# Patient Record
Sex: FEMALE | Race: WHITE | NOT HISPANIC OR LATINO | Employment: OTHER | URBAN - NONMETROPOLITAN AREA
[De-identification: names, ages, dates, MRNs, and addresses within clinical notes are randomized per-mention and may not be internally consistent; named-entity substitution may affect disease eponyms.]

---

## 2017-01-09 ENCOUNTER — LAB (OUTPATIENT)
Dept: LAB | Facility: HOSPITAL | Age: 82
End: 2017-01-09
Attending: INTERNAL MEDICINE

## 2017-01-09 ENCOUNTER — OFFICE VISIT (OUTPATIENT)
Dept: CARDIOLOGY | Facility: CLINIC | Age: 82
End: 2017-01-09

## 2017-01-09 VITALS
HEIGHT: 65 IN | HEART RATE: 77 BPM | BODY MASS INDEX: 23.56 KG/M2 | DIASTOLIC BLOOD PRESSURE: 62 MMHG | WEIGHT: 141.4 LBS | OXYGEN SATURATION: 96 % | SYSTOLIC BLOOD PRESSURE: 140 MMHG

## 2017-01-09 DIAGNOSIS — F51.01 PRIMARY INSOMNIA: ICD-10-CM

## 2017-01-09 DIAGNOSIS — M25.562 LEFT KNEE PAIN, UNSPECIFIED CHRONICITY: ICD-10-CM

## 2017-01-09 DIAGNOSIS — D69.6 THROMBOCYTOPENIA (HCC): ICD-10-CM

## 2017-01-09 DIAGNOSIS — K74.60 CIRRHOSIS OF LIVER WITHOUT ASCITES, UNSPECIFIED HEPATIC CIRRHOSIS TYPE (HCC): ICD-10-CM

## 2017-01-09 DIAGNOSIS — K74.60 CIRRHOSIS OF LIVER WITHOUT ASCITES, UNSPECIFIED HEPATIC CIRRHOSIS TYPE (HCC): Primary | ICD-10-CM

## 2017-01-09 DIAGNOSIS — E03.4 HYPOTHYROIDISM DUE TO ACQUIRED ATROPHY OF THYROID: ICD-10-CM

## 2017-01-09 DIAGNOSIS — Q78.2 OSTEOPETROSIS: ICD-10-CM

## 2017-01-09 DIAGNOSIS — I10 ESSENTIAL HYPERTENSION: ICD-10-CM

## 2017-01-09 DIAGNOSIS — F41.1 GENERALIZED ANXIETY DISORDER: ICD-10-CM

## 2017-01-09 DIAGNOSIS — N19 RENAL FAILURE: ICD-10-CM

## 2017-01-09 LAB
ALBUMIN SERPL-MCNC: 3.9 G/DL (ref 3.4–4.8)
ALBUMIN/GLOB SERPL: 0.9 G/DL (ref 1.5–2.5)
ALP SERPL-CCNC: 103 U/L (ref 46–116)
ALT SERPL W P-5'-P-CCNC: 20 U/L (ref 10–36)
AMMONIA BLD-SCNC: 56 UMOL/L (ref 11–51)
ANION GAP SERPL CALCULATED.3IONS-SCNC: 8.3 MMOL/L (ref 3.6–11.2)
AST SERPL-CCNC: 56 U/L (ref 10–30)
BASOPHILS # BLD AUTO: 0.05 10*3/MM3 (ref 0–0.3)
BASOPHILS NFR BLD AUTO: 0.6 % (ref 0–2)
BILIRUB SERPL-MCNC: 1.1 MG/DL (ref 0.2–1.8)
BUN BLD-MCNC: 29 MG/DL (ref 7–21)
BUN/CREAT SERPL: 13.4 (ref 7–25)
CALCIUM SPEC-SCNC: 9.8 MG/DL (ref 7.7–10)
CHLORIDE SERPL-SCNC: 95 MMOL/L (ref 99–112)
CHOLEST SERPL-MCNC: 204 MG/DL (ref 0–200)
CO2 SERPL-SCNC: 33.7 MMOL/L (ref 24.3–31.9)
CREAT BLD-MCNC: 2.16 MG/DL (ref 0.43–1.29)
DEPRECATED RDW RBC AUTO: 49.4 FL (ref 37–54)
EOSINOPHIL # BLD AUTO: 0.29 10*3/MM3 (ref 0–0.7)
EOSINOPHIL NFR BLD AUTO: 3.5 % (ref 0–7)
ERYTHROCYTE [DISTWIDTH] IN BLOOD BY AUTOMATED COUNT: 14.9 % (ref 11.5–14.5)
GFR SERPL CREATININE-BSD FRML MDRD: 22 ML/MIN/1.73
GLOBULIN UR ELPH-MCNC: 4.2 GM/DL
GLUCOSE BLD-MCNC: 77 MG/DL (ref 70–110)
HCT VFR BLD AUTO: 39.7 % (ref 37–47)
HDLC SERPL-MCNC: 104 MG/DL (ref 60–100)
HGB BLD-MCNC: 12.9 G/DL (ref 12–16)
IMM GRANULOCYTES # BLD: 0.02 10*3/MM3 (ref 0–0.03)
IMM GRANULOCYTES NFR BLD: 0.2 % (ref 0–0.5)
INR PPP: 1.03 (ref 0.8–1.1)
LDLC SERPL CALC-MCNC: 77 MG/DL (ref 0–100)
LDLC/HDLC SERPL: 0.74 {RATIO}
LYMPHOCYTES # BLD AUTO: 1.8 10*3/MM3 (ref 1–3)
LYMPHOCYTES NFR BLD AUTO: 21.8 % (ref 16–46)
MCH RBC QN AUTO: 31.6 PG (ref 27–33)
MCHC RBC AUTO-ENTMCNC: 32.5 G/DL (ref 33–37)
MCV RBC AUTO: 97.3 FL (ref 80–94)
MONOCYTES # BLD AUTO: 1.08 10*3/MM3 (ref 0.1–0.9)
MONOCYTES NFR BLD AUTO: 13.1 % (ref 0–12)
NEUTROPHILS # BLD AUTO: 5.02 10*3/MM3 (ref 1.4–6.5)
NEUTROPHILS NFR BLD AUTO: 60.8 % (ref 40–75)
OSMOLALITY SERPL CALC.SUM OF ELEC: 278.5 MOSM/KG (ref 273–305)
PLATELET # BLD AUTO: 128 10*3/MM3 (ref 130–400)
PMV BLD AUTO: 12 FL (ref 6–10)
POTASSIUM BLD-SCNC: 4.9 MMOL/L (ref 3.5–5.3)
PROT SERPL-MCNC: 8.1 G/DL (ref 6–8)
PROTHROMBIN TIME: 11.6 SECONDS (ref 9.8–11.9)
RBC # BLD AUTO: 4.08 10*6/MM3 (ref 4.2–5.4)
SODIUM BLD-SCNC: 137 MMOL/L (ref 135–153)
TRIGL SERPL-MCNC: 116 MG/DL (ref 0–150)
VLDLC SERPL-MCNC: 23.2 MG/DL
WBC NRBC COR # BLD: 8.26 10*3/MM3 (ref 4.5–12.5)

## 2017-01-09 PROCEDURE — 85025 COMPLETE CBC W/AUTO DIFF WBC: CPT | Performed by: INTERNAL MEDICINE

## 2017-01-09 PROCEDURE — 99214 OFFICE O/P EST MOD 30 MIN: CPT | Performed by: INTERNAL MEDICINE

## 2017-01-09 PROCEDURE — 80053 COMPREHEN METABOLIC PANEL: CPT | Performed by: INTERNAL MEDICINE

## 2017-01-09 PROCEDURE — 36415 COLL VENOUS BLD VENIPUNCTURE: CPT

## 2017-01-09 PROCEDURE — 80061 LIPID PANEL: CPT | Performed by: INTERNAL MEDICINE

## 2017-01-09 PROCEDURE — 82140 ASSAY OF AMMONIA: CPT | Performed by: INTERNAL MEDICINE

## 2017-01-09 PROCEDURE — 85610 PROTHROMBIN TIME: CPT | Performed by: INTERNAL MEDICINE

## 2017-01-09 NOTE — MR AVS SNAPSHOT
Alma Delia Garcia   1/9/2017 12:15 PM   Office Visit    Dept Phone:  [phone]   Encounter #:  [id number]    Provider:  Galina Gonsalves MD   Department:  [facility] CARDIOLOGY                Your Full Care Plan              Your Updated Medication List          This list is accurate as of: 1/9/17  2:51 PM.  Always use your most recent med list.                alendronate 70 MG tablet   Commonly known as:  FOSAMAX   Take 1 tablet by mouth Every 7 (Seven) Days. Takes on mondays       aspirin 81 MG tablet   Take 1 tablet by mouth Daily.       esomeprazole 40 MG capsule   Commonly known as:  nexIUM   Take 1 capsule by mouth Every Morning Before Breakfast.       folic acid 1 MG tablet   Commonly known as:  FOLVITE   Take 1 tablet by mouth Every Evening. Pt takes about noon       furosemide 20 MG tablet   Commonly known as:  LASIX   Take 1 tablet by mouth Daily.       HYDROcodone-acetaminophen 7.5-325 MG per tablet   Commonly known as:  NORCO   Take 1 tablet by mouth Every 8 (Eight) Hours As Needed for moderate pain (4-6).       lactulose 10 GM/15ML solution   Commonly known as:  CHRONULAC   Take 30 mL by mouth 3 (Three) Times a Day.       levothyroxine 50 MCG tablet   Commonly known as:  SYNTHROID, LEVOTHROID   TAKE ONE TABLET BY MOUTH DAILY       losartan 25 MG tablet   Commonly known as:  COZAAR   Take 1 tablet by mouth Daily.       NITROSTAT 0.4 MG SL tablet   Generic drug:  nitroglycerin   TAKE ONE TABLET UNDER THE TONGUE AS NEEDED       potassium chloride 10 MEQ CR tablet   Commonly known as:  K-DUR,KLOR-CON   Take 1 tablet by mouth Daily.       rifaximin 550 MG tablet   Commonly known as:  XIFAXAN   Take 1 tablet by mouth Every 12 (Twelve) Hours.       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  ICD-9-CM: 756.52     Left knee pain, unspecified chronicity     ICD-10-CM: M25.562  ICD-9-CM: 719.46     Renal failure     ICD-10-CM: N19  ICD-9-CM: 586     Thrombocytopenia     ICD-10-CM: D69.6  ICD-9-CM: 287.5     Primary insomnia     ICD-10-CM: F51.01  ICD-9-CM: 307.42     Generalized anxiety disorder     ICD-10-CM: F41.1  ICD-9-CM: 300.02       Instructions     None    Patient Instructions History      Upcoming Appointments     Visit Type Date Time Department    SAME DAY 1/9/2017 12:15 PM [facility] CARDIOLOGY [facility]    FOLLOW UP 3/2/2017  2:45 PM [facility] CARDIOLOGY [facility]      MyChart Signup     Our records indicate that you have declined [facility] signup. If you would like to sign up for [facility], please email [employer] [facility] [email or web address] or call [phone] to obtain an activation code.             Other Info from Your Visit           Your Appointments     Mar 02, 2017  2:45 PM EST   Follow Up with Galina Gonsalves MD   [facility] (--)    [address] [place] [address] [phone]           Arrive 15 minutes prior to appointment.              Allergies     Ciprofloxacin        Reason for Visit     Dizziness     Muscle Pain having it all over    Nausea     Arm Pain left arm is bruised and pt said knots keep appering all over it.       Vital Signs     Blood Pressure Pulse Height Weight Oxygen Saturation Body Mass Index    140/62 (BP Location: Left arm, Patient Position: Sitting) 77 65\" (165.1 cm) 141 lb 6.4 oz (64.1 kg) 96% 23.53 kg/m2    Smoking Status                   Never " Smoker           Problems and Diagnoses Noted     Anxiety disorder    Cirrhosis    High blood pressure    Underactive thyroid    Pain in left knee    Congenital anomaly of skeletal bone    Difficulty falling or staying asleep    Kidney failure    Decreased platelet count      Results

## 2017-01-09 NOTE — PROGRESS NOTES
subjective     Chief Complaint   Patient presents with   • Dizziness   • Muscle Pain     having it all over   • Nausea   • Arm Pain     left arm is bruised and pt said knots keep appering all over it.      History of Present Illness  Patient complains of nervousness and anxiety wants nerve medications.  She also says that she cannot sleep and wants sleeping medications.  Estuardo lengthy discussion with the patient because she has hepatic cirrhosis with multiple episodes of hepatic encephalopathy.  As matter of fact she has been just discharged from [facility] with altered mental status .  Hepatic cirrhosis is due to chronic alcoholism.  Patient states that she used to carry beer with her wherever she went and currently she has not had any alcohol intake    Hepatic cirrhosis  Patient has hepatic cirrhosis and episodes of hepatic encephalopathy in the past. She presented to [facility] and with the altered mental status and was transferred to [facility]. Ammonia level was high at 155. Patient was stabilized at [facility] and was discharged home. Patient is here for follow-up and further care. Patient is now awake and alert but complains of being weak and tired wants more nerve and pain medications which were declined. She is taking Xifaxan 550 mg every 12 hours but she is very reluctant to take the lactulose. She said that she is taking once or twice but she cannot take it 3 times. She is having only one bowel movement a day. It was discussed with her that she should be having loose bowel movements and at least 2 or 3 bowel movements a day to ammonia level low.     Chronic pain syndrome  Patient has history of back pain and has been taking hydrocodone 7.5/325 every 8 hours .she was advised to decrease as much as possible as she is taking 3 times a day. Patient states that she cannot cut it down at all because she cries with pain particularly lately because of the left  leg also     Anxiety disorder.  Patient complains of anxiety however anxiety medications were discontinued in the past because of her multiple episodes of hepatic encephalopathy.    Alma Delia Garcia has long-standing essential hypertension. she is taking medications regularly. There are no medication side effects. Blood pressure is very well controlled. There has been no headache nausea chest pain. There has been no syncopal or presyncopal episode. she denies episodes of hypo-tension or accelerated hypertension.  GERD  Alma Delia Garica has gastroesophageal reflux disorder however she significantly better on medications. There is no nausea or vomiting. No hematemesis or melena. No abdominal pain. Mild epigastric heartburns are noted. Appetite is good bowel movements are normal.  Hypothyroidism  Alma Delia Garcia has long-standing history of hypothyroidism.she is taking Synthroid. Doing very well. No drug side effects. No change in Weight. No cold intolerance. denies any constipation. No hair loss.     Patient Active Problem List   Diagnosis   • Hepatic cirrhosis*   • HTN (hypertension)*   • Anxiety disorder*   • Osteopetrosis*   • Hypothyroidism*   • GERD (gastroesophageal reflux disease)*   • Chronic pain syndrome due to fractured spine and left rib fracture*   • Anemia, chronic disease   • Hepatic encephalopathy   • Arthralgia of hip   • Left knee pain   • Fracture of proximal end of tibia and fibula   • Primary insomnia   • Thrombocytopenia   • Renal failure       Social History   Substance Use Topics   • Smoking status: Never Smoker   • Smokeless tobacco: Former User     Types: Snuff     Quit date: 1970   • Alcohol use No      Comment: FORMER 12 BOTTLES PER DAY FOR 15 YEARS       Allergies   Allergen Reactions   • Ciprofloxacin          Current Outpatient Prescriptions:   •  alendronate (FOSAMAX) 70 MG tablet, Take 1 tablet by mouth Every 7 (Seven) Days. Takes on mondays, Disp: 12 tablet, Rfl: 0  •  aspirin 81 MG  tablet, Take 1 tablet by mouth Daily., Disp: 90 tablet, Rfl: 2  •  esomeprazole (nexIUM) 40 MG capsule, Take 1 capsule by mouth Every Morning Before Breakfast., Disp: 90 capsule, Rfl: 3  •  folic acid (FOLVITE) 1 MG tablet, Take 1 tablet by mouth Every Evening. Pt takes about noon, Disp: 90 tablet, Rfl: 0  •  furosemide (LASIX) 20 MG tablet, Take 1 tablet by mouth Daily., Disp: 30 tablet, Rfl: 0  •  HYDROcodone-acetaminophen (NORCO) 7.5-325 MG per tablet, Take 1 tablet by mouth Every 8 (Eight) Hours As Needed for moderate pain (4-6)., Disp: 90 tablet, Rfl: 0  •  lactulose (CHRONULAC) 10 GM/15ML solution, Take 30 mL by mouth 3 (Three) Times a Day., Disp: 236 mL, Rfl: 0  •  levothyroxine (SYNTHROID, LEVOTHROID) 50 MCG tablet, TAKE ONE TABLET BY MOUTH DAILY, Disp: 90 tablet, Rfl: 0  •  losartan (COZAAR) 25 MG tablet, Take 1 tablet by mouth Daily., Disp: 90 tablet, Rfl: 2  •  NITROSTAT 0.4 MG SL tablet, TAKE ONE TABLET UNDER THE TONGUE AS NEEDED, Disp: 25 tablet, Rfl: 0  •  potassium chloride (K-DUR,KLOR-CON) 10 MEQ CR tablet, Take 1 tablet by mouth Daily., Disp: 90 tablet, Rfl: 2  •  rifaximin (XIFAXAN) 550 MG tablet, Take 1 tablet by mouth Every 12 (Twelve) Hours., Disp: 180 tablet, Rfl: 0  •  rOPINIRole (REQUIP) 1 MG tablet, TAKE ONE TABLET BY MOUTH AT BEDTIME, Disp: 90 tablet, Rfl: 0      The following portions of the patient's history were reviewed and updated as appropriate: allergies, current medications, past family history, past medical history, past social history, past surgical history and problem list.    Review of Systems   Constitution: Positive for weakness and malaise/fatigue.   HENT: Negative.    Cardiovascular: Negative for chest pain, dyspnea on exertion, palpitations and paroxysmal nocturnal dyspnea.   Musculoskeletal: Positive for arthritis, back pain, myalgias and stiffness.   Gastrointestinal: Positive for nausea. Negative for abdominal pain, diarrhea, dysphagia, heartburn and vomiting.  "  Genitourinary: Negative.    Psychiatric/Behavioral: The patient has insomnia and is nervous/anxious.           Objective:     Visit Vitals   • /62 (BP Location: Left arm, Patient Position: Sitting)   • Pulse 77   • Ht 65\" (165.1 cm)   • Wt 141 lb 6.4 oz (64.1 kg)   • SpO2 96%   • BMI 23.53 kg/m2     Physical Exam   Constitutional: She appears well-developed and well-nourished.   HENT:   Head: Normocephalic and atraumatic.   Mouth/Throat: Oropharynx is clear and moist.   Eyes: Conjunctivae and EOM are normal. Pupils are equal, round, and reactive to light. No scleral icterus.   Neck: Normal range of motion. Neck supple. No JVD present. No tracheal deviation present. No thyromegaly present.   Cardiovascular: Normal rate, regular rhythm, normal heart sounds and intact distal pulses.  Exam reveals no friction rub.    No murmur heard.  Pulmonary/Chest: Effort normal and breath sounds normal. No respiratory distress. She has no wheezes. She has no rales. She exhibits no tenderness.   Abdominal: Soft. Bowel sounds are normal. She exhibits no distension and no mass. There is no tenderness. There is no rebound and no guarding.   Musculoskeletal: Normal range of motion. She exhibits no edema, tenderness or deformity.   Lymphadenopathy:     She has no cervical adenopathy.   Neurological: She is alert. She has normal reflexes. No cranial nerve deficit. She exhibits normal muscle tone. Coordination normal.   Skin: Skin is warm and dry.   Psychiatric: She has a normal mood and affect. Her behavior is normal. Judgment and thought content normal.         Lab Review  Lab Results   Component Value Date     01/09/2017    K 4.9 01/09/2017    CL 95 (L) 01/09/2017    BUN 29 (H) 01/09/2017    CREATININE 2.16 (H) 01/09/2017    GLUCOSE 77 01/09/2017    CALCIUM 9.8 01/09/2017    ALT 20 01/09/2017    ALKPHOS 103 01/09/2017    LABIL2 0.9 (L) 01/09/2017     Lab Results   Component Value Date    CKTOTAL 47 10/05/2016     Lab " Results   Component Value Date    WBC 8.26 01/09/2017    HGB 12.9 01/09/2017    HCT 39.7 01/09/2017     (L) 01/09/2017     Lab Results   Component Value Date    INR 1.03 01/09/2017    INR 1.12 (H) 12/31/2016    INR 1.07 11/12/2016     Lab Results   Component Value Date    MG 1.7 11/27/2015     Lab Results   Component Value Date    TSH 3.407 12/31/2016     Lab Results   Component Value Date    BNP 35.0 12/31/2016     Lab Results   Component Value Date    CHLPL 161 04/08/2016     Lab Results   Component Value Date    CHOL 204 (H) 01/09/2017    TRIG 116 01/09/2017     (H) 01/09/2017    LDLCALC 77 01/09/2017    VLDL 23.2 01/09/2017    LDLHDL 0.74 01/09/2017         Procedures     I personally viewed and interpreted the patient's LAB data         Assessment:      Diagnosis Plan   1. Cirrhosis of liver without ascites, unspecified hepatic cirrhosis type  Ammonia   2. Essential hypertension  CBC & Differential    Lipid Panel    CBC Auto Differential   3. Hypothyroidism due to acquired atrophy of thyroid     4. Osteopetrosis*     5. Left knee pain, unspecified chronicity     6. Renal failure  Comprehensive Metabolic Panel    Lipid Panel    Osmolality, Calculated    Osmolality, Calculated   7. Thrombocytopenia  Protime-INR   8. Primary insomnia     9. Generalized anxiety disorder            Plan:     Patient's record from [facility] were reviewed.  Patient was admitted there with altered mental status.  No definite diagnosis was made and patient was sent home with possibility of dehydration and mild renal failure.  Patient still does have significant renal failure with creatinine of 2.16    Because of patient's multiple episodes of hepatic encephalopathy it was encouraged that she did not take any narcotics or sedatives as much as possible.  No new medications were given although patient wishes to take medication for nerves and insomnia.    Her renal functions have improved  somewhat.  She does have chronic thrombocytopenia probably related to hepatic encephalopathy.  Lab work was drawn.    Patient was advised to DC Lasix and potassium    No Follow-up on file.

## 2017-01-11 ENCOUNTER — TELEPHONE (OUTPATIENT)
Dept: CARDIOLOGY | Facility: CLINIC | Age: 82
End: 2017-01-11

## 2017-01-16 RX ORDER — FOLIC ACID 1 MG/1
1 TABLET ORAL EVERY EVENING
Qty: 90 TABLET | Refills: 0 | Status: SHIPPED | OUTPATIENT
Start: 2017-01-16 | End: 2017-04-18 | Stop reason: SDUPTHER

## 2017-01-16 RX ORDER — ALENDRONATE SODIUM 70 MG/1
70 TABLET ORAL
Qty: 12 TABLET | Refills: 0 | Status: SHIPPED | OUTPATIENT
Start: 2017-01-16 | End: 2017-04-18 | Stop reason: SDUPTHER

## 2017-01-16 RX ORDER — LOSARTAN POTASSIUM 25 MG/1
25 TABLET ORAL DAILY
Qty: 90 TABLET | Refills: 2 | Status: SHIPPED | OUTPATIENT
Start: 2017-01-16 | End: 2017-03-30

## 2017-01-16 RX ORDER — LEVOTHYROXINE SODIUM 0.05 MG/1
50 TABLET ORAL DAILY
Qty: 90 TABLET | Refills: 0 | Status: SHIPPED | OUTPATIENT
Start: 2017-01-16 | End: 2017-05-18

## 2017-01-16 RX ORDER — ROPINIROLE 1 MG/1
1 TABLET, FILM COATED ORAL NIGHTLY
Qty: 90 TABLET | Refills: 0 | Status: SHIPPED | OUTPATIENT
Start: 2017-01-16 | End: 2017-04-18 | Stop reason: SDUPTHER

## 2017-01-16 RX ORDER — ESOMEPRAZOLE MAGNESIUM 40 MG/1
40 CAPSULE, DELAYED RELEASE ORAL
Qty: 90 CAPSULE | Refills: 0 | Status: SHIPPED | OUTPATIENT
Start: 2017-01-16 | End: 2017-07-25 | Stop reason: SDUPTHER

## 2017-01-16 RX ORDER — LACTULOSE 10 G/15ML
20 SOLUTION ORAL 3 TIMES DAILY
Qty: 236 ML | Refills: 0 | Status: ON HOLD | OUTPATIENT
Start: 2017-01-16 | End: 2017-07-15

## 2017-01-16 RX ORDER — NITROGLYCERIN 0.4 MG/1
0.4 TABLET SUBLINGUAL
Qty: 25 TABLET | Refills: 0 | Status: SHIPPED | OUTPATIENT
Start: 2017-01-16 | End: 2018-03-26 | Stop reason: SDUPTHER

## 2017-01-16 NOTE — TELEPHONE ENCOUNTER
Pt states that when she was transferred from the hospital to rehab and back home her bag of maintenance  meds are now missing. Requesting refills for everything.

## 2017-01-19 RX ORDER — ROPINIROLE 1 MG/1
TABLET, FILM COATED ORAL
Qty: 90 TABLET | Refills: 0 | OUTPATIENT
Start: 2017-01-19

## 2017-01-19 RX ORDER — FUROSEMIDE 20 MG/1
20 TABLET ORAL DAILY
Qty: 30 TABLET | Refills: 0 | Status: SHIPPED | OUTPATIENT
Start: 2017-01-19 | End: 2017-03-24 | Stop reason: SDUPTHER

## 2017-01-19 NOTE — TELEPHONE ENCOUNTER
Informed pt to make sure she is taking xifazan 550 bid  Asa 81 qd  Synthroid 50 qd  Lasix 20 qd   and we will recheck cbc and cmp Monday.

## 2017-01-26 ENCOUNTER — TELEPHONE (OUTPATIENT)
Dept: CARDIOLOGY | Facility: CLINIC | Age: 82
End: 2017-01-26

## 2017-01-27 RX ORDER — HYDROCODONE BITARTRATE AND ACETAMINOPHEN 7.5; 325 MG/1; MG/1
1 TABLET ORAL EVERY 8 HOURS PRN
Qty: 90 TABLET | Refills: 0 | Status: SHIPPED | OUTPATIENT
Start: 2017-01-27 | End: 2017-02-28 | Stop reason: SDUPTHER

## 2017-02-03 ENCOUNTER — OFFICE VISIT (OUTPATIENT)
Dept: CARDIOLOGY | Facility: CLINIC | Age: 82
End: 2017-02-03

## 2017-02-03 VITALS
SYSTOLIC BLOOD PRESSURE: 144 MMHG | HEART RATE: 64 BPM | BODY MASS INDEX: 23.19 KG/M2 | HEIGHT: 65 IN | OXYGEN SATURATION: 98 % | WEIGHT: 139.2 LBS | DIASTOLIC BLOOD PRESSURE: 68 MMHG

## 2017-02-03 DIAGNOSIS — D69.6 THROMBOCYTOPENIA (HCC): ICD-10-CM

## 2017-02-03 DIAGNOSIS — G89.4 CHRONIC PAIN SYNDROME: ICD-10-CM

## 2017-02-03 DIAGNOSIS — F41.1 GENERALIZED ANXIETY DISORDER: ICD-10-CM

## 2017-02-03 DIAGNOSIS — K74.60 CIRRHOSIS OF LIVER WITHOUT ASCITES, UNSPECIFIED HEPATIC CIRRHOSIS TYPE (HCC): ICD-10-CM

## 2017-02-03 DIAGNOSIS — I10 ESSENTIAL HYPERTENSION: Primary | ICD-10-CM

## 2017-02-03 DIAGNOSIS — E03.4 HYPOTHYROIDISM DUE TO ACQUIRED ATROPHY OF THYROID: ICD-10-CM

## 2017-02-03 PROCEDURE — 99214 OFFICE O/P EST MOD 30 MIN: CPT | Performed by: INTERNAL MEDICINE
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
## 2017-02-27 ENCOUNTER — HOSPITAL ENCOUNTER (OUTPATIENT)
Dept: CARDIOLOGY | Facility: HOSPITAL | Age: 82
Discharge: HOME | End: 2017-02-27
Attending: INTERNAL MEDICINE | Admitting: INTERNAL MEDICINE

## 2017-02-27 ENCOUNTER — HOSPITAL ENCOUNTER (OUTPATIENT)
Dept: CARDIOLOGY | Facility: HOSPITAL | Age: 82
Discharge: HOME | End: 2017-02-27
Attending: INTERNAL MEDICINE

## 2017-02-27 ENCOUNTER — TELEPHONE (OUTPATIENT)
Dept: CARDIOLOGY | Facility: CLINIC | Age: 82
End: 2017-02-27

## 2017-02-27 DIAGNOSIS — I73.9 PAD (PERIPHERAL ARTERY DISEASE) (HCC): ICD-10-CM

## 2017-02-27 DIAGNOSIS — R60.0 BILATERAL LEG EDEMA: ICD-10-CM

## 2017-02-27 PROCEDURE — 93925 LOWER EXTREMITY STUDY: CPT

## 2017-02-27 PROCEDURE — 93925 LOWER EXTREMITY STUDY: CPT | Performed by: RADIOLOGY

## 2017-02-27 PROCEDURE — 93970 EXTREMITY STUDY: CPT | Performed by: RADIOLOGY

## 2017-02-27 PROCEDURE — 93970 EXTREMITY STUDY: CPT

## 2017-02-28 ENCOUNTER — TELEPHONE (OUTPATIENT)
Dept: CARDIOLOGY | Facility: CLINIC | Age: 82
End: 2017-02-28

## 2017-02-28 DIAGNOSIS — I73.9 PAD (PERIPHERAL ARTERY DISEASE) (HCC): Primary | ICD-10-CM

## 2017-02-28 RX ORDER — HYDROCODONE BITARTRATE AND ACETAMINOPHEN 7.5; 325 MG/1; MG/1
1 TABLET ORAL EVERY 8 HOURS PRN
Qty: 90 TABLET | Refills: 0 | Status: SHIPPED | OUTPATIENT
Start: 2017-02-28 | End: 2017-03-28 | Stop reason: SDUPTHER

## 2017-03-15 ENCOUNTER — OFFICE VISIT (OUTPATIENT)
Dept: CARDIAC SURGERY | Facility: CLINIC | Age: 82
End: 2017-03-15

## 2017-03-15 VITALS
BODY MASS INDEX: 23.16 KG/M2 | WEIGHT: 139 LBS | HEIGHT: 65 IN | SYSTOLIC BLOOD PRESSURE: 142 MMHG | HEART RATE: 72 BPM | DIASTOLIC BLOOD PRESSURE: 62 MMHG

## 2017-03-15 DIAGNOSIS — I73.9 PERIPHERAL VASCULAR DISEASE (HCC): Primary | ICD-10-CM

## 2017-03-15 PROCEDURE — 99203 OFFICE O/P NEW LOW 30 MIN: CPT | Performed by: THORACIC SURGERY (CARDIOTHORACIC VASCULAR SURGERY)

## 2017-03-22 ENCOUNTER — APPOINTMENT (OUTPATIENT)
Dept: CT IMAGING | Facility: HOSPITAL | Age: 82
End: 2017-03-22

## 2017-03-27 RX ORDER — FUROSEMIDE 20 MG/1
TABLET ORAL
Qty: 30 TABLET | Refills: 0 | Status: SHIPPED | OUTPATIENT
Start: 2017-03-27 | End: 2017-03-30 | Stop reason: SDUPTHER

## 2017-03-28 RX ORDER — HYDROCODONE BITARTRATE AND ACETAMINOPHEN 7.5; 325 MG/1; MG/1
1 TABLET ORAL EVERY 8 HOURS PRN
Qty: 90 TABLET | Refills: 0 | Status: SHIPPED | OUTPATIENT
Start: 2017-03-28 | End: 2017-03-30 | Stop reason: SDUPTHER

## 2017-03-29 ENCOUNTER — HOSPITAL ENCOUNTER (OUTPATIENT)
Dept: CT IMAGING | Facility: HOSPITAL | Age: 82
Discharge: HOME | End: 2017-03-29
Admitting: THORACIC SURGERY (CARDIOTHORACIC VASCULAR SURGERY)

## 2017-03-29 LAB — CREAT BLDA-MCNC: 0.9 MG/DL (ref 0.6–1.3)

## 2017-03-29 PROCEDURE — 0 IOPAMIDOL 61 % SOLUTION: Performed by: THORACIC SURGERY (CARDIOTHORACIC VASCULAR SURGERY)

## 2017-03-29 PROCEDURE — 75635 CT ANGIO ABDOMINAL ARTERIES: CPT | Performed by: RADIOLOGY

## 2017-03-29 PROCEDURE — 82565 ASSAY OF CREATININE: CPT

## 2017-03-29 PROCEDURE — 75635 CT ANGIO ABDOMINAL ARTERIES: CPT

## 2017-03-29 RX ADMIN — IOPAMIDOL 100 ML: 612 INJECTION, SOLUTION INTRAVENOUS at 14:02

## 2017-03-30 ENCOUNTER — OFFICE VISIT (OUTPATIENT)
Dept: CARDIOLOGY | Facility: CLINIC | Age: 82
End: 2017-03-30

## 2017-03-30 VITALS
BODY MASS INDEX: 24.93 KG/M2 | WEIGHT: 149.6 LBS | OXYGEN SATURATION: 97 % | HEIGHT: 65 IN | SYSTOLIC BLOOD PRESSURE: 162 MMHG | HEART RATE: 67 BPM | DIASTOLIC BLOOD PRESSURE: 64 MMHG

## 2017-03-30 DIAGNOSIS — K74.60 CIRRHOSIS OF LIVER WITHOUT ASCITES, UNSPECIFIED HEPATIC CIRRHOSIS TYPE (HCC): ICD-10-CM

## 2017-03-30 DIAGNOSIS — N19 RENAL FAILURE: ICD-10-CM

## 2017-03-30 DIAGNOSIS — R60.0 BILATERAL LEG EDEMA: ICD-10-CM

## 2017-03-30 DIAGNOSIS — D69.6 THROMBOCYTOPENIA (HCC): ICD-10-CM

## 2017-03-30 DIAGNOSIS — E03.4 HYPOTHYROIDISM DUE TO ACQUIRED ATROPHY OF THYROID: ICD-10-CM

## 2017-03-30 DIAGNOSIS — K21.9 GASTROESOPHAGEAL REFLUX DISEASE WITHOUT ESOPHAGITIS: ICD-10-CM

## 2017-03-30 DIAGNOSIS — G89.4 CHRONIC PAIN SYNDROME: ICD-10-CM

## 2017-03-30 DIAGNOSIS — D63.8 ANEMIA, CHRONIC DISEASE: ICD-10-CM

## 2017-03-30 DIAGNOSIS — I10 ESSENTIAL HYPERTENSION: Primary | ICD-10-CM

## 2017-03-30 DIAGNOSIS — F41.1 GENERALIZED ANXIETY DISORDER: ICD-10-CM

## 2017-03-30 DIAGNOSIS — F51.01 PRIMARY INSOMNIA: ICD-10-CM

## 2017-03-30 LAB
ANION GAP SERPL CALCULATED.3IONS-SCNC: 3.5 MMOL/L (ref 3.6–11.2)
BUN BLD-MCNC: 14 MG/DL (ref 7–21)
BUN/CREAT SERPL: 16.9 (ref 7–25)
CALCIUM SPEC-SCNC: 9.2 MG/DL (ref 7.7–10)
CHLORIDE SERPL-SCNC: 107 MMOL/L (ref 99–112)
CO2 SERPL-SCNC: 27.5 MMOL/L (ref 24.3–31.9)
CREAT BLD-MCNC: 0.83 MG/DL (ref 0.43–1.29)
GFR SERPL CREATININE-BSD FRML MDRD: 65 ML/MIN/1.73
GLUCOSE BLD-MCNC: 88 MG/DL (ref 70–110)
OSMOLALITY SERPL CALC.SUM OF ELEC: 275.6 MOSM/KG (ref 273–305)
POTASSIUM BLD-SCNC: 4.7 MMOL/L (ref 3.5–5.3)
SODIUM BLD-SCNC: 138 MMOL/L (ref 135–153)

## 2017-03-30 PROCEDURE — 80048 BASIC METABOLIC PNL TOTAL CA: CPT | Performed by: INTERNAL MEDICINE

## 2017-03-30 PROCEDURE — 99214 OFFICE O/P EST MOD 30 MIN: CPT | Performed by: INTERNAL MEDICINE

## 2017-03-30 RX ORDER — HYDROCHLOROTHIAZIDE 12.5 MG/1
12.5 CAPSULE, GELATIN COATED ORAL DAILY
Qty: 30 CAPSULE | Refills: 11 | Status: SHIPPED | OUTPATIENT
Start: 2017-03-30 | End: 2017-05-18

## 2017-03-30 RX ORDER — LOSARTAN POTASSIUM 25 MG/1
50 TABLET ORAL DAILY
Qty: 90 TABLET | Refills: 2 | Status: SHIPPED | OUTPATIENT
Start: 2017-03-30 | End: 2017-05-18

## 2017-03-30 RX ORDER — HYDROCODONE BITARTRATE AND ACETAMINOPHEN 7.5; 325 MG/1; MG/1
1 TABLET ORAL EVERY 8 HOURS PRN
Qty: 90 TABLET | Refills: 0 | Status: SHIPPED | OUTPATIENT
Start: 2017-03-30 | End: 2017-04-28 | Stop reason: SDUPTHER

## 2017-03-30 RX ORDER — FUROSEMIDE 20 MG/1
40 TABLET ORAL DAILY
Qty: 30 TABLET | Refills: 0 | Status: SHIPPED | OUTPATIENT
Start: 2017-03-30 | End: 2017-05-10 | Stop reason: SDUPTHER

## 2017-04-18 ENCOUNTER — OFFICE VISIT (OUTPATIENT)
Dept: CARDIOLOGY | Facility: CLINIC | Age: 82
End: 2017-04-18

## 2017-04-18 VITALS
HEIGHT: 65 IN | HEART RATE: 71 BPM | OXYGEN SATURATION: 98 % | SYSTOLIC BLOOD PRESSURE: 139 MMHG | BODY MASS INDEX: 23.69 KG/M2 | DIASTOLIC BLOOD PRESSURE: 67 MMHG | WEIGHT: 142.2 LBS

## 2017-04-18 DIAGNOSIS — K74.60 CIRRHOSIS OF LIVER WITH ASCITES, UNSPECIFIED HEPATIC CIRRHOSIS TYPE (HCC): ICD-10-CM

## 2017-04-18 DIAGNOSIS — E03.9 ACQUIRED HYPOTHYROIDISM: ICD-10-CM

## 2017-04-18 DIAGNOSIS — I15.9 SECONDARY HYPERTENSION: ICD-10-CM

## 2017-04-18 DIAGNOSIS — I10 ESSENTIAL HYPERTENSION: Primary | ICD-10-CM

## 2017-04-18 DIAGNOSIS — R18.8 CIRRHOSIS OF LIVER WITH ASCITES, UNSPECIFIED HEPATIC CIRRHOSIS TYPE (HCC): ICD-10-CM

## 2017-04-18 DIAGNOSIS — E03.4 HYPOTHYROIDISM DUE TO ACQUIRED ATROPHY OF THYROID: ICD-10-CM

## 2017-04-18 LAB
AMMONIA BLD-SCNC: 91 UMOL/L (ref 11–51)
ANION GAP SERPL CALCULATED.3IONS-SCNC: 5.8 MMOL/L (ref 3.6–11.2)
BASOPHILS # BLD AUTO: 0.03 10*3/MM3 (ref 0–0.3)
BASOPHILS NFR BLD AUTO: 0.8 % (ref 0–2)
BUN BLD-MCNC: 35 MG/DL (ref 7–21)
BUN/CREAT SERPL: 29.7 (ref 7–25)
CALCIUM SPEC-SCNC: 10.2 MG/DL (ref 7.7–10)
CHLORIDE SERPL-SCNC: 106 MMOL/L (ref 99–112)
CHOLEST SERPL-MCNC: 186 MG/DL (ref 0–200)
CK SERPL-CCNC: 243 U/L (ref 24–173)
CO2 SERPL-SCNC: 29.2 MMOL/L (ref 24.3–31.9)
CREAT BLD-MCNC: 1.18 MG/DL (ref 0.43–1.29)
DEPRECATED RDW RBC AUTO: 48.3 FL (ref 37–54)
EOSINOPHIL # BLD AUTO: 0.1 10*3/MM3 (ref 0–0.7)
EOSINOPHIL NFR BLD AUTO: 2.7 % (ref 0–7)
ERYTHROCYTE [DISTWIDTH] IN BLOOD BY AUTOMATED COUNT: 14.3 % (ref 11.5–14.5)
GFR SERPL CREATININE-BSD FRML MDRD: 43 ML/MIN/1.73
GLUCOSE BLD-MCNC: 114 MG/DL (ref 70–110)
HCT VFR BLD AUTO: 39.8 % (ref 37–47)
HDLC SERPL-MCNC: 106 MG/DL (ref 60–100)
HGB BLD-MCNC: 13.2 G/DL (ref 12–16)
IMM GRANULOCYTES # BLD: 0.01 10*3/MM3 (ref 0–0.03)
IMM GRANULOCYTES NFR BLD: 0.3 % (ref 0–0.5)
LDLC SERPL CALC-MCNC: 66 MG/DL (ref 0–100)
LDLC/HDLC SERPL: 0.62 {RATIO}
LYMPHOCYTES # BLD AUTO: 1.02 10*3/MM3 (ref 1–3)
LYMPHOCYTES NFR BLD AUTO: 27.9 % (ref 16–46)
MCH RBC QN AUTO: 32.2 PG (ref 27–33)
MCHC RBC AUTO-ENTMCNC: 33.2 G/DL (ref 33–37)
MCV RBC AUTO: 97.1 FL (ref 80–94)
MONOCYTES # BLD AUTO: 0.41 10*3/MM3 (ref 0.1–0.9)
MONOCYTES NFR BLD AUTO: 11.2 % (ref 0–12)
NEUTROPHILS # BLD AUTO: 2.09 10*3/MM3 (ref 1.4–6.5)
NEUTROPHILS NFR BLD AUTO: 57.1 % (ref 40–75)
OSMOLALITY SERPL CALC.SUM OF ELEC: 290.1 MOSM/KG (ref 273–305)
PLATELET # BLD AUTO: 105 10*3/MM3 (ref 130–400)
PMV BLD AUTO: 11.4 FL (ref 6–10)
POTASSIUM BLD-SCNC: 4.9 MMOL/L (ref 3.5–5.3)
RBC # BLD AUTO: 4.1 10*6/MM3 (ref 4.2–5.4)
SODIUM BLD-SCNC: 141 MMOL/L (ref 135–153)
T4 FREE SERPL-MCNC: 1.1 NG/DL (ref 0.89–1.76)
TRIGL SERPL-MCNC: 72 MG/DL (ref 0–150)
TSH SERPL DL<=0.05 MIU/L-ACNC: 5.43 MIU/ML (ref 0.55–4.78)
VLDLC SERPL-MCNC: 14.4 MG/DL
WBC NRBC COR # BLD: 3.66 10*3/MM3 (ref 4.5–12.5)

## 2017-04-18 PROCEDURE — 82140 ASSAY OF AMMONIA: CPT | Performed by: INTERNAL MEDICINE

## 2017-04-18 PROCEDURE — 82550 ASSAY OF CK (CPK): CPT | Performed by: INTERNAL MEDICINE

## 2017-04-18 PROCEDURE — 84443 ASSAY THYROID STIM HORMONE: CPT | Performed by: INTERNAL MEDICINE

## 2017-04-18 PROCEDURE — 84439 ASSAY OF FREE THYROXINE: CPT | Performed by: INTERNAL MEDICINE

## 2017-04-18 PROCEDURE — 80061 LIPID PANEL: CPT | Performed by: INTERNAL MEDICINE

## 2017-04-18 PROCEDURE — 85025 COMPLETE CBC W/AUTO DIFF WBC: CPT | Performed by: INTERNAL MEDICINE

## 2017-04-18 PROCEDURE — 80048 BASIC METABOLIC PNL TOTAL CA: CPT | Performed by: INTERNAL MEDICINE

## 2017-04-18 PROCEDURE — 99214 OFFICE O/P EST MOD 30 MIN: CPT | Performed by: INTERNAL MEDICINE

## 2017-04-18 RX ORDER — ROPINIROLE 1 MG/1
TABLET, FILM COATED ORAL
Qty: 30 TABLET | Refills: 0 | OUTPATIENT
Start: 2017-04-18 | End: 2017-06-15 | Stop reason: SDUPTHER

## 2017-04-18 RX ORDER — ALENDRONATE SODIUM 70 MG/1
TABLET ORAL
Qty: 4 TABLET | Refills: 0 | Status: SHIPPED | OUTPATIENT
Start: 2017-04-18 | End: 2017-05-18 | Stop reason: SDUPTHER

## 2017-04-18 RX ORDER — FOLIC ACID 1 MG/1
TABLET ORAL
Qty: 30 TABLET | Refills: 0 | Status: SHIPPED | OUTPATIENT
Start: 2017-04-18 | End: 2017-08-10 | Stop reason: SDUPTHER

## 2017-04-18 NOTE — PROGRESS NOTES
subjective     Chief Complaint   Patient presents with   • Hypertension   • Hepatic cirrhosis   • Hypothyroidism   • Heartburn     History of Present Illness     Hepatic cirrhosis  Patient has hepatic cirrhosis and episodes of hepatic encephalopathy in the past. She presented to [facility] and with the altered mental status and was transferred to [facility]. Ammonia level was high at 155. Patient was stabilized at [facility] and was discharged home. Patient is here for follow-up and further care. Patient is now awake and alert but complains of being weak and tired wants more nerve and pain medications which were declined. She is taking Xifaxan 550 mg every 12 hours but she is very reluctant to take the lactulose. She said that she is taking once or twice but she cannot take it 3 times. She is having only one bowel movement a day. It was discussed with her that she should be having loose bowel movements and at least 2 or 3 bowel movements a day to ammonia level low.      Chronic pain syndrome  Patient has history of back pain and has been taking hydrocodone 7.5/325 every 8 hours .she was advised to decrease as much as possible as she is taking 3 times a day. Patient states that she cannot cut it down at all because she cries with pain particularly lately because of the left leg also      Anxiety disorder.  Patient complains of anxiety however anxiety medications were discontinued in the past because of her multiple episodes of hepatic encephalopathy.     Alma Delia Garcia has long-standing essential hypertension. she is taking medications regularly. There are no medication side effects. Blood pressure is very well controlled. There has been no headache nausea chest pain. There has been no syncopal or presyncopal episode. she denies episodes of hypo-tension or accelerated hypertension.    GERD  Alma Delia Garcia has gastroesophageal reflux disorder however she significantly better on medications. There  is no nausea or vomiting. No hematemesis or melena. No abdominal pain. Mild epigastric heartburns are noted. Appetite is good bowel movements are normal.    Hypothyroidism  Alma Delia Garcia has long-standing history of hypothyroidism.she is taking Synthroid. Doing very well. No drug side effects. No change in Weight. No cold intolerance. denies any constipation. No hair loss.     Patient Active Problem List   Diagnosis   • Hepatic cirrhosis*   • HTN (hypertension)*   • Anxiety disorder*   • Osteopetrosis*   • Hypothyroidism*   • GERD (gastroesophageal reflux disease)*   • Chronic pain syndrome due to fractured spine and left rib fracture*   • Anemia, chronic disease   • Arthralgia of hip   • Left knee pain   • Fracture of proximal end of tibia and fibula   • Primary insomnia   • Thrombocytopenia   • Renal failure   • Peripheral vascular disease   • Bilateral leg edema       Social History   Substance Use Topics   • Smoking status: Never Smoker   • Smokeless tobacco: Former User     Types: Snuff     Quit date: 1970   • Alcohol use No      Comment: FORMER 12 BOTTLES PER DAY FOR 15 YEARS       Allergies   Allergen Reactions   • Ciprofloxacin          Current Outpatient Prescriptions:   •  aspirin 81 MG tablet, Take 1 tablet by mouth Daily., Disp: 90 tablet, Rfl: 2  •  esomeprazole (nexIUM) 40 MG capsule, Take 1 capsule by mouth Every Morning Before Breakfast., Disp: 90 capsule, Rfl: 0  •  furosemide (LASIX) 20 MG tablet, Take 2 tablets by mouth Daily., Disp: 30 tablet, Rfl: 0  •  hydrochlorothiazide (MICROZIDE) 12.5 MG capsule, Take 1 capsule by mouth Daily., Disp: 30 capsule, Rfl: 11  •  HYDROcodone-acetaminophen (NORCO) 7.5-325 MG per tablet, Take 1 tablet by mouth Every 8 (Eight) Hours As Needed for Moderate Pain (4-6)., Disp: 90 tablet, Rfl: 0  •  lactulose (CHRONULAC) 10 GM/15ML solution, Take 30 mL by mouth 3 (Three) Times a Day., Disp: 236 mL, Rfl: 0  •  levothyroxine (SYNTHROID, LEVOTHROID) 50 MCG tablet, Take 1  "tablet by mouth Daily., Disp: 90 tablet, Rfl: 0  •  losartan (COZAAR) 25 MG tablet, Take 2 tablets by mouth Daily., Disp: 90 tablet, Rfl: 2  •  nitroglycerin (NITROSTAT) 0.4 MG SL tablet, Place 1 tablet under the tongue Every 5 (Five) Minutes As Needed for chest pain. Take no more than 3 doses in 15 minutes., Disp: 25 tablet, Rfl: 0  •  rifaximin (XIFAXAN) 550 MG tablet, Take 1 tablet by mouth Every 12 (Twelve) Hours., Disp: 180 tablet, Rfl: 0  •  alendronate (FOSAMAX) 70 MG tablet, TAKE ONE TABLET BY MOUTH EVERY WEEK, Disp: 4 tablet, Rfl: 0  •  folic acid (FOLVITE) 1 MG tablet, TAKE ONE TABLET BY MOUTH DAILY, Disp: 30 tablet, Rfl: 0  •  rOPINIRole (REQUIP) 1 MG tablet, TAKE ONE TABLET BY MOUTH 1 HOUR BEFORE BEDTIME, Disp: 30 tablet, Rfl: 0      The following portions of the patient's history were reviewed and updated as appropriate: allergies, current medications, past family history, past medical history, past social history, past surgical history and problem list.    Review of Systems   Constitution: Positive for weakness, malaise/fatigue and weight gain.   HENT: Negative.    Eyes: Negative.    Cardiovascular: Positive for dyspnea on exertion and leg swelling.   Respiratory: Positive for shortness of breath. Negative for wheezing.    Endocrine: Negative.    Skin: Negative.    Musculoskeletal: Positive for arthritis, back pain, myalgias and stiffness.   Gastrointestinal: Positive for bloating.   Genitourinary: Negative.    Psychiatric/Behavioral: The patient is nervous/anxious.           Objective:     /67 (BP Location: Left arm, Patient Position: Sitting)  Pulse 71  Ht 65\" (165.1 cm)  Wt 142 lb 3.2 oz (64.5 kg)  SpO2 98%  BMI 23.66 kg/m2  Physical Exam   Constitutional: She appears well-developed and well-nourished.   HENT:   Head: Normocephalic and atraumatic.   Mouth/Throat: Oropharynx is clear and moist.   Eyes: Conjunctivae and EOM are normal. Pupils are equal, round, and reactive to light. No " scleral icterus.   Neck: Normal range of motion. Neck supple. No JVD present. No tracheal deviation present. No thyromegaly present.   Cardiovascular: Normal rate, regular rhythm, normal heart sounds and intact distal pulses.  Exam reveals no friction rub.    No murmur heard.  Pulmonary/Chest: Effort normal and breath sounds normal. No respiratory distress. She has no wheezes. She has no rales. She exhibits no tenderness.   Abdominal: Soft. Bowel sounds are normal. She exhibits distension. She exhibits no mass. There is no tenderness. There is no rebound and no guarding.   Musculoskeletal: Normal range of motion. She exhibits edema. She exhibits no tenderness or deformity.   Lymphadenopathy:     She has no cervical adenopathy.   Neurological: She is alert. She has normal reflexes. No cranial nerve deficit. She exhibits normal muscle tone. Coordination normal.   Skin: Skin is warm and dry.   Psychiatric: She has a normal mood and affect. Her behavior is normal. Judgment and thought content normal.         Lab Review  Lab Results   Component Value Date     04/18/2017    K 4.9 04/18/2017     04/18/2017    BUN 35 (H) 04/18/2017    CREATININE 1.18 04/18/2017    GLUCOSE 114 (H) 04/18/2017    CALCIUM 10.2 (H) 04/18/2017    ALT 20 01/09/2017    ALKPHOS 103 01/09/2017    LABIL2 0.9 (L) 01/09/2017     Lab Results   Component Value Date    CKTOTAL 243 (H) 04/18/2017     Lab Results   Component Value Date    WBC 3.66 (L) 04/18/2017    HGB 13.2 04/18/2017    HCT 39.8 04/18/2017     (L) 04/18/2017     Lab Results   Component Value Date    INR 1.03 01/09/2017    INR 1.12 (H) 12/31/2016    INR 1.07 11/12/2016     Lab Results   Component Value Date    MG 1.7 11/27/2015     Lab Results   Component Value Date    TSH 5.429 (H) 04/18/2017     Lab Results   Component Value Date    BNP 35.0 12/31/2016     Lab Results   Component Value Date    CHLPL 161 04/08/2016     Lab Results   Component Value Date    CHOL 186  04/18/2017    TRIG 72 04/18/2017     (H) 04/18/2017    LDLCALC 66 04/18/2017    VLDL 14.4 04/18/2017    LDLHDL 0.62 04/18/2017         Procedures     I personally viewed and interpreted the patient's LAB data         Assessment:     1. Essential hypertension    2. Secondary hypertension    3. Cirrhosis of liver with ascites, unspecified hepatic cirrhosis type    4. Acquired hypothyroidism    5. Hypothyroidism due to acquired atrophy of thyroid          Plan:      She seems to be doing much better.  Blood pressure is very well controlled.  She is taking Cozaar 25 mg daily.    Ankle edema and ascites is also better.  She is combining Lasix 40 mg daily and hydrochlorothiazide 12.5 daily.    Ammonia level is still bit high  Patient was advised to make sure she is taking Xifaxan and lactulose dose was increased.  Patient states that she did run out of Xifaxan.    Thyroid functions are also normal.    Rest of the medications were continued.          Return in about 4 weeks (around 5/16/2017).

## 2017-04-28 RX ORDER — HYDROCODONE BITARTRATE AND ACETAMINOPHEN 7.5; 325 MG/1; MG/1
1 TABLET ORAL EVERY 8 HOURS PRN
Qty: 90 TABLET | Refills: 0 | Status: SHIPPED | OUTPATIENT
Start: 2017-04-28 | End: 2017-05-26 | Stop reason: SDUPTHER

## 2017-05-10 RX ORDER — LACTULOSE 10 G/15ML
SOLUTION ORAL; RECTAL
Qty: 473 ML | Refills: 0 | Status: SHIPPED | OUTPATIENT
Start: 2017-05-10 | End: 2017-05-18 | Stop reason: SDUPTHER

## 2017-05-10 RX ORDER — FUROSEMIDE 20 MG/1
TABLET ORAL
Qty: 30 TABLET | Refills: 0 | Status: SHIPPED | OUTPATIENT
Start: 2017-05-10 | End: 2017-05-18

## 2017-05-12 ENCOUNTER — TELEPHONE (OUTPATIENT)
Dept: CARDIOLOGY | Facility: CLINIC | Age: 82
End: 2017-05-12

## 2017-05-18 ENCOUNTER — OFFICE VISIT (OUTPATIENT)
Dept: CARDIOLOGY | Facility: CLINIC | Age: 82
End: 2017-05-18

## 2017-05-18 VITALS
HEIGHT: 65 IN | BODY MASS INDEX: 23.72 KG/M2 | HEART RATE: 63 BPM | SYSTOLIC BLOOD PRESSURE: 148 MMHG | OXYGEN SATURATION: 97 % | WEIGHT: 142.4 LBS | DIASTOLIC BLOOD PRESSURE: 66 MMHG

## 2017-05-18 DIAGNOSIS — F41.1 GENERALIZED ANXIETY DISORDER: ICD-10-CM

## 2017-05-18 DIAGNOSIS — D63.8 ANEMIA, CHRONIC DISEASE: ICD-10-CM

## 2017-05-18 DIAGNOSIS — E03.4 HYPOTHYROIDISM DUE TO ACQUIRED ATROPHY OF THYROID: ICD-10-CM

## 2017-05-18 DIAGNOSIS — F51.01 PRIMARY INSOMNIA: ICD-10-CM

## 2017-05-18 DIAGNOSIS — K74.60 CIRRHOSIS OF LIVER WITHOUT ASCITES, UNSPECIFIED HEPATIC CIRRHOSIS TYPE (HCC): ICD-10-CM

## 2017-05-18 DIAGNOSIS — G89.4 CHRONIC PAIN SYNDROME: ICD-10-CM

## 2017-05-18 DIAGNOSIS — D69.6 THROMBOCYTOPENIA (HCC): ICD-10-CM

## 2017-05-18 DIAGNOSIS — I10 ESSENTIAL HYPERTENSION: Primary | ICD-10-CM

## 2017-05-18 PROCEDURE — 99213 OFFICE O/P EST LOW 20 MIN: CPT | Performed by: INTERNAL MEDICINE

## 2017-05-18 RX ORDER — LEVOTHYROXINE SODIUM 0.05 MG/1
75 TABLET ORAL DAILY
Qty: 90 TABLET | Refills: 0 | Status: SHIPPED | OUTPATIENT
Start: 2017-05-18 | End: 2017-07-26

## 2017-05-18 RX ORDER — ALENDRONATE SODIUM 70 MG/1
70 TABLET ORAL
Qty: 4 TABLET | Refills: 0 | Status: SHIPPED | OUTPATIENT
Start: 2017-05-18 | End: 2017-06-15 | Stop reason: SDUPTHER

## 2017-05-18 RX ORDER — BUMETANIDE 1 MG/1
1 TABLET ORAL DAILY
Qty: 30 TABLET | Refills: 2 | Status: SHIPPED | OUTPATIENT
Start: 2017-05-18 | End: 2017-05-26 | Stop reason: DRUGHIGH

## 2017-05-18 RX ORDER — LOSARTAN POTASSIUM 50 MG/1
50 TABLET ORAL DAILY
Qty: 30 TABLET | Refills: 2 | Status: SHIPPED | OUTPATIENT
Start: 2017-05-18 | End: 2017-07-28 | Stop reason: HOSPADM

## 2017-05-26 ENCOUNTER — TELEPHONE (OUTPATIENT)
Dept: CARDIOLOGY | Facility: CLINIC | Age: 82
End: 2017-05-26

## 2017-05-26 DIAGNOSIS — R10.9 ABDOMINAL PAIN, UNSPECIFIED LOCATION: Primary | ICD-10-CM

## 2017-05-26 RX ORDER — HYDROCODONE BITARTRATE AND ACETAMINOPHEN 7.5; 325 MG/1; MG/1
1 TABLET ORAL EVERY 8 HOURS PRN
Qty: 90 TABLET | Refills: 0 | Status: SHIPPED | OUTPATIENT
Start: 2017-05-26 | End: 2017-06-26 | Stop reason: SDUPTHER

## 2017-05-26 RX ORDER — BUMETANIDE 1 MG/1
1 TABLET ORAL 2 TIMES DAILY
Qty: 60 TABLET | Refills: 3 | Status: ON HOLD | OUTPATIENT
Start: 2017-05-26 | End: 2017-07-28

## 2017-06-09 RX ORDER — LACTULOSE 10 G/15ML
SOLUTION ORAL; RECTAL
Qty: 30 ML | Refills: 0 | Status: SHIPPED | OUTPATIENT
Start: 2017-06-09 | End: 2017-06-15 | Stop reason: SDUPTHER

## 2017-06-12 ENCOUNTER — LAB (OUTPATIENT)
Dept: LAB | Facility: HOSPITAL | Age: 82
End: 2017-06-12
Attending: INTERNAL MEDICINE

## 2017-06-12 DIAGNOSIS — E03.4 HYPOTHYROIDISM DUE TO ACQUIRED ATROPHY OF THYROID: ICD-10-CM

## 2017-06-12 DIAGNOSIS — K74.60 CIRRHOSIS OF LIVER WITHOUT ASCITES, UNSPECIFIED HEPATIC CIRRHOSIS TYPE (HCC): ICD-10-CM

## 2017-06-12 LAB
ALBUMIN SERPL-MCNC: 3.4 G/DL (ref 3.4–4.8)
ALBUMIN/GLOB SERPL: 0.9 G/DL (ref 1.5–2.5)
ALP SERPL-CCNC: 87 U/L (ref 35–104)
ALT SERPL W P-5'-P-CCNC: 27 U/L (ref 10–36)
ANION GAP SERPL CALCULATED.3IONS-SCNC: 2.8 MMOL/L (ref 3.6–11.2)
AST SERPL-CCNC: 50 U/L (ref 10–30)
BASOPHILS # BLD AUTO: 0.04 10*3/MM3 (ref 0–0.3)
BASOPHILS NFR BLD AUTO: 1 % (ref 0–2)
BILIRUB SERPL-MCNC: 1.7 MG/DL (ref 0.2–1.8)
BUN BLD-MCNC: 23 MG/DL (ref 7–21)
BUN/CREAT SERPL: 20 (ref 7–25)
CALCIUM SPEC-SCNC: 9.9 MG/DL (ref 7.7–10)
CHLORIDE SERPL-SCNC: 107 MMOL/L (ref 99–112)
CO2 SERPL-SCNC: 30.2 MMOL/L (ref 24.3–31.9)
CREAT BLD-MCNC: 1.15 MG/DL (ref 0.43–1.29)
DEPRECATED RDW RBC AUTO: 48.4 FL (ref 37–54)
EOSINOPHIL # BLD AUTO: 0.25 10*3/MM3 (ref 0–0.7)
EOSINOPHIL NFR BLD AUTO: 5.9 % (ref 0–7)
ERYTHROCYTE [DISTWIDTH] IN BLOOD BY AUTOMATED COUNT: 13.9 % (ref 11.5–14.5)
GFR SERPL CREATININE-BSD FRML MDRD: 45 ML/MIN/1.73
GLOBULIN UR ELPH-MCNC: 4 GM/DL
GLUCOSE BLD-MCNC: 85 MG/DL (ref 70–110)
HCT VFR BLD AUTO: 35.4 % (ref 37–47)
HGB BLD-MCNC: 11.7 G/DL (ref 12–16)
IMM GRANULOCYTES # BLD: 0.01 10*3/MM3 (ref 0–0.03)
IMM GRANULOCYTES NFR BLD: 0.2 % (ref 0–0.5)
LYMPHOCYTES # BLD AUTO: 1.54 10*3/MM3 (ref 1–3)
LYMPHOCYTES NFR BLD AUTO: 36.6 % (ref 16–46)
MCH RBC QN AUTO: 31.5 PG (ref 27–33)
MCHC RBC AUTO-ENTMCNC: 33.1 G/DL (ref 33–37)
MCV RBC AUTO: 95.4 FL (ref 80–94)
MONOCYTES # BLD AUTO: 0.55 10*3/MM3 (ref 0.1–0.9)
MONOCYTES NFR BLD AUTO: 13.1 % (ref 0–12)
NEUTROPHILS # BLD AUTO: 1.82 10*3/MM3 (ref 1.4–6.5)
NEUTROPHILS NFR BLD AUTO: 43.2 % (ref 40–75)
OSMOLALITY SERPL CALC.SUM OF ELEC: 282.3 MOSM/KG (ref 273–305)
PLATELET # BLD AUTO: 90 10*3/MM3 (ref 130–400)
PMV BLD AUTO: 11.5 FL (ref 6–10)
POTASSIUM BLD-SCNC: 4.3 MMOL/L (ref 3.5–5.3)
PROT SERPL-MCNC: 7.4 G/DL (ref 6–8)
RBC # BLD AUTO: 3.71 10*6/MM3 (ref 4.2–5.4)
SODIUM BLD-SCNC: 140 MMOL/L (ref 135–153)
T4 FREE SERPL-MCNC: 1.22 NG/DL (ref 0.89–1.76)
TSH SERPL DL<=0.05 MIU/L-ACNC: 1.36 MIU/ML (ref 0.55–4.78)
WBC NRBC COR # BLD: 4.21 10*3/MM3 (ref 4.5–12.5)

## 2017-06-12 PROCEDURE — 84443 ASSAY THYROID STIM HORMONE: CPT | Performed by: INTERNAL MEDICINE

## 2017-06-12 PROCEDURE — 84439 ASSAY OF FREE THYROXINE: CPT | Performed by: INTERNAL MEDICINE

## 2017-06-12 PROCEDURE — 85025 COMPLETE CBC W/AUTO DIFF WBC: CPT | Performed by: INTERNAL MEDICINE

## 2017-06-12 PROCEDURE — 80053 COMPREHEN METABOLIC PANEL: CPT | Performed by: INTERNAL MEDICINE

## 2017-06-15 ENCOUNTER — OFFICE VISIT (OUTPATIENT)
Dept: CARDIOLOGY | Facility: CLINIC | Age: 82
End: 2017-06-15

## 2017-06-15 VITALS
SYSTOLIC BLOOD PRESSURE: 144 MMHG | OXYGEN SATURATION: 97 % | HEIGHT: 65 IN | WEIGHT: 148.8 LBS | DIASTOLIC BLOOD PRESSURE: 62 MMHG | HEART RATE: 60 BPM | BODY MASS INDEX: 24.79 KG/M2

## 2017-06-15 DIAGNOSIS — R25.2 CRAMP OF BOTH LOWER EXTREMITIES: ICD-10-CM

## 2017-06-15 DIAGNOSIS — E03.4 HYPOTHYROIDISM DUE TO ACQUIRED ATROPHY OF THYROID: ICD-10-CM

## 2017-06-15 DIAGNOSIS — K74.60 CIRRHOSIS OF LIVER WITHOUT ASCITES, UNSPECIFIED HEPATIC CIRRHOSIS TYPE (HCC): Primary | ICD-10-CM

## 2017-06-15 DIAGNOSIS — F41.1 GENERALIZED ANXIETY DISORDER: ICD-10-CM

## 2017-06-15 DIAGNOSIS — R60.0 BILATERAL LEG EDEMA: ICD-10-CM

## 2017-06-15 DIAGNOSIS — G89.4 CHRONIC PAIN SYNDROME: ICD-10-CM

## 2017-06-15 PROCEDURE — 99213 OFFICE O/P EST LOW 20 MIN: CPT | Performed by: INTERNAL MEDICINE

## 2017-06-15 RX ORDER — METOLAZONE 2.5 MG/1
1.25 TABLET ORAL DAILY
Qty: 30 TABLET | Refills: 0 | Status: SHIPPED | OUTPATIENT
Start: 2017-06-15 | End: 2017-07-25 | Stop reason: SDUPTHER

## 2017-06-15 RX ORDER — ROPINIROLE 1 MG/1
1 TABLET, FILM COATED ORAL NIGHTLY
Qty: 30 TABLET | Refills: 2 | Status: SHIPPED | OUTPATIENT
Start: 2017-06-15 | End: 2017-08-28 | Stop reason: SDUPTHER

## 2017-06-15 RX ORDER — ALENDRONATE SODIUM 70 MG/1
TABLET ORAL
Qty: 4 TABLET | Refills: 0 | Status: SHIPPED | OUTPATIENT
Start: 2017-06-15 | End: 2017-07-26

## 2017-06-15 NOTE — PROGRESS NOTES
subjective     Chief Complaint   Patient presents with   • Follow-up     LAB RESULTS   • Hypertension   • Edema     BOTH LEGS   • Back Pain   • Hepatic cirrhosis     History of Present Illness    Patient is 86 years old white female who has marked bilateral ankle edema both legs are huge there are tiny blisters and fluid is using.  Patient is concerned.  She already is taking Bumex 1 mg twice a day without much benefit.  Patient is known to have hepatic cirrhosis quite severe with multiple episodes of hepatic encephalopathy and hepatic coma.  Patient had to be flown out tertiary facility twice.  Lately she has been doing very well is awake alert oriented.  She is ambulatory with assistance of a walker.  She doesn't huge amount of ankle edema probably related to hepatic cirrhosis with hypoalbuminemia      Leg cramps  Patient complains of severe leg cramps at night.  She is taking Requip 1 mg daily but states that that is not enough.  She also has burning in the bottom of both feet.      Back pain  She complains of severe pain in the back.  She is taking Narco 7.53 times a day which is barely keeping the pain under control.  She was advised to try to take medicine as the left possible because of sedation effect and with history of hepatic encephalopathy.    Hypertension  Patient has long-standing history of hypertension.  She is taking Cozaar 50 mg daily blood pressure according to her has been good.            Procedure Laterality Date   • BACK SURGERY      KYPHOPLASTIES    • CARDIAC CATHETERIZATION Left 08/2004   • CARDIOVASCULAR STRESS TEST  01/25/2015   • CATARACT EXTRACTION     • CHOLECYSTECTOMY     • CLAVICLE SURGERY Right    • COLONOSCOPY  10/2004   • HIP FRACTURE SURGERY Bilateral     ARTHROPLASTIES    • PARTIAL HYSTERECTOMY     • WRIST FRACTURE SURGERY       Family History   Problem Relation Age of Onset   • Cancer Mother    • Heart disease Father    • Arthritis Sister    • Osteoporosis Sister    • Diabetes  Maternal Aunt      Past Medical History:   Diagnosis Date   • Acute renal failure    • Anxiety    • Bell's palsy    • Cellulitis     LLE   • CHF (congestive heart failure)     Diastolic   • Diverticulosis    • GERD (gastroesophageal reflux disease)    • Hepatic cirrhosis    • Hypertension    • Hypothyroidism    • Osteoporosis    • Thrombocytopenia    • Trigeminal neuralgia      Patient Active Problem List   Diagnosis   • Hepatic cirrhosis*   • HTN (hypertension)*   • Anxiety disorder*   • Osteopetrosis*   • Hypothyroidism*   • GERD (gastroesophageal reflux disease)*   • Chronic pain syndrome due to fractured spine and left rib fracture*   • Anemia, chronic disease   • Arthralgia of hip   • Left knee pain   • Fracture of proximal end of tibia and fibula   • Primary insomnia   • Thrombocytopenia   • Renal failure   • Peripheral vascular disease   • Bilateral leg edema   • Cramp of both lower extremities       Social History   Substance Use Topics   • Smoking status: Never Smoker   • Smokeless tobacco: Former User     Types: Snuff     Quit date: 1970   • Alcohol use No      Comment: FORMER 12 BOTTLES PER DAY FOR 15 YEARS       Allergies   Allergen Reactions   • Ciprofloxacin        Current Outpatient Prescriptions on File Prior to Visit   Medication Sig   • alendronate (FOSAMAX) 70 MG tablet Take 1 tablet by mouth Every 7 (Seven) Days.   • aspirin 81 MG tablet Take 1 tablet by mouth Daily.   • bumetanide (BUMEX) 1 MG tablet Take 1 tablet by mouth 2 (Two) Times a Day.   • esomeprazole (nexIUM) 40 MG capsule Take 1 capsule by mouth Every Morning Before Breakfast.   • lactulose (CHRONULAC) 10 GM/15ML solution Take 30 mL by mouth 3 (Three) Times a Day.   • levothyroxine (SYNTHROID, LEVOTHROID) 50 MCG tablet Take 1.5 tablets by mouth Daily.   • losartan (COZAAR) 50 MG tablet Take 1 tablet by mouth Daily.   • nitroglycerin (NITROSTAT) 0.4 MG SL tablet Place 1 tablet under the tongue Every 5 (Five) Minutes As Needed for chest  "pain. Take no more than 3 doses in 15 minutes.   • rifaximin (XIFAXAN) 550 MG tablet Take 1 tablet by mouth Every 12 (Twelve) Hours.   • [DISCONTINUED] rOPINIRole (REQUIP) 1 MG tablet TAKE ONE TABLET BY MOUTH 1 HOUR BEFORE BEDTIME   • folic acid (FOLVITE) 1 MG tablet TAKE ONE TABLET BY MOUTH DAILY   • HYDROcodone-acetaminophen (NORCO) 7.5-325 MG per tablet Take 1 tablet by mouth Every 8 (Eight) Hours As Needed for Moderate Pain (4-6).   • [DISCONTINUED] lactulose (CHRONULAC) 10 GM/15ML solution solution (encephalopathy) TAKE 30 ML BY MOUTH THREE TIMES A DAY     No current facility-administered medications on file prior to visit.          The following portions of the patient's history were reviewed and updated as appropriate: allergies, current medications, past family history, past medical history, past social history, past surgical history and problem list.    Review of Systems   Constitution: Positive for weakness, malaise/fatigue and weight gain.   HENT: Negative.    Eyes: Negative.    Cardiovascular: Positive for leg swelling. Negative for chest pain, claudication, cyanosis, dyspnea on exertion, irregular heartbeat, near-syncope, orthopnea, palpitations, paroxysmal nocturnal dyspnea and syncope.   Respiratory: Positive for shortness of breath.    Skin: Negative.    Musculoskeletal: Positive for arthritis, back pain and myalgias.   Gastrointestinal:        Ascites mild   Genitourinary: Positive for bladder incontinence.   Neurological: Positive for loss of balance and numbness.   Psychiatric/Behavioral: The patient is nervous/anxious.           Objective:     /62 (BP Location: Left arm, Patient Position: Sitting)  Pulse 60  Ht 65\" (165.1 cm)  Wt 148 lb 12.8 oz (67.5 kg)  SpO2 97%  BMI 24.76 kg/m2  Physical Exam   Constitutional: She appears well-developed and well-nourished.   HENT:   Head: Normocephalic and atraumatic.   Mouth/Throat: Oropharynx is clear and moist.   Eyes: Conjunctivae and EOM are " normal. Pupils are equal, round, and reactive to light. No scleral icterus.   Neck: Normal range of motion. Neck supple. No JVD present. No tracheal deviation present. No thyromegaly present.   Cardiovascular: Normal rate, regular rhythm, normal heart sounds and intact distal pulses.  Exam reveals no friction rub.    No murmur heard.  Pulmonary/Chest: Effort normal and breath sounds normal. No respiratory distress. She has no wheezes. She has no rales. She exhibits no tenderness.   Abdominal: Soft. Bowel sounds are normal. She exhibits no distension and no mass. There is no tenderness. There is no rebound and no guarding.   Small amount of ascites noted   Musculoskeletal: Normal range of motion. She exhibits edema. She exhibits no tenderness or deformity.   Marked bilateral lower extremity edema with fluid oozing from the blisters   Lymphadenopathy:     She has no cervical adenopathy.   Neurological: She is alert. She has normal reflexes. No cranial nerve deficit. She exhibits normal muscle tone. Coordination normal.   Skin: Skin is warm and dry.   Psychiatric: She has a normal mood and affect. Her behavior is normal. Judgment and thought content normal.         Lab Review  Lab Results   Component Value Date     06/12/2017    K 4.3 06/12/2017     06/12/2017    BUN 23 (H) 06/12/2017    CREATININE 1.15 06/12/2017    GLUCOSE 85 06/12/2017    CALCIUM 9.9 06/12/2017    ALT 27 06/12/2017    ALKPHOS 87 06/12/2017    LABIL2 0.9 (L) 06/12/2017     Lab Results   Component Value Date    CKTOTAL 243 (H) 04/18/2017     Lab Results   Component Value Date    WBC 4.21 (L) 06/12/2017    HGB 11.7 (L) 06/12/2017    HCT 35.4 (L) 06/12/2017    PLT 90 (L) 06/12/2017     Lab Results   Component Value Date    INR 1.03 01/09/2017    INR 1.12 (H) 12/31/2016    INR 1.07 11/12/2016     Lab Results   Component Value Date    MG 1.7 11/27/2015     Lab Results   Component Value Date    TSH 1.364 06/12/2017     Lab Results   Component  Value Date    BNP 35.0 12/31/2016     Lab Results   Component Value Date    CHOL 186 04/18/2017    CHLPL 161 04/08/2016    TRIG 72 04/18/2017     (H) 04/18/2017    LDLCALC 66 04/18/2017    VLDL 14.4 04/18/2017    LDLHDL 0.62 04/18/2017         Procedures       I personally viewed and interpreted the patient's LAB data         Assessment:     1. Cirrhosis of liver without ascites, unspecified hepatic cirrhosis type    2. Bilateral leg edema    3. Generalized anxiety disorder    4. Chronic pain syndrome due to fractured spine and left rib fracture*    5. Hypothyroidism due to acquired atrophy of thyroid    6. Cramp of both lower extremities          Plan:     Patient has marked bilateral ankle edema both legs are huge there are tiny blisters and fluid is using.  Patient is concerned.  She already is taking Bumex 1 mg twice a day without much benefit.  Patient is known to have hepatic cirrhosis quite severe with multiple episodes of hepatic encephalopathy and hepatic coma.  Patient had to be flown out tertiary facility twice.  Lately she has been doing very well is awake alert oriented.  She is ambulatory with assistance of a walker.  She doesn't huge amount of ankle edema probably related to hepatic cirrhosis with hypoalbuminemia    Leg edema is noncardiac and LV ejection fraction is between 70-80% .  Patient will continue taking Bumex 1 mg twice a day.  Zaroxolyn 1.25 mg daily was added for only one week.     lengthy discussion with the family and patient about hepatic decompensation with diuretic therapy.  And also renal failure    family understands it very well.    Pain medications were not given because it's early  She will continue current dose.    Lab work discussed with the patient  Follow-up scheduled               No Follow-up on file.

## 2017-06-26 RX ORDER — HYDROCODONE BITARTRATE AND ACETAMINOPHEN 7.5; 325 MG/1; MG/1
1 TABLET ORAL EVERY 8 HOURS PRN
Qty: 90 TABLET | Refills: 0 | Status: CANCELLED
Start: 2017-06-26

## 2017-06-28 RX ORDER — HYDROCODONE BITARTRATE AND ACETAMINOPHEN 7.5; 325 MG/1; MG/1
1 TABLET ORAL EVERY 8 HOURS PRN
Qty: 90 TABLET | Refills: 0
Start: 2017-06-28 | End: 2017-07-25 | Stop reason: SDUPTHER

## 2017-07-11 ENCOUNTER — TELEPHONE (OUTPATIENT)
Dept: CARDIOLOGY | Facility: CLINIC | Age: 82
End: 2017-07-11

## 2017-07-11 DIAGNOSIS — N19 RENAL FAILURE: Primary | ICD-10-CM

## 2017-07-11 NOTE — TELEPHONE ENCOUNTER
Spoke to Yeimy and she said that she would take Chrystell to get labs done at the hospital this week.  ----- Message from Galina Gonsalves MD sent at 7/10/2017  1:24 PM EDT -----  Only for 7 days as she took.  She will need lab work before she can be called anymore.  CMP  ----- Message -----     From: Shalonda Jesus MA     Sent: 7/10/2017  12:31 PM       To: Galina Gonsalves MD    Pt granddaughter called states that she needs a refill for methalazone. She states that she took for 7 days and then threw rx away. Do you want her to take them longer? She thought she was only to take them for a week?

## 2017-07-12 ENCOUNTER — APPOINTMENT (OUTPATIENT)
Dept: CT IMAGING | Facility: HOSPITAL | Age: 82
End: 2017-07-12

## 2017-07-12 ENCOUNTER — HOSPITAL ENCOUNTER (INPATIENT)
Facility: HOSPITAL | Age: 82
LOS: 3 days | Discharge: HOME | End: 2017-07-15
Attending: EMERGENCY MEDICINE | Admitting: INTERNAL MEDICINE

## 2017-07-12 ENCOUNTER — APPOINTMENT (OUTPATIENT)
Dept: GENERAL RADIOLOGY | Facility: HOSPITAL | Age: 82
End: 2017-07-12

## 2017-07-12 DIAGNOSIS — K76.82 HEPATIC ENCEPHALOPATHY (HCC): Primary | ICD-10-CM

## 2017-07-12 LAB
6-ACETYL MORPHINE: NEGATIVE
A-A DO2: 28 MMHG (ref 0–300)
ALBUMIN SERPL-MCNC: 3.4 G/DL (ref 3.4–4.8)
ALBUMIN/GLOB SERPL: 0.9 G/DL (ref 1.5–2.5)
ALP SERPL-CCNC: 87 U/L (ref 35–104)
ALT SERPL W P-5'-P-CCNC: 24 U/L (ref 10–36)
AMMONIA BLD-SCNC: 130 UMOL/L (ref 11–51)
AMPHET+METHAMPHET UR QL: NEGATIVE
AMYLASE SERPL-CCNC: 44 U/L (ref 28–100)
ANION GAP SERPL CALCULATED.3IONS-SCNC: 5.6 MMOL/L (ref 3.6–11.2)
ARTERIAL PATENCY WRIST A: POSITIVE
AST SERPL-CCNC: 55 U/L (ref 10–30)
ATMOSPHERIC PRESS: 730 MMHG
BACTERIA UR QL AUTO: ABNORMAL /HPF
BARBITURATES UR QL SCN: NEGATIVE
BASE EXCESS BLDA CALC-SCNC: 1.5 MMOL/L
BASOPHILS # BLD AUTO: 0.03 10*3/MM3 (ref 0–0.3)
BASOPHILS NFR BLD AUTO: 0.7 % (ref 0–2)
BDY SITE: ABNORMAL
BENZODIAZ UR QL SCN: NEGATIVE
BILIRUB SERPL-MCNC: 1.2 MG/DL (ref 0.2–1.8)
BILIRUB UR QL STRIP: NEGATIVE
BODY TEMPERATURE: 98.6 C
BUN BLD-MCNC: 24 MG/DL (ref 7–21)
BUN/CREAT SERPL: 20.7 (ref 7–25)
BUPRENORPHINE SERPL-MCNC: NEGATIVE NG/ML
CALCIUM SPEC-SCNC: 10 MG/DL (ref 7.7–10)
CANNABINOIDS SERPL QL: NEGATIVE
CHLORIDE SERPL-SCNC: 110 MMOL/L (ref 99–112)
CK MB SERPL-CCNC: 5.71 NG/ML (ref 0–5)
CK MB SERPL-CCNC: 6.41 NG/ML (ref 0–5)
CK MB SERPL-RTO: 1.1 % (ref 0–3)
CK MB SERPL-RTO: 2.3 % (ref 0–3)
CK SERPL-CCNC: 243 U/L (ref 24–173)
CK SERPL-CCNC: 570 U/L
CLARITY UR: CLEAR
CO2 SERPL-SCNC: 25.4 MMOL/L (ref 24.3–31.9)
COCAINE UR QL: NEGATIVE
COHGB MFR BLD: 1.8 % (ref 0–5)
COLOR UR: YELLOW
CREAT BLD-MCNC: 1.16 MG/DL (ref 0.43–1.29)
D-LACTATE SERPL-SCNC: 1.2 MMOL/L (ref 0.5–2)
DEPRECATED RDW RBC AUTO: 44.5 FL (ref 37–54)
EOSINOPHIL # BLD AUTO: 0.08 10*3/MM3 (ref 0–0.7)
EOSINOPHIL NFR BLD AUTO: 2 % (ref 0–7)
ERYTHROCYTE [DISTWIDTH] IN BLOOD BY AUTOMATED COUNT: 13.6 % (ref 11.5–14.5)
ETHANOL BLD-MCNC: <10 MG/DL
ETHANOL UR QL: <0.01 %
GFR SERPL CREATININE-BSD FRML MDRD: 44 ML/MIN/1.73
GLOBULIN UR ELPH-MCNC: 3.6 GM/DL
GLUCOSE BLD-MCNC: 113 MG/DL (ref 70–110)
GLUCOSE BLDC GLUCOMTR-MCNC: 106 MG/DL (ref 70–130)
GLUCOSE BLDC GLUCOMTR-MCNC: 80 MG/DL (ref 70–130)
GLUCOSE UR STRIP-MCNC: NEGATIVE MG/DL
HCO3 BLDA-SCNC: 24.9 MMOL/L (ref 22–26)
HCT VFR BLD AUTO: 36.1 % (ref 37–47)
HCT VFR BLD CALC: 39 % (ref 37–47)
HGB BLD-MCNC: 12.2 G/DL (ref 12–16)
HGB BLDA-MCNC: 13.2 G/DL (ref 12–16)
HGB UR QL STRIP.AUTO: NEGATIVE
HOLD SPECIMEN: NORMAL
HOLD SPECIMEN: NORMAL
HOROWITZ INDEX BLD+IHG-RTO: 21 %
HYALINE CASTS UR QL AUTO: ABNORMAL /LPF
IMM GRANULOCYTES # BLD: 0 10*3/MM3 (ref 0–0.03)
IMM GRANULOCYTES NFR BLD: 0 % (ref 0–0.5)
KETONES UR QL STRIP: NEGATIVE
LEUKOCYTE ESTERASE UR QL STRIP.AUTO: ABNORMAL
LIPASE SERPL-CCNC: 44 U/L (ref 13–60)
LYMPHOCYTES # BLD AUTO: 0.86 10*3/MM3 (ref 1–3)
LYMPHOCYTES NFR BLD AUTO: 21.2 % (ref 16–46)
MAGNESIUM SERPL-MCNC: 1.6 MG/DL (ref 1.7–2.6)
MCH RBC QN AUTO: 32.1 PG (ref 27–33)
MCHC RBC AUTO-ENTMCNC: 33.8 G/DL (ref 33–37)
MCV RBC AUTO: 95 FL (ref 80–94)
METHADONE UR QL SCN: NEGATIVE
METHGB BLD QL: 0.4 % (ref 0–3)
MODALITY: ABNORMAL
MONOCYTES # BLD AUTO: 0.41 10*3/MM3 (ref 0.1–0.9)
MONOCYTES NFR BLD AUTO: 10.1 % (ref 0–12)
NEUTROPHILS # BLD AUTO: 2.67 10*3/MM3 (ref 1.4–6.5)
NEUTROPHILS NFR BLD AUTO: 66 % (ref 40–75)
NITRITE UR QL STRIP: NEGATIVE
OPIATES UR QL: POSITIVE
OSMOLALITY SERPL CALC.SUM OF ELEC: 286.1 MOSM/KG (ref 273–305)
OXYCODONE UR QL SCN: NEGATIVE
OXYHGB MFR BLDV: 92.7 % (ref 85–100)
PCO2 BLDA: 35.4 MM HG (ref 35–45)
PCP UR QL SCN: NEGATIVE
PH BLDA: 7.46 PH UNITS (ref 7.35–7.45)
PH UR STRIP.AUTO: 8.5 [PH] (ref 5–8)
PLATELET # BLD AUTO: 99 10*3/MM3 (ref 130–400)
PMV BLD AUTO: 11 FL (ref 6–10)
PO2 BLDA: 73 MM HG (ref 80–100)
POTASSIUM BLD-SCNC: 3.5 MMOL/L (ref 3.5–5.3)
PROT SERPL-MCNC: 7 G/DL (ref 6–8)
PROT UR QL STRIP: NEGATIVE
RBC # BLD AUTO: 3.8 10*6/MM3 (ref 4.2–5.4)
RBC # UR: ABNORMAL /HPF
REF LAB TEST METHOD: ABNORMAL
SAO2 % BLDCOA: 94.8 % (ref 90–100)
SODIUM BLD-SCNC: 141 MMOL/L (ref 135–153)
SP GR UR STRIP: 1.01 (ref 1–1.03)
SQUAMOUS #/AREA URNS HPF: ABNORMAL /HPF
TROPONIN I SERPL-MCNC: <0.006 NG/ML
UROBILINOGEN UR QL STRIP: ABNORMAL
WBC NRBC COR # BLD: 4.05 10*3/MM3 (ref 4.5–12.5)
WBC UR QL AUTO: ABNORMAL /HPF
WHOLE BLOOD HOLD SPECIMEN: NORMAL
WHOLE BLOOD HOLD SPECIMEN: NORMAL

## 2017-07-12 PROCEDURE — 80307 DRUG TEST PRSMV CHEM ANLYZR: CPT | Performed by: EMERGENCY MEDICINE

## 2017-07-12 PROCEDURE — 99222 1ST HOSP IP/OBS MODERATE 55: CPT | Performed by: INTERNAL MEDICINE

## 2017-07-12 PROCEDURE — 81001 URINALYSIS AUTO W/SCOPE: CPT | Performed by: EMERGENCY MEDICINE

## 2017-07-12 PROCEDURE — 83050 HGB METHEMOGLOBIN QUAN: CPT | Performed by: EMERGENCY MEDICINE

## 2017-07-12 PROCEDURE — 51702 INSERT TEMP BLADDER CATH: CPT

## 2017-07-12 PROCEDURE — 80053 COMPREHEN METABOLIC PANEL: CPT | Performed by: EMERGENCY MEDICINE

## 2017-07-12 PROCEDURE — 99284 EMERGENCY DEPT VISIT MOD MDM: CPT

## 2017-07-12 PROCEDURE — 83605 ASSAY OF LACTIC ACID: CPT | Performed by: INTERNAL MEDICINE

## 2017-07-12 PROCEDURE — 82550 ASSAY OF CK (CPK): CPT | Performed by: PHYSICIAN ASSISTANT

## 2017-07-12 PROCEDURE — 71010 XR CHEST 1 VW: CPT | Performed by: RADIOLOGY

## 2017-07-12 PROCEDURE — 83735 ASSAY OF MAGNESIUM: CPT | Performed by: INTERNAL MEDICINE

## 2017-07-12 PROCEDURE — 82375 ASSAY CARBOXYHB QUANT: CPT | Performed by: EMERGENCY MEDICINE

## 2017-07-12 PROCEDURE — 82962 GLUCOSE BLOOD TEST: CPT

## 2017-07-12 PROCEDURE — 25010000002 MAGNESIUM SULFATE 2 GM/50ML SOLUTION: Performed by: HOSPITALIST

## 2017-07-12 PROCEDURE — 25010000003 POTASSIUM CHLORIDE 10 MEQ/100ML SOLUTION: Performed by: INTERNAL MEDICINE

## 2017-07-12 PROCEDURE — 84484 ASSAY OF TROPONIN QUANT: CPT | Performed by: EMERGENCY MEDICINE

## 2017-07-12 PROCEDURE — 82805 BLOOD GASES W/O2 SATURATION: CPT | Performed by: EMERGENCY MEDICINE

## 2017-07-12 PROCEDURE — 84484 ASSAY OF TROPONIN QUANT: CPT | Performed by: PHYSICIAN ASSISTANT

## 2017-07-12 PROCEDURE — 82140 ASSAY OF AMMONIA: CPT | Performed by: EMERGENCY MEDICINE

## 2017-07-12 PROCEDURE — 85025 COMPLETE CBC W/AUTO DIFF WBC: CPT | Performed by: EMERGENCY MEDICINE

## 2017-07-12 PROCEDURE — 93010 ELECTROCARDIOGRAM REPORT: CPT | Performed by: INTERNAL MEDICINE

## 2017-07-12 PROCEDURE — 93005 ELECTROCARDIOGRAM TRACING: CPT | Performed by: EMERGENCY MEDICINE

## 2017-07-12 PROCEDURE — 94799 UNLISTED PULMONARY SVC/PX: CPT

## 2017-07-12 PROCEDURE — 82553 CREATINE MB FRACTION: CPT | Performed by: PHYSICIAN ASSISTANT

## 2017-07-12 PROCEDURE — 36600 WITHDRAWAL OF ARTERIAL BLOOD: CPT | Performed by: EMERGENCY MEDICINE

## 2017-07-12 PROCEDURE — 87086 URINE CULTURE/COLONY COUNT: CPT | Performed by: INTERNAL MEDICINE

## 2017-07-12 PROCEDURE — 70450 CT HEAD/BRAIN W/O DYE: CPT | Performed by: RADIOLOGY

## 2017-07-12 PROCEDURE — 87040 BLOOD CULTURE FOR BACTERIA: CPT | Performed by: INTERNAL MEDICINE

## 2017-07-12 PROCEDURE — 70450 CT HEAD/BRAIN W/O DYE: CPT

## 2017-07-12 PROCEDURE — 83690 ASSAY OF LIPASE: CPT | Performed by: EMERGENCY MEDICINE

## 2017-07-12 PROCEDURE — 82150 ASSAY OF AMYLASE: CPT | Performed by: EMERGENCY MEDICINE

## 2017-07-12 PROCEDURE — 71010 HC CHEST PA OR AP: CPT

## 2017-07-12 RX ORDER — HYDROCODONE BITARTRATE AND ACETAMINOPHEN 7.5; 325 MG/1; MG/1
1 TABLET ORAL EVERY 8 HOURS PRN
Status: CANCELLED | OUTPATIENT
Start: 2017-07-12

## 2017-07-12 RX ORDER — BUMETANIDE 0.25 MG/ML
0.5 INJECTION INTRAMUSCULAR; INTRAVENOUS 2 TIMES DAILY
Status: DISCONTINUED | OUTPATIENT
Start: 2017-07-12 | End: 2017-07-14

## 2017-07-12 RX ORDER — HYDROCHLOROTHIAZIDE 12.5 MG/1
12.5 CAPSULE, GELATIN COATED ORAL DAILY
Status: CANCELLED | OUTPATIENT
Start: 2017-07-12

## 2017-07-12 RX ORDER — MAGNESIUM SULFATE HEPTAHYDRATE 40 MG/ML
2 INJECTION, SOLUTION INTRAVENOUS ONCE
Status: COMPLETED | OUTPATIENT
Start: 2017-07-12 | End: 2017-07-12

## 2017-07-12 RX ORDER — PANTOPRAZOLE SODIUM 40 MG/1
40 TABLET, DELAYED RELEASE ORAL EVERY MORNING
Status: CANCELLED | OUTPATIENT
Start: 2017-07-13

## 2017-07-12 RX ORDER — ASPIRIN 81 MG/1
81 TABLET ORAL DAILY
Status: CANCELLED | OUTPATIENT
Start: 2017-07-12

## 2017-07-12 RX ORDER — NALOXONE HYDROCHLORIDE 1 MG/ML
INJECTION INTRAMUSCULAR; INTRAVENOUS; SUBCUTANEOUS
Status: COMPLETED
Start: 2017-07-12 | End: 2017-07-12

## 2017-07-12 RX ORDER — MAGNESIUM SULFATE HEPTAHYDRATE 40 MG/ML
2 INJECTION, SOLUTION INTRAVENOUS AS NEEDED
Status: DISCONTINUED | OUTPATIENT
Start: 2017-07-12 | End: 2017-07-15 | Stop reason: HOSPADM

## 2017-07-12 RX ORDER — POTASSIUM CHLORIDE 7.45 MG/ML
10 INJECTION INTRAVENOUS
Status: DISCONTINUED | OUTPATIENT
Start: 2017-07-12 | End: 2017-07-15 | Stop reason: HOSPADM

## 2017-07-12 RX ORDER — LEVOTHYROXINE SODIUM 0.07 MG/1
75 TABLET ORAL DAILY
Status: CANCELLED | OUTPATIENT
Start: 2017-07-12

## 2017-07-12 RX ORDER — LACTULOSE 10 G/15ML
10 SOLUTION ORAL 3 TIMES DAILY
Status: DISCONTINUED | OUTPATIENT
Start: 2017-07-12 | End: 2017-07-12 | Stop reason: SDUPTHER

## 2017-07-12 RX ORDER — HYDRALAZINE HYDROCHLORIDE 20 MG/ML
10 INJECTION INTRAMUSCULAR; INTRAVENOUS EVERY 4 HOURS PRN
Status: DISCONTINUED | OUTPATIENT
Start: 2017-07-12 | End: 2017-07-15 | Stop reason: HOSPADM

## 2017-07-12 RX ORDER — POTASSIUM CHLORIDE 7.45 MG/ML
10 INJECTION INTRAVENOUS
Status: COMPLETED | OUTPATIENT
Start: 2017-07-12 | End: 2017-07-12

## 2017-07-12 RX ORDER — NALOXONE HCL 0.4 MG/ML
1 VIAL (ML) INJECTION ONCE
Status: DISCONTINUED | OUTPATIENT
Start: 2017-07-12 | End: 2017-07-14

## 2017-07-12 RX ORDER — FOLIC ACID 1 MG/1
1 TABLET ORAL DAILY
Status: CANCELLED | OUTPATIENT
Start: 2017-07-12

## 2017-07-12 RX ORDER — MAGNESIUM SULFATE HEPTAHYDRATE 40 MG/ML
4 INJECTION, SOLUTION INTRAVENOUS AS NEEDED
Status: DISCONTINUED | OUTPATIENT
Start: 2017-07-12 | End: 2017-07-15 | Stop reason: HOSPADM

## 2017-07-12 RX ORDER — METOLAZONE 2.5 MG/1
1.25 TABLET ORAL DAILY
Status: CANCELLED | OUTPATIENT
Start: 2017-07-12

## 2017-07-12 RX ORDER — SODIUM CHLORIDE 0.9 % (FLUSH) 0.9 %
10 SYRINGE (ML) INJECTION AS NEEDED
Status: DISCONTINUED | OUTPATIENT
Start: 2017-07-12 | End: 2017-07-15 | Stop reason: HOSPADM

## 2017-07-12 RX ORDER — LACTULOSE 10 G/15ML
20 SOLUTION ORAL 3 TIMES DAILY
Status: CANCELLED | OUTPATIENT
Start: 2017-07-12

## 2017-07-12 RX ORDER — LACTULOSE 10 G/15ML
10 SOLUTION ORAL 3 TIMES DAILY
Status: DISCONTINUED | OUTPATIENT
Start: 2017-07-12 | End: 2017-07-12

## 2017-07-12 RX ORDER — SODIUM CHLORIDE 0.9 % (FLUSH) 0.9 %
1-10 SYRINGE (ML) INJECTION AS NEEDED
Status: DISCONTINUED | OUTPATIENT
Start: 2017-07-12 | End: 2017-07-15 | Stop reason: HOSPADM

## 2017-07-12 RX ORDER — NITROGLYCERIN 0.4 MG/1
0.4 TABLET SUBLINGUAL
Status: CANCELLED | OUTPATIENT
Start: 2017-07-12

## 2017-07-12 RX ORDER — HYDROCHLOROTHIAZIDE 12.5 MG/1
12.5 CAPSULE, GELATIN COATED ORAL DAILY
COMMUNITY
End: 2017-07-15 | Stop reason: HOSPADM

## 2017-07-12 RX ORDER — PANTOPRAZOLE SODIUM 40 MG/10ML
40 INJECTION, POWDER, LYOPHILIZED, FOR SOLUTION INTRAVENOUS
Status: DISCONTINUED | OUTPATIENT
Start: 2017-07-13 | End: 2017-07-15 | Stop reason: HOSPADM

## 2017-07-12 RX ORDER — LEVOTHYROXINE SODIUM ANHYDROUS 100 UG/5ML
37.5 INJECTION, POWDER, LYOPHILIZED, FOR SOLUTION INTRAVENOUS
Status: DISCONTINUED | OUTPATIENT
Start: 2017-07-12 | End: 2017-07-13

## 2017-07-12 RX ORDER — ROPINIROLE 1 MG/1
1 TABLET, FILM COATED ORAL NIGHTLY
Status: CANCELLED | OUTPATIENT
Start: 2017-07-12

## 2017-07-12 RX ORDER — POTASSIUM CHLORIDE 7.45 MG/ML
10 INJECTION INTRAVENOUS
Status: DISCONTINUED | OUTPATIENT
Start: 2017-07-12 | End: 2017-07-12 | Stop reason: SDUPTHER

## 2017-07-12 RX ORDER — HEPARIN SODIUM 5000 [USP'U]/ML
5000 INJECTION, SOLUTION INTRAVENOUS; SUBCUTANEOUS EVERY 12 HOURS SCHEDULED
Status: CANCELLED | OUTPATIENT
Start: 2017-07-12

## 2017-07-12 RX ORDER — MAGNESIUM SULFATE 1 G/100ML
1 INJECTION INTRAVENOUS AS NEEDED
Status: DISCONTINUED | OUTPATIENT
Start: 2017-07-12 | End: 2017-07-15 | Stop reason: HOSPADM

## 2017-07-12 RX ORDER — LOSARTAN POTASSIUM 50 MG/1
50 TABLET ORAL DAILY
Status: CANCELLED | OUTPATIENT
Start: 2017-07-12

## 2017-07-12 RX ORDER — BUMETANIDE 1 MG/1
1 TABLET ORAL 2 TIMES DAILY
Status: CANCELLED | OUTPATIENT
Start: 2017-07-12

## 2017-07-12 RX ADMIN — WATER 300 ML: 100 IRRIGANT IRRIGATION at 18:59

## 2017-07-12 RX ADMIN — BUMETANIDE 0.5 MG: 0.25 INJECTION INTRAMUSCULAR; INTRAVENOUS at 17:42

## 2017-07-12 RX ADMIN — MAGNESIUM SULFATE IN WATER 2 G: 40 INJECTION, SOLUTION INTRAVENOUS at 21:07

## 2017-07-12 RX ADMIN — WATER 300 ML: 100 IRRIGANT IRRIGATION at 23:54

## 2017-07-12 RX ADMIN — POTASSIUM CHLORIDE 10 MEQ: 7.46 INJECTION, SOLUTION INTRAVENOUS at 19:30

## 2017-07-12 RX ADMIN — NALOXONE HYDROCHLORIDE 1 MG: 1 INJECTION PARENTERAL at 11:41

## 2017-07-12 RX ADMIN — POTASSIUM CHLORIDE 10 MEQ: 7.46 INJECTION, SOLUTION INTRAVENOUS at 21:53

## 2017-07-12 RX ADMIN — POTASSIUM CHLORIDE 10 MEQ: 7.46 INJECTION, SOLUTION INTRAVENOUS at 20:33

## 2017-07-12 RX ADMIN — POTASSIUM CHLORIDE 10 MEQ: 7.46 INJECTION, SOLUTION INTRAVENOUS at 16:34

## 2017-07-12 NOTE — H&P
Patient Identification:  Name:  Alma Delia Garcia  Age:  86 y.o.  Sex:  female  :  1930  MRN:  [id number]   Visit Number:  [id number]  Primary Care Physician:  Galina Gonsalves MD    I have seen the patient in conjunction with Savannah Edmond PA-C and I agree with the following statements:     Chief complaint:  Altered mental status    History of presenting illness:  86 y.o. female with known history of CIrrhosis with ongoing Xifaxin and Lactulose therapy that presented to the ED due to decreased responsiveness and increased confusion.  Her ammonia level was found to be significantly elevated.  Her daughter Justin, her caretaker, reports that she has not been taking the prescription as prescribed.   She reports she usually only takes it once or twice a day, and has recently not even been taking it daily.   She has a long-standing history of non-compliance with Lactulose.   They report her confusion has gradually worsened.  She is altered at the time of evaluation. She does respond and withdraw to pain. She does appear to move her limbs equally bilaterally in the upper and lower extremities.  Her history has been taken from her chart and daughter at bedside.  Her family reports she has not wanted to take the Lactulose.  They state that she has not had the Xifaxin due to running out and awaiting refill due to wait for pre authorization of medication.   She does have ongoing bilateral lower extremity edema.        ---------------------------------------------------------------------------------------------------------------------   Review of Systems   Unable to perform ROS: Mental status change      ---------------------------------------------------------------------------------------------------------------------   Past Medical History:   Diagnosis Date   • Acute renal failure    • Anxiety    • Bell's palsy    • Cellulitis     LLE   • CHF (congestive heart failure)     Diastolic   • Diverticulosis    • GERD  (gastroesophageal reflux disease)    • Hepatic cirrhosis    • Hypertension    • Hypothyroidism    • Osteoporosis    • Thrombocytopenia    • Trigeminal neuralgia      Past Surgical History:   Procedure Laterality Date   • BACK SURGERY      KYPHOPLASTIES    • CARDIAC CATHETERIZATION Left 08/2004   • CARDIOVASCULAR STRESS TEST  01/25/2015   • CATARACT EXTRACTION     • CHOLECYSTECTOMY     • CLAVICLE SURGERY Right    • COLONOSCOPY  10/2004   • HIP FRACTURE SURGERY Bilateral     ARTHROPLASTIES    • PARTIAL HYSTERECTOMY     • WRIST FRACTURE SURGERY       Family History   Problem Relation Age of Onset   • Cancer Mother    • Heart disease Father    • Arthritis Sister    • Osteoporosis Sister    • Diabetes Maternal Aunt      Social History     Social History   • Marital status:      Spouse name: N/A   • Number of children: N/A   • Years of education: N/A     Social History Main Topics   • Smoking status: Never Smoker   • Smokeless tobacco: Former User     Types: Snuff     Quit date: 1970   • Alcohol use No      Comment: FORMER 12 BOTTLES PER DAY FOR 15 YEARS   • Drug use: No   • Sexual activity: Defer     Other Topics Concern   • None     Social History Narrative    LIVES WITH GRANDDAUGHTER DEVON IN [place]      ---------------------------------------------------------------------------------------------------------------------   Allergies:  Ciprofloxacin  ---------------------------------------------------------------------------------------------------------------------   Prior to Admission Medications     Prescriptions Last Dose Informant Patient Reported? Taking?    alendronate (FOSAMAX) 70 MG tablet   No Yes    TAKE ONE TABLET BY MOUTH EVERY 7 DAYS    aspirin 81 MG tablet   No Yes    Take 1 tablet by mouth Daily.    bumetanide (BUMEX) 1 MG tablet   No Yes    Take 1 tablet by mouth 2 (Two) Times a Day.    esomeprazole (nexIUM) 40 MG capsule   No Yes    Take 1 capsule by mouth Every Morning Before Breakfast.     folic acid (FOLVITE) 1 MG tablet   No Yes    TAKE ONE TABLET BY MOUTH DAILY    HYDROcodone-acetaminophen (NORCO) 7.5-325 MG per tablet   No Yes    Take 1 tablet by mouth Every 8 (Eight) Hours As Needed for Moderate Pain (4-6).    lactulose (CHRONULAC) 10 GM/15ML solution   No Yes    Take 30 mL by mouth 3 (Three) Times a Day.    levothyroxine (SYNTHROID, LEVOTHROID) 50 MCG tablet   No Yes    Take 1.5 tablets by mouth Daily.    losartan (COZAAR) 50 MG tablet   No Yes    Take 1 tablet by mouth Daily.    metOLazone (ZAROXOLYN) 2.5 MG tablet   No Yes    Take 0.5 tablets by mouth Daily.    nitroglycerin (NITROSTAT) 0.4 MG SL tablet   No Yes    Place 1 tablet under the tongue Every 5 (Five) Minutes As Needed for chest pain. Take no more than 3 doses in 15 minutes.    rifaximin (XIFAXAN) 550 MG tablet   No Yes    Take 1 tablet by mouth Every 12 (Twelve) Hours.    rOPINIRole (REQUIP) 1 MG tablet   No Yes    Take 1 tablet by mouth Every Night. Take 1 hour before bedtime.        Hospital Scheduled Meds:    naloxone 1 mg Intravenous Once        ---------------------------------------------------------------------------------------------------------------------   Vital Signs:  Temp:  [97.6 °F (36.4 °C)] 97.6 °F (36.4 °C)  Heart Rate:  [54-70] 54  Resp:  [12-16] 16  BP: (135-145)/(63-93) 135/63  Last 3 weights    07/12/17  1117   Weight: 148 lb (67.1 kg)     Body mass index is 24.63 kg/(m^2).  ---------------------------------------------------------------------------------------------------------------------   Physical Exam:  Constitutional:  Elderly. Responsive to pain.      HENT:  Head: Normocephalic and atraumatic.  Mouth:  Moist mucous membranes.    Eyes:  Conjunctivae and EOM are normal.  Pupils are equal, round, and reactive to light.  No scleral icterus.  Neck:  Neck supple.  No JVD present.    Cardiovascular:  Normal rate, regular rhythm and normal heart sounds with no murmur.  Pulmonary/Chest:  No respiratory distress,  no wheezes, no crackles, with normal breath sounds and good air movement.  Abdominal:  Soft.  Bowel sounds are normal.  No tenderness.  Mild abdominal distension.   Musculoskeletal:  Bilateral lower extremity edema 2+ pitting edema (Baseline per family), no tenderness, and no deformity.  No red or swollen joints anywhere.    Neurological:  Responsive to pain. Moves extremities equally in bed.  Does not follow commands.    Skin:  Skin is warm and dry.  No rash noted.  No pallor.   Psychiatric:  Altered mental status.   Peripheral vascular:  Bilateral lower extremity edema (2+) and strong pulses on all 4 extremities.  ---------------------------------------------------------------------------------------------------------------------  EKG:      NSR.  Unable to utilize snipping tool at present.       ---------------------------------------------------------------------------------------------------------------------     Results from last 7 days  Lab Units 07/12/17  1309   WBC 10*3/mm3 4.05*   HEMOGLOBIN g/dL 12.2   HEMATOCRIT % 36.1*   MCV fL 95.0*   MCHC g/dL 33.8   PLATELETS 10*3/mm3 99*       Results from last 7 days  Lab Units 07/12/17  1201   PH, ARTERIAL pH units 7.465*   PO2 ART mm Hg 73.0*   PCO2, ARTERIAL mm Hg 35.4   HCO3 ART mmol/L 24.9       Results from last 7 days  Lab Units 07/12/17  1309   SODIUM mmol/L 141   POTASSIUM mmol/L 3.5   CHLORIDE mmol/L 110   CO2 mmol/L 25.4   BUN mg/dL 24*   CREATININE mg/dL 1.16   EGFR IF NONAFRICN AM mL/min/1.73 44*   CALCIUM mg/dL 10.0   GLUCOSE mg/dL 113*   ALBUMIN g/dL 3.40   BILIRUBIN mg/dL 1.2   ALK PHOS U/L 87   AST (SGOT) U/L 55*   ALT (SGPT) U/L 24   Estimated Creatinine Clearance: 36.9 mL/min (by C-G formula based on Cr of 1.16).  Ammonia   Date Value Ref Range Status   07/12/2017 130 (H) 11 - 51 umol/L Final       Results from last 7 days  Lab Units 07/12/17  1309   TROPONIN I ng/mL <0.006         Lab Results   Component Value Date    HGBA1C 4.8 11/24/2015      Lab Results   Component Value Date    TSH 1.364 06/12/2017    FREET4 1.22 06/12/2017     No results found for: PREGTESTUR, PREGSERUM, HCG, HCGQUANT  Pain Management Panel     Pain Management Panel Latest Ref Rng & Units 7/12/2017 12/31/2016    AMPHETAMINES SCREEN, URINE Negative Negative Negative    BARBITURATES SCREEN Negative Negative Negative    BENZODIAZEPINE SCREEN, URINE Negative Negative Negative    BUPRENORPHINE Negative Negative -    COCAINE SCREEN, URINE Negative Negative Negative    METHADONE SCREEN, URINE Negative Negative Negative                        ---------------------------------------------------------------------------------------------------------------------  Imaging Results (last 7 days)     ** No results found for the last 168 hours. **          Cultures:         I have personally reviewed the radiology images and read the final radiology report.  ---------------------------------------------------------------------------------------------------------------------  Assessment and Plan:    -Altered mental status assumed to be secondary to hepatic encephalopathy with underlying Cirrhosis: Ammonia level is elevated.  ABG did not reveal acute abnormality.  Rectal lactulose has been ordered per pharmacy dosing.  CT of head has been ordered for further evaluation. Neuro checks have been ordered q4 hours.  She has been placed in the CCU given altered mental status with advanced age and need for close monitoring. GI consultation has been placed.  Urinalysis was unremarkable.  CXR is ordered and pending.   She is on chronic narcotic medications with hydrocodone.  She is NPO at present.     -Cirrhosis:  GI consultation placed.  Lactulose as outlined above.  Will consult pharmacy to look into Xifaxin prescription information.     -Bilateral lower extremity edema, chronic:  Will monitor strict Is & Os, though patient is NPO at present. Will review home diuretics upon availability.      -Thrombocytopenia, chronic:  Likely secondary to liver disease.      -Hypertension, controlled.  Will review home medications.     -Hypothyroidism: Will obtain TSH.  -DVT prophylaxis with SQ Heparin        WILMER Renner  07/12/17  3:06 PM  ---------------------------------------------------------------------------------------------------------------------   Dr Alexander Hamilton:  Patient is seen and examined at bedside,agree with above H&Pand A&P.

## 2017-07-12 NOTE — ED NOTES
ekg performed by [facility] at 1120 and shown to Dr. Praful Johnson  07/12/17 1126

## 2017-07-12 NOTE — PLAN OF CARE
Problem: Patient Care Overview (Adult)  Goal: Plan of Care Review  Outcome: Ongoing (interventions implemented as appropriate)  Goal: Adult Individualization and Mutuality  Outcome: Ongoing (interventions implemented as appropriate)  Goal: Discharge Needs Assessment  Outcome: Ongoing (interventions implemented as appropriate)    Problem: Confusion, Acute (Adult)  Goal: Identify Related Risk Factors and Signs and Symptoms  Outcome: Ongoing (interventions implemented as appropriate)  Goal: Cognitive/Functional Impairments Minimized  Outcome: Ongoing (interventions implemented as appropriate)  Goal: Safety  Outcome: Ongoing (interventions implemented as appropriate)    Problem: Skin Integrity Impairment, Risk/Actual (Adult)  Goal: Identify Related Risk Factors and Signs and Symptoms  Outcome: Ongoing (interventions implemented as appropriate)  Goal: Skin Integrity/Wound Healing  Outcome: Ongoing (interventions implemented as appropriate)    Problem: Pressure Ulcer Risk (Evens Q Scale) (Pediatric)  Goal: Identify Related Risk Factors and Signs and Symptoms  Outcome: Ongoing (interventions implemented as appropriate)  Goal: Skin Integrity  Outcome: Ongoing (interventions implemented as appropriate)    Problem: Fall Risk (Adult)  Goal: Identify Related Risk Factors and Signs and Symptoms  Outcome: Ongoing (interventions implemented as appropriate)  Goal: Absence of Falls  Outcome: Ongoing (interventions implemented as appropriate)

## 2017-07-12 NOTE — PROGRESS NOTES
A prior authorization was obtained for the patient's xifaxan.     Vandana Lopez, [facility]  07/12/17  5:07 PM

## 2017-07-12 NOTE — ED PROVIDER NOTES
Subjective   HPI Comments: Chart limited due to patient non verbal    Patient is a 86 y.o. female presenting with altered mental status.   History limited by:  Acuity of condition   used: No    Altered Mental Status   Presenting symptoms: partial responsiveness (responsive to pain)    Severity:  Severe  Most recent episode:  Today  Associated symptoms: no abdominal pain and no fever        Review of Systems   Unable to perform ROS: Acuity of condition   Constitutional: Negative.  Negative for fever.   HENT: Negative.    Respiratory: Negative.    Cardiovascular: Negative.  Negative for chest pain.   Gastrointestinal: Negative.  Negative for abdominal pain.   Endocrine: Negative.    Genitourinary: Negative.  Negative for dysuria.   Skin: Negative.    Neurological: Negative.    All other systems reviewed and are negative.      Past Medical History:   Diagnosis Date   • Acute renal failure    • Anxiety    • Bell's palsy    • Cellulitis     LLE   • CHF (congestive heart failure)     Diastolic   • Diverticulosis    • GERD (gastroesophageal reflux disease)    • Hepatic cirrhosis    • Hypertension    • Hypothyroidism    • Osteoporosis    • Thrombocytopenia    • Trigeminal neuralgia        Allergies   Allergen Reactions   • Ciprofloxacin        Past Surgical History:   Procedure Laterality Date   • BACK SURGERY      KYPHOPLASTIES    • CARDIAC CATHETERIZATION Left 08/2004   • CARDIOVASCULAR STRESS TEST  01/25/2015   • CATARACT EXTRACTION     • CHOLECYSTECTOMY     • CLAVICLE SURGERY Right    • COLONOSCOPY  10/2004   • HIP FRACTURE SURGERY Bilateral     ARTHROPLASTIES    • PARTIAL HYSTERECTOMY     • WRIST FRACTURE SURGERY         Family History   Problem Relation Age of Onset   • Cancer Mother    • Heart disease Father    • Arthritis Sister    • Osteoporosis Sister    • Diabetes Maternal Aunt        Social History     Social History   • Marital status:      Spouse name: N/A   • Number of children: N/A    • Years of education: N/A     Social History Main Topics   • Smoking status: Never Smoker   • Smokeless tobacco: Former User     Types: Snuff     Quit date: 1970   • Alcohol use No      Comment: FORMER 12 BOTTLES PER DAY FOR 15 YEARS   • Drug use: No   • Sexual activity: Defer     Other Topics Concern   • None     Social History Narrative    LIVES WITH GRANDDAUGHTER DEVON IN [place]          Objective   Physical Exam   Constitutional: She appears well-developed and well-nourished.   Responsive to pain   HENT:   Head: Normocephalic and atraumatic.   Right Ear: External ear normal.   Left Ear: External ear normal.   Nose: Nose normal.   Eyes: Conjunctivae and EOM are normal. Pupils are equal, round, and reactive to light.   Neck: Normal range of motion. Neck supple. No JVD present. No tracheal deviation present.   Cardiovascular: Normal rate, regular rhythm and normal heart sounds.    No murmur heard.  Pulmonary/Chest: Effort normal and breath sounds normal. No respiratory distress. She has no wheezes.   Abdominal: Soft. Bowel sounds are normal. There is no tenderness.   Musculoskeletal: Normal range of motion. She exhibits no edema or deformity.   Neurological: No cranial nerve deficit.   Skin: Skin is warm and dry. No rash noted. She is not diaphoretic. No erythema. No pallor.   Psychiatric: She has a normal mood and affect.   Nursing note and vitals reviewed.      Procedures         LAB RESULTS  Lab Results (last 24 hours)     Procedure Component Value Units Date/Time    POC Glucose Fingerstick [[id number]]  (Normal) Collected:  07/12/17 1123    Specimen:  Blood Updated:  07/12/17 1130     Glucose 106 mg/dL     Narrative:       Meter: [id number] : [id number] Fiona Martinez    Urinalysis With / Culture If Indicated [[id number]]  (Abnormal) Collected:  07/12/17 1155    Specimen:  Urine from Urine, Catheter Updated:  07/12/17 1226     Color, UA Yellow     Appearance, UA Clear     pH, UA 8.5 (H)     Specific  Gravity, UA 1.015     Glucose, UA Negative     Ketones, UA Negative     Bilirubin, UA Negative     Blood, UA Negative     Protein, UA Negative     Leuk Esterase, UA Trace (A)     Nitrite, UA Negative     Urobilinogen, UA 2.0 E.U./dL (A)    Urine Drug Screen [[id number]]  (Abnormal) Collected:  07/12/17 1155    Specimen:  Urine from Urine, Catheter Updated:  07/12/17 1225     Amphetamine Screen, Urine Negative     Barbiturates Screen, Urine Negative     Benzodiazepine Screen, Urine Negative     Cocaine Screen, Urine Negative     Methadone Screen, Urine Negative     Opiate Screen Positive (A)     Phencyclidine (PCP), Urine Negative     THC, Screen, Urine Negative     6-ACETYL MORPHINE Negative     Buprenorphine, Screen, Urine Negative     Oxycodone Screen, Urine Negative    Narrative:       Negative Thresholds For Drugs Screened:                  Amphetamines              1000 ng/ml               Barbiturates               200 ng/ml               Benzodiazepines            200 ng/ml              Cocaine                    300 ng/ml              Methadone                  300 ng/ml              Opiates                    300 ng/ml               Phencyclidine               25 ng/ml               THC                         50 ng/ml              6-Acetyl Morphine           10 ng/ml              Buprenorphine                5 ng/ml              Oxycodone                  300 ng/ml    The reference range for all drugs tested is negative. This report includes final unconfirmed qualitative results to be used for medical treatment purposes only. Unconfirmed results must not be used for non-medical purposes such as employment or legal testing. Clinical consideration should be applied to any drug of abuse test, especially when unconfirmed quantitative results are used.      Urinalysis, Microscopic Only [[id number]]  (Abnormal) Collected:  07/12/17 1155    Specimen:  Urine from Urine, Catheter Updated:  07/12/17 1226     RBC, UA  0-2 (A) /HPF      WBC, UA 0-2 (A) /HPF      Bacteria, UA None Seen /HPF      Squamous Epithelial Cells, UA 0-2 /HPF      Hyaline Casts, UA None Seen /LPF      Methodology Automated Microscopy    Blood Gas, Arterial With Co-Ox [[id number]]  (Abnormal) Collected:  07/12/17 1201    Specimen:  Arterial Blood Updated:  07/12/17 1204     Site Arterial: left radial     Chris's Test Positive     pH, Arterial 7.465 (H) pH units      pCO2, Arterial 35.4 mm Hg      pO2, Arterial 73.0 (L) mm Hg      HCO3, Arterial 24.9 mmol/L      Base Excess, Arterial 1.5 mmol/L      O2 Saturation, Arterial 94.8 %      Hemoglobin, Blood Gas 13.2 g/dL      Hematocrit, Blood Gas 39.0 %      Oxyhemoglobin 92.7 %      Methemoglobin 0.4 %      Carboxyhemoglobin 1.8 %      A-a Gradiant 28.0 mmHg      Temperature 98.6 C      Barometric Pressure for Blood Gas 730 mmHg      Modality Room air     FIO2 21 %     CBC & Differential [[id number]] Collected:  07/12/17 1309    Specimen:  Blood Updated:  07/12/17 1319    Narrative:       The following orders were created for panel order CBC & Differential.  Procedure                               Abnormality         Status                     ---------                               -----------         ------                     CBC Auto Differential[[id number]]        Abnormal            Final result                 Please view results for these tests on the individual orders.    Comprehensive Metabolic Panel [[id number]]  (Abnormal) Collected:  07/12/17 1309    Specimen:  Blood from Arm, Left Updated:  07/12/17 2029     Glucose 113 (H) mg/dL      BUN 24 (H) mg/dL      Creatinine 1.16 mg/dL      Sodium 141 mmol/L      Potassium 3.5 mmol/L      Chloride 110 mmol/L      CO2 25.4 mmol/L      Calcium 10.0 mg/dL      Total Protein 7.0 g/dL      Albumin 3.40 g/dL      ALT (SGPT) 24 U/L      AST (SGOT) 55 (H) U/L      Alkaline Phosphatase 87 U/L       Note New Reference Ranges        Total Bilirubin 1.2 mg/dL      eGFR  Non  Amer 44 (L) mL/min/1.73      Globulin 3.6 gm/dL      A/G Ratio 0.9 (L) g/dL      BUN/Creatinine Ratio 20.7     Anion Gap 5.6 mmol/L     Narrative:       The MDRD GFR formula is only valid for adults with stable renal function between ages 18 and 70.    Amylase [[id number]]  (Normal) Collected:  07/12/17 1309    Specimen:  Blood from Arm, Left Updated:  07/12/17 1339     Amylase 44 U/L     Lipase [[id number]]  (Normal) Collected:  07/12/17 1309    Specimen:  Blood from Arm, Left Updated:  07/12/17 1339     Lipase 44 U/L     Troponin [[id number]]  (Normal) Collected:  07/12/17 1309    Specimen:  Blood from Arm, Left Updated:  07/12/17 1347     Troponin I <0.006 ng/mL     Narrative:       Ultra Troponin I Reference Range:         <=0.039 ng/mL: Negative    0.04-0.779 ng/mL: Indeterminate Range. Suspicious of MI.  Clinical correlation required.       >=0.78  ng/mL: Consistent with myocardial injury.  Clinical correlation required.    Ethanol [[id number]] Collected:  07/12/17 1309    Specimen:  Blood from Arm, Left Updated:  07/12/17 1349     Ethanol <10 mg/dL      Ethanol % <0.010 %     Narrative:       >/= 80.0 legally intoxicated    Ammonia [[id number]]  (Abnormal) Collected:  07/12/17 1309    Specimen:  Blood from Arm, Left Updated:  07/12/17 1337     Ammonia 130 (H) umol/L     CBC Auto Differential [[id number]]  (Abnormal) Collected:  07/12/17 1309    Specimen:  Blood from Arm, Left Updated:  07/12/17 1319     WBC 4.05 (L) 10*3/mm3      RBC 3.80 (L) 10*6/mm3      Hemoglobin 12.2 g/dL      Hematocrit 36.1 (L) %      MCV 95.0 (H) fL      MCH 32.1 pg      MCHC 33.8 g/dL      RDW 13.6 %      RDW-SD 44.5 fl      MPV 11.0 (H) fL      Platelets 99 (L) 10*3/mm3      Neutrophil % 66.0 %      Lymphocyte % 21.2 %      Monocyte % 10.1 %      Eosinophil % 2.0 %      Basophil % 0.7 %      Immature Grans % 0.0 %      Neutrophils, Absolute 2.67 10*3/mm3      Lymphocytes, Absolute 0.86 (L) 10*3/mm3      Monocytes, Absolute  0.41 10*3/mm3      Eosinophils, Absolute 0.08 10*3/mm3      Basophils, Absolute 0.03 10*3/mm3      Immature Grans, Absolute 0.00 10*3/mm3           I ordered the above labs and reviewed the results    RADIOLOGY  No orders to display        I ordered the above noted radiological studies. Interpreted by radiologist. Reviewed by me in PACS.     ED Course  ED Course       Discussed with hospitalist will place in ICU      did discuss with Dr. Kamran DRIVER    Final diagnoses:   Hepatic encephalopathy       Documentation assistance provided by crispin Basilio.  Information recorded by the scribe was done at my direction and has been verified and validated by me.     Karson Basilio  07/12/17 1130       Troy Basilio MD  07/12/17 1403       Troy Basilio MD  07/12/17 1409

## 2017-07-13 ENCOUNTER — APPOINTMENT (OUTPATIENT)
Dept: GENERAL RADIOLOGY | Facility: HOSPITAL | Age: 82
End: 2017-07-13

## 2017-07-13 ENCOUNTER — APPOINTMENT (OUTPATIENT)
Dept: ULTRASOUND IMAGING | Facility: HOSPITAL | Age: 82
End: 2017-07-13
Attending: INTERNAL MEDICINE

## 2017-07-13 LAB
ALBUMIN SERPL-MCNC: 3.2 G/DL (ref 3.4–4.8)
ALBUMIN/GLOB SERPL: 0.9 G/DL (ref 1.5–2.5)
ALP SERPL-CCNC: 76 U/L (ref 35–104)
ALT SERPL W P-5'-P-CCNC: 27 U/L (ref 10–36)
AMMONIA BLD-SCNC: 53 UMOL/L (ref 11–51)
ANION GAP SERPL CALCULATED.3IONS-SCNC: 8 MMOL/L (ref 3.6–11.2)
AST SERPL-CCNC: 57 U/L (ref 10–30)
BASOPHILS # BLD AUTO: 0.06 10*3/MM3 (ref 0–0.3)
BASOPHILS NFR BLD AUTO: 1 % (ref 0–2)
BILIRUB SERPL-MCNC: 2.2 MG/DL (ref 0.2–1.8)
BUN BLD-MCNC: 26 MG/DL (ref 7–21)
BUN/CREAT SERPL: 25.2 (ref 7–25)
CALCIUM SPEC-SCNC: 9.4 MG/DL (ref 7.7–10)
CHLORIDE SERPL-SCNC: 110 MMOL/L (ref 99–112)
CK MB SERPL-CCNC: 5.36 NG/ML (ref 0–5)
CK MB SERPL-RTO: 1.8 % (ref 0–3)
CK SERPL-CCNC: 292 U/L (ref 24–173)
CO2 SERPL-SCNC: 26 MMOL/L (ref 24.3–31.9)
CREAT BLD-MCNC: 1.03 MG/DL (ref 0.43–1.29)
DEPRECATED RDW RBC AUTO: 44 FL (ref 37–54)
EOSINOPHIL # BLD AUTO: 0.21 10*3/MM3 (ref 0–0.7)
EOSINOPHIL NFR BLD AUTO: 3.4 % (ref 0–7)
ERYTHROCYTE [DISTWIDTH] IN BLOOD BY AUTOMATED COUNT: 13.3 % (ref 11.5–14.5)
GFR SERPL CREATININE-BSD FRML MDRD: 51 ML/MIN/1.73
GLOBULIN UR ELPH-MCNC: 3.5 GM/DL
GLUCOSE BLD-MCNC: 103 MG/DL (ref 70–110)
GLUCOSE BLDC GLUCOMTR-MCNC: 103 MG/DL (ref 70–130)
GLUCOSE BLDC GLUCOMTR-MCNC: 104 MG/DL (ref 70–130)
GLUCOSE BLDC GLUCOMTR-MCNC: 86 MG/DL (ref 70–130)
HCT VFR BLD AUTO: 36.1 % (ref 37–47)
HGB BLD-MCNC: 12.4 G/DL (ref 12–16)
IMM GRANULOCYTES # BLD: 0.01 10*3/MM3 (ref 0–0.03)
IMM GRANULOCYTES NFR BLD: 0.2 % (ref 0–0.5)
LYMPHOCYTES # BLD AUTO: 1.25 10*3/MM3 (ref 1–3)
LYMPHOCYTES NFR BLD AUTO: 20.2 % (ref 16–46)
MAGNESIUM SERPL-MCNC: 1.9 MG/DL (ref 1.7–2.6)
MCH RBC QN AUTO: 32.3 PG (ref 27–33)
MCHC RBC AUTO-ENTMCNC: 34.3 G/DL (ref 33–37)
MCV RBC AUTO: 94 FL (ref 80–94)
MONOCYTES # BLD AUTO: 0.65 10*3/MM3 (ref 0.1–0.9)
MONOCYTES NFR BLD AUTO: 10.5 % (ref 0–12)
NEUTROPHILS # BLD AUTO: 4 10*3/MM3 (ref 1.4–6.5)
NEUTROPHILS NFR BLD AUTO: 64.7 % (ref 40–75)
OSMOLALITY SERPL CALC.SUM OF ELEC: 291.8 MOSM/KG (ref 273–305)
PHOSPHATE SERPL-MCNC: 3.8 MG/DL (ref 2.7–4.5)
PLATELET # BLD AUTO: 114 10*3/MM3 (ref 130–400)
PMV BLD AUTO: 11.2 FL (ref 6–10)
POTASSIUM BLD-SCNC: 3.4 MMOL/L (ref 3.5–5.3)
POTASSIUM BLD-SCNC: 3.7 MMOL/L (ref 3.5–5.3)
PROT SERPL-MCNC: 6.7 G/DL (ref 6–8)
RBC # BLD AUTO: 3.84 10*6/MM3 (ref 4.2–5.4)
SODIUM BLD-SCNC: 144 MMOL/L (ref 135–153)
T4 FREE SERPL-MCNC: 1.22 NG/DL (ref 0.89–1.76)
TROPONIN I SERPL-MCNC: 0.02 NG/ML
TSH SERPL DL<=0.05 MIU/L-ACNC: 0.11 MIU/ML (ref 0.55–4.78)
VIT B12 BLD-MCNC: 678 PG/ML (ref 211–911)
WBC NRBC COR # BLD: 6.18 10*3/MM3 (ref 4.5–12.5)

## 2017-07-13 PROCEDURE — 80053 COMPREHEN METABOLIC PANEL: CPT | Performed by: PHYSICIAN ASSISTANT

## 2017-07-13 PROCEDURE — 74230 X-RAY XM SWLNG FUNCJ C+: CPT

## 2017-07-13 PROCEDURE — 84443 ASSAY THYROID STIM HORMONE: CPT | Performed by: INTERNAL MEDICINE

## 2017-07-13 PROCEDURE — 93970 EXTREMITY STUDY: CPT

## 2017-07-13 PROCEDURE — 94799 UNLISTED PULMONARY SVC/PX: CPT

## 2017-07-13 PROCEDURE — 84100 ASSAY OF PHOSPHORUS: CPT | Performed by: PHYSICIAN ASSISTANT

## 2017-07-13 PROCEDURE — 83735 ASSAY OF MAGNESIUM: CPT | Performed by: PHYSICIAN ASSISTANT

## 2017-07-13 PROCEDURE — 74230 X-RAY XM SWLNG FUNCJ C+: CPT | Performed by: RADIOLOGY

## 2017-07-13 PROCEDURE — 84439 ASSAY OF FREE THYROXINE: CPT | Performed by: INTERNAL MEDICINE

## 2017-07-13 PROCEDURE — 25010000003 POTASSIUM CHLORIDE 10 MEQ/100ML SOLUTION: Performed by: INTERNAL MEDICINE

## 2017-07-13 PROCEDURE — 84132 ASSAY OF SERUM POTASSIUM: CPT | Performed by: INTERNAL MEDICINE

## 2017-07-13 PROCEDURE — 84484 ASSAY OF TROPONIN QUANT: CPT | Performed by: PHYSICIAN ASSISTANT

## 2017-07-13 PROCEDURE — G8996 SWALLOW CURRENT STATUS: HCPCS | Performed by: SPEECH-LANGUAGE PATHOLOGIST

## 2017-07-13 PROCEDURE — 82607 VITAMIN B-12: CPT | Performed by: INTERNAL MEDICINE

## 2017-07-13 PROCEDURE — G8997 SWALLOW GOAL STATUS: HCPCS | Performed by: SPEECH-LANGUAGE PATHOLOGIST

## 2017-07-13 PROCEDURE — 92611 MOTION FLUOROSCOPY/SWALLOW: CPT | Performed by: SPEECH-LANGUAGE PATHOLOGIST

## 2017-07-13 PROCEDURE — 99231 SBSQ HOSP IP/OBS SF/LOW 25: CPT | Performed by: PHYSICIAN ASSISTANT

## 2017-07-13 PROCEDURE — 99233 SBSQ HOSP IP/OBS HIGH 50: CPT | Performed by: INTERNAL MEDICINE

## 2017-07-13 PROCEDURE — 92610 EVALUATE SWALLOWING FUNCTION: CPT | Performed by: SPEECH-LANGUAGE PATHOLOGIST

## 2017-07-13 PROCEDURE — 82962 GLUCOSE BLOOD TEST: CPT

## 2017-07-13 PROCEDURE — 25010000002 MAGNESIUM SULFATE IN D5W 1G/100ML (PREMIX) 1-5 GM/100ML-% SOLUTION: Performed by: HOSPITALIST

## 2017-07-13 PROCEDURE — 93970 EXTREMITY STUDY: CPT | Performed by: RADIOLOGY

## 2017-07-13 PROCEDURE — 85025 COMPLETE CBC W/AUTO DIFF WBC: CPT | Performed by: PHYSICIAN ASSISTANT

## 2017-07-13 PROCEDURE — 82550 ASSAY OF CK (CPK): CPT | Performed by: PHYSICIAN ASSISTANT

## 2017-07-13 PROCEDURE — 82553 CREATINE MB FRACTION: CPT | Performed by: PHYSICIAN ASSISTANT

## 2017-07-13 PROCEDURE — 82140 ASSAY OF AMMONIA: CPT | Performed by: PHYSICIAN ASSISTANT

## 2017-07-13 RX ORDER — MAGNESIUM SULFATE HEPTAHYDRATE 40 MG/ML
2 INJECTION, SOLUTION INTRAVENOUS ONCE
Status: DISCONTINUED | OUTPATIENT
Start: 2017-07-13 | End: 2017-07-15 | Stop reason: HOSPADM

## 2017-07-13 RX ORDER — LACTULOSE 10 G/15ML
30 SOLUTION ORAL 3 TIMES DAILY
Status: DISCONTINUED | OUTPATIENT
Start: 2017-07-13 | End: 2017-07-15 | Stop reason: HOSPADM

## 2017-07-13 RX ORDER — FOLIC ACID 1 MG/1
1 TABLET ORAL DAILY
Status: DISCONTINUED | OUTPATIENT
Start: 2017-07-13 | End: 2017-07-15 | Stop reason: HOSPADM

## 2017-07-13 RX ORDER — NITROGLYCERIN 0.4 MG/1
0.4 TABLET SUBLINGUAL
Status: DISCONTINUED | OUTPATIENT
Start: 2017-07-13 | End: 2017-07-15 | Stop reason: HOSPADM

## 2017-07-13 RX ORDER — ROPINIROLE 1 MG/1
1 TABLET, FILM COATED ORAL NIGHTLY
Status: DISCONTINUED | OUTPATIENT
Start: 2017-07-13 | End: 2017-07-15 | Stop reason: HOSPADM

## 2017-07-13 RX ORDER — LEVOTHYROXINE SODIUM 0.07 MG/1
75 TABLET ORAL DAILY
Status: DISCONTINUED | OUTPATIENT
Start: 2017-07-13 | End: 2017-07-15 | Stop reason: HOSPADM

## 2017-07-13 RX ORDER — METOLAZONE 2.5 MG/1
1.25 TABLET ORAL DAILY
Status: DISCONTINUED | OUTPATIENT
Start: 2017-07-13 | End: 2017-07-15 | Stop reason: HOSPADM

## 2017-07-13 RX ORDER — POTASSIUM CHLORIDE 7.45 MG/ML
10 INJECTION INTRAVENOUS
Status: COMPLETED | OUTPATIENT
Start: 2017-07-13 | End: 2017-07-13

## 2017-07-13 RX ORDER — ASPIRIN 81 MG/1
81 TABLET, CHEWABLE ORAL DAILY
Status: DISCONTINUED | OUTPATIENT
Start: 2017-07-13 | End: 2017-07-15 | Stop reason: HOSPADM

## 2017-07-13 RX ORDER — LOSARTAN POTASSIUM 50 MG/1
50 TABLET ORAL DAILY
Status: DISCONTINUED | OUTPATIENT
Start: 2017-07-13 | End: 2017-07-15 | Stop reason: HOSPADM

## 2017-07-13 RX ADMIN — RIFAXIMIN 550 MG: 550 TABLET ORAL at 21:27

## 2017-07-13 RX ADMIN — LACTULOSE 30 G: 20 SOLUTION ORAL at 17:34

## 2017-07-13 RX ADMIN — POTASSIUM CHLORIDE 10 MEQ: 7.46 INJECTION, SOLUTION INTRAVENOUS at 09:10

## 2017-07-13 RX ADMIN — BUMETANIDE 0.5 MG: 0.25 INJECTION INTRAMUSCULAR; INTRAVENOUS at 17:36

## 2017-07-13 RX ADMIN — POTASSIUM CHLORIDE 10 MEQ: 7.46 INJECTION, SOLUTION INTRAVENOUS at 12:54

## 2017-07-13 RX ADMIN — BUMETANIDE 0.5 MG: 0.25 INJECTION INTRAMUSCULAR; INTRAVENOUS at 09:39

## 2017-07-13 RX ADMIN — POTASSIUM CHLORIDE 10 MEQ: 7.46 INJECTION, SOLUTION INTRAVENOUS at 10:11

## 2017-07-13 RX ADMIN — MAGNESIUM SULFATE IN DEXTROSE 1 G: 10 INJECTION, SOLUTION INTRAVENOUS at 06:29

## 2017-07-13 RX ADMIN — LACTULOSE 30 G: 20 SOLUTION ORAL at 21:27

## 2017-07-13 RX ADMIN — WATER 300 ML: 100 IRRIGANT IRRIGATION at 05:43

## 2017-07-13 RX ADMIN — PANTOPRAZOLE SODIUM 40 MG: 40 INJECTION, POWDER, FOR SOLUTION INTRAVENOUS at 06:30

## 2017-07-13 RX ADMIN — ROPINIROLE HYDROCHLORIDE 1 MG: 1 TABLET, FILM COATED ORAL at 21:27

## 2017-07-13 RX ADMIN — POTASSIUM CHLORIDE 10 MEQ: 7.46 INJECTION, SOLUTION INTRAVENOUS at 11:50

## 2017-07-13 NOTE — PROGRESS NOTES
"Acute Care - Speech Language Pathology   Swallow Initial Evaluation  Mark     Patient Name: Alma Delia Garcia  : 1930  MRN: [id number]  Today's Date: 2017     Admit Date: 2017  Alma Delia Garcia  is seen at bedside this pm on CCU-10 to evaluate safety/efficacy of swallowing fnx, determine safest/least restrictive diet tolerance. Pt has h/o of cirrhosis, acute renal failure, anxiety, bells palsy, CHF, diverticulitis, GERD, HTN, Hypothyroid, osteoperosis, trigeminal neuralgia and was admitted w/ hepatic encephalopathy. Pt reports that she has difficulty swallowing at home w/ different consistencies, she says she \"gets strangled\" on a lot of different things. Pt reported this to Dr. Ramirez this am. Pt is currently NPO.     Social History     Social History   • Marital status:      Spouse name: N/A   • Number of children: N/A   • Years of education: N/A     Occupational History   • Not on file.     Social History Main Topics   • Smoking status: Never Smoker   • Smokeless tobacco: Former User     Types: Snuff     Quit date:    • Alcohol use No      Comment: FORMER 12 BOTTLES PER DAY FOR 15 YEARS   • Drug use: No   • Sexual activity: Defer     Other Topics Concern   • Not on file     Social History Narrative    LIVES WITH GRANDDAUGHTER DEVON IN [place]         Chest xray on 17 reveals no acute findings however presents w/ chronic interstitial lung disease.    Diet Orders (active)     Start     Ordered    17 1607  DIET MESSAGE supper  Once     Comments:  supper    17 1607    17 1559  NPO Diet  Diet Effective Now      17 1558          Ms. Garcia is observed on ra w/o complications    Pt is positioned upright and centered in bed to accept multiple po presentations of ice chips, puree and thin liquids via spoon, cup and straw.  Pt is able to self feed w/assistance. Solid cracker is deferred due to pt's partial edentulous state.      Facial/oral structures are " "symmetrical upon observation. Lingual protrusion reveals no deviation. Oral mucosa are moist, pink, and clean. Secretions are clear, thin, and well controlled. OROM/ERVIN is wfl to imitate oral postures. Gag is not assessed. Volitional cough is intact w/ adequate  intensity, clear in quality, non-productive. Voice is adequate in intensity, clear in quality w/ intelligible speech.    Upon po presentations, adequate bolus anticipation and acceptance w/ good labial seal for bolus clearance via spoon bowl, cup rim stability and suction via straw. Bolus formation, manipulation and control are wfl w/ rotary mastication pattern. A-p transit is mildly impaired w/minimal oral residue.     Pharyngeal swallow is mildly delayed w/  reduced hyolaryngeal elevation per palpation. Pt coughed x4 during trials of thin liquids w/ all presentations. She reports she \"felt strangled\". Pt cleared throat x1 w/ puree consistency. Silent aspiration suspected as pt has changes in vocal quality post po presentations.     Impression: Pt presents w/mild-moderately impaired oropharyngeal swallow as evidenced by cough and throat clear w/ all po trials during this evaluation. Pt also has wet vocal quality after thin liquid trials. No odynophagia reported by the patient. Pt is felt to most benefit from instrumental MBS study, per protocol to determine safest level of po intake as she displays overt s/s of aspiration during this evaluation. D/w RNChyna, she gives permission for pt to be transported off the floor to radiology to participate in MBS study this pm.       Visit Dx:     ICD-10-CM ICD-9-CM   1. Hepatic encephalopathy K72.90 572.2     Patient Active Problem List   Diagnosis   • Hepatic cirrhosis*   • HTN (hypertension)*   • Anxiety disorder*   • Osteopetrosis*   • Hypothyroidism*   • GERD (gastroesophageal reflux disease)*   • Chronic pain syndrome due to fractured spine and left rib fracture*   • Anemia, chronic disease   • Arthralgia of " hip   • Left knee pain   • Fracture of proximal end of tibia and fibula   • Primary insomnia   • Thrombocytopenia   • Renal failure   • Peripheral vascular disease   • Bilateral leg edema   • Cramp of both lower extremities   • Hepatic encephalopathy     Past Medical History:   Diagnosis Date   • Acute renal failure    • Anxiety    • Bell's palsy    • Cellulitis     LLE   • CHF (congestive heart failure)     Diastolic   • Diverticulosis    • GERD (gastroesophageal reflux disease)    • Hepatic cirrhosis    • Hypertension    • Hypothyroidism    • Osteoporosis    • Thrombocytopenia    • Trigeminal neuralgia      Past Surgical History:   Procedure Laterality Date   • BACK SURGERY      KYPHOPLASTIES    • CARDIAC CATHETERIZATION Left 08/2004   • CARDIOVASCULAR STRESS TEST  01/25/2015   • CATARACT EXTRACTION     • CHOLECYSTECTOMY     • CLAVICLE SURGERY Right    • COLONOSCOPY  10/2004   • HIP FRACTURE SURGERY Bilateral     ARTHROPLASTIES    • PARTIAL HYSTERECTOMY     • WRIST FRACTURE SURGERY             EDUCATION  The patient has been educated in the following areas:   Dysphagia (Swallowing Impairment).    SLP Recommendation and Plan    Recommendations:  1. NPO diet w/further recs pending MBS this pm.     SLP will plan MBS study this pm.     D/w pt results and recommendations w/ verbal agreement.    D/w RNChyna, results and recommendations w/ verbal agreement.    Thank you for allowing me to participate in the care of your patient-  Lisa Martino M.S., [facility]      Time Calculation:         Time Calculation- SLP       07/13/17 1253          Time Calculation- SLP    SLP Start Time 1220  -      SLP Stop Time 1250  -      SLP Time Calculation (min) 30 min  -      SLP Non-Billable Time (min) 15 min  -      SLP - Next Appointment 07/13/17  -        User Key  (r) = Recorded By, (t) = Taken By, (c) = Cosigned By    Initials Name Provider Type    NATHALIE Martino Speech Therapist          Therapy Charges for Today      Code Description Service Date Service Provider Modifiers Qty    [id number]  ST CARE PLAN 15 MIN 7/13/2017 Lisa Martino GN 1    [id number] [facility] EVAL ORAL PHARYNG SWALLOW 2 7/13/2017 Lisa BUI 1               Lisa Martino  7/13/2017

## 2017-07-13 NOTE — PROGRESS NOTES
Subjective     History:   Alma Delia Garcia is a 86 y.o. female admitted on 7/12/2017 secondary to Hepatic encephalopathy     Procedures: None    Patient seen and examined with PHILLIP Clemente. Awake and alert. Oriented to person and place but otherwise confused. Reports LE pain. Reports occasional cough as well and states this has been intermittent over the last several weeks. Denies CP, dyspnea or palpitations. Denies nausea or vomiting.     History taken from: patient, chart, and RN.      Objective     Vital Signs  Temp:  [97.6 °F (36.4 °C)-99.6 °F (37.6 °C)] 99.6 °F (37.6 °C)  Heart Rate:  [54-89] 60  Resp:  [10-24] 14  BP: (118-166)/() 126/48    Intake/Output Summary (Last 24 hours) at 07/13/17 0921  Last data filed at 07/13/17 0910   Gross per 24 hour   Intake              550 ml   Output             3500 ml   Net            -2950 ml         Physical Exam:  General:    Awake, alert but confused (oriented to person and place), follows simple commands, in no acute distress, chronically ill appearing    Heart:      Normal S1 and S2. Regular rate and rhythm. No significant murmur, rubs or gallops appreciated.   Lungs:     Respirations regular, even and unlabored. Lungs clear to auscultation B/L. No wheezes, rales or rhonchi.   Abdomen:   Soft and nontender. No guarding, rebound tenderness or  organomegaly noted. Bowel sounds present x 4.   Extremities:  2+ edema in LE B/L. Moves UE and LE equally B/L.     Results Review:      Results from last 7 days  Lab Units 07/13/17  0449 07/12/17  1309   WBC 10*3/mm3 6.18 4.05*   HEMOGLOBIN g/dL 12.4 12.2   PLATELETS 10*3/mm3 114* 99*       Results from last 7 days  Lab Units 07/13/17  0449 07/12/17  1309   SODIUM mmol/L 144 141   POTASSIUM mmol/L 3.4* 3.5   CHLORIDE mmol/L 110 110   CO2 mmol/L 26.0 25.4   BUN mg/dL 26* 24*   CREATININE mg/dL 1.03 1.16   CALCIUM mg/dL 9.4 10.0   GLUCOSE mg/dL 103 113*       Results from last 7 days  Lab Units 07/13/17  0247 07/12/17  3114    BILIRUBIN mg/dL 2.2* 1.2   ALK PHOS U/L 76 87   AST (SGOT) U/L 57* 55*   ALT (SGPT) U/L 27 24       Results from last 7 days  Lab Units 07/13/17  0449 07/12/17  1710   MAGNESIUM mg/dL 1.9 1.6*           Results from last 7 days  Lab Units 07/13/17  0449 07/12/17  2145 07/12/17  1628   CK TOTAL U/L 292* 570 243*   TROPONIN I ng/mL 0.017 <0.006 <0.006   CK MB INDEX % 1.8 1.1 2.3       Imaging Results (last 24 hours)     Procedure Component Value Units Date/Time    XR Chest 1 View [[id number]] Collected:  07/13/17 0709     Updated:  07/13/17 0711    Narrative:       EXAMINATION: XR CHEST 1 VW-      CLINICAL INDICATION:     Altered mental status; K72.90-Hepatic failure,  unspecified without coma     TECHNIQUE:  XR CHEST 1 VW-      COMPARISON: NONE      FINDINGS:   Coarse interstitial markings suggestive of chronic interstitial lung  disease.  Heart and mediastinum contours are unremarkable.  No pleural effusion.  No pneumothorax.   Bony and soft tissue structures are unremarkable.       Impression:       No radiographic evidence of acute cardiac or pulmonary  disease.     This report was finalized on 7/13/2017 7:09 AM by Dr. Zeke Adams MD.       CT Head Without Contrast [[id number]] Collected:  07/13/17 0709     Updated:  07/13/17 0712    Narrative:          EXAMINATION: CT HEAD WO CONTRAST-      CLINICAL INDICATION:     Altered mental status; K72.90-Hepatic failure,  unspecified without coma     TECHNIQUE: Contiguous axial CT images of the head were obtained without  contrast administration.      Radiation dose reduction techniques were utilized per ALARA protocol.  Automated exposure control was initiated through either or CareDose or  DoseRight software packages by  protocol.       720.78 mGy.cm     COMPARISON:  None.       FINDINGS: Generalized cerebral atrophy is present. There is no mass  effect, midline displacement, or hydrocephalus. There are patchy areas  of decreased density within the  periventricular white matter which  likely reflect chronic small vessel ischemic changes. There is no CT  evidence of acute infarct or hemorrhage. Bone windows reveal no osseous  abnormalities or fractures.        Impression:       Atrophy and chronic small vessel ischemic change, but there  are no acute intracranial abnormalities identified.         This report was finalized on 7/13/2017 7:10 AM by Dr. Zeke Adams MD.               Medications:    aspirin 81 mg Oral Daily   bumetanide 0.5 mg Intravenous BID   folic acid 1 mg Oral Daily   lactulose 30 g Oral TID   levothyroxine 75 mcg Oral Daily   losartan 50 mg Oral Daily   magnesium sulfate 2 g Intravenous Once   metOLazone 1.25 mg Oral Daily   naloxone 1 mg Intravenous Once   pantoprazole 40 mg Intravenous Q AM   potassium chloride 10 mEq Intravenous Q1H   rifaximin 550 mg Oral Q12H   rOPINIRole 1 mg Oral Nightly              Assessment/Plan   AMS/encephalopathy: Likely 2/2 hepatic encephalopathy. Appears more awake this AM. CT head negative for any acute changes. TSH is low, will check T4. Serial troponins are negative. No signs of active infection.  Ammonia improved this AM. Cont treatment of hepatic encephalopathy and supportive treatment. Order speech eval in setting of confusion and reported choking episodes at home.     Hepatic encephalopathy with underlying cirrhosis: Reported medication noncompliance prior to admission. Pharmacy has obtained PA for pt's Xifaxan. Change lactulose to PO. Cont to monitor closely. GI consulted.     Chronic LE edema: Venous duplex US ordered. Cont current diuretic regimen.     Hypokalemia: Mg remains <2 but improved today. Cont electrolyte replacement protocols and repeat labs in the AM.     Hypothyroidism: TSH is low. Check T4. Cont current dose of PO Synthroid. Stop IV Synthroid.     Essential HTN: BP overall stable. Cont current medication regimen and monitor.     Chronic thrombocytopenia: Likely 2/2 chronic liver  disease. Improved today. Cont to hold pharmacologic DVT PPX. If plts continue to improve will restart DVT PPX. Repeat CBC in the AM.     DVT PPX: SCD's. No pharmacologic DVT PPX 2/2 thrombocytopenia.     Pt is at high risk 2/2 AMS/encephalopathy, hepatic encephalopathy with underlying cirrhosis, electrolyte abnormalities, LE edema, advanced age and medication noncompliance.         Jamison Ramirez,   07/13/17  9:21 AM

## 2017-07-13 NOTE — PROGRESS NOTES
Discharge Planning Assessment   Mark     Patient Name: Alma Delia Garcia  MRN: [id number]  Today's Date: 7/13/2017    Admit Date: 7/12/2017          Discharge Needs Assessment       07/13/17 8942    Living Environment    Lives With child(jojo), adult   Pt lives with her daugther, Justin and granddaughter, Savannah.     Living Arrangements house    Transportation Available none    Living Environment    Provides Primary Care For no one    Primary Care Provided By child(jojo) (specify)    Quality Of Family Relationships supportive;helpful;involved    Able to Return to Prior Living Arrangements yes    Discharge Needs Assessment    Concerns To Be Addressed no discharge needs identified    Readmission Within The Last 30 Days no previous admission in last 30 days    Outpatient/Agency/Support Group Needs --   Pt does not utilize home health at this time.     Anticipated Changes Related to Illness none    Equipment Currently Used at Home walker, rolling;rollator;wheelchair   Love Haydee    Equipment Needed After Discharge none    Discharge Disposition still a patient            Discharge Plan       07/13/17 4786    Case Management/Social Work Plan    Plan Pt lives at home with her daughter, Justin and Granddaughter, Savannah and plans to return.  Pt does not have home health at this time. Pt states she had [facility] in the past.  Pt has a rolling walker, rollator and a wheelchair vial Love Haydee.  SS will continue to follow.    Patient/Family In Agreement With Plan yes             Demographic Summary       07/13/17 2836    Referral Information    Admission Type inpatient    Referral Source nursing    Reason For Consult discharge planning    Primary Care Physician Information    Name Dr. Gonsalves            Functional Status       07/13/17 0933    Functional Status Current    Current Functional Level Comment Pt is mobile and is I with ADL's.             Legal       07/13/17 6884    Legal    Assistance with Managing/Advocating  Healthcare Needs --   Pt does not have a POA or Advance Directive.              Haleigh Scanlon

## 2017-07-13 NOTE — NURSING NOTE
Nurse spoke with Dr. Peters in person about the insertion of a rectal tube to administer and help catch contents of lactulose enemas. Pt has a history of skin issues and is at great risk for more. Dr. Peters felt that whatever was best for pt is what should be done. Nurse inserted rectal tube for administration and collection of colonic contents during and after lactulose enemas. Pt tolerated well, no acute stress noted. Nurse will continue to monitor.

## 2017-07-13 NOTE — PROGRESS NOTES
07/13/17      Alma Delia Garcia is a 86 y.o. female who is seen today for follow up of hepatic encephalopathy    HPI  The patient is alert today and giving appropriate responses to open-ended questions.  Her lactulose has been changed to oral.  AST is 57 and total bilirubin is 2.2.  Other liver chemistries are normal.    Past medical history, social history, and family history remain unchanged since initial consultation.    REVIEW OF SYSTEMS  Cough, back, leg, and shoulder pain.  All other systems are negative.    CURRENT MEDICATION    Current Facility-Administered Medications:   •  aspirin chewable tablet 81 mg, 81 mg, Oral, Daily, WILMER Renner  •  bumetanide (BUMEX) injection 0.5 mg, 0.5 mg, Intravenous, BID, Alexander Hamilton MD, 0.5 mg at 07/13/17 0939  •  folic acid (FOLVITE) tablet 1 mg, 1 mg, Oral, Daily, WILMER Renner  •  hydrALAZINE (APRESOLINE) injection 10 mg, 10 mg, Intravenous, Q4H PRN, Alexander Hamilton MD  •  lactulose (CHRONULAC) 10 GM/15ML solution 30 g, 30 g, Oral, TID, Jamison Ramirez DO  •  levothyroxine (SYNTHROID, LEVOTHROID) tablet 75 mcg, 75 mcg, Oral, Daily, WILMER Renner  •  losartan (COZAAR) tablet 50 mg, 50 mg, Oral, Daily, WILMER Renner  •  magnesium sulfate 4 gram infusion- Mg less than or equal to 1 mg/dL, 4 g, Intravenous, PRN **OR** magnesium sulfate 3 gram infusion (1gm x 3) - Mg 1.1 - 1.5 mg/dL, 1 g, Intravenous, PRN, Last Rate: 100 mL/hr at 07/13/17 0629, 1 g at 07/13/17 0629 **OR** Magnesium Sulfate 2 gram infusion- Mg 1.6 - 1.9 mg/dL, 2 g, Intravenous, PRN, Med Peters MD  •  Magnesium Sulfate 2 gram infusion- Mg 1.6 - 1.9 mg/dL, 2 g, Intravenous, Once, Alexander Hamilton MD  •  metOLazone (ZAROXOLYN) tablet 1.25 mg, 1.25 mg, Oral, Daily, WILMER Renner  •  naloxone (NARCAN) injection 1 mg, 1 mg, Intravenous, Once, Troy Basilio MD  •  nitroglycerin (NITROSTAT) SL tablet 0.4 mg, 0.4 mg,  Sublingual, Q5 Min PRN, WILMER Renner  •  pantoprazole (PROTONIX) injection 40 mg, 40 mg, Intravenous, Q AM, Alexander Hamilton MD, 40 mg at 07/13/17 0630  •  potassium chloride 10 mEq in 100 mL IVPB, 10 mEq, Intravenous, Q1H PRN, Alexander Hamilton MD  •  rifaximin (XIFAXAN) tablet 550 mg, 550 mg, Oral, Q12H, Alexander Hamilton MD  •  rOPINIRole (REQUIP) tablet 1 mg, 1 mg, Oral, Nightly, WILMER Renner  •  sodium chloride 0.9 % flush 1-10 mL, 1-10 mL, Intravenous, PRN, WILMER Renner  •  Insert peripheral IV, , , Once **AND** sodium chloride 0.9 % flush 10 mL, 10 mL, Intravenous, PRN, Troy Basilio MD    ALLERGIES  Ciprofloxacin     VITAL SIGNS  Vital Signs (last 24 hours)       07/12 0700  -  07/13 0659 07/13 0700  -  07/13 1714   Most Recent    Temp (°F) 97.6 -  98.8    97.8 -  99.6     97.8 (36.6)    Heart Rate 54 -  89    58 -  70     59    Resp 10 -  24    14 -  18     16    /64 -  166/75    123/60 -  164/64     162/64    SpO2 (%) 92 -  100    (!)88 -  100     100            PHYSICAL EXAM  General:  Appearance alert, pleasant, interactive and cooperative  Head:  normocephalic, without obvious abnormality and atraumatic  Eyes:  lids and lashes normal, conjunctivae and sclerae normal, no icterus, no pallor, corneas clear and PERRLA  Ears:  ears appear intact with no abnormalities noted  Nose:  nares normal, septum midline, mucosa normal and no drainage  Throat:  no oral lesions, no thrush and oral mucosa moist  Lungs:  Dry cough; clear to auscultation, respirations regular, respirations even and respirations unlabored  Heart:  regular rhythm & normal rate, normal S1, S2, no murmur, no gallop, no rub and no click  Abdomen:  normal bowel sounds, no masses, no hepatomegaly, no splenomegaly, soft non-tender, no guarding and no rebound tenderness  Extremities:  Bilateral LE edema; moves extremities well, no edema, no cyanosis and no redness  Skin:  no bleeding, bruising  or rash, color normal, no lesions noted and temperature normal  Neurologic:  Mental Status orientated to person, place, time and situation, alertness alert, awake and arousable, oriented to person, town, place and mood/affect:  normal        LABS   Lab Results (last 24 hours)     Procedure Component Value Units Date/Time    Lactic Acid, Plasma [[id number]]  (Normal) Collected:  07/12/17 1628    Specimen:  Blood Updated:  07/12/17 1717     Lactate 1.2 mmol/L     Troponin [[id number]]  (Normal) Collected:  07/12/17 1628    Specimen:  Blood Updated:  07/12/17 1723     Troponin I <0.006 ng/mL     Narrative:       Ultra Troponin I Reference Range:         <=0.039 ng/mL: Negative    0.04-0.779 ng/mL: Indeterminate Range. Suspicious of MI.  Clinical correlation required.       >=0.78  ng/mL: Consistent with myocardial injury.  Clinical correlation required.    CK Total & CKMB [[id number]]  (Abnormal) Collected:  07/12/17 1628    Specimen:  Blood Updated:  07/12/17 1727     CKMB 5.71 (C) ng/mL      Creatine Kinase 243 (H) U/L     CK-MB Index [[id number]]  (Normal) Collected:  07/12/17 1628    Specimen:  Blood Updated:  07/12/17 1727     CK-MB Index 2.3 %     Magnesium [[id number]]  (Abnormal) Collected:  07/12/17 1710    Specimen:  Blood Updated:  07/12/17 1728     Magnesium 1.6 (L) mg/dL     Urine Culture [[id number]] Collected:  07/12/17 1730    Specimen:  Urine from Urine, Catheter Updated:  07/12/17 1844    POC Glucose Fingerstick [[id number]]  (Normal) Collected:  07/12/17 2156    Specimen:  Blood Updated:  07/12/17 2202     Glucose 80 mg/dL     Narrative:       Meter: [id number] : [id number] katt mccoy    Troponin [[id number]]  (Normal) Collected:  07/12/17 2145    Specimen:  Blood Updated:  07/12/17 2222     Troponin I <0.006 ng/mL     Narrative:       Ultra Troponin I Reference Range:         <=0.039 ng/mL: Negative    0.04-0.779 ng/mL: Indeterminate Range. Suspicious of MI.  Clinical correlation required.        >=0.78  ng/mL: Consistent with myocardial injury.  Clinical correlation required.    CK Total & CKMB [[id number]]  (Abnormal) Collected:  07/12/17 2145    Specimen:  Blood Updated:  07/12/17 2231     CKMB 6.41 (C) ng/mL      Creatine Kinase 570 U/L     CK-MB Index [[id number]]  (Normal) Collected:  07/12/17 2145    Specimen:  Blood Updated:  07/12/17 2231     CK-MB Index 1.1 %     CBC Auto Differential [[id number]]  (Abnormal) Collected:  07/13/17 0449    Specimen:  Blood Updated:  07/13/17 0515     WBC 6.18 10*3/mm3      RBC 3.84 (L) 10*6/mm3      Hemoglobin 12.4 g/dL      Hematocrit 36.1 (L) %      MCV 94.0 fL      MCH 32.3 pg      MCHC 34.3 g/dL      RDW 13.3 %      RDW-SD 44.0 fl      MPV 11.2 (H) fL      Platelets 114 (L) 10*3/mm3      Neutrophil % 64.7 %      Lymphocyte % 20.2 %      Monocyte % 10.5 %      Eosinophil % 3.4 %      Basophil % 1.0 %      Immature Grans % 0.2 %      Neutrophils, Absolute 4.00 10*3/mm3      Lymphocytes, Absolute 1.25 10*3/mm3      Monocytes, Absolute 0.65 10*3/mm3      Eosinophils, Absolute 0.21 10*3/mm3      Basophils, Absolute 0.06 10*3/mm3      Immature Grans, Absolute 0.01 10*3/mm3     Magnesium [[id number]]  (Normal) Collected:  07/13/17 0449    Specimen:  Blood Updated:  07/13/17 0533     Magnesium 1.9 mg/dL     Phosphorus [[id number]]  (Normal) Collected:  07/13/17 0449    Specimen:  Blood Updated:  07/13/17 0533     Phosphorus 3.8 mg/dL     Ammonia [[id number]]  (Abnormal) Collected:  07/13/17 0449    Specimen:  Blood Updated:  07/13/17 0536     Ammonia 53 (H) umol/L     Comprehensive Metabolic Panel [[id number]]  (Abnormal) Collected:  07/13/17 0449    Specimen:  Blood Updated:  07/13/17 0537     Glucose 103 mg/dL      BUN 26 (H) mg/dL      Creatinine 1.03 mg/dL      Sodium 144 mmol/L      Potassium 3.4 (L) mmol/L      Chloride 110 mmol/L      CO2 26.0 mmol/L      Calcium 9.4 mg/dL      Total Protein 6.7 g/dL      Albumin 3.20 (L) g/dL      ALT (SGPT) 27 U/L      AST (SGOT) 57  (H) U/L      Alkaline Phosphatase 76 U/L       Note New Reference Ranges        Total Bilirubin 2.2 (H) mg/dL       1+ Icteric         eGFR Non African Amer 51 (L) mL/min/1.73      Globulin 3.5 gm/dL      A/G Ratio 0.9 (L) g/dL      BUN/Creatinine Ratio 25.2 (H)     Anion Gap 8.0 mmol/L     Narrative:       The MDRD GFR formula is only valid for adults with stable renal function between ages 18 and 70.    Osmolality, Calculated [[id number]]  (Normal) Collected:  07/13/17 0449    Specimen:  Blood Updated:  07/13/17 0538     Osmolality Calc 291.8 mOsm/kg     Troponin [[id number]]  (Normal) Collected:  07/13/17 0449    Specimen:  Blood Updated:  07/13/17 0547     Troponin I 0.017 ng/mL     Narrative:       Ultra Troponin I Reference Range:         <=0.039 ng/mL: Negative    0.04-0.779 ng/mL: Indeterminate Range. Suspicious of MI.  Clinical correlation required.       >=0.78  ng/mL: Consistent with myocardial injury.  Clinical correlation required.    CK Total & CKMB [[id number]]  (Abnormal) Collected:  07/13/17 0449    Specimen:  Blood Updated:  07/13/17 0550     CKMB 5.36 (C) ng/mL      Creatine Kinase 292 (H) U/L     CK-MB Index [[id number]]  (Normal) Collected:  07/13/17 0449    Specimen:  Blood Updated:  07/13/17 0550     CK-MB Index 1.8 %     Blood Culture [[id number]]  (Normal) Collected:  07/12/17 1627    Specimen:  Blood from Hand, Left Updated:  07/13/17 0601     Blood Culture No growth at less than 24 hours    Vitamin B12 [[id number]]  (Normal) Collected:  07/13/17 0449    Specimen:  Blood Updated:  07/13/17 0639     Vitamin B-12 678 pg/mL     TSH [[id number]]  (Abnormal) Collected:  07/13/17 0449    Specimen:  Blood Updated:  07/13/17 0639     TSH 0.109 (L) mIU/mL     Blood Culture [[id number]]  (Normal) Collected:  07/12/17 6539    Specimen:  Blood from Blood, Venous Line Updated:  07/13/17 1001     Blood Culture No growth at less than 24 hours    T4, Free [[id number]]  (Normal) Collected:  07/13/17 1025     Specimen:  Blood Updated:  07/13/17 1110     Free T4 1.22 ng/dL     POC Glucose Fingerstick [[id number]]  (Normal) Collected:  07/13/17 1427    Specimen:  Blood Updated:  07/13/17 1438     Glucose 86 mg/dL     Narrative:       Meter: [id number] : [id number] CIELO NUÑEZ          IMAGING  Imaging Results (last 24 hours)     Procedure Component Value Units Date/Time    XR Chest 1 View [[id number]] Collected:  07/13/17 0709     Updated:  07/13/17 0711    Narrative:       EXAMINATION: XR CHEST 1 VW-      CLINICAL INDICATION:     Altered mental status; K72.90-Hepatic failure,  unspecified without coma     TECHNIQUE:  XR CHEST 1 VW-      COMPARISON: NONE      FINDINGS:   Coarse interstitial markings suggestive of chronic interstitial lung  disease.  Heart and mediastinum contours are unremarkable.  No pleural effusion.  No pneumothorax.   Bony and soft tissue structures are unremarkable.       Impression:       No radiographic evidence of acute cardiac or pulmonary  disease.     This report was finalized on 7/13/2017 7:09 AM by Dr. Zeke Adams MD.       CT Head Without Contrast [[id number]] Collected:  07/13/17 0709     Updated:  07/13/17 0712    Narrative:          EXAMINATION: CT HEAD WO CONTRAST-      CLINICAL INDICATION:     Altered mental status; K72.90-Hepatic failure,  unspecified without coma     TECHNIQUE: Contiguous axial CT images of the head were obtained without  contrast administration.      Radiation dose reduction techniques were utilized per ALARA protocol.  Automated exposure control was initiated through either or CareDose or  DoseRight software packages by  protocol.       720.78 mGy.cm     COMPARISON:  None.       FINDINGS: Generalized cerebral atrophy is present. There is no mass  effect, midline displacement, or hydrocephalus. There are patchy areas  of decreased density within the periventricular white matter which  likely reflect chronic small vessel ischemic changes. There is no  CT  evidence of acute infarct or hemorrhage. Bone windows reveal no osseous  abnormalities or fractures.        Impression:       Atrophy and chronic small vessel ischemic change, but there  are no acute intracranial abnormalities identified.         This report was finalized on 7/13/2017 7:10 AM by Dr. Zeke Adams MD.       US Venous Doppler Lower Extremity Bilateral (duplex) [[id number]] Collected:  07/13/17 1351     Updated:  07/13/17 1353    Narrative:       EXAMINATION: US VENOUS DOPPLER LOWER EXTREMITY BILATERAL (DUPLEX)-      CLINICAL INDICATION:     Pain and Swelling     TECHNIQUE: Multiplanar gray scale and Doppler vascular sonographic  imaging of the deep veins of BILATERAL lower extremity, without and with  compression.      COMPARISON: 02/27/2017      FINDINGS: The visualized deep veins demonstrate flow and are  compressible. No evidence of deep venous thrombosis.             Impression:       No DVT.     This report was finalized on 7/13/2017 1:51 PM by Dr. Manish Sherwood MD.       FL Video Swallow [[id number]] Collected:  07/13/17 1527     Updated:  07/13/17 1530    Narrative:       EXAMINATION: FL VIDEO SWALLOW-      CLINICAL INDICATION:     aspiration concerns; K72.90-Hepatic failure,  unspecified without coma     TECHNIQUE: Modified barium swallow was performed utilizing video  fluoroscopy in conjunction with the Speech Pathology team. The patient  ingested multiple barium media including thin barium, nectar, and honey  liquid as well as barium pudding and a barium pudding coated cracker.   FLUOROSCOPY TIME: 90 s     COMPARISON: NONE      FINDINGS: Laryngeal penetration of thin liquids via cup w/ large bolus  size, no other laryngeal penetration or aspiration evidenced in this  study.        Impression:       Laryngeal penetration of thin liquids via cup w/ large bolus  size, no other laryngeal penetration or aspiration evidenced in this  study.      This report was finalized on 7/13/2017 3:27 PM by  Dr. Zeke Adams MD.               ASSESSMENT/PLAN    Hepatic encephalopathy  Cirrhosis  Thyroid disease  Hypertension  History of GERD  History of colonic diverticulosis    We will continue to monitor patient's liver chemistries.        Electronically signed by: WILMER Putnam

## 2017-07-13 NOTE — MBS/VFSS/FEES
Acute Care - Speech Language Pathology   Swallow Initial Evaluation  Mark   MODIFIED BARIUM SWALLOW STUDY     Patient Name: Alma Delia Garcia  : 1930  MRN: [id number]  Today's Date: 2017      Admit Date: 2017    Alma Delia Garcia  presents to the radiology suite this am from CC10/1C to participate in an instrumental MBS to evaluate safety/efficacy of swallowing fnx, determine safest/least restrictive diet. Pt is accompanied by RN to radiology suite. Pt had significant throat clear and cough during bedside evaluation w/ results pending MBS.         Social History     Social History   • Marital status:      Spouse name: N/A   • Number of children: N/A   • Years of education: N/A     Occupational History   • Not on file.     Social History Main Topics   • Smoking status: Never Smoker   • Smokeless tobacco: Former User     Types: Snuff     Quit date:    • Alcohol use No      Comment: FORMER 12 BOTTLES PER DAY FOR 15 YEARS   • Drug use: No   • Sexual activity: Defer     Other Topics Concern   • Not on file     Social History Narrative    LIVES WITH GRANDDAUGHTER DEVON IN [place]         Chest xray on 17 revealed no acute findings however chronic interstitial lung disease.    Diet Orders (active)     Start     Ordered    17 1540  Diet Soft Texture; Ground; Thin  Diet Effective Now     Comments:  1:1 assist w/feeds; NO STRAWS    17 1540    17 1607  DIET MESSAGE supper  Once     Comments:  supper    17 1607          Ms. Garcia is observed on ra w/o complications     Risks and benefits of the procedure are explained w/ verbalizing understanding/agreement to participate.     Pt is positioned upright and centered in a soft strap vess chair chair to accept multiple po presentations of puree, honey thick, nectar thick, and thin liquids via spoon, cup and straw, along w/ whole placebo pill in puree. Solid cracker is deferred due to pt's partial edentulous state. Pt is  able to self feed inconsistently.     All views are from the lateral plane.     Facial/oral structures are symmetrical upon observation w/o lingual deviation upon protrusion. Oral mucosa are moist, pink and clean. Secretions are clear, thin and well controlled. OROM/ERVIN is wfl to imitate oral postures. Gag is not assessed. Volitional cough is adequate in intensity, clear in quality, nonproductive. Vocal quality is adequate in intensity, clear in quality w/ intelligible speech. Pt is a/a and cooperative to particpate.  Pt  is oriented to person, and place follows simple directives, and participates in simple conversational exchanges.     Upon po presentations, adequate bolus anticipation w/ mildly weak labial seal for bolus clearance via spoon bowl, cup rim stability and poor suction via straw. Pt is unable to suction bolus via straw.  Bolus formation, manipulation,  and control are wfl w/ rotary mastication pattern. A-p transit is timely w/o oral residue. Tongue base retraction and linguavelar seal are moderately weak w/ premature spillage. No laryngeal penetration or aspiration is evidenced before the swallow.     Pharyngeal swallow is mildly reduced w/ adequate hyolaryngeal elevation and epiglottic inversion. Pharyngeal contraction is adequate w/o significant residue. Penetration of thin liquids via cup when pt is allowed to self present bolus. Pt penetrated thin liquids w/ large bolus size only, however cleared after the swallow. No other laryngeal penetration or aspiration evidenced during or after the swallow.     Upper 1/3 esophageal sweep reveals no mucosal abnormalities. Motility is wfl w/o retrograde flow noted.      Impression: Pt presents w/mild oropharyngeal swallowing deficits secondary to partial edentulous state and premature spillage across test textures. Penetration of thin liquids w/large bolus size only when pt allowed to self present. No other laryngeal penetration or aspiration evidenced across  this evaluation. Due to pt's edentulous state and large bolus consumption, pt will most benefit from 1:1 feeding assistance at meals and no straws as pt is unable to adequately create seal for suction via straw.         Visit Dx:     ICD-10-CM ICD-9-CM   1. Hepatic encephalopathy K72.90 572.2     Patient Active Problem List   Diagnosis   • Hepatic cirrhosis*   • HTN (hypertension)*   • Anxiety disorder*   • Osteopetrosis*   • Hypothyroidism*   • GERD (gastroesophageal reflux disease)*   • Chronic pain syndrome due to fractured spine and left rib fracture*   • Anemia, chronic disease   • Arthralgia of hip   • Left knee pain   • Fracture of proximal end of tibia and fibula   • Primary insomnia   • Thrombocytopenia   • Renal failure   • Peripheral vascular disease   • Bilateral leg edema   • Cramp of both lower extremities   • Hepatic encephalopathy     Past Medical History:   Diagnosis Date   • Acute renal failure    • Anxiety    • Bell's palsy    • Cellulitis     LLE   • CHF (congestive heart failure)     Diastolic   • Diverticulosis    • GERD (gastroesophageal reflux disease)    • Hepatic cirrhosis    • Hypertension    • Hypothyroidism    • Osteoporosis    • Thrombocytopenia    • Trigeminal neuralgia      Past Surgical History:   Procedure Laterality Date   • BACK SURGERY      KYPHOPLASTIES    • CARDIAC CATHETERIZATION Left 08/2004   • CARDIOVASCULAR STRESS TEST  01/25/2015   • CATARACT EXTRACTION     • CHOLECYSTECTOMY     • CLAVICLE SURGERY Right    • COLONOSCOPY  10/2004   • HIP FRACTURE SURGERY Bilateral     ARTHROPLASTIES    • PARTIAL HYSTERECTOMY     • WRIST FRACTURE SURGERY             EDUCATION  The patient has been educated in the following areas:   Dysphagia (Swallowing Impairment).    SLP Recommendation and Plan    Recommendations:   1. MS, ground w/ thin liquids, NO STRAWS  2. Meds whole in puree.   3. Seth precautions.   4. Oral care protocol.  5. Universal aspiration precautinos  6. Fully a/a at all po  intake  7. 1:1 feeding assistance  8. Upright and centered for all po intake     SLP will f/u for diet tolerance.    D/w pt results and recommendations w/ verbal understanding and agreement.     D/w RNChyna, results and recommendations w/ verbal understanding and agreement.     Thank you for allowing me to participate in the care of your patient-  Lisa Martino M.S., CFY-SLP    Time Calculation:         Time Calculation- SLP       07/13/17 1636 07/13/17 1253       Time Calculation- SLP    SLP Start Time 1450  - 1220  -CJ     SLP Stop Time 1550  - 1250  -     SLP Time Calculation (min) 60 min  - 30 min  -     SLP Non-Billable Time (min) 15 min  - 15 min  -     SLP - Next Appointment 07/14/17  - 07/13/17  -       User Key  (r) = Recorded By, (t) = Taken By, (c) = Cosigned By    Initials Name Provider Type     Lisa Martino Speech Therapist          Therapy Charges for Today     Code Description Service Date Service Provider Modifiers Qty    87985776182 HC ST CARE PLAN 15 MIN 7/13/2017 Lisa Martino GN 1    13667903157 HC ST EVAL ORAL PHARYNG SWALLOW 2 7/13/2017 Lisa Martino GN 1    [id number] HC ST SWALLOWING CURRENT STATUS 7/13/2017 Lisa Martino GN, CI 1    [id number] HC ST SWALLOWING PROJECTED 7/13/2017 Lisa Martino GN, CI 1    [id number] HC ST CARE PLAN 15 MIN 7/13/2017 Lisa Martino GN 1    [id number] HC ST MOTION FLUORO EVAL SWALLOW 4 7/13/2017 iLsa Martino GN 1          SLP G-Codes  SLP NOMS Used?: Yes  Functional Limitations: Swallowing  Swallow Current Status (): At least 1 percent but less than 20 percent impaired, limited or restricted  Swallow Goal Status (): At least 1 percent but less than 20 percent impaired, limited or restricted    Lisa Martino  7/13/2017

## 2017-07-13 NOTE — CONSULTS
07/12/17  Chief Complaint   Patient presents with   • Altered Mental Status     Alma Delia Garcia is a 86 y.o. female who presents today at the request of WILMER Mckeon,  for hepatic encephalopathy    HPI  The patient was seen for a GI evaluation due to hepatic encephalopathy.  The patient has a history of cirrhosis and she arrived at the ED with confusion, decreased respirations, and elevated ammonia level.   Patient's family reported that the confusion has been slowly worsening.  The patient takes Xifaxin and lactulose at home, however, she is non-compliant with lactulose.  CT of her head was ordered.  Ammonia level is 130 and AST is 55.  Other liver chemistries are normal.  Patient also has thrombocytopenia, likely due to liver disease.   The patient was only responding to pain until she had her first dose of rectal lactulose.  The patient is now awake and verbally responsive.  She answered simple questions appropriately and was oriented to basic information.      Medical, surgical, social, and family histories were reviewed and are listed below.    Review of Systems  The patient was very sleepy and was unable to give full ROS, however, she denied any pain or discomfort.    ACTIVE PROBLEMS:   Specialty Problems        Gastroenterology Problems    GERD (gastroesophageal reflux disease)*        Hepatic cirrhosis*              PAST MEDICAL HISTORY:  Past Medical History:   Diagnosis Date   • Acute renal failure    • Anxiety    • Bell's palsy    • Cellulitis     LLE   • CHF (congestive heart failure)     Diastolic   • Diverticulosis    • GERD (gastroesophageal reflux disease)    • Hepatic cirrhosis    • Hypertension    • Hypothyroidism    • Osteoporosis    • Thrombocytopenia    • Trigeminal neuralgia        SURGICAL HISTORY:  Past Surgical History:   Procedure Laterality Date   • BACK SURGERY      KYPHOPLASTIES    • CARDIAC CATHETERIZATION Left 08/2004   • CARDIOVASCULAR STRESS TEST  01/25/2015   • CATARACT  EXTRACTION     • CHOLECYSTECTOMY     • CLAVICLE SURGERY Right    • COLONOSCOPY  10/2004   • HIP FRACTURE SURGERY Bilateral     ARTHROPLASTIES    • PARTIAL HYSTERECTOMY     • WRIST FRACTURE SURGERY         FAMILY HISTORY:  Family History   Problem Relation Age of Onset   • Cancer Mother    • Heart disease Father    • Arthritis Sister    • Osteoporosis Sister    • Diabetes Maternal Aunt        SOCIAL HISTORY:  Social History   Substance Use Topics   • Smoking status: Never Smoker   • Smokeless tobacco: Former User     Types: Snuff     Quit date: 1970   • Alcohol use No      Comment: FORMER 12 BOTTLES PER DAY FOR 15 YEARS       CURRENT MEDICATION:    Current Facility-Administered Medications:   •  bumetanide (BUMEX) injection 0.5 mg, 0.5 mg, Intravenous, BID, Alexander Hamilton MD, 0.5 mg at 07/12/17 1742  •  hydrALAZINE (APRESOLINE) injection 10 mg, 10 mg, Intravenous, Q4H PRN, Alexander Hamilton MD  •  lactulose enema, 300 mL, Rectal, Q6H, Alexander Hamilton MD, 300 mL at 07/12/17 1859  •  levothyroxine sodium injection 37.5 mcg, 37.5 mcg, Intravenous, Daily, Alexander Hamilton MD  •  magnesium sulfate 4 gram infusion- Mg less than or equal to 1 mg/dL, 4 g, Intravenous, PRN **OR** magnesium sulfate 3 gram infusion (1gm x 3) - Mg 1.1 - 1.5 mg/dL, 1 g, Intravenous, PRN **OR** Magnesium Sulfate 2 gram infusion- Mg 1.6 - 1.9 mg/dL, 2 g, Intravenous, PRN, Med Peters MD  •  naloxone (NARCAN) injection 1 mg, 1 mg, Intravenous, Once, Troy Basilio MD  •  [START ON 7/13/2017] pantoprazole (PROTONIX) injection 40 mg, 40 mg, Intravenous, Q AM, Alexander Hamilton MD  •  potassium chloride 10 mEq in 100 mL IVPB, 10 mEq, Intravenous, Q1H PRN, Alexander Hamilton MD  •  rifaximin (XIFAXAN) tablet 550 mg, 550 mg, Oral, Q12H, Alexander Hamilton MD  •  sodium chloride 0.9 % flush 1-10 mL, 1-10 mL, Intravenous, PRN, WILMER Renner  •  Insert peripheral IV, , , Once **AND** sodium chloride 0.9 % flush  10 mL, 10 mL, Intravenous, PRN, Troy Basilio MD    ALLERGIES:  Ciprofloxacin     VITAL SIGNS:  Vital Signs (last 24 hours)       07/11 0700  -  07/12 0659 07/12 0700  -  07/12 2335   Most Recent    Temp (°F)   97.6 -  98.8     98.8 (37.1)    Heart Rate   54 -  73     67    Resp   12 -  24     14    BP   135/63 -  166/75     148/56    SpO2 (%)   97 -  100     100          PHYSICAL EXAMINATION    General Appearance:    Alert but some lethargy, cooperative, in no acute distress   Head:    Normocephalic, without obvious abnormality, atraumatic   Eyes:            Lids and lashes normal, conjunctivae and sclerae normal, no   icterus, no pallor, corneas clear, PERRLA   Ears:    Ears appear intact with no abnormalities noted   Throat:   Edentulous; No oral lesions, no thrush, oral mucosa moist   Neck:   No adenopathy, supple, trachea midline, no thyromegaly, no   carotid bruit, no JVD   Back:     No kyphosis present, no scoliosis present, no skin lesions,      erythema or scars, no tenderness to percussion or                   palpation,   range of motion normal   Lungs:     Dry coughing; Clear to auscultation,respirations regular, even and unlabored    Heart:    Regular rhythm and normal rate, normal S1 and S2, no            murmur, no gallop, no rub, no click   Chest Wall:    No abnormalities observed   Abdomen:     Normal bowel sounds, no masses, no organomegaly, soft        non-tender, non-distended, no guarding, no rebound                tenderness   Rectal:     Deferred   Extremities:   Bilateral LE edema; Moves all extremities well, no cyanosis, no redness   Pulses:   Pulses palpable and equal bilaterally   Skin:   No bleeding, bruising or rash   Lymph nodes:   No palpable adenopathy   Neurologic:   Answered simple yes/no questions appropriately;  Oriented to self, town, location, but not to year     Lab and imaging studies were reviewed by me.    LABS  Lab Results (last 24 hours)     Procedure Component  Value Units Date/Time    POC Glucose Fingerstick [[id number]]  (Normal) Collected:  07/12/17 1123    Specimen:  Blood Updated:  07/12/17 1130     Glucose 106 mg/dL     Narrative:       Meter: [id number] : [id number] Fiona Martinez    Blood Gas, Arterial With Co-Ox [[id number]]  (Abnormal) Collected:  07/12/17 1201    Specimen:  Arterial Blood Updated:  07/12/17 1204     Site Arterial: left radial     Chris's Test Positive     pH, Arterial 7.465 (H) pH units      pCO2, Arterial 35.4 mm Hg      pO2, Arterial 73.0 (L) mm Hg      HCO3, Arterial 24.9 mmol/L      Base Excess, Arterial 1.5 mmol/L      O2 Saturation, Arterial 94.8 %      Hemoglobin, Blood Gas 13.2 g/dL      Hematocrit, Blood Gas 39.0 %      Oxyhemoglobin 92.7 %      Methemoglobin 0.4 %      Carboxyhemoglobin 1.8 %      A-a Gradiant 28.0 mmHg      Temperature 98.6 C      Barometric Pressure for Blood Gas 730 mmHg      Modality Room air     FIO2 21 %     Urine Drug Screen [[id number]]  (Abnormal) Collected:  07/12/17 1155    Specimen:  Urine from Urine, Catheter Updated:  07/12/17 1225     Amphetamine Screen, Urine Negative     Barbiturates Screen, Urine Negative     Benzodiazepine Screen, Urine Negative     Cocaine Screen, Urine Negative     Methadone Screen, Urine Negative     Opiate Screen Positive (A)     Phencyclidine (PCP), Urine Negative     THC, Screen, Urine Negative     6-ACETYL MORPHINE Negative     Buprenorphine, Screen, Urine Negative     Oxycodone Screen, Urine Negative    Narrative:       Negative Thresholds For Drugs Screened:                  Amphetamines              1000 ng/ml               Barbiturates               200 ng/ml               Benzodiazepines            200 ng/ml              Cocaine                    300 ng/ml              Methadone                  300 ng/ml              Opiates                    300 ng/ml               Phencyclidine               25 ng/ml               THC                         50 ng/ml               6-Acetyl Morphine           10 ng/ml              Buprenorphine                5 ng/ml              Oxycodone                  300 ng/ml    The reference range for all drugs tested is negative. This report includes final unconfirmed qualitative results to be used for medical treatment purposes only. Unconfirmed results must not be used for non-medical purposes such as employment or legal testing. Clinical consideration should be applied to any drug of abuse test, especially when unconfirmed quantitative results are used.      Urinalysis With / Culture If Indicated [[id number]]  (Abnormal) Collected:  07/12/17 1155    Specimen:  Urine from Urine, Catheter Updated:  07/12/17 1226     Color, UA Yellow     Appearance, UA Clear     pH, UA 8.5 (H)     Specific Gravity, UA 1.015     Glucose, UA Negative     Ketones, UA Negative     Bilirubin, UA Negative     Blood, UA Negative     Protein, UA Negative     Leuk Esterase, UA Trace (A)     Nitrite, UA Negative     Urobilinogen, UA 2.0 E.U./dL (A)    Urinalysis, Microscopic Only [[id number]]  (Abnormal) Collected:  07/12/17 1155    Specimen:  Urine from Urine, Catheter Updated:  07/12/17 1226     RBC, UA 0-2 (A) /HPF      WBC, UA 0-2 (A) /HPF      Bacteria, UA None Seen /HPF      Squamous Epithelial Cells, UA 0-2 /HPF      Hyaline Casts, UA None Seen /LPF      Methodology Automated Microscopy    CBC & Differential [[id number]] Collected:  07/12/17 1309    Specimen:  Blood Updated:  07/12/17 1319    Narrative:       The following orders were created for panel order CBC & Differential.  Procedure                               Abnormality         Status                     ---------                               -----------         ------                     CBC Auto Differential[[id number]]        Abnormal            Final result                 Please view results for these tests on the individual orders.    CBC Auto Differential [[id number]]  (Abnormal) Collected:  07/12/17 1309     Specimen:  Blood from Arm, Left Updated:  07/12/17 1319     WBC 4.05 (L) 10*3/mm3      RBC 3.80 (L) 10*6/mm3      Hemoglobin 12.2 g/dL      Hematocrit 36.1 (L) %      MCV 95.0 (H) fL      MCH 32.1 pg      MCHC 33.8 g/dL      RDW 13.6 %      RDW-SD 44.5 fl      MPV 11.0 (H) fL      Platelets 99 (L) 10*3/mm3      Neutrophil % 66.0 %      Lymphocyte % 21.2 %      Monocyte % 10.1 %      Eosinophil % 2.0 %      Basophil % 0.7 %      Immature Grans % 0.0 %      Neutrophils, Absolute 2.67 10*3/mm3      Lymphocytes, Absolute 0.86 (L) 10*3/mm3      Monocytes, Absolute 0.41 10*3/mm3      Eosinophils, Absolute 0.08 10*3/mm3      Basophils, Absolute 0.03 10*3/mm3      Immature Grans, Absolute 0.00 10*3/mm3     Ammonia [[id number]]  (Abnormal) Collected:  07/12/17 1309    Specimen:  Blood from Arm, Left Updated:  07/12/17 1337     Ammonia 130 (H) umol/L     Lipase [[id number]]  (Normal) Collected:  07/12/17 1309    Specimen:  Blood from Arm, Left Updated:  07/12/17 1339     Lipase 44 U/L     Comprehensive Metabolic Panel [[id number]]  (Abnormal) Collected:  07/12/17 1309    Specimen:  Blood from Arm, Left Updated:  07/12/17 1339     Glucose 113 (H) mg/dL      BUN 24 (H) mg/dL      Creatinine 1.16 mg/dL      Sodium 141 mmol/L      Potassium 3.5 mmol/L      Chloride 110 mmol/L      CO2 25.4 mmol/L      Calcium 10.0 mg/dL      Total Protein 7.0 g/dL      Albumin 3.40 g/dL      ALT (SGPT) 24 U/L      AST (SGOT) 55 (H) U/L      Alkaline Phosphatase 87 U/L       Note New Reference Ranges        Total Bilirubin 1.2 mg/dL      eGFR Non African Amer 44 (L) mL/min/1.73      Globulin 3.6 gm/dL      A/G Ratio 0.9 (L) g/dL      BUN/Creatinine Ratio 20.7     Anion Gap 5.6 mmol/L     Narrative:       The MDRD GFR formula is only valid for adults with stable renal function between ages 18 and 70.    Osmolality, Calculated [[id number]]  (Normal) Collected:  07/12/17 1309    Specimen:  Blood from Arm, Left Updated:  07/12/17 1339     Osmolality Calc  286.1 mOsm/kg     Amylase [[id number]]  (Normal) Collected:  07/12/17 1309    Specimen:  Blood from Arm, Left Updated:  07/12/17 1339     Amylase 44 U/L     Troponin [[id number]]  (Normal) Collected:  07/12/17 1309    Specimen:  Blood from Arm, Left Updated:  07/12/17 1347     Troponin I <0.006 ng/mL     Narrative:       Ultra Troponin I Reference Range:         <=0.039 ng/mL: Negative    0.04-0.779 ng/mL: Indeterminate Range. Suspicious of MI.  Clinical correlation required.       >=0.78  ng/mL: Consistent with myocardial injury.  Clinical correlation required.    Ethanol [[id number]] Collected:  07/12/17 1309    Specimen:  Blood from Arm, Left Updated:  07/12/17 1349     Ethanol <10 mg/dL      Ethanol % <0.010 %     Narrative:       >/= 80.0 legally intoxicated    Light Blue Top [[id number]] Collected:  07/12/17 1309    Specimen:  Blood from Arm, Left Updated:  07/12/17 1502     Extra Tube hold for add-on      Auto resulted       Green Top (Gel) [[id number]] Collected:  07/12/17 1309    Specimen:  Blood from Arm, Left Updated:  07/12/17 1502     Extra Tube Hold for add-ons.      Auto resulted.       Lavender Top [[id number]] Collected:  07/12/17 1309    Specimen:  Blood from Arm, Left Updated:  07/12/17 1502     Extra Tube hold for add-on      Auto resulted       [place] Draw [[id number]] Collected:  07/12/17 1309    Specimen:  Blood Updated:  07/12/17 1602    Narrative:       The following orders were created for panel order [place] Draw.  Procedure                               Abnormality         Status                     ---------                               -----------         ------                     Light Blue Top[[id number]]                                   Final result               Green Top (Gel)[[id number]]                                  Final result               Lavender Top[[id number]]                                     Final result               Gold Top - SST[[id number]]                                    Final result                 Please view results for these tests on the individual orders.    Gold Top - SST [[id number]] Collected:  07/12/17 1309    Specimen:  Blood from Arm, Left Updated:  07/12/17 1602     Extra Tube Hold for add-ons.      Auto resulted.       Lactic Acid, Plasma [[id number]]  (Normal) Collected:  07/12/17 1628    Specimen:  Blood Updated:  07/12/17 1717     Lactate 1.2 mmol/L     Troponin [[id number]]  (Normal) Collected:  07/12/17 1628    Specimen:  Blood Updated:  07/12/17 1723     Troponin I <0.006 ng/mL     Narrative:       Ultra Troponin I Reference Range:         <=0.039 ng/mL: Negative    0.04-0.779 ng/mL: Indeterminate Range. Suspicious of MI.  Clinical correlation required.       >=0.78  ng/mL: Consistent with myocardial injury.  Clinical correlation required.    CK Total & CKMB [[id number]]  (Abnormal) Collected:  07/12/17 1628    Specimen:  Blood Updated:  07/12/17 1727     CKMB 5.71 (C) ng/mL      Creatine Kinase 243 (H) U/L     CK-MB Index [[id number]]  (Normal) Collected:  07/12/17 1628    Specimen:  Blood Updated:  07/12/17 1727     CK-MB Index 2.3 %     Magnesium [[id number]]  (Abnormal) Collected:  07/12/17 1710    Specimen:  Blood Updated:  07/12/17 1728     Magnesium 1.6 (L) mg/dL     Blood Culture [[id number]] Collected:  07/12/17 1627    Specimen:  Blood from Hand, Left Updated:  07/12/17 1740    Urine Culture [[id number]] Collected:  07/12/17 1730    Specimen:  Urine from Urine, Catheter Updated:  07/12/17 1844    Blood Culture [[id number]] Collected:  07/12/17 1739    Specimen:  Blood from Blood, Venous Line Updated:  07/12/17 2154    POC Glucose Fingerstick [[id number]]  (Normal) Collected:  07/12/17 2156    Specimen:  Blood Updated:  07/12/17 2202     Glucose 80 mg/dL     Narrative:       Meter: [id number] : [id number] katt mccoy    Troponin [[id number]]  (Normal) Collected:  07/12/17 2145    Specimen:  Blood Updated:  07/12/17 2222     Troponin I <0.006 ng/mL      Narrative:       Ultra Troponin I Reference Range:         <=0.039 ng/mL: Negative    0.04-0.779 ng/mL: Indeterminate Range. Suspicious of MI.  Clinical correlation required.       >=0.78  ng/mL: Consistent with myocardial injury.  Clinical correlation required.    CK Total & CKMB [[id number]]  (Abnormal) Collected:  07/12/17 2145    Specimen:  Blood Updated:  07/12/17 2231     CKMB 6.41 (C) ng/mL      Creatine Kinase 570 U/L     CK-MB Index [[id number]]  (Normal) Collected:  07/12/17 2145    Specimen:  Blood Updated:  07/12/17 2231     CK-MB Index 1.1 %           IMAGING  Imaging Results (last 72 hours)     Procedure Component Value Units Date/Time    CT Head Without Contrast [[id number]] Updated:  07/12/17 1837    XR Chest 1 View [[id number]] Updated:  07/12/17 1854          Assessment/Plan   Hepatic encephalopathy  Cirrhosis  Thyroid disease  Hypertension  History of GERD  History of colonic diverticulosis    REC  She is being treated with Lactulose and Xifaxan.  Her mental status has improved since admission.  I concur with her present management.  The possible need for additional evaluation/intervention will be determined by her clinical course.  Thank you for asking me to participate in the care of the patient.    Patient seen and evaluated by Dr. Andrew, including history of present illness, physical examination, assessment and treatment plan.  The note above was reviewed and edited by Dr. Andrew.      Electronically signed by: WILMER Putnam     CC:  Savannah GUILLEN

## 2017-07-14 LAB
ALBUMIN SERPL-MCNC: 3.1 G/DL (ref 3.4–4.8)
ALBUMIN/GLOB SERPL: 0.8 G/DL (ref 1.5–2.5)
ALP SERPL-CCNC: 80 U/L (ref 35–104)
ALT SERPL W P-5'-P-CCNC: 25 U/L (ref 10–36)
AMMONIA BLD-SCNC: 58 UMOL/L (ref 11–51)
ANION GAP SERPL CALCULATED.3IONS-SCNC: 7.7 MMOL/L (ref 3.6–11.2)
AST SERPL-CCNC: 71 U/L (ref 10–30)
BASOPHILS # BLD AUTO: 0.01 10*3/MM3 (ref 0–0.3)
BASOPHILS NFR BLD AUTO: 0.2 % (ref 0–2)
BILIRUB SERPL-MCNC: 1.5 MG/DL (ref 0.2–1.8)
BUN BLD-MCNC: 18 MG/DL (ref 7–21)
BUN/CREAT SERPL: 15.7 (ref 7–25)
CALCIUM SPEC-SCNC: 9.5 MG/DL (ref 7.7–10)
CHLORIDE SERPL-SCNC: 110 MMOL/L (ref 99–112)
CO2 SERPL-SCNC: 23.3 MMOL/L (ref 24.3–31.9)
CREAT BLD-MCNC: 1.15 MG/DL (ref 0.43–1.29)
DEPRECATED RDW RBC AUTO: 45.9 FL (ref 37–54)
EOSINOPHIL # BLD AUTO: 0.24 10*3/MM3 (ref 0–0.7)
EOSINOPHIL NFR BLD AUTO: 4.9 % (ref 0–7)
ERYTHROCYTE [DISTWIDTH] IN BLOOD BY AUTOMATED COUNT: 13.6 % (ref 11.5–14.5)
GFR SERPL CREATININE-BSD FRML MDRD: 45 ML/MIN/1.73
GLOBULIN UR ELPH-MCNC: 4 GM/DL
GLUCOSE BLD-MCNC: 107 MG/DL (ref 70–110)
GLUCOSE BLDC GLUCOMTR-MCNC: 98 MG/DL (ref 70–130)
HCT VFR BLD AUTO: 38.6 % (ref 37–47)
HGB BLD-MCNC: 10.8 G/DL (ref 12–16)
HYPOCHROMIA BLD QL: NORMAL
IMM GRANULOCYTES # BLD: 0.01 10*3/MM3 (ref 0–0.03)
IMM GRANULOCYTES NFR BLD: 0.2 % (ref 0–0.5)
LYMPHOCYTES # BLD AUTO: 1.12 10*3/MM3 (ref 1–3)
LYMPHOCYTES NFR BLD AUTO: 22.9 % (ref 16–46)
MAGNESIUM SERPL-MCNC: 2.2 MG/DL (ref 1.7–2.6)
MCH RBC QN AUTO: 26 PG (ref 27–33)
MCHC RBC AUTO-ENTMCNC: 28 G/DL (ref 33–37)
MCV RBC AUTO: 92.8 FL (ref 80–94)
MONOCYTES # BLD AUTO: 0.59 10*3/MM3 (ref 0.1–0.9)
MONOCYTES NFR BLD AUTO: 12.1 % (ref 0–12)
NEUTROPHILS # BLD AUTO: 2.92 10*3/MM3 (ref 1.4–6.5)
NEUTROPHILS NFR BLD AUTO: 59.7 % (ref 40–75)
OSMOLALITY SERPL CALC.SUM OF ELEC: 283.6 MOSM/KG (ref 273–305)
PLATELET # BLD AUTO: 106 10*3/MM3 (ref 130–400)
PMV BLD AUTO: 11.8 FL (ref 6–10)
POTASSIUM BLD-SCNC: 4 MMOL/L (ref 3.5–5.3)
PROT SERPL-MCNC: 7.1 G/DL (ref 6–8)
RBC # BLD AUTO: 4.16 10*6/MM3 (ref 4.2–5.4)
SMALL PLATELETS BLD QL SMEAR: NORMAL
SODIUM BLD-SCNC: 141 MMOL/L (ref 135–153)
WBC NRBC COR # BLD: 4.89 10*3/MM3 (ref 4.5–12.5)

## 2017-07-14 PROCEDURE — 99231 SBSQ HOSP IP/OBS SF/LOW 25: CPT | Performed by: PHYSICIAN ASSISTANT

## 2017-07-14 PROCEDURE — 94799 UNLISTED PULMONARY SVC/PX: CPT

## 2017-07-14 PROCEDURE — 83735 ASSAY OF MAGNESIUM: CPT | Performed by: INTERNAL MEDICINE

## 2017-07-14 PROCEDURE — 85025 COMPLETE CBC W/AUTO DIFF WBC: CPT | Performed by: INTERNAL MEDICINE

## 2017-07-14 PROCEDURE — 92526 ORAL FUNCTION THERAPY: CPT | Performed by: SPEECH-LANGUAGE PATHOLOGIST

## 2017-07-14 PROCEDURE — 85007 BL SMEAR W/DIFF WBC COUNT: CPT | Performed by: INTERNAL MEDICINE

## 2017-07-14 PROCEDURE — 82962 GLUCOSE BLOOD TEST: CPT

## 2017-07-14 PROCEDURE — G8996 SWALLOW CURRENT STATUS: HCPCS | Performed by: SPEECH-LANGUAGE PATHOLOGIST

## 2017-07-14 PROCEDURE — 80053 COMPREHEN METABOLIC PANEL: CPT | Performed by: INTERNAL MEDICINE

## 2017-07-14 PROCEDURE — 82140 ASSAY OF AMMONIA: CPT | Performed by: PHYSICIAN ASSISTANT

## 2017-07-14 PROCEDURE — 99233 SBSQ HOSP IP/OBS HIGH 50: CPT | Performed by: INTERNAL MEDICINE

## 2017-07-14 PROCEDURE — G8998 SWALLOW D/C STATUS: HCPCS | Performed by: SPEECH-LANGUAGE PATHOLOGIST

## 2017-07-14 PROCEDURE — G8997 SWALLOW GOAL STATUS: HCPCS | Performed by: SPEECH-LANGUAGE PATHOLOGIST

## 2017-07-14 RX ORDER — HYDROCODONE BITARTRATE AND ACETAMINOPHEN 5; 325 MG/1; MG/1
1 TABLET ORAL ONCE AS NEEDED
Status: DISCONTINUED | OUTPATIENT
Start: 2017-07-14 | End: 2017-07-14 | Stop reason: SDUPTHER

## 2017-07-14 RX ORDER — HYDROCODONE BITARTRATE AND ACETAMINOPHEN 7.5; 325 MG/1; MG/1
1 TABLET ORAL EVERY 8 HOURS PRN
Status: DISCONTINUED | OUTPATIENT
Start: 2017-07-14 | End: 2017-07-15 | Stop reason: HOSPADM

## 2017-07-14 RX ORDER — BUMETANIDE 1 MG/1
1 TABLET ORAL 2 TIMES DAILY
Status: DISCONTINUED | OUTPATIENT
Start: 2017-07-14 | End: 2017-07-15 | Stop reason: HOSPADM

## 2017-07-14 RX ORDER — HYDROCODONE BITARTRATE AND ACETAMINOPHEN 5; 325 MG/1; MG/1
1 TABLET ORAL ONCE
Status: COMPLETED | OUTPATIENT
Start: 2017-07-14 | End: 2017-07-14

## 2017-07-14 RX ADMIN — LOSARTAN POTASSIUM 50 MG: 50 TABLET, FILM COATED ORAL at 08:58

## 2017-07-14 RX ADMIN — HYDROCODONE BITARTRATE AND ACETAMINOPHEN 1 TABLET: 5; 325 TABLET ORAL at 01:17

## 2017-07-14 RX ADMIN — METOLAZONE 1.25 MG: 2.5 TABLET ORAL at 08:58

## 2017-07-14 RX ADMIN — LEVOTHYROXINE SODIUM 75 MCG: 75 TABLET ORAL at 08:58

## 2017-07-14 RX ADMIN — ROPINIROLE HYDROCHLORIDE 1 MG: 1 TABLET, FILM COATED ORAL at 21:19

## 2017-07-14 RX ADMIN — ASPIRIN 81 MG: 81 TABLET, CHEWABLE ORAL at 08:58

## 2017-07-14 RX ADMIN — FOLIC ACID 1 MG: 1 TABLET ORAL at 08:58

## 2017-07-14 RX ADMIN — BUMETANIDE 1 MG: 1 TABLET ORAL at 17:51

## 2017-07-14 RX ADMIN — LACTULOSE 30 G: 20 SOLUTION ORAL at 16:00

## 2017-07-14 RX ADMIN — HYDROCODONE BITARTRATE AND ACETAMINOPHEN 1 TABLET: 7.5; 325 TABLET ORAL at 21:19

## 2017-07-14 RX ADMIN — PANTOPRAZOLE SODIUM 40 MG: 40 INJECTION, POWDER, FOR SOLUTION INTRAVENOUS at 06:01

## 2017-07-14 RX ADMIN — LACTULOSE 30 G: 20 SOLUTION ORAL at 21:19

## 2017-07-14 RX ADMIN — RIFAXIMIN 550 MG: 550 TABLET ORAL at 08:58

## 2017-07-14 RX ADMIN — LACTULOSE 30 G: 20 SOLUTION ORAL at 09:05

## 2017-07-14 RX ADMIN — RIFAXIMIN 550 MG: 550 TABLET ORAL at 21:19

## 2017-07-14 RX ADMIN — BUMETANIDE 1 MG: 1 TABLET ORAL at 10:58

## 2017-07-14 NOTE — PLAN OF CARE
Problem: Patient Care Overview (Adult)  Goal: Plan of Care Review    07/14/17 0525 07/14/17 0800   Coping/Psychosocial Response Interventions   Plan Of Care Reviewed With --  patient   Patient Care Overview   Progress improving --    Outcome Evaluation   Outcome Summary/Follow up Plan Mental status is improving.  --          Problem: Confusion, Acute (Adult)  Goal: Identify Related Risk Factors and Signs and Symptoms    07/14/17 0525   Confusion, Acute   Related Risk Factors (Acute Confusion) advanced age;cognitive impairment   Signs and Symptoms (Acute Confusion) disorientation;thought process diminished/disorganized       Goal: Cognitive/Functional Impairments Minimized    07/14/17 0525   Confusion, Acute (Adult)   Cognitive/Functional Impairments Minimized making progress toward outcome       Goal: Safety    07/14/17 0525   Confusion, Acute (Adult)   Safety making progress toward outcome         Problem: Skin Integrity Impairment, Risk/Actual (Adult)  Goal: Identify Related Risk Factors and Signs and Symptoms    07/14/17 0525   Skin Integrity Impairment, Risk/Actual   Skin Integrity Impairment, Risk/Actual: Related Risk Factors age extremes;cognitive impairment;edema;immobility   Signs and Symptoms (Skin Integrity Impairment) edema       Goal: Skin Integrity/Wound Healing    07/14/17 0525   Skin Integrity Impairment, Risk/Actual (Adult)   Skin Integrity/Wound Healing making progress toward outcome         Problem: Pressure Ulcer Risk (Evens Q Scale) (Pediatric)  Goal: Identify Related Risk Factors and Signs and Symptoms    07/14/17 0525   Pressure Ulcer Risk (Evens Q Scale)   Related Risk Factors (Pressure Ulcer Risk (Evens Q Scale)) critical care admission;fluid intake inadequate;mental impairment;mobility impaired       Goal: Skin Integrity    07/14/17 0525   Pressure Ulcer Risk (Evens Q Scale) (Pediatric)   Skin Integrity making progress toward outcome         Problem: Fall Risk (Adult)  Goal: Identify  Related Risk Factors and Signs and Symptoms    07/14/17 0525   Fall Risk   Fall Risk: Related Risk Factors age-related changes;polypharmacy   Fall Risk: Signs and Symptoms presence of risk factors       Goal: Absence of Falls    07/14/17 0525   Fall Risk (Adult)   Absence of Falls making progress toward outcome

## 2017-07-14 NOTE — PROGRESS NOTES
Subjective     History:   Alma Delia Garcia is a 86 y.o. female admitted on 7/12/2017 secondary to Hepatic encephalopathy     Procedures: None    Patient seen and examined with PHILLIP Coon. Awake and alert. More awake today, oriented to person, place, month and year. Reports chronic back and shoulder pain. Denies CP, dyspnea or palpitations. Reports dry cough. Denies fever or chills. RN reports pt has been more awake and tolerated dose of Norco last PM. No acute events overnight per RN.     History taken from: patient, chart, and RN.      Objective     Vital Signs  Temp:  [97.8 °F (36.6 °C)-98.5 °F (36.9 °C)] 98.5 °F (36.9 °C)  Heart Rate:  [58-73] 63  Resp:  [12-20] 13  BP: (105-166)/() 105/48    Intake/Output Summary (Last 24 hours) at 07/14/17 0912  Last data filed at 07/14/17 0500   Gross per 24 hour   Intake              680 ml   Output             2675 ml   Net            -1995 ml         Physical Exam:  General:    Awake, alert, less confused today, follows simple commands, in no acute distress, chronically ill appearing    Heart:      Normal S1 and S2. Regular rate and rhythm. No significant murmur, rubs or gallops appreciated.   Lungs:     Respirations regular, even and unlabored. Lungs clear to auscultation B/L. No wheezes, rales or rhonchi.   Abdomen:   Soft and nontender. No guarding, rebound tenderness or  organomegaly noted. Bowel sounds present x 4.   Extremities:  1-2+ edema in LE B/L. Moves UE and LE equally B/L.     Results Review:      Results from last 7 days  Lab Units 07/14/17  0255 07/13/17  0449 07/12/17  1309   WBC 10*3/mm3 4.89 6.18 4.05*   HEMOGLOBIN g/dL 10.8* 12.4 12.2   PLATELETS 10*3/mm3 106* 114* 99*       Results from last 7 days  Lab Units 07/14/17  0255 07/13/17  1841 07/13/17  0449 07/12/17  1309   SODIUM mmol/L 141  --  144 141   POTASSIUM mmol/L 4.0 3.7 3.4* 3.5   CHLORIDE mmol/L 110  --  110 110   CO2 mmol/L 23.3*  --  26.0 25.4   BUN mg/dL 18  --  26* 24*   CREATININE mg/dL  1.15  --  1.03 1.16   CALCIUM mg/dL 9.5  --  9.4 10.0   GLUCOSE mg/dL 107  --  103 113*       Results from last 7 days  Lab Units 07/14/17  0255 07/13/17 0449 07/12/17  1309   BILIRUBIN mg/dL 1.5 2.2* 1.2   ALK PHOS U/L 80 76 87   AST (SGOT) U/L 71* 57* 55*   ALT (SGPT) U/L 25 27 24       Results from last 7 days  Lab Units 07/14/17  0255 07/13/17 0449 07/12/17  1710   MAGNESIUM mg/dL 2.2 1.9 1.6*           Results from last 7 days  Lab Units 07/13/17  0449 07/12/17  2145 07/12/17  1628   CK TOTAL U/L 292* 570 243*   TROPONIN I ng/mL 0.017 <0.006 <0.006   CK MB INDEX % 1.8 1.1 2.3       Imaging Results (last 24 hours)     Procedure Component Value Units Date/Time    US Venous Doppler Lower Extremity Bilateral (duplex) [[id number]] Collected:  07/13/17 1351     Updated:  07/13/17 1353    Narrative:       EXAMINATION: US VENOUS DOPPLER LOWER EXTREMITY BILATERAL (DUPLEX)-      CLINICAL INDICATION:     Pain and Swelling     TECHNIQUE: Multiplanar gray scale and Doppler vascular sonographic  imaging of the deep veins of BILATERAL lower extremity, without and with  compression.      COMPARISON: 02/27/2017      FINDINGS: The visualized deep veins demonstrate flow and are  compressible. No evidence of deep venous thrombosis.             Impression:       No DVT.     This report was finalized on 7/13/2017 1:51 PM by Dr. Manish Sherwood MD.       FL Video Swallow [[id number]] Collected:  07/13/17 1527     Updated:  07/13/17 1530    Narrative:       EXAMINATION: FL VIDEO SWALLOW-      CLINICAL INDICATION:     aspiration concerns; K72.90-Hepatic failure,  unspecified without coma     TECHNIQUE: Modified barium swallow was performed utilizing video  fluoroscopy in conjunction with the Speech Pathology team. The patient  ingested multiple barium media including thin barium, nectar, and honey  liquid as well as barium pudding and a barium pudding coated cracker.   FLUOROSCOPY TIME: 90 s     COMPARISON: NONE      FINDINGS: Laryngeal  penetration of thin liquids via cup w/ large bolus  size, no other laryngeal penetration or aspiration evidenced in this  study.        Impression:       Laryngeal penetration of thin liquids via cup w/ large bolus  size, no other laryngeal penetration or aspiration evidenced in this  study.      This report was finalized on 7/13/2017 3:27 PM by Dr. Zeke Adams MD.               Medications:    aspirin 81 mg Oral Daily   bumetanide 1 mg Oral BID   folic acid 1 mg Oral Daily   lactulose 30 g Oral TID   levothyroxine 75 mcg Oral Daily   losartan 50 mg Oral Daily   magnesium sulfate 2 g Intravenous Once   metOLazone 1.25 mg Oral Daily   naloxone 1 mg Intravenous Once   pantoprazole 40 mg Intravenous Q AM   rifaximin 550 mg Oral Q12H   rOPINIRole 1 mg Oral Nightly              Assessment/Plan   AMS/encephalopathy: Likely 2/2 hepatic encephalopathy. Appears more awake and alert again this AM. CT head negative for any acute changes. TSH is low with normal T4. Serial troponins are negative. No signs of active infection. Cont treatment of hepatic encephalopathy and supportive treatment.     Hepatic encephalopathy with underlying cirrhosis: Reported medication noncompliance prior to admission. Pharmacy has obtained PA for pt's Xifaxan. Cont PO lactulose. Cont to monitor closely. GI following with input appreciated.      Chronic LE edema: No evidence of DVT. Change Bumex to PO. Cont metolazone.     Hypokalemia: Improved today. Mg is now >2. Cont electrolyte replacement protocols and repeat labs in the AM.     Hypothyroidism: TSH is low with a normal T4.  Cont current dose of PO Synthroid. Recommend repeat thyroid studies as an outpatient.     Essential HTN: BP overall stable. Cont current medication regimen and monitor.     Chronic thrombocytopenia: Likely 2/2 chronic liver disease. Stable today. Cont to hold pharmacologic DVT PPX. Repeat CBC in the AM.     DVT PPX: SCD's. No pharmacologic DVT PPX 2/2 thrombocytopenia.      Transfer to med/surg with telemetry.     Pt is at high risk 2/2 AMS/encephalopathy, hepatic encephalopathy with underlying cirrhosis, electrolyte abnormalities, LE edema, advanced age and medication noncompliance.         Jamison Ramirez DO  07/14/17  9:12 AM

## 2017-07-14 NOTE — PROGRESS NOTES
07/14/17      Alma Delia Garcia is a 86 y.o. female who is seen today for follow up of hepatic encephalopathy    HPI  The patient continues to improve.  She will be transferred to medical floor today.  She reported mild nausea with breakfast but no vomiting.  Her AST is 71.  Other liver chemistries are normal.  Ammonia level is 58.    Past medical history, social history, and family history remain unchanged since initial consultation.    REVIEW OF SYSTEMS  Reports pain in back, shoulders, arms and legs; nausea;  All other systems are negative    CURRENT MEDICATION    Current Facility-Administered Medications:   •  aspirin chewable tablet 81 mg, 81 mg, Oral, Daily, WILMER Renner, 81 mg at 07/14/17 0858  •  bumetanide (BUMEX) tablet 1 mg, 1 mg, Oral, BID, Jamison Ramirez DO, 1 mg at 07/14/17 1058  •  folic acid (FOLVITE) tablet 1 mg, 1 mg, Oral, Daily, WILMER Renner, 1 mg at 07/14/17 0858  •  hydrALAZINE (APRESOLINE) injection 10 mg, 10 mg, Intravenous, Q4H PRN, Alexander Hamilton MD  •  HYDROcodone-acetaminophen (NORCO) 7.5-325 MG per tablet 1 tablet, 1 tablet, Oral, Q8H PRN, Jamison Ramirez DO  •  lactulose (CHRONULAC) 10 GM/15ML solution 30 g, 30 g, Oral, TID, Jamison Ramirez DO, 30 g at 07/14/17 0905  •  levothyroxine (SYNTHROID, LEVOTHROID) tablet 75 mcg, 75 mcg, Oral, Daily, WILMER Renner, 75 mcg at 07/14/17 0858  •  losartan (COZAAR) tablet 50 mg, 50 mg, Oral, Daily, WILMER Renner, 50 mg at 07/14/17 0858  •  magnesium sulfate 4 gram infusion- Mg less than or equal to 1 mg/dL, 4 g, Intravenous, PRN **OR** magnesium sulfate 3 gram infusion (1gm x 3) - Mg 1.1 - 1.5 mg/dL, 1 g, Intravenous, PRN, Last Rate: 100 mL/hr at 07/13/17 0629, 1 g at 07/13/17 0629 **OR** Magnesium Sulfate 2 gram infusion- Mg 1.6 - 1.9 mg/dL, 2 g, Intravenous, PRN, Med Peters MD  •  Magnesium Sulfate 2 gram infusion- Mg 1.6 - 1.9 mg/dL, 2 g, Intravenous, Once, Alexander  MD Alfonso  •  metOLazone (ZAROXOLYN) tablet 1.25 mg, 1.25 mg, Oral, Daily, WILMER Renner, 1.25 mg at 07/14/17 0858  •  naloxone (NARCAN) injection 1 mg, 1 mg, Intravenous, Once, Troy Basilio MD  •  nitroglycerin (NITROSTAT) SL tablet 0.4 mg, 0.4 mg, Sublingual, Q5 Min PRN, WILMER Renner  •  pantoprazole (PROTONIX) injection 40 mg, 40 mg, Intravenous, Q AM, Alexander Hamilton MD, 40 mg at 07/14/17 0601  •  potassium chloride 10 mEq in 100 mL IVPB, 10 mEq, Intravenous, Q1H PRN, Alexander Hamilton MD  •  rifaximin (XIFAXAN) tablet 550 mg, 550 mg, Oral, Q12H, Alexander Hamilton MD, 550 mg at 07/14/17 0858  •  rOPINIRole (REQUIP) tablet 1 mg, 1 mg, Oral, Nightly, WILMER Renner, 1 mg at 07/13/17 2127  •  sodium chloride 0.9 % flush 1-10 mL, 1-10 mL, Intravenous, PRN, WILMER Renner  •  Insert peripheral IV, , , Once **AND** sodium chloride 0.9 % flush 10 mL, 10 mL, Intravenous, PRN, Troy Basilio MD    ALLERGIES  Ciprofloxacin     VITAL SIGNS  Vital Signs (last 24 hours)       07/13 0700  -  07/14 0659 07/14 0700  -  07/14 1152   Most Recent    Temp (°F) 97.8 -  99.6      97.7     97.7 (36.5)    Heart Rate 58 -  73    66 -  77     74    Resp 12 -  20    12 -  16     12    /48 -  166/72    138/64 -  168/83     138/64    SpO2 (%) (!)88 -  100    98 -  99     98            PHYSICAL EXAM  General:  Appearance alert, pleasant, interactive and cooperative  Head:  normocephalic, without obvious abnormality and atraumatic  Eyes:  lids and lashes normal, conjunctivae and sclerae normal, no icterus, no pallor, corneas clear and PERRLA  Ears:  ears appear intact with no abnormalities noted  Nose:  nares normal, septum midline, mucosa normal and no drainage  Throat:  no oral lesions, no thrush and oral mucosa moist  Lungs:  clear to auscultation, respirations regular, respirations even and respirations unlabored  Heart:  regular rhythm & normal rate, normal  S1, S2, no murmur, no gallop, no rub and no click  Abdomen:  normal bowel sounds, no masses, no hepatomegaly, no splenomegaly, soft non-tender, no guarding and no rebound tenderness  Extremities:  Bilateral LE edema; moves extremities well,  no cyanosis and no redness  Skin:  no bleeding, bruising or rash, color normal, no lesions noted and temperature normal  Neurologic:  Oriented to basic information; confusion much improved  LABS   Lab Results (last 24 hours)     Procedure Component Value Units Date/Time    POC Glucose Fingerstick [[id number]]  (Normal) Collected:  07/13/17 1427    Specimen:  Blood Updated:  07/13/17 1438     Glucose 86 mg/dL     Narrative:       Meter: [id number] : [id number] CIELO NUÑEZ    POC Glucose Fingerstick [[id number]]  (Normal) Collected:  07/13/17 1747    Specimen:  Blood Updated:  07/13/17 1755     Glucose 104 mg/dL     Narrative:       Meter: [id number] : [id number] CIELO NUÑEZ    Blood Culture [[id number]]  (Normal) Collected:  07/12/17 1627    Specimen:  Blood from Hand, Left Updated:  07/13/17 1801     Blood Culture No growth at 24 hours    Potassium [[id number]]  (Normal) Collected:  07/13/17 1841    Specimen:  Blood Updated:  07/13/17 1933     Potassium 3.7 mmol/L       1+ Icteric        Blood Culture [[id number]]  (Normal) Collected:  07/12/17 1739    Specimen:  Blood from Blood, Venous Line Updated:  07/13/17 2201     Blood Culture No growth at 24 hours    POC Glucose Fingerstick [[id number]]  (Normal) Collected:  07/13/17 2129    Specimen:  Blood Updated:  07/13/17 2224     Glucose 103 mg/dL     Narrative:       Meter: [id number] : [id number] jaidenvivi austina    Ammonia [[id number]]  (Abnormal) Collected:  07/14/17 0255    Specimen:  Blood Updated:  07/14/17 0419     Ammonia 58 (H) umol/L     Magnesium [[id number]]  (Normal) Collected:  07/14/17 0255    Specimen:  Blood Updated:  07/14/17 0431     Magnesium 2.2 mg/dL     Comprehensive Metabolic Panel [[id number]]   (Abnormal) Collected:  07/14/17 0255    Specimen:  Blood Updated:  07/14/17 0441     Glucose 107 mg/dL      BUN 18 mg/dL      Creatinine 1.15 mg/dL      Sodium 141 mmol/L      Potassium 4.0 mmol/L       2+ Hemolysis   1+ Icteric         Chloride 110 mmol/L      CO2 23.3 (L) mmol/L      Calcium 9.5 mg/dL      Total Protein 7.1 g/dL      Albumin 3.10 (L) g/dL      ALT (SGPT) 25 U/L      AST (SGOT) 71 (H) U/L      Alkaline Phosphatase 80 U/L       Note New Reference Ranges        Total Bilirubin 1.5 mg/dL      eGFR Non African Amer 45 (L) mL/min/1.73      Globulin 4.0 gm/dL      A/G Ratio 0.8 (L) g/dL      BUN/Creatinine Ratio 15.7     Anion Gap 7.7 mmol/L     Narrative:       The MDRD GFR formula is only valid for adults with stable renal function between ages 18 and 70.    Osmolality, Calculated [[id number]]  (Normal) Collected:  07/14/17 0255    Specimen:  Blood Updated:  07/14/17 0442     Osmolality Calc 283.6 mOsm/kg     CBC Auto Differential [[id number]]  (Abnormal) Collected:  07/14/17 0255    Specimen:  Blood Updated:  07/14/17 0459     WBC 4.89 10*3/mm3      RBC 4.16 (L) 10*6/mm3      Hemoglobin 10.8 (L) g/dL      Hematocrit 38.6 %      MCV 92.8 fL      MCH 26.0 (L) pg      MCHC 28.0 (L) g/dL      RDW 13.6 %      RDW-SD 45.9 fl      MPV 11.8 (H) fL      Platelets 106 (L) 10*3/mm3      Neutrophil % 59.7 %      Lymphocyte % 22.9 %      Monocyte % 12.1 (H) %      Eosinophil % 4.9 %      Basophil % 0.2 %      Immature Grans % 0.2 %      Neutrophils, Absolute 2.92 10*3/mm3      Lymphocytes, Absolute 1.12 10*3/mm3      Monocytes, Absolute 0.59 10*3/mm3      Eosinophils, Absolute 0.24 10*3/mm3      Basophils, Absolute 0.01 10*3/mm3      Immature Grans, Absolute 0.01 10*3/mm3     CBC & Differential [[id number]] Collected:  07/14/17 0255    Specimen:  Blood Updated:  07/14/17 0459    Narrative:       The following orders were created for panel order CBC & Differential.  Procedure                               Abnormality          Status                     ---------                               -----------         ------                     Scan Slide[[id number]]                                       Final result               CBC Auto Differential[[id number]]        Abnormal            Final result                 Please view results for these tests on the individual orders.    Scan Slide [[id number]] Collected:  07/14/17 0255    Specimen:  Blood Updated:  07/14/17 0459     Hypochromia Slight/1+     Platelet Estimate Decreased    POC Glucose Fingerstick [[id number]]  (Normal) Collected:  07/14/17 0603    Specimen:  Blood Updated:  07/14/17 0643     Glucose 98 mg/dL     Narrative:       Meter: [id number] : [id number] rigoberto morin    Urine Culture [[id number]]  (Normal) Collected:  07/12/17 1730    Specimen:  Urine from Urine, Catheter Updated:  07/14/17 0906     Urine Culture Culture in progress          IMAGING  Imaging Results (last 24 hours)     Procedure Component Value Units Date/Time    US Venous Doppler Lower Extremity Bilateral (duplex) [[id number]] Collected:  07/13/17 1351     Updated:  07/13/17 1353    Narrative:       EXAMINATION: US VENOUS DOPPLER LOWER EXTREMITY BILATERAL (DUPLEX)-      CLINICAL INDICATION:     Pain and Swelling     TECHNIQUE: Multiplanar gray scale and Doppler vascular sonographic  imaging of the deep veins of BILATERAL lower extremity, without and with  compression.      COMPARISON: 02/27/2017      FINDINGS: The visualized deep veins demonstrate flow and are  compressible. No evidence of deep venous thrombosis.             Impression:       No DVT.     This report was finalized on 7/13/2017 1:51 PM by Dr. Manish Sherwood MD.       FL Video Swallow [[id number]] Collected:  07/13/17 1527     Updated:  07/13/17 1530    Narrative:       EXAMINATION: FL VIDEO SWALLOW-      CLINICAL INDICATION:     aspiration concerns; K72.90-Hepatic failure,  unspecified without coma     TECHNIQUE: Modified barium swallow  was performed utilizing video  fluoroscopy in conjunction with the Speech Pathology team. The patient  ingested multiple barium media including thin barium, nectar, and honey  liquid as well as barium pudding and a barium pudding coated cracker.   FLUOROSCOPY TIME: 90 s     COMPARISON: NONE      FINDINGS: Laryngeal penetration of thin liquids via cup w/ large bolus  size, no other laryngeal penetration or aspiration evidenced in this  study.        Impression:       Laryngeal penetration of thin liquids via cup w/ large bolus  size, no other laryngeal penetration or aspiration evidenced in this  study.      This report was finalized on 7/13/2017 3:27 PM by Dr. Zeke Adams MD.               ASSESSMENT/PLAN  Hepatic encephalopathy  Cirrhosis  Thyroid disease  Hypertension  History of GERD  History of colonic diverticulosis    Agree with current management of hepatic encephalopathy.  GI will sign off.        Electronically signed by: WILMER Putnam

## 2017-07-14 NOTE — PLAN OF CARE
Problem: Patient Care Overview (Adult)  Goal: Plan of Care Review  Outcome: Ongoing (interventions implemented as appropriate)    07/14/17 0525   Coping/Psychosocial Response Interventions   Plan Of Care Reviewed With patient;family   Patient Care Overview   Progress improving   Outcome Evaluation   Outcome Summary/Follow up Plan Mental status is improving.        Goal: Discharge Needs Assessment  Outcome: Ongoing (interventions implemented as appropriate)    07/14/17 0525   Discharge Needs Assessment   Discharge Disposition still a patient         Problem: Confusion, Acute (Adult)  Goal: Identify Related Risk Factors and Signs and Symptoms  Outcome: Ongoing (interventions implemented as appropriate)    07/14/17 0525   Confusion, Acute   Related Risk Factors (Acute Confusion) advanced age;cognitive impairment   Signs and Symptoms (Acute Confusion) disorientation;thought process diminished/disorganized       Goal: Cognitive/Functional Impairments Minimized  Outcome: Ongoing (interventions implemented as appropriate)    07/14/17 0525   Confusion, Acute (Adult)   Cognitive/Functional Impairments Minimized making progress toward outcome       Goal: Safety  Outcome: Ongoing (interventions implemented as appropriate)    07/14/17 0525   Confusion, Acute (Adult)   Safety making progress toward outcome         Problem: Skin Integrity Impairment, Risk/Actual (Adult)  Goal: Identify Related Risk Factors and Signs and Symptoms  Outcome: Ongoing (interventions implemented as appropriate)    07/14/17 0525   Skin Integrity Impairment, Risk/Actual   Skin Integrity Impairment, Risk/Actual: Related Risk Factors age extremes;cognitive impairment;edema;immobility   Signs and Symptoms (Skin Integrity Impairment) edema       Goal: Skin Integrity/Wound Healing  Outcome: Ongoing (interventions implemented as appropriate)    07/14/17 0525   Skin Integrity Impairment, Risk/Actual (Adult)   Skin Integrity/Wound Healing making progress toward  outcome         Problem: Pressure Ulcer Risk (Evens Q Scale) (Pediatric)  Goal: Identify Related Risk Factors and Signs and Symptoms  Outcome: Ongoing (interventions implemented as appropriate)    07/14/17 0525   Pressure Ulcer Risk (Evens Q Scale)   Related Risk Factors (Pressure Ulcer Risk (Evens Q Scale)) critical care admission;fluid intake inadequate;mental impairment;mobility impaired       Goal: Skin Integrity  Outcome: Ongoing (interventions implemented as appropriate)    07/14/17 0525   Pressure Ulcer Risk (Evens Q Scale) (Pediatric)   Skin Integrity making progress toward outcome         Problem: Fall Risk (Adult)  Goal: Identify Related Risk Factors and Signs and Symptoms  Outcome: Ongoing (interventions implemented as appropriate)    07/14/17 0525   Fall Risk   Fall Risk: Related Risk Factors age-related changes;polypharmacy   Fall Risk: Signs and Symptoms presence of risk factors       Goal: Absence of Falls  Outcome: Ongoing (interventions implemented as appropriate)    07/14/17 0525   Fall Risk (Adult)   Absence of Falls making progress toward outcome

## 2017-07-14 NOTE — PROGRESS NOTES
Acute Care - Speech Language Pathology   Swallow Follow up  [place]     Patient Name: Alma Delia Garcia  : 1930  MRN: [id number]  Today's Date: 2017     Admit Date: 2017  Ms. Garcia is seen this am for diet tolerance on CCU-10. Pt is s/p MBS on 17 w/ SLP recs of MS, ground w/ thin liquids, no straws. Pt is a/a upon entry w/ Jaymie FISHER present at bedside. RN reports no difficulty w/ any consistencies or liquids at breakfast this am. RN reports improved PO intake. SLP gives pt 5x trials of thin liquids w/ no overt s/s of aspiration. Pt is also observed during med pass and has no difficulty swallowing any pills this am. Pt displayed no overt s/s during med pass. Pt follows diet recommendation of no straws as she reports it to RN before breakfast. Pt reports no difficulty w/consistencies since MBS study yesterday.     Plan: No further SLP f/u at this time as pt is tolerating current diet    D/w, RN, she verbalizes agreement and understanding.    Thank you-  Lisa Martino M.S., CFY-SLP    Visit Dx:      ICD-10-CM ICD-9-CM   1. Hepatic encephalopathy K72.90 572.2     Patient Active Problem List   Diagnosis   • Hepatic cirrhosis*   • HTN (hypertension)*   • Anxiety disorder*   • Osteopetrosis*   • Hypothyroidism*   • GERD (gastroesophageal reflux disease)*   • Chronic pain syndrome due to fractured spine and left rib fracture*   • Anemia, chronic disease   • Arthralgia of hip   • Left knee pain   • Fracture of proximal end of tibia and fibula   • Primary insomnia   • Thrombocytopenia   • Renal failure   • Peripheral vascular disease   • Bilateral leg edema   • Cramp of both lower extremities   • Hepatic encephalopathy                 EDUCATION  The patient has been educated in the following areas:   Dysphagia (Swallowing Impairment).    SLP Recommendation and Plan   No further SLP f/u at this time.     Time Calculation:         Time Calculation- SLP       17 0955          Time Calculation- SLP     SLP Start Time 0915  -      SLP Stop Time 0930  -      SLP Time Calculation (min) 15 min  -      SLP Non-Billable Time (min) 15 min  -        User Key  (r) = Recorded By, (t) = Taken By, (c) = Cosigned By    Initials Name Provider Type     Lisa Martino Speech Therapist          Therapy Charges for Today     Code Description Service Date Service Provider Modifiers Qty    99939716560 HC ST CARE PLAN 15 MIN 7/13/2017 Lisa Martino GN 1    [id number] HC ST EVAL ORAL PHARYNG SWALLOW 2 7/13/2017 Lisa Martino GN 1    [id number] HC ST SWALLOWING CURRENT STATUS 7/13/2017 Lisa Martino GN, CI 1    [id number] HC ST SWALLOWING PROJECTED 7/13/2017 Lisa Martino GN, CI 1    [id number] HC ST CARE PLAN 15 MIN 7/13/2017 Lisa Martino GN 1    [id number] HC ST MOTION FLUORO EVAL SWALLOW 4 7/13/2017 Lisa Martino GN 1    [id number] HC ST SWALLOWING CURRENT STATUS 7/14/2017 Lisa Martino GN, CI 1    [id number] HC ST SWALLOWING PROJECTED 7/14/2017 Lisa Martino GN, CI 1    17528252310 HC ST SWALLOWING DISCHARGE 7/14/2017 Lisa Martino GN, CI 1    [id number] HC ST CARE PLAN 15 MIN 7/14/2017 Lisa Martino GN 1    59330111130 HC ST TREATMENT SWALLOW 1 7/14/2017 Lisa Martino GN 1          SLP G-Codes  SLP NOMS Used?: Yes  Functional Limitations: Swallowing  Swallow Current Status (): At least 1 percent but less than 20 percent impaired, limited or restricted  Swallow Goal Status (): At least 1 percent but less than 20 percent impaired, limited or restricted  Swallow Discharge Status (): At least 1 percent but less than 20 percent impaired, limited or restricted      Lisa Martino  7/14/2017

## 2017-07-15 VITALS
HEIGHT: 65 IN | WEIGHT: 135.06 LBS | BODY MASS INDEX: 22.5 KG/M2 | DIASTOLIC BLOOD PRESSURE: 42 MMHG | HEART RATE: 70 BPM | SYSTOLIC BLOOD PRESSURE: 98 MMHG | TEMPERATURE: 97.7 F | RESPIRATION RATE: 16 BRPM | OXYGEN SATURATION: 97 %

## 2017-07-15 LAB — BACTERIA SPEC AEROBE CULT: NO GROWTH

## 2017-07-15 PROCEDURE — 99239 HOSP IP/OBS DSCHRG MGMT >30: CPT | Performed by: INTERNAL MEDICINE

## 2017-07-15 RX ORDER — ONDANSETRON 4 MG/1
4 TABLET, ORALLY DISINTEGRATING ORAL EVERY 8 HOURS PRN
Qty: 30 TABLET | Refills: 0 | Status: ON HOLD | OUTPATIENT
Start: 2017-07-15 | End: 2017-10-12

## 2017-07-15 RX ORDER — LACTULOSE 10 G/15ML
20 SOLUTION ORAL 3 TIMES DAILY
Qty: 1892 ML | Refills: 0 | Status: ON HOLD | OUTPATIENT
Start: 2017-07-15 | End: 2017-10-12

## 2017-07-15 RX ADMIN — ASPIRIN 81 MG: 81 TABLET, CHEWABLE ORAL at 09:00

## 2017-07-15 RX ADMIN — METOLAZONE 1.25 MG: 2.5 TABLET ORAL at 09:00

## 2017-07-15 RX ADMIN — LACTULOSE 30 G: 20 SOLUTION ORAL at 09:00

## 2017-07-15 RX ADMIN — LEVOTHYROXINE SODIUM 75 MCG: 75 TABLET ORAL at 09:00

## 2017-07-15 RX ADMIN — HYDROCODONE BITARTRATE AND ACETAMINOPHEN 1 TABLET: 7.5; 325 TABLET ORAL at 11:45

## 2017-07-15 RX ADMIN — PANTOPRAZOLE SODIUM 40 MG: 40 INJECTION, POWDER, FOR SOLUTION INTRAVENOUS at 05:45

## 2017-07-15 RX ADMIN — FOLIC ACID 1 MG: 1 TABLET ORAL at 09:00

## 2017-07-15 RX ADMIN — LACTULOSE 30 G: 20 SOLUTION ORAL at 16:08

## 2017-07-15 RX ADMIN — BUMETANIDE 1 MG: 1 TABLET ORAL at 09:00

## 2017-07-15 RX ADMIN — RIFAXIMIN 550 MG: 550 TABLET ORAL at 09:00

## 2017-07-15 NOTE — DISCHARGE INSTRUCTIONS
If you have any questions about your recovery, please call [phone]. A registered nurse is available 24 hours a day 7 days a week to assist you.

## 2017-07-15 NOTE — DISCHARGE SUMMARY
Date of Admission: 7/12/2017    Date of Discharge: 7/15/2017     PCP: Galina Gonsalves MD    Admission Diagnosis:   AMS, assumed to be 2/2 hepatic encephalopathy  Cirrhosis  Chronic lower extremity edema  Chronic thrombocytopenia  Essential HTN, controlled  Hypothyroidism    Discharge Diagnosis:   Metabolic encephalopathy  Hepatic encephalopathy with underlying cirrhosis  Chronic lower extremity edema  Hypokalemia  Hypothyroidism  Essential HTN  Chronic thrombocytopenia    Procedures Performed:  None     Consults:   Consults     Date and Time Order Name Status Description    7/12/2017 6060 Inpatient Consult to Gastroenterology Completed     7/12/2017 1401 Hospitalist (on-call MD unless specified)              History of Present Illness:  Alma Delia Garcia is a 86 y.o. female who presented to [facility] ED with decreased responsiveness and increased confusion. She has a known hx of cirrhosis with ongoing Xifaxin and lactulose treatment. Please see H&P for complete details.     In the ED, her ammonia level was noted to be significantly elevated. Her caregiver noted that she had not been compliant with her medications recently and her confusion had gradually worsened. She was confused but would respond with no focal deficits noted on exam. CT head was negative for any acute changes and EKG did not reveal any acute ischemic changes.         Hospital Course  Alma Delia Garcia was admitted to the CCU for further evaluation and treatment. She was started on scheduled rectal lactulose with frequent neuro checks. GI was consulted for further input.     Serial CE's were followed and remained negative. TSH was noted to be low with a normal T4. Her home medications were changed to IV if possible and any potential sedating medications were held upon admission. She was evaluated by GI who agreed with the present management. Her mental status quickly began improving with the scheduled lactulose and she was transitioned back to PO  lactulose once she was alert enough to take PO. A speech eval was completed with no evidence of aspiration. Her home Xifaxin was restarted and pharmacy worked to complete a prior authorization for this medication.     She began c/o chronic back and shoulder pain and Norco was restarted at a lower dose. She tolerated this well and the Norco was increased back to her home dose. She remained awake and alert and was able to be transferred out of the CCU to the med/surg floor. She admitted to medication noncompliance at home and she was counseled regarding the importance of compliance with her treatment.     Ms. Garcia was seen and examined on 7/15. She remained hemodynamically stable with stable routine AM labs. She remained awake and alert and appeared back to her baseline. She was deemed medically stable for discharge and instructed to follow up with her PCP in 1 week. She was given prescriptions for lactulose and Xifaxin and again counseled on the importance of medication compliance.       Pertinent Test Results:   CT head without contrast  Atrophy and chronic small vessel ischemic change, but there are no acute intracranial abnormalities identified.    US venous doppler lower extremity bilateral  No DVT.    Condition on Discharge:  Stable    Vital Signs  Vitals:    07/15/17 0743   BP: 106/42   Pulse: 69   Resp: 16   Temp: 97.5 °F (36.4 °C)   SpO2:        Physical Exam:  General:    Awake, alert, in no acute distress, chronically ill appearing   Heart:      Normal S1 and S2. Regular rate and rhythm. No significant murmur, rubs or gallops appreciated.   Lungs:     Respirations regular, even and unlabored. Lungs clear to auscultation B/L. No wheezes, rales or rhonchi.   Abdomen:   Soft and nontender. No guarding, rebound tenderness or  organomegaly noted. Bowel sounds present x 4.   Extremities:  1-2+ edema in LE B/L. Moves UE and LE equally B/L.     Discharge Disposition:   home      Discharge Medications:   Jose  Alma Delia   Home Medication Instructions YESI:[id number]    Printed on:07/15/17 1146   Medication Information                      alendronate (FOSAMAX) 70 MG tablet  TAKE ONE TABLET BY MOUTH EVERY 7 DAYS             aspirin 81 MG tablet  Take 1 tablet by mouth Daily.             bumetanide (BUMEX) 1 MG tablet  Take 1 tablet by mouth 2 (Two) Times a Day.             esomeprazole (nexIUM) 40 MG capsule  Take 1 capsule by mouth Every Morning Before Breakfast.             folic acid (FOLVITE) 1 MG tablet  TAKE ONE TABLET BY MOUTH DAILY             HYDROcodone-acetaminophen (NORCO) 7.5-325 MG per tablet  Take 1 tablet by mouth Every 8 (Eight) Hours As Needed for Moderate Pain (4-6).             lactulose (CHRONULAC) 10 GM/15ML solution  Take 30 mL by mouth 3 (Three) Times a Day.             levothyroxine (SYNTHROID, LEVOTHROID) 50 MCG tablet  Take 1.5 tablets by mouth Daily.             losartan (COZAAR) 50 MG tablet  Take 1 tablet by mouth Daily.             metOLazone (ZAROXOLYN) 2.5 MG tablet  Take 0.5 tablets by mouth Daily.             nitroglycerin (NITROSTAT) 0.4 MG SL tablet  Place 1 tablet under the tongue Every 5 (Five) Minutes As Needed for chest pain. Take no more than 3 doses in 15 minutes.             ondansetron ODT (ZOFRAN-ODT) 4 MG disintegrating tablet  Take 1 tablet by mouth Every 8 (Eight) Hours As Needed for Nausea or Vomiting.             rifaximin (XIFAXAN) 550 MG tablet  Take 1 tablet by mouth Every 12 (Twelve) Hours.             rOPINIRole (REQUIP) 1 MG tablet  Take 1 tablet by mouth Every Night. Take 1 hour before bedtime.                   Discharge Diet:        Dietary Orders            Start     Ordered    07/13/17 1540  Diet Soft Texture; Ground; Thin  Diet Effective Now     Comments:  1:1 assist w/feeds; NO STRAWS   Question Answer Comment   Diet Texture / Consistency Soft Texture    Select Texture: Ground    Fluid Consistency Thin        07/13/17 1540    07/12/17 1607  DIET MESSAGE  supper  Once     Comments:  supper    07/12/17 1607          Activity at Discharge:  activity as tolerated    Follow-up Appointments:  Additional Instructions for the Follow-ups that You Need to Schedule     Discharge Follow-up with PCP    As directed    Follow Up Details:  Follow up with Dr. Gonsalves in 1 week.             Follow-up Information     Follow up with Galina Gonsalves MD .    Specialty:  Cardiology    Why:  Follow up with Dr. Gonsalves in 1 week.    Contact information:    [address]  [place] [address]  [phone]          Your Scheduled Appointments     Sep 14, 2017  2:45 PM EDT   Follow Up with Galina Gonsalves MD   [facility] CARDIOLOGY (--)    [address] [place] [id number] [phone]           Arrive 15 minutes prior to appointment.                    Test Results Pending at Discharge:  None      Jamison Ramirez DO  07/15/17  11:49 AM      Time: Greater than 30 minutes spent on this discharge.

## 2017-07-15 NOTE — PLAN OF CARE
Problem: Patient Care Overview (Adult)  Goal: Plan of Care Review    07/14/17 0525 07/15/17 0131 07/15/17 0800   Coping/Psychosocial Response Interventions   Plan Of Care Reviewed With --  --  patient   Patient Care Overview   Progress --  progress toward functional goals as expected --    Outcome Evaluation   Outcome Summary/Follow up Plan Mental status is improving.  --  --          Problem: Confusion, Acute (Adult)  Goal: Identify Related Risk Factors and Signs and Symptoms    07/14/17 0525   Confusion, Acute   Related Risk Factors (Acute Confusion) advanced age;cognitive impairment   Signs and Symptoms (Acute Confusion) disorientation;thought process diminished/disorganized       Goal: Cognitive/Functional Impairments Minimized    07/15/17 0131   Confusion, Acute (Adult)   Cognitive/Functional Impairments Minimized making progress toward outcome       Goal: Safety    07/15/17 0131   Confusion, Acute (Adult)   Safety making progress toward outcome         Problem: Skin Integrity Impairment, Risk/Actual (Adult)  Goal: Identify Related Risk Factors and Signs and Symptoms    07/14/17 0525   Skin Integrity Impairment, Risk/Actual   Skin Integrity Impairment, Risk/Actual: Related Risk Factors age extremes;cognitive impairment;edema;immobility   Signs and Symptoms (Skin Integrity Impairment) edema       Goal: Skin Integrity/Wound Healing    07/15/17 0131   Skin Integrity Impairment, Risk/Actual (Adult)   Skin Integrity/Wound Healing making progress toward outcome         Problem: Pressure Ulcer Risk (Evens Q Scale) (Pediatric)  Goal: Identify Related Risk Factors and Signs and Symptoms    07/15/17 0131   Pressure Ulcer Risk (Evens Q Scale)   Related Risk Factors (Pressure Ulcer Risk (Evens Q Scale)) fluid intake inadequate;hospitalization prolonged;mobility impaired       Goal: Skin Integrity    07/15/17 0131   Pressure Ulcer Risk (Evens Q Scale) (Pediatric)   Skin Integrity making progress toward outcome          Problem: Fall Risk (Adult)  Goal: Identify Related Risk Factors and Signs and Symptoms    07/14/17 0525   Fall Risk   Fall Risk: Related Risk Factors age-related changes;polypharmacy   Fall Risk: Signs and Symptoms presence of risk factors       Goal: Absence of Falls    07/15/17 0131   Fall Risk (Adult)   Absence of Falls making progress toward outcome

## 2017-07-15 NOTE — DISCHARGE INSTR - LAB
Please call as soon as possible to schedule an appointment to see Dr. Gonsalves in one week.  You can reach the office at .

## 2017-07-15 NOTE — PLAN OF CARE
Problem: Patient Care Overview (Adult)  Goal: Plan of Care Review  Outcome: Ongoing (interventions implemented as appropriate)    07/15/17 0131   Coping/Psychosocial Response Interventions   Plan Of Care Reviewed With patient   Patient Care Overview   Progress progress toward functional goals as expected       Goal: Adult Individualization and Mutuality  Outcome: Ongoing (interventions implemented as appropriate)    Problem: Confusion, Acute (Adult)  Goal: Identify Related Risk Factors and Signs and Symptoms    07/14/17 0525   Confusion, Acute   Related Risk Factors (Acute Confusion) advanced age;cognitive impairment   Signs and Symptoms (Acute Confusion) disorientation;thought process diminished/disorganized       Goal: Cognitive/Functional Impairments Minimized  Outcome: Ongoing (interventions implemented as appropriate)    07/15/17 0131   Confusion, Acute (Adult)   Cognitive/Functional Impairments Minimized making progress toward outcome       Goal: Safety  Outcome: Ongoing (interventions implemented as appropriate)    07/15/17 0131   Confusion, Acute (Adult)   Safety making progress toward outcome         Problem: Skin Integrity Impairment, Risk/Actual (Adult)  Goal: Identify Related Risk Factors and Signs and Symptoms  Outcome: Ongoing (interventions implemented as appropriate)    07/14/17 0525   Skin Integrity Impairment, Risk/Actual   Skin Integrity Impairment, Risk/Actual: Related Risk Factors age extremes;cognitive impairment;edema;immobility   Signs and Symptoms (Skin Integrity Impairment) edema       Goal: Skin Integrity/Wound Healing  Outcome: Ongoing (interventions implemented as appropriate)    07/15/17 0131   Skin Integrity Impairment, Risk/Actual (Adult)   Skin Integrity/Wound Healing making progress toward outcome         Problem: Pressure Ulcer Risk (Evens Q Scale) (Pediatric)  Goal: Identify Related Risk Factors and Signs and Symptoms  Outcome: Ongoing (interventions implemented as appropriate)     07/15/17 0131   Pressure Ulcer Risk (Evens Q Scale)   Related Risk Factors (Pressure Ulcer Risk (Evens Q Scale)) fluid intake inadequate;hospitalization prolonged;mobility impaired       Goal: Skin Integrity  Outcome: Ongoing (interventions implemented as appropriate)    07/15/17 0131   Pressure Ulcer Risk (Evens Q Scale) (Pediatric)   Skin Integrity making progress toward outcome         Problem: Fall Risk (Adult)  Goal: Identify Related Risk Factors and Signs and Symptoms  Outcome: Ongoing (interventions implemented as appropriate)    07/14/17 0525   Fall Risk   Fall Risk: Related Risk Factors age-related changes;polypharmacy   Fall Risk: Signs and Symptoms presence of risk factors       Goal: Absence of Falls  Outcome: Ongoing (interventions implemented as appropriate)    07/15/17 0131   Fall Risk (Adult)   Absence of Falls making progress toward outcome

## 2017-07-17 LAB
BACTERIA SPEC AEROBE CULT: NORMAL
BACTERIA SPEC AEROBE CULT: NORMAL

## 2017-07-20 ENCOUNTER — TELEPHONE (OUTPATIENT)
Dept: CARDIOLOGY | Facility: CLINIC | Age: 82
End: 2017-07-20

## 2017-07-20 NOTE — TELEPHONE ENCOUNTER
----- Message from Galina Gonsalves MD sent at 7/20/2017  1:17 PM EDT -----  Home health referral for physical therapy  ----- Message -----     From: Shalonda Jesus MA     Sent: 7/20/2017  11:05 AM       To: Galina Gonsalves MD    Pt granddaughter called states that since the patient was discharged from the hospital, she has been refusing to talk and take her meds. She states that when she does talk she only yells. Family wants to know if you have any recommendations for her such as rehab or therapy?

## 2017-07-25 ENCOUNTER — HOSPITAL ENCOUNTER (INPATIENT)
Facility: HOSPITAL | Age: 82
LOS: 2 days | Discharge: HOME HEALTH | End: 2017-07-28
Attending: EMERGENCY MEDICINE | Admitting: HOSPITALIST

## 2017-07-25 ENCOUNTER — APPOINTMENT (OUTPATIENT)
Dept: CT IMAGING | Facility: HOSPITAL | Age: 82
End: 2017-07-25

## 2017-07-25 ENCOUNTER — APPOINTMENT (OUTPATIENT)
Dept: GENERAL RADIOLOGY | Facility: HOSPITAL | Age: 82
End: 2017-07-25

## 2017-07-25 DIAGNOSIS — M25.551 PAIN OF RIGHT HIP JOINT: ICD-10-CM

## 2017-07-25 DIAGNOSIS — K74.60 CIRRHOSIS OF LIVER WITHOUT ASCITES, UNSPECIFIED HEPATIC CIRRHOSIS TYPE (HCC): ICD-10-CM

## 2017-07-25 DIAGNOSIS — E86.0 DEHYDRATION: Primary | ICD-10-CM

## 2017-07-25 DIAGNOSIS — N39.0 URINARY TRACT INFECTION, SITE UNSPECIFIED: ICD-10-CM

## 2017-07-25 LAB
6-ACETYL MORPHINE: NEGATIVE
A-A DO2: 23.6 MMHG (ref 0–300)
ALBUMIN SERPL-MCNC: 3.8 G/DL (ref 3.4–4.8)
ALBUMIN/GLOB SERPL: 0.9 G/DL (ref 1.5–2.5)
ALP SERPL-CCNC: 95 U/L (ref 35–104)
ALT SERPL W P-5'-P-CCNC: 32 U/L (ref 10–36)
AMMONIA BLD-SCNC: 57 UMOL/L (ref 11–51)
AMPHET+METHAMPHET UR QL: NEGATIVE
ANION GAP SERPL CALCULATED.3IONS-SCNC: 3.2 MMOL/L (ref 3.6–11.2)
ARTERIAL PATENCY WRIST A: POSITIVE
AST SERPL-CCNC: 63 U/L (ref 10–30)
ATMOSPHERIC PRESS: 729 MMHG
BACTERIA UR QL AUTO: ABNORMAL /HPF
BARBITURATES UR QL SCN: NEGATIVE
BASE EXCESS BLDA CALC-SCNC: 7.5 MMOL/L
BASOPHILS # BLD AUTO: 0.03 10*3/MM3 (ref 0–0.3)
BASOPHILS NFR BLD AUTO: 0.5 % (ref 0–2)
BDY SITE: ABNORMAL
BENZODIAZ UR QL SCN: NEGATIVE
BILIRUB SERPL-MCNC: 1.6 MG/DL (ref 0.2–1.8)
BILIRUB UR QL STRIP: NEGATIVE
BNP SERPL-MCNC: 22 PG/ML (ref 0–100)
BODY TEMPERATURE: 98.6 C
BUN BLD-MCNC: 55 MG/DL (ref 7–21)
BUN/CREAT SERPL: 28.2 (ref 7–25)
BUPRENORPHINE SERPL-MCNC: NEGATIVE NG/ML
CALCIUM SPEC-SCNC: 10 MG/DL (ref 7.7–10)
CANNABINOIDS SERPL QL: NEGATIVE
CHLORIDE SERPL-SCNC: 103 MMOL/L (ref 99–112)
CK MB SERPL-CCNC: 2.88 NG/ML (ref 0–5)
CK MB SERPL-RTO: 2.9 % (ref 0–3)
CK SERPL-CCNC: 99 U/L (ref 24–173)
CLARITY UR: ABNORMAL
CO2 SERPL-SCNC: 35.8 MMOL/L (ref 24.3–31.9)
COCAINE UR QL: NEGATIVE
COHGB MFR BLD: 1.6 % (ref 0–5)
COLOR UR: YELLOW
CREAT BLD-MCNC: 1.95 MG/DL (ref 0.43–1.29)
CRP SERPL-MCNC: 2.71 MG/DL (ref 0–0.99)
D-LACTATE SERPL-SCNC: 1.9 MMOL/L (ref 0.5–2)
DEPRECATED RDW RBC AUTO: 45.1 FL (ref 37–54)
EOSINOPHIL # BLD AUTO: 0.27 10*3/MM3 (ref 0–0.7)
EOSINOPHIL NFR BLD AUTO: 4.7 % (ref 0–7)
ERYTHROCYTE [DISTWIDTH] IN BLOOD BY AUTOMATED COUNT: 13.8 % (ref 11.5–14.5)
ERYTHROCYTE [SEDIMENTATION RATE] IN BLOOD: 29 MM/HR (ref 0–30)
ETHANOL BLD-MCNC: <10 MG/DL
ETHANOL UR QL: <0.01 %
GFR SERPL CREATININE-BSD FRML MDRD: 24 ML/MIN/1.73
GLOBULIN UR ELPH-MCNC: 4.1 GM/DL
GLUCOSE BLD-MCNC: 95 MG/DL (ref 70–110)
GLUCOSE UR STRIP-MCNC: NEGATIVE MG/DL
HCO3 BLDA-SCNC: 31.2 MMOL/L (ref 22–26)
HCT VFR BLD AUTO: 39.7 % (ref 37–47)
HCT VFR BLD CALC: 41 % (ref 37–47)
HGB BLD-MCNC: 14 G/DL (ref 12–16)
HGB BLDA-MCNC: 13.8 G/DL (ref 12–16)
HGB UR QL STRIP.AUTO: ABNORMAL
HOROWITZ INDEX BLD+IHG-RTO: 21 %
HYALINE CASTS UR QL AUTO: ABNORMAL /LPF
IMM GRANULOCYTES # BLD: 0.01 10*3/MM3 (ref 0–0.03)
IMM GRANULOCYTES NFR BLD: 0.2 % (ref 0–0.5)
KETONES UR QL STRIP: NEGATIVE
LEUKOCYTE ESTERASE UR QL STRIP.AUTO: ABNORMAL
LYMPHOCYTES # BLD AUTO: 1.75 10*3/MM3 (ref 1–3)
LYMPHOCYTES NFR BLD AUTO: 30.8 % (ref 16–46)
MCH RBC QN AUTO: 33.2 PG (ref 27–33)
MCHC RBC AUTO-ENTMCNC: 35.3 G/DL (ref 33–37)
MCV RBC AUTO: 94.1 FL (ref 80–94)
METHADONE UR QL SCN: NEGATIVE
METHGB BLD QL: 0.3 % (ref 0–3)
MODALITY: ABNORMAL
MONOCYTES # BLD AUTO: 0.41 10*3/MM3 (ref 0.1–0.9)
MONOCYTES NFR BLD AUTO: 7.2 % (ref 0–12)
MYOGLOBIN SERPL-MCNC: 196 NG/ML (ref 0–109)
NEUTROPHILS # BLD AUTO: 3.22 10*3/MM3 (ref 1.4–6.5)
NEUTROPHILS NFR BLD AUTO: 56.6 % (ref 40–75)
NITRITE UR QL STRIP: NEGATIVE
OPIATES UR QL: NEGATIVE
OSMOLALITY SERPL CALC.SUM OF ELEC: 298 MOSM/KG (ref 273–305)
OXYCODONE UR QL SCN: NEGATIVE
OXYHGB MFR BLDV: 93.2 % (ref 85–100)
PCO2 BLDA: 40.4 MM HG (ref 35–45)
PCP UR QL SCN: NEGATIVE
PH BLDA: 7.5 PH UNITS (ref 7.35–7.45)
PH UR STRIP.AUTO: 6.5 [PH] (ref 5–8)
PLATELET # BLD AUTO: 141 10*3/MM3 (ref 130–400)
PMV BLD AUTO: 11 FL (ref 6–10)
PO2 BLDA: 71.2 MM HG (ref 80–100)
POTASSIUM BLD-SCNC: 4.2 MMOL/L (ref 3.5–5.3)
PROT SERPL-MCNC: 7.9 G/DL (ref 6–8)
PROT UR QL STRIP: NEGATIVE
RBC # BLD AUTO: 4.22 10*6/MM3 (ref 4.2–5.4)
RBC # UR: ABNORMAL /HPF
REF LAB TEST METHOD: ABNORMAL
SAO2 % BLDCOA: 95 % (ref 90–100)
SODIUM BLD-SCNC: 142 MMOL/L (ref 135–153)
SP GR UR STRIP: 1.01 (ref 1–1.03)
SQUAMOUS #/AREA URNS HPF: ABNORMAL /HPF
TROPONIN I SERPL-MCNC: 0.01 NG/ML
UROBILINOGEN UR QL STRIP: ABNORMAL
WBC NRBC COR # BLD: 5.69 10*3/MM3 (ref 4.5–12.5)
WBC UR QL AUTO: ABNORMAL /HPF

## 2017-07-25 PROCEDURE — 71010 HC CHEST PA OR AP: CPT

## 2017-07-25 PROCEDURE — 82553 CREATINE MB FRACTION: CPT | Performed by: PHYSICIAN ASSISTANT

## 2017-07-25 PROCEDURE — 86140 C-REACTIVE PROTEIN: CPT | Performed by: PHYSICIAN ASSISTANT

## 2017-07-25 PROCEDURE — 80307 DRUG TEST PRSMV CHEM ANLYZR: CPT | Performed by: PHYSICIAN ASSISTANT

## 2017-07-25 PROCEDURE — 83874 ASSAY OF MYOGLOBIN: CPT | Performed by: PHYSICIAN ASSISTANT

## 2017-07-25 PROCEDURE — 87040 BLOOD CULTURE FOR BACTERIA: CPT | Performed by: PHYSICIAN ASSISTANT

## 2017-07-25 PROCEDURE — 87186 SC STD MICRODIL/AGAR DIL: CPT | Performed by: PHYSICIAN ASSISTANT

## 2017-07-25 PROCEDURE — 93005 ELECTROCARDIOGRAM TRACING: CPT | Performed by: PHYSICIAN ASSISTANT

## 2017-07-25 PROCEDURE — 81003 URINALYSIS AUTO W/O SCOPE: CPT | Performed by: PHYSICIAN ASSISTANT

## 2017-07-25 PROCEDURE — 82140 ASSAY OF AMMONIA: CPT | Performed by: PHYSICIAN ASSISTANT

## 2017-07-25 PROCEDURE — 82805 BLOOD GASES W/O2 SATURATION: CPT | Performed by: PHYSICIAN ASSISTANT

## 2017-07-25 PROCEDURE — 84484 ASSAY OF TROPONIN QUANT: CPT | Performed by: PHYSICIAN ASSISTANT

## 2017-07-25 PROCEDURE — 70450 CT HEAD/BRAIN W/O DYE: CPT

## 2017-07-25 PROCEDURE — 82375 ASSAY CARBOXYHB QUANT: CPT | Performed by: PHYSICIAN ASSISTANT

## 2017-07-25 PROCEDURE — 93010 ELECTROCARDIOGRAM REPORT: CPT | Performed by: INTERNAL MEDICINE

## 2017-07-25 PROCEDURE — 85652 RBC SED RATE AUTOMATED: CPT | Performed by: PHYSICIAN ASSISTANT

## 2017-07-25 PROCEDURE — 87086 URINE CULTURE/COLONY COUNT: CPT | Performed by: PHYSICIAN ASSISTANT

## 2017-07-25 PROCEDURE — 36600 WITHDRAWAL OF ARTERIAL BLOOD: CPT | Performed by: PHYSICIAN ASSISTANT

## 2017-07-25 PROCEDURE — 85025 COMPLETE CBC W/AUTO DIFF WBC: CPT | Performed by: PHYSICIAN ASSISTANT

## 2017-07-25 PROCEDURE — 99284 EMERGENCY DEPT VISIT MOD MDM: CPT

## 2017-07-25 PROCEDURE — 70450 CT HEAD/BRAIN W/O DYE: CPT | Performed by: RADIOLOGY

## 2017-07-25 PROCEDURE — 87077 CULTURE AEROBIC IDENTIFY: CPT | Performed by: PHYSICIAN ASSISTANT

## 2017-07-25 PROCEDURE — 36415 COLL VENOUS BLD VENIPUNCTURE: CPT

## 2017-07-25 PROCEDURE — 81001 URINALYSIS AUTO W/SCOPE: CPT | Performed by: PHYSICIAN ASSISTANT

## 2017-07-25 PROCEDURE — 82550 ASSAY OF CK (CPK): CPT | Performed by: PHYSICIAN ASSISTANT

## 2017-07-25 PROCEDURE — 83050 HGB METHEMOGLOBIN QUAN: CPT | Performed by: PHYSICIAN ASSISTANT

## 2017-07-25 PROCEDURE — 83605 ASSAY OF LACTIC ACID: CPT | Performed by: PHYSICIAN ASSISTANT

## 2017-07-25 PROCEDURE — 71010 XR CHEST 1 VW: CPT | Performed by: RADIOLOGY

## 2017-07-25 PROCEDURE — 83880 ASSAY OF NATRIURETIC PEPTIDE: CPT | Performed by: PHYSICIAN ASSISTANT

## 2017-07-25 PROCEDURE — 80053 COMPREHEN METABOLIC PANEL: CPT | Performed by: PHYSICIAN ASSISTANT

## 2017-07-25 RX ORDER — SODIUM CHLORIDE 0.9 % (FLUSH) 0.9 %
10 SYRINGE (ML) INJECTION AS NEEDED
Status: DISCONTINUED | OUTPATIENT
Start: 2017-07-25 | End: 2017-07-28 | Stop reason: HOSPADM

## 2017-07-25 RX ORDER — HYDROCODONE BITARTRATE AND ACETAMINOPHEN 7.5; 325 MG/1; MG/1
1 TABLET ORAL EVERY 8 HOURS PRN
Qty: 90 TABLET | Refills: 0 | Status: SHIPPED | OUTPATIENT
Start: 2017-07-25 | End: 2017-08-23 | Stop reason: SDUPTHER

## 2017-07-25 RX ORDER — METOLAZONE 2.5 MG/1
1.25 TABLET ORAL DAILY
Qty: 30 TABLET | Refills: 0 | Status: SHIPPED | OUTPATIENT
Start: 2017-07-25 | End: 2017-07-28 | Stop reason: HOSPADM

## 2017-07-25 RX ORDER — ESOMEPRAZOLE MAGNESIUM 40 MG/1
40 CAPSULE, DELAYED RELEASE ORAL
Qty: 90 CAPSULE | Refills: 0 | Status: SHIPPED | OUTPATIENT
Start: 2017-07-25 | End: 2018-03-26 | Stop reason: SDUPTHER

## 2017-07-25 RX ADMIN — SODIUM CHLORIDE 1000 ML: 9 INJECTION, SOLUTION INTRAVENOUS at 23:04

## 2017-07-26 PROBLEM — N39.0 UTI (URINARY TRACT INFECTION): Status: ACTIVE | Noted: 2017-07-26

## 2017-07-26 LAB
ALBUMIN SERPL-MCNC: 3 G/DL (ref 3.4–4.8)
ALBUMIN/GLOB SERPL: 0.9 G/DL (ref 1.5–2.5)
ALP SERPL-CCNC: 85 U/L (ref 35–104)
ALT SERPL W P-5'-P-CCNC: 30 U/L (ref 10–36)
AMMONIA BLD-SCNC: 103 UMOL/L (ref 11–51)
ANION GAP SERPL CALCULATED.3IONS-SCNC: 6.8 MMOL/L (ref 3.6–11.2)
AST SERPL-CCNC: 58 U/L (ref 10–30)
BASOPHILS # BLD AUTO: 0.02 10*3/MM3 (ref 0–0.3)
BASOPHILS NFR BLD AUTO: 0.4 % (ref 0–2)
BILIRUB SERPL-MCNC: 1.1 MG/DL (ref 0.2–1.8)
BUN BLD-MCNC: 51 MG/DL (ref 7–21)
BUN/CREAT SERPL: 33.8 (ref 7–25)
CALCIUM SPEC-SCNC: 8.7 MG/DL (ref 7.7–10)
CHLORIDE SERPL-SCNC: 109 MMOL/L (ref 99–112)
CO2 SERPL-SCNC: 26.2 MMOL/L (ref 24.3–31.9)
CREAT BLD-MCNC: 1.51 MG/DL (ref 0.43–1.29)
CRP SERPL-MCNC: 2.04 MG/DL (ref 0–0.99)
DEPRECATED RDW RBC AUTO: 45.9 FL (ref 37–54)
EOSINOPHIL # BLD AUTO: 0.29 10*3/MM3 (ref 0–0.7)
EOSINOPHIL NFR BLD AUTO: 5.5 % (ref 0–7)
ERYTHROCYTE [DISTWIDTH] IN BLOOD BY AUTOMATED COUNT: 14 % (ref 11.5–14.5)
GFR SERPL CREATININE-BSD FRML MDRD: 33 ML/MIN/1.73
GLOBULIN UR ELPH-MCNC: 3.3 GM/DL
GLUCOSE BLD-MCNC: 94 MG/DL (ref 70–110)
HCT VFR BLD AUTO: 35 % (ref 37–47)
HGB BLD-MCNC: 11.9 G/DL (ref 12–16)
IMM GRANULOCYTES # BLD: 0.02 10*3/MM3 (ref 0–0.03)
IMM GRANULOCYTES NFR BLD: 0.4 % (ref 0–0.5)
LYMPHOCYTES # BLD AUTO: 1.42 10*3/MM3 (ref 1–3)
LYMPHOCYTES NFR BLD AUTO: 26.7 % (ref 16–46)
MCH RBC QN AUTO: 32.5 PG (ref 27–33)
MCHC RBC AUTO-ENTMCNC: 34 G/DL (ref 33–37)
MCV RBC AUTO: 95.6 FL (ref 80–94)
MONOCYTES # BLD AUTO: 0.42 10*3/MM3 (ref 0.1–0.9)
MONOCYTES NFR BLD AUTO: 7.9 % (ref 0–12)
NEUTROPHILS # BLD AUTO: 3.14 10*3/MM3 (ref 1.4–6.5)
NEUTROPHILS NFR BLD AUTO: 59.1 % (ref 40–75)
OSMOLALITY SERPL CALC.SUM OF ELEC: 296.6 MOSM/KG (ref 273–305)
PLATELET # BLD AUTO: 124 10*3/MM3 (ref 130–400)
PMV BLD AUTO: 11.4 FL (ref 6–10)
POTASSIUM BLD-SCNC: 3.6 MMOL/L (ref 3.5–5.3)
PROT SERPL-MCNC: 6.3 G/DL (ref 6–8)
RBC # BLD AUTO: 3.66 10*6/MM3 (ref 4.2–5.4)
SODIUM BLD-SCNC: 142 MMOL/L (ref 135–153)
TROPONIN I SERPL-MCNC: 0.01 NG/ML
WBC NRBC COR # BLD: 5.31 10*3/MM3 (ref 4.5–12.5)

## 2017-07-26 PROCEDURE — 80053 COMPREHEN METABOLIC PANEL: CPT | Performed by: HOSPITALIST

## 2017-07-26 PROCEDURE — 94799 UNLISTED PULMONARY SVC/PX: CPT

## 2017-07-26 PROCEDURE — 25010000002 CEFTRIAXONE: Performed by: PHYSICIAN ASSISTANT

## 2017-07-26 PROCEDURE — 84484 ASSAY OF TROPONIN QUANT: CPT | Performed by: PHYSICIAN ASSISTANT

## 2017-07-26 PROCEDURE — 25010000002 CEFTRIAXONE: Performed by: HOSPITALIST

## 2017-07-26 PROCEDURE — 82140 ASSAY OF AMMONIA: CPT | Performed by: HOSPITALIST

## 2017-07-26 PROCEDURE — 86140 C-REACTIVE PROTEIN: CPT | Performed by: HOSPITALIST

## 2017-07-26 PROCEDURE — 99223 1ST HOSP IP/OBS HIGH 75: CPT | Performed by: HOSPITALIST

## 2017-07-26 PROCEDURE — 85025 COMPLETE CBC W/AUTO DIFF WBC: CPT | Performed by: HOSPITALIST

## 2017-07-26 RX ORDER — SODIUM CHLORIDE 0.9 % (FLUSH) 0.9 %
1-10 SYRINGE (ML) INJECTION AS NEEDED
Status: DISCONTINUED | OUTPATIENT
Start: 2017-07-26 | End: 2017-07-28 | Stop reason: HOSPADM

## 2017-07-26 RX ORDER — ONDANSETRON 4 MG/1
4 TABLET, ORALLY DISINTEGRATING ORAL EVERY 8 HOURS PRN
Status: DISCONTINUED | OUTPATIENT
Start: 2017-07-26 | End: 2017-07-28 | Stop reason: HOSPADM

## 2017-07-26 RX ORDER — NITROGLYCERIN 0.4 MG/1
0.4 TABLET SUBLINGUAL
Status: DISCONTINUED | OUTPATIENT
Start: 2017-07-26 | End: 2017-07-26

## 2017-07-26 RX ORDER — NITROGLYCERIN 0.4 MG/1
0.4 TABLET SUBLINGUAL
Status: DISCONTINUED | OUTPATIENT
Start: 2017-07-26 | End: 2017-07-28 | Stop reason: HOSPADM

## 2017-07-26 RX ORDER — LACTULOSE 10 G/15ML
20 SOLUTION ORAL 3 TIMES DAILY
Status: DISCONTINUED | OUTPATIENT
Start: 2017-07-26 | End: 2017-07-28 | Stop reason: HOSPADM

## 2017-07-26 RX ORDER — SODIUM CHLORIDE 9 MG/ML
50 INJECTION, SOLUTION INTRAVENOUS CONTINUOUS
Status: DISCONTINUED | OUTPATIENT
Start: 2017-07-26 | End: 2017-07-27

## 2017-07-26 RX ORDER — FOLIC ACID 1 MG/1
1 TABLET ORAL DAILY
Status: DISCONTINUED | OUTPATIENT
Start: 2017-07-26 | End: 2017-07-28 | Stop reason: HOSPADM

## 2017-07-26 RX ORDER — ALENDRONATE SODIUM 70 MG/1
70 TABLET ORAL
COMMUNITY
End: 2017-08-10 | Stop reason: SDUPTHER

## 2017-07-26 RX ORDER — PANTOPRAZOLE SODIUM 40 MG/1
40 TABLET, DELAYED RELEASE ORAL
Status: DISCONTINUED | OUTPATIENT
Start: 2017-07-26 | End: 2017-07-28 | Stop reason: HOSPADM

## 2017-07-26 RX ORDER — LEVOTHYROXINE SODIUM 0.07 MG/1
75 TABLET ORAL EVERY MORNING
Status: DISCONTINUED | OUTPATIENT
Start: 2017-07-26 | End: 2017-07-28 | Stop reason: HOSPADM

## 2017-07-26 RX ORDER — LOSARTAN POTASSIUM 50 MG/1
50 TABLET ORAL DAILY
Status: CANCELLED | OUTPATIENT
Start: 2017-07-26

## 2017-07-26 RX ORDER — ROPINIROLE 1 MG/1
1 TABLET, FILM COATED ORAL NIGHTLY
Status: DISCONTINUED | OUTPATIENT
Start: 2017-07-26 | End: 2017-07-28 | Stop reason: HOSPADM

## 2017-07-26 RX ORDER — METOLAZONE 2.5 MG/1
1.25 TABLET ORAL DAILY
Status: CANCELLED | OUTPATIENT
Start: 2017-07-26

## 2017-07-26 RX ORDER — HYDROCODONE BITARTRATE AND ACETAMINOPHEN 7.5; 325 MG/1; MG/1
1 TABLET ORAL EVERY 8 HOURS PRN
Status: CANCELLED | OUTPATIENT
Start: 2017-07-26

## 2017-07-26 RX ORDER — ASPIRIN 81 MG/1
81 TABLET ORAL DAILY
Status: DISCONTINUED | OUTPATIENT
Start: 2017-07-26 | End: 2017-07-28 | Stop reason: HOSPADM

## 2017-07-26 RX ORDER — BUMETANIDE 1 MG/1
1 TABLET ORAL 2 TIMES DAILY
Status: CANCELLED | OUTPATIENT
Start: 2017-07-26

## 2017-07-26 RX ORDER — LEVOTHYROXINE SODIUM 0.07 MG/1
75 TABLET ORAL DAILY
COMMUNITY
End: 2017-07-31 | Stop reason: SDUPTHER

## 2017-07-26 RX ADMIN — CEFTRIAXONE 1 G: 1 INJECTION, POWDER, FOR SOLUTION INTRAMUSCULAR; INTRAVENOUS at 20:29

## 2017-07-26 RX ADMIN — ASPIRIN 81 MG: 81 TABLET ORAL at 06:30

## 2017-07-26 RX ADMIN — PANTOPRAZOLE SODIUM 40 MG: 40 TABLET, DELAYED RELEASE ORAL at 06:30

## 2017-07-26 RX ADMIN — SODIUM CHLORIDE 50 ML/HR: 9 INJECTION, SOLUTION INTRAVENOUS at 03:30

## 2017-07-26 RX ADMIN — ROPINIROLE HYDROCHLORIDE 1 MG: 1 TABLET, FILM COATED ORAL at 20:29

## 2017-07-26 RX ADMIN — LACTULOSE 20 G: 20 SOLUTION ORAL at 15:37

## 2017-07-26 RX ADMIN — CEFTRIAXONE 1 G: 1 INJECTION, POWDER, FOR SOLUTION INTRAMUSCULAR; INTRAVENOUS at 00:58

## 2017-07-26 RX ADMIN — LACTULOSE 20 G: 20 SOLUTION ORAL at 20:29

## 2017-07-26 RX ADMIN — LEVOTHYROXINE SODIUM 75 MCG: 75 TABLET ORAL at 06:29

## 2017-07-26 RX ADMIN — RIFAXIMIN 550 MG: 550 TABLET ORAL at 20:29

## 2017-07-26 RX ADMIN — FOLIC ACID 1 MG: 1 TABLET ORAL at 09:22

## 2017-07-26 RX ADMIN — LACTULOSE 20 G: 20 SOLUTION ORAL at 09:22

## 2017-07-26 RX ADMIN — RIFAXIMIN 550 MG: 550 TABLET ORAL at 06:30

## 2017-07-27 LAB
ALBUMIN SERPL-MCNC: 3 G/DL (ref 3.4–4.8)
ALBUMIN/GLOB SERPL: 0.9 G/DL (ref 1.5–2.5)
ALP SERPL-CCNC: 81 U/L (ref 35–104)
ALT SERPL W P-5'-P-CCNC: 34 U/L (ref 10–36)
AMMONIA BLD-SCNC: 61 UMOL/L (ref 11–51)
ANION GAP SERPL CALCULATED.3IONS-SCNC: 8.4 MMOL/L (ref 3.6–11.2)
AST SERPL-CCNC: 57 U/L (ref 10–30)
BASOPHILS # BLD AUTO: 0.03 10*3/MM3 (ref 0–0.3)
BASOPHILS NFR BLD AUTO: 0.6 % (ref 0–2)
BILIRUB SERPL-MCNC: 0.8 MG/DL (ref 0.2–1.8)
BUN BLD-MCNC: 32 MG/DL (ref 7–21)
BUN/CREAT SERPL: 29.4 (ref 7–25)
CALCIUM SPEC-SCNC: 8.5 MG/DL (ref 7.7–10)
CHLORIDE SERPL-SCNC: 116 MMOL/L (ref 99–112)
CO2 SERPL-SCNC: 19.6 MMOL/L (ref 24.3–31.9)
CREAT BLD-MCNC: 1.09 MG/DL (ref 0.43–1.29)
CRP SERPL-MCNC: 1.73 MG/DL (ref 0–0.99)
DEPRECATED RDW RBC AUTO: 50.2 FL (ref 37–54)
EOSINOPHIL # BLD AUTO: 0.36 10*3/MM3 (ref 0–0.7)
EOSINOPHIL NFR BLD AUTO: 6.9 % (ref 0–7)
ERYTHROCYTE [DISTWIDTH] IN BLOOD BY AUTOMATED COUNT: 14.3 % (ref 11.5–14.5)
GFR SERPL CREATININE-BSD FRML MDRD: 47 ML/MIN/1.73
GLOBULIN UR ELPH-MCNC: 3.2 GM/DL
GLUCOSE BLD-MCNC: 84 MG/DL (ref 70–110)
HCT VFR BLD AUTO: 36.6 % (ref 37–47)
HGB BLD-MCNC: 12.1 G/DL (ref 12–16)
IMM GRANULOCYTES # BLD: 0.04 10*3/MM3 (ref 0–0.03)
IMM GRANULOCYTES NFR BLD: 0.8 % (ref 0–0.5)
LYMPHOCYTES # BLD AUTO: 1.85 10*3/MM3 (ref 1–3)
LYMPHOCYTES NFR BLD AUTO: 35.7 % (ref 16–46)
MCH RBC QN AUTO: 33.2 PG (ref 27–33)
MCHC RBC AUTO-ENTMCNC: 33.1 G/DL (ref 33–37)
MCV RBC AUTO: 100.5 FL (ref 80–94)
MONOCYTES # BLD AUTO: 0.53 10*3/MM3 (ref 0.1–0.9)
MONOCYTES NFR BLD AUTO: 10.2 % (ref 0–12)
NEUTROPHILS # BLD AUTO: 2.37 10*3/MM3 (ref 1.4–6.5)
NEUTROPHILS NFR BLD AUTO: 45.8 % (ref 40–75)
OSMOLALITY SERPL CALC.SUM OF ELEC: 292.9 MOSM/KG (ref 273–305)
PLATELET # BLD AUTO: 114 10*3/MM3 (ref 130–400)
PMV BLD AUTO: 11 FL (ref 6–10)
POTASSIUM BLD-SCNC: 3.6 MMOL/L (ref 3.5–5.3)
PROT SERPL-MCNC: 6.2 G/DL (ref 6–8)
RBC # BLD AUTO: 3.64 10*6/MM3 (ref 4.2–5.4)
SODIUM BLD-SCNC: 144 MMOL/L (ref 135–153)
WBC NRBC COR # BLD: 5.18 10*3/MM3 (ref 4.5–12.5)

## 2017-07-27 PROCEDURE — 82140 ASSAY OF AMMONIA: CPT | Performed by: INTERNAL MEDICINE

## 2017-07-27 PROCEDURE — 80053 COMPREHEN METABOLIC PANEL: CPT | Performed by: INTERNAL MEDICINE

## 2017-07-27 PROCEDURE — 85025 COMPLETE CBC W/AUTO DIFF WBC: CPT | Performed by: INTERNAL MEDICINE

## 2017-07-27 PROCEDURE — 99233 SBSQ HOSP IP/OBS HIGH 50: CPT | Performed by: INTERNAL MEDICINE

## 2017-07-27 PROCEDURE — 25010000002 CEFTRIAXONE: Performed by: HOSPITALIST

## 2017-07-27 PROCEDURE — 94799 UNLISTED PULMONARY SVC/PX: CPT

## 2017-07-27 PROCEDURE — 86140 C-REACTIVE PROTEIN: CPT | Performed by: INTERNAL MEDICINE

## 2017-07-27 RX ADMIN — LACTULOSE 20 G: 20 SOLUTION ORAL at 08:43

## 2017-07-27 RX ADMIN — RIFAXIMIN 550 MG: 550 TABLET ORAL at 08:43

## 2017-07-27 RX ADMIN — SODIUM CHLORIDE 50 ML/HR: 9 INJECTION, SOLUTION INTRAVENOUS at 00:50

## 2017-07-27 RX ADMIN — ROPINIROLE HYDROCHLORIDE 1 MG: 1 TABLET, FILM COATED ORAL at 21:01

## 2017-07-27 RX ADMIN — CEFTRIAXONE 1 G: 1 INJECTION, POWDER, FOR SOLUTION INTRAMUSCULAR; INTRAVENOUS at 21:01

## 2017-07-27 RX ADMIN — LACTULOSE 20 G: 20 SOLUTION ORAL at 21:01

## 2017-07-27 RX ADMIN — ASPIRIN 81 MG: 81 TABLET ORAL at 08:43

## 2017-07-27 RX ADMIN — RIFAXIMIN 550 MG: 550 TABLET ORAL at 21:01

## 2017-07-27 RX ADMIN — PANTOPRAZOLE SODIUM 40 MG: 40 TABLET, DELAYED RELEASE ORAL at 06:44

## 2017-07-27 RX ADMIN — LACTULOSE 20 G: 20 SOLUTION ORAL at 15:14

## 2017-07-27 RX ADMIN — FOLIC ACID 1 MG: 1 TABLET ORAL at 08:43

## 2017-07-27 RX ADMIN — LEVOTHYROXINE SODIUM 75 MCG: 75 TABLET ORAL at 06:44

## 2017-07-28 VITALS
DIASTOLIC BLOOD PRESSURE: 51 MMHG | BODY MASS INDEX: 22.47 KG/M2 | TEMPERATURE: 98.7 F | OXYGEN SATURATION: 97 % | WEIGHT: 126.8 LBS | RESPIRATION RATE: 18 BRPM | HEIGHT: 63 IN | SYSTOLIC BLOOD PRESSURE: 110 MMHG | HEART RATE: 54 BPM

## 2017-07-28 LAB
ALBUMIN SERPL-MCNC: 2.8 G/DL (ref 3.4–4.8)
ALBUMIN/GLOB SERPL: 0.8 G/DL (ref 1.5–2.5)
ALP SERPL-CCNC: 80 U/L (ref 35–104)
ALT SERPL W P-5'-P-CCNC: 31 U/L (ref 10–36)
AMMONIA BLD-SCNC: 44 UMOL/L (ref 11–51)
ANION GAP SERPL CALCULATED.3IONS-SCNC: 6.8 MMOL/L (ref 3.6–11.2)
AST SERPL-CCNC: 55 U/L (ref 10–30)
BACTERIA SPEC AEROBE CULT: ABNORMAL
BASOPHILS # BLD AUTO: 0.02 10*3/MM3 (ref 0–0.3)
BASOPHILS NFR BLD AUTO: 0.4 % (ref 0–2)
BILIRUB SERPL-MCNC: 1.1 MG/DL (ref 0.2–1.8)
BUN BLD-MCNC: 18 MG/DL (ref 7–21)
BUN/CREAT SERPL: 20.5 (ref 7–25)
CALCIUM SPEC-SCNC: 8.7 MG/DL (ref 7.7–10)
CHLORIDE SERPL-SCNC: 114 MMOL/L (ref 99–112)
CO2 SERPL-SCNC: 23.2 MMOL/L (ref 24.3–31.9)
CREAT BLD-MCNC: 0.88 MG/DL (ref 0.43–1.29)
CRP SERPL-MCNC: 1.16 MG/DL (ref 0–0.99)
DEPRECATED RDW RBC AUTO: 44.7 FL (ref 37–54)
EOSINOPHIL # BLD AUTO: 0.43 10*3/MM3 (ref 0–0.7)
EOSINOPHIL NFR BLD AUTO: 8.5 % (ref 0–7)
ERYTHROCYTE [DISTWIDTH] IN BLOOD BY AUTOMATED COUNT: 13.7 % (ref 11.5–14.5)
GFR SERPL CREATININE-BSD FRML MDRD: 61 ML/MIN/1.73
GLOBULIN UR ELPH-MCNC: 3.4 GM/DL
GLUCOSE BLD-MCNC: 87 MG/DL (ref 70–110)
HCT VFR BLD AUTO: 34.1 % (ref 37–47)
HGB BLD-MCNC: 11.5 G/DL (ref 12–16)
IMM GRANULOCYTES # BLD: 0.02 10*3/MM3 (ref 0–0.03)
IMM GRANULOCYTES NFR BLD: 0.4 % (ref 0–0.5)
LYMPHOCYTES # BLD AUTO: 1.49 10*3/MM3 (ref 1–3)
LYMPHOCYTES NFR BLD AUTO: 29.6 % (ref 16–46)
MCH RBC QN AUTO: 32.2 PG (ref 27–33)
MCHC RBC AUTO-ENTMCNC: 33.7 G/DL (ref 33–37)
MCV RBC AUTO: 95.5 FL (ref 80–94)
MONOCYTES # BLD AUTO: 0.41 10*3/MM3 (ref 0.1–0.9)
MONOCYTES NFR BLD AUTO: 8.1 % (ref 0–12)
NEUTROPHILS # BLD AUTO: 2.67 10*3/MM3 (ref 1.4–6.5)
NEUTROPHILS NFR BLD AUTO: 53 % (ref 40–75)
OSMOLALITY SERPL CALC.SUM OF ELEC: 288.1 MOSM/KG (ref 273–305)
PLATELET # BLD AUTO: 114 10*3/MM3 (ref 130–400)
PMV BLD AUTO: 11.1 FL (ref 6–10)
POTASSIUM BLD-SCNC: 3.3 MMOL/L (ref 3.5–5.3)
PROT SERPL-MCNC: 6.2 G/DL (ref 6–8)
RBC # BLD AUTO: 3.57 10*6/MM3 (ref 4.2–5.4)
SODIUM BLD-SCNC: 144 MMOL/L (ref 135–153)
WBC NRBC COR # BLD: 5.04 10*3/MM3 (ref 4.5–12.5)

## 2017-07-28 PROCEDURE — 97530 THERAPEUTIC ACTIVITIES: CPT

## 2017-07-28 PROCEDURE — 99239 HOSP IP/OBS DSCHRG MGMT >30: CPT | Performed by: INTERNAL MEDICINE

## 2017-07-28 PROCEDURE — 85025 COMPLETE CBC W/AUTO DIFF WBC: CPT | Performed by: INTERNAL MEDICINE

## 2017-07-28 PROCEDURE — 94799 UNLISTED PULMONARY SVC/PX: CPT

## 2017-07-28 PROCEDURE — 86140 C-REACTIVE PROTEIN: CPT | Performed by: INTERNAL MEDICINE

## 2017-07-28 PROCEDURE — G8978 MOBILITY CURRENT STATUS: HCPCS

## 2017-07-28 PROCEDURE — G8979 MOBILITY GOAL STATUS: HCPCS

## 2017-07-28 PROCEDURE — 97162 PT EVAL MOD COMPLEX 30 MIN: CPT

## 2017-07-28 PROCEDURE — 82140 ASSAY OF AMMONIA: CPT | Performed by: INTERNAL MEDICINE

## 2017-07-28 PROCEDURE — 97116 GAIT TRAINING THERAPY: CPT

## 2017-07-28 PROCEDURE — G8980 MOBILITY D/C STATUS: HCPCS

## 2017-07-28 PROCEDURE — 80053 COMPREHEN METABOLIC PANEL: CPT | Performed by: INTERNAL MEDICINE

## 2017-07-28 RX ORDER — POTASSIUM CHLORIDE 1.5 G/1.77G
40 POWDER, FOR SOLUTION ORAL ONCE
Status: COMPLETED | OUTPATIENT
Start: 2017-07-28 | End: 2017-07-28

## 2017-07-28 RX ORDER — BUMETANIDE 1 MG/1
1 TABLET ORAL
Qty: 60 TABLET | Refills: 3
Start: 2017-07-28 | End: 2017-09-28 | Stop reason: SDUPTHER

## 2017-07-28 RX ORDER — POTASSIUM CHLORIDE 20 MEQ/1
40 TABLET, EXTENDED RELEASE ORAL ONCE
Status: DISCONTINUED | OUTPATIENT
Start: 2017-07-28 | End: 2017-07-28 | Stop reason: SDUPTHER

## 2017-07-28 RX ORDER — CEFPODOXIME PROXETIL 200 MG/1
200 TABLET, FILM COATED ORAL EVERY 12 HOURS
Qty: 8 TABLET | Refills: 0 | Status: SHIPPED | OUTPATIENT
Start: 2017-07-28 | End: 2017-08-01

## 2017-07-28 RX ADMIN — RIFAXIMIN 550 MG: 550 TABLET ORAL at 08:39

## 2017-07-28 RX ADMIN — ASPIRIN 81 MG: 81 TABLET ORAL at 08:39

## 2017-07-28 RX ADMIN — PANTOPRAZOLE SODIUM 40 MG: 40 TABLET, DELAYED RELEASE ORAL at 06:40

## 2017-07-28 RX ADMIN — LACTULOSE 20 G: 20 SOLUTION ORAL at 15:42

## 2017-07-28 RX ADMIN — LEVOTHYROXINE SODIUM 75 MCG: 75 TABLET ORAL at 06:40

## 2017-07-28 RX ADMIN — LACTULOSE 20 G: 20 SOLUTION ORAL at 08:39

## 2017-07-28 RX ADMIN — POTASSIUM CHLORIDE 40 MEQ: 1.5 POWDER, FOR SOLUTION ORAL at 15:42

## 2017-07-28 RX ADMIN — FOLIC ACID 1 MG: 1 TABLET ORAL at 08:39

## 2017-07-30 LAB
BACTERIA SPEC AEROBE CULT: NORMAL
BACTERIA SPEC AEROBE CULT: NORMAL

## 2017-07-31 RX ORDER — LEVOTHYROXINE SODIUM 0.07 MG/1
75 TABLET ORAL DAILY
Qty: 30 TABLET | Refills: 0 | Status: SHIPPED | OUTPATIENT
Start: 2017-07-31 | End: 2017-08-28

## 2017-08-10 ENCOUNTER — OFFICE VISIT (OUTPATIENT)
Dept: CARDIOLOGY | Facility: CLINIC | Age: 82
End: 2017-08-10

## 2017-08-10 VITALS
WEIGHT: 136.4 LBS | DIASTOLIC BLOOD PRESSURE: 54 MMHG | HEIGHT: 63 IN | OXYGEN SATURATION: 92 % | BODY MASS INDEX: 24.17 KG/M2 | SYSTOLIC BLOOD PRESSURE: 118 MMHG | HEART RATE: 69 BPM

## 2017-08-10 DIAGNOSIS — F41.1 GENERALIZED ANXIETY DISORDER: ICD-10-CM

## 2017-08-10 DIAGNOSIS — I10 ESSENTIAL HYPERTENSION: ICD-10-CM

## 2017-08-10 DIAGNOSIS — F11.20 UNCOMPLICATED OPIOID DEPENDENCE (HCC): ICD-10-CM

## 2017-08-10 DIAGNOSIS — E03.4 HYPOTHYROIDISM DUE TO ACQUIRED ATROPHY OF THYROID: Primary | ICD-10-CM

## 2017-08-10 DIAGNOSIS — K74.60 CIRRHOSIS OF LIVER WITHOUT ASCITES, UNSPECIFIED HEPATIC CIRRHOSIS TYPE (HCC): ICD-10-CM

## 2017-08-10 DIAGNOSIS — F11.90 NARCOTIC DRUG USE: ICD-10-CM

## 2017-08-10 DIAGNOSIS — R60.0 BILATERAL LEG EDEMA: ICD-10-CM

## 2017-08-10 DIAGNOSIS — E87.6 HYPOKALEMIA: ICD-10-CM

## 2017-08-10 DIAGNOSIS — D69.6 THROMBOCYTOPENIA (HCC): ICD-10-CM

## 2017-08-10 DIAGNOSIS — D63.8 ANEMIA, CHRONIC DISEASE: ICD-10-CM

## 2017-08-10 DIAGNOSIS — K21.9 GASTROESOPHAGEAL REFLUX DISEASE WITHOUT ESOPHAGITIS: ICD-10-CM

## 2017-08-10 LAB
6-ACETYL MORPHINE: NEGATIVE
AMPHET+METHAMPHET UR QL: NEGATIVE
BARBITURATES UR QL SCN: NEGATIVE
BENZODIAZ UR QL SCN: NEGATIVE
BUPRENORPHINE SERPL-MCNC: NEGATIVE NG/ML
CANNABINOIDS SERPL QL: NEGATIVE
COCAINE UR QL: NEGATIVE
METHADONE UR QL SCN: NEGATIVE
OPIATES UR QL: POSITIVE
OXYCODONE UR QL SCN: NEGATIVE
PCP UR QL SCN: NEGATIVE

## 2017-08-10 PROCEDURE — 80307 DRUG TEST PRSMV CHEM ANLYZR: CPT | Performed by: INTERNAL MEDICINE

## 2017-08-10 PROCEDURE — 99214 OFFICE O/P EST MOD 30 MIN: CPT | Performed by: INTERNAL MEDICINE

## 2017-08-10 RX ORDER — FOLIC ACID 1 MG/1
1 TABLET ORAL DAILY
Qty: 30 TABLET | Refills: 0 | Status: SHIPPED | OUTPATIENT
Start: 2017-08-10 | End: 2018-03-26 | Stop reason: SDUPTHER

## 2017-08-10 RX ORDER — ALENDRONATE SODIUM 70 MG/1
70 TABLET ORAL
Qty: 4 TABLET | Refills: 3 | Status: SHIPPED | OUTPATIENT
Start: 2017-08-10 | End: 2018-03-26

## 2017-08-10 NOTE — PROGRESS NOTES
subjective     Chief Complaint   Patient presents with   • Follow-up   • Hypertension   • Back Pain   • Fatigue     History of Present Illness  Patient was recently in the hospital.  She was admitted with Escherichia coli UTI also had the cirrhosis with hepatic encephalopathy.  Patient was discharged on 7/28/2017 with home health.  Hospital records were reviewed patient's potassium was low on the day of discharge but no correction was made.  Patient complains of being fatigued and tired lack of energy.  She also complains of some ankle edema.  She is taking Bumex 1 mg every 48 hours when necessary.    She has been noncompliant in the past with the rifaximin and lactulose.  She states that she is taking it regularly now.    She still complains of back pain and is taking hydrocodone 7.5 every 8 hours when necessary.  Blood pressure is very well controlled.    Past Surgical History:   Procedure Laterality Date   • BACK SURGERY      KYPHOPLASTIES    • CARDIAC CATHETERIZATION Left 08/2004   • CARDIOVASCULAR STRESS TEST  01/25/2015   • CATARACT EXTRACTION     • CHOLECYSTECTOMY     • CLAVICLE SURGERY Right    • COLONOSCOPY  10/2004   • ECHO - CONVERTED  11/2012   • HIP FRACTURE SURGERY Bilateral     ARTHROPLASTIES    • PARTIAL HYSTERECTOMY     • WRIST FRACTURE SURGERY       Family History   Problem Relation Age of Onset   • Cancer Mother    • Heart disease Father    • Arthritis Sister    • Osteoporosis Sister    • Diabetes Maternal Aunt      Past Medical History:   Diagnosis Date   • Acute renal failure    • Anxiety    • Bell's palsy    • Cellulitis     LLE   • CHF (congestive heart failure)     Diastolic   • Diverticulosis    • GERD (gastroesophageal reflux disease)    • Hepatic cirrhosis    • Hypertension    • Hypothyroidism    • Osteoporosis    • Thrombocytopenia    • Trigeminal neuralgia      Patient Active Problem List   Diagnosis   • Hepatic cirrhosis*   • HTN (hypertension)*   • Anxiety disorder*   • Osteopetrosis*    • Hypothyroidism*   • GERD (gastroesophageal reflux disease)*   • Chronic pain syndrome due to fractured spine and left rib fracture*   • Anemia, chronic disease   • Arthralgia of hip   • Left knee pain   • Fracture of proximal end of tibia and fibula   • Primary insomnia   • Thrombocytopenia   • Renal failure   • Peripheral vascular disease   • Bilateral leg edema   • Cramp of both lower extremities   • Hepatic encephalopathy   • UTI (urinary tract infection)   • Hypokalemia       Social History   Substance Use Topics   • Smoking status: Never Smoker   • Smokeless tobacco: Former User     Types: Snuff     Quit date: 1970   • Alcohol use No      Comment: FORMER 12 BOTTLES PER DAY FOR 15 YEARS       Allergies   Allergen Reactions   • Ciprofloxacin        Current Outpatient Prescriptions on File Prior to Visit   Medication Sig   • aspirin 81 MG tablet Take 1 tablet by mouth Daily.   • bumetanide (BUMEX) 1 MG tablet Take 1 tablet by mouth Every Other Day As Needed (swelling).   • esomeprazole (nexIUM) 40 MG capsule Take 1 capsule by mouth Every Morning Before Breakfast.   • HYDROcodone-acetaminophen (NORCO) 7.5-325 MG per tablet Take 1 tablet by mouth Every 8 (Eight) Hours As Needed for Moderate Pain   • lactulose (CHRONULAC) 10 GM/15ML solution Take 30 mL by mouth 3 (Three) Times a Day.   • levothyroxine (SYNTHROID, LEVOTHROID) 75 MCG tablet Take 1 tablet by mouth Daily.   • nitroglycerin (NITROSTAT) 0.4 MG SL tablet Place 1 tablet under the tongue Every 5 (Five) Minutes As Needed for chest pain. Take no more than 3 doses in 15 minutes.   • ondansetron ODT (ZOFRAN-ODT) 4 MG disintegrating tablet Dissolve 1 tablet on the tongue Every 8 (Eight) Hours As Needed for Nausea or Vomiting.   • PHARMACY MEDS TO BED CONSULT Daily.   • rifaximin (XIFAXAN) 550 MG tablet Take 1 tablet by mouth Every 12 (Twelve) Hours.   • rOPINIRole (REQUIP) 1 MG tablet Take 1 tablet by mouth Every Night. Take 1 hour before bedtime.   •  "[DISCONTINUED] alendronate (FOSAMAX) 70 MG tablet Take 70 mg by mouth Every 7 (Seven) Days. Takes on Mondays.   • [DISCONTINUED] folic acid (FOLVITE) 1 MG tablet TAKE ONE TABLET BY MOUTH DAILY     No current facility-administered medications on file prior to visit.          The following portions of the patient's history were reviewed and updated as appropriate: allergies, current medications, past family history, past medical history, past social history, past surgical history and problem list.    Review of Systems   Constitution: Positive for weakness and malaise/fatigue.   HENT: Negative.  Negative for congestion and headaches.    Eyes: Negative.    Cardiovascular: Positive for leg swelling. Negative for chest pain, cyanosis, dyspnea on exertion, irregular heartbeat, near-syncope, orthopnea, palpitations, paroxysmal nocturnal dyspnea and syncope.   Respiratory: Negative.  Negative for shortness of breath.    Endocrine: Negative.    Hematologic/Lymphatic: Negative.    Skin: Negative.    Musculoskeletal: Positive for back pain, muscle cramps and myalgias.   Gastrointestinal: Negative.    Genitourinary: Negative.    Psychiatric/Behavioral: The patient is nervous/anxious.           Objective:     /54 (BP Location: Left arm, Patient Position: Sitting)  Pulse 69  Ht 63\" (160 cm)  Wt 136 lb 6.4 oz (61.9 kg)  SpO2 92%  BMI 24.16 kg/m2  Physical Exam   Constitutional: She appears well-developed and well-nourished. No distress.   HENT:   Head: Normocephalic and atraumatic.   Mouth/Throat: Oropharynx is clear and moist. No oropharyngeal exudate.   Eyes: Conjunctivae and EOM are normal. Pupils are equal, round, and reactive to light. No scleral icterus.   Neck: Normal range of motion. Neck supple. No JVD present. No tracheal deviation present. No thyromegaly present.   Cardiovascular: Normal rate, regular rhythm, normal heart sounds and intact distal pulses.  PMI is not displaced.  Exam reveals no gallop, no " friction rub and no decreased pulses.    No murmur heard.  Pulses:       Carotid pulses are 3+ on the right side, and 3+ on the left side.       Radial pulses are 3+ on the right side, and 3+ on the left side.   Pulmonary/Chest: Effort normal and breath sounds normal. No respiratory distress. She has no wheezes. She has no rales. She exhibits no tenderness.   Abdominal: Soft. Bowel sounds are normal. She exhibits no distension, no abdominal bruit and no mass. There is no splenomegaly or hepatomegaly. There is no tenderness. There is no rebound and no guarding.   Musculoskeletal: Normal range of motion. She exhibits edema. She exhibits no tenderness or deformity.   Lymphadenopathy:     She has no cervical adenopathy.   Neurological: She is alert. She has normal reflexes. No cranial nerve deficit. She exhibits normal muscle tone. Coordination normal.   Skin: Skin is warm and dry. No rash noted. She is not diaphoretic. No erythema.   Psychiatric: She has a normal mood and affect. Her behavior is normal. Judgment and thought content normal.         Lab Review  Lab Results   Component Value Date     07/28/2017    K 3.3 (L) 07/28/2017     (H) 07/28/2017    BUN 18 07/28/2017    CREATININE 0.88 07/28/2017    GLUCOSE 87 07/28/2017    CALCIUM 8.7 07/28/2017    ALT 31 07/28/2017    ALKPHOS 80 07/28/2017    LABIL2 0.8 (L) 07/28/2017     Lab Results   Component Value Date    CKTOTAL 99 07/25/2017     Lab Results   Component Value Date    WBC 5.04 07/28/2017    HGB 11.5 (L) 07/28/2017    HCT 34.1 (L) 07/28/2017     (L) 07/28/2017     Lab Results   Component Value Date    INR 1.03 01/09/2017    INR 1.12 (H) 12/31/2016    INR 1.07 11/12/2016     Lab Results   Component Value Date    MG 2.2 07/14/2017     Lab Results   Component Value Date    TSH 0.109 (L) 07/13/2017     Lab Results   Component Value Date    BNP 22.0 07/25/2017     Lab Results   Component Value Date    CHOL 186 04/18/2017    CHLPL 161 04/08/2016     TRIG 72 04/18/2017     (H) 04/18/2017    LDLCALC 66 04/18/2017    VLDL 14.4 04/18/2017    LDLHDL 0.62 04/18/2017         Procedures       I personally viewed and interpreted the patient's LAB data         Assessment:     1. Hypothyroidism due to acquired atrophy of thyroid    2. Cirrhosis of liver without ascites, unspecified hepatic cirrhosis type    3. Essential hypertension    4. Anemia, chronic disease    5. Thrombocytopenia    6. Bilateral leg edema    7. Generalized anxiety disorder    8. Gastroesophageal reflux disease without esophagitis    9. Hypokalemia          Plan:      Lab from the hospital reviewed.  Potassium is 3.3 no replacement of potassium was given when she was discharged from the hospital.  Home health referral was arranged patient will have CMP done and then will adjust and replace potassium if needed.  Potassium 3.3 is 2 weeks old and cannot be relied upon at this time.    TSH is 0.109  Thyroid dose needs to be decreased to 50 µg daily.    Back pain is better.  Patient is taking hydrocodone  Urine drug screen was done which is appropriate.    Blood pressure is very well controlled   Follow-up in        No Follow-up on file.

## 2017-08-22 ENCOUNTER — TELEPHONE (OUTPATIENT)
Dept: CARDIOLOGY | Facility: CLINIC | Age: 82
End: 2017-08-22

## 2017-08-22 RX ORDER — LEVOTHYROXINE SODIUM 0.05 MG/1
50 TABLET ORAL DAILY
Qty: 90 TABLET | Refills: 0 | Status: SHIPPED | OUTPATIENT
Start: 2017-08-22 | End: 2018-03-26 | Stop reason: SDUPTHER

## 2017-08-24 RX ORDER — HYDROCODONE BITARTRATE AND ACETAMINOPHEN 7.5; 325 MG/1; MG/1
1 TABLET ORAL EVERY 8 HOURS PRN
Qty: 90 TABLET | Refills: 0 | Status: SHIPPED | OUTPATIENT
Start: 2017-08-24 | End: 2017-09-25 | Stop reason: SDUPTHER

## 2017-08-29 RX ORDER — POTASSIUM CHLORIDE 750 MG/1
10 TABLET, FILM COATED, EXTENDED RELEASE ORAL DAILY
Qty: 90 TABLET | Refills: 0 | OUTPATIENT
Start: 2017-08-29 | End: 2017-09-14 | Stop reason: SDUPTHER

## 2017-08-29 RX ORDER — ROPINIROLE 1 MG/1
1 TABLET, FILM COATED ORAL NIGHTLY
Qty: 30 TABLET | Refills: 0 | OUTPATIENT
Start: 2017-08-29 | End: 2017-09-28 | Stop reason: SDUPTHER

## 2017-09-14 ENCOUNTER — OFFICE VISIT (OUTPATIENT)
Dept: CARDIOLOGY | Facility: CLINIC | Age: 82
End: 2017-09-14

## 2017-09-14 DIAGNOSIS — K74.60 CIRRHOSIS OF LIVER WITHOUT ASCITES, UNSPECIFIED HEPATIC CIRRHOSIS TYPE (HCC): ICD-10-CM

## 2017-09-14 DIAGNOSIS — G89.4 CHRONIC PAIN SYNDROME: ICD-10-CM

## 2017-09-14 DIAGNOSIS — D63.8 ANEMIA, CHRONIC DISEASE: ICD-10-CM

## 2017-09-14 DIAGNOSIS — K76.82 HEPATIC ENCEPHALOPATHY (HCC): ICD-10-CM

## 2017-09-14 DIAGNOSIS — E03.4 HYPOTHYROIDISM DUE TO ACQUIRED ATROPHY OF THYROID: ICD-10-CM

## 2017-09-14 DIAGNOSIS — I73.9 PERIPHERAL VASCULAR DISEASE (HCC): ICD-10-CM

## 2017-09-14 DIAGNOSIS — K21.9 GASTROESOPHAGEAL REFLUX DISEASE WITHOUT ESOPHAGITIS: ICD-10-CM

## 2017-09-14 DIAGNOSIS — I10 ESSENTIAL HYPERTENSION: Primary | ICD-10-CM

## 2017-09-14 DIAGNOSIS — Q78.2 OSTEOPETROSIS: ICD-10-CM

## 2017-09-14 PROCEDURE — 99214 OFFICE O/P EST MOD 30 MIN: CPT | Performed by: INTERNAL MEDICINE

## 2017-09-14 RX ORDER — POTASSIUM CHLORIDE 750 MG/1
10 TABLET, FILM COATED, EXTENDED RELEASE ORAL DAILY
Qty: 90 TABLET | Refills: 0 | Status: SHIPPED | OUTPATIENT
Start: 2017-09-14 | End: 2018-03-26 | Stop reason: SDUPTHER

## 2017-09-14 NOTE — PROGRESS NOTES
subjective     Chief Complaint   Patient presents with   • Follow-up   • Back Pain   • Shoulder Pain   • Hypertension   • Edema     History of Present Illness  Patient is 87 years old white female who has hepatic cirrhosis and multiple admissions to the hospital because of hepatic encephalopathy.  She returned today stating that she is hurting a lot in her back and her ribs and the shoulder.  Patient takes Norco for pain.  She wants more pain medications which was denied.  He was explained to the patient that she has been admitted many times to the hospital with altered mental status confusion and hepatic encephalopathy she cannot be taking nerve and pain medications all the times she should take medication only on when necessary basis and call quantity of medication has to be restricted.    Hypertension  Patient has long-standing history of hypertension.  She is taking Cozaar 50 mg daily blood pressure according to her has been good.    Leg cramps  Patient complains of severe leg cramps at night.  She is taking Requip 1 mg daily but states that that is not enough.  She also has burning in the bottom of both feet.    Anxiety disorder.  Patient complains of anxiety however anxiety medications were discontinued in the past because of her multiple episodes of hepatic encephalopathy.      GERD  Alma Delia Garcia has gastroesophageal reflux disorder however she significantly better on medications. There is no nausea or vomiting. No hematemesis or melena. No abdominal pain. Mild epigastric heartburns are noted. Appetite is good bowel movements are normal.      Hypothyroidism  Alma Delia Garcia has long-standing history of hypothyroidism.she is taking Synthroid. Doing very well. No drug side effects. No change in Weight. No cold intolerance. denies any constipation. No hair loss.   Past Surgical History:   Procedure Laterality Date   • BACK SURGERY      KYPHOPLASTIES    • CARDIAC CATHETERIZATION Left 08/2004   • CARDIOVASCULAR  STRESS TEST  01/25/2015   • CATARACT EXTRACTION     • CHOLECYSTECTOMY     • CLAVICLE SURGERY Right    • COLONOSCOPY  10/2004   • ECHO - CONVERTED  11/2012   • HIP FRACTURE SURGERY Bilateral     ARTHROPLASTIES    • PARTIAL HYSTERECTOMY     • WRIST FRACTURE SURGERY       Family History   Problem Relation Age of Onset   • Cancer Mother    • Heart disease Father    • Arthritis Sister    • Osteoporosis Sister    • Diabetes Maternal Aunt      Past Medical History:   Diagnosis Date   • Acute renal failure    • Anxiety    • Bell's palsy    • Cellulitis     LLE   • CHF (congestive heart failure)     Diastolic   • Diverticulosis    • GERD (gastroesophageal reflux disease)    • Hepatic cirrhosis    • Hypertension    • Hypothyroidism    • Osteoporosis    • Thrombocytopenia    • Trigeminal neuralgia      Patient Active Problem List   Diagnosis   • Hepatic cirrhosis*   • HTN (hypertension)*   • Anxiety disorder*   • Osteopetrosis*   • Hypothyroidism*   • GERD (gastroesophageal reflux disease)*   • Chronic pain syndrome due to fractured spine and left rib fracture*   • Anemia, chronic disease   • Arthralgia of hip   • Left knee pain   • Fracture of proximal end of tibia and fibula   • Primary insomnia   • Thrombocytopenia   • Renal failure   • Peripheral vascular disease   • Bilateral leg edema   • Cramp of both lower extremities   • Hepatic encephalopathy   • UTI (urinary tract infection)   • Hypokalemia       Social History   Substance Use Topics   • Smoking status: Never Smoker   • Smokeless tobacco: Former User     Types: Snuff     Quit date: 1970   • Alcohol use No      Comment: FORMER 12 BOTTLES PER DAY FOR 15 YEARS       Allergies   Allergen Reactions   • Ciprofloxacin        Current Outpatient Prescriptions on File Prior to Visit   Medication Sig   • alendronate (FOSAMAX) 70 MG tablet Take 1 tablet by mouth Every 7 (Seven) Days. Takes on Mondays.   • aspirin 81 MG tablet Take 1 tablet by mouth Daily.   • bumetanide (BUMEX)  1 MG tablet Take 1 tablet by mouth Every Other Day As Needed (swelling).   • esomeprazole (nexIUM) 40 MG capsule Take 1 capsule by mouth Every Morning Before Breakfast.   • folic acid (FOLVITE) 1 MG tablet Take 1 tablet by mouth Daily.   • HYDROcodone-acetaminophen (NORCO) 7.5-325 MG per tablet Take 1 tablet by mouth Every 8 (Eight) Hours As Needed for Moderate Pain   • lactulose (CHRONULAC) 10 GM/15ML solution Take 30 mL by mouth 3 (Three) Times a Day.   • levothyroxine (SYNTHROID) 50 MCG tablet Take 1 tablet by mouth Daily.   • nitroglycerin (NITROSTAT) 0.4 MG SL tablet Place 1 tablet under the tongue Every 5 (Five) Minutes As Needed for chest pain. Take no more than 3 doses in 15 minutes.   • ondansetron ODT (ZOFRAN-ODT) 4 MG disintegrating tablet Dissolve 1 tablet on the tongue Every 8 (Eight) Hours As Needed for Nausea or Vomiting.   • PHARMACY MEDS TO BED CONSULT Daily.   • rifaximin (XIFAXAN) 550 MG tablet Take 1 tablet by mouth Every 12 (Twelve) Hours.   • rOPINIRole (REQUIP) 1 MG tablet Take 1 tablet by mouth Every Night. Take 1 hour before bedtime.     No current facility-administered medications on file prior to visit.          The following portions of the patient's history were reviewed and updated as appropriate: allergies, current medications, past family history, past medical history, past social history, past surgical history and problem list.    Review of Systems   Constitution: Positive for weakness and malaise/fatigue.   HENT: Negative.  Negative for congestion and headaches.    Eyes: Negative.    Cardiovascular: Negative.  Negative for chest pain, cyanosis, dyspnea on exertion, irregular heartbeat, leg swelling, near-syncope, orthopnea, palpitations, paroxysmal nocturnal dyspnea and syncope.   Respiratory: Positive for shortness of breath. Negative for cough, sputum production and wheezing.    Hematologic/Lymphatic: Negative.    Skin: Negative.    Musculoskeletal: Positive for arthritis, back  "pain, muscle weakness, myalgias and stiffness.   Gastrointestinal: Positive for bloating and heartburn.   Genitourinary: Negative.    Neurological: Positive for numbness and paresthesias.   Psychiatric/Behavioral: The patient has insomnia and is nervous/anxious.           Objective:     /80  Pulse 64  Ht 63\" (160 cm)  Wt 144 lb 9.6 oz (65.6 kg)  SpO2 99%  BMI 25.61 kg/m2  Physical Exam   Constitutional: She appears well-developed and well-nourished. No distress.   HENT:   Head: Normocephalic and atraumatic.   Mouth/Throat: Oropharynx is clear and moist. No oropharyngeal exudate.   Eyes: Conjunctivae and EOM are normal. Pupils are equal, round, and reactive to light. No scleral icterus.   Neck: Normal range of motion. Neck supple. No JVD present. No tracheal deviation present. No thyromegaly present.   Cardiovascular: Normal rate, regular rhythm, normal heart sounds and intact distal pulses.  PMI is not displaced.  Exam reveals no gallop, no friction rub and no decreased pulses.    No murmur heard.  Pulses:       Carotid pulses are 3+ on the right side, and 3+ on the left side.       Radial pulses are 3+ on the right side, and 3+ on the left side.   Pulmonary/Chest: Effort normal and breath sounds normal. No respiratory distress. She has no wheezes. She has no rales. She exhibits no tenderness.   Abdominal: Soft. Bowel sounds are normal. She exhibits no distension, no abdominal bruit and no mass. There is no splenomegaly or hepatomegaly. There is no tenderness. There is no rebound and no guarding.   Musculoskeletal: Normal range of motion. She exhibits edema. She exhibits no tenderness or deformity.   Lymphadenopathy:     She has no cervical adenopathy.   Neurological: She is alert. She has normal reflexes. No cranial nerve deficit. She exhibits normal muscle tone. Coordination normal.   Skin: Skin is warm and dry. No rash noted. She is not diaphoretic. No erythema.   Psychiatric: She has a normal mood and " affect. Her behavior is normal. Judgment and thought content normal.         Lab Review    Lab Results   Component Value Date    WBC 5.04 07/28/2017    HGB 11.5 (L) 07/28/2017    HCT 34.1 (L) 07/28/2017     (L) 07/28/2017       Lab Results   Component Value Date    MG 2.2 07/14/2017             Lab Results   Component Value Date    BNP 22.0 07/25/2017     Lab Results   Component Value Date    CHOL 186 04/18/2017    CHLPL 161 04/08/2016    TRIG 72 04/18/2017     (H) 04/18/2017    LDLCALC 66 04/18/2017    VLDL 14.4 04/18/2017    LDL 49 04/08/2016    LDLHDL 0.62 04/18/2017         Procedures       I personally viewed and interpreted the patient's LAB data         Assessment:     1. Essential hypertension    2. Peripheral vascular disease    3. Gastroesophageal reflux disease without esophagitis    4. Cirrhosis of liver without ascites, unspecified hepatic cirrhosis type    5. Hypothyroidism due to acquired atrophy of thyroid    6. Hepatic encephalopathy    7. Osteopetrosis*    8. Anemia, chronic disease    9. Chronic pain syndrome due to fractured spine and left rib fracture*          Plan:      Patient is doing better.  She is awake alert oriented in no acute distress.  She appears lot stronger.  Current medications will be continued.  Her potassium is low potassium replacement was discussed with the patient.    Free T4 is normal and TSH is low.  Synthroid dose will need to be decreased.  Patient will take 50 µg daily.  TSH has been scheduled with the next visit.    Patient wanted more pain medications request was denied.  Reasons were explained to the patient    Patient was also instructed to take his side effects and and the lactulose regularly.  Ammonia level will be checked next visit.    Follow-up scheduled        Return in about 3 months (around 12/14/2017).

## 2017-09-15 VITALS
OXYGEN SATURATION: 99 % | HEART RATE: 64 BPM | BODY MASS INDEX: 25.62 KG/M2 | WEIGHT: 144.6 LBS | SYSTOLIC BLOOD PRESSURE: 142 MMHG | HEIGHT: 63 IN | DIASTOLIC BLOOD PRESSURE: 80 MMHG

## 2017-09-25 RX ORDER — HYDROCODONE BITARTRATE AND ACETAMINOPHEN 7.5; 325 MG/1; MG/1
1 TABLET ORAL EVERY 8 HOURS PRN
Qty: 90 TABLET | Refills: 0 | Status: ON HOLD | OUTPATIENT
Start: 2017-09-25 | End: 2017-10-12

## 2017-09-28 ENCOUNTER — TELEPHONE (OUTPATIENT)
Dept: CARDIOLOGY | Facility: CLINIC | Age: 82
End: 2017-09-28

## 2017-09-28 ENCOUNTER — OFFICE VISIT (OUTPATIENT)
Dept: CARDIOLOGY | Facility: CLINIC | Age: 82
End: 2017-09-28

## 2017-09-28 VITALS
BODY MASS INDEX: 25.69 KG/M2 | SYSTOLIC BLOOD PRESSURE: 142 MMHG | HEART RATE: 78 BPM | OXYGEN SATURATION: 92 % | HEIGHT: 63 IN | DIASTOLIC BLOOD PRESSURE: 78 MMHG | WEIGHT: 145 LBS

## 2017-09-28 DIAGNOSIS — E03.4 HYPOTHYROIDISM DUE TO ACQUIRED ATROPHY OF THYROID: ICD-10-CM

## 2017-09-28 DIAGNOSIS — I50.22 CHRONIC SYSTOLIC HEART FAILURE (HCC): ICD-10-CM

## 2017-09-28 DIAGNOSIS — R60.0 BILATERAL LEG EDEMA: Primary | ICD-10-CM

## 2017-09-28 DIAGNOSIS — E87.6 HYPOKALEMIA: ICD-10-CM

## 2017-09-28 PROCEDURE — 99213 OFFICE O/P EST LOW 20 MIN: CPT | Performed by: INTERNAL MEDICINE

## 2017-09-28 RX ORDER — BUMETANIDE 1 MG/1
1 TABLET ORAL 2 TIMES DAILY
Qty: 60 TABLET | Refills: 3
Start: 2017-09-28 | End: 2018-03-26 | Stop reason: SDUPTHER

## 2017-09-28 RX ORDER — ROPINIROLE 1 MG/1
1 TABLET, FILM COATED ORAL NIGHTLY
Qty: 30 TABLET | Refills: 1 | Status: SHIPPED | OUTPATIENT
Start: 2017-09-28 | End: 2018-03-26 | Stop reason: SDUPTHER

## 2017-09-28 NOTE — TELEPHONE ENCOUNTER
V/O to [facility] at [facility] we need labs drawn tomorrow a BMP only and then next Friday we need a CMP and a BNP drawn.

## 2017-09-28 NOTE — PROGRESS NOTES
subjective     Chief Complaint   Patient presents with   • Fall   • Hip Pain   • Back Pain   • Edema     History of Present Illness  Patient is 87 years old elderly white female who has hepatic cirrhosis .  Patient has marked bilateral lower extremity edema.  She was recently in the hospital with renal failure .  Diuretic therapy was discontinued and then started at low dose.  Currently she is taking Bumex 1 mg every other day.  Ankle edema is getting much worse patient's legs are 4+ edematous with oozing fluid.  Swelling is all the way up to her nevus.  She also complains of difficulty breathing.      Family states the patient does not rest and does things on her own independently.  She again fell on her back.  Fortunately she did not hurt herself.  She was advised not to do anything without assistance.    She is taking pain medications of Norco 7.5 and wants to increase it because of pain but there was denied.      Past Surgical History:   Procedure Laterality Date   • BACK SURGERY      KYPHOPLASTIES    • CARDIAC CATHETERIZATION Left 08/2004   • CARDIOVASCULAR STRESS TEST  01/25/2015   • CATARACT EXTRACTION     • CHOLECYSTECTOMY     • CLAVICLE SURGERY Right    • COLONOSCOPY  10/2004   • ECHO - CONVERTED  11/2012   • HIP FRACTURE SURGERY Bilateral     ARTHROPLASTIES    • PARTIAL HYSTERECTOMY     • WRIST FRACTURE SURGERY       Family History   Problem Relation Age of Onset   • Cancer Mother    • Heart disease Father    • Arthritis Sister    • Osteoporosis Sister    • Diabetes Maternal Aunt      Past Medical History:   Diagnosis Date   • Acute renal failure    • Anxiety    • Bell's palsy    • Cellulitis     LLE   • CHF (congestive heart failure)     Diastolic   • Diverticulosis    • GERD (gastroesophageal reflux disease)    • Hepatic cirrhosis    • Hypertension    • Hypothyroidism    • Osteoporosis    • Thrombocytopenia    • Trigeminal neuralgia      Patient Active Problem List   Diagnosis   • Hepatic cirrhosis*   •  HTN (hypertension)*   • Anxiety disorder*   • Osteopetrosis*   • Hypothyroidism*   • GERD (gastroesophageal reflux disease)*   • Chronic pain syndrome due to fractured spine and left rib fracture*   • Anemia, chronic disease   • Arthralgia of hip   • Left knee pain   • Fracture of proximal end of tibia and fibula   • Primary insomnia   • Thrombocytopenia   • Renal failure   • Peripheral vascular disease   • Bilateral leg edema   • Cramp of both lower extremities   • Hepatic encephalopathy   • UTI (urinary tract infection)   • Hypokalemia   • Chronic systolic heart failure       Social History   Substance Use Topics   • Smoking status: Never Smoker   • Smokeless tobacco: Former User     Types: Snuff     Quit date: 1970   • Alcohol use No      Comment: FORMER 12 BOTTLES PER DAY FOR 15 YEARS       Allergies   Allergen Reactions   • Ciprofloxacin        Current Outpatient Prescriptions on File Prior to Visit   Medication Sig   • alendronate (FOSAMAX) 70 MG tablet Take 1 tablet by mouth Every 7 (Seven) Days. Takes on Mondays.   • aspirin 81 MG tablet Take 1 tablet by mouth Daily.   • esomeprazole (nexIUM) 40 MG capsule Take 1 capsule by mouth Every Morning Before Breakfast.   • folic acid (FOLVITE) 1 MG tablet Take 1 tablet by mouth Daily.   • HYDROcodone-acetaminophen (NORCO) 7.5-325 MG per tablet Take 1 tablet by mouth Every 8 (Eight) Hours As Needed for Moderate Pain   • lactulose (CHRONULAC) 10 GM/15ML solution Take 30 mL by mouth 3 (Three) Times a Day.   • levothyroxine (SYNTHROID) 50 MCG tablet Take 1 tablet by mouth Daily.   • nitroglycerin (NITROSTAT) 0.4 MG SL tablet Place 1 tablet under the tongue Every 5 (Five) Minutes As Needed for chest pain. Take no more than 3 doses in 15 minutes.   • ondansetron ODT (ZOFRAN-ODT) 4 MG disintegrating tablet Dissolve 1 tablet on the tongue Every 8 (Eight) Hours As Needed for Nausea or Vomiting.   • PHARMACY MEDS TO BED CONSULT Daily.   • potassium chloride (K-DUR) 10 MEQ CR  "tablet Take 1 tablet by mouth Daily.   • rifaximin (XIFAXAN) 550 MG tablet Take 1 tablet by mouth Every 12 (Twelve) Hours.   • [DISCONTINUED] bumetanide (BUMEX) 1 MG tablet Take 1 tablet by mouth Every Other Day As Needed (swelling).   • [DISCONTINUED] rOPINIRole (REQUIP) 1 MG tablet Take 1 tablet by mouth Every Night. Take 1 hour before bedtime.     No current facility-administered medications on file prior to visit.          The following portions of the patient's history were reviewed and updated as appropriate: allergies, current medications, past family history, past medical history, past social history, past surgical history and problem list.    Review of Systems   Constitution: Positive for weakness and malaise/fatigue.   Cardiovascular: Positive for dyspnea on exertion and leg swelling.   Respiratory: Positive for shortness of breath.    Skin: Negative.    Musculoskeletal: Positive for arthritis, back pain and myalgias.          Objective:     /68 (BP Location: Left arm, Patient Position: Sitting)  Pulse 78  Ht 63\" (160 cm)  Wt 145 lb (65.8 kg)  SpO2 92%  BMI 25.69 kg/m2  Physical Exam   Constitutional: She appears well-developed and well-nourished. No distress.   HENT:   Head: Normocephalic and atraumatic.   Mouth/Throat: Oropharynx is clear and moist. No oropharyngeal exudate.   Eyes: Conjunctivae and EOM are normal. Pupils are equal, round, and reactive to light. No scleral icterus.   Neck: Normal range of motion. Neck supple. No JVD present. No tracheal deviation present. No thyromegaly present.   Cardiovascular: Normal rate, regular rhythm, normal heart sounds and intact distal pulses.  PMI is not displaced.  Exam reveals no gallop, no friction rub and no decreased pulses.    No murmur heard.  Pulses:       Carotid pulses are 3+ on the right side, and 3+ on the left side.       Radial pulses are 3+ on the right side, and 3+ on the left side.   Pulmonary/Chest: Effort normal and breath sounds " normal. No respiratory distress. She has no wheezes. She has no rales. She exhibits no tenderness.   Abdominal: Soft. Bowel sounds are normal. She exhibits no distension, no abdominal bruit and no mass. There is no splenomegaly or hepatomegaly. There is no tenderness. There is no rebound and no guarding.   Musculoskeletal: Normal range of motion. She exhibits edema. She exhibits no tenderness or deformity.   Lymphadenopathy:     She has no cervical adenopathy.   Neurological: She is alert. She has normal reflexes. No cranial nerve deficit. She exhibits normal muscle tone. Coordination normal.   Skin: Skin is warm and dry. No rash noted. She is not diaphoretic. No erythema.   Psychiatric: She has a normal mood and affect. Her behavior is normal. Judgment and thought content normal.         Lab Review  Lab Results   Component Value Date     07/28/2017    K 3.3 (L) 07/28/2017     (H) 07/28/2017    BUN 18 07/28/2017    CREATININE 0.88 07/28/2017    GLUCOSE 87 07/28/2017    CALCIUM 8.7 07/28/2017    ALT 31 07/28/2017    ALKPHOS 80 07/28/2017    LABIL2 0.8 (L) 07/28/2017     Lab Results   Component Value Date    CKTOTAL 99 07/25/2017     Lab Results   Component Value Date    WBC 5.04 07/28/2017    HGB 11.5 (L) 07/28/2017    HCT 34.1 (L) 07/28/2017     (L) 07/28/2017     Lab Results   Component Value Date    INR 1.03 01/09/2017    INR 1.12 (H) 12/31/2016    INR 1.07 11/12/2016     Lab Results   Component Value Date    MG 2.2 07/14/2017     Lab Results   Component Value Date    TSH 0.109 (L) 07/13/2017     Lab Results   Component Value Date    BNP 22.0 07/25/2017     Lab Results   Component Value Date    CHOL 186 04/18/2017    TRIG 72 04/18/2017     (H) 04/18/2017    LDLCALC 66 04/18/2017    VLDL 14.4 04/18/2017    LDLHDL 0.62 04/18/2017         Procedures       I personally viewed and interpreted the patient's LAB data         Assessment:     1. Bilateral leg edema    2. Hypokalemia    3.  Hypothyroidism due to acquired atrophy of thyroid    4. Chronic systolic heart failure          Plan:     Patient has marked bilateral lower extremity edema with fluid oozing.  Swelling is huge both legs up to mid abdomen.  She is also having some difficulty breathing.    She was advised to take Bumex 1 mg twice a day but only for 2 weeks.  Home health will draw blood on her tomorrow and will check again in a week.  Lab will be checked primarily for kidney functions and electrolytes.  Will also check BNP.    Pain medications were not increased.  Refills were given including Requip for leg cramps.  Follow-up in 2 weeks      Return in about 2 weeks (around 10/12/2017).

## 2017-09-29 ENCOUNTER — LAB REQUISITION (OUTPATIENT)
Dept: LAB | Facility: HOSPITAL | Age: 82
End: 2017-09-29

## 2017-09-29 DIAGNOSIS — N18.30 CHRONIC KIDNEY DISEASE, STAGE III (MODERATE) (HCC): ICD-10-CM

## 2017-09-29 LAB
ANION GAP SERPL CALCULATED.3IONS-SCNC: 6.3 MMOL/L (ref 3.6–11.2)
BUN BLD-MCNC: 16 MG/DL (ref 7–21)
BUN/CREAT SERPL: 20.5 (ref 7–25)
CALCIUM SPEC-SCNC: 9.7 MG/DL (ref 7.7–10)
CHLORIDE SERPL-SCNC: 112 MMOL/L (ref 99–112)
CO2 SERPL-SCNC: 25.7 MMOL/L (ref 24.3–31.9)
CREAT BLD-MCNC: 0.78 MG/DL (ref 0.43–1.29)
GFR SERPL CREATININE-BSD FRML MDRD: 70 ML/MIN/1.73
GLUCOSE BLD-MCNC: 77 MG/DL (ref 70–110)
OSMOLALITY SERPL CALC.SUM OF ELEC: 286.8 MOSM/KG (ref 273–305)
POTASSIUM BLD-SCNC: 4 MMOL/L (ref 3.5–5.3)
SODIUM BLD-SCNC: 144 MMOL/L (ref 135–153)

## 2017-09-29 PROCEDURE — 80048 BASIC METABOLIC PNL TOTAL CA: CPT | Performed by: INTERNAL MEDICINE

## 2017-10-05 ENCOUNTER — LAB REQUISITION (OUTPATIENT)
Dept: LAB | Facility: HOSPITAL | Age: 82
End: 2017-10-05

## 2017-10-05 DIAGNOSIS — I50.9 HEART FAILURE (HCC): ICD-10-CM

## 2017-10-05 LAB
ALBUMIN SERPL-MCNC: 3.4 G/DL (ref 3.4–4.8)
ALBUMIN/GLOB SERPL: 0.9 G/DL (ref 1.5–2.5)
ALP SERPL-CCNC: 151 U/L (ref 35–104)
ALT SERPL W P-5'-P-CCNC: 24 U/L (ref 10–36)
ANION GAP SERPL CALCULATED.3IONS-SCNC: 7.3 MMOL/L (ref 3.6–11.2)
AST SERPL-CCNC: 36 U/L (ref 10–30)
BILIRUB SERPL-MCNC: 0.9 MG/DL (ref 0.2–1.8)
BNP SERPL-MCNC: 11 PG/ML (ref 0–100)
BUN BLD-MCNC: 29 MG/DL (ref 7–21)
BUN/CREAT SERPL: 21.8 (ref 7–25)
CALCIUM SPEC-SCNC: 9 MG/DL (ref 7.7–10)
CHLORIDE SERPL-SCNC: 100 MMOL/L (ref 99–112)
CO2 SERPL-SCNC: 31.7 MMOL/L (ref 24.3–31.9)
CREAT BLD-MCNC: 1.33 MG/DL (ref 0.43–1.29)
GFR SERPL CREATININE-BSD FRML MDRD: 38 ML/MIN/1.73
GLOBULIN UR ELPH-MCNC: 3.7 GM/DL
GLUCOSE BLD-MCNC: 91 MG/DL (ref 70–110)
OSMOLALITY SERPL CALC.SUM OF ELEC: 283 MOSM/KG (ref 273–305)
POTASSIUM BLD-SCNC: 4.4 MMOL/L (ref 3.5–5.3)
PROT SERPL-MCNC: 7.1 G/DL (ref 6–8)
SODIUM BLD-SCNC: 139 MMOL/L (ref 135–153)

## 2017-10-05 PROCEDURE — 80053 COMPREHEN METABOLIC PANEL: CPT | Performed by: INTERNAL MEDICINE

## 2017-10-05 PROCEDURE — 83880 ASSAY OF NATRIURETIC PEPTIDE: CPT | Performed by: INTERNAL MEDICINE

## 2017-10-11 ENCOUNTER — HOSPITAL ENCOUNTER (OUTPATIENT)
Dept: CARDIOLOGY | Facility: HOSPITAL | Age: 82
Discharge: HOME | End: 2017-10-11
Attending: INTERNAL MEDICINE | Admitting: INTERNAL MEDICINE

## 2017-10-11 DIAGNOSIS — R60.0 BILATERAL LEG EDEMA: ICD-10-CM

## 2017-10-11 PROCEDURE — 93306 TTE W/DOPPLER COMPLETE: CPT | Performed by: INTERNAL MEDICINE

## 2017-10-12 ENCOUNTER — HOSPITAL ENCOUNTER (INPATIENT)
Facility: HOSPITAL | Age: 82
LOS: 5 days | Discharge: NURSING, REHAB OR LONG-TERM CARE | End: 2017-10-17
Attending: EMERGENCY MEDICINE | Admitting: INTERNAL MEDICINE

## 2017-10-12 ENCOUNTER — APPOINTMENT (OUTPATIENT)
Dept: GENERAL RADIOLOGY | Facility: HOSPITAL | Age: 82
End: 2017-10-12

## 2017-10-12 ENCOUNTER — ANESTHESIA EVENT (OUTPATIENT)
Dept: PERIOP | Facility: HOSPITAL | Age: 82
End: 2017-10-12

## 2017-10-12 ENCOUNTER — ANESTHESIA (OUTPATIENT)
Dept: PERIOP | Facility: HOSPITAL | Age: 82
End: 2017-10-12

## 2017-10-12 DIAGNOSIS — S72.402A CLOSED FRACTURE OF DISTAL END OF LEFT FEMUR, UNSPECIFIED FRACTURE MORPHOLOGY, INITIAL ENCOUNTER (HCC): Primary | ICD-10-CM

## 2017-10-12 DIAGNOSIS — S72.492A OTHER CLOSED FRACTURE OF DISTAL END OF LEFT FEMUR, INITIAL ENCOUNTER (HCC): ICD-10-CM

## 2017-10-12 LAB
ABO GROUP BLD: NORMAL
ALBUMIN SERPL-MCNC: 2.7 G/DL (ref 3.4–4.8)
ALBUMIN SERPL-MCNC: 3 G/DL (ref 3.4–4.8)
ALBUMIN/GLOB SERPL: 0.8 G/DL (ref 1.5–2.5)
ALBUMIN/GLOB SERPL: 0.8 G/DL (ref 1.5–2.5)
ALP SERPL-CCNC: 113 U/L (ref 35–104)
ALP SERPL-CCNC: 119 U/L (ref 35–104)
ALT SERPL W P-5'-P-CCNC: 23 U/L (ref 10–36)
ALT SERPL W P-5'-P-CCNC: 26 U/L (ref 10–36)
AMMONIA BLD-SCNC: 76 UMOL/L (ref 11–51)
ANION GAP SERPL CALCULATED.3IONS-SCNC: 7.3 MMOL/L (ref 3.6–11.2)
ANION GAP SERPL CALCULATED.3IONS-SCNC: 8.1 MMOL/L (ref 3.6–11.2)
AST SERPL-CCNC: 50 U/L (ref 10–30)
AST SERPL-CCNC: 55 U/L (ref 10–30)
BACTERIA UR QL AUTO: ABNORMAL /HPF
BASOPHILS # BLD AUTO: 0.02 10*3/MM3 (ref 0–0.3)
BASOPHILS # BLD AUTO: 0.02 10*3/MM3 (ref 0–0.3)
BASOPHILS NFR BLD AUTO: 0.3 % (ref 0–2)
BASOPHILS NFR BLD AUTO: 0.3 % (ref 0–2)
BH CV ECHO MEAS - % IVS THICK: 55.2 %
BH CV ECHO MEAS - % LVPW THICK: 10.3 %
BH CV ECHO MEAS - ACS: 1.5 CM
BH CV ECHO MEAS - AI DEC SLOPE: 234.8 CM/SEC^2
BH CV ECHO MEAS - AI MAX PG: 61.1 MMHG
BH CV ECHO MEAS - AI MAX VEL: 390.9 CM/SEC
BH CV ECHO MEAS - AI P1/2T: 487.6 MSEC
BH CV ECHO MEAS - AO MAX PG (FULL): 3.8 MMHG
BH CV ECHO MEAS - AO MAX PG: 8.8 MMHG
BH CV ECHO MEAS - AO MEAN PG (FULL): 2.1 MMHG
BH CV ECHO MEAS - AO MEAN PG: 4.4 MMHG
BH CV ECHO MEAS - AO ROOT AREA (BSA CORRECTED): 1.9
BH CV ECHO MEAS - AO ROOT AREA: 7.7 CM^2
BH CV ECHO MEAS - AO ROOT DIAM: 3.1 CM
BH CV ECHO MEAS - AO V2 MAX: 148 CM/SEC
BH CV ECHO MEAS - AO V2 MEAN: 97.3 CM/SEC
BH CV ECHO MEAS - AO V2 VTI: 33.6 CM
BH CV ECHO MEAS - AVA(I,A): 2.1 CM^2
BH CV ECHO MEAS - AVA(I,D): 2.1 CM^2
BH CV ECHO MEAS - AVA(V,A): 1.9 CM^2
BH CV ECHO MEAS - AVA(V,D): 1.9 CM^2
BH CV ECHO MEAS - BSA(HAYCOCK): 1.7 M^2
BH CV ECHO MEAS - BSA: 1.7 M^2
BH CV ECHO MEAS - BZI_BMI: 26.9 KILOGRAMS/M^2
BH CV ECHO MEAS - BZI_METRIC_HEIGHT: 157.5 CM
BH CV ECHO MEAS - BZI_METRIC_WEIGHT: 66.7 KG
BH CV ECHO MEAS - EDV(CUBED): 63.7 ML
BH CV ECHO MEAS - EDV(TEICH): 69.7 ML
BH CV ECHO MEAS - EF(CUBED): 77.4 %
BH CV ECHO MEAS - EF(TEICH): 70.2 %
BH CV ECHO MEAS - ESV(CUBED): 14.4 ML
BH CV ECHO MEAS - ESV(TEICH): 20.8 ML
BH CV ECHO MEAS - FS: 39.1 %
BH CV ECHO MEAS - IVS/LVPW: 1
BH CV ECHO MEAS - IVSD: 1.3 CM
BH CV ECHO MEAS - IVSS: 2 CM
BH CV ECHO MEAS - LA DIMENSION: 3.9 CM
BH CV ECHO MEAS - LA/AO: 1.2
BH CV ECHO MEAS - LV MASS(C)D: 177.3 GRAMS
BH CV ECHO MEAS - LV MASS(C)DI: 105.7 GRAMS/M^2
BH CV ECHO MEAS - LV MASS(C)S: 148.8 GRAMS
BH CV ECHO MEAS - LV MASS(C)SI: 88.7 GRAMS/M^2
BH CV ECHO MEAS - LV MAX PG: 5 MMHG
BH CV ECHO MEAS - LV MEAN PG: 2.3 MMHG
BH CV ECHO MEAS - LV V1 MAX: 111.6 CM/SEC
BH CV ECHO MEAS - LV V1 MEAN: 69.5 CM/SEC
BH CV ECHO MEAS - LV V1 VTI: 28.6 CM
BH CV ECHO MEAS - LVIDD: 4 CM
BH CV ECHO MEAS - LVIDS: 2.4 CM
BH CV ECHO MEAS - LVOT AREA (M): 2.5 CM^2
BH CV ECHO MEAS - LVOT AREA: 2.5 CM^2
BH CV ECHO MEAS - LVOT DIAM: 1.8 CM
BH CV ECHO MEAS - LVPWD: 1.3 CM
BH CV ECHO MEAS - LVPWS: 1.4 CM
BH CV ECHO MEAS - MV A MAX VEL: 91.8 CM/SEC
BH CV ECHO MEAS - MV DEC SLOPE: 274.6 CM/SEC^2
BH CV ECHO MEAS - MV E MAX VEL: 72.1 CM/SEC
BH CV ECHO MEAS - MV E/A: 0.78
BH CV ECHO MEAS - MV P1/2T MAX VEL: 72.1 CM/SEC
BH CV ECHO MEAS - MV P1/2T: 76.9 MSEC
BH CV ECHO MEAS - MVA P1/2T LCG: 3.1 CM^2
BH CV ECHO MEAS - MVA(P1/2T): 2.9 CM^2
BH CV ECHO MEAS - RAP SYSTOLE: 10 MMHG
BH CV ECHO MEAS - RVSP: 32.8 MMHG
BH CV ECHO MEAS - SI(AO): 153.7 ML/M^2
BH CV ECHO MEAS - SI(CUBED): 29.4 ML/M^2
BH CV ECHO MEAS - SI(LVOT): 42.3 ML/M^2
BH CV ECHO MEAS - SI(TEICH): 29.2 ML/M^2
BH CV ECHO MEAS - SV(AO): 257.7 ML
BH CV ECHO MEAS - SV(CUBED): 49.3 ML
BH CV ECHO MEAS - SV(LVOT): 70.9 ML
BH CV ECHO MEAS - SV(TEICH): 48.9 ML
BH CV ECHO MEAS - TR MAX VEL: 238.9 CM/SEC
BILIRUB SERPL-MCNC: 0.9 MG/DL (ref 0.2–1.8)
BILIRUB SERPL-MCNC: 1 MG/DL (ref 0.2–1.8)
BILIRUB UR QL STRIP: NEGATIVE
BLD GP AB SCN SERPL QL: NEGATIVE
BUN BLD-MCNC: 18 MG/DL (ref 7–21)
BUN BLD-MCNC: 18 MG/DL (ref 7–21)
BUN/CREAT SERPL: 18.8 (ref 7–25)
BUN/CREAT SERPL: 21.4 (ref 7–25)
CALCIUM SPEC-SCNC: 9 MG/DL (ref 7.7–10)
CALCIUM SPEC-SCNC: 9.3 MG/DL (ref 7.7–10)
CHLORIDE SERPL-SCNC: 103 MMOL/L (ref 99–112)
CHLORIDE SERPL-SCNC: 105 MMOL/L (ref 99–112)
CLARITY UR: ABNORMAL
CO2 SERPL-SCNC: 27.9 MMOL/L (ref 24.3–31.9)
CO2 SERPL-SCNC: 29.7 MMOL/L (ref 24.3–31.9)
COLOR UR: ABNORMAL
CREAT BLD-MCNC: 0.84 MG/DL (ref 0.43–1.29)
CREAT BLD-MCNC: 0.96 MG/DL (ref 0.43–1.29)
DEPRECATED RDW RBC AUTO: 48 FL (ref 37–54)
DEPRECATED RDW RBC AUTO: 48.3 FL (ref 37–54)
DEPRECATED RDW RBC AUTO: 48.9 FL (ref 37–54)
EOSINOPHIL # BLD AUTO: 0.06 10*3/MM3 (ref 0–0.7)
EOSINOPHIL # BLD AUTO: 0.08 10*3/MM3 (ref 0–0.7)
EOSINOPHIL NFR BLD AUTO: 1 % (ref 0–7)
EOSINOPHIL NFR BLD AUTO: 1.4 % (ref 0–7)
ERYTHROCYTE [DISTWIDTH] IN BLOOD BY AUTOMATED COUNT: 14.4 % (ref 11.5–14.5)
ERYTHROCYTE [DISTWIDTH] IN BLOOD BY AUTOMATED COUNT: 14.6 % (ref 11.5–14.5)
ERYTHROCYTE [DISTWIDTH] IN BLOOD BY AUTOMATED COUNT: 14.6 % (ref 11.5–14.5)
GFR SERPL CREATININE-BSD FRML MDRD: 55 ML/MIN/1.73
GFR SERPL CREATININE-BSD FRML MDRD: 64 ML/MIN/1.73
GLOBULIN UR ELPH-MCNC: 3.3 GM/DL
GLOBULIN UR ELPH-MCNC: 3.6 GM/DL
GLUCOSE BLD-MCNC: 112 MG/DL (ref 70–110)
GLUCOSE BLD-MCNC: 144 MG/DL (ref 70–110)
GLUCOSE UR STRIP-MCNC: NEGATIVE MG/DL
HCT VFR BLD AUTO: 18.2 % (ref 37–47)
HCT VFR BLD AUTO: 27.1 % (ref 37–47)
HCT VFR BLD AUTO: 31.1 % (ref 37–47)
HCT VFR BLD AUTO: 32.5 % (ref 37–47)
HGB BLD-MCNC: 10.8 G/DL (ref 12–16)
HGB BLD-MCNC: 5.8 G/DL (ref 12–16)
HGB BLD-MCNC: 9.1 G/DL (ref 12–16)
HGB BLD-MCNC: 9.9 G/DL (ref 12–16)
HGB UR QL STRIP.AUTO: NEGATIVE
HYALINE CASTS UR QL AUTO: ABNORMAL /LPF
IMM GRANULOCYTES # BLD: 0.02 10*3/MM3 (ref 0–0.03)
IMM GRANULOCYTES # BLD: 0.03 10*3/MM3 (ref 0–0.03)
IMM GRANULOCYTES NFR BLD: 0.3 % (ref 0–0.5)
IMM GRANULOCYTES NFR BLD: 0.5 % (ref 0–0.5)
INR PPP: 1.17 (ref 0.9–1.1)
INR PPP: 2.04 (ref 0.9–1.1)
KETONES UR QL STRIP: ABNORMAL
LEUKOCYTE ESTERASE UR QL STRIP.AUTO: ABNORMAL
LYMPHOCYTES # BLD AUTO: 0.7 10*3/MM3 (ref 1–3)
LYMPHOCYTES # BLD AUTO: 0.86 10*3/MM3 (ref 1–3)
LYMPHOCYTES NFR BLD AUTO: 11.9 % (ref 16–46)
LYMPHOCYTES NFR BLD AUTO: 14.8 % (ref 16–46)
MAGNESIUM SERPL-MCNC: 1.7 MG/DL (ref 1.7–2.6)
MCH RBC QN AUTO: 30.9 PG (ref 27–33)
MCH RBC QN AUTO: 31.4 PG (ref 27–33)
MCH RBC QN AUTO: 31.8 PG (ref 27–33)
MCHC RBC AUTO-ENTMCNC: 31.8 G/DL (ref 33–37)
MCHC RBC AUTO-ENTMCNC: 31.9 G/DL (ref 33–37)
MCHC RBC AUTO-ENTMCNC: 33.2 G/DL (ref 33–37)
MCV RBC AUTO: 95.6 FL (ref 80–94)
MCV RBC AUTO: 97.2 FL (ref 80–94)
MCV RBC AUTO: 98.4 FL (ref 80–94)
MONOCYTES # BLD AUTO: 0.61 10*3/MM3 (ref 0.1–0.9)
MONOCYTES # BLD AUTO: 0.78 10*3/MM3 (ref 0.1–0.9)
MONOCYTES NFR BLD AUTO: 10.4 % (ref 0–12)
MONOCYTES NFR BLD AUTO: 13.4 % (ref 0–12)
NEUTROPHILS # BLD AUTO: 4.06 10*3/MM3 (ref 1.4–6.5)
NEUTROPHILS # BLD AUTO: 4.45 10*3/MM3 (ref 1.4–6.5)
NEUTROPHILS NFR BLD AUTO: 70 % (ref 40–75)
NEUTROPHILS NFR BLD AUTO: 75.7 % (ref 40–75)
NITRITE UR QL STRIP: POSITIVE
OSMOLALITY SERPL CALC.SUM OF ELEC: 283.8 MOSM/KG (ref 273–305)
OSMOLALITY SERPL CALC.SUM OF ELEC: 283.9 MOSM/KG (ref 273–305)
PH UR STRIP.AUTO: 5.5 [PH] (ref 5–8)
PLATELET # BLD AUTO: 103 10*3/MM3 (ref 130–400)
PLATELET # BLD AUTO: 122 10*3/MM3 (ref 130–400)
PLATELET # BLD AUTO: 80 10*3/MM3 (ref 130–400)
PMV BLD AUTO: 10.3 FL (ref 6–10)
PMV BLD AUTO: 10.5 FL (ref 6–10)
PMV BLD AUTO: 10.9 FL (ref 6–10)
POTASSIUM BLD-SCNC: 3.1 MMOL/L (ref 3.5–5.3)
POTASSIUM BLD-SCNC: 3.5 MMOL/L (ref 3.5–5.3)
PROT SERPL-MCNC: 6 G/DL (ref 6–8)
PROT SERPL-MCNC: 6.6 G/DL (ref 6–8)
PROT UR QL STRIP: NEGATIVE
PROTHROMBIN TIME: 15 SECONDS (ref 11–15.4)
PROTHROMBIN TIME: 23.3 SECONDS (ref 11–15.4)
RBC # BLD AUTO: 1.85 10*6/MM3 (ref 4.2–5.4)
RBC # BLD AUTO: 3.2 10*6/MM3 (ref 4.2–5.4)
RBC # BLD AUTO: 3.4 10*6/MM3 (ref 4.2–5.4)
RBC # UR: ABNORMAL /HPF
REF LAB TEST METHOD: ABNORMAL
RH BLD: POSITIVE
SODIUM BLD-SCNC: 140 MMOL/L (ref 135–153)
SODIUM BLD-SCNC: 141 MMOL/L (ref 135–153)
SP GR UR STRIP: 1.02 (ref 1–1.03)
SQUAMOUS #/AREA URNS HPF: ABNORMAL /HPF
TROPONIN I SERPL-MCNC: <0.006 NG/ML
TSH SERPL DL<=0.05 MIU/L-ACNC: 1.77 MIU/ML (ref 0.55–4.78)
UROBILINOGEN UR QL STRIP: ABNORMAL
WBC NRBC COR # BLD: 5.81 10*3/MM3 (ref 4.5–12.5)
WBC NRBC COR # BLD: 5.88 10*3/MM3 (ref 4.5–12.5)
WBC NRBC COR # BLD: 9.73 10*3/MM3 (ref 4.5–12.5)
WBC UR QL AUTO: ABNORMAL /HPF

## 2017-10-12 PROCEDURE — 25010000002 PROPOFOL 10 MG/ML EMULSION: Performed by: NURSE ANESTHETIST, CERTIFIED REGISTERED

## 2017-10-12 PROCEDURE — P9046 ALBUMIN (HUMAN), 25%, 20 ML: HCPCS | Performed by: ANESTHESIOLOGY

## 2017-10-12 PROCEDURE — 80053 COMPREHEN METABOLIC PANEL: CPT | Performed by: EMERGENCY MEDICINE

## 2017-10-12 PROCEDURE — 86900 BLOOD TYPING SEROLOGIC ABO: CPT

## 2017-10-12 PROCEDURE — 87086 URINE CULTURE/COLONY COUNT: CPT | Performed by: EMERGENCY MEDICINE

## 2017-10-12 PROCEDURE — C1713 ANCHOR/SCREW BN/BN,TIS/BN: HCPCS | Performed by: ORTHOPAEDIC SURGERY

## 2017-10-12 PROCEDURE — 87186 SC STD MICRODIL/AGAR DIL: CPT | Performed by: EMERGENCY MEDICINE

## 2017-10-12 PROCEDURE — L1830 KO IMMOB CANVAS LONG PRE OTS: HCPCS | Performed by: ORTHOPAEDIC SURGERY

## 2017-10-12 PROCEDURE — 84484 ASSAY OF TROPONIN QUANT: CPT | Performed by: EMERGENCY MEDICINE

## 2017-10-12 PROCEDURE — 82140 ASSAY OF AMMONIA: CPT | Performed by: INTERNAL MEDICINE

## 2017-10-12 PROCEDURE — 83735 ASSAY OF MAGNESIUM: CPT | Performed by: INTERNAL MEDICINE

## 2017-10-12 PROCEDURE — 86850 RBC ANTIBODY SCREEN: CPT | Performed by: EMERGENCY MEDICINE

## 2017-10-12 PROCEDURE — 71010 HC CHEST PA OR AP: CPT

## 2017-10-12 PROCEDURE — 71010 XR CHEST 1 VW: CPT | Performed by: RADIOLOGY

## 2017-10-12 PROCEDURE — 93010 ELECTROCARDIOGRAM REPORT: CPT | Performed by: INTERNAL MEDICINE

## 2017-10-12 PROCEDURE — 85014 HEMATOCRIT: CPT | Performed by: ORTHOPAEDIC SURGERY

## 2017-10-12 PROCEDURE — 94799 UNLISTED PULMONARY SVC/PX: CPT

## 2017-10-12 PROCEDURE — 85027 COMPLETE CBC AUTOMATED: CPT | Performed by: ORTHOPAEDIC SURGERY

## 2017-10-12 PROCEDURE — 85025 COMPLETE CBC W/AUTO DIFF WBC: CPT | Performed by: EMERGENCY MEDICINE

## 2017-10-12 PROCEDURE — 80053 COMPREHEN METABOLIC PANEL: CPT | Performed by: INTERNAL MEDICINE

## 2017-10-12 PROCEDURE — 25010000002 HYDROMORPHONE PER 4 MG: Performed by: EMERGENCY MEDICINE

## 2017-10-12 PROCEDURE — 99284 EMERGENCY DEPT VISIT MOD MDM: CPT

## 2017-10-12 PROCEDURE — 25010000002 CEFTRIAXONE: Performed by: INTERNAL MEDICINE

## 2017-10-12 PROCEDURE — 85610 PROTHROMBIN TIME: CPT | Performed by: ORTHOPAEDIC SURGERY

## 2017-10-12 PROCEDURE — 0QS904Z REPOSITION LEFT FEMORAL SHAFT WITH INTERNAL FIXATION DEVICE, OPEN APPROACH: ICD-10-PCS | Performed by: ORTHOPAEDIC SURGERY

## 2017-10-12 PROCEDURE — 25010000003 CEFAZOLIN PER 500 MG: Performed by: PHYSICIAN ASSISTANT

## 2017-10-12 PROCEDURE — 86920 COMPATIBILITY TEST SPIN: CPT

## 2017-10-12 PROCEDURE — 85025 COMPLETE CBC W/AUTO DIFF WBC: CPT | Performed by: INTERNAL MEDICINE

## 2017-10-12 PROCEDURE — 27511 TREATMENT OF THIGH FRACTURE: CPT | Performed by: ORTHOPAEDIC SURGERY

## 2017-10-12 PROCEDURE — 86901 BLOOD TYPING SEROLOGIC RH(D): CPT | Performed by: EMERGENCY MEDICINE

## 2017-10-12 PROCEDURE — 73552 X-RAY EXAM OF FEMUR 2/>: CPT | Performed by: RADIOLOGY

## 2017-10-12 PROCEDURE — 85610 PROTHROMBIN TIME: CPT | Performed by: EMERGENCY MEDICINE

## 2017-10-12 PROCEDURE — P9016 RBC LEUKOCYTES REDUCED: HCPCS

## 2017-10-12 PROCEDURE — 25010000002 MORPHINE (PF) 10 MG/ML SOLUTION: Performed by: NURSE ANESTHETIST, CERTIFIED REGISTERED

## 2017-10-12 PROCEDURE — 36430 TRANSFUSION BLD/BLD COMPNT: CPT

## 2017-10-12 PROCEDURE — 73552 X-RAY EXAM OF FEMUR 2/>: CPT

## 2017-10-12 PROCEDURE — P9612 CATHETERIZE FOR URINE SPEC: HCPCS

## 2017-10-12 PROCEDURE — 25010000002 MORPHINE PER 10 MG: Performed by: INTERNAL MEDICINE

## 2017-10-12 PROCEDURE — 99221 1ST HOSP IP/OBS SF/LOW 40: CPT | Performed by: PHYSICIAN ASSISTANT

## 2017-10-12 PROCEDURE — 36415 COLL VENOUS BLD VENIPUNCTURE: CPT

## 2017-10-12 PROCEDURE — 25010000003 POTASSIUM CHLORIDE 10 MEQ/100ML SOLUTION: Performed by: INTERNAL MEDICINE

## 2017-10-12 PROCEDURE — 85018 HEMOGLOBIN: CPT | Performed by: ORTHOPAEDIC SURGERY

## 2017-10-12 PROCEDURE — 84443 ASSAY THYROID STIM HORMONE: CPT | Performed by: INTERNAL MEDICINE

## 2017-10-12 PROCEDURE — 93005 ELECTROCARDIOGRAM TRACING: CPT | Performed by: INTERNAL MEDICINE

## 2017-10-12 PROCEDURE — 25010000002 ALBUMIN HUMAN 25% PER 50 ML: Performed by: ANESTHESIOLOGY

## 2017-10-12 PROCEDURE — 81001 URINALYSIS AUTO W/SCOPE: CPT | Performed by: EMERGENCY MEDICINE

## 2017-10-12 PROCEDURE — 86900 BLOOD TYPING SEROLOGIC ABO: CPT | Performed by: EMERGENCY MEDICINE

## 2017-10-12 PROCEDURE — 76000 FLUOROSCOPY <1 HR PHYS/QHP: CPT

## 2017-10-12 PROCEDURE — 87077 CULTURE AEROBIC IDENTIFY: CPT | Performed by: EMERGENCY MEDICINE

## 2017-10-12 PROCEDURE — 25010000002 PHENYLEPHRINE PER 1 ML: Performed by: NURSE ANESTHETIST, CERTIFIED REGISTERED

## 2017-10-12 PROCEDURE — 99223 1ST HOSP IP/OBS HIGH 75: CPT | Performed by: INTERNAL MEDICINE

## 2017-10-12 DEVICE — IMPLANTABLE DEVICE: Type: IMPLANTABLE DEVICE | Site: FEMUR | Status: FUNCTIONAL

## 2017-10-12 DEVICE — SCRW VA LK ST STRDRV 5X60MM: Type: IMPLANTABLE DEVICE | Site: FEMUR | Status: FUNCTIONAL

## 2017-10-12 DEVICE — SCRW VA LK ST STRDRV 5X70MM: Type: IMPLANTABLE DEVICE | Site: FEMUR | Status: FUNCTIONAL

## 2017-10-12 DEVICE — PIN CERCLG POSITION THRD 4.5MM STRL: Type: IMPLANTABLE DEVICE | Site: FEMUR | Status: FUNCTIONAL

## 2017-10-12 DEVICE — CABL W CR1.7MM 750ML STRL: Type: IMPLANTABLE DEVICE | Site: FEMUR | Status: FUNCTIONAL

## 2017-10-12 DEVICE — DBM T44150 10CC ORTHOBLEND SMALL DEFGRAF
Type: IMPLANTABLE DEVICE | Site: FEMUR | Status: FUNCTIONAL
Brand: GRAFTON®AND GRAFTON PLUS®DEMINERALIZED BONE MATRIX (DBM)

## 2017-10-12 RX ORDER — HYDROCODONE BITARTRATE AND ACETAMINOPHEN 7.5; 325 MG/1; MG/1
1 TABLET ORAL EVERY 8 HOURS PRN
Status: CANCELLED | OUTPATIENT
Start: 2017-10-12

## 2017-10-12 RX ORDER — BUMETANIDE 1 MG/1
1 TABLET ORAL 2 TIMES DAILY
Status: CANCELLED | OUTPATIENT
Start: 2017-10-12

## 2017-10-12 RX ORDER — PANTOPRAZOLE SODIUM 40 MG/1
40 TABLET, DELAYED RELEASE ORAL EVERY MORNING
Status: DISCONTINUED | OUTPATIENT
Start: 2017-10-13 | End: 2017-10-17 | Stop reason: HOSPADM

## 2017-10-12 RX ORDER — ROPINIROLE 1 MG/1
1 TABLET, FILM COATED ORAL NIGHTLY
Status: DISCONTINUED | OUTPATIENT
Start: 2017-10-12 | End: 2017-10-17 | Stop reason: HOSPADM

## 2017-10-12 RX ORDER — SODIUM CHLORIDE 0.9 % (FLUSH) 0.9 %
1-10 SYRINGE (ML) INJECTION AS NEEDED
Status: DISCONTINUED | OUTPATIENT
Start: 2017-10-12 | End: 2017-10-17 | Stop reason: HOSPADM

## 2017-10-12 RX ORDER — PANTOPRAZOLE SODIUM 40 MG/1
40 TABLET, DELAYED RELEASE ORAL EVERY MORNING
Status: CANCELLED | OUTPATIENT
Start: 2017-10-12

## 2017-10-12 RX ORDER — HYDROMORPHONE HYDROCHLORIDE 1 MG/ML
0.5 INJECTION, SOLUTION INTRAMUSCULAR; INTRAVENOUS; SUBCUTANEOUS ONCE
Status: COMPLETED | OUTPATIENT
Start: 2017-10-12 | End: 2017-10-12

## 2017-10-12 RX ORDER — POTASSIUM CHLORIDE 750 MG/1
10 TABLET, FILM COATED, EXTENDED RELEASE ORAL DAILY
Status: CANCELLED | OUTPATIENT
Start: 2017-10-12

## 2017-10-12 RX ORDER — CALCIUM CHLORIDE 100 MG/ML
INJECTION INTRAVENOUS; INTRAVENTRICULAR AS NEEDED
Status: DISCONTINUED | OUTPATIENT
Start: 2017-10-12 | End: 2017-10-12 | Stop reason: SURG

## 2017-10-12 RX ORDER — NALOXONE HCL 0.4 MG/ML
0.4 VIAL (ML) INJECTION
Status: DISCONTINUED | OUTPATIENT
Start: 2017-10-12 | End: 2017-10-13

## 2017-10-12 RX ORDER — SODIUM CHLORIDE 9 MG/ML
INJECTION, SOLUTION INTRAVENOUS CONTINUOUS PRN
Status: DISCONTINUED | OUTPATIENT
Start: 2017-10-12 | End: 2017-10-12 | Stop reason: SURG

## 2017-10-12 RX ORDER — SODIUM CHLORIDE 0.9 % (FLUSH) 0.9 %
1-10 SYRINGE (ML) INJECTION AS NEEDED
Status: DISCONTINUED | OUTPATIENT
Start: 2017-10-12 | End: 2017-10-12 | Stop reason: HOSPADM

## 2017-10-12 RX ORDER — FOLIC ACID 1 MG/1
1 TABLET ORAL DAILY
Status: DISCONTINUED | OUTPATIENT
Start: 2017-10-12 | End: 2017-10-17 | Stop reason: HOSPADM

## 2017-10-12 RX ORDER — BUPIVACAINE HYDROCHLORIDE AND EPINEPHRINE 5; 5 MG/ML; UG/ML
INJECTION, SOLUTION PERINEURAL AS NEEDED
Status: DISCONTINUED | OUTPATIENT
Start: 2017-10-12 | End: 2017-10-12 | Stop reason: HOSPADM

## 2017-10-12 RX ORDER — NITROGLYCERIN 0.4 MG/1
0.4 TABLET SUBLINGUAL
Status: CANCELLED | OUTPATIENT
Start: 2017-10-12

## 2017-10-12 RX ORDER — NITROGLYCERIN 0.4 MG/1
0.4 TABLET SUBLINGUAL
Status: DISCONTINUED | OUTPATIENT
Start: 2017-10-12 | End: 2017-10-17 | Stop reason: HOSPADM

## 2017-10-12 RX ORDER — LEVOTHYROXINE SODIUM 0.05 MG/1
50 TABLET ORAL DAILY
Status: DISCONTINUED | OUTPATIENT
Start: 2017-10-12 | End: 2017-10-17 | Stop reason: HOSPADM

## 2017-10-12 RX ORDER — BUMETANIDE 1 MG/1
1 TABLET ORAL 2 TIMES DAILY
Status: DISCONTINUED | OUTPATIENT
Start: 2017-10-12 | End: 2017-10-17 | Stop reason: HOSPADM

## 2017-10-12 RX ORDER — ALBUMIN (HUMAN) 12.5 G/50ML
25 SOLUTION INTRAVENOUS
Status: COMPLETED | OUTPATIENT
Start: 2017-10-12 | End: 2017-10-12

## 2017-10-12 RX ORDER — PROPOFOL 10 MG/ML
VIAL (ML) INTRAVENOUS AS NEEDED
Status: DISCONTINUED | OUTPATIENT
Start: 2017-10-12 | End: 2017-10-12 | Stop reason: SURG

## 2017-10-12 RX ORDER — POTASSIUM CHLORIDE 7.45 MG/ML
10 INJECTION INTRAVENOUS
Status: DISCONTINUED | OUTPATIENT
Start: 2017-10-12 | End: 2017-10-17 | Stop reason: HOSPADM

## 2017-10-12 RX ORDER — ASPIRIN 81 MG/1
81 TABLET ORAL DAILY
Status: CANCELLED | OUTPATIENT
Start: 2017-10-12

## 2017-10-12 RX ORDER — HYDROCODONE BITARTRATE AND ACETAMINOPHEN 7.5; 325 MG/1; MG/1
1 TABLET ORAL EVERY 4 HOURS PRN
Status: DISCONTINUED | OUTPATIENT
Start: 2017-10-12 | End: 2017-10-17 | Stop reason: HOSPADM

## 2017-10-12 RX ORDER — SENNA AND DOCUSATE SODIUM 50; 8.6 MG/1; MG/1
2 TABLET, FILM COATED ORAL 2 TIMES DAILY
Status: DISCONTINUED | OUTPATIENT
Start: 2017-10-12 | End: 2017-10-17 | Stop reason: HOSPADM

## 2017-10-12 RX ORDER — IPRATROPIUM BROMIDE AND ALBUTEROL SULFATE 2.5; .5 MG/3ML; MG/3ML
3 SOLUTION RESPIRATORY (INHALATION) ONCE AS NEEDED
Status: DISCONTINUED | OUTPATIENT
Start: 2017-10-12 | End: 2017-10-12 | Stop reason: HOSPADM

## 2017-10-12 RX ORDER — LEVOTHYROXINE SODIUM 0.05 MG/1
50 TABLET ORAL DAILY
Status: CANCELLED | OUTPATIENT
Start: 2017-10-12

## 2017-10-12 RX ORDER — MORPHINE SULFATE 10 MG/ML
INJECTION, SOLUTION INTRAMUSCULAR; INTRAVENOUS AS NEEDED
Status: DISCONTINUED | OUTPATIENT
Start: 2017-10-12 | End: 2017-10-12 | Stop reason: SURG

## 2017-10-12 RX ORDER — SODIUM CHLORIDE, SODIUM LACTATE, POTASSIUM CHLORIDE, CALCIUM CHLORIDE 600; 310; 30; 20 MG/100ML; MG/100ML; MG/100ML; MG/100ML
125 INJECTION, SOLUTION INTRAVENOUS CONTINUOUS
Status: DISCONTINUED | OUTPATIENT
Start: 2017-10-12 | End: 2017-10-12

## 2017-10-12 RX ORDER — ONDANSETRON 2 MG/ML
4 INJECTION INTRAMUSCULAR; INTRAVENOUS EVERY 6 HOURS PRN
Status: DISCONTINUED | OUTPATIENT
Start: 2017-10-12 | End: 2017-10-17 | Stop reason: HOSPADM

## 2017-10-12 RX ORDER — POTASSIUM CHLORIDE 750 MG/1
10 TABLET, FILM COATED, EXTENDED RELEASE ORAL DAILY
Status: DISCONTINUED | OUTPATIENT
Start: 2017-10-12 | End: 2017-10-17 | Stop reason: HOSPADM

## 2017-10-12 RX ORDER — ASPIRIN 81 MG/1
81 TABLET ORAL DAILY
Status: DISCONTINUED | OUTPATIENT
Start: 2017-10-13 | End: 2017-10-17 | Stop reason: HOSPADM

## 2017-10-12 RX ORDER — POTASSIUM CHLORIDE 7.45 MG/ML
10 INJECTION INTRAVENOUS
Status: DISCONTINUED | OUTPATIENT
Start: 2017-10-12 | End: 2017-10-12

## 2017-10-12 RX ORDER — ROCURONIUM BROMIDE 10 MG/ML
INJECTION, SOLUTION INTRAVENOUS AS NEEDED
Status: DISCONTINUED | OUTPATIENT
Start: 2017-10-12 | End: 2017-10-12 | Stop reason: SURG

## 2017-10-12 RX ORDER — ROPINIROLE 1 MG/1
1 TABLET, FILM COATED ORAL NIGHTLY
Status: CANCELLED | OUTPATIENT
Start: 2017-10-12

## 2017-10-12 RX ORDER — FOLIC ACID 1 MG/1
1 TABLET ORAL DAILY
Status: CANCELLED | OUTPATIENT
Start: 2017-10-12

## 2017-10-12 RX ORDER — MORPHINE SULFATE 2 MG/ML
1 INJECTION, SOLUTION INTRAMUSCULAR; INTRAVENOUS EVERY 4 HOURS PRN
Status: DISCONTINUED | OUTPATIENT
Start: 2017-10-12 | End: 2017-10-13

## 2017-10-12 RX ORDER — BISACODYL 10 MG
10 SUPPOSITORY, RECTAL RECTAL DAILY PRN
Status: DISCONTINUED | OUTPATIENT
Start: 2017-10-12 | End: 2017-10-17 | Stop reason: HOSPADM

## 2017-10-12 RX ORDER — ONDANSETRON 2 MG/ML
4 INJECTION INTRAMUSCULAR; INTRAVENOUS ONCE AS NEEDED
Status: DISCONTINUED | OUTPATIENT
Start: 2017-10-12 | End: 2017-10-12 | Stop reason: HOSPADM

## 2017-10-12 RX ADMIN — MORPHINE SULFATE 1 MG: 2 INJECTION, SOLUTION INTRAMUSCULAR; INTRAVENOUS at 04:15

## 2017-10-12 RX ADMIN — MORPHINE SULFATE 3 MG: 10 INJECTION, SOLUTION INTRAMUSCULAR; INTRAVENOUS at 13:05

## 2017-10-12 RX ADMIN — MORPHINE SULFATE 2 MG: 10 INJECTION, SOLUTION INTRAMUSCULAR; INTRAVENOUS at 15:15

## 2017-10-12 RX ADMIN — ROCURONIUM BROMIDE 15 MG: 10 INJECTION INTRAVENOUS at 13:39

## 2017-10-12 RX ADMIN — POTASSIUM CHLORIDE 10 MEQ: 7.46 INJECTION, SOLUTION INTRAVENOUS at 07:31

## 2017-10-12 RX ADMIN — SODIUM CHLORIDE, POTASSIUM CHLORIDE, SODIUM LACTATE AND CALCIUM CHLORIDE: 600; 310; 30; 20 INJECTION, SOLUTION INTRAVENOUS at 15:50

## 2017-10-12 RX ADMIN — PHENYLEPHRINE HYDROCHLORIDE 100 MCG: 10 INJECTION, SOLUTION INTRAMUSCULAR; INTRAVENOUS; SUBCUTANEOUS at 13:20

## 2017-10-12 RX ADMIN — ROCURONIUM BROMIDE 10 MG: 10 INJECTION INTRAVENOUS at 14:01

## 2017-10-12 RX ADMIN — POTASSIUM CHLORIDE 10 MEQ: 7.46 INJECTION, SOLUTION INTRAVENOUS at 08:29

## 2017-10-12 RX ADMIN — SODIUM CHLORIDE: 9 INJECTION, SOLUTION INTRAVENOUS at 15:45

## 2017-10-12 RX ADMIN — PROPOFOL 80 MG: 10 INJECTION, EMULSION INTRAVENOUS at 12:58

## 2017-10-12 RX ADMIN — SODIUM CHLORIDE: 9 INJECTION, SOLUTION INTRAVENOUS at 16:20

## 2017-10-12 RX ADMIN — PHENYLEPHRINE HYDROCHLORIDE 200 MCG: 10 INJECTION, SOLUTION INTRAMUSCULAR; INTRAVENOUS; SUBCUTANEOUS at 15:39

## 2017-10-12 RX ADMIN — CEFAZOLIN SODIUM 2 G: 2 SOLUTION INTRAVENOUS at 13:01

## 2017-10-12 RX ADMIN — MORPHINE SULFATE 3 MG: 10 INJECTION, SOLUTION INTRAMUSCULAR; INTRAVENOUS at 13:23

## 2017-10-12 RX ADMIN — PHENYLEPHRINE HYDROCHLORIDE 100 MCG: 10 INJECTION, SOLUTION INTRAMUSCULAR; INTRAVENOUS; SUBCUTANEOUS at 14:50

## 2017-10-12 RX ADMIN — PHENYLEPHRINE HYDROCHLORIDE: 10 INJECTION INTRAVENOUS at 15:55

## 2017-10-12 RX ADMIN — PHENYLEPHRINE HYDROCHLORIDE 100 MCG: 10 INJECTION, SOLUTION INTRAMUSCULAR; INTRAVENOUS; SUBCUTANEOUS at 14:12

## 2017-10-12 RX ADMIN — PHENYLEPHRINE HYDROCHLORIDE 100 MCG: 10 INJECTION, SOLUTION INTRAMUSCULAR; INTRAVENOUS; SUBCUTANEOUS at 14:25

## 2017-10-12 RX ADMIN — SODIUM CHLORIDE, POTASSIUM CHLORIDE, SODIUM LACTATE AND CALCIUM CHLORIDE: 600; 310; 30; 20 INJECTION, SOLUTION INTRAVENOUS at 12:55

## 2017-10-12 RX ADMIN — PHENYLEPHRINE HYDROCHLORIDE 100 MCG: 10 INJECTION, SOLUTION INTRAMUSCULAR; INTRAVENOUS; SUBCUTANEOUS at 14:55

## 2017-10-12 RX ADMIN — ROPINIROLE HYDROCHLORIDE 1 MG: 1 TABLET, FILM COATED ORAL at 20:50

## 2017-10-12 RX ADMIN — MORPHINE SULFATE 2 MG: 10 INJECTION, SOLUTION INTRAMUSCULAR; INTRAVENOUS at 13:39

## 2017-10-12 RX ADMIN — CEFTRIAXONE 1 G: 1 INJECTION, POWDER, FOR SOLUTION INTRAMUSCULAR; INTRAVENOUS at 10:47

## 2017-10-12 RX ADMIN — RIFAXIMIN 550 MG: 550 TABLET ORAL at 20:50

## 2017-10-12 RX ADMIN — PHENYLEPHRINE HYDROCHLORIDE 100 MCG: 10 INJECTION, SOLUTION INTRAMUSCULAR; INTRAVENOUS; SUBCUTANEOUS at 15:10

## 2017-10-12 RX ADMIN — PHENYLEPHRINE HYDROCHLORIDE 100 MCG: 10 INJECTION, SOLUTION INTRAMUSCULAR; INTRAVENOUS; SUBCUTANEOUS at 15:05

## 2017-10-12 RX ADMIN — PHENYLEPHRINE HYDROCHLORIDE 100 MCG: 10 INJECTION, SOLUTION INTRAMUSCULAR; INTRAVENOUS; SUBCUTANEOUS at 15:13

## 2017-10-12 RX ADMIN — SODIUM CHLORIDE 500 ML: 9 INJECTION, SOLUTION INTRAVENOUS at 02:34

## 2017-10-12 RX ADMIN — SODIUM CHLORIDE, POTASSIUM CHLORIDE, SODIUM LACTATE AND CALCIUM CHLORIDE: 600; 310; 30; 20 INJECTION, SOLUTION INTRAVENOUS at 13:45

## 2017-10-12 RX ADMIN — POTASSIUM CHLORIDE 10 MEQ: 7.46 INJECTION, SOLUTION INTRAVENOUS at 11:02

## 2017-10-12 RX ADMIN — ROCURONIUM BROMIDE 10 MG: 10 INJECTION INTRAVENOUS at 13:47

## 2017-10-12 RX ADMIN — HYDROMORPHONE HYDROCHLORIDE 0.5 MG: 1 INJECTION, SOLUTION INTRAMUSCULAR; INTRAVENOUS; SUBCUTANEOUS at 02:35

## 2017-10-12 RX ADMIN — POTASSIUM CHLORIDE 10 MEQ: 7.46 INJECTION, SOLUTION INTRAVENOUS at 06:15

## 2017-10-12 RX ADMIN — PHENYLEPHRINE HYDROCHLORIDE 100 MCG: 10 INJECTION, SOLUTION INTRAMUSCULAR; INTRAVENOUS; SUBCUTANEOUS at 14:02

## 2017-10-12 RX ADMIN — PHENYLEPHRINE HYDROCHLORIDE 100 MCG: 10 INJECTION, SOLUTION INTRAMUSCULAR; INTRAVENOUS; SUBCUTANEOUS at 14:35

## 2017-10-12 RX ADMIN — PHENYLEPHRINE HYDROCHLORIDE 100 MCG: 10 INJECTION, SOLUTION INTRAMUSCULAR; INTRAVENOUS; SUBCUTANEOUS at 15:20

## 2017-10-12 RX ADMIN — PHENYLEPHRINE HYDROCHLORIDE 200 MCG: 10 INJECTION, SOLUTION INTRAMUSCULAR; INTRAVENOUS; SUBCUTANEOUS at 15:35

## 2017-10-12 RX ADMIN — ALBUMIN HUMAN: 0.25 SOLUTION INTRAVENOUS at 15:53

## 2017-10-12 RX ADMIN — PHENYLEPHRINE HYDROCHLORIDE 200 MCG: 10 INJECTION, SOLUTION INTRAMUSCULAR; INTRAVENOUS; SUBCUTANEOUS at 15:28

## 2017-10-12 RX ADMIN — PHENYLEPHRINE HYDROCHLORIDE 100 MCG: 10 INJECTION, SOLUTION INTRAMUSCULAR; INTRAVENOUS; SUBCUTANEOUS at 14:59

## 2017-10-12 RX ADMIN — POTASSIUM CHLORIDE 10 MEQ: 7.46 INJECTION, SOLUTION INTRAVENOUS at 09:49

## 2017-10-12 RX ADMIN — CALCIUM CHLORIDE 0.3 G: 100 INJECTION, SOLUTION INTRAVENOUS at 17:35

## 2017-10-12 RX ADMIN — ALBUMIN HUMAN: 0.25 SOLUTION INTRAVENOUS at 15:56

## 2017-10-12 NOTE — ED NOTES
Multiple attempts at IV access without success, provider notified.      Mitzi Richards RN  10/12/17 0734

## 2017-10-12 NOTE — ED NOTES
Patient presents to the ER [facility] after having a fall at home. Patient states that she has been unsteady and falling a lot recently, states that she was getting up and lost her balance and fell backwards onto her back, patient denies hitting her head. Patient states that she has pain to left leg, patient has noted swelling and deformity to left thigh area, patient has immobilizer to left leg placed by EMS, left on per MD. Patient has less than 3 second capillary refill in bilateral lower extremities as well as strong bilateral doralis pedal pulses.      Mitzi Richards RN  10/12/17 0719

## 2017-10-12 NOTE — ED PROVIDER NOTES
Subjective   Patient is a 87 y.o. female presenting with fall.   Fall   Mechanism of injury: fall    Fall:     Fall occurred:  Standing    Impact surface:  Hard floor    Point of impact:  Unable to specify  Suspicion of alcohol use: no    Suspicion of drug use: no    Prior to arrival data:     Bystander interventions:  None  Associated symptoms: no abdominal pain and no chest pain        Review of Systems   Constitutional: Negative.  Negative for fever.   HENT: Negative.    Respiratory: Negative.    Cardiovascular: Negative.  Negative for chest pain.   Gastrointestinal: Negative.  Negative for abdominal pain.   Endocrine: Negative.    Genitourinary: Negative.  Negative for dysuria.   Musculoskeletal:        Pain left upper leg   Skin: Negative.    Neurological: Negative.    Psychiatric/Behavioral: Negative.    All other systems reviewed and are negative.      Past Medical History:   Diagnosis Date   • Acute renal failure    • Anxiety    • Bell's palsy    • Cellulitis     LLE   • CHF (congestive heart failure)     Diastolic   • Diverticulosis    • GERD (gastroesophageal reflux disease)    • Hepatic cirrhosis    • History of transfusion    • Hypertension    • Hypothyroidism    • Osteoporosis    • Thrombocytopenia    • Trigeminal neuralgia        Allergies   Allergen Reactions   • Ciprofloxacin        Past Surgical History:   Procedure Laterality Date   • BACK SURGERY      KYPHOPLASTIES    • CARDIAC CATHETERIZATION Left 08/2004   • CARDIOVASCULAR STRESS TEST  01/25/2015   • CATARACT EXTRACTION     • CHOLECYSTECTOMY     • CLAVICLE SURGERY Right    • COLONOSCOPY  10/2004   • ECHO - CONVERTED  11/2012   • FEMUR OPEN REDUCTION INTERNAL FIXATION Left 10/12/2017    Procedure: FEMUR OPEN REDUCTION INTERNAL FIXATION;  Surgeon: Karson Pierre MD;  Location: [facility];  Service:    • HIP FRACTURE SURGERY Bilateral     ARTHROPLASTIES    • PARTIAL HYSTERECTOMY     • WRIST FRACTURE SURGERY         Family History    Problem Relation Age of Onset   • Cancer Mother    • Heart disease Father    • Arthritis Sister    • Osteoporosis Sister    • Diabetes Maternal Aunt        Social History     Social History   • Marital status:      Spouse name: N/A   • Number of children: N/A   • Years of education: N/A     Social History Main Topics   • Smoking status: Never Smoker   • Smokeless tobacco: Former User     Types: Snuff     Quit date: 1970   • Alcohol use No      Comment: FORMER 12 BOTTLES PER DAY FOR 15 YEARS   • Drug use: No   • Sexual activity: Defer     Other Topics Concern   • None     Social History Narrative    LIVES WITH GRANDDAUGHTER DEVON IN [place]            Objective   Physical Exam   Constitutional: She is oriented to person, place, and time. She appears well-developed and well-nourished. No distress.   HENT:   Head: Normocephalic and atraumatic.   Right Ear: External ear normal.   Left Ear: External ear normal.   Nose: Nose normal.   Eyes: Conjunctivae and EOM are normal. Pupils are equal, round, and reactive to light.   Neck: Normal range of motion. Neck supple. No JVD present. No tracheal deviation present.   Cardiovascular: Normal rate, regular rhythm and normal heart sounds.    No murmur heard.  Pulmonary/Chest: Effort normal and breath sounds normal. No respiratory distress. She has no wheezes.   Abdominal: Soft. Bowel sounds are normal. There is no tenderness.   Musculoskeletal: Normal range of motion. She exhibits no edema or deformity.   Left leg shortened and externally rotated   Neurological: She is alert and oriented to person, place, and time. No cranial nerve deficit.   Skin: Skin is warm and dry. No rash noted. She is not diaphoretic. No erythema. No pallor.   Psychiatric: She has a normal mood and affect. Her behavior is normal. Thought content normal.   Nursing note and vitals reviewed.      Procedures         ED Course  ED Course                  MDM    Final diagnoses:   Closed fracture of  distal end of left femur, unspecified fracture morphology, initial encounter            Rex Adam MD  10/20/17 0661

## 2017-10-12 NOTE — ED NOTES
Patient being transported to the floor at this time by  tech. Patient's fluids continue infusing, IV remains intact and patent, patient is alert and oriented x4, respirations are even and unlabored, NADN.     Mitzi Richards RN  10/12/17 0941

## 2017-10-12 NOTE — OP NOTE
FEMUR OPEN REDUCTION INTERNAL FIXATION  Procedure Report    Patient Name:  Alma Delia Garcia  YOB: 1930    Date of Surgery:  10/12/2017     Indications:  87-year-old white female who fell at home yesterday suffering a distal third supracondylar periprosthetic femur fracture displaced around a long stemmed hip prosthesis.  Situation discussed with the patient is recommended we try to open reduction internal fixation of this.  The risk and benefits were discussed including infection, bleeding, failure of healing, need for future surgery, failure to achieve previous level function loss of life etc.  The severity of the injury was discussed with patient who seemed to understand it.  She wished to proceed with.  Patient was evaluated by medical service preoperatively and felt to be a moderate risk.    Pre-op Diagnosis:   Other closed fracture of distal end of left femur, initial encounter [S72.492A]       Post-Op Diagnosis Codes:     * Other closed fracture of distal end of left femur, initial encounter [S72.492A]    Procedure/CPT® Codes:      Procedure(s):  FEMUR OPEN REDUCTION INTERNAL FIXATION    Staff:  Surgeon(s):  Karson Pierre MD    Assistant: Mayito Arriola; Doug Riley    Anesthesia: Choice    Estimated Blood Loss: 1250 mL    Implants:      Implant Name Type Inv. Item Serial No.  Lot No. LRB No. Used   PIN CERCLG POSITION THRD 4.5MM STRL - [id number] Implant PIN CERCLG POSITION THRD 4.5MM STRL  [employer] [id number] Left 1   PIN CERCLG POSITION THRD 4.5MM STRL - [id number] Implant PIN CERCLG POSITION THRD 4.5MM STRL  [employer] [id number] Left 1   PIN CERCLG POSITION THRD 4.5MM STRL - [id number] Implant PIN CERCLG POSITION THRD 4.5MM STRL  [employer] [id number] Left 1   PIN CERCLG POSITION THRD 4.5MM STRL - [id number] Implant PIN CERCLG POSITION THRD 4.5MM STRL  [employer] [id number] Left 1   PIN CERCLG POSITION THRD 4.5MM STRL - [id number] Implant PIN CERCLG POSITION  THRD 4.5MM STRL  [employer] [id number] Left 1   PIN CERCLG POSITION THRD 4.5MM STRL - [id number] Implant PIN CERCLG POSITION THRD 4.5MM STRL  [employer] [id number] Left 1   PIN CERCLG POSITION THRD 4.5MM STRL - [id number] Implant PIN CERCLG POSITION THRD 4.5MM STRL  DEPU[employer] [id number] Left 1   PIN CERCLG POSITION THRD 4.5MM STRL - [id number] Implant PIN CERCLG POSITION THRD 4.5MM STRL  DEPU[employer] SY[employer]ES [id number] Left 1   CABL W CR1.7MM 750ML STRL - [id number] Implant CABL W CR1.7MM 750ML STRL  DEPU[employer] [id number] Left 1   CABL W CR1.7MM 750ML STRL - [id number] Implant CABL W CR1.7MM 750ML STRL  DEPU[employer] SY[employer]ES [id number] Left 1   CABL W CR1.7MM 750ML STRL - [id number] Implant CABL W CR1.7MM 750ML STRL  DEPU[employer] [id number] Left 1   CABL W CR1.7MM 750ML STRL - [id number] Implant CABL W CR1.7MM 750ML STRL  DEPUY SYN[employer]ES [id number] Left 1   CABL W CR1.7MM 750ML STRL - [id number] Implant CABL W CR1.7MM 750ML STRL  DEPUY SYN[employer]ES [id number] Left 1   CABL W CR1.7MM 750ML STRL - [id number] Implant CABL W CR1.7MM 750ML STRL  DEPU[employer]ES [id number] Left 1   CABL W CR1.7MM 750ML STRL - [id number] Implant CABL W CR1.7MM 750ML STRL  DEPU[employer] SYN[employer]ES [id number] Left 1   CABL W CR1.7MM 750ML STRL - [id number] Implant CABL W CR1.7MM 750ML STRL  DEPU[employer] [id number] Left 1   CABL W CR1.7MM 750ML STRL - [id number] Implant CABL W CR1.7MM 750ML STRL  DEPU[employer] [id number] Left 1   CABL W CR1.7MM 750ML STRL - [id number] Implant CABL W CR1.7MM 750ML STRL  DEPU[employer] [id number] Left 1   CABL W CR1.7MM 750ML STRL - [id number] Implant CABL W CR1.7MM 750ML STRL  DEPUY SYNTHES [id number] Left 1   PIN CERCLG POSITION THRD 4.5MM STRL - [id number] Implant PIN CERCLG POSITION THRD 4.5MM STRL  [employer] [id number] Left 1   ORTHOBLEND DBM ALDEN 10CC  - [id number] - [id number] Implant ORTHOBLEND DBM ALDEN 10CC  [id number] [facility]   Left 1       Specimen:                Findings: See full note    Complications: See full note    Description of Procedure: Briefly the patient  brought the operating room she underwent a general anesthetic x-ray and she is service.  She was placed supine on the OR table with a bump under her left hip.  Her left leg was prepped with ChloraPrep from the toes to above the iliac crest and she was draped in sterile fashion.  Incision was made laterally from the distal femur over the lateral aspect of knee and injecting to a previous incision where her hip surgery done previously.  Sharp dissection was made down to the subcutaneous space tissues to the fascia dre.  The fascia dre was divided in line with its fibers.  Hemostasis was obtained using a Bovie electrocautery hemostats.  Should be noted the patient did have significant blood of lost during the course of this case approximately 1200 cc.  She received 4 units intraoperatively.  Next we carefully dissected over the distal lateral femoral condyle and dissected up the lateral aspect of the distal femur to the fracture site.  We Connett released the vastus lateralis office fascia dre, posterior slowly.  Again controlling hemostasis with Bovie cautery and hemostats.  As we identified the fracture it was was comminuted with a rotation to it.  She some comminution which wasn't initially appreciated on the preoperative x-rays.  Reduction was actually quite difficult.  We did have the patient paralyzed help ease this.  The end of the hip prosthesis stem was noted in the wound.    The wound year copiously as it was done several times during the case.  We initially just to reapproximate the comminuted area between the supracondylar space and end of the prosthesis we placed 2 #5 Arthrex fiber wires and cerclage affect just a, pull these areas together.  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  We placed a Synthes 12 hole supracondylar plate.  Initially we placed days locking screw distally so we said we can get better control the distal fragment.  We were then able to lock this on with the large  bone forceps and to bridge the comminuted area on the distal portion of the fracture.  This was done under fluoroscopic guidance and appeared to have a reasonable reduction were finished.  At this point we started putting on multiple cerclage wires proximal to the fracture site we placed approximately 6 through a Synthes screw klein.  We placed 2 at the fracture site being careful not to over tighten knees.  We also placed 4 more locking screws distally in the femur.  On the table now we had good range of motion of the knee.  We'll take we stabilized the fracture very well.  The wound was then irrigated again copiously.  We placed some graft on OrthoBlend into some of the comminuted areas where we had bridged with the plate approximately 10 cc was packed here.    At this point we closed the fascia dre with #2 Ethibond and running locking interrupted and running locking sutures we use to these.  The 17 tissues were approximated with 1 Vicryl and 2-0 Monocryl and the skin was closed with staples.  A Betadine soaked Adaptic and sterile compressive dressing was placed around the wound which extended from the knee to almost the trochanter base of the trochanter.  We then placed a Martino cotton around all this and an Ace wrap and a knee immobilizer.  The patient tolerated procedure well was transferred to recovery room in guarded condition.      Karson Pierre MD     Date: 10/12/2017  Time: 5:25 PM

## 2017-10-12 NOTE — PLAN OF CARE
Problem: Fall Risk (Adult)  Goal: Identify Related Risk Factors and Signs and Symptoms  Outcome: Ongoing (interventions implemented as appropriate)    10/12/17 0428 10/12/17 1132   Fall Risk   Fall Risk: Related Risk Factors age-related changes;bladder function altered;fatigue/slow reaction;fear of falling;gait/mobility problems;history of falls;homeostatic imbalance;impaired vision;inadequate lighting;objects hard to reach;sensory deficits;sleep pattern alteration;slipper/uneven surfaces;environment unfamiliar --    Fall Risk: Signs and Symptoms --  presence of risk factors       Goal: Absence of Falls  Outcome: Ongoing (interventions implemented as appropriate)    10/12/17 1132   Fall Risk (Adult)   Absence of Falls making progress toward outcome         Problem: Mobility, Physical Impaired (Adult)  Goal: Identify Related Risk Factors and Signs and Symptoms  Outcome: Ongoing (interventions implemented as appropriate)    10/12/17 0428   Mobility, Physical Impaired   Physical Mobility, Impaired: Related Risk Factors activity intolerance;fatigue;fear of falling;injury;knowledge deficit;malnutrition;medication effects;ordered restrictions;pain/discomfort;surgery/procedure   Signs and Symptoms (Physical Mobility Impaired) decreased balance;fear/anxiety related to mobility;holding on to furniture;inability to purposefully move in environment;limited ability to perform gross/fine motor skills;postural instability with activity;unsafe transfers/ambulation       Goal: Enhanced Mobility Skills  Outcome: Ongoing (interventions implemented as appropriate)    10/12/17 1132   Mobility, Physical Impaired (Adult)   Enhanced Mobility Skills making progress toward outcome       Goal: Enhanced Functionality Ability  Outcome: Ongoing (interventions implemented as appropriate)    10/12/17 1132   Mobility, Physical Impaired (Adult)   Enhanced Functionality Ability making progress toward outcome         Problem: Pain, Acute  (Adult)  Goal: Identify Related Risk Factors and Signs and Symptoms  Outcome: Ongoing (interventions implemented as appropriate)    10/12/17 0428   Pain, Acute   Related Risk Factors (Acute Pain) fear;patient perception;persistent pain;positioning;procedure/treatment;surgery;trauma   Signs and Symptoms (Acute Pain) alteration in muscle tone;BADLs/IADLs reluctance/inability to perform;facial mask of pain/grimace;fatigue/weakness;fear of reinjury;guarding/abnormal posturing/positioning;pacing/restlessness;sleep pattern alteration;verbalization of pain descriptors       Goal: Acceptable Pain Control/Comfort Level  Outcome: Ongoing (interventions implemented as appropriate)    10/12/17 1132   Pain, Acute (Adult)   Acceptable Pain Control/Comfort Level making progress toward outcome         Problem: Infection, Risk/Actual (Adult)  Goal: Identify Related Risk Factors and Signs and Symptoms  Outcome: Ongoing (interventions implemented as appropriate)    10/12/17 0428   Infection, Risk/Actual   Infection, Risk/Actual: Related Risk Factors age extremes;chronic illness/condition;malnutrition;medication effects;skin integrity impairment;sleep disturbance;surgery/procedure;tissue perfusion altered;trauma injury;treatment plan   Signs and Symptoms (Infection, Risk/Actual) chills;drainage;edema;feeding/eating intolerance;malaise;pain;weakness       Goal: Infection Prevention/Resolution  Outcome: Ongoing (interventions implemented as appropriate)    10/12/17 1132   Infection, Risk/Actual (Adult)   Infection Prevention/Resolution making progress toward outcome         Problem: Skin Integrity Impairment, Risk/Actual (Adult)  Goal: Identify Related Risk Factors and Signs and Symptoms  Outcome: Ongoing (interventions implemented as appropriate)    10/12/17 0428   Skin Integrity Impairment, Risk/Actual   Skin Integrity Impairment, Risk/Actual: Related Risk Factors age extremes;edema;fluid/nutrition status;immobility;mechanical  factors;moisture;sensory impairment;surgery/procedure;tissue perfusion impaired;traumatic injury   Signs and Symptoms (Skin Integrity Impairment) edema;inflammation       Goal: Skin Integrity/Wound Healing  Outcome: Ongoing (interventions implemented as appropriate)    10/12/17 1132   Skin Integrity Impairment, Risk/Actual (Adult)   Skin Integrity/Wound Healing making progress toward outcome         Problem: Patient Care Overview (Adult)  Goal: Plan of Care Review  Outcome: Ongoing (interventions implemented as appropriate)    10/12/17 1132   Coping/Psychosocial Response Interventions   Plan Of Care Reviewed With patient   Patient Care Overview   Progress no change       Goal: Adult Individualization and Mutuality  Outcome: Ongoing (interventions implemented as appropriate)  Goal: Discharge Needs Assessment  Outcome: Ongoing (interventions implemented as appropriate)

## 2017-10-12 NOTE — PROGRESS NOTES
Discharge Planning Assessment   [place]     Patient Name: Alma Delia Garcia  MRN: [id number]  Today's Date: 10/12/2017    Admit Date: 10/12/2017          Discharge Needs Assessment       10/12/17 1444    Living Environment    Lives With child(jojo), adult;other (see comments)   Dtr, Geri and GranddtrSavannah.      Living Arrangements house    Transportation Available car;family or friend will provide    Living Environment    Provides Primary Care For no one, unable/limited ability to care for self    Primary Care Provided By child(jojo) (specify)    Quality Of Family Relationships supportive;helpful;involved    Able to Return to Prior Living Arrangements yes    Discharge Needs Assessment    Concerns To Be Addressed no discharge needs identified    Readmission Within The Last 30 Days no previous admission in last 30 days    Outpatient/Agency/Support Group Needs --   Pt utilizes [facility].  Updated information will need to be faxed at discharge.     Anticipated Changes Related to Illness none    Equipment Needed After Discharge commode;walker, rolling;wheelchair   Unknown Provider    Current Discharge Risk chronically ill    Discharge Disposition still a patient            Discharge Plan       10/12/17 1447    Case Management/Social Work Plan    Plan Pt lives at home with her daughter, Geri and Granddaughter, Savannah.  Pt utilizes [facility].  Pt has a bedside commode, rolling walker and a wheelchair via unknown provider.  Pt does not have a POA or Advance Directive.  Pt family will provide transportion at discharge.  SS will continue to follow.     Patient/Family In Agreement With Plan yes        Expected Discharge Date and Time     Expected Discharge Date Expected Discharge Time    Oct 15, 2017               Demographic Summary       10/12/17 1442    Referral Information    Admission Type inpatient    Referral Source nursing    Reason For Consult discharge planning    Primary Care  Physician Information    Name SUMEET SOSA            Legal       10/12/17 5462    Legal    Assistance with Managing/Advocating Healthcare Needs --   Pt does not have a POA or Advance Directive.  Pt is not interested in a Living will.               Haleigh Scanlon

## 2017-10-12 NOTE — ANESTHESIA PROCEDURE NOTES
Airway  Urgency: elective    Date/Time: 10/12/2017 1:13 PM  Airway not difficult    General Information and Staff    Patient location during procedure: OR  Anesthesiologist: HIEN ALTMAN  CRNA: LAYO CAMPBELL    Indications and Patient Condition  Indications for airway management: airway protection    Preoxygenated: yes  Mask difficulty assessment: 0 - not attempted    Final Airway Details  Final airway type: supraglottic airway      Successful airway: unique  Size 4    Number of attempts at approach: 1    Additional Comments  No trauma or change to dentition

## 2017-10-12 NOTE — ANESTHESIA POSTPROCEDURE EVALUATION
Patient: Alma Delia Garcia    Procedure Summary     Date Anesthesia Start Anesthesia Stop Room / Location    10/12/17 1255 1718  [facility] OR 01 / [facility]       Procedure Diagnosis Surgeon Provider    FEMUR OPEN REDUCTION INTERNAL FIXATION (Left Thigh) Other closed fracture of distal end of left femur, initial encounter  (Other closed fracture of distal end of left femur, initial encounter [S72.492A]) MD Steve Head MD          Anesthesia Type: general  Last vitals  BP   102/46 (10/12/17 1748)    Temp   98.3 °F (36.8 °C) (10/12/17 1748)    Pulse   72 (10/12/17 1748)   Resp   14 (10/12/17 1748)    SpO2   100 % (10/12/17 1748)      Post Anesthesia Care and Evaluation    Patient location during evaluation: PACU  Patient participation: complete - patient participated  Level of consciousness: awake and alert  Pain score: 1  Pain management: adequate  Airway patency: patent  Anesthetic complications: No anesthetic complications  PONV Status: controlled  Cardiovascular status: acceptable  Respiratory status: acceptable  Hydration status: acceptable

## 2017-10-12 NOTE — H&P
Hospitalist History and Physical    Patient Identification:  Name: Alma Delia Garcia  Age/Sex: 87 y.o. female  :  1930  MRN: [id number]         Primary Care Physician: Galina Gonsalves MD    Chief Complaint   Patient presents with   • Fall   • Leg Injury       History of Present Illness  Patient is a 87 y.o. female presents with the following: left leg pain after a fall    The patient is an 88 yo female with past medical history significant for liver cirrhosis with history of alcohol abuse, diastolic heart failure and hypertension who presents to the ED complaining of left lower extremity pain status post a fall at home.     The patient states that she was standing in her kitchen at the sink. She states that she was using a knife to open a bag of candy when she started falling backwards. She states that she tried to catch herself. Please note that she is currently somewhat of a poor historian as she has received IV pain medication and falls asleep quickly (but is easily aroused).     She states that she is unsure if she became dizzy. She denies any dyspnea. She currently denies any chest pain. She denies any other recent falls. She denies LOC and/or head trauma. She reports chronic diarrhea due to lactulose. She reports recent urinary frequency but denies dysuria.     In the ED, the patient was found to have a left distal femur fracture. Her UA was also suggestive of a UTI. She has been admitted to the medical-surgical floor for further management.     Present during visit: PHILLIP Smith    Past History:  Past Medical History:   Diagnosis Date   • Anxiety    • Bell's palsy    • Cellulitis     LLE   • CHF (congestive heart failure)     Diastolic   • Diverticulosis    • GERD (gastroesophageal reflux disease)    • Hepatic cirrhosis    • Hypertension    • Hypothyroidism    • Osteoporosis    • Thrombocytopenia    • Trigeminal neuralgia      Past Surgical History:   Procedure Laterality Date   • BACK SURGERY       KYPHOPLASTIES    • CARDIAC CATHETERIZATION Left 08/2004   • CATARACT EXTRACTION     • CHOLECYSTECTOMY     • CLAVICLE SURGERY Right    • COLONOSCOPY  10/2004   • HIP FRACTURE SURGERY Bilateral     ARTHROPLASTIES    • PARTIAL HYSTERECTOMY     • WRIST FRACTURE SURGERY       Family History   Problem Relation Age of Onset   • Cancer Mother    • Heart disease Father    • Arthritis Sister    • Osteoporosis Sister    • Diabetes Maternal Aunt      Social History   Substance Use Topics   • Smoking status: Never Smoker   • Smokeless tobacco: Former User     Types: Snuff     Quit date: 1970   • Alcohol use No      Comment: FORMER 12 BOTTLES PER DAY FOR 15 YEARS     Prescriptions Prior to Admission   Medication Sig Dispense Refill Last Dose   • alendronate (FOSAMAX) 70 MG tablet Take 1 tablet by mouth Every 7 (Seven) Days. Takes on Mondays. 4 tablet 3 Taking   • aspirin 81 MG tablet Take 1 tablet by mouth Daily. 90 tablet 2 Taking   • bumetanide (BUMEX) 1 MG tablet Take 1 tablet by mouth 2 (Two) Times a Day. 60 tablet 3    • esomeprazole (nexIUM) 40 MG capsule Take 1 capsule by mouth Every Morning Before Breakfast. 90 capsule 0 Taking   • folic acid (FOLVITE) 1 MG tablet Take 1 tablet by mouth Daily. 30 tablet 0 Taking   • HYDROcodone-acetaminophen (NORCO) 7.5-325 MG per tablet Take 1 tablet by mouth Every 8 (Eight) Hours As Needed for Moderate Pain 90 tablet 0 Taking   • lactulose (CHRONULAC) 10 GM/15ML solution Take 30 mL by mouth 3 (Three) Times a Day. 1892 mL 0 Taking   • levothyroxine (SYNTHROID) 50 MCG tablet Take 1 tablet by mouth Daily. 90 tablet 0 Taking   • nitroglycerin (NITROSTAT) 0.4 MG SL tablet Place 1 tablet under the tongue Every 5 (Five) Minutes As Needed for chest pain. Take no more than 3 doses in 15 minutes. 25 tablet 0 Taking   • ondansetron ODT (ZOFRAN-ODT) 4 MG disintegrating tablet Dissolve 1 tablet on the tongue Every 8 (Eight) Hours As Needed for Nausea or Vomiting. 30 tablet 0 Taking   • PHARMACY  MEDS TO BED CONSULT Daily. 1 each 0 Taking   • potassium chloride (K-DUR) 10 MEQ CR tablet Take 1 tablet by mouth Daily. 90 tablet 0 Taking   • rifaximin (XIFAXAN) 550 MG tablet Take 1 tablet by mouth Every 12 (Twelve) Hours. 60 tablet 1 Taking   • rOPINIRole (REQUIP) 1 MG tablet Take 1 tablet by mouth Every Night. Take 1 hour before bedtime. 30 tablet 1      Allergies: Ciprofloxacin    Review of Systems:  Review of Systems   Unable to perform ROS: Mental status change      Vital Signs  Temp:  [98.1 °F (36.7 °C)-98.2 °F (36.8 °C)] 98.1 °F (36.7 °C)  Heart Rate:  [78-88] 78  Resp:  [18-20] 20  BP: (109-166)/(41-63) 143/63  Body mass index is 27.29 kg/(m^2).    Physical Exam:  Physical Exam   Constitutional: She is oriented to person, place, and time. She appears well-developed and well-nourished. She is sleeping. No distress.   Chronically ill appearing   HENT:   Head: Normocephalic and atraumatic.   Nose: Nose normal.   Mouth/Throat: Oropharynx is clear and moist. Mucous membranes are dry.   Eyes: Conjunctivae and EOM are normal. Pupils are equal, round, and reactive to light. No scleral icterus.   Neck: Trachea normal. Neck supple. No JVD present. Carotid bruit is not present. No thyromegaly present.   Cardiovascular: Normal rate, regular rhythm, normal heart sounds and normal pulses.  Exam reveals no gallop and no friction rub.    No murmur heard.  2+ lower extremity edema   Pulmonary/Chest: Effort normal. No respiratory distress. She has decreased breath sounds in the right lower field and the left lower field. She has no wheezes. She has no rales.   Abdominal: Soft. Bowel sounds are normal. She exhibits no distension. There is no tenderness. There is no guarding.   Musculoskeletal:   Immobilizer in place   Neurological: She is oriented to person, place, and time. She has normal strength. No cranial nerve deficit.   Sleeping but arousable and able to follow all commands   Skin: Skin is warm, dry and intact.  Ecchymosis noted. No rash noted.   Erythema noted bilateral lower extremities distally   Psychiatric: She has a normal mood and affect. Her speech is normal.     Results Review:    Results from last 7 days  Lab Units 10/12/17  0206   WBC 10*3/mm3 5.88   HEMOGLOBIN g/dL 10.8*   HEMATOCRIT % 32.5*   PLATELETS 10*3/mm3 122*       Results from last 7 days  Lab Units 10/12/17  0206 10/05/17  1315   SODIUM mmol/L 140 139   POTASSIUM mmol/L 3.1* 4.4   CHLORIDE mmol/L 103 100   CO2 mmol/L 29.7 31.7   BUN mg/dL 18 29*   CREATININE mg/dL 0.96 1.33*   CALCIUM mg/dL 9.3 9.0   GLUCOSE mg/dL 144* 91       Results from last 7 days  Lab Units 10/12/17  0206 10/05/17  1315   BILIRUBIN mg/dL 1.0 0.9   ALK PHOS U/L 119* 151*   AST (SGOT) U/L 55* 36*   ALT (SGPT) U/L 26 24       Results from last 7 days  Lab Units 10/12/17  0206   TROPONIN I ng/mL <0.006       Results from last 7 days  Lab Units 10/12/17  0206   INR  1.17*       Imaging:    I have personally reviewed the EKG. Per my view: normal sinus rhythm    Imaging Results (most recent)     Procedure Component Value Units Date/Time    XR Chest 1 View [[id number]] Updated:  10/12/17 0133    XR Femur 2 View Left [[id number]] Updated:  10/12/17 0133      *Per my view: her femur xray reveals a distal femur fracture. Her chest xray reveals no obvious infiltrate or effusion. She is rotated to the right.    Assessment/Plan      -Acute, traumatic left distal femur fracture status post fall  -Possible acute UTI  -History of CHF with diastolic dysfunction; currently in no acute exacerbation  -Chronic lower extremity edema with chronic venous stasis dermatitis/possible cellulitis  -Mild hypokalemia  -Moderate protein malnutrition  -Mild thrombocytopenia  -Macrocytic anemia  -History of cirrhosis; compensated  -Hypothyroidism    Has been admitted to the medical surgical floor.  She is currently nothing by mouth awaiting orthopedic surgery evaluation.  The patient's potassium will be supplemented  and I have ordered a magnesium level.  We'll hold on any IV fluid hydration at this time given patient's history of congestive heart failure and cirrhosis.  Patient will receive as needed IV pain medication with holding parameters.  We will follow-up on the official x-ray reports.  I have started her on empiric IV antibiotic therapy with Rocephin while we await the final urine culture.  I have ordered a TSH and ammonia level.  P her CBC and CMP later on in the morning.  The patient had a troponin level that was within normal limits and had an outpatient echocardiogram performed on the day prior to admission.  Patient's home medication list will be addressed once verified.  At this point I feel that the patient is at least a moderate risk patient for any intermediate risk procedure, however, her Alicia perioperative cardiac risk score is approximately 0.94%    DVT prophylaxis: No DVT ppx for now in anticpation of surgery; no SCDs due to lower extremity swelling    Estimated Length of Stay: > 2 MNs    The patient's previous medical record from [facility] has been reviewed.    I discussed the patients findings and my recommendations with patient and nursing staff      Britany Serrato DO  10/12/17  4:33 AM

## 2017-10-12 NOTE — PLAN OF CARE
Problem: Fall Risk (Adult)  Goal: Identify Related Risk Factors and Signs and Symptoms  Outcome: Ongoing (interventions implemented as appropriate)  Goal: Absence of Falls  Outcome: Ongoing (interventions implemented as appropriate)    Problem: Mobility, Physical Impaired (Adult)  Goal: Identify Related Risk Factors and Signs and Symptoms  Outcome: Ongoing (interventions implemented as appropriate)  Goal: Enhanced Mobility Skills  Outcome: Ongoing (interventions implemented as appropriate)  Goal: Enhanced Functionality Ability  Outcome: Ongoing (interventions implemented as appropriate)    Problem: Pain, Acute (Adult)  Goal: Identify Related Risk Factors and Signs and Symptoms  Outcome: Ongoing (interventions implemented as appropriate)  Goal: Acceptable Pain Control/Comfort Level  Outcome: Ongoing (interventions implemented as appropriate)    Problem: Infection, Risk/Actual (Adult)  Goal: Identify Related Risk Factors and Signs and Symptoms  Outcome: Ongoing (interventions implemented as appropriate)  Goal: Infection Prevention/Resolution  Outcome: Ongoing (interventions implemented as appropriate)    Problem: Skin Integrity Impairment, Risk/Actual (Adult)  Goal: Identify Related Risk Factors and Signs and Symptoms  Outcome: Ongoing (interventions implemented as appropriate)  Goal: Skin Integrity/Wound Healing  Outcome: Ongoing (interventions implemented as appropriate)

## 2017-10-12 NOTE — ANESTHESIA PREPROCEDURE EVALUATION
Anesthesia Evaluation     Patient summary reviewed and Nursing notes reviewed   no history of anesthetic complications:  NPO Solid Status: > 8 hours  NPO Liquid Status: > 8 hours     Airway   Mallampati: II  TM distance: >3 FB  Neck ROM: full  no difficulty expected  Dental - normal exam   (+) poor dentition    Pulmonary - negative pulmonary ROS and normal exam   (+) decreased breath sounds,   (-) asthma, not a smoker  Cardiovascular - normal exam  Exercise tolerance: poor (<4 METS)    NYHA Classification: II    (+) hypertension, past MI (1999) , angina at rest, CHF, PVD,   (-) dysrhythmias      Neuro/Psych  (+) numbness, psychiatric history Anxiety,    (-) seizures, CVA  GI/Hepatic/Renal/Endo    (+)  GERD, liver disease, renal disease ARF, hypothyroidism,     Musculoskeletal     Abdominal  - normal exam    Bowel sounds: normal.   Substance History - negative use     OB/GYN negative ob/gyn ROS         Other   (+) arthritis         Phys Exam Other: Dental counseling discussed                                Anesthesia Plan    ASA 3     general     intravenous induction   Anesthetic plan and risks discussed with patient.  Use of blood products discussed with patient  Consented to blood products.

## 2017-10-12 NOTE — CONSULTS
Patient: Alma Delia Garcia  YOB: 1930  Date of encounter: 10/12/2017  Admitting Physician:     History of Present Illness:   Alma Delia Garcia is a 87 y.o. female who is admitted to the hospital following a fall that occurred at home yesterday.  She states that she recalls standing in her kitchen sink when she lost her balance and fell.  She states she is unsure if she became dizzy but does not recall any loss of consciousness.  She denies any associated chest pain or palpitations.  She is somewhat of a poor historian given her recent IV pain medication.  She will frequently fall asleep but is easily arousable and able to answer questions.  She does have significant history of liver cirrhosis and history of alcohol abuse with diastolic heart failure and hypertension.    PMH:   Patient Active Problem List   Diagnosis   • Hepatic cirrhosis*   • HTN (hypertension)*   • Anxiety disorder*   • Osteopetrosis*   • Hypothyroidism*   • GERD (gastroesophageal reflux disease)*   • Chronic pain syndrome due to fractured spine and left rib fracture*   • Anemia, chronic disease   • Arthralgia of hip   • Left knee pain   • Fracture of proximal end of tibia and fibula   • Primary insomnia   • Thrombocytopenia   • Renal failure   • Peripheral vascular disease   • Bilateral leg edema   • Cramp of both lower extremities   • Hepatic encephalopathy   • UTI (urinary tract infection)   • Hypokalemia   • Chronic systolic heart failure   • Closed fracture of left distal femur       Past Medical History:   Diagnosis Date   • Acute renal failure    • Anxiety    • Bell's palsy    • Cellulitis     LLE   • CHF (congestive heart failure)     Diastolic   • Diverticulosis    • GERD (gastroesophageal reflux disease)    • Hepatic cirrhosis    • Hypertension    • Hypothyroidism    • Osteoporosis    • Thrombocytopenia    • Trigeminal neuralgia        PSH:  Past Surgical History:   Procedure Laterality Date   • BACK SURGERY       KYPHOPLASTIES    • CARDIAC CATHETERIZATION Left 08/2004   • CARDIOVASCULAR STRESS TEST  01/25/2015   • CATARACT EXTRACTION     • CHOLECYSTECTOMY     • CLAVICLE SURGERY Right    • COLONOSCOPY  10/2004   • ECHO - CONVERTED  11/2012   • HIP FRACTURE SURGERY Bilateral     ARTHROPLASTIES    • PARTIAL HYSTERECTOMY     • WRIST FRACTURE SURGERY         Allergies:     Allergies   Allergen Reactions   • Ciprofloxacin        Medications:   Prior to Admission medications    Medication Sig Start Date End Date Taking? Authorizing Provider   alendronate (FOSAMAX) 70 MG tablet Take 1 tablet by mouth Every 7 (Seven) Days. Takes on Mondays. 8/10/17   Galina Gonsalves MD   aspirin 81 MG tablet Take 1 tablet by mouth Daily. 8/22/17   Galina Gonsalves MD   bumetanide (BUMEX) 1 MG tablet Take 1 tablet by mouth 2 (Two) Times a Day. 9/28/17   Galina Gonsalves MD   esomeprazole (nexIUM) 40 MG capsule Take 1 capsule by mouth Every Morning Before Breakfast. 7/25/17   Galina Gonsalves MD   folic acid (FOLVITE) 1 MG tablet Take 1 tablet by mouth Daily. 8/10/17   Galina Gonsalves MD   HYDROcodone-acetaminophen (NORCO) 7.5-325 MG per tablet Take 1 tablet by mouth Every 8 (Eight) Hours As Needed for Moderate Pain 9/25/17   Galina Gonsalves MD   lactulose (CHRONULAC) 10 GM/15ML solution Take 30 mL by mouth 3 (Three) Times a Day. 7/15/17   Jamison Ramirez DO   levothyroxine (SYNTHROID) 50 MCG tablet Take 1 tablet by mouth Daily. 8/22/17   Galina Gonsalves MD   nitroglycerin (NITROSTAT) 0.4 MG SL tablet Place 1 tablet under the tongue Every 5 (Five) Minutes As Needed for chest pain. Take no more than 3 doses in 15 minutes. 1/16/17   Galina Gonsalves MD   ondansetron ODT (ZOFRAN-ODT) 4 MG disintegrating tablet Dissolve 1 tablet on the tongue Every 8 (Eight) Hours As Needed for Nausea or Vomiting. 7/15/17   Jamison Ramirez DO   PHARMACY MEDS TO BED CONSULT Daily. 7/28/17   Jamison CABRERA  "James,    potassium chloride (K-DUR) 10 MEQ CR tablet Take 1 tablet by mouth Daily. 9/14/17   Galina Gonsalves MD   rifaximin (XIFAXAN) 550 MG tablet Take 1 tablet by mouth Every 12 (Twelve) Hours. 8/22/17   Galina Gonsalves MD   rOPINIRole (REQUIP) 1 MG tablet Take 1 tablet by mouth Every Night. Take 1 hour before bedtime. 9/28/17   Galina Gonsalves MD       Social History:  Social History     Social History   • Marital status:      Spouse name: N/A   • Number of children: N/A   • Years of education: N/A     Occupational History   • Not on file.     Social History Main Topics   • Smoking status: Never Smoker   • Smokeless tobacco: Former User     Types: Snuff     Quit date: 1970   • Alcohol use No      Comment: FORMER 12 BOTTLES PER DAY FOR 15 YEARS   • Drug use: No   • Sexual activity: Defer     Other Topics Concern   • Not on file     Social History Narrative    LIVES WITH GRANDDAUGHTER DEVON IN [place]        Family History:     Family History   Problem Relation Age of Onset   • Cancer Mother    • Heart disease Father    • Arthritis Sister    • Osteoporosis Sister    • Diabetes Maternal Aunt        Review of Systems:   Review of Systems - Unable to obtain due to patients mental status.     Physical Exam: 87 y.o. female  General Appearance:    Alert and oriented x 3, cooperative, in no acute distress                   Vitals:    10/12/17 0248 10/12/17 0302 10/12/17 0327 10/12/17 0702   BP: 109/41 116/45 143/63 121/50   BP Location:   Right arm Right arm   Patient Position:   Lying Lying   Pulse:   78 67   Resp:   20 20   Temp:   98.1 °F (36.7 °C) 98.2 °F (36.8 °C)   TempSrc:   Oral Oral   SpO2: 97%      Weight:   149 lb 3.2 oz (67.7 kg)    Height:   62\" (157.5 cm)           HEENT: Unremarkable      Neck: Supple without lymphadenopathy.      Chest: She has decreased breath sounds in the right lower and left lower lobe.  She                 has no wheezing, rhonchi or rales.. Normal " respiratory effort.      Heart: Regular rate and rhythm. No murmurs noted.      Musculoskeletal: Examination of the left lower extremity reveals her in a Traction.  She has moderate swelling with tenderness throughout the distal thigh.  Her range of motion is not tested secondary to known fracture.  Her neurovascular status is grossly intact      Radiology:     Imaging Results (last 72 hours)     Procedure Component Value Units Date/Time    XR Femur 2 View Left [[id number]] Updated:  10/12/17 0133    XR Chest 1 View [[id number]] Collected:  10/12/17 0708     Updated:  10/12/17 0711    Narrative:       XR CHEST 1 VW-     CLINICAL INDICATION: pre-surgery          COMPARISON: None available      TECHNIQUE: Single frontal view of the chest.     FINDINGS:     There is no focal alveolar infiltrate or effusion.  The cardiac silhouette is normal. The pulmonary vasculature is  unremarkable.  There is no evidence of an acute osseous abnormality.   There are no suspicious-appearing parenchymal soft tissue nodules.            Impression:       No evidence of active or acute cardiopulmonary disease on today's chest  radiograph.         This report was finalized on 10/12/2017 7:09 AM by Dr. Byron Mcintosh MD.             Assessment    ICD-10-CM ICD-9-CM   1. Closed fracture of distal end of left femur, unspecified fracture morphology, initial encounter S72.402A 821.20       Plan:   An 87-year-old female sustained a fall yesterday at her home.  X-rays were reviewed revealing a distal femoral shaft fracture with displacement.  She will require surgical intervention with open reduction and internal fixation of the distal femoral shaft.  I discussed the risks, benefits, and future outcomes of surgery the patient.  She accepts the risks and is agreeable to surgery.  She has been cleared by the hospitalist we will continue nothing by mouth status and proceed with surgical intervention this afternoon.    Rebecca GUILLEN

## 2017-10-13 ENCOUNTER — APPOINTMENT (OUTPATIENT)
Dept: GENERAL RADIOLOGY | Facility: HOSPITAL | Age: 82
End: 2017-10-13

## 2017-10-13 LAB
ABO + RH BLD: NORMAL
ALBUMIN SERPL-MCNC: 2.6 G/DL (ref 3.4–4.8)
ALBUMIN/GLOB SERPL: 1.4 G/DL (ref 1.5–2.5)
ALP SERPL-CCNC: 51 U/L (ref 35–104)
ALT SERPL W P-5'-P-CCNC: 15 U/L (ref 10–36)
AMMONIA BLD-SCNC: 29 UMOL/L (ref 11–51)
ANION GAP SERPL CALCULATED.3IONS-SCNC: 6.8 MMOL/L (ref 3.6–11.2)
AST SERPL-CCNC: 47 U/L (ref 10–30)
BASOPHILS # BLD AUTO: 0.04 10*3/MM3 (ref 0–0.3)
BASOPHILS NFR BLD AUTO: 0.3 % (ref 0–2)
BH BB BLOOD EXPIRATION DATE: NORMAL
BH BB BLOOD TYPE BARCODE: 6200
BH BB DISPENSE STATUS: NORMAL
BH BB PRODUCT CODE: NORMAL
BH BB UNIT NUMBER: NORMAL
BILIRUB SERPL-MCNC: 2.1 MG/DL (ref 0.2–1.8)
BUN BLD-MCNC: 21 MG/DL (ref 7–21)
BUN/CREAT SERPL: 25.6 (ref 7–25)
CALCIUM SPEC-SCNC: 8.1 MG/DL (ref 7.7–10)
CHLORIDE SERPL-SCNC: 112 MMOL/L (ref 99–112)
CO2 SERPL-SCNC: 24.2 MMOL/L (ref 24.3–31.9)
CREAT BLD-MCNC: 0.82 MG/DL (ref 0.43–1.29)
CROSSMATCH INTERPRETATION: NORMAL
DEPRECATED RDW RBC AUTO: 50.4 FL (ref 37–54)
EOSINOPHIL # BLD AUTO: 0.68 10*3/MM3 (ref 0–0.7)
EOSINOPHIL NFR BLD AUTO: 5.5 % (ref 0–7)
ERYTHROCYTE [DISTWIDTH] IN BLOOD BY AUTOMATED COUNT: 15.8 % (ref 11.5–14.5)
FOLATE SERPL-MCNC: 20.82 NG/ML (ref 5.4–20)
GFR SERPL CREATININE-BSD FRML MDRD: 66 ML/MIN/1.73
GLOBULIN UR ELPH-MCNC: 1.9 GM/DL
GLUCOSE BLD-MCNC: 102 MG/DL (ref 70–110)
HCT VFR BLD AUTO: 27.6 % (ref 37–47)
HGB BLD-MCNC: 9.2 G/DL (ref 12–16)
IMM GRANULOCYTES # BLD: 0.05 10*3/MM3 (ref 0–0.03)
IMM GRANULOCYTES NFR BLD: 0.4 % (ref 0–0.5)
INR PPP: 1.79 (ref 0.9–1.1)
LYMPHOCYTES # BLD AUTO: 1.72 10*3/MM3 (ref 1–3)
LYMPHOCYTES NFR BLD AUTO: 13.9 % (ref 16–46)
MCH RBC QN AUTO: 30.3 PG (ref 27–33)
MCHC RBC AUTO-ENTMCNC: 33.3 G/DL (ref 33–37)
MCV RBC AUTO: 90.8 FL (ref 80–94)
MONOCYTES # BLD AUTO: 0.77 10*3/MM3 (ref 0.1–0.9)
MONOCYTES NFR BLD AUTO: 6.2 % (ref 0–12)
NEUTROPHILS # BLD AUTO: 9.13 10*3/MM3 (ref 1.4–6.5)
NEUTROPHILS NFR BLD AUTO: 73.7 % (ref 40–75)
OSMOLALITY SERPL CALC.SUM OF ELEC: 288.1 MOSM/KG (ref 273–305)
PLATELET # BLD AUTO: 52 10*3/MM3 (ref 130–400)
PMV BLD AUTO: 11 FL (ref 6–10)
POTASSIUM BLD-SCNC: 4.6 MMOL/L (ref 3.5–5.3)
PROT SERPL-MCNC: 4.5 G/DL (ref 6–8)
PROTHROMBIN TIME: 21 SECONDS (ref 11–15.4)
RBC # BLD AUTO: 3.04 10*6/MM3 (ref 4.2–5.4)
SODIUM BLD-SCNC: 143 MMOL/L (ref 135–153)
UNIT  ABO: NORMAL
UNIT  RH: NORMAL
VIT B12 BLD-MCNC: 251 PG/ML (ref 211–911)
WBC NRBC COR # BLD: 12.39 10*3/MM3 (ref 4.5–12.5)

## 2017-10-13 PROCEDURE — 94799 UNLISTED PULMONARY SVC/PX: CPT

## 2017-10-13 PROCEDURE — 80053 COMPREHEN METABOLIC PANEL: CPT | Performed by: INTERNAL MEDICINE

## 2017-10-13 PROCEDURE — 97530 THERAPEUTIC ACTIVITIES: CPT

## 2017-10-13 PROCEDURE — 25010000002 CEFTRIAXONE: Performed by: INTERNAL MEDICINE

## 2017-10-13 PROCEDURE — 25010000002 MORPHINE PER 10 MG

## 2017-10-13 PROCEDURE — 85610 PROTHROMBIN TIME: CPT | Performed by: INTERNAL MEDICINE

## 2017-10-13 PROCEDURE — 85025 COMPLETE CBC W/AUTO DIFF WBC: CPT | Performed by: ORTHOPAEDIC SURGERY

## 2017-10-13 PROCEDURE — G8987 SELF CARE CURRENT STATUS: HCPCS | Performed by: OCCUPATIONAL THERAPIST

## 2017-10-13 PROCEDURE — 97162 PT EVAL MOD COMPLEX 30 MIN: CPT

## 2017-10-13 PROCEDURE — 82140 ASSAY OF AMMONIA: CPT | Performed by: INTERNAL MEDICINE

## 2017-10-13 PROCEDURE — G8979 MOBILITY GOAL STATUS: HCPCS

## 2017-10-13 PROCEDURE — 82607 VITAMIN B-12: CPT | Performed by: INTERNAL MEDICINE

## 2017-10-13 PROCEDURE — G8978 MOBILITY CURRENT STATUS: HCPCS

## 2017-10-13 PROCEDURE — 82746 ASSAY OF FOLIC ACID SERUM: CPT | Performed by: INTERNAL MEDICINE

## 2017-10-13 PROCEDURE — 71010 HC CHEST PA OR AP: CPT

## 2017-10-13 PROCEDURE — 25010000002 MORPHINE PER 10 MG: Performed by: INTERNAL MEDICINE

## 2017-10-13 PROCEDURE — 99232 SBSQ HOSP IP/OBS MODERATE 35: CPT | Performed by: INTERNAL MEDICINE

## 2017-10-13 PROCEDURE — 71010 XR CHEST 1 VW: CPT | Performed by: RADIOLOGY

## 2017-10-13 PROCEDURE — 99024 POSTOP FOLLOW-UP VISIT: CPT | Performed by: PHYSICIAN ASSISTANT

## 2017-10-13 PROCEDURE — G8988 SELF CARE GOAL STATUS: HCPCS | Performed by: OCCUPATIONAL THERAPIST

## 2017-10-13 PROCEDURE — 97167 OT EVAL HIGH COMPLEX 60 MIN: CPT | Performed by: OCCUPATIONAL THERAPIST

## 2017-10-13 RX ORDER — L.ACID,CASEI/B.ANIMAL/S.THERMO 16B CELL
1 CAPSULE ORAL DAILY
Status: DISCONTINUED | OUTPATIENT
Start: 2017-10-13 | End: 2017-10-17 | Stop reason: HOSPADM

## 2017-10-13 RX ORDER — NALOXONE HCL 0.4 MG/ML
0.4 VIAL (ML) INJECTION
Status: DISCONTINUED | OUTPATIENT
Start: 2017-10-13 | End: 2017-10-17 | Stop reason: HOSPADM

## 2017-10-13 RX ORDER — MORPHINE SULFATE 4 MG/ML
INJECTION, SOLUTION INTRAMUSCULAR; INTRAVENOUS
Status: COMPLETED
Start: 2017-10-13 | End: 2017-10-13

## 2017-10-13 RX ORDER — MORPHINE SULFATE 2 MG/ML
2 INJECTION, SOLUTION INTRAMUSCULAR; INTRAVENOUS EVERY 4 HOURS PRN
Status: DISCONTINUED | OUTPATIENT
Start: 2017-10-13 | End: 2017-10-17 | Stop reason: HOSPADM

## 2017-10-13 RX ORDER — SODIUM CHLORIDE 9 MG/ML
INJECTION, SOLUTION INTRAVENOUS
Status: COMPLETED
Start: 2017-10-13 | End: 2017-10-13

## 2017-10-13 RX ADMIN — HYDROCODONE BITARTRATE AND ACETAMINOPHEN 1 TABLET: 7.5; 325 TABLET ORAL at 14:06

## 2017-10-13 RX ADMIN — POTASSIUM CHLORIDE 10 MEQ: 750 TABLET, FILM COATED, EXTENDED RELEASE ORAL at 08:31

## 2017-10-13 RX ADMIN — ROPINIROLE HYDROCHLORIDE 1 MG: 1 TABLET, FILM COATED ORAL at 22:24

## 2017-10-13 RX ADMIN — DOCUSATE SODIUM AND SENNOSIDES 2 TABLET: 8.6; 5 TABLET, FILM COATED ORAL at 08:31

## 2017-10-13 RX ADMIN — RIFAXIMIN 550 MG: 550 TABLET ORAL at 08:31

## 2017-10-13 RX ADMIN — SODIUM CHLORIDE 500 ML: 9 INJECTION, SOLUTION INTRAVENOUS at 15:39

## 2017-10-13 RX ADMIN — HYDROCODONE BITARTRATE AND ACETAMINOPHEN 1 TABLET: 7.5; 325 TABLET ORAL at 10:06

## 2017-10-13 RX ADMIN — LEVOTHYROXINE SODIUM 50 MCG: 50 TABLET ORAL at 08:30

## 2017-10-13 RX ADMIN — MORPHINE SULFATE 1 MG: 2 INJECTION, SOLUTION INTRAMUSCULAR; INTRAVENOUS at 08:24

## 2017-10-13 RX ADMIN — RIFAXIMIN 550 MG: 550 TABLET ORAL at 22:24

## 2017-10-13 RX ADMIN — Medication 1 CAPSULE: at 14:06

## 2017-10-13 RX ADMIN — CEFTRIAXONE 1 G: 1 INJECTION, POWDER, FOR SOLUTION INTRAMUSCULAR; INTRAVENOUS at 08:46

## 2017-10-13 RX ADMIN — BUMETANIDE 1 MG: 1 TABLET ORAL at 08:49

## 2017-10-13 RX ADMIN — MORPHINE SULFATE 4 MG: 4 INJECTION, SOLUTION INTRAMUSCULAR; INTRAVENOUS at 11:31

## 2017-10-13 RX ADMIN — FOLIC ACID 1 MG: 1 TABLET ORAL at 08:30

## 2017-10-13 RX ADMIN — PANTOPRAZOLE SODIUM 40 MG: 40 TABLET, DELAYED RELEASE ORAL at 06:00

## 2017-10-13 NOTE — PLAN OF CARE
Problem: Fall Risk (Adult)  Goal: Identify Related Risk Factors and Signs and Symptoms  Outcome: Ongoing (interventions implemented as appropriate)    10/12/17 0428 10/12/17 1132   Fall Risk   Fall Risk: Related Risk Factors age-related changes;bladder function altered;fatigue/slow reaction;fear of falling;gait/mobility problems;history of falls;homeostatic imbalance;impaired vision;inadequate lighting;objects hard to reach;sensory deficits;sleep pattern alteration;slipper/uneven surfaces;environment unfamiliar --    Fall Risk: Signs and Symptoms --  presence of risk factors       Goal: Absence of Falls  Outcome: Ongoing (interventions implemented as appropriate)    10/12/17 1132   Fall Risk (Adult)   Absence of Falls making progress toward outcome         Problem: Mobility, Physical Impaired (Adult)  Goal: Identify Related Risk Factors and Signs and Symptoms  Outcome: Ongoing (interventions implemented as appropriate)    10/12/17 0428   Mobility, Physical Impaired   Physical Mobility, Impaired: Related Risk Factors activity intolerance;fatigue;fear of falling;injury;knowledge deficit;malnutrition;medication effects;ordered restrictions;pain/discomfort;surgery/procedure   Signs and Symptoms (Physical Mobility Impaired) decreased balance;fear/anxiety related to mobility;holding on to furniture;inability to purposefully move in environment;limited ability to perform gross/fine motor skills;postural instability with activity;unsafe transfers/ambulation       Goal: Enhanced Mobility Skills  Outcome: Ongoing (interventions implemented as appropriate)  Goal: Enhanced Functionality Ability  Outcome: Ongoing (interventions implemented as appropriate)    10/12/17 1132   Mobility, Physical Impaired (Adult)   Enhanced Functionality Ability making progress toward outcome         Problem: Pain, Acute (Adult)  Goal: Identify Related Risk Factors and Signs and Symptoms  Outcome: Ongoing (interventions implemented as appropriate)     10/12/17 0428   Pain, Acute   Related Risk Factors (Acute Pain) fear;patient perception;persistent pain;positioning;procedure/treatment;surgery;trauma   Signs and Symptoms (Acute Pain) alteration in muscle tone;BADLs/IADLs reluctance/inability to perform;facial mask of pain/grimace;fatigue/weakness;fear of reinjury;guarding/abnormal posturing/positioning;pacing/restlessness;sleep pattern alteration;verbalization of pain descriptors       Goal: Acceptable Pain Control/Comfort Level  Outcome: Ongoing (interventions implemented as appropriate)    10/12/17 1132   Pain, Acute (Adult)   Acceptable Pain Control/Comfort Level making progress toward outcome         Problem: Infection, Risk/Actual (Adult)  Goal: Identify Related Risk Factors and Signs and Symptoms  Outcome: Ongoing (interventions implemented as appropriate)    10/12/17 0428   Infection, Risk/Actual   Infection, Risk/Actual: Related Risk Factors age extremes;chronic illness/condition;malnutrition;medication effects;skin integrity impairment;sleep disturbance;surgery/procedure;tissue perfusion altered;trauma injury;treatment plan   Signs and Symptoms (Infection, Risk/Actual) chills;drainage;edema;feeding/eating intolerance;malaise;pain;weakness       Goal: Infection Prevention/Resolution  Outcome: Ongoing (interventions implemented as appropriate)    10/12/17 1132   Infection, Risk/Actual (Adult)   Infection Prevention/Resolution making progress toward outcome         Problem: Skin Integrity Impairment, Risk/Actual (Adult)  Goal: Identify Related Risk Factors and Signs and Symptoms  Outcome: Ongoing (interventions implemented as appropriate)    10/12/17 0428   Skin Integrity Impairment, Risk/Actual   Skin Integrity Impairment, Risk/Actual: Related Risk Factors age extremes;edema;fluid/nutrition status;immobility;mechanical factors;moisture;sensory impairment;surgery/procedure;tissue perfusion impaired;traumatic injury   Signs and Symptoms (Skin Integrity  Impairment) edema;inflammation       Goal: Skin Integrity/Wound Healing  Outcome: Ongoing (interventions implemented as appropriate)    10/12/17 1132   Skin Integrity Impairment, Risk/Actual (Adult)   Skin Integrity/Wound Healing making progress toward outcome         Problem: Patient Care Overview (Adult)  Goal: Plan of Care Review  Outcome: Ongoing (interventions implemented as appropriate)    10/12/17 1132 10/12/17 1915   Coping/Psychosocial Response Interventions   Plan Of Care Reviewed With --  patient   Patient Care Overview   Progress no change --        Goal: Adult Individualization and Mutuality  Outcome: Ongoing (interventions implemented as appropriate)    10/12/17 2206   Individualization   Patient Specific Preferences none   Patient Specific Goals maintain baseline mobility   Mutuality/Individual Preferences   What Anxieties, Fears or Concerns Do You Have About Your Health or Care? impaired mobility   What Questions Do You Have About Your Health or Care? none   What Information Would Help Us Give You More Personalized Care? none       Goal: Discharge Needs Assessment  Outcome: Ongoing (interventions implemented as appropriate)    10/12/17 0315 10/12/17 1444   Discharge Needs Assessment   Concerns To Be Addressed --  no discharge needs identified   Readmission Within The Last 30 Days --  no previous admission in last 30 days   Equipment Needed After Discharge --  commode;walker, rolling;wheelchair  (Unknown Provider)   Current Discharge Risk --  chronically ill   Discharge Disposition --  still a patient   Current Health   Outpatient/Agency/Support Group Needs --  (Pt utilizes [facility]. Updated information will need to be faxed at discharge. )   Anticipated Changes Related to Illness --  none   Living Environment   Transportation Available --  car;family or friend will provide   Self-Care   Equipment Currently Used at Home wheelchair;commode;walker, rolling --

## 2017-10-13 NOTE — PROGRESS NOTES
Discharge Planning Assessment   Mark     Patient Name: Alma Delia Garcia  MRN: [id number]  Today's Date: 10/13/2017    Admit Date: 10/12/2017          Discharge Needs Assessment     None            Discharge Plan       10/13/17 1057    Case Management/Social Work Plan    Plan Pt lives at home with her daughter, Geri and Granddaughter, Savannah.  Pt utilizes [facility].  Pt has a bedside commode, rolling walker and a wheelchair via unknown provider.  Pt does not have a POA or Advance Directive.  Pt family will provide transportion at discharge. Pt's daughter, Geri states the pt may need short term rehab at discharge. SS will follow-up on Monday with discharge plans.  If pt returns home the pt will need an order for chucks for home.  SS will continue to follow.     Patient/Family In Agreement With Plan yes        Discharge Placement     No information found        Expected Discharge Date and Time     Expected Discharge Date Expected Discharge Time    Oct 15, 2017               Demographic Summary     None            Functional Status     None            Psychosocial     None            Abuse/Neglect     None            Legal     None            Substance Abuse     None            Patient Forms     None          Haleigh Scanlon

## 2017-10-13 NOTE — PLAN OF CARE
Problem: Fall Risk (Adult)  Goal: Identify Related Risk Factors and Signs and Symptoms  Outcome: Ongoing (interventions implemented as appropriate)  Goal: Absence of Falls  Outcome: Ongoing (interventions implemented as appropriate)    Problem: Mobility, Physical Impaired (Adult)  Goal: Identify Related Risk Factors and Signs and Symptoms  Outcome: Ongoing (interventions implemented as appropriate)  Goal: Enhanced Mobility Skills  Outcome: Ongoing (interventions implemented as appropriate)  Goal: Enhanced Functionality Ability  Outcome: Ongoing (interventions implemented as appropriate)    Problem: Pain, Acute (Adult)  Goal: Identify Related Risk Factors and Signs and Symptoms  Outcome: Ongoing (interventions implemented as appropriate)  Goal: Acceptable Pain Control/Comfort Level  Outcome: Ongoing (interventions implemented as appropriate)    Problem: Infection, Risk/Actual (Adult)  Goal: Identify Related Risk Factors and Signs and Symptoms  Outcome: Ongoing (interventions implemented as appropriate)  Goal: Infection Prevention/Resolution  Outcome: Ongoing (interventions implemented as appropriate)    Problem: Skin Integrity Impairment, Risk/Actual (Adult)  Goal: Identify Related Risk Factors and Signs and Symptoms  Outcome: Ongoing (interventions implemented as appropriate)  Goal: Skin Integrity/Wound Healing  Outcome: Ongoing (interventions implemented as appropriate)    Problem: Patient Care Overview (Adult)  Goal: Plan of Care Review  Outcome: Ongoing (interventions implemented as appropriate)  Goal: Adult Individualization and Mutuality  Outcome: Ongoing (interventions implemented as appropriate)  Goal: Discharge Needs Assessment  Outcome: Ongoing (interventions implemented as appropriate)

## 2017-10-13 NOTE — PROGRESS NOTES
[facility] HOSPITALIST PROGRESS NOTE     Patient Identification:  Name:  Alma Delia Garcia  Age:  87 y.o.  Sex:  female  :  1930  MRN:  [id number]  Visit Number:  [id number]  Primary Care Provider:  Galina Gonsalves MD    Length of stay:  1    Chief complaint:  87 y.o. old female admitted with distal femur fracture      Subjective:    BP was low last night, but has improved today. Pt also required 4 units of PRBC transfusion last night due to significant amount of blood loss during surgery.  Patient also was given 2-1/2 little fluid with 50 g of albumin.   This morning she is feeling better.  Denies any acute complaints other than soreness in her hip.  No other complaints.       Current Hospital Meds:    aspirin 81 mg Oral Daily   bumetanide 1 mg Oral BID   ceftriaxone 1 g Intravenous Q24H   folic acid 1 mg Oral Daily   levothyroxine 50 mcg Oral Daily   pantoprazole 40 mg Oral QAM   potassium chloride 10 mEq Oral Daily   rifaximin 550 mg Oral Q12H   rOPINIRole 1 mg Oral Nightly   sennosides-docusate sodium 2 tablet Oral BID        ----------------------------------------------------------------------------------------------------------------------  Vital Signs:  Temp:  [97.7 °F (36.5 °C)-98.8 °F (37.1 °C)] 98.4 °F (36.9 °C)  Heart Rate:  [64-89] 87  Resp:  [14-22] 22  BP: ()/(33-76) 92/50  Last 3 weights    10/12/17  0047 10/12/17  0327 10/13/17  0400   Weight: 147 lb (66.7 kg) 149 lb 3.2 oz (67.7 kg) 154 lb 1.6 oz (69.9 kg)     Body mass index is 28.19 kg/(m^2).    Intake/Output Summary (Last 24 hours) at 10/13/17 1009  Last data filed at 10/13/17 0864   Gross per 24 hour   Intake             4550 ml   Output             1875 ml   Net             2675 ml     Diet Regular  ----------------------------------------------------------------------------------------------------------------------  Physical exam:  Constitutional:  Well-developed and well-nourished.   Sitting comfortably in  bed.  HENT:  Head:  Normocephalic and atraumatic.  Mouth:  Moist mucous membranes.    Eyes:  Conjunctivae and EOM are normal.  Pupils are equal, round, and reactive to light.   Neck:  Neck supple.  No JVD present.    Cardiovascular:  Regular rate and rhythm. S1+S2. No murmur, rubs or gallops.   Pulmonary/Chest: Clear to auscultation bilaterally.   Abdominal:  Soft. Non-tender. No viscera palpable.  Bowel sounds audible.   Musculoskeletal: No deformity or joint swelling.   Peripheral vascular: Bilateral dorsalis pedis palpable.  Bilateral lower extremity pitting edema.  Neurological:  Alert and oriented to person, place, and time.  Cranial nerves grossly intact. Strength bilaterally symmetrical in upper and lower extremities.   Skin:  Skin is warm and dry. No rash noted. No pallor.   ----------------------------------------------------------------------------------------------------------------------  Tele:  Sinus rhythm  ----------------------------------------------------------------------------------------------------------------------    Results from last 7 days  Lab Units 10/12/17  0206   TROPONIN I ng/mL <0.006       Results from last 7 days  Lab Units 10/13/17  0317 10/12/17  1957 10/12/17  1956 10/12/17  1543 10/12/17  0509 10/12/17  0206   WBC 10*3/mm3 12.39  --   --  9.73 5.81 5.88   HEMOGLOBIN g/dL 9.2* 9.1*  --  5.8* 9.9* 10.8*   HEMATOCRIT % 27.6* 27.1*  --  18.2* 31.1* 32.5*   MCV fL 90.8  --   --  98.4* 97.2* 95.6*   MCHC g/dL 33.3  --   --  31.9* 31.8* 33.2   PLATELETS 10*3/mm3 52*  --   --  80* 103* 122*   INR  1.79*  --  2.04*  --   --  1.17*           Results from last 7 days  Lab Units 10/13/17  0317 10/12/17  0509 10/12/17  0206   SODIUM mmol/L 143 141 140   POTASSIUM mmol/L 4.6 3.5 3.1*   MAGNESIUM mg/dL  --  1.7  --    CHLORIDE mmol/L 112 105 103   CO2 mmol/L 24.2* 27.9 29.7   BUN mg/dL 21 18 18   CREATININE mg/dL 0.82 0.84 0.96   EGFR IF NONAFRICN AM mL/min/1.73 66 64 55*   CALCIUM mg/dL 8.1  9.0 9.3   GLUCOSE mg/dL 102 112* 144*   ALBUMIN g/dL 2.60* 2.70* 3.00*   BILIRUBIN mg/dL 2.1* 0.9 1.0   ALK PHOS U/L 51 113* 119*   AST (SGOT) U/L 47* 50* 55*   ALT (SGPT) U/L 15 23 26   Estimated Creatinine Clearance: 44.3 mL/min (by C-G formula based on Cr of 0.82).    Ammonia   Date Value Ref Range Status   10/13/2017 29 11 - 51 umol/L Final   10/12/2017 76 (H) 11 - 51 umol/L Final            Urine Culture   Date Value Ref Range Status   10/12/2017 90,000-100,000 CFU/mL Gram Negative Bacilli (A)  Preliminary             I have personally looked at the labs and they are summarized above.  ----------------------------------------------------------------------------------------------------------------------  Imaging Results (last 24 hours)     Procedure Component Value Units Date/Time    FL Surgery Fluoro [[id number]] Updated:  10/12/17 1634    XR Chest 1 View [[id number]] Collected:  10/13/17 0747     Updated:  10/13/17 0802    Narrative:       XR CHEST 1 VIEW-     CLINICAL INDICATION: Cough; S72.402A-Unspecified fracture of lower end  of left femur, initial encounter for closed fracture; S72.492A-Other  fracture of lower end of left femur, initial encounter for closed  fracture.          COMPARISON: 10/12/2017.      TECHNIQUE: Single frontal view of the chest.     FINDINGS:     There is no focal alveolar infiltrate or effusion.  The cardiac silhouette is normal. The pulmonary vasculature is  unremarkable.  There is no evidence of an acute osseous abnormality.   There are no suspicious-appearing parenchymal soft tissue nodules.            Impression:       No evidence of active or acute cardiopulmonary disease on today's chest  radiograph.         This report was finalized on 10/13/2017 8:00 AM by Dr. Byron Mcintosh MD.       XR Femur 2 View Left [[id number]] Collected:  10/13/17 0747     Updated:  10/13/17 0805    Narrative:       XR FEMUR 2 VIEW LEFT-      CLINICAL INDICATION: Postop; S72.402A-Unspecified fracture of  lower end  of left femur, initial encounter for closed fracture; S72.492A-Other  fracture of lower end of left femur, initial encounter for closed  fracture.          COMPARISON: None available.     FINDINGS: 3 views of the left femur show postoperative hardware.  Overlying skin clips are present.       Impression:       Postoperative change for fixation of distal femoral fracture  with markedly improved alignment.      This report was finalized on 10/13/2017 8:03 AM by Dr. Byron Mcintosh MD.           ----------------------------------------------------------------------------------------------------------------------  Assessment and Plan:    -Acute, traumatic left distal femur fracture status post fall  S/p ORIF.  Continue pain control with narcotic analgesic.  Continue PT/OT and postoperative care per orthopedic surgery.  Fish 8 help from orthopedics surgery.    - Acute on chronic anemia  Patient had acute expected blood loss anemia requiring transfusion.  Status post 4 units of PRBC transfusion.  H&H improved.  Continue to monitor H&H.    - Acute UTI  Continue Rocephin.  Urine culture pending.    - Chronic diastolic CHF  Compensated.  Continue Bumex.  Monitor intake, output and daily weight    - Hypothyroidism  Continue levothyroxine.    - Cirrhosis  Compensated at present.  Continue rifaximin to prevent encephalopathy    - Chronic venous stasis causing lower extremity edema  Continue diuretics and monitor electrolytes.    - Prophylaxis  DVT: SCDs  GI: PPI.       The patient is high risk due to: Femur fracture, acute UTI, CHF and acute anemia.    Delores Diamond MD  10/13/17  10:09 AM

## 2017-10-13 NOTE — PLAN OF CARE
Problem: Inpatient Occupational Therapy  Goal: Endurance Goal LTG- OT    10/13/17 1256   Endurance OT LTG   Endurance Goal OT LTG, Date Established 10/13/17   Endurance Goal OT LTG, Time to Achieve by discharge   Endurance Goal OT LTG, Activity Level endurance 2 fair       Goal: Grooming Goal LTG- OT    10/13/17 1256   Grooming OT LTG   Grooming Goal OT LTG, Date Established 10/13/17   Grooming Goal OT LTG, Time to Achieve by discharge   Grooming Goal OT LTG, [place] Level minimum assist (75% patient effort)

## 2017-10-13 NOTE — PROGRESS NOTES
HOSPITALIST CROSS COVER NOTE:    The patient was evaluated this evening after surgery.  The patient underwent a left femoral ORIF.  Dr. Hanna, anesthesiologist is on the unit and I discussed her case with him. He states that the patient did fairly well during surgery but did experience a significant amount of blood loss requiring PRBC transfusion, which was not unexpected given her history of liver cirrhosis.  She also received approximately 2-1/2 L of fluid with 50 g of albumin.    Her initial postoperative hemoglobin had dropped from 9.9-5.8 with a drop in her hemoglobin from 31.1-18.2.  After 4 packed red blood cell transfusions, her hemoglobin has improved to 9.1 with a hematocrit of 27.1.  Her blood pressures have been acceptable for the most part with systolic blood pressure readings in the upper 90s to low 100s.  Her mean arterial pressures have been in the low to mid 60s.    On examination, the patient is sleeping but arousable.  Her oxygen saturation is 99% via 2 L per nasal cannula.  She currently denies any significant pain in the left lower extremity.  She does complain of some mild dyspnea that appears to be comfortable.  She does have some mild nonproductive cough.  Her heart is regular rate and rhythm with no murmur gallop or rub.  Her lungs are with some coarse bilateral breath sounds.  Abdomen is soft but distended with tympany to percussion mostly in the upper quadrants.  The patient has an immobilizer on the left lower extremity with an SCD on the right lower extremity.  She has stable 2+ edema in the right lower extremity.  A Ery catheter is in place.  Neurologically she is intact and able to follow all commands.  She has a left-sided external jugular peripheral IV and a left upper extremity peripheral IV.    Continue to follow her blood pressure closely overnight.  Will allow the patient's IV fluid order to run to completion and will consider further hydration based on her clinical picture.   Will be cautious regarding volume overload in the setting of liver cirrhosis and diastolic dysfunction.  I will obtain a portable chest x-ray in the morning and will repeat her CBC, CMP, PT/INR and ammonia level. Continue IV Rocephin while awaiting final urine culture results.    Present during visit: PHILLIP Vela

## 2017-10-13 NOTE — THERAPY EVALUATION
Acute Care - Physical Therapy Initial Evaluation   Mark     Patient Name: Alma Delia Garcia  : 1930  MRN: [id number]  Today's Date: 10/13/2017   Onset of Illness/Injury or Date of Surgery Date: 10/12/17  Date of Referral to PT: 10/13/17  Referring Physician: Dr. Pierre      Admit Date: 10/12/2017     Visit Dx:    ICD-10-CM ICD-9-CM   1. Closed fracture of distal end of left femur, unspecified fracture morphology, initial encounter S72.402A 821.20   2. Other closed fracture of distal end of left femur, initial encounter S72.492A 821.29     Patient Active Problem List   Diagnosis   • Hepatic cirrhosis*   • HTN (hypertension)*   • Anxiety disorder*   • Osteopetrosis*   • Hypothyroidism*   • GERD (gastroesophageal reflux disease)*   • Chronic pain syndrome due to fractured spine and left rib fracture*   • Anemia, chronic disease   • Arthralgia of hip   • Left knee pain   • Fracture of proximal end of tibia and fibula   • Primary insomnia   • Thrombocytopenia   • Renal failure   • Peripheral vascular disease   • Bilateral leg edema   • Cramp of both lower extremities   • Hepatic encephalopathy   • UTI (urinary tract infection)   • Hypokalemia   • Chronic systolic heart failure   • Closed fracture of left distal femur     Past Medical History:   Diagnosis Date   • Acute renal failure    • Anxiety    • Bell's palsy    • Cellulitis     LLE   • CHF (congestive heart failure)     Diastolic   • Diverticulosis    • GERD (gastroesophageal reflux disease)    • Hepatic cirrhosis    • History of transfusion    • Hypertension    • Hypothyroidism    • Osteoporosis    • Thrombocytopenia    • Trigeminal neuralgia      Past Surgical History:   Procedure Laterality Date   • BACK SURGERY      KYPHOPLASTIES    • CARDIAC CATHETERIZATION Left 2004   • CARDIOVASCULAR STRESS TEST  2015   • CATARACT EXTRACTION     • CHOLECYSTECTOMY     • CLAVICLE SURGERY Right    • COLONOSCOPY  10/2004   • ECHO - CONVERTED  2012   •  FEMUR OPEN REDUCTION INTERNAL FIXATION Left 10/12/2017    Procedure: FEMUR OPEN REDUCTION INTERNAL FIXATION;  Surgeon: Karson Pierre MD;  Location: [facility] OR;  Service:    • HIP FRACTURE SURGERY Bilateral     ARTHROPLASTIES    • PARTIAL HYSTERECTOMY     • WRIST FRACTURE SURGERY            PT ASSESSMENT (last 72 hours)      PT Evaluation       10/13/17 1337 10/13/17 1300    Rehab Evaluation    Document Type  evaluation  -BC    Subjective Information  agree to therapy;complains of;weakness;fatigue  -BC    Patient Effort, Rehab Treatment  adequate  -BC    Symptoms Noted During/After Treatment  fatigue  -BC    General Information    Patient Profile Review  yes  -BC    Onset of Illness/Injury or Date of Surgery Date  10/12/17  -BC    Referring Physician  Dr. Pierre  -BC    General Observations  Pt. supine in bed, O2 intact,   -BC    Pertinent History Of Current Problem  L ORIF distal femur fx  -BC    Precautions/Limitations  fall precautions;oxygen therapy device and L/min  -BC    Prior Level of Function  max assist:;all household mobility  -BC    Equipment Currently Used at Home  commode;wheelchair;walker, rolling;shower chair  -BC    Plans/Goals Discussed With  patient;agreed upon  -BC    Barriers to Rehab  medically complex;previous functional deficit;physical barrier  -BC    Living Environment    Lives With  child(jojo), adult  -BC    Living Arrangements  house  -BC    Clinical Impression    Date of Referral to PT  10/13/17  -BC    Functional Level At Time Of Evaluation  poor  -BC    Criteria for Skilled Therapeutic Interventions Met  yes;treatment indicated  -BC    Rehab Potential  fair, will monitor progress closely  -BC    Predicted Duration of Therapy Intervention (days/wks)  LOS  -BC    Cognitive Assessment/Intervention    Current Cognitive/Communication Assessment  functional  -BC    Orientation Status  oriented to;person  -BC    Follows Commands/Answers Questions  able to follow single-step  instructions;needs cueing  -BC    ROM (Range of Motion)    General ROM  lower extremity range of motion deficits identified  -BC    General ROM Detail LLE not tested  -BC     MMT (Manual Muscle Testing)    General MMT Assessment Detail BLE not tested  -BC     Muscle Tone Assessment    Muscle Tone Assessment LLE   NWB; immobilizer to knee.    -KF     LLE Muscle Tone Assessment unable/inappropriate to test  -KF     Bed Mobility, Assessment/Treatment    Bed Mobility, Assistive Device bed rails  -BC     Bed Mob, Supine to Sit, Falls maximum assist (25% patient effort);dependent (less than 25% patient effort);2 person assist required  -BC     Bed Mob, Sit to Supine, Falls maximum assist (25% patient effort);dependent (less than 25% patient effort);2 person assist required  -BC     Transfer Assessment/Treatment    Transfers, Sit-Stand Falls dependent (less than 25% patient effort);2 person assist required  -BC     Transfers, Stand-Sit Falls dependent (less than 25% patient effort);2 person assist required  -BC     Gait Assessment/Treatment    Gait, Falls Level dependent (less than 25% patient effort);2 person assist required;unable to perform  -BC     Positioning and Restraints    Pre-Treatment Position in bed  -BC     Post Treatment Position chair  -BC     In Bed notified nsg;supine;call light within reach;encouraged to call for assist;side rails up x3   2nd session  -BC     In Chair notified nsg;sitting;call light within reach;encouraged to call for assist   1st session  -BC       10/13/17 1241 10/13/17 0200    Rehab Evaluation    Document Type evaluation  -BF     Subjective Information agree to therapy;complains of;weakness;fatigue  -BF     Patient Effort, Rehab Treatment adequate  -BF     Symptoms Noted During/After Treatment fatigue  -BF     General Information    Patient Profile Review yes  -BF     Onset of Illness/Injury or Date of Surgery Date 10/12/17  -BF     Referring  Physician Ignacio  -BF     General Observations Pt supine in bed, O2 intact; pt easily awoken  -BF     Pertinent History Of Current Problem L ORIF femur fx  -BF     Precautions/Limitations fall precautions;oxygen therapy device and L/min  -BF     Prior Level of Function ADL's;mod assist:;max assist:  -BF     Equipment Currently Used at Home commode;wheelchair;walker, rolling;shower chair  -BF     Plans/Goals Discussed With patient;agreed upon  -BF     Barriers to Rehab medically complex;previous functional deficit;physical barrier  -BF     Living Environment    Lives With child(jojo), adult  -BF     Living Arrangements house  -BF     Vision Assessment/Intervention    Visual Impairment visual impairment, bilaterally  -BF     Cognitive Assessment/Intervention    Current Cognitive/Communication Assessment functional  -BF     Orientation Status oriented to;person  -BF     Follows Commands/Answers Questions able to follow single-step instructions;needs cueing  -BF     ROM (Range of Motion)    General ROM upper extremity range of motion deficits identified  -BF     General ROM Detail R shoulder AROM 50%; L shoulder 40%; Pt states that she has a hurt rib on R side that limits ROM.  -BF     MMT (Manual Muscle Testing)    General MMT Assessment upper extremity strength deficits identified  -BF     General MMT Assessment Detail BUE shoulder  MMT score grossly 2+/5  -BF     Muscle Tone Assessment    LLE Muscle Tone Assessment  unable/inappropriate to test   knee immobilizer in place  -CD    Motor Skills/Interventions    Additional Documentation Functional Endurance (Group)  -BF     Functional Endurance    Detail (Functional Endurance) Poor  -BF     Positioning and Restraints    Pre-Treatment Position in bed  -BF     Post Treatment Position bed  -BF     In Bed supine;call light within reach;encouraged to call for assist  -BF       10/12/17 1955 10/12/17 0560    Living Environment    Lives With  child(jojo), adult;other (see  comments)   Geri Gee and Savannah Vega.    -AG    Living Arrangements  house  -AG    Transportation Available  car;family or friend will provide  -AG    Muscle Tone Assessment    LLE Muscle Tone Assessment unable/inappropriate to test   knee immobilizer in place  -CD       10/12/17 0800 10/12/17 0400    Living Environment    Lives With  child(jojo), adult  -SM    Living Arrangements  house  -SM    Home Accessibility  no concerns  -    Stair Railings at Home  none  -    Type of Financial/Environmental Concern  none  -SM    Transportation Available  car;family or friend will provide  -SM    Muscle Tone Assessment    Muscle Tone Assessment LLE  -LG     LLE Muscle Tone Assessment unable/inappropriate to test   immobilizer in place  -LG       10/12/17 0315       General Information    Equipment Currently Used at Home wheelchair;commode;walker, rolling  -     Muscle Tone Assessment    Muscle Tone Assessment LLE  -SM     LLE Muscle Tone Assessment unable/inappropriate to test   immobilizer in place  -       User Key  (r) = Recorded By, (t) = Taken By, (c) = Cosigned By    Initials Name Provider Type     Aubrie Rudd, RN Registered Nurse     Salome Hallman, PHILLIP Registered Nurse    BC Rosa Stringer, PT Physical Therapist    BF Jasmine Rogers, OT Occupational Therapist    AG Haleigh Scanlon      Sarah Curtis, RN Registered Nurse    KF Breanna Resendez, RN Registered Nurse          Physical Therapy Education     Title: PT OT SLP Therapies (Active)     Topic: Physical Therapy (Active)     Point: Mobility training (Active)    Learning Progress Summary    Learner Readiness Method Response Comment Documented by Status   Patient Acceptance E NL  BC 10/13/17 1414 Active    Acceptance E VU   10/12/17 2213 Done    Acceptance E VU,NR   10/12/17 0433 Done               Point: Home exercise program (Active)    Learning Progress Summary    Learner Readiness Method Response  Comment Documented by Status   Patient Acceptance E NL  BC 10/13/17 1414 Active    Acceptance E VU   10/12/17 2213 Done    Acceptance E VU,NR   10/12/17 0433 Done               Point: Body mechanics (Active)    Learning Progress Summary    Learner Readiness Method Response Comment Documented by Status   Patient Acceptance E NL  BC 10/13/17 1414 Active    Acceptance E VU   10/12/17 2213 Done    Acceptance E VU,NR   10/12/17 0433 Done               Point: Precautions (Active)    Learning Progress Summary    Learner Readiness Method Response Comment Documented by Status   Patient Acceptance E NL  BC 10/13/17 1414 Active    Acceptance E VU   10/12/17 2213 Done    Acceptance E VU,NR   10/12/17 0433 Done                      User Key     Initials Effective Dates Name Provider Type Discipline     06/16/16 -  Aubrie Rudd, RN Registered Nurse Nurse    BC 03/14/16 -  Rosa Stringer, PT Physical Therapist PT     12/19/16 -  Sarah Curtis RN Registered Nurse Nurse                PT Recommendation and Plan  Planned Therapy Interventions: balance training, bed mobility training, gait training, home exercise program, patient/family education, strengthening, transfer training  PT Frequency: 2 times/day, 5 times/wk, per priority policy             IP PT Goals       10/13/17 1415          Bed Mobility PT LTG    Bed Mobility PT LTG, Date Established 10/13/17  -BC      Bed Mobility PT LTG, Time to Achieve by discharge  -BC      Bed Mobility PT LTG, Activity Type all bed mobility  -BC      Bed Mobility PT LTG, [place] Level minimum assist (75% patient effort)  -BC      Bed Mobility PT Goal  LTG, Assist Device bed rails  -BC      Transfer Training PT LTG    Transfer Training PT LTG, Date Established 10/13/17  -BC      Transfer Training PT LTG, Time to Achieve by discharge  -BC      Transfer Training PT LTG, Activity Type all transfers  -BC      Transfer Training PT LTG, [place] Level minimum  assist (75% patient effort)  -BC      Transfer Training PT LTG, Assist Device walker, rolling  -BC      Gait Training PT LTG    Gait Training Goal PT LTG, Date Established 10/13/17  -BC      Gait Training Goal PT LTG, Time to Achieve by discharge  -BC      Gait Training Goal PT LTG, [place] Level minimum assist (75% patient effort)  -BC      Gait Training Goal PT LTG, Assist Device walker, rolling  -BC      Gait Training Goal PT LTG, Distance to Achieve 10  -BC        User Key  (r) = Recorded By, (t) = Taken By, (c) = Cosigned By    Initials Name Provider Type    JULISSA Stringer, PT Physical Therapist                Outcome Measures       10/13/17 1400 10/13/17 1257       How much help from another person do you currently need...    Turning from your back to your side while in flat bed without using bedrails? 1  -BC      Moving from lying on back to sitting on the side of a flat bed without bedrails? 2  -BC      Moving to and from a bed to a chair (including a wheelchair)? 1  -BC      Standing up from a chair using your arms (e.g., wheelchair, bedside chair)? 1  -BC      Climbing 3-5 steps with a railing? 1  -BC      To walk in hospital room? 1  -BC      AM-PAC 6 Clicks Score 7  -BC      How much help from another is currently needed...    Putting on and taking off regular lower body clothing?  1  -BF     Bathing (including washing, rinsing, and drying)  1  -BF     Toileting (which includes using toilet bed pan or urinal)  1  -BF     Putting on and taking off regular upper body clothing  2  -BF     Taking care of personal grooming (such as brushing teeth)  2  -BF     Eating meals  3  -BF     Score  10  -BF     Functional Assessment    Outcome Measure Options AM-PAC 6 Clicks Basic Mobility (PT)  -BC AM-PAC 6 Clicks Daily Activity (OT)  -BF       User Key  (r) = Recorded By, (t) = Taken By, (c) = Cosigned By    Initials Name Provider Type    JULISSA Stringer, PT Physical Therapist    BAILEY Portillo  RASHID Rogers Occupational Therapist           Time Calculation:         PT Charges       10/13/17 1419          Time Calculation    Start Time --   75  -BC      PT Received On 10/13/17  -BC      PT Goal Re-Cert Due Date 10/27/17  -BC        User Key  (r) = Recorded By, (t) = Taken By, (c) = Cosigned By    Initials Name Provider Type    BC Rosa Stringer, PT Physical Therapist          Therapy Charges for Today     Code Description Service Date Service Provider Modifiers Qty    90016606653 HC PT MOBILITY CURRENT 10/13/2017 Rosa Stringer, PT GP, CM 1    71989530497 HC PT MOBILITY PROJECTED 10/13/2017 Rosa Stringer, PT GP, CL 1    31658211065 HC PT EVAL MOD COMPLEXITY 2 10/13/2017 Rosa Stringer, PT GP 1    74080747620 HC PT THERAPEUTIC ACT EA 15 MIN 10/13/2017 Rosa Stringer, PT GP 3    17909225006 HC PT THER SUPP EA 15 MIN 10/13/2017 Rosa Stringer, PT GP 4          PT G-Codes  Outcome Measure Options: AM-PAC 6 Clicks Basic Mobility (PT)  Score: 7  Functional Limitation: Mobility: Walking and moving around  Mobility: Walking and Moving Around Current Status (): At least 80 percent but less than 100 percent impaired, limited or restricted  Mobility: Walking and Moving Around Goal Status (): At least 60 percent but less than 80 percent impaired, limited or restricted      Rosa Stringer PT  10/13/2017

## 2017-10-13 NOTE — THERAPY EVALUATION
Acute Care - Occupational Therapy Initial Evaluation   [place]     Patient Name: Alma Delia Garcia  : 1930  MRN: [id number]  Today's Date: 10/13/2017  Onset of Illness/Injury or Date of Surgery Date: 10/12/17  Date of Referral to OT: 10/12/17  Referring Physician: Ignacio    Admit Date: 10/12/2017       ICD-10-CM ICD-9-CM   1. Closed fracture of distal end of left femur, unspecified fracture morphology, initial encounter S72.402A 821.20   2. Other closed fracture of distal end of left femur, initial encounter S72.492A 821.29     Patient Active Problem List   Diagnosis   • Hepatic cirrhosis*   • HTN (hypertension)*   • Anxiety disorder*   • Osteopetrosis*   • Hypothyroidism*   • GERD (gastroesophageal reflux disease)*   • Chronic pain syndrome due to fractured spine and left rib fracture*   • Anemia, chronic disease   • Arthralgia of hip   • Left knee pain   • Fracture of proximal end of tibia and fibula   • Primary insomnia   • Thrombocytopenia   • Renal failure   • Peripheral vascular disease   • Bilateral leg edema   • Cramp of both lower extremities   • Hepatic encephalopathy   • UTI (urinary tract infection)   • Hypokalemia   • Chronic systolic heart failure   • Closed fracture of left distal femur     Past Medical History:   Diagnosis Date   • Acute renal failure    • Anxiety    • Bell's palsy    • Cellulitis     LLE   • CHF (congestive heart failure)     Diastolic   • Diverticulosis    • GERD (gastroesophageal reflux disease)    • Hepatic cirrhosis    • History of transfusion    • Hypertension    • Hypothyroidism    • Osteoporosis    • Thrombocytopenia    • Trigeminal neuralgia      Past Surgical History:   Procedure Laterality Date   • BACK SURGERY      KYPHOPLASTIES    • CARDIAC CATHETERIZATION Left 2004   • CARDIOVASCULAR STRESS TEST  2015   • CATARACT EXTRACTION     • CHOLECYSTECTOMY     • CLAVICLE SURGERY Right    • COLONOSCOPY  10/2004   • ECHO - CONVERTED  2012   • FEMUR OPEN  REDUCTION INTERNAL FIXATION Left 10/12/2017    Procedure: FEMUR OPEN REDUCTION INTERNAL FIXATION;  Surgeon: Karson iPerre MD;  Location: [facility] OR;  Service:    • HIP FRACTURE SURGERY Bilateral     ARTHROPLASTIES    • PARTIAL HYSTERECTOMY     • WRIST FRACTURE SURGERY            OT ASSESSMENT FLOWSHEET (last 72 hours)      OT Evaluation       10/13/17 1241 10/13/17 0200 10/12/17 1915 10/12/17 1444 10/12/17 0800    Rehab Evaluation    Document Type evaluation  -BF        Subjective Information agree to therapy;complains of;weakness;fatigue  -BF        Patient Effort, Rehab Treatment adequate  -BF        Symptoms Noted During/After Treatment fatigue  -BF        General Information    Patient Profile Review yes  -BF        Onset of Illness/Injury or Date of Surgery Date 10/12/17  -BF        Referring Physician Ignacio  -BF        General Observations Pt supine in bed, O2 intact; pt easily awoken  -BF        Pertinent History Of Current Problem L ORIF femur fx  -BF        Precautions/Limitations fall precautions;oxygen therapy device and L/min  -BF        Prior Level of Function ADL's;mod assist:;max assist:  -BF        Equipment Currently Used at Home commode;wheelchair;walker, rolling;shower chair  -BF        Plans/Goals Discussed With patient;agreed upon  -BF        Barriers to Rehab medically complex;previous functional deficit;physical barrier  -BF        Living Environment    Lives With child(jojo), adult  -BF   child(jojo), adult;other (see comments)   Geri Gee and Savannah Vega.    -AG     Living Arrangements house  -BF   house  -AG     Transportation Available    car;family or friend will provide  -AG     Clinical Impression    Date of Referral to OT 10/12/17  -BF        OT Diagnosis Debility  -BF        Impairments Found (describe specific impairments) aerobic capacity/endurance;muscle performance;ROM;motor function;other (see comments)   Impaired ADLs  -BF        Patient/Family Goals  Statement To return home   -BF        Criteria for Skilled Therapeutic Interventions Met yes  -BF        Rehab Potential fair, will monitor progress closely  -BF        Therapy Frequency 3-5 times/wk  -BF        Predicted Duration of Therapy Intervention (days/wks) 7-10 days  -BF        Anticipated Equipment Needs At Discharge --   TBD  -BF        Anticipated Discharge Disposition home with assist;home with home health   TBD  -BF        Vision Assessment/Intervention    Visual Impairment visual impairment, bilaterally  -BF        Cognitive Assessment/Intervention    Current Cognitive/Communication Assessment functional  -BF        Orientation Status oriented to;person  -BF        Follows Commands/Answers Questions able to follow single-step instructions;needs cueing  -BF        ROM (Range of Motion)    General ROM upper extremity range of motion deficits identified  -BF        General ROM Detail R shoulder AROM 50%; L shoulder 40%; Pt states that she has a hurt rib on R side that limits ROM.  -BF        MMT (Manual Muscle Testing)    General MMT Assessment upper extremity strength deficits identified  -BF        General MMT Assessment Detail BUE shoulder  MMT score grossly 2+/5  -BF        Muscle Tone Assessment    Muscle Tone Assessment     LLE  -LG    LLE Muscle Tone Assessment  unable/inappropriate to test   knee immobilizer in place  -CD unable/inappropriate to test   knee immobilizer in place  -CD  unable/inappropriate to test   immobilizer in place  -LG    ADL Assessment/Intervention    Additional Documentation Self-Feeding Assessment/Training (Group)  -BF        Lower Body Bathing Assessment/Training    LB Bathing Assess/Train, Comment TB bathing: Total A  -BF        Upper Body Dressing Assessment/Training    UB Dressing Assess/Train, Comment Max A  -BF        Lower Body Dressing Assessment/Training    LB Dressing Assess/Train, Comment Total A  -BF        Toileting Assessment/Training    Toileting  Assess/Train, Comment Total A  -BF        Grooming Assessment/Training    Grooming Assess/Train, Comment Mod A  -BF        Self-Feeding Assessment/Training    Self-Feeding Assess/Train, Comment Set-up  -BF        Motor Skills/Interventions    Additional Documentation Functional Endurance (Group)  -BF        Functional Endurance    Detail (Functional Endurance) Poor  -BF        General Therapy Interventions    Planned Therapy Interventions activity intolerance;ADL retraining;motor coordination training;ROM (Range of Motion);strengthening  -BF        Positioning and Restraints    Pre-Treatment Position in bed  -BF        Post Treatment Position bed  -BF        In Bed supine;call light within reach;encouraged to call for assist  -BF          10/12/17 0400 10/12/17 0313             General Information    Equipment Currently Used at Home  wheelchair;commode;walker, rolling  -SM       Living Environment    Lives With child(jojo), adult  -SM        Living Arrangements house  -SM        Home Accessibility no concerns  -SM        Stair Railings at Home none  -SM        Type of Financial/Environmental Concern none  -SM        Transportation Available car;family or friend will provide  -SM        Functional Level Prior    Ambulation 1-->assistive equipment  -SM        Transferring 1-->assistive equipment  -SM        Toileting 1-->assistive equipment  -SM        Bathing 2-->assistive person  -SM        Dressing 2-->assistive person  -SM        Eating 0-->independent  -SM        Communication 0-->understands/communicates without difficulty  -SM        Swallowing 0-->swallows foods/liquids without difficulty  -SM        Prior Functional Level Comment none  -SM        Muscle Tone Assessment    Muscle Tone Assessment  LLE  -SM       LLE Muscle Tone Assessment  unable/inappropriate to test   immobilizer in place  -SM         User Key  (r) = Recorded By, (t) = Taken By, (c) = Cosigned By    Initials Name Effective Dates    BRUCE Alfred  Mirian Rudd, PHILLIP 06/16/16 -     LG Salome Hallman RN 06/16/16 -     BF Jasmine Rogers OT 03/14/16 -     AG Haleigh Scanlon 03/14/16 -     SM Sarah Curtis RN 12/19/16 -                  OT Recommendation and Plan  Anticipated Equipment Needs At Discharge:  (TBD)  Anticipated Discharge Disposition: home with assist, home with home health (TBD)  Planned Therapy Interventions: activity intolerance, ADL retraining, motor coordination training, ROM (Range of Motion), strengthening  Therapy Frequency: 3-5 times/wk             OT Goals       10/13/17 1256          Grooming OT LTG    Grooming Goal OT LTG, Date Established 10/13/17  -BF      Grooming Goal OT LTG, Time to Achieve by discharge  -BF      Grooming Goal OT LTG, [place] Level minimum assist (75% patient effort)  -BF      Endurance OT LTG    Endurance Goal OT LTG, Date Established 10/13/17  -BF      Endurance Goal OT LTG, Time to Achieve by discharge  -BF      Endurance Goal OT LTG, Activity Level endurance 2 fair  -BF        User Key  (r) = Recorded By, (t) = Taken By, (c) = Cosigned By    Initials Name Provider Type    BF Jasmine Rogers OT Occupational Therapist                Outcome Measures       10/13/17 1257          How much help from another is currently needed...    Putting on and taking off regular lower body clothing? 1  -BF      Bathing (including washing, rinsing, and drying) 1  -BF      Toileting (which includes using toilet bed pan or urinal) 1  -BF      Putting on and taking off regular upper body clothing 2  -BF      Taking care of personal grooming (such as brushing teeth) 2  -BF      Eating meals 3  -BF      Score 10  -BF      Functional Assessment    Outcome Measure Options AM-PAC 6 Clicks Daily Activity (OT)  -BF        User Key  (r) = Recorded By, (t) = Taken By, (c) = Cosigned By    Initials Name Provider Type    BAILEY Rogers OT Occupational Therapist          Time Calculation:   OT  Non-Billable Time (min): 25 min (Total Timed Code Minutes-OT: 25 minutes)    Therapy Charges for Today     Code Description Service Date Service Provider Modifiers Qty    25083637187 HC OT EVAL HIGH COMPLEXITY 2 10/13/2017 Jasmine Rogers OT GO 1    [id number]  OT SELFCARE CURRENT 10/13/2017 Jasmine Rogers OT GO, CM 1    [id number]  OT SELFCARE PROJECTED 10/13/2017 Jasmine Rogers OT GO, CK 1          OT G-codes  OT Professional Judgement Used?: Yes  OT Functional Scales Options: AM-PAC 6 Clicks Daily Activity (OT)  Functional Assessment Tool Used: FIM  Functional Limitation: Self care  Self Care Current Status (): At least 80 percent but less than 100 percent impaired, limited or restricted  Self Care Goal Status (): At least 40 percent but less than 60 percent impaired, limited or restricted    Jasmine Rogers OT  10/13/2017

## 2017-10-13 NOTE — PROGRESS NOTES
Inpatient Progress Note  Alma Delia Garcia  Date: 10/13/17  MRN: [id number]      Subjective:  Alma Delia Garcia is a 87 y.o. female POD#1 ORIF left femur. Patient is stable this morning.She is lethargic but will awake and answer questions. She does complain of moderate pain. She denies paresthesias    Objective:    Vitals:    10/13/17 0402 10/13/17 0501 10/13/17 0602 10/13/17 0702   BP: 116/45 122/50 118/46 119/43   BP Location:       Patient Position:       Pulse: 82 84 84 86   Resp: 20 21 22    Temp:       TempSrc:       SpO2: 98% 99% 99% 97%   Weight:       Height:         Examination of the left hip reveals saturation of her bulky occlusive dressing. Her dressing was removed. She has moderate swelling in the thigh. The incision looks good without surrounding erythema. Her wound has mild post op drainage. She has active dorsiflexion and plantarflexion. Neurovascular status is intact.    Labs:      Results from last 7 days  Lab Units 10/13/17  0317 10/12/17  1957 10/12/17  1956 10/12/17  1543 10/12/17  0509 10/12/17  0206   WBC 10*3/mm3 12.39  --   --  9.73 5.81 5.88   HEMOGLOBIN g/dL 9.2* 9.1*  --  5.8* 9.9* 10.8*   HEMATOCRIT % 27.6* 27.1*  --  18.2* 31.1* 32.5*   MCV fL 90.8  --   --  98.4* 97.2* 95.6*   MCHC g/dL 33.3  --   --  31.9* 31.8* 33.2   PLATELETS 10*3/mm3 52*  --   --  80* 103* 122*   INR  1.79*  --  2.04*  --   --  1.17*           Results from last 7 days  Lab Units 10/13/17  0317 10/12/17  0509 10/12/17  0206   SODIUM mmol/L 143 141 140   POTASSIUM mmol/L 4.6 3.5 3.1*   MAGNESIUM mg/dL  --  1.7  --    CHLORIDE mmol/L 112 105 103   CO2 mmol/L 24.2* 27.9 29.7   BUN mg/dL 21 18 18   CREATININE mg/dL 0.82 0.84 0.96   EGFR IF NONAFRICN AM mL/min/1.73 66 64 55*   CALCIUM mg/dL 8.1 9.0 9.3   GLUCOSE mg/dL 102 112* 144*   ALBUMIN g/dL 2.60* 2.70* 3.00*   BILIRUBIN mg/dL 2.1* 0.9 1.0   ALK PHOS U/L 51 113* 119*   AST (SGOT) U/L 47* 50* 55*   ALT (SGPT) U/L 15 23 26   Estimated Creatinine Clearance: 44.3 mL/min  (by C-G formula based on Cr of 0.82).  Ammonia   Date Value Ref Range Status   10/13/2017 29 11 - 51 umol/L Final   10/12/2017 76 (H) 11 - 51 umol/L Final       Results from last 7 days  Lab Units 10/12/17  0206   TROPONIN I ng/mL <0.006         No results found for: HGBA1C  Lab Results   Component Value Date    TSH 1.771 10/12/2017    FREET4 1.22 07/13/2017         Pain Management Panel     Pain Management Panel Latest Ref Rng & Units 8/10/2017 7/25/2017    AMPHETAMINES SCREEN, URINE Negative Negative Negative    BARBITURATES SCREEN Negative Negative Negative    BENZODIAZEPINE SCREEN, URINE Negative Negative Negative    BUPRENORPHINE Negative Negative Negative    COCAINE SCREEN, URINE Negative Negative Negative    METHADONE SCREEN, URINE Negative Negative Negative              Radiology:  Imaging Results (last 72 hours)     Procedure Component Value Units Date/Time    XR Chest 1 View [[id number]] Collected:  10/12/17 0708     Updated:  10/12/17 0711    Narrative:       XR CHEST 1 VW-     CLINICAL INDICATION: pre-surgery          COMPARISON: None available      TECHNIQUE: Single frontal view of the chest.     FINDINGS:     There is no focal alveolar infiltrate or effusion.  The cardiac silhouette is normal. The pulmonary vasculature is  unremarkable.  There is no evidence of an acute osseous abnormality.   There are no suspicious-appearing parenchymal soft tissue nodules.            Impression:       No evidence of active or acute cardiopulmonary disease on today's chest  radiograph.         This report was finalized on 10/12/2017 7:09 AM by Dr. Byron Mcintosh MD.       XR Femur 2 View Left [[id number]] Collected:  10/12/17 0913     Updated:  10/12/17 0942    Narrative:       XR FEMUR 2 VIEWS, LEFT-      HISTORY:  Trauma.          COMPARISON: None available.     FINDINGS:  Four views of the left femur show an acute fracture of the  distal femoral shaft.     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       Impression:        Fracture of the distal femoral shaft with significant  displacement.     This report was finalized on 10/12/2017 9:40 AM by Dr. Byron Mcintosh MD.       FL Surgery Fluoro [[id number]] Updated:  10/12/17 1634    XR Chest 1 View [[id number]] Collected:  10/13/17 0747     Updated:  10/13/17 0747    Narrative:       XR CHEST 1 VIEW-     CLINICAL INDICATION: Cough; S72.402A-Unspecified fracture of lower end  of left femur, initial encounter for closed fracture; S72.492A-Other  fracture of lower end of left femur, initial encounter for closed  fracture.          COMPARISON: 10/12/2017.      TECHNIQUE: Single frontal view of the chest.     FINDINGS:     There is no focal alveolar infiltrate or effusion.  The cardiac silhouette is normal. The pulmonary vasculature is  unremarkable.  There is no evidence of an acute osseous abnormality.   There are no suspicious-appearing parenchymal soft tissue nodules.            Impression:       No evidence of active or acute cardiopulmonary disease on today's chest  radiograph.           XR Femur 2 View Left [[id number]] Collected:  10/13/17 0747     Updated:  10/13/17 0748    Narrative:       XR FEMUR 2 VIEW LEFT-      CLINICAL INDICATION: Postop; S72.402A-Unspecified fracture of lower end  of left femur, initial encounter for closed fracture; S72.492A-Other  fracture of lower end of left femur, initial encounter for closed  fracture.          COMPARISON: None available.     FINDINGS: 3 views of the left femur show postoperative hardware.  Overlying skin clips are present.       Impression:       Postoperative change for fixation of distal femoral fracture  with markedly improved alignment.                   Assessment:      ICD-10-CM ICD-9-CM   1. Closed fracture of distal end of left femur, unspecified fracture morphology, initial encounter S72.402A 821.20   2. Other closed fracture of distal end of left femur, initial encounter S72.492A 821.29       Plan:   POD#1 ORIF femur. Patient is  stable. Dressing changed this morning. Will continue to watch H&H closely. Continue knee immobilizer. Non weight bearing right leg.       WILMER Moreno

## 2017-10-13 NOTE — PLAN OF CARE
Problem: Inpatient Physical Therapy  Goal: Bed Mobility Goal LTG- PT  Outcome: Ongoing (interventions implemented as appropriate)    10/13/17 1415   Bed Mobility PT LTG   Bed Mobility PT LTG, Date Established 10/13/17   Bed Mobility PT LTG, Time to Achieve by discharge   Bed Mobility PT LTG, Activity Type all bed mobility   Bed Mobility PT LTG, [place] Level minimum assist (75% patient effort)   Bed Mobility PT Goal LTG, Assist Device bed rails       Goal: Transfer Training Goal 1 LTG- PT  Outcome: Ongoing (interventions implemented as appropriate)    10/13/17 1415   Transfer Training PT LTG   Transfer Training PT LTG, Date Established 10/13/17   Transfer Training PT LTG, Time to Achieve by discharge   Transfer Training PT LTG, Activity Type all transfers   Transfer Training PT LTG, [place] Level minimum assist (75% patient effort)   Transfer Training PT LTG, Assist Device walker, rolling       Goal: Gait Training Goal LTG- PT  Outcome: Ongoing (interventions implemented as appropriate)    10/13/17 1415   Gait Training PT LTG   Gait Training Goal PT LTG, Date Established 10/13/17   Gait Training Goal PT LTG, Time to Achieve by discharge   Gait Training Goal PT LTG, [place] Level minimum assist (75% patient effort)   Gait Training Goal PT LTG, Assist Device walker, rolling   Gait Training Goal PT LTG, Distance to Achieve 10

## 2017-10-14 LAB
ALBUMIN SERPL-MCNC: 2.6 G/DL (ref 3.4–4.8)
ALBUMIN/GLOB SERPL: 1.1 G/DL (ref 1.5–2.5)
ALP SERPL-CCNC: 67 U/L (ref 35–104)
ALT SERPL W P-5'-P-CCNC: 19 U/L (ref 10–36)
ANION GAP SERPL CALCULATED.3IONS-SCNC: 3.7 MMOL/L (ref 3.6–11.2)
AST SERPL-CCNC: 61 U/L (ref 10–30)
BACTERIA SPEC AEROBE CULT: ABNORMAL
BACTERIA SPEC AEROBE CULT: ABNORMAL
BASOPHILS # BLD AUTO: 0.02 10*3/MM3 (ref 0–0.3)
BASOPHILS NFR BLD AUTO: 0.1 % (ref 0–2)
BILIRUB SERPL-MCNC: 1.2 MG/DL (ref 0.2–1.8)
BUN BLD-MCNC: 29 MG/DL (ref 7–21)
BUN/CREAT SERPL: 31.5 (ref 7–25)
CALCIUM SPEC-SCNC: 7.8 MG/DL (ref 7.7–10)
CHLORIDE SERPL-SCNC: 109 MMOL/L (ref 99–112)
CO2 SERPL-SCNC: 26.3 MMOL/L (ref 24.3–31.9)
CREAT BLD-MCNC: 0.92 MG/DL (ref 0.43–1.29)
DEPRECATED RDW RBC AUTO: 53.2 FL (ref 37–54)
EOSINOPHIL # BLD AUTO: 0.37 10*3/MM3 (ref 0–0.7)
EOSINOPHIL NFR BLD AUTO: 2.6 % (ref 0–7)
ERYTHROCYTE [DISTWIDTH] IN BLOOD BY AUTOMATED COUNT: 16.6 % (ref 11.5–14.5)
GFR SERPL CREATININE-BSD FRML MDRD: 58 ML/MIN/1.73
GLOBULIN UR ELPH-MCNC: 2.4 GM/DL
GLUCOSE BLD-MCNC: 118 MG/DL (ref 70–110)
HCT VFR BLD AUTO: 22.2 % (ref 37–47)
HGB BLD-MCNC: 7.3 G/DL (ref 12–16)
IMM GRANULOCYTES # BLD: 0.04 10*3/MM3 (ref 0–0.03)
IMM GRANULOCYTES NFR BLD: 0.3 % (ref 0–0.5)
LYMPHOCYTES # BLD AUTO: 1.65 10*3/MM3 (ref 1–3)
LYMPHOCYTES NFR BLD AUTO: 11.8 % (ref 16–46)
MAGNESIUM SERPL-MCNC: 1.7 MG/DL (ref 1.7–2.6)
MCH RBC QN AUTO: 29.6 PG (ref 27–33)
MCHC RBC AUTO-ENTMCNC: 32.9 G/DL (ref 33–37)
MCV RBC AUTO: 89.9 FL (ref 80–94)
MONOCYTES # BLD AUTO: 1.56 10*3/MM3 (ref 0.1–0.9)
MONOCYTES NFR BLD AUTO: 11.2 % (ref 0–12)
NEUTROPHILS # BLD AUTO: 10.34 10*3/MM3 (ref 1.4–6.5)
NEUTROPHILS NFR BLD AUTO: 74 % (ref 40–75)
OSMOLALITY SERPL CALC.SUM OF ELEC: 284.5 MOSM/KG (ref 273–305)
PHOSPHATE SERPL-MCNC: 2.1 MG/DL (ref 2.7–4.5)
PLATELET # BLD AUTO: 60 10*3/MM3 (ref 130–400)
PMV BLD AUTO: 11.5 FL (ref 6–10)
POTASSIUM BLD-SCNC: 4.3 MMOL/L (ref 3.5–5.3)
PROT SERPL-MCNC: 5 G/DL (ref 6–8)
RBC # BLD AUTO: 2.47 10*6/MM3 (ref 4.2–5.4)
SODIUM BLD-SCNC: 139 MMOL/L (ref 135–153)
WBC NRBC COR # BLD: 13.98 10*3/MM3 (ref 4.5–12.5)

## 2017-10-14 PROCEDURE — 85025 COMPLETE CBC W/AUTO DIFF WBC: CPT | Performed by: INTERNAL MEDICINE

## 2017-10-14 PROCEDURE — 84100 ASSAY OF PHOSPHORUS: CPT | Performed by: PHYSICIAN ASSISTANT

## 2017-10-14 PROCEDURE — 99232 SBSQ HOSP IP/OBS MODERATE 35: CPT | Performed by: INTERNAL MEDICINE

## 2017-10-14 PROCEDURE — 99024 POSTOP FOLLOW-UP VISIT: CPT | Performed by: PHYSICIAN ASSISTANT

## 2017-10-14 PROCEDURE — 97530 THERAPEUTIC ACTIVITIES: CPT | Performed by: PHYSICAL THERAPIST

## 2017-10-14 PROCEDURE — 25010000002 MORPHINE PER 10 MG

## 2017-10-14 PROCEDURE — 25010000002 CEFTRIAXONE: Performed by: INTERNAL MEDICINE

## 2017-10-14 PROCEDURE — 80053 COMPREHEN METABOLIC PANEL: CPT | Performed by: PHYSICIAN ASSISTANT

## 2017-10-14 PROCEDURE — 83735 ASSAY OF MAGNESIUM: CPT | Performed by: PHYSICIAN ASSISTANT

## 2017-10-14 RX ORDER — POLYETHYLENE GLYCOL 3350 17 G/17G
17 POWDER, FOR SOLUTION ORAL DAILY
Status: DISCONTINUED | OUTPATIENT
Start: 2017-10-14 | End: 2017-10-17 | Stop reason: HOSPADM

## 2017-10-14 RX ORDER — MORPHINE SULFATE 4 MG/ML
INJECTION, SOLUTION INTRAMUSCULAR; INTRAVENOUS
Status: COMPLETED
Start: 2017-10-14 | End: 2017-10-14

## 2017-10-14 RX ORDER — LACTULOSE 10 G/15ML
20 SOLUTION ORAL 2 TIMES DAILY
Status: DISCONTINUED | OUTPATIENT
Start: 2017-10-14 | End: 2017-10-16

## 2017-10-14 RX ADMIN — PANTOPRAZOLE SODIUM 40 MG: 40 TABLET, DELAYED RELEASE ORAL at 06:02

## 2017-10-14 RX ADMIN — CEFTRIAXONE 1 G: 1 INJECTION, POWDER, FOR SOLUTION INTRAMUSCULAR; INTRAVENOUS at 07:37

## 2017-10-14 RX ADMIN — ASPIRIN 81 MG: 81 TABLET ORAL at 08:08

## 2017-10-14 RX ADMIN — DOCUSATE SODIUM AND SENNOSIDES 2 TABLET: 8.6; 5 TABLET, FILM COATED ORAL at 17:56

## 2017-10-14 RX ADMIN — LACTULOSE 20 G: 10 SOLUTION ORAL at 17:57

## 2017-10-14 RX ADMIN — BUMETANIDE 1 MG: 1 TABLET ORAL at 08:08

## 2017-10-14 RX ADMIN — LEVOTHYROXINE SODIUM 50 MCG: 50 TABLET ORAL at 08:09

## 2017-10-14 RX ADMIN — POLYETHYLENE GLYCOL 3350 17 G: 17 POWDER, FOR SOLUTION ORAL at 17:57

## 2017-10-14 RX ADMIN — BUMETANIDE 1 MG: 1 TABLET ORAL at 17:56

## 2017-10-14 RX ADMIN — ROPINIROLE HYDROCHLORIDE 1 MG: 1 TABLET, FILM COATED ORAL at 21:16

## 2017-10-14 RX ADMIN — RIFAXIMIN 550 MG: 550 TABLET ORAL at 21:16

## 2017-10-14 RX ADMIN — HYDROCODONE BITARTRATE AND ACETAMINOPHEN 1 TABLET: 7.5; 325 TABLET ORAL at 17:56

## 2017-10-14 RX ADMIN — Medication 1 CAPSULE: at 08:10

## 2017-10-14 RX ADMIN — HYDROCODONE BITARTRATE AND ACETAMINOPHEN 1 TABLET: 7.5; 325 TABLET ORAL at 01:28

## 2017-10-14 RX ADMIN — POTASSIUM CHLORIDE 10 MEQ: 750 TABLET, FILM COATED, EXTENDED RELEASE ORAL at 08:08

## 2017-10-14 RX ADMIN — HYDROCODONE BITARTRATE AND ACETAMINOPHEN 1 TABLET: 7.5; 325 TABLET ORAL at 23:21

## 2017-10-14 RX ADMIN — DOCUSATE SODIUM AND SENNOSIDES 2 TABLET: 8.6; 5 TABLET, FILM COATED ORAL at 08:08

## 2017-10-14 RX ADMIN — FOLIC ACID 1 MG: 1 TABLET ORAL at 08:10

## 2017-10-14 RX ADMIN — MORPHINE SULFATE 2 MG: 4 INJECTION, SOLUTION INTRAMUSCULAR; INTRAVENOUS at 05:56

## 2017-10-14 RX ADMIN — RIFAXIMIN 550 MG: 550 TABLET ORAL at 08:09

## 2017-10-14 NOTE — PLAN OF CARE
Problem: Fall Risk (Adult)  Goal: Identify Related Risk Factors and Signs and Symptoms  Outcome: Ongoing (interventions implemented as appropriate)    10/13/17 2353   Fall Risk   Fall Risk: Related Risk Factors age-related changes   Fall Risk: Signs and Symptoms presence of risk factors       Goal: Absence of Falls  Outcome: Ongoing (interventions implemented as appropriate)    10/13/17 2353   Fall Risk (Adult)   Absence of Falls making progress toward outcome         Problem: Mobility, Physical Impaired (Adult)  Goal: Identify Related Risk Factors and Signs and Symptoms  Outcome: Ongoing (interventions implemented as appropriate)    10/13/17 2353   Mobility, Physical Impaired   Physical Mobility, Impaired: Related Risk Factors activity intolerance         Problem: Pain, Acute (Adult)  Goal: Identify Related Risk Factors and Signs and Symptoms  Outcome: Ongoing (interventions implemented as appropriate)    10/13/17 2353   Pain, Acute   Related Risk Factors (Acute Pain) positioning;patient perception;surgery;trauma   Signs and Symptoms (Acute Pain) sleep pattern alteration;verbalization of pain descriptors       Goal: Acceptable Pain Control/Comfort Level  Outcome: Ongoing (interventions implemented as appropriate)    10/13/17 2353   Pain, Acute (Adult)   Acceptable Pain Control/Comfort Level making progress toward outcome         Problem: Infection, Risk/Actual (Adult)  Goal: Identify Related Risk Factors and Signs and Symptoms  Outcome: Ongoing (interventions implemented as appropriate)    10/13/17 2353   Infection, Risk/Actual   Infection, Risk/Actual: Related Risk Factors skin integrity impairment;trauma injury;age extremes   Signs and Symptoms (Infection, Risk/Actual) chills;edema;respiratory rate increase;weakness;pain         Problem: Skin Integrity Impairment, Risk/Actual (Adult)  Goal: Identify Related Risk Factors and Signs and Symptoms  Outcome: Ongoing (interventions implemented as appropriate)     10/13/17 2353   Skin Integrity Impairment, Risk/Actual   Skin Integrity Impairment, Risk/Actual: Related Risk Factors age extremes;traumatic injury;edema   Signs and Symptoms (Skin Integrity Impairment) edema;inflammation         Problem: Patient Care Overview (Adult)  Goal: Plan of Care Review  Outcome: Ongoing (interventions implemented as appropriate)    10/13/17 2353   Coping/Psychosocial Response Interventions   Plan Of Care Reviewed With patient   Patient Care Overview   Progress no change       Goal: Discharge Needs Assessment  Outcome: Ongoing (interventions implemented as appropriate)    10/12/17 1444 10/13/17 1300 10/13/17 2353   Discharge Needs Assessment   Concerns To Be Addressed --  --  no discharge needs identified   Readmission Within The Last 30 Days --  --  no previous admission in last 30 days   Equipment Needed After Discharge commode;walker, rolling;wheelchair  (Unknown Provider) --  --    Current Discharge Risk chronically ill --  --    Discharge Disposition still a patient --  --    Current Health   Anticipated Changes Related to Illness --  --  none   Living Environment   Transportation Available car;family or friend will provide --  --    Self-Care   Equipment Currently Used at Home --  commode;wheelchair;walker, rolling;shower chair --

## 2017-10-14 NOTE — PROGRESS NOTES
Acute Care - Physical Therapy Treatment Note   [place]     Patient Name: Alma Delia Garcia  : 1930  MRN: [id number]  Today's Date: 10/14/2017  Onset of Illness/Injury or Date of Surgery Date: 10/12/17  Date of Referral to PT: 10/13/17  Referring Physician: Dr. Pierre    Admit Date: 10/12/2017    Visit Dx:    ICD-10-CM ICD-9-CM   1. Closed fracture of distal end of left femur, unspecified fracture morphology, initial encounter S72.402A 821.20   2. Other closed fracture of distal end of left femur, initial encounter S72.492A 821.29     Patient Active Problem List   Diagnosis   • Hepatic cirrhosis*   • HTN (hypertension)*   • Anxiety disorder*   • Osteopetrosis*   • Hypothyroidism*   • GERD (gastroesophageal reflux disease)*   • Chronic pain syndrome due to fractured spine and left rib fracture*   • Anemia, chronic disease   • Arthralgia of hip   • Left knee pain   • Fracture of proximal end of tibia and fibula   • Primary insomnia   • Thrombocytopenia   • Renal failure   • Peripheral vascular disease   • Bilateral leg edema   • Cramp of both lower extremities   • Hepatic encephalopathy   • UTI (urinary tract infection)   • Hypokalemia   • Chronic systolic heart failure   • Closed fracture of left distal femur               Adult Rehabilitation Note       10/14/17 1100          Rehab Assessment/Intervention    Discipline physical therapist  -AD      Document Type therapy note (daily note)  -AD      Subjective Information agree to therapy;complains of;pain  -AD      Patient Effort, Rehab Treatment adequate  -AD      Symptoms Noted During/After Treatment fatigue;increased pain  -AD      Precautions/Limitations fall precautions;non-weight bearing status  -AD      Patient Response to Treatment Pt was dependent with bed mobility. She presented with decreased awareness, with CGA required with EOB sitting.  -AD      Recorded by [AD] Ashley Claudene Dalton, PT      Bed Mobility, Assessment/Treatment    Bed Mobility,  Assistive Device draw sheet  -AD      Bed Mob, Supine to Sit, Fisher dependent (less than 25% patient effort)  -AD      Bed Mob, Sit to Supine, Fisher dependent (less than 25% patient effort)  -AD      Recorded by [AD] Ashley Claudene Dalton, ASAD      Therapy Exercises    Bilateral Lower Extremities --   Sitting on EOB with CGA for ten minutes; AP peformed  -AD      Recorded by [AD] Ashley Claudene Dalton, PT      Positioning and Restraints    Pre-Treatment Position in bed  -AD      Post Treatment Position bed  -AD      In Bed notified nsg;call light within reach;side lying right;side rails up x2   SCD on RLE, knee immobilizer on RLE; heels elevated  -AD      Recorded by [AD] Ashley Claudene Dalton, PT        User Key  (r) = Recorded By, (t) = Taken By, (c) = Cosigned By    Initials Name Effective Dates    AD Ashley Claudene Dalton, PT 02/04/16 -                 IP PT Goals       10/13/17 1415          Bed Mobility PT LTG    Bed Mobility PT LTG, Date Established 10/13/17  -BC      Bed Mobility PT LTG, Time to Achieve by discharge  -BC      Bed Mobility PT LTG, Activity Type all bed mobility  -BC      Bed Mobility PT LTG, Fisher Level minimum assist (75% patient effort)  -BC      Bed Mobility PT Goal  LTG, Assist Device bed rails  -BC      Transfer Training PT LTG    Transfer Training PT LTG, Date Established 10/13/17  -BC      Transfer Training PT LTG, Time to Achieve by discharge  -BC      Transfer Training PT LTG, Activity Type all transfers  -BC      Transfer Training PT LTG, Fisher Level minimum assist (75% patient effort)  -BC      Transfer Training PT LTG, Assist Device walker, rolling  -BC      Gait Training PT LTG    Gait Training Goal PT LTG, Date Established 10/13/17  -BC      Gait Training Goal PT LTG, Time to Achieve by discharge  -BC      Gait Training Goal PT LTG, Fisher Level minimum assist (75% patient effort)  -BC      Gait Training Goal PT LTG, Assist Device walker,  rolling  -BC      Gait Training Goal PT LTG, Distance to Achieve 10  -BC        User Key  (r) = Recorded By, (t) = Taken By, (c) = Cosigned By    Initials Name Provider Type    JULISSA Stringer PT Physical Therapist          Physical Therapy Education     Title: PT OT SLP Therapies (Done)     Topic: Physical Therapy (Done)     Point: Mobility training (Done)    Learning Progress Summary    Learner Readiness Method Response Comment Documented by Status   Patient Acceptance E VU  AD 10/14/17 1121 Done    Acceptance E NR   10/13/17 1616 Active    Acceptance E [facility] 10/13/17 1414 Active    Acceptance E VU   10/12/17 2213 Done    Acceptance E VU,NR   10/12/17 0433 Done               Point: Home exercise program (Done)    Learning Progress Summary    Learner Readiness Method Response Comment Documented by Status   Patient Acceptance E VU  AD 10/14/17 1121 Done    Acceptance E NR   10/13/17 1616 Active    Acceptance E [facility] 10/13/17 1414 Active    Acceptance E VU   10/12/17 2213 Done    Acceptance E VU,NR   10/12/17 0433 Done               Point: Body mechanics (Done)    Learning Progress Summary    Learner Readiness Method Response Comment Documented by Status   Patient Acceptance E VU  AD 10/14/17 1121 Done    Acceptance E NR   10/13/17 1616 Active    Acceptance E [facility] 10/13/17 1414 Active    Acceptance E VU   10/12/17 2213 Done    Acceptance E VU,NR   10/12/17 0433 Done               Point: Precautions (Done)    Learning Progress Summary    Learner Readiness Method Response Comment Documented by Status   Patient Acceptance E VU  AD 10/14/17 1121 Done    Acceptance E NR   10/13/17 1616 Active    Acceptance E [facility] 10/13/17 1414 Active    Acceptance E VU   10/12/17 2213 Done    Acceptance E VU,NR   10/12/17 0433 Done                      User Key     Initials Effective Dates Name Provider Type Discipline    CD 06/16/16 -  Aubrie Rudd, RN Registered Nurse Nurse    AD 02/04/16 -   Ashley Claudene Dalton, PT Physical Therapist PT    BC 03/14/16 -  Rosa Stringer, PT Physical Therapist PT     10/18/16 -  Mimi Bethea, RN Registered Nurse Nurse     12/19/16 -  Sarah Curtis, RN Registered Nurse Nurse                    PT Recommendation and Plan  Planned Therapy Interventions: balance training, bed mobility training, gait training, home exercise program, patient/family education, strengthening, transfer training  PT Frequency: 2 times/day, 5 times/wk, per priority policy             Outcome Measures       10/14/17 1100 10/13/17 1400 10/13/17 1257    How much help from another person do you currently need...    Turning from your back to your side while in flat bed without using bedrails? 1  -AD 1  -BC     Moving from lying on back to sitting on the side of a flat bed without bedrails? 1  -AD 2  -BC     Moving to and from a bed to a chair (including a wheelchair)? 1  -AD 1  -BC     Standing up from a chair using your arms (e.g., wheelchair, bedside chair)? 1  -AD 1  -BC     Climbing 3-5 steps with a railing? 1  -AD 1  -BC     To walk in hospital room? 1  -AD 1  -BC     AM-PAC 6 Clicks Score 6  -AD 7  -BC     How much help from another is currently needed...    Putting on and taking off regular lower body clothing?   1  -BF    Bathing (including washing, rinsing, and drying)   1  -BF    Toileting (which includes using toilet bed pan or urinal)   1  -BF    Putting on and taking off regular upper body clothing   2  -BF    Taking care of personal grooming (such as brushing teeth)   2  -BF    Eating meals   3  -BF    Score   10  -BF    Functional Assessment    Outcome Measure Options  AM-PAC 6 Clicks Basic Mobility (PT)  -BC AM-PAC 6 Clicks Daily Activity (OT)  -BF      User Key  (r) = Recorded By, (t) = Taken By, (c) = Cosigned By    Initials Name Provider Type    AD Ashley Claudene Dalton, PT Physical Therapist    BC Rosa Stringer, PT Physical Therapist     Jasmine Portillo  RASHID Rogers Occupational Therapist           Time Calculation:         PT Charges       10/14/17 1121          Time Calculation    Start Time --   20 minutes  -AD      PT Received On 10/14/17  -AD      PT - Next Appointment 10/16/17  -AD      PT Goal Re-Cert Due Date 10/27/17  -AD        User Key  (r) = Recorded By, (t) = Taken By, (c) = Cosigned By    Initials Name Provider Type    AD Ashley Claudene Dalton, PT Physical Therapist          Therapy Charges for Today     Code Description Service Date Service Provider Modifiers Qty    55056960968 HC PT THER SUPP EA 15 MIN 10/14/2017 Ashley Claudene Dalton, PT GP 1    56551450352 HC PT THERAPEUTIC ACT EA 15 MIN 10/14/2017 Ashley Claudene Dalton, PT GP 1          PT G-Codes  Outcome Measure Options: AM-PAC 6 Clicks Basic Mobility (PT)  Score: 7  Functional Limitation: Mobility: Walking and moving around  Mobility: Walking and Moving Around Current Status (): At least 80 percent but less than 100 percent impaired, limited or restricted  Mobility: Walking and Moving Around Goal Status (): At least 60 percent but less than 80 percent impaired, limited or restricted    Ashley Claudene Dalton, PT  10/14/2017

## 2017-10-14 NOTE — PROGRESS NOTES
Inpatient Progress Note  Alma Delia Garcia  Date: 10/14/17  MRN: [id number]      Subjective:  Alma Delia Garcia is a 87 y.o. female POD#2 ORIF left femur. Patient is stable this morning.She is more alert this morning and answer questions. She does complain of moderate pain. She denies paresthesias    Objective:    Vitals:    10/14/17 0100 10/14/17 0200 10/14/17 0300 10/14/17 0400   BP: (!) 112/38 (!) 106/37 (!) 98/34 (!) 105/36   BP Location: Right arm Right arm  Right arm   Patient Position:       Pulse: 82 83 81 77   Resp:       Temp:       TempSrc:       SpO2: 96% 100% 94% 97%   Weight:       Height:         Examination of the left hip reveals  bulky occlusive dressing. She has moderate swelling in the thigh. The incision looks good without surrounding erythema. Her wound has moderate post op drainage. She has active dorsiflexion and plantarflexion. Neurovascular status is intact.    Labs:      Results from last 7 days  Lab Units 10/13/17  0317 10/12/17  1957 10/12/17  1956 10/12/17  1543 10/12/17  0509 10/12/17  0206   WBC 10*3/mm3 12.39  --   --  9.73 5.81 5.88   HEMOGLOBIN g/dL 9.2* 9.1*  --  5.8* 9.9* 10.8*   HEMATOCRIT % 27.6* 27.1*  --  18.2* 31.1* 32.5*   MCV fL 90.8  --   --  98.4* 97.2* 95.6*   MCHC g/dL 33.3  --   --  31.9* 31.8* 33.2   PLATELETS 10*3/mm3 52*  --   --  80* 103* 122*   INR  1.79*  --  2.04*  --   --  1.17*           Results from last 7 days  Lab Units 10/13/17  0317 10/12/17  0509 10/12/17  0206   SODIUM mmol/L 143 141 140   POTASSIUM mmol/L 4.6 3.5 3.1*   MAGNESIUM mg/dL  --  1.7  --    CHLORIDE mmol/L 112 105 103   CO2 mmol/L 24.2* 27.9 29.7   BUN mg/dL 21 18 18   CREATININE mg/dL 0.82 0.84 0.96   EGFR IF NONAFRICN AM mL/min/1.73 66 64 55*   CALCIUM mg/dL 8.1 9.0 9.3   GLUCOSE mg/dL 102 112* 144*   ALBUMIN g/dL 2.60* 2.70* 3.00*   BILIRUBIN mg/dL 2.1* 0.9 1.0   ALK PHOS U/L 51 113* 119*   AST (SGOT) U/L 47* 50* 55*   ALT (SGPT) U/L 15 23 26   Estimated Creatinine Clearance: 44.3 mL/min (by  C-G formula based on Cr of 0.82).  Ammonia   Date Value Ref Range Status   10/13/2017 29 11 - 51 umol/L Final   10/12/2017 76 (H) 11 - 51 umol/L Final       Results from last 7 days  Lab Units 10/12/17  0206   TROPONIN I ng/mL <0.006         No results found for: HGBA1C  Lab Results   Component Value Date    TSH 1.771 10/12/2017    FREET4 1.22 07/13/2017         Pain Management Panel     Pain Management Panel Latest Ref Rng & Units 8/10/2017 7/25/2017    AMPHETAMINES SCREEN, URINE Negative Negative Negative    BARBITURATES SCREEN Negative Negative Negative    BENZODIAZEPINE SCREEN, URINE Negative Negative Negative    BUPRENORPHINE Negative Negative Negative    COCAINE SCREEN, URINE Negative Negative Negative    METHADONE SCREEN, URINE Negative Negative Negative              Radiology:  Imaging Results (last 72 hours)     Procedure Component Value Units Date/Time    XR Chest 1 View [[id number]] Collected:  10/12/17 0708     Updated:  10/12/17 0711    Narrative:       XR CHEST 1 VW-     CLINICAL INDICATION: pre-surgery          COMPARISON: None available      TECHNIQUE: Single frontal view of the chest.     FINDINGS:     There is no focal alveolar infiltrate or effusion.  The cardiac silhouette is normal. The pulmonary vasculature is  unremarkable.  There is no evidence of an acute osseous abnormality.   There are no suspicious-appearing parenchymal soft tissue nodules.            Impression:       No evidence of active or acute cardiopulmonary disease on today's chest  radiograph.         This report was finalized on 10/12/2017 7:09 AM by Dr. Byron Mcintosh MD.       XR Femur 2 View Left [[id number]] Collected:  10/12/17 0913     Updated:  10/12/17 0942    Narrative:       XR FEMUR 2 VIEWS, LEFT-      HISTORY:  Trauma.          COMPARISON: None available.     FINDINGS:  Four views of the left femur show an acute fracture of the  distal femoral shaft.     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       Impression:       Fracture  of the distal femoral shaft with significant  displacement.     This report was finalized on 10/12/2017 9:40 AM by Dr. Byron Mcintosh MD.       FL Surgery Fluoro [[id number]] Updated:  10/12/17 1634    XR Chest 1 View [[id number]] Collected:  10/13/17 0747     Updated:  10/13/17 0747    Narrative:       XR CHEST 1 VIEW-     CLINICAL INDICATION: Cough; S72.402A-Unspecified fracture of lower end  of left femur, initial encounter for closed fracture; S72.492A-Other  fracture of lower end of left femur, initial encounter for closed  fracture.          COMPARISON: 10/12/2017.      TECHNIQUE: Single frontal view of the chest.     FINDINGS:     There is no focal alveolar infiltrate or effusion.  The cardiac silhouette is normal. The pulmonary vasculature is  unremarkable.  There is no evidence of an acute osseous abnormality.   There are no suspicious-appearing parenchymal soft tissue nodules.            Impression:       No evidence of active or acute cardiopulmonary disease on today's chest  radiograph.           XR Femur 2 View Left [[id number]] Collected:  10/13/17 0747     Updated:  10/13/17 0748    Narrative:       XR FEMUR 2 VIEW LEFT-      CLINICAL INDICATION: Postop; S72.402A-Unspecified fracture of lower end  of left femur, initial encounter for closed fracture; S72.492A-Other  fracture of lower end of left femur, initial encounter for closed  fracture.          COMPARISON: None available.     FINDINGS: 3 views of the left femur show postoperative hardware.  Overlying skin clips are present.       Impression:       Postoperative change for fixation of distal femoral fracture  with markedly improved alignment.                   Assessment:      ICD-10-CM ICD-9-CM   1. Closed fracture of distal end of left femur, unspecified fracture morphology, initial encounter S72.402A 821.20   2. Other closed fracture of distal end of left femur, initial encounter S72.492A 821.29       Plan:   POD#2 ORIF femur. Patient is stable.  Dressing changed as needed if saturated. Will continue to watch H&H closely. Continue knee immobilizer. Non weight bearing right leg.       WILMER Moreno

## 2017-10-14 NOTE — PROGRESS NOTES
[facility] HOSPITALIST PROGRESS NOTE     Patient Identification:  Name:  Alma Delia Garcia  Age:  87 y.o.  Sex:  female  :  1930  MRN:  [id number]  Visit Number:  [id number]  Primary Care Provider:  Galina Gonsalves MD    Length of stay:  2    Chief complaint:  87 y.o. old female admitted with distal femur fracture      Subjective:    BP was low last night, but has improved today.   Pt is complaining of hip pain which she states is slightly improved since yesterday.  Patient denies any other complaints.    Procedures:  10/12/17: Transfusion with 4 units of PRBC  10/12/17: Transfusion of 50 g of albumin with 2 liters of IVF          Current Hospital Meds:    aspirin 81 mg Oral Daily   bumetanide 1 mg Oral BID   ceftriaxone 1 g Intravenous Q24H   folic acid 1 mg Oral Daily   levothyroxine 50 mcg Oral Daily   pantoprazole 40 mg Oral QAM   potassium chloride 10 mEq Oral Daily   rifaximin 550 mg Oral Q12H   Risaquad-2 1 capsule Oral Daily   rOPINIRole 1 mg Oral Nightly   sennosides-docusate sodium 2 tablet Oral BID        ----------------------------------------------------------------------------------------------------------------------  Vital Signs:  Temp:  [98.2 °F (36.8 °C)-98.7 °F (37.1 °C)] 98.4 °F (36.9 °C)  Heart Rate:  [69-90] 84  Resp:  [18] 18  BP: ()/(27-75) 97/51  Last 3 weights    10/12/17  0327 10/13/17  0400 10/14/17  0400   Weight: 149 lb 3.2 oz (67.7 kg) 154 lb 1.6 oz (69.9 kg) 149 lb 11.2 oz (67.9 kg)     Body mass index is 27.38 kg/(m^2).    Intake/Output Summary (Last 24 hours) at 10/14/17 0827  Last data filed at 10/14/17 0743   Gross per 24 hour   Intake              810 ml   Output             1230 ml   Net             -420 ml     Diet Regular  ----------------------------------------------------------------------------------------------------------------------  Physical exam:  Constitutional:  Well-developed and well-nourished.   Sitting comfortably in  bed.  HENT:  Head:  Normocephalic and atraumatic.  Mouth:  Moist mucous membranes.    Eyes:  Conjunctivae and EOM are normal.  Pupils are equal, round, and reactive to light.   Neck:  Neck supple.  No JVD present.    Cardiovascular:  Regular rate and rhythm. S1+S2. No murmur, rubs or gallops.   Pulmonary/Chest: Clear to auscultation bilaterally.   Abdominal:  Soft. Non-tender. No viscera palpable.  Bowel sounds audible.   Musculoskeletal: No deformity or joint swelling.   Peripheral vascular: Bilateral dorsalis pedis palpable.  Bilateral lower extremity pitting edema.  Neurological:  Alert and oriented to person, place, and time.  Cranial nerves grossly intact. Strength bilaterally symmetrical in upper and lower extremities.   Skin:  Skin is warm and dry. No rash noted. No pallor.   ----------------------------------------------------------------------------------------------------------------------  Tele:  Sinus rhythm  ----------------------------------------------------------------------------------------------------------------------    Results from last 7 days  Lab Units 10/12/17  0206   TROPONIN I ng/mL <0.006       Results from last 7 days  Lab Units 10/13/17  0317 10/12/17  1957 10/12/17  1956 10/12/17  1543 10/12/17  0509 10/12/17  0206   WBC 10*3/mm3 12.39  --   --  9.73 5.81 5.88   HEMOGLOBIN g/dL 9.2* 9.1*  --  5.8* 9.9* 10.8*   HEMATOCRIT % 27.6* 27.1*  --  18.2* 31.1* 32.5*   MCV fL 90.8  --   --  98.4* 97.2* 95.6*   MCHC g/dL 33.3  --   --  31.9* 31.8* 33.2   PLATELETS 10*3/mm3 52*  --   --  80* 103* 122*   INR  1.79*  --  2.04*  --   --  1.17*           Results from last 7 days  Lab Units 10/13/17  0317 10/12/17  0509 10/12/17  0206   SODIUM mmol/L 143 141 140   POTASSIUM mmol/L 4.6 3.5 3.1*   MAGNESIUM mg/dL  --  1.7  --    CHLORIDE mmol/L 112 105 103   CO2 mmol/L 24.2* 27.9 29.7   BUN mg/dL 21 18 18   CREATININE mg/dL 0.82 0.84 0.96   EGFR IF NONAFRICN AM mL/min/1.73 66 64 55*   CALCIUM mg/dL 8.1  9.0 9.3   GLUCOSE mg/dL 102 112* 144*   ALBUMIN g/dL 2.60* 2.70* 3.00*   BILIRUBIN mg/dL 2.1* 0.9 1.0   ALK PHOS U/L 51 113* 119*   AST (SGOT) U/L 47* 50* 55*   ALT (SGPT) U/L 15 23 26   Estimated Creatinine Clearance: 43.6 mL/min (by C-G formula based on Cr of 0.82).    Ammonia   Date Value Ref Range Status   10/13/2017 29 11 - 51 umol/L Final   10/12/2017 76 (H) 11 - 51 umol/L Final            Urine Culture   Date Value Ref Range Status   10/12/2017 90,000-100,000 CFU/mL Gram Negative Bacilli (A)  Preliminary             I have personally looked at the labs and they are summarized above.  ----------------------------------------------------------------------------------------------------------------------  Imaging Results (last 24 hours)     ** No results found for the last 24 hours. **        ----------------------------------------------------------------------------------------------------------------------  Assessment and Plan:    -Acute, traumatic left distal femur fracture status post fall  S/p ORIF.  Continue pain control with narcotic analgesic.  Continue PT/OT with NWB on left leg, and postoperative care per orthopedic surgery. Appreciate help from orthopedics surgery.  She has coagulopathy with INR elevated to 1.8 secondary to cirrhosis.    - Acute on chronic anemia  Patient had acute expected blood loss anemia requiring transfusion with 4 units of PRBC transfusion.  H&H has been stable.  Continue to monitor H&H.    - Acute UTI  Continue Rocephin.  Urine culture growing GNR.     - Chronic diastolic CHF  Compensated.  Continue Bumex.  Monitor intake, output and daily weight    - Hypothyroidism  Continue levothyroxine.    - Cirrhosis  Compensated at present.  Continue rifaximin to prevent encephalopathy    - Chronic venous stasis causing lower extremity edema  Continue diuretics and monitor electrolytes.    - Prophylaxis  DVT: SCDs, patient has coagulopathy secondary to cirrhosis.    GI: PPI.       The  patient is high risk due to: Femur fracture, acute UTI, CHF and acute anemia.    Delores Diamond MD  10/14/17  8:57 AM

## 2017-10-15 LAB
ALBUMIN SERPL-MCNC: 2.2 G/DL (ref 3.4–4.8)
ALBUMIN/GLOB SERPL: 1 G/DL (ref 1.5–2.5)
ALP SERPL-CCNC: 77 U/L (ref 35–104)
ALT SERPL W P-5'-P-CCNC: 20 U/L (ref 10–36)
ANION GAP SERPL CALCULATED.3IONS-SCNC: 3.7 MMOL/L (ref 3.6–11.2)
AST SERPL-CCNC: 62 U/L (ref 10–30)
BASOPHILS # BLD AUTO: 0.02 10*3/MM3 (ref 0–0.3)
BASOPHILS NFR BLD AUTO: 0.2 % (ref 0–2)
BILIRUB SERPL-MCNC: 0.6 MG/DL (ref 0.2–1.8)
BUN BLD-MCNC: 27 MG/DL (ref 7–21)
BUN/CREAT SERPL: 31 (ref 7–25)
CALCIUM SPEC-SCNC: 7.4 MG/DL (ref 7.7–10)
CHLORIDE SERPL-SCNC: 108 MMOL/L (ref 99–112)
CO2 SERPL-SCNC: 26.3 MMOL/L (ref 24.3–31.9)
CREAT BLD-MCNC: 0.87 MG/DL (ref 0.43–1.29)
DEPRECATED RDW RBC AUTO: 50.9 FL (ref 37–54)
DEPRECATED RDW RBC AUTO: 52.7 FL (ref 37–54)
EOSINOPHIL # BLD AUTO: 0.41 10*3/MM3 (ref 0–0.7)
EOSINOPHIL NFR BLD AUTO: 4 % (ref 0–7)
ERYTHROCYTE [DISTWIDTH] IN BLOOD BY AUTOMATED COUNT: 16 % (ref 11.5–14.5)
ERYTHROCYTE [DISTWIDTH] IN BLOOD BY AUTOMATED COUNT: 16.5 % (ref 11.5–14.5)
GFR SERPL CREATININE-BSD FRML MDRD: 62 ML/MIN/1.73
GLOBULIN UR ELPH-MCNC: 2.1 GM/DL
GLUCOSE BLD-MCNC: 134 MG/DL (ref 70–110)
HCT VFR BLD AUTO: 18.6 % (ref 37–47)
HCT VFR BLD AUTO: 25.6 % (ref 37–47)
HEMOCCULT STL QL: NEGATIVE
HGB BLD-MCNC: 6 G/DL (ref 12–16)
HGB BLD-MCNC: 8.4 G/DL (ref 12–16)
IMM GRANULOCYTES # BLD: 0.02 10*3/MM3 (ref 0–0.03)
IMM GRANULOCYTES NFR BLD: 0.2 % (ref 0–0.5)
LYMPHOCYTES # BLD AUTO: 1.58 10*3/MM3 (ref 1–3)
LYMPHOCYTES NFR BLD AUTO: 15.4 % (ref 16–46)
MAGNESIUM SERPL-MCNC: 1.6 MG/DL (ref 1.7–2.6)
MCH RBC QN AUTO: 30.4 PG (ref 27–33)
MCH RBC QN AUTO: 30.6 PG (ref 27–33)
MCHC RBC AUTO-ENTMCNC: 32.3 G/DL (ref 33–37)
MCHC RBC AUTO-ENTMCNC: 32.8 G/DL (ref 33–37)
MCV RBC AUTO: 92.8 FL (ref 80–94)
MCV RBC AUTO: 94.9 FL (ref 80–94)
MONOCYTES # BLD AUTO: 1.13 10*3/MM3 (ref 0.1–0.9)
MONOCYTES NFR BLD AUTO: 11 % (ref 0–12)
NEUTROPHILS # BLD AUTO: 7.08 10*3/MM3 (ref 1.4–6.5)
NEUTROPHILS NFR BLD AUTO: 69.2 % (ref 40–75)
OSMOLALITY SERPL CALC.SUM OF ELEC: 282.8 MOSM/KG (ref 273–305)
PHOSPHATE SERPL-MCNC: 2 MG/DL (ref 2.7–4.5)
PLATELET # BLD AUTO: 77 10*3/MM3 (ref 130–400)
PLATELET # BLD AUTO: 82 10*3/MM3 (ref 130–400)
PMV BLD AUTO: 11.1 FL (ref 6–10)
PMV BLD AUTO: 11.7 FL (ref 6–10)
POTASSIUM BLD-SCNC: 3.6 MMOL/L (ref 3.5–5.3)
POTASSIUM BLD-SCNC: 4.1 MMOL/L (ref 3.5–5.3)
PROT SERPL-MCNC: 4.3 G/DL (ref 6–8)
RBC # BLD AUTO: 1.96 10*6/MM3 (ref 4.2–5.4)
RBC # BLD AUTO: 2.76 10*6/MM3 (ref 4.2–5.4)
SODIUM BLD-SCNC: 138 MMOL/L (ref 135–153)
WBC NRBC COR # BLD: 10.24 10*3/MM3 (ref 4.5–12.5)
WBC NRBC COR # BLD: 8.9 10*3/MM3 (ref 4.5–12.5)

## 2017-10-15 PROCEDURE — 36430 TRANSFUSION BLD/BLD COMPNT: CPT

## 2017-10-15 PROCEDURE — 25010000002 MAGNESIUM SULFATE IN D5W 1G/100ML (PREMIX) 1-5 GM/100ML-% SOLUTION: Performed by: INTERNAL MEDICINE

## 2017-10-15 PROCEDURE — 86900 BLOOD TYPING SEROLOGIC ABO: CPT

## 2017-10-15 PROCEDURE — 85027 COMPLETE CBC AUTOMATED: CPT | Performed by: INTERNAL MEDICINE

## 2017-10-15 PROCEDURE — 80053 COMPREHEN METABOLIC PANEL: CPT | Performed by: INTERNAL MEDICINE

## 2017-10-15 PROCEDURE — 99233 SBSQ HOSP IP/OBS HIGH 50: CPT | Performed by: INTERNAL MEDICINE

## 2017-10-15 PROCEDURE — 85025 COMPLETE CBC W/AUTO DIFF WBC: CPT | Performed by: PHYSICIAN ASSISTANT

## 2017-10-15 PROCEDURE — 82272 OCCULT BLD FECES 1-3 TESTS: CPT | Performed by: INTERNAL MEDICINE

## 2017-10-15 PROCEDURE — 25010000003 POTASSIUM CHLORIDE 10 MEQ/100ML SOLUTION: Performed by: INTERNAL MEDICINE

## 2017-10-15 PROCEDURE — 63710000001 DIPHENHYDRAMINE PER 50 MG: Performed by: INTERNAL MEDICINE

## 2017-10-15 PROCEDURE — 25010000002 CYANOCOBALAMIN PER 1000 MCG: Performed by: PHYSICIAN ASSISTANT

## 2017-10-15 PROCEDURE — 83735 ASSAY OF MAGNESIUM: CPT | Performed by: PHYSICIAN ASSISTANT

## 2017-10-15 PROCEDURE — 25010000002 CEFTRIAXONE: Performed by: INTERNAL MEDICINE

## 2017-10-15 PROCEDURE — P9016 RBC LEUKOCYTES REDUCED: HCPCS

## 2017-10-15 PROCEDURE — 84100 ASSAY OF PHOSPHORUS: CPT | Performed by: PHYSICIAN ASSISTANT

## 2017-10-15 PROCEDURE — 84132 ASSAY OF SERUM POTASSIUM: CPT | Performed by: FAMILY MEDICINE

## 2017-10-15 RX ORDER — CYANOCOBALAMIN 1000 UG/ML
1000 INJECTION, SOLUTION INTRAMUSCULAR; SUBCUTANEOUS
Status: DISCONTINUED | OUTPATIENT
Start: 2017-10-15 | End: 2017-10-17 | Stop reason: HOSPADM

## 2017-10-15 RX ORDER — POTASSIUM CHLORIDE 7.45 MG/ML
10 INJECTION INTRAVENOUS
Status: COMPLETED | OUTPATIENT
Start: 2017-10-15 | End: 2017-10-15

## 2017-10-15 RX ORDER — MAGNESIUM SULFATE 1 G/100ML
1 INJECTION INTRAVENOUS AS NEEDED
Status: DISCONTINUED | OUTPATIENT
Start: 2017-10-15 | End: 2017-10-17 | Stop reason: HOSPADM

## 2017-10-15 RX ORDER — ACETAMINOPHEN 325 MG/1
650 TABLET ORAL ONCE
Status: COMPLETED | OUTPATIENT
Start: 2017-10-15 | End: 2017-10-15

## 2017-10-15 RX ORDER — DIPHENHYDRAMINE HCL 25 MG
25 CAPSULE ORAL ONCE
Status: COMPLETED | OUTPATIENT
Start: 2017-10-15 | End: 2017-10-15

## 2017-10-15 RX ORDER — MAGNESIUM SULFATE HEPTAHYDRATE 40 MG/ML
2 INJECTION, SOLUTION INTRAVENOUS AS NEEDED
Status: DISCONTINUED | OUTPATIENT
Start: 2017-10-15 | End: 2017-10-17 | Stop reason: HOSPADM

## 2017-10-15 RX ADMIN — MAGNESIUM SULFATE IN DEXTROSE 1 G: 10 INJECTION, SOLUTION INTRAVENOUS at 09:14

## 2017-10-15 RX ADMIN — LEVOTHYROXINE SODIUM 50 MCG: 50 TABLET ORAL at 08:28

## 2017-10-15 RX ADMIN — POTASSIUM CHLORIDE 10 MEQ: 7.46 INJECTION, SOLUTION INTRAVENOUS at 11:10

## 2017-10-15 RX ADMIN — POTASSIUM CHLORIDE 10 MEQ: 750 TABLET, FILM COATED, EXTENDED RELEASE ORAL at 08:28

## 2017-10-15 RX ADMIN — Medication 1 CAPSULE: at 08:28

## 2017-10-15 RX ADMIN — PANTOPRAZOLE SODIUM 40 MG: 40 TABLET, DELAYED RELEASE ORAL at 06:05

## 2017-10-15 RX ADMIN — DOCUSATE SODIUM AND SENNOSIDES 2 TABLET: 8.6; 5 TABLET, FILM COATED ORAL at 17:21

## 2017-10-15 RX ADMIN — HYDROCODONE BITARTRATE AND ACETAMINOPHEN 1 TABLET: 7.5; 325 TABLET ORAL at 16:14

## 2017-10-15 RX ADMIN — LACTULOSE 20 G: 10 SOLUTION ORAL at 08:33

## 2017-10-15 RX ADMIN — PANTOPRAZOLE SODIUM 40 MG: 40 TABLET, DELAYED RELEASE ORAL at 05:46

## 2017-10-15 RX ADMIN — BUMETANIDE 1 MG: 1 TABLET ORAL at 08:28

## 2017-10-15 RX ADMIN — CEFTRIAXONE 1 G: 1 INJECTION, POWDER, FOR SOLUTION INTRAMUSCULAR; INTRAVENOUS at 08:10

## 2017-10-15 RX ADMIN — HYDROCODONE BITARTRATE AND ACETAMINOPHEN 1 TABLET: 7.5; 325 TABLET ORAL at 11:11

## 2017-10-15 RX ADMIN — DOCUSATE SODIUM AND SENNOSIDES 2 TABLET: 8.6; 5 TABLET, FILM COATED ORAL at 08:28

## 2017-10-15 RX ADMIN — LACTULOSE 20 G: 10 SOLUTION ORAL at 17:21

## 2017-10-15 RX ADMIN — POTASSIUM CHLORIDE 10 MEQ: 7.46 INJECTION, SOLUTION INTRAVENOUS at 08:09

## 2017-10-15 RX ADMIN — FOLIC ACID 1 MG: 1 TABLET ORAL at 08:28

## 2017-10-15 RX ADMIN — DIPHENHYDRAMINE HYDROCHLORIDE 25 MG: 25 CAPSULE ORAL at 08:12

## 2017-10-15 RX ADMIN — RIFAXIMIN 550 MG: 550 TABLET ORAL at 08:28

## 2017-10-15 RX ADMIN — ACETAMINOPHEN 650 MG: 325 TABLET ORAL at 08:12

## 2017-10-15 RX ADMIN — HYDROCODONE BITARTRATE AND ACETAMINOPHEN 1 TABLET: 7.5; 325 TABLET ORAL at 20:41

## 2017-10-15 RX ADMIN — RIFAXIMIN 550 MG: 550 TABLET ORAL at 20:42

## 2017-10-15 RX ADMIN — POTASSIUM CHLORIDE 10 MEQ: 7.46 INJECTION, SOLUTION INTRAVENOUS at 10:02

## 2017-10-15 RX ADMIN — ROPINIROLE HYDROCHLORIDE 1 MG: 1 TABLET, FILM COATED ORAL at 20:42

## 2017-10-15 RX ADMIN — ASPIRIN 81 MG: 81 TABLET ORAL at 08:28

## 2017-10-15 RX ADMIN — CYANOCOBALAMIN 1000 MCG: 1000 INJECTION, SOLUTION INTRAMUSCULAR at 12:36

## 2017-10-15 RX ADMIN — BUMETANIDE 1 MG: 1 TABLET ORAL at 17:22

## 2017-10-15 RX ADMIN — POTASSIUM CHLORIDE 10 MEQ: 7.46 INJECTION, SOLUTION INTRAVENOUS at 09:17

## 2017-10-15 RX ADMIN — POLYETHYLENE GLYCOL 3350 17 G: 17 POWDER, FOR SOLUTION ORAL at 08:27

## 2017-10-15 NOTE — PLAN OF CARE
Problem: Mobility, Physical Impaired (Adult)  Goal: Identify Related Risk Factors and Signs and Symptoms  Outcome: Ongoing (interventions implemented as appropriate)  Goal: Enhanced Mobility Skills  Outcome: Ongoing (interventions implemented as appropriate)  Goal: Enhanced Functionality Ability  Outcome: Ongoing (interventions implemented as appropriate)    Problem: Pain, Acute (Adult)  Goal: Identify Related Risk Factors and Signs and Symptoms  Outcome: Ongoing (interventions implemented as appropriate)    Problem: Infection, Risk/Actual (Adult)  Goal: Identify Related Risk Factors and Signs and Symptoms  Outcome: Ongoing (interventions implemented as appropriate)  Goal: Infection Prevention/Resolution  Outcome: Ongoing (interventions implemented as appropriate)    Problem: Skin Integrity Impairment, Risk/Actual (Adult)  Goal: Identify Related Risk Factors and Signs and Symptoms  Outcome: Ongoing (interventions implemented as appropriate)  Goal: Skin Integrity/Wound Healing  Outcome: Ongoing (interventions implemented as appropriate)    Problem: Patient Care Overview (Adult)  Goal: Plan of Care Review  Outcome: Ongoing (interventions implemented as appropriate)  Goal: Adult Individualization and Mutuality  Outcome: Ongoing (interventions implemented as appropriate)  Goal: Discharge Needs Assessment  Outcome: Ongoing (interventions implemented as appropriate)

## 2017-10-15 NOTE — PROGRESS NOTES
POD #3 s/p ORIF  Left periprosthetic femur fracture    Pt awake, responsive, feels tired, some pain in leg. No specific complaints  Afebrile, vitals signs relatively stable (some hypotension noted overnight)  Wound clean, intact, some mild serosanguinous drainage since yesterday, moderate swelling  Of thigh noted,  Left foot grossly NV intact with some moderate swelling  H/H 6.0/18.6  Platelets 77k  Post op anemia, pt receiving 2 units prbcs this am.  Continue immobilizer and strict non weight bearing.  Will continue to follow closely.

## 2017-10-15 NOTE — PLAN OF CARE
Problem: Fall Risk (Adult)  Goal: Identify Related Risk Factors and Signs and Symptoms  Outcome: Ongoing (interventions implemented as appropriate)    10/14/17 2306   Fall Risk   Fall Risk: Related Risk Factors age-related changes   Fall Risk: Signs and Symptoms presence of risk factors       Goal: Absence of Falls  Outcome: Ongoing (interventions implemented as appropriate)    10/14/17 2306   Fall Risk (Adult)   Absence of Falls making progress toward outcome         Problem: Mobility, Physical Impaired (Adult)  Goal: Identify Related Risk Factors and Signs and Symptoms  Outcome: Ongoing (interventions implemented as appropriate)    10/14/17 2306   Mobility, Physical Impaired   Physical Mobility, Impaired: Related Risk Factors activity intolerance   Signs and Symptoms (Physical Mobility Impaired) inability to purposefully move in environment;fear/anxiety related to mobility         Problem: Pain, Acute (Adult)  Goal: Acceptable Pain Control/Comfort Level  Outcome: Ongoing (interventions implemented as appropriate)    10/14/17 2306   Pain, Acute (Adult)   Acceptable Pain Control/Comfort Level making progress toward outcome         Problem: Infection, Risk/Actual (Adult)  Goal: Identify Related Risk Factors and Signs and Symptoms  Outcome: Ongoing (interventions implemented as appropriate)    10/13/17 2353   Infection, Risk/Actual   Infection, Risk/Actual: Related Risk Factors skin integrity impairment;trauma injury;age extremes   Signs and Symptoms (Infection, Risk/Actual) chills;edema;respiratory rate increase;weakness;pain         Problem: Skin Integrity Impairment, Risk/Actual (Adult)  Goal: Identify Related Risk Factors and Signs and Symptoms  Outcome: Ongoing (interventions implemented as appropriate)    10/13/17 2353   Skin Integrity Impairment, Risk/Actual   Skin Integrity Impairment, Risk/Actual: Related Risk Factors age extremes;traumatic injury;edema   Signs and Symptoms (Skin Integrity Impairment)  edema;inflammation       Goal: Skin Integrity/Wound Healing  Outcome: Ongoing (interventions implemented as appropriate)    10/14/17 2306   Skin Integrity Impairment, Risk/Actual (Adult)   Skin Integrity/Wound Healing making progress toward outcome         Problem: Patient Care Overview (Adult)  Goal: Plan of Care Review  Outcome: Ongoing (interventions implemented as appropriate)    10/14/17 2306   Coping/Psychosocial Response Interventions   Plan Of Care Reviewed With patient   Patient Care Overview   Progress no change       Goal: Discharge Needs Assessment  Outcome: Ongoing (interventions implemented as appropriate)    10/12/17 1444 10/13/17 1300 10/14/17 2306   Discharge Needs Assessment   Concerns To Be Addressed --  --  basic needs concerns   Readmission Within The Last 30 Days --  --  no previous admission in last 30 days   Equipment Needed After Discharge commode;walker, rolling;wheelchair  (Unknown Provider) --  --    Discharge Facility/Level Of Care Needs --  --  home with home health   Discharge Disposition --  --  still a patient   Current Health   Outpatient/Agency/Support Group Needs --  --  homecare agency (specify level of care)   Anticipated Changes Related to Illness --  --  inability to care for self   Living Environment   Transportation Available car;family or friend will provide --  --    Self-Care   Equipment Currently Used at Home --  commode;wheelchair;walker, rolling;shower chair --

## 2017-10-15 NOTE — PROGRESS NOTES
[facility] HOSPITALIST PROGRESS NOTE     Patient Identification:  Name:  Alma Delia Garcia  Age:  87 y.o.  Sex:  female  :  1930  MRN:  [id number]  Visit Number:  [id number]  Primary Care Provider:  Galina Gonsalves MD    Length of stay:  3    Chief complaint:  87 y.o. old female admitted with distal femur fracture      Subjective:    Pt had a drop in her hb last night and was down to 6.   Pt is complaining of hip pain which she states is slightly improved since yesterday.    Per nursing staff, patient has not been eating and drinking much.  Patient has not worked with physical therapy much as well.  Nursing staff also reports that patient has not had a bowel movement since her admission.  Patient denies any nausea or vomiting.    Procedures:  10/12/17: Transfusion with 4 units of PRBC  10/12/17: Transfusion of 50 g of albumin with 2 liters of IVF   10/15/17: Transfusion with 2 units of PRBC         Current Hospital Meds:    aspirin 81 mg Oral Daily   bumetanide 1 mg Oral BID   ceftriaxone 1 g Intravenous Q24H   folic acid 1 mg Oral Daily   lactulose 20 g Oral BID   levothyroxine 50 mcg Oral Daily   pantoprazole 40 mg Oral QAM   polyethylene glycol 17 g Oral Daily   potassium chloride 10 mEq Oral Daily   potassium chloride 10 mEq Intravenous Q1H   rifaximin 550 mg Oral Q12H   Risaquad-2 1 capsule Oral Daily   rOPINIRole 1 mg Oral Nightly   sennosides-docusate sodium 2 tablet Oral BID        ----------------------------------------------------------------------------------------------------------------------  Vital Signs:  Temp:  [97.9 °F (36.6 °C)-99 °F (37.2 °C)] 98.1 °F (36.7 °C)  Heart Rate:  [70-96] 73  Resp:  [16-20] 16  BP: ()/(34-52) 108/44  Last 3 weights    10/12/17  0327 10/13/17  0400 10/14/17  0400   Weight: 149 lb 3.2 oz (67.7 kg) 154 lb 1.6 oz (69.9 kg) 149 lb 11.2 oz (67.9 kg)     Body mass index is 27.38 kg/(m^2).    Intake/Output Summary (Last 24 hours) at 10/15/17  1006  Last data filed at 10/15/17 1002   Gross per 24 hour   Intake             1210 ml   Output             1500 ml   Net             -290 ml     Diet Regular  ----------------------------------------------------------------------------------------------------------------------  Physical exam:  Constitutional:  Well-developed and well-nourished. lying comfortably in bed.  HENT:  Head:  Normocephalic and atraumatic.  Mouth:  Moist mucous membranes.    Eyes:  Conjunctivae and EOM are normal.  Pupils are equal, round, and reactive to light.   Neck:  Neck supple.  No JVD present.    Cardiovascular:  Regular rate and rhythm. S1+S2. No murmur, rubs or gallops.   Pulmonary/Chest: Clear to auscultation bilaterally.   Abdominal:  Soft. Non-tender. No viscera palpable.  Bowel sounds audible.   Musculoskeletal: No deformity or joint swelling.  Patient has bandage on her left hip lateral area as well as left leg.  Patient's bandage he has little blood on it.  Peripheral vascular: Bilateral dorsalis pedis palpable.  Bilateral lower extremity pitting edema.  Neurological:  Alert and oriented to person, place, and time.  Cranial nerves grossly intact. Strength bilaterally symmetrical in upper and lower extremities.   Skin:  Skin is warm and dry. No rash noted. No pallor.   ----------------------------------------------------------------------------------------------------------------------  Tele:  Sinus rhythm  ----------------------------------------------------------------------------------------------------------------------    Results from last 7 days  Lab Units 10/12/17  0206   TROPONIN I ng/mL <0.006       Results from last 7 days  Lab Units 10/15/17  0411 10/14/17  1003 10/13/17  0317  10/12/17  1956  10/12/17  0206   WBC 10*3/mm3 10.24 13.98* 12.39  --   --   < > 5.88   HEMOGLOBIN g/dL 6.0* 7.3* 9.2*  < >  --   < > 10.8*   HEMATOCRIT % 18.6* 22.2* 27.6*  < >  --   < > 32.5*   MCV fL 94.9* 89.9 90.8  --   --   < > 95.6*    MCHC g/dL 32.3* 32.9* 33.3  --   --   < > 33.2   PLATELETS 10*3/mm3 77* 60* 52*  --   --   < > 122*   INR   --   --  1.79*  --  2.04*  --  1.17*   < > = values in this interval not displayed.        Results from last 7 days  Lab Units 10/15/17  0102 10/14/17  1536 10/13/17  0317 10/12/17  0509   SODIUM mmol/L 138 139 143 141   POTASSIUM mmol/L 3.6 4.3 4.6 3.5   MAGNESIUM mg/dL 1.6* 1.7  --  1.7   CHLORIDE mmol/L 108 109 112 105   CO2 mmol/L 26.3 26.3 24.2* 27.9   BUN mg/dL 27* 29* 21 18   CREATININE mg/dL 0.87 0.92 0.82 0.84   EGFR IF NONAFRICN AM mL/min/1.73 62 58* 66 64   CALCIUM mg/dL 7.4* 7.8 8.1 9.0   GLUCOSE mg/dL 134* 118* 102 112*   ALBUMIN g/dL 2.20* 2.60* 2.60* 2.70*   BILIRUBIN mg/dL 0.6 1.2 2.1* 0.9   ALK PHOS U/L 77 67 51 113*   AST (SGOT) U/L 62* 61* 47* 50*   ALT (SGPT) U/L 20 19 15 23   Estimated Creatinine Clearance: 41.1 mL/min (by C-G formula based on Cr of 0.87).    Ammonia   Date Value Ref Range Status   10/13/2017 29 11 - 51 umol/L Final            Urine Culture   Date Value Ref Range Status   10/12/2017 90,000-100,000 CFU/mL Gram Negative Bacilli (A)  Preliminary             I have personally looked at the labs and they are summarized above.  ----------------------------------------------------------------------------------------------------------------------  Imaging Results (last 24 hours)     ** No results found for the last 24 hours. **        ----------------------------------------------------------------------------------------------------------------------  Assessment and Plan:    -Acute, traumatic left distal femur fracture status post fall  S/p ORIF.  Continue pain control with narcotic analgesic.  Continue PT/OT with NWB on left leg, and postoperative care per orthopedic surgery. Appreciate help from orthopedics surgery.  She has coagulopathy with INR elevated to 1.8 secondary to cirrhosis And does not need DVT prophylaxis at this time.    - Acute on chronic anemia  Patient had  further drop in her hemoglobin.  Per nursing staff patient has not had a lot of bleeding from her wound.  She has some swelling of her thigh and no tenderness.    He shouldn't had significant bleeding during her surgery causing acute expected blood loss anemia requiring transfusion with 4 units of PRBC transfusion.   Unclear as to the reason for her repeat drop in her hemoglobin.  We'll check her stool for occult blood.  I discussed with her orthopedic surgeon and nursing staff.  Patient has not had any significant amount of bleeding from her bandage since yesterday.  She however had a lot of bleeding during her surgery and continued to bleed continuously during the surgery. Current drop may be infection of her blood loss during surgery.  And will get transfusion with 2 units of packed red blood cells.  If stool shows any evidence of occult blood then we'll consult GI for endoscopy.     - Acute UTI due to Escherichia coli  Continue Rocephin.     - Chronic diastolic CHF  Compensated.  Continue Bumex.  Monitor intake, output and daily weight    - Hypothyroidism  Continue levothyroxine.    - Cirrhosis  Compensated at present.  Continue rifaximin to prevent encephalopathy.  Will add lactulose and MiraLAX for constipation.    - Chronic venous stasis causing lower extremity edema  Continue diuretics and monitor electrolytes.    - Prophylaxis  DVT: SCDs, patient has coagulopathy secondary to cirrhosis.    GI: PPI.       The patient is high risk due to: Femur fracture, acute UTI, CHF and acute anemia.    Delores Diamond MD  10/15/17  10:06 AM

## 2017-10-16 ENCOUNTER — APPOINTMENT (OUTPATIENT)
Dept: GENERAL RADIOLOGY | Facility: HOSPITAL | Age: 82
End: 2017-10-16

## 2017-10-16 LAB
ABO + RH BLD: NORMAL
ABO + RH BLD: NORMAL
ALBUMIN SERPL-MCNC: 2.3 G/DL (ref 3.4–4.8)
ALBUMIN/GLOB SERPL: 0.9 G/DL (ref 1.5–2.5)
ALP SERPL-CCNC: 90 U/L (ref 35–104)
ALT SERPL W P-5'-P-CCNC: 20 U/L (ref 10–36)
ANION GAP SERPL CALCULATED.3IONS-SCNC: 3.2 MMOL/L (ref 3.6–11.2)
AST SERPL-CCNC: 61 U/L (ref 10–30)
BASOPHILS # BLD AUTO: 0.04 10*3/MM3 (ref 0–0.3)
BASOPHILS NFR BLD AUTO: 0.4 % (ref 0–2)
BH BB BLOOD EXPIRATION DATE: NORMAL
BH BB BLOOD EXPIRATION DATE: NORMAL
BH BB BLOOD TYPE BARCODE: 6200
BH BB BLOOD TYPE BARCODE: 6200
BH BB DISPENSE STATUS: NORMAL
BH BB DISPENSE STATUS: NORMAL
BH BB PRODUCT CODE: NORMAL
BH BB PRODUCT CODE: NORMAL
BH BB UNIT NUMBER: NORMAL
BH BB UNIT NUMBER: NORMAL
BILIRUB SERPL-MCNC: 1.6 MG/DL (ref 0.2–1.8)
BUN BLD-MCNC: 21 MG/DL (ref 7–21)
BUN/CREAT SERPL: 23.6 (ref 7–25)
CALCIUM SPEC-SCNC: 8 MG/DL (ref 7.7–10)
CHLORIDE SERPL-SCNC: 107 MMOL/L (ref 99–112)
CO2 SERPL-SCNC: 28.8 MMOL/L (ref 24.3–31.9)
CREAT BLD-MCNC: 0.89 MG/DL (ref 0.43–1.29)
CROSSMATCH INTERPRETATION: NORMAL
CROSSMATCH INTERPRETATION: NORMAL
DEPRECATED RDW RBC AUTO: 52.3 FL (ref 37–54)
EOSINOPHIL # BLD AUTO: 0.88 10*3/MM3 (ref 0–0.7)
EOSINOPHIL NFR BLD AUTO: 9.6 % (ref 0–7)
ERYTHROCYTE [DISTWIDTH] IN BLOOD BY AUTOMATED COUNT: 16.3 % (ref 11.5–14.5)
GFR SERPL CREATININE-BSD FRML MDRD: 60 ML/MIN/1.73
GLOBULIN UR ELPH-MCNC: 2.5 GM/DL
GLUCOSE BLD-MCNC: 116 MG/DL (ref 70–110)
HCT VFR BLD AUTO: 25.6 % (ref 37–47)
HGB BLD-MCNC: 8.2 G/DL (ref 12–16)
IMM GRANULOCYTES # BLD: 0.04 10*3/MM3 (ref 0–0.03)
IMM GRANULOCYTES NFR BLD: 0.4 % (ref 0–0.5)
LYMPHOCYTES # BLD AUTO: 1.36 10*3/MM3 (ref 1–3)
LYMPHOCYTES NFR BLD AUTO: 14.9 % (ref 16–46)
MAGNESIUM SERPL-MCNC: 1.8 MG/DL (ref 1.7–2.6)
MCH RBC QN AUTO: 29.9 PG (ref 27–33)
MCHC RBC AUTO-ENTMCNC: 32 G/DL (ref 33–37)
MCV RBC AUTO: 93.4 FL (ref 80–94)
MONOCYTES # BLD AUTO: 1.01 10*3/MM3 (ref 0.1–0.9)
MONOCYTES NFR BLD AUTO: 11.1 % (ref 0–12)
NEUTROPHILS # BLD AUTO: 5.79 10*3/MM3 (ref 1.4–6.5)
NEUTROPHILS NFR BLD AUTO: 63.6 % (ref 40–75)
OSMOLALITY SERPL CALC.SUM OF ELEC: 281.5 MOSM/KG (ref 273–305)
PHOSPHATE SERPL-MCNC: 2.1 MG/DL (ref 2.7–4.5)
PLATELET # BLD AUTO: 95 10*3/MM3 (ref 130–400)
PMV BLD AUTO: 11 FL (ref 6–10)
POTASSIUM BLD-SCNC: 3.9 MMOL/L (ref 3.5–5.3)
PROT SERPL-MCNC: 4.8 G/DL (ref 6–8)
RBC # BLD AUTO: 2.74 10*6/MM3 (ref 4.2–5.4)
SODIUM BLD-SCNC: 139 MMOL/L (ref 135–153)
UNIT  ABO: NORMAL
UNIT  ABO: NORMAL
UNIT  RH: NORMAL
UNIT  RH: NORMAL
WBC NRBC COR # BLD: 9.12 10*3/MM3 (ref 4.5–12.5)

## 2017-10-16 PROCEDURE — 73030 X-RAY EXAM OF SHOULDER: CPT

## 2017-10-16 PROCEDURE — C1751 CATH, INF, PER/CENT/MIDLINE: HCPCS

## 2017-10-16 PROCEDURE — 73030 X-RAY EXAM OF SHOULDER: CPT | Performed by: RADIOLOGY

## 2017-10-16 PROCEDURE — 85025 COMPLETE CBC W/AUTO DIFF WBC: CPT | Performed by: PHYSICIAN ASSISTANT

## 2017-10-16 PROCEDURE — 25010000002 CEFTRIAXONE: Performed by: INTERNAL MEDICINE

## 2017-10-16 PROCEDURE — 80053 COMPREHEN METABOLIC PANEL: CPT | Performed by: INTERNAL MEDICINE

## 2017-10-16 PROCEDURE — 94799 UNLISTED PULMONARY SVC/PX: CPT

## 2017-10-16 PROCEDURE — 84100 ASSAY OF PHOSPHORUS: CPT | Performed by: PHYSICIAN ASSISTANT

## 2017-10-16 PROCEDURE — 25010000002 MAGNESIUM SULFATE IN D5W 1G/100ML (PREMIX) 1-5 GM/100ML-% SOLUTION: Performed by: INTERNAL MEDICINE

## 2017-10-16 PROCEDURE — 99024 POSTOP FOLLOW-UP VISIT: CPT | Performed by: PHYSICIAN ASSISTANT

## 2017-10-16 PROCEDURE — 99233 SBSQ HOSP IP/OBS HIGH 50: CPT | Performed by: INTERNAL MEDICINE

## 2017-10-16 PROCEDURE — 97530 THERAPEUTIC ACTIVITIES: CPT

## 2017-10-16 PROCEDURE — 97110 THERAPEUTIC EXERCISES: CPT

## 2017-10-16 PROCEDURE — 83735 ASSAY OF MAGNESIUM: CPT | Performed by: PHYSICIAN ASSISTANT

## 2017-10-16 RX ORDER — CEFDINIR 300 MG/1
300 CAPSULE ORAL EVERY 12 HOURS SCHEDULED
Status: DISCONTINUED | OUTPATIENT
Start: 2017-10-16 | End: 2017-10-17 | Stop reason: HOSPADM

## 2017-10-16 RX ORDER — MAGNESIUM SULFATE 1 G/100ML
1 INJECTION INTRAVENOUS ONCE
Status: DISCONTINUED | OUTPATIENT
Start: 2017-10-16 | End: 2017-10-16 | Stop reason: SDUPTHER

## 2017-10-16 RX ORDER — LACTULOSE 10 G/15ML
20 SOLUTION ORAL 3 TIMES DAILY
Status: DISCONTINUED | OUTPATIENT
Start: 2017-10-16 | End: 2017-10-17 | Stop reason: HOSPADM

## 2017-10-16 RX ORDER — SODIUM CHLORIDE 0.9 % (FLUSH) 0.9 %
10 SYRINGE (ML) INJECTION EVERY 12 HOURS SCHEDULED
Status: DISCONTINUED | OUTPATIENT
Start: 2017-10-16 | End: 2017-10-17 | Stop reason: HOSPADM

## 2017-10-16 RX ORDER — SODIUM CHLORIDE 0.9 % (FLUSH) 0.9 %
10 SYRINGE (ML) INJECTION AS NEEDED
Status: DISCONTINUED | OUTPATIENT
Start: 2017-10-16 | End: 2017-10-17 | Stop reason: HOSPADM

## 2017-10-16 RX ADMIN — POLYETHYLENE GLYCOL 3350 17 G: 17 POWDER, FOR SOLUTION ORAL at 10:11

## 2017-10-16 RX ADMIN — POTASSIUM CHLORIDE 10 MEQ: 750 TABLET, FILM COATED, EXTENDED RELEASE ORAL at 10:13

## 2017-10-16 RX ADMIN — CEFTRIAXONE 1 G: 1 INJECTION, POWDER, FOR SOLUTION INTRAMUSCULAR; INTRAVENOUS at 10:03

## 2017-10-16 RX ADMIN — Medication 10 ML: at 21:17

## 2017-10-16 RX ADMIN — POTASSIUM PHOSPHATE, MONOBASIC AND POTASSIUM PHOSPHATE, DIBASIC 15 MMOL: 224; 236 INJECTION, SOLUTION INTRAVENOUS at 10:10

## 2017-10-16 RX ADMIN — LACTULOSE 20 G: 20 SOLUTION ORAL at 18:18

## 2017-10-16 RX ADMIN — BUMETANIDE 1 MG: 1 TABLET ORAL at 18:18

## 2017-10-16 RX ADMIN — HYDROCODONE BITARTRATE AND ACETAMINOPHEN 1 TABLET: 7.5; 325 TABLET ORAL at 21:40

## 2017-10-16 RX ADMIN — MAGNESIUM SULFATE IN DEXTROSE 1 G: 10 INJECTION, SOLUTION INTRAVENOUS at 10:10

## 2017-10-16 RX ADMIN — LACTULOSE 20 G: 10 SOLUTION ORAL at 10:15

## 2017-10-16 RX ADMIN — DOCUSATE SODIUM AND SENNOSIDES 2 TABLET: 8.6; 5 TABLET, FILM COATED ORAL at 18:18

## 2017-10-16 RX ADMIN — LACTULOSE 20 G: 20 SOLUTION ORAL at 21:16

## 2017-10-16 RX ADMIN — HYDROCODONE BITARTRATE AND ACETAMINOPHEN 1 TABLET: 7.5; 325 TABLET ORAL at 14:46

## 2017-10-16 RX ADMIN — ROPINIROLE HYDROCHLORIDE 1 MG: 1 TABLET, FILM COATED ORAL at 21:16

## 2017-10-16 RX ADMIN — Medication 1 CAPSULE: at 10:15

## 2017-10-16 RX ADMIN — PANTOPRAZOLE SODIUM 40 MG: 40 TABLET, DELAYED RELEASE ORAL at 06:42

## 2017-10-16 RX ADMIN — BUMETANIDE 1 MG: 1 TABLET ORAL at 10:12

## 2017-10-16 RX ADMIN — LEVOTHYROXINE SODIUM 50 MCG: 50 TABLET ORAL at 10:12

## 2017-10-16 RX ADMIN — CEFDINIR 300 MG: 300 CAPSULE ORAL at 21:16

## 2017-10-16 RX ADMIN — Medication 10 ML: at 14:46

## 2017-10-16 RX ADMIN — CEFDINIR 300 MG: 300 CAPSULE ORAL at 14:46

## 2017-10-16 RX ADMIN — DOCUSATE SODIUM AND SENNOSIDES 2 TABLET: 8.6; 5 TABLET, FILM COATED ORAL at 10:11

## 2017-10-16 RX ADMIN — RIFAXIMIN 550 MG: 550 TABLET ORAL at 21:16

## 2017-10-16 RX ADMIN — ASPIRIN 81 MG: 81 TABLET ORAL at 10:16

## 2017-10-16 RX ADMIN — RIFAXIMIN 550 MG: 550 TABLET ORAL at 10:14

## 2017-10-16 RX ADMIN — HYDROCODONE BITARTRATE AND ACETAMINOPHEN 1 TABLET: 7.5; 325 TABLET ORAL at 04:08

## 2017-10-16 RX ADMIN — FOLIC ACID 1 MG: 1 TABLET ORAL at 10:11

## 2017-10-16 NOTE — PROGRESS NOTES
Inpatient Progress Note  Alma Delia Garcia  Date: 10/16/17  MRN: [id number]      Subjective:  Alma Delia Garcia is a 87 y.o. female POD#4 ORIF left femur. Patient is stable this morning.She is more alert this morning and answer questions. She does complain of moderate pain. She denies paresthesias    Objective:    Vitals:    10/16/17 0400 10/16/17 0500 10/16/17 0600 10/16/17 0800   BP: 94/55 (!) 97/38 (!) 93/36    BP Location:       Patient Position:       Pulse: 77 68 64 72   Resp:       Temp: 98.3 °F (36.8 °C)      TempSrc: Oral      SpO2: 100% 99% 98% 100%   Weight: 153 lb 4.8 oz (69.5 kg)      Height:         Examination of the left hip reveals  Aquacel dressing intact with minimal post op drainage. She has moderate swelling in the thigh.  She has active dorsiflexion and plantarflexion. Neurovascular status is intact.    Labs:      Results from last 7 days  Lab Units 10/16/17  0031 10/15/17  1740 10/15/17  0411  10/13/17  0317  10/12/17  1956  10/12/17  0206   WBC 10*3/mm3 9.12 8.90 10.24  < > 12.39  --   --   < > 5.88   HEMOGLOBIN g/dL 8.2* 8.4* 6.0*  < > 9.2*  < >  --   < > 10.8*   HEMATOCRIT % 25.6* 25.6* 18.6*  < > 27.6*  < >  --   < > 32.5*   MCV fL 93.4 92.8 94.9*  < > 90.8  --   --   < > 95.6*   MCHC g/dL 32.0* 32.8* 32.3*  < > 33.3  --   --   < > 33.2   PLATELETS 10*3/mm3 95* 82* 77*  < > 52*  --   --   < > 122*   INR   --   --   --   --  1.79*  --  2.04*  --  1.17*   < > = values in this interval not displayed.        Results from last 7 days  Lab Units 10/16/17  0031 10/15/17  1613 10/15/17  0102 10/14/17  1536   SODIUM mmol/L 139  --  138 139   POTASSIUM mmol/L 3.9 4.1 3.6 4.3   MAGNESIUM mg/dL 1.8  --  1.6* 1.7   CHLORIDE mmol/L 107  --  108 109   CO2 mmol/L 28.8  --  26.3 26.3   BUN mg/dL 21  --  27* 29*   CREATININE mg/dL 0.89  --  0.87 0.92   EGFR IF NONAFRICN AM mL/min/1.73 60*  --  62 58*   CALCIUM mg/dL 8.0  --  7.4* 7.8   GLUCOSE mg/dL 116*  --  134* 118*   ALBUMIN g/dL 2.30*  --  2.20* 2.60*    BILIRUBIN mg/dL 1.6  --  0.6 1.2   ALK PHOS U/L 90  --  77 67   AST (SGOT) U/L 61*  --  62* 61*   ALT (SGPT) U/L 20  --  20 19   Estimated Creatinine Clearance: 40.7 mL/min (by C-G formula based on Cr of 0.89).  No results found for: AMMONIA    Results from last 7 days  Lab Units 10/12/17  0206   TROPONIN I ng/mL <0.006         No results found for: HGBA1C  Lab Results   Component Value Date    TSH 1.771 10/12/2017    FREET4 1.22 07/13/2017         Pain Management Panel     Pain Management Panel Latest Ref Rng & Units 8/10/2017 7/25/2017    AMPHETAMINES SCREEN, URINE Negative Negative Negative    BARBITURATES SCREEN Negative Negative Negative    BENZODIAZEPINE SCREEN, URINE Negative Negative Negative    BUPRENORPHINE Negative Negative Negative    COCAINE SCREEN, URINE Negative Negative Negative    METHADONE SCREEN, URINE Negative Negative Negative              Radiology:  Imaging Results (last 72 hours)     Procedure Component Value Units Date/Time    XR Chest 1 View [[id number]] Collected:  10/12/17 0708     Updated:  10/12/17 0711    Narrative:       XR CHEST 1 VW-     CLINICAL INDICATION: pre-surgery          COMPARISON: None available      TECHNIQUE: Single frontal view of the chest.     FINDINGS:     There is no focal alveolar infiltrate or effusion.  The cardiac silhouette is normal. The pulmonary vasculature is  unremarkable.  There is no evidence of an acute osseous abnormality.   There are no suspicious-appearing parenchymal soft tissue nodules.            Impression:       No evidence of active or acute cardiopulmonary disease on today's chest  radiograph.         This report was finalized on 10/12/2017 7:09 AM by Dr. Byron Mcintosh MD.       XR Femur 2 View Left [[id number]] Collected:  10/12/17 0913     Updated:  10/12/17 0942    Narrative:       XR FEMUR 2 VIEWS, LEFT-      HISTORY:  Trauma.          COMPARISON: None available.     FINDINGS:  Four views of the left femur show an acute fracture of  the  distal femoral shaft.     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       Impression:       Fracture of the distal femoral shaft with significant  displacement.     This report was finalized on 10/12/2017 9:40 AM by Dr. Byron Mcintosh MD.       FL Surgery Fluoro [[id number]] Updated:  10/12/17 1634    XR Chest 1 View [[id number]] Collected:  10/13/17 0747     Updated:  10/13/17 0747    Narrative:       XR CHEST 1 VIEW-     CLINICAL INDICATION: Cough; S72.402A-Unspecified fracture of lower end  of left femur, initial encounter for closed fracture; S72.492A-Other  fracture of lower end of left femur, initial encounter for closed  fracture.          COMPARISON: 10/12/2017.      TECHNIQUE: Single frontal view of the chest.     FINDINGS:     There is no focal alveolar infiltrate or effusion.  The cardiac silhouette is normal. The pulmonary vasculature is  unremarkable.  There is no evidence of an acute osseous abnormality.   There are no suspicious-appearing parenchymal soft tissue nodules.            Impression:       No evidence of active or acute cardiopulmonary disease on today's chest  radiograph.           XR Femur 2 View Left [[id number]] Collected:  10/13/17 0747     Updated:  10/13/17 0748    Narrative:       XR FEMUR 2 VIEW LEFT-      CLINICAL INDICATION: Postop; S72.402A-Unspecified fracture of lower end  of left femur, initial encounter for closed fracture; S72.492A-Other  fracture of lower end of left femur, initial encounter for closed  fracture.          COMPARISON: None available.     FINDINGS: 3 views of the left femur show postoperative hardware.  Overlying skin clips are present.       Impression:       Postoperative change for fixation of distal femoral fracture  with markedly improved alignment.                   Assessment:      ICD-10-CM ICD-9-CM   1. Closed fracture of distal end of left femur, unspecified fracture morphology, initial encounter S72.402A 821.20   2. Other closed fracture of distal end of  left femur, initial encounter S72.492A 821.29       Plan:   POD#4 ORIF femur. Patient is stable. Dressing changed as needed if saturated. Will continue to watch H&H closely. Continue knee immobilizer. Non weight bearing right leg.       WILMER Moreno

## 2017-10-16 NOTE — THERAPY TREATMENT NOTE
Acute Care - Physical Therapy Treatment Note   [facility]     Patient Name: Alma Delia Garcia  : 1930  MRN: [id number]  Today's Date: 10/16/2017  Onset of Illness/Injury or Date of Surgery Date: 10/12/17  Date of Referral to PT: 10/13/17  Referring Physician: Dr. Pierre    Admit Date: 10/12/2017    Visit Dx:    ICD-10-CM ICD-9-CM   1. Closed fracture of distal end of left femur, unspecified fracture morphology, initial encounter S72.402A 821.20   2. Other closed fracture of distal end of left femur, initial encounter S72.492A 821.29     Patient Active Problem List   Diagnosis   • Hepatic cirrhosis*   • HTN (hypertension)*   • Anxiety disorder*   • Osteopetrosis*   • Hypothyroidism*   • GERD (gastroesophageal reflux disease)*   • Chronic pain syndrome due to fractured spine and left rib fracture*   • Anemia, chronic disease   • Arthralgia of hip   • Left knee pain   • Fracture of proximal end of tibia and fibula   • Primary insomnia   • Thrombocytopenia   • Renal failure   • Peripheral vascular disease   • Bilateral leg edema   • Cramp of both lower extremities   • Hepatic encephalopathy   • UTI (urinary tract infection)   • Hypokalemia   • Chronic systolic heart failure   • Closed fracture of left distal femur               Adult Rehabilitation Note       10/16/17 1549 10/14/17 1100       Rehab Assessment/Intervention    Discipline physical therapy assistant  -RF physical therapist  -AD     Document Type therapy note (daily note)  -RF therapy note (daily note)  -AD     Subjective Information agree to therapy;complains of;pain  -RF agree to therapy;complains of;pain  -AD     Patient Effort, Rehab Treatment adequate  -RF adequate  -AD     Symptoms Noted During/After Treatment fatigue;increased pain  -RF fatigue;increased pain  -AD     Precautions/Limitations fall precautions;non-weight bearing status  -RF fall precautions;non-weight bearing status  -AD     Patient Response to Treatment Pt demonstrates  maxX2/dep assistance for transferring from bed to chair and chair to bed. Pt demonstrates increased pain with all activity and mobility.   -RF Pt was dependent with bed mobility. She presented with decreased awareness, with CGA required with EOB sitting.  -AD     Recorded by [RF] Kiah Bonds PTA [AD] Ashley Claudene Dalton, PT     Pain Assessment    Pain Assessment Gregory-Reynolds FACES  -RF      Gregory-Reynolds FACES Pain Rating 8  -RF      Pain Location Leg  -RF      Pain Orientation Left  -RF      Pain Intervention(s) Repositioned;Rest  -RF      Recorded by [RF] Kiah Bonds PTA      Cognitive Assessment/Intervention    Current Cognitive/Communication Assessment functional  -RF      Orientation Status oriented to;person  -RF      Follows Commands/Answers Questions able to follow single-step instructions;needs cueing  -RF      Recorded by [RF] Kiah Bonds PTA      Bed Mobility, Assessment/Treatment    Bed Mobility, Assistive Device draw sheet  -RF draw sheet  -AD     Bed Mobility, Scoot/Bridge, [place] dependent (less than 25% patient effort);2 person assist required  -RF      Bed Mob, Supine to Sit, [place] dependent (less than 25% patient effort)  -RF dependent (less than 25% patient effort)  -AD     Bed Mob, Sit to Supine, [place] dependent (less than 25% patient effort)  -RF dependent (less than 25% patient effort)  -AD     Recorded by [RF] Kiah Bonds PTA [AD] Ashley Claudene Dalton, PT     Transfer Assessment/Treatment    Transfers, Sit-Stand [place] dependent (less than 25% patient effort);2 person assist required  -RF      Transfers, Stand-Sit [place] dependent (less than 25% patient effort);2 person assist required  -RF      Transfer, Impairments strength decreased  -RF      Recorded by [RF] Kiah Bonds PTA      Gait Assessment/Treatment    Gait, [place] Level dependent (less than 25% patient effort);2 person assist required  -RF      Gait, Assistive Device  rolling walker  -RF      Gait, Distance (Feet) 3  -RF      Gait, Comment Short distance from bed to chair.   -RF      Recorded by [RF] Kiah Bonds PTA      Therapy Exercises    Bilateral Lower Extremities AROM:;AAROM:;10 reps;supine  -RF --   Sitting on EOB with CGA for ten minutes; AP peformed  -AD     Recorded by [RF] Kiah Bonds PTA [AD] Ashley Claudene Dalton, PT     Positioning and Restraints    Pre-Treatment Position in bed  -RF in bed  -AD     Post Treatment Position wheelchair  -RF bed  -AD     In Bed supine;call light within reach;encouraged to call for assist   in PM  -RF notified nsg;call light within reach;side lying right;side rails up x2   SCD on RLE, knee immobilizer on RLE; heels elevated  -AD     In Chair reclined;call light within reach;encouraged to call for assist   In AM  -RF      Recorded by [RF] Kiah Bonds, KIRILL [AD] Ashley Claudene Dalton, PT       User Key  (r) = Recorded By, (t) = Taken By, (c) = Cosigned By    Initials Name Effective Dates    AD Ashley Claudene Dalton, PT 02/04/16 -     RF Kiah Bonds, PTA 07/19/17 -                 IP PT Goals       10/13/17 1415          Bed Mobility PT LTG    Bed Mobility PT LTG, Date Established 10/13/17  -BC      Bed Mobility PT LTG, Time to Achieve by discharge  -BC      Bed Mobility PT LTG, Activity Type all bed mobility  -BC      Bed Mobility PT LTG, [place] Level minimum assist (75% patient effort)  -BC      Bed Mobility PT Goal  LTG, Assist Device bed rails  -BC      Transfer Training PT LTG    Transfer Training PT LTG, Date Established 10/13/17  -BC      Transfer Training PT LTG, Time to Achieve by discharge  -BC      Transfer Training PT LTG, Activity Type all transfers  -BC      Transfer Training PT LTG, [place] Level minimum assist (75% patient effort)  -BC      Transfer Training PT LTG, Assist Device walker, rolling  -BC      Gait Training PT LTG    Gait Training Goal PT LTG, Date Established 10/13/17  -BC       Gait Training Goal PT LTG, Time to Achieve by discharge  -BC      Gait Training Goal PT LTG, [place] Level minimum assist (75% patient effort)  -BC      Gait Training Goal PT LTG, Assist Device walker, rolling  -BC      Gait Training Goal PT LTG, Distance to Achieve 10  -BC        User Key  (r) = Recorded By, (t) = Taken By, (c) = Cosigned By    Initials Name Provider Type    JULISSA Stringer, PT Physical Therapist          Physical Therapy Education     Title: PT OT SLP Therapies (Done)     Topic: Physical Therapy (Done)     Point: Mobility training (Done)    Learning Progress Summary    Learner Readiness Method Response Comment Documented by Status   Patient Acceptance E VU,NR  RF 10/16/17 1554 Done    Acceptance E NR,NL  RR 10/16/17 0008 Active    Acceptance E NR  KF 10/15/17 1557 Active    Acceptance E VU,NR  RR 10/14/17 2312 Done    Acceptance E VU  AD 10/14/17 1121 Done    Acceptance E NR  KL 10/13/17 1616 Active    Acceptance E NL  BC 10/13/17 1414 Active    Acceptance E VU  CD 10/12/17 2213 Done    Acceptance E VU,NR   10/12/17 0433 Done               Point: Home exercise program (Done)    Learning Progress Summary    Learner Readiness Method Response Comment Documented by Status   Patient Acceptance E VU,NR  RF 10/16/17 1554 Done    Acceptance E NR,NL  RR 10/16/17 0008 Active    Acceptance E NR  KF 10/15/17 1557 Active    Acceptance E VU,NR  RR 10/14/17 2312 Done    Acceptance E VU  AD 10/14/17 1121 Done    Acceptance E NR  KL 10/13/17 1616 Active    Acceptance E NL  BC 10/13/17 1414 Active    Acceptance E VU  CD 10/12/17 2213 Done    Acceptance E VU,NR   10/12/17 0433 Done               Point: Body mechanics (Done)    Learning Progress Summary    Learner Readiness Method Response Comment Documented by Status   Patient Acceptance E VU,NR  RF 10/16/17 1554 Done    Acceptance E NR,NL  RR 10/16/17 0008 Active    Acceptance E NR  KF 10/15/17 1557 Active    Acceptance E VU,NR  RR 10/14/17 2312  Done    Acceptance E VU  AD 10/14/17 1121 Done    Acceptance E NR   10/13/17 1616 Active    Acceptance E NL  BC 10/13/17 1414 Active    Acceptance E VU  CD 10/12/17 2213 Done    Acceptance E VU,NR   10/12/17 0433 Done               Point: Precautions (Done)    Learning Progress Summary    Learner Readiness Method Response Comment Documented by Status   Patient Acceptance E VU,NR  RF 10/16/17 1554 Done    Acceptance E NR,NL  RR 10/16/17 0008 Active    Acceptance E NR  KF 10/15/17 1557 Active    Acceptance E VU,NR   10/14/17 2312 Done    Acceptance E VU  AD 10/14/17 1121 Done    Acceptance E NR   10/13/17 1616 Active    Acceptance E NL  BC 10/13/17 1414 Active    Acceptance E VU   10/12/17 2213 Done    Acceptance E VU,NR   10/12/17 0433 Done                      User Key     Initials Effective Dates Name Provider Type Discipline     06/16/16 -  Aubrie Rudd, RN Registered Nurse Nurse     02/04/16 -  Ashley Claudene Dalton, PT Physical Therapist PT     11/02/16 -  Lacey López, RN Registered Nurse Nurse    BC 03/14/16 -  Rosa Stringer, PT Physical Therapist PT     10/18/16 -  Mimi Bethea, RN Registered Nurse Nurse     12/19/16 -  aSrah Curtis, RN Registered Nurse Nurse     07/19/17 -  Kiah Bonds, PTA Physical Therapy Assistant PT     07/19/17 -  Breanna Resendez, RN Registered Nurse Nurse                    PT Recommendation and Plan  Planned Therapy Interventions: balance training, bed mobility training, gait training, home exercise program, patient/family education, strengthening, transfer training  PT Frequency: 2 times/day, 5 times/wk, per priority policy             Outcome Measures       10/16/17 1500 10/14/17 1100       How much help from another person do you currently need...    Turning from your back to your side while in flat bed without using bedrails? 1  -RF 1  -AD     Moving from lying on back to sitting on the side of a flat bed without  bedrails? 1  -RF 1  -AD     Moving to and from a bed to a chair (including a wheelchair)? 1  -RF 1  -AD     Standing up from a chair using your arms (e.g., wheelchair, bedside chair)? 1  -RF 1  -AD     Climbing 3-5 steps with a railing? 1  -RF 1  -AD     To walk in hospital room? 1  -RF 1  -AD     AM-PAC 6 Clicks Score 6  -RF 6  -AD     Functional Assessment    Outcome Measure Options AM-PAC 6 Clicks Basic Mobility (PT)  -RF        User Key  (r) = Recorded By, (t) = Taken By, (c) = Cosigned By    Initials Name Provider Type    AD Ashley Claudene Dalton, PT Physical Therapist    RF Kiah Bonds PTA Physical Therapy Assistant           Time Calculation:         PT Charges       10/16/17 1555 10/16/17 1554       Time Calculation    PT Received On 10/16/17  -RF 10/16/17  -RF     PT - Next Appointment 10/17/17  -RF 10/16/17  -RF     PT Goal Re-Cert Due Date 10/27/17  -RF 10/27/17  -RF     Time Calculation- PT    Total Timed Code Minutes- PT 23 minute(s)  -RF 25 minute(s)  -RF       User Key  (r) = Recorded By, (t) = Taken By, (c) = Cosigned By    Initials Name Provider Type    RF Kiah Bonds PTA Physical Therapy Assistant          Therapy Charges for Today     Code Description Service Date Service Provider Modifiers Qty    06144993266 HC PT THER PROC EA 15 MIN 10/16/2017 Kiah Bonds PTA GP 1    91512412725 HC PT THERAPEUTIC ACT EA 15 MIN 10/16/2017 Kiah Bonds PTA GP 3    07332565999 HC PT THER SUPP EA 15 MIN 10/16/2017 Kiah Bonds PTA GP 4          PT G-Codes  Outcome Measure Options: AM-PAC 6 Clicks Basic Mobility (PT)  Score: 7  Functional Limitation: Mobility: Walking and moving around  Mobility: Walking and Moving Around Current Status (): At least 80 percent but less than 100 percent impaired, limited or restricted  Mobility: Walking and Moving Around Goal Status (): At least 60 percent but less than 80 percent impaired, limited or restricted    Kiah Bonds  PTA  10/16/2017

## 2017-10-16 NOTE — PROGRESS NOTES
Discharge Planning Assessment   [facility]     Patient Name: Alma Delia Garcia  MRN: [id number]  Today's Date: 10/16/2017    Admit Date: 10/12/2017       Discharge Plan       10/16/17 1325    Case Management/Social Work Plan    Plan SS spoke with pt and pt's daughter Justin. Pt and daughter are agreeable to short term rehab at [facility]. Pt has been a resident of [facility] in the past. SS contacted [facility] per Rosalie who states she has one bed available at this time. SS faxed pt's information. SS will follow up in the AM.     SS received call back from [facility] on this date. Rosalie states they are agreeable to accept pt at discharge. SS will follow and assist as needed.     Patient/Family In Agreement With Plan yes        Discharge Placement     No information found        Expected Discharge Date and Time     Expected Discharge Date Expected Discharge Time    Oct 16, 2017             Ama Ramirez

## 2017-10-16 NOTE — PLAN OF CARE
Problem: Fall Risk (Adult)  Goal: Identify Related Risk Factors and Signs and Symptoms  Outcome: Ongoing (interventions implemented as appropriate)  Goal: Absence of Falls  Outcome: Ongoing (interventions implemented as appropriate)    Problem: Mobility, Physical Impaired (Adult)  Goal: Identify Related Risk Factors and Signs and Symptoms  Outcome: Ongoing (interventions implemented as appropriate)  Goal: Enhanced Mobility Skills  Outcome: Ongoing (interventions implemented as appropriate)  Goal: Enhanced Functionality Ability  Outcome: Ongoing (interventions implemented as appropriate)    Problem: Pain, Acute (Adult)  Goal: Identify Related Risk Factors and Signs and Symptoms  Outcome: Ongoing (interventions implemented as appropriate)  Goal: Acceptable Pain Control/Comfort Level  Outcome: Ongoing (interventions implemented as appropriate)    Problem: Infection, Risk/Actual (Adult)  Goal: Identify Related Risk Factors and Signs and Symptoms  Outcome: Ongoing (interventions implemented as appropriate)  Goal: Infection Prevention/Resolution  Outcome: Ongoing (interventions implemented as appropriate)    Problem: Skin Integrity Impairment, Risk/Actual (Adult)  Goal: Identify Related Risk Factors and Signs and Symptoms  Outcome: Ongoing (interventions implemented as appropriate)  Goal: Skin Integrity/Wound Healing  Outcome: Ongoing (interventions implemented as appropriate)    Problem: Patient Care Overview (Adult)  Goal: Plan of Care Review  Outcome: Ongoing (interventions implemented as appropriate)  Goal: Adult Individualization and Mutuality  Outcome: Ongoing (interventions implemented as appropriate)

## 2017-10-16 NOTE — DISCHARGE PLACEMENT REQUEST
"Alma Delia Fabian (87 y.o. Female)     Date of Birth Social Security Number Address Home Phone MRN    07/26/1930  [address]  [facility] [address] [phone] [id number]    Religious Marital Status          Other        Admission Date Admission Type Admitting Provider Attending Provider Department, Room/Bed    10/12/17 Emergency Britany Serrato DO Khan, Hafiz Sarfraz, MD [facility], P202/S2    Discharge Date Discharge Disposition Discharge Destination                      Attending Provider: Delores Diamond MD     Allergies:  Ciprofloxacin    Isolation:  None   Infection:  None   Code Status:  FULL    Ht:  62\" (157.5 cm)   Wt:  153 lb 4.8 oz (69.5 kg)    Admission Cmt:  None   Principal Problem:  Closed fracture of left distal femur [S72.402A]                 Active Insurance as of 10/12/2017     Primary Coverage     Payor Plan Insurance Group Employer/Plan Group    MEDICARE MEDICARE A & B      Payor Plan Address Payor Plan Phone Number Effective From Effective To    [address] [phone] 11/1/1988     [place] [address]       Subscriber Name Subscriber Birth Date Member ID       ALMA DELIA FABIAN 7/26/1930 [id number]           Secondary Coverage     Payor Plan Insurance Group Employer/Plan Group    [place]      Payor Plan Address Payor Plan Phone Number Effective From Effective To    [address] [phone] 7/12/2017     [place] [address]       Subscriber Name Subscriber Birth Date Member ID       ALMA DELIA FABIAN 7/26/1930 [id number]                 Emergency Contacts      (Rel.) Home Phone Work Phone Mobile Phone    Justin Crowley (Daughter) [phone] -- --    RosyYeimy (Sister) [phone] -- --    Candace Rico [phone] -- --            Insurance Information                MEDICARE/MEDICARE A & B Phone: [phone]    Subscriber: Alma Delia Fabian Subscriber#: [id number]    Group#:  Precert#:         " [place] Phone: [phone]    Subscriber: Alma Delia Garcia Subscriber#: [id number]    Group#:  Precert#:              History & Physical      Britany Ryder AmaDO at 10/12/2017  4:33 AM          Hospitalist History and Physical    Patient Identification:  Name: Alma Delia Garcia  Age/Sex: 87 y.o. female  :  1930  MRN: [id number]         Primary Care Physician: Galina Gonsalves MD    Chief Complaint   Patient presents with   • Fall   • Leg Injury       History of Present Illness  Patient is a 87 y.o. female presents with the following: left leg pain after a fall    The patient is an 86 yo female with past medical history significant for liver cirrhosis with history of alcohol abuse, diastolic heart failure and hypertension who presents to the ED complaining of left lower extremity pain status post a fall at home.     The patient states that she was standing in her kitchen at the sink. She states that she was using a knife to open a bag of candy when she started falling backwards. She states that she tried to catch herself. Please note that she is currently somewhat of a poor historian as she has received IV pain medication and falls asleep quickly (but is easily aroused).     She states that she is unsure if she became dizzy. She denies any dyspnea. She currently denies any chest pain. She denies any other recent falls. She denies LOC and/or head trauma. She reports chronic diarrhea due to lactulose. She reports recent urinary frequency but denies dysuria.     In the ED, the patient was found to have a left distal femur fracture. Her UA was also suggestive of a UTI. She has been admitted to the medical-surgical floor for further management.     Present during visit: PHILLIP Smith    Past History:  Past Medical History:   Diagnosis Date   • Anxiety    • Bell's palsy    • Cellulitis     LLE   • CHF (congestive heart failure)     Diastolic   • Diverticulosis    • GERD  (gastroesophageal reflux disease)    • Hepatic cirrhosis    • Hypertension    • Hypothyroidism    • Osteoporosis    • Thrombocytopenia    • Trigeminal neuralgia      Past Surgical History:   Procedure Laterality Date   • BACK SURGERY      KYPHOPLASTIES    • CARDIAC CATHETERIZATION Left 08/2004   • CATARACT EXTRACTION     • CHOLECYSTECTOMY     • CLAVICLE SURGERY Right    • COLONOSCOPY  10/2004   • HIP FRACTURE SURGERY Bilateral     ARTHROPLASTIES    • PARTIAL HYSTERECTOMY     • WRIST FRACTURE SURGERY       Family History   Problem Relation Age of Onset   • Cancer Mother    • Heart disease Father    • Arthritis Sister    • Osteoporosis Sister    • Diabetes Maternal Aunt      Social History   Substance Use Topics   • Smoking status: Never Smoker   • Smokeless tobacco: Former User     Types: Snuff     Quit date: 1970   • Alcohol use No      Comment: FORMER 12 BOTTLES PER DAY FOR 15 YEARS     Prescriptions Prior to Admission   Medication Sig Dispense Refill Last Dose   • alendronate (FOSAMAX) 70 MG tablet Take 1 tablet by mouth Every 7 (Seven) Days. Takes on Mondays. 4 tablet 3 Taking   • aspirin 81 MG tablet Take 1 tablet by mouth Daily. 90 tablet 2 Taking   • bumetanide (BUMEX) 1 MG tablet Take 1 tablet by mouth 2 (Two) Times a Day. 60 tablet 3    • esomeprazole (nexIUM) 40 MG capsule Take 1 capsule by mouth Every Morning Before Breakfast. 90 capsule 0 Taking   • folic acid (FOLVITE) 1 MG tablet Take 1 tablet by mouth Daily. 30 tablet 0 Taking   • HYDROcodone-acetaminophen (NORCO) 7.5-325 MG per tablet Take 1 tablet by mouth Every 8 (Eight) Hours As Needed for Moderate Pain 90 tablet 0 Taking   • lactulose (CHRONULAC) 10 GM/15ML solution Take 30 mL by mouth 3 (Three) Times a Day. 1892 mL 0 Taking   • levothyroxine (SYNTHROID) 50 MCG tablet Take 1 tablet by mouth Daily. 90 tablet 0 Taking   • nitroglycerin (NITROSTAT) 0.4 MG SL tablet Place 1 tablet under the tongue Every 5 (Five) Minutes As Needed for chest pain. Take  no more than 3 doses in 15 minutes. 25 tablet 0 Taking   • ondansetron ODT (ZOFRAN-ODT) 4 MG disintegrating tablet Dissolve 1 tablet on the tongue Every 8 (Eight) Hours As Needed for Nausea or Vomiting. 30 tablet 0 Taking   • PHARMACY MEDS TO BED CONSULT Daily. 1 each 0 Taking   • potassium chloride (K-DUR) 10 MEQ CR tablet Take 1 tablet by mouth Daily. 90 tablet 0 Taking   • rifaximin (XIFAXAN) 550 MG tablet Take 1 tablet by mouth Every 12 (Twelve) Hours. 60 tablet 1 Taking   • rOPINIRole (REQUIP) 1 MG tablet Take 1 tablet by mouth Every Night. Take 1 hour before bedtime. 30 tablet 1      Allergies: Ciprofloxacin    Review of Systems:  Review of Systems   Unable to perform ROS: Mental status change      Vital Signs  Temp:  [98.1 °F (36.7 °C)-98.2 °F (36.8 °C)] 98.1 °F (36.7 °C)  Heart Rate:  [78-88] 78  Resp:  [18-20] 20  BP: (109-166)/(41-63) 143/63  Body mass index is 27.29 kg/(m^2).    Physical Exam:  Physical Exam   Constitutional: She is oriented to person, place, and time. She appears well-developed and well-nourished. She is sleeping. No distress.   Chronically ill appearing   HENT:   Head: Normocephalic and atraumatic.   Nose: Nose normal.   Mouth/Throat: Oropharynx is clear and moist. Mucous membranes are dry.   Eyes: Conjunctivae and EOM are normal. Pupils are equal, round, and reactive to light. No scleral icterus.   Neck: Trachea normal. Neck supple. No JVD present. Carotid bruit is not present. No thyromegaly present.   Cardiovascular: Normal rate, regular rhythm, normal heart sounds and normal pulses.  Exam reveals no gallop and no friction rub.    No murmur heard.  2+ lower extremity edema   Pulmonary/Chest: Effort normal. No respiratory distress. She has decreased breath sounds in the right lower field and the left lower field. She has no wheezes. She has no rales.   Abdominal: Soft. Bowel sounds are normal. She exhibits no distension. There is no tenderness. There is no guarding.   Musculoskeletal:    Immobilizer in place   Neurological: She is oriented to person, place, and time. She has normal strength. No cranial nerve deficit.   Sleeping but arousable and able to follow all commands   Skin: Skin is warm, dry and intact. Ecchymosis noted. No rash noted.   Erythema noted bilateral lower extremities distally   Psychiatric: She has a normal mood and affect. Her speech is normal.     Results Review:    Results from last 7 days  Lab Units 10/12/17  0206   WBC 10*3/mm3 5.88   HEMOGLOBIN g/dL 10.8*   HEMATOCRIT % 32.5*   PLATELETS 10*3/mm3 122*       Results from last 7 days  Lab Units 10/12/17  0206 10/05/17  1315   SODIUM mmol/L 140 139   POTASSIUM mmol/L 3.1* 4.4   CHLORIDE mmol/L 103 100   CO2 mmol/L 29.7 31.7   BUN mg/dL 18 29*   CREATININE mg/dL 0.96 1.33*   CALCIUM mg/dL 9.3 9.0   GLUCOSE mg/dL 144* 91       Results from last 7 days  Lab Units 10/12/17  0206 10/05/17  1315   BILIRUBIN mg/dL 1.0 0.9   ALK PHOS U/L 119* 151*   AST (SGOT) U/L 55* 36*   ALT (SGPT) U/L 26 24       Results from last 7 days  Lab Units 10/12/17  0206   TROPONIN I ng/mL <0.006       Results from last 7 days  Lab Units 10/12/17  0206   INR  1.17*       Imaging:    I have personally reviewed the EKG. Per my view: normal sinus rhythm    Imaging Results (most recent)     Procedure Component Value Units Date/Time    XR Chest 1 View [[id number]] Updated:  10/12/17 0133    XR Femur 2 View Left [[id number]] Updated:  10/12/17 0133      *Per my view: her femur xray reveals a distal femur fracture. Her chest xray reveals no obvious infiltrate or effusion. She is rotated to the right.    Assessment/Plan      -Acute, traumatic left distal femur fracture status post fall  -Possible acute UTI  -History of CHF with diastolic dysfunction; currently in no acute exacerbation  -Chronic lower extremity edema with chronic venous stasis dermatitis/possible cellulitis  -Mild hypokalemia  -Moderate protein malnutrition  -Mild thrombocytopenia  -Macrocytic  anemia  -History of cirrhosis; compensated  -Hypothyroidism    Has been admitted to the medical surgical floor.  She is currently nothing by mouth awaiting orthopedic surgery evaluation.  The patient's potassium will be supplemented and I have ordered a magnesium level.  We'll hold on any IV fluid hydration at this time given patient's history of congestive heart failure and cirrhosis.  Patient will receive as needed IV pain medication with holding parameters.  We will follow-up on the official x-ray reports.  I have started her on empiric IV antibiotic therapy with Rocephin while we await the final urine culture.  I have ordered a TSH and ammonia level.  P her CBC and CMP later on in the morning.  The patient had a troponin level that was within normal limits and had an outpatient echocardiogram performed on the day prior to admission.  Patient's home medication list will be addressed once verified.  At this point I feel that the patient is at least a moderate risk patient for any intermediate risk procedure, however, her Alicia perioperative cardiac risk score is approximately 0.94%    DVT prophylaxis: No DVT ppx for now in anticpation of surgery; no SCDs due to lower extremity swelling    Estimated Length of Stay: > 2 MNs    The patient's previous medical record from [facility] has been reviewed.    I discussed the patients findings and my recommendations with patient and nursing staff      Britany Serrato DO  10/12/17  4:33 AM     Electronically signed by Britany Serrato DO at 10/12/2017  8:07 AM        Hospital Medications (active)       Dose Frequency Start End    aspirin EC tablet 81 mg 81 mg Daily 10/13/2017     Sig - Route: Take 1 tablet by mouth Daily. - Oral    bisacodyl (DULCOLAX) suppository 10 mg 10 mg Daily PRN 10/12/2017     Sig - Route: Insert 1 suppository into the rectum Daily As Needed for Constipation. - Rectal    bumetanide (BUMEX) tablet 1 mg 1 mg 2 Times Daily 10/12/2017     Sig - Route:  "Take 1 tablet by mouth 2 (Two) Times a Day. - Oral    cefdinir (OMNICEF) capsule 300 mg 300 mg Every 12 Hours Scheduled 10/16/2017 10/19/2017    Sig - Route: Take 1 capsule by mouth Every 12 (Twelve) Hours. - Oral    cyanocobalamin injection 1,000 mcg 1,000 mcg Every 28 Days 10/15/2017     Sig - Route: Inject 1 mL into the shoulder, thigh, or buttocks Every 28 (Twenty-Eight) Days. - Intramuscular    Cosign for Ordering: Accepted by Delores Diamond MD on 10/15/2017  1:34 PM    folic acid (FOLVITE) tablet 1 mg 1 mg Daily 10/12/2017     Sig - Route: Take 1 tablet by mouth Daily. - Oral    HYDROcodone-acetaminophen (NORCO) 7.5-325 MG per tablet 1 tablet 1 tablet Every 4 Hours PRN 10/12/2017 10/22/2017    Sig - Route: Take 1 tablet by mouth Every 4 (Four) Hours As Needed for Moderate Pain . - Oral    lactulose (CHRONULAC) 10 GM/15ML solution 20 g 20 g 3 Times Daily 10/16/2017     Sig - Route: Take 30 mL by mouth 3 (Three) Times a Day. - Oral    Notes to Pharmacy: Please increase to TID if patient has no BM. Decrease to once a day if she starts havign diarrhea or more than 3 loose BM daily    levothyroxine (SYNTHROID, LEVOTHROID) tablet 50 mcg 50 mcg Daily 10/12/2017     Sig - Route: Take 1 tablet by mouth Daily. - Oral    Magesium Sulfate 1 gram infusion - Mg 1.6-1.9 mg/dL 1 g As Needed 10/15/2017     Sig - Route: Infuse 100 mL into a venous catheter As Needed (Mg 1.6-1.9 mg/dL.). - Intravenous    Linked Group 1:  \"Or\" Linked Group Details        Magesium Sulfate 1 gram infusion - Mg 1.6-1.9 mg/dL 1 g Once 10/16/2017     Sig - Route: Infuse 100 mL into a venous catheter 1 (One) Time. - Intravenous    magnesium hydroxide (MILK OF MAGNESIA) suspension 2400 mg/10mL 10 mL 10 mL Daily PRN 10/14/2017     Sig - Route: Take 10 mL by mouth Daily As Needed for Constipation. - Oral    Magnesium Sulfate 2 gram infusion - Mg less than or equal to 1.5 mg/dL 2 g As Needed 10/15/2017     Sig - Route: Infuse 50 mL into a venous " "catheter As Needed (for Mg less than or equal 1.5 mg/dL). - Intravenous    Linked Group 1:  \"Or\" Linked Group Details        morphine injection 2 mg 2 mg Every 4 Hours PRN 10/13/2017 10/22/2017    Sig - Route: Infuse 1 mL into a venous catheter Every 4 (Four) Hours As Needed for Moderate Pain . - Intravenous    Linked Group 2:  \"And\" Linked Group Details        naloxone (NARCAN) injection 0.4 mg 0.4 mg Every 5 Minutes PRN 10/13/2017     Sig - Route: Infuse 1 mL into a venous catheter Every 5 (Five) Minutes As Needed for Respiratory Depression. - Intravenous    Linked Group 2:  \"And\" Linked Group Details        nitroglycerin (NITROSTAT) SL tablet 0.4 mg 0.4 mg Every 5 Minutes PRN 10/12/2017     Sig - Route: Place 1 tablet under the tongue Every 5 (Five) Minutes As Needed for Chest Pain. - Sublingual    ondansetron (ZOFRAN) injection 4 mg 4 mg Every 6 Hours PRN 10/12/2017     Sig - Route: Infuse 2 mL into a venous catheter Every 6 (Six) Hours As Needed for Nausea or Vomiting. - Intravenous    pantoprazole (PROTONIX) EC tablet 40 mg 40 mg Every Morning 10/13/2017     Sig - Route: Take 1 tablet by mouth Every Morning. - Oral    pneumococcal polysaccharide 23-valent (PNEUMOVAX-23) vaccine 0.5 mL 0.5 mL During Hospitalization 10/12/2017     Sig - Route: Inject 0.5 mL into the shoulder, thigh, or buttocks During Hospitalization for Immunization. - Intramuscular    Cosign for Ordering: Accepted by Delores Diamond MD on 10/13/2017  8:51 AM    polyethylene glycol (MIRALAX) powder 17 g 17 g Daily 10/14/2017     Sig - Route: Take 17 g by mouth Daily. - Oral    potassium chloride (K-DUR,KLOR-CON) ER tablet 10 mEq 10 mEq Daily 10/12/2017     Sig - Route: Take 1 tablet by mouth Daily. - Oral    potassium chloride 10 mEq in 100 mL IVPB 10 mEq Every 1 Hour PRN 10/12/2017     Sig - Route: Infuse 100 mL into a venous catheter Every 1 (One) Hour As Needed (See admin Instructions.). - Intravenous    potassium phosphate 15 mmol in " "sodium chloride 0.9 % 100 mL infusion 15 mmol As Needed 10/15/2017     Sig - Route: Infuse 15 mmol into a venous catheter As Needed (Peripheral IV - Phosphorus 1.8 - 2.5). - Intravenous    Linked Group 3:  \"Or\" Linked Group Details        potassium phosphate 15 mmol in sodium chloride 0.9 % 100 mL infusion 15 mmol Once 10/16/2017     Sig - Route: Infuse 15 mmol into a venous catheter 1 (One) Time. - Intravenous    potassium phosphate 30 mmol in sodium chloride 0.9 % 250 mL infusion 30 mmol As Needed 10/15/2017     Sig - Route: Infuse 30 mmol into a venous catheter As Needed (Peripheral IV - Phosphorus 1.3 - 1.7). - Intravenous    Linked Group 3:  \"Or\" Linked Group Details        potassium phosphate 45 mmol in sodium chloride 0.9 % 500 mL infusion 45 mmol As Needed 10/15/2017     Sig - Route: Infuse 45 mmol into a venous catheter As Needed (Peripheral IV - Phosphorus Less Than 1.3). - Intravenous    Linked Group 3:  \"Or\" Linked Group Details        rifaximin (XIFAXAN) tablet 550 mg 550 mg Every 12 Hours Scheduled 10/12/2017     Sig - Route: Take 1 tablet by mouth Every 12 (Twelve) Hours. - Oral    Risaquad-2 capsule 1 capsule 1 capsule Daily 10/13/2017     Sig - Route: Take 1 capsule by mouth Daily. - Oral    rOPINIRole (REQUIP) tablet 1 mg 1 mg Nightly 10/12/2017     Sig - Route: Take 1 tablet by mouth Every Night. - Oral    sennosides-docusate sodium (SENOKOT-S) 8.6-50 MG tablet 2 tablet 2 tablet 2 Times Daily 10/12/2017     Sig - Route: Take 2 tablets by mouth 2 (Two) Times a Day. - Oral    sodium chloride 0.9 % flush 1-10 mL 1-10 mL As Needed 10/12/2017     Sig - Route: Infuse 1-10 mL into a venous catheter As Needed for Line Care. - Intravenous    sodium chloride 0.9 % flush 1-10 mL 1-10 mL As Needed 10/12/2017     Sig - Route: Infuse 1-10 mL into a venous catheter As Needed for Line Care. - Intravenous    sodium phosphates 15 mmol in sodium chloride 0.9 % 250 mL IVPB 15 mmol As Needed 10/15/2017     Sig - " "Route: Infuse 15 mmol into a venous catheter As Needed (Peripheral IV - Phosphorus 1.8 - 2.5 & Potassium Greater Than 4). - Intravenous    Linked Group 3:  \"Or\" Linked Group Details        sodium phosphates 30 mmol in sodium chloride 0.9 % 250 mL IVPB 30 mmol As Needed 10/15/2017     Sig - Route: Infuse 30 mmol into a venous catheter As Needed (Peripheral IV - Phosphorus 1.3-1.7 & Potassium Greater Than 4). - Intravenous    Linked Group 3:  \"Or\" Linked Group Details        sodium phosphates 45 mmol in sodium chloride 0.9 % 500 mL IVPB 45 mmol As Needed 10/15/2017     Sig - Route: Infuse 45 mmol into a venous catheter As Needed (Peripheral IV - Phosphorus Less Than 1.3 & Potassium Greater Than 4). - Intravenous    Linked Group 3:  \"Or\" Linked Group Details        cefTRIAXone (ROCEPHIN) 1 g/100 mL 0.9% NS (MBP) (Discontinued) 1 g Every 24 Hours 10/12/2017 10/16/2017    Sig - Route: Infuse 100 mL into a venous catheter Daily. - Intravenous    lactulose (CHRONULAC) 10 GM/15ML solution 20 g (Discontinued) 20 g 2 Times Daily 10/14/2017 10/16/2017    Sig - Route: Take 30 mL by mouth 2 (Two) Times a Day. - Oral    Notes to Pharmacy: Please increase to TID if patient has no BM. Decrease to once a day if she starts havign diarrhea or more than 3 loose BM daily            Lab Results (last 24 hours)     Procedure Component Value Units Date/Time    Potassium [[id number]]  (Normal) Collected:  10/15/17 1613    Specimen:  Blood Updated:  10/15/17 1634     Potassium 4.1 mmol/L     CBC (No Diff) [[id number]]  (Abnormal) Collected:  10/15/17 1740    Specimen:  Blood Updated:  10/15/17 1804     WBC 8.90 10*3/mm3      RBC 2.76 (L) 10*6/mm3      Hemoglobin 8.4 (L) g/dL       Post Transfusion Specimen         Hematocrit 25.6 (L) %      MCV 92.8 fL      MCH 30.4 pg      MCHC 32.8 (L) g/dL      RDW 16.0 (H) %      RDW-SD 50.9 fl      MPV 11.1 (H) fL      Platelets 82 (L) 10*3/mm3     Occult Blood X 1, Stool - Stool, Per Rectum [[id number]]  " (Normal) Collected:  10/15/17 2221    Specimen:  Stool from Per Rectum Updated:  10/15/17 2314     Fecal Occult Blood Negative    CBC & Differential [[id number]] Collected:  10/16/17 0031    Specimen:  Blood Updated:  10/16/17 0122    Narrative:       The following orders were created for panel order CBC & Differential.  Procedure                               Abnormality         Status                     ---------                               -----------         ------                     CBC Auto Differential[[id number]]        Abnormal            Final result                 Please view results for these tests on the individual orders.    CBC Auto Differential [[id number]]  (Abnormal) Collected:  10/16/17 0031    Specimen:  Blood Updated:  10/16/17 0122     WBC 9.12 10*3/mm3      RBC 2.74 (L) 10*6/mm3      Hemoglobin 8.2 (L) g/dL      Hematocrit 25.6 (L) %      MCV 93.4 fL      MCH 29.9 pg      MCHC 32.0 (L) g/dL      RDW 16.3 (H) %      RDW-SD 52.3 fl      MPV 11.0 (H) fL      Platelets 95 (L) 10*3/mm3      Neutrophil % 63.6 %      Lymphocyte % 14.9 (L) %      Monocyte % 11.1 %      Eosinophil % 9.6 (H) %      Basophil % 0.4 %      Immature Grans % 0.4 %      Neutrophils, Absolute 5.79 10*3/mm3      Lymphocytes, Absolute 1.36 10*3/mm3      Monocytes, Absolute 1.01 (H) 10*3/mm3      Eosinophils, Absolute 0.88 (H) 10*3/mm3      Basophils, Absolute 0.04 10*3/mm3      Immature Grans, Absolute 0.04 (H) 10*3/mm3     Magnesium [[id number]]  (Normal) Collected:  10/16/17 0031    Specimen:  Blood Updated:  10/16/17 0147     Magnesium 1.8 mg/dL     Phosphorus [[id number]]  (Abnormal) Collected:  10/16/17 0031    Specimen:  Blood Updated:  10/16/17 0147     Phosphorus 2.1 (L) mg/dL     Comprehensive Metabolic Panel [[id number]]  (Abnormal) Collected:  10/16/17 0031    Specimen:  Blood Updated:  10/16/17 0153     Glucose 116 (H) mg/dL      BUN 21 mg/dL      Creatinine 0.89 mg/dL      Sodium 139 mmol/L      Potassium 3.9 mmol/L       Chloride 107 mmol/L      CO2 28.8 mmol/L      Calcium 8.0 mg/dL      Total Protein 4.8 (L) g/dL      Albumin 2.30 (L) g/dL      ALT (SGPT) 20 U/L      AST (SGOT) 61 (H) U/L      Alkaline Phosphatase 90 U/L       Note New Reference Ranges        Total Bilirubin 1.6 mg/dL       1+ Icteric         eGFR Non African Amer 60 (L) mL/min/1.73      Globulin 2.5 gm/dL      A/G Ratio 0.9 (L) g/dL      BUN/Creatinine Ratio 23.6     Anion Gap 3.2 (L) mmol/L     Narrative:       The MDRD GFR formula is only valid for adults with stable renal function between ages 18 and 70.    Osmolality, Calculated [[id number]]  (Normal) Collected:  10/16/17 0031    Specimen:  Blood Updated:  10/16/17 0153     Osmolality Calc 281.5 mOsm/kg         Imaging Results (last 24 hours)     ** No results found for the last 24 hours. **        ECG/EMG Results (last 24 hours)     ** No results found for the last 24 hours. **           Physician Progress Notes (last 24 hours) (Notes from 10/15/2017  1:21 PM through 10/16/2017  1:21 PM)      WILMER Moreno at 10/16/2017  8:27 AM  Version 1 of 1         Inpatient Progress Note  Alma Delia Garcia  Date: 10/16/17  MRN: [id number]      Subjective:  Alma Delia Garcia is a 87 y.o. female POD#4 ORIF left femur. Patient is stable this morning.She is more alert this morning and answer questions. She does complain of moderate pain. She denies paresthesias    Objective:    Vitals:    10/16/17 0400 10/16/17 0500 10/16/17 0600 10/16/17 0800   BP: 94/55 (!) 97/38 (!) 93/36    BP Location:       Patient Position:       Pulse: 77 68 64 72   Resp:       Temp: 98.3 °F (36.8 °C)      TempSrc: Oral      SpO2: 100% 99% 98% 100%   Weight: 153 lb 4.8 oz (69.5 kg)      Height:         Examination of the left hip reveals  Aquacel dressing intact with minimal post op drainage. She has moderate swelling in the thigh.  She has active dorsiflexion and plantarflexion. Neurovascular status is intact.    Labs:      Results from  last 7 days  Lab Units 10/16/17  0031 10/15/17  1740 10/15/17  0411  10/13/17  0317  10/12/17  1956  10/12/17  0206   WBC 10*3/mm3 9.12 8.90 10.24  < > 12.39  --   --   < > 5.88   HEMOGLOBIN g/dL 8.2* 8.4* 6.0*  < > 9.2*  < >  --   < > 10.8*   HEMATOCRIT % 25.6* 25.6* 18.6*  < > 27.6*  < >  --   < > 32.5*   MCV fL 93.4 92.8 94.9*  < > 90.8  --   --   < > 95.6*   MCHC g/dL 32.0* 32.8* 32.3*  < > 33.3  --   --   < > 33.2   PLATELETS 10*3/mm3 95* 82* 77*  < > 52*  --   --   < > 122*   INR   --   --   --   --  1.79*  --  2.04*  --  1.17*   < > = values in this interval not displayed.        Results from last 7 days  Lab Units 10/16/17  0031 10/15/17  1613 10/15/17  0102 10/14/17  1536   SODIUM mmol/L 139  --  138 139   POTASSIUM mmol/L 3.9 4.1 3.6 4.3   MAGNESIUM mg/dL 1.8  --  1.6* 1.7   CHLORIDE mmol/L 107  --  108 109   CO2 mmol/L 28.8  --  26.3 26.3   BUN mg/dL 21  --  27* 29*   CREATININE mg/dL 0.89  --  0.87 0.92   EGFR IF NONAFRICN AM mL/min/1.73 60*  --  62 58*   CALCIUM mg/dL 8.0  --  7.4* 7.8   GLUCOSE mg/dL 116*  --  134* 118*   ALBUMIN g/dL 2.30*  --  2.20* 2.60*   BILIRUBIN mg/dL 1.6  --  0.6 1.2   ALK PHOS U/L 90  --  77 67   AST (SGOT) U/L 61*  --  62* 61*   ALT (SGPT) U/L 20  --  20 19   Estimated Creatinine Clearance: 40.7 mL/min (by C-G formula based on Cr of 0.89).  No results found for: AMMONIA    Results from last 7 days  Lab Units 10/12/17  0206   TROPONIN I ng/mL <0.006         No results found for: HGBA1C  Lab Results   Component Value Date    TSH 1.771 10/12/2017    FREET4 1.22 07/13/2017         Pain Management Panel     Pain Management Panel Latest Ref Rng & Units 8/10/2017 7/25/2017    AMPHETAMINES SCREEN, URINE Negative Negative Negative    BARBITURATES SCREEN Negative Negative Negative    BENZODIAZEPINE SCREEN, URINE Negative Negative Negative    BUPRENORPHINE Negative Negative Negative    COCAINE SCREEN, URINE Negative Negative Negative    METHADONE SCREEN, URINE Negative Negative Negative               Radiology:  Imaging Results (last 72 hours)     Procedure Component Value Units Date/Time    XR Chest 1 View [[id number]] Collected:  10/12/17 0708     Updated:  10/12/17 0711    Narrative:       XR CHEST 1 VW-     CLINICAL INDICATION: pre-surgery          COMPARISON: None available      TECHNIQUE: Single frontal view of the chest.     FINDINGS:     There is no focal alveolar infiltrate or effusion.  The cardiac silhouette is normal. The pulmonary vasculature is  unremarkable.  There is no evidence of an acute osseous abnormality.   There are no suspicious-appearing parenchymal soft tissue nodules.            Impression:       No evidence of active or acute cardiopulmonary disease on today's chest  radiograph.         This report was finalized on 10/12/2017 7:09 AM by Dr. Byron Mcintosh MD.       XR Femur 2 View Left [[id number]] Collected:  10/12/17 0913     Updated:  10/12/17 0942    Narrative:       XR FEMUR 2 VIEWS, LEFT-      HISTORY:  Trauma.          COMPARISON: None available.     FINDINGS:  Four views of the left femur show an acute fracture of the  distal femoral shaft.     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       Impression:       Fracture of the distal femoral shaft with significant  displacement.     This report was finalized on 10/12/2017 9:40 AM by Dr. Byron Mcintosh MD.       FL Surgery Fluoro [[id number]] Updated:  10/12/17 1634    XR Chest 1 View [[id number]] Collected:  10/13/17 0747     Updated:  10/13/17 0747    Narrative:       XR CHEST 1 VIEW-     CLINICAL INDICATION: Cough; S72.402A-Unspecified fracture of lower end  of left femur, initial encounter for closed fracture; S72.492A-Other  fracture of lower end of left femur, initial encounter for closed  fracture.          COMPARISON: 10/12/2017.      TECHNIQUE: Single frontal view of the chest.     FINDINGS:     There is no focal alveolar infiltrate or effusion.  The cardiac silhouette is normal. The pulmonary vasculature  is  unremarkable.  There is no evidence of an acute osseous abnormality.   There are no suspicious-appearing parenchymal soft tissue nodules.            Impression:       No evidence of active or acute cardiopulmonary disease on today's chest  radiograph.           XR Femur 2 View Left [[id number]] Collected:  10/13/17 0747     Updated:  10/13/17 0748    Narrative:       XR FEMUR 2 VIEW LEFT-      CLINICAL INDICATION: Postop; S72.402A-Unspecified fracture of lower end  of left femur, initial encounter for closed fracture; S72.492A-Other  fracture of lower end of left femur, initial encounter for closed  fracture.          COMPARISON: None available.     FINDINGS: 3 views of the left femur show postoperative hardware.  Overlying skin clips are present.       Impression:       Postoperative change for fixation of distal femoral fracture  with markedly improved alignment.                   Assessment:      ICD-10-CM ICD-9-CM   1. Closed fracture of distal end of left femur, unspecified fracture morphology, initial encounter S72.402A 821.20   2. Other closed fracture of distal end of left femur, initial encounter S72.492A 821.29       Plan:   POD#4 ORIF femur. Patient is stable. Dressing changed as needed if saturated. Will continue to watch H&H closely. Continue knee immobilizer. Non weight bearing right leg.       WILMER Moreno            Electronically signed by WILMER Moreno at 10/16/2017  9:04 AM      Jamison Ramirez DO at 10/16/2017 11:45 AM  Version 1 of 1         Subjective     History:   Alma Delia Garcia is a 87 y.o. female admitted on 10/12/2017 secondary to Closed fracture of left distal femur     Procedures:   10/12/17: ORIF of distal end of left femur    Transfusions:  10/12/17: 2 units of PRBC's  10/15/17: 2 units of PRBC's    Patient seen and examined with PHILLIP Clemente. Awake and alert. Currently sitting in bedside chair. Currently c/o left hip pain and right shoulder pain. Confused to  year but otherwise oriented. Denies CP, dyspnea or palpitations. Denies fever or chills. Reports BM last PM. No acute events overnight per RN.     History taken from: patient, chart, and RN.      Objective     Vital Signs  Temp:  [98 °F (36.7 °C)-98.3 °F (36.8 °C)] 98.3 °F (36.8 °C)  Heart Rate:  [64-81] 70  Resp:  [18] 18  BP: ()/(33-55) 123/50    Intake/Output Summary (Last 24 hours) at 10/16/17 1145  Last data filed at 10/16/17 1003   Gross per 24 hour   Intake             1600 ml   Output             2000 ml   Net             -400 ml         Physical Exam:  General:    Awake, alert, in no acute distress, chronically ill appearing   Heart:      Normal S1 and S2. Regular rate and rhythm. No significant murmur, rubs or gallops appreciated.   Lungs:     Respirations regular, even and unlabored. Lungs clear to auscultation B/L. No wheezes, rales or rhonchi.   Abdomen:   Soft and nontender. No guarding, rebound tenderness or  organomegaly noted. Bowel sounds present x 4.   Extremities:  (+) B/L LE edema. (+) left knee immobilizer. (+) left hip dressing.      Results Review:      Results from last 7 days  Lab Units 10/16/17  0031 10/15/17  1740 10/15/17  0411 10/14/17  1003 10/13/17  0317 10/12/17  1957 10/12/17  1543 10/12/17  0509   WBC 10*3/mm3 9.12 8.90 10.24 13.98* 12.39  --  9.73 5.81   HEMOGLOBIN g/dL 8.2* 8.4* 6.0* 7.3* 9.2* 9.1* 5.8* 9.9*   PLATELETS 10*3/mm3 95* 82* 77* 60* 52*  --  80* 103*       Results from last 7 days  Lab Units 10/16/17  0031 10/15/17  1613 10/15/17  0102 10/14/17  1536 10/13/17  0317 10/12/17  0509 10/12/17  0206   SODIUM mmol/L 139  --  138 139 143 141 140   POTASSIUM mmol/L 3.9 4.1 3.6 4.3 4.6 3.5 3.1*   CHLORIDE mmol/L 107  --  108 109 112 105 103   CO2 mmol/L 28.8  --  26.3 26.3 24.2* 27.9 29.7   BUN mg/dL 21  --  27* 29* 21 18 18   CREATININE mg/dL 0.89  --  0.87 0.92 0.82 0.84 0.96   CALCIUM mg/dL 8.0  --  7.4* 7.8 8.1 9.0 9.3   GLUCOSE mg/dL 116*  --  134* 118* 102 112*  144*       Results from last 7 days  Lab Units 10/16/17  0031 10/15/17  0102 10/14/17  1536 10/13/17  0317 10/12/17  0509 10/12/17  0206   BILIRUBIN mg/dL 1.6 0.6 1.2 2.1* 0.9 1.0   ALK PHOS U/L 90 77 67 51 113* 119*   AST (SGOT) U/L 61* 62* 61* 47* 50* 55*   ALT (SGPT) U/L 20 20 19 15 23 26       Results from last 7 days  Lab Units 10/16/17  0031 10/15/17  0102 10/14/17  1536 10/12/17  0509   MAGNESIUM mg/dL 1.8 1.6* 1.7 1.7       Results from last 7 days  Lab Units 10/13/17  0317 10/12/17  1956 10/12/17  0206   INR  1.79* 2.04* 1.17*       Results from last 7 days  Lab Units 10/12/17  0206   TROPONIN I ng/mL <0.006       Imaging Results (last 24 hours)     ** No results found for the last 24 hours. **            Medications:    aspirin 81 mg Oral Daily   bumetanide 1 mg Oral BID   ceftriaxone 1 g Intravenous Q24H   cyanocobalamin 1,000 mcg Intramuscular Q28 Days   folic acid 1 mg Oral Daily   lactulose 20 g Oral TID   levothyroxine 50 mcg Oral Daily   pantoprazole 40 mg Oral QAM   polyethylene glycol 17 g Oral Daily   potassium chloride 10 mEq Oral Daily   rifaximin 550 mg Oral Q12H   Risaquad-2 1 capsule Oral Daily   rOPINIRole 1 mg Oral Nightly   sennosides-docusate sodium 2 tablet Oral BID              Assessment/Plan   Acute traumatic left distal femur fracture s/p fall: S/P ORIF. Cont management per orthopedic surgery. Cont PT/OT. Pt expresses interest in short term rehab. Discussed with SS who will begin working on placement. Orthopedic surgery input appreciated.     Acute on chronic normocytic anemia with acute blood loss: S/P transfusions this admission including intraoperatively. H&H stable this AM. Cont to monitor closely and transfuse if Hgb<7 or with hemodynamic instability.     E coli UTI: Transition to Omnicef x 3 more days.     Chronic diastolic CHF: Appears compensated. Cont PO Bumex.    Cirrhosis: Cont lactulose and Xifaxan.     Hypothyroidism: Cont Synthroid.    Thrombocytopenia: Likely 2/2  cirrhosis. Cont to monitor.     DVT PPX: SCD to RLE. No pharmacologic DVT PPX 2/2 acute blood loss anemia and thrombocytopenia.     Pt is at high risk 2/2 traumatic left distal femur fracture, acute on chronic anemia with acute blood loss requiring transfusion, UTI, CHF, cirrhosis and advanced age.       Jamison Ramirez DO  10/16/17  11:45 AM     Electronically signed by Jamison Ramirez DO at 10/16/2017 11:59 AM        Medical Student Notes (last 24 hours) (Notes from 10/15/2017  1:21 PM through 10/16/2017  1:21 PM)     No notes of this type exist for this encounter.        Consult Notes (last 24 hours) (Notes from 10/15/2017  1:21 PM through 10/16/2017  1:21 PM)     No notes of this type exist for this encounter.        Nutrition Notes (last 24 hours) (Notes from 10/15/2017  1:21 PM through 10/16/2017  1:21 PM)     No notes of this type exist for this encounter.        Physical Therapy Notes (last 24 hours) (Notes from 10/15/2017  1:21 PM through 10/16/2017  1:21 PM)     No notes of this type exist for this encounter.        Occupational Therapy Notes (last 24 hours) (Notes from 10/15/2017  1:21 PM through 10/16/2017  1:21 PM)     No notes of this type exist for this encounter.        Speech Language Pathology Notes (last 24 hours) (Notes from 10/15/2017  1:21 PM through 10/16/2017  1:21 PM)     No notes of this type exist for this encounter.        Respiratory Therapy Notes (last 24 hours) (Notes from 10/15/2017  1:21 PM through 10/16/2017  1:21 PM)     No notes of this type exist for this encounter.

## 2017-10-16 NOTE — PROGRESS NOTES
Subjective     History:   Alma Delia Garcia is a 87 y.o. female admitted on 10/12/2017 secondary to Closed fracture of left distal femur     Procedures:   10/12/17: ORIF of distal end of left femur    Transfusions:  10/12/17: 2 units of PRBC's  10/15/17: 2 units of PRBC's    Patient seen and examined with PHILLIP Clemente. Awake and alert. Currently sitting in bedside chair. Currently c/o left hip pain and right shoulder pain. Confused to year but otherwise oriented. Denies CP, dyspnea or palpitations. Denies fever or chills. Reports BM last PM. No acute events overnight per RN.     History taken from: patient, chart, and RN.      Objective     Vital Signs  Temp:  [98 °F (36.7 °C)-98.3 °F (36.8 °C)] 98.3 °F (36.8 °C)  Heart Rate:  [64-81] 70  Resp:  [18] 18  BP: ()/(33-55) 123/50    Intake/Output Summary (Last 24 hours) at 10/16/17 1145  Last data filed at 10/16/17 1003   Gross per 24 hour   Intake             1600 ml   Output             2000 ml   Net             -400 ml         Physical Exam:  General:    Awake, alert, in no acute distress, chronically ill appearing   Heart:      Normal S1 and S2. Regular rate and rhythm. No significant murmur, rubs or gallops appreciated.   Lungs:     Respirations regular, even and unlabored. Lungs clear to auscultation B/L. No wheezes, rales or rhonchi.   Abdomen:   Soft and nontender. No guarding, rebound tenderness or  organomegaly noted. Bowel sounds present x 4.   Extremities:  (+) B/L LE edema. (+) left knee immobilizer. (+) left hip dressing.      Results Review:      Results from last 7 days  Lab Units 10/16/17  0031 10/15/17  1740 10/15/17  0411 10/14/17  1003 10/13/17  0317 10/12/17  1957 10/12/17  1543 10/12/17  0509   WBC 10*3/mm3 9.12 8.90 10.24 13.98* 12.39  --  9.73 5.81   HEMOGLOBIN g/dL 8.2* 8.4* 6.0* 7.3* 9.2* 9.1* 5.8* 9.9*   PLATELETS 10*3/mm3 95* 82* 77* 60* 52*  --  80* 103*       Results from last 7 days  Lab Units 10/16/17  0031 10/15/17  1613 10/15/17  0102  10/14/17  1536 10/13/17  0317 10/12/17  0509 10/12/17  0206   SODIUM mmol/L 139  --  138 139 143 141 140   POTASSIUM mmol/L 3.9 4.1 3.6 4.3 4.6 3.5 3.1*   CHLORIDE mmol/L 107  --  108 109 112 105 103   CO2 mmol/L 28.8  --  26.3 26.3 24.2* 27.9 29.7   BUN mg/dL 21  --  27* 29* 21 18 18   CREATININE mg/dL 0.89  --  0.87 0.92 0.82 0.84 0.96   CALCIUM mg/dL 8.0  --  7.4* 7.8 8.1 9.0 9.3   GLUCOSE mg/dL 116*  --  134* 118* 102 112* 144*       Results from last 7 days  Lab Units 10/16/17  0031 10/15/17  0102 10/14/17  1536 10/13/17  0317 10/12/17  0509 10/12/17  0206   BILIRUBIN mg/dL 1.6 0.6 1.2 2.1* 0.9 1.0   ALK PHOS U/L 90 77 67 51 113* 119*   AST (SGOT) U/L 61* 62* 61* 47* 50* 55*   ALT (SGPT) U/L 20 20 19 15 23 26       Results from last 7 days  Lab Units 10/16/17  0031 10/15/17  0102 10/14/17  1536 10/12/17  0509   MAGNESIUM mg/dL 1.8 1.6* 1.7 1.7       Results from last 7 days  Lab Units 10/13/17  0317 10/12/17  1956 10/12/17  0206   INR  1.79* 2.04* 1.17*       Results from last 7 days  Lab Units 10/12/17  0206   TROPONIN I ng/mL <0.006       Imaging Results (last 24 hours)     ** No results found for the last 24 hours. **            Medications:    aspirin 81 mg Oral Daily   bumetanide 1 mg Oral BID   ceftriaxone 1 g Intravenous Q24H   cyanocobalamin 1,000 mcg Intramuscular Q28 Days   folic acid 1 mg Oral Daily   lactulose 20 g Oral TID   levothyroxine 50 mcg Oral Daily   pantoprazole 40 mg Oral QAM   polyethylene glycol 17 g Oral Daily   potassium chloride 10 mEq Oral Daily   rifaximin 550 mg Oral Q12H   Risaquad-2 1 capsule Oral Daily   rOPINIRole 1 mg Oral Nightly   sennosides-docusate sodium 2 tablet Oral BID              Assessment/Plan   Acute traumatic left distal femur fracture s/p fall: S/P ORIF. Cont management per orthopedic surgery. Cont PT/OT. Pt expresses interest in short term rehab. Discussed with SS who will begin working on placement. Orthopedic surgery input appreciated.     Acute on chronic  normocytic anemia with acute blood loss: S/P transfusions this admission including intraoperatively. H&H stable this AM. Cont to monitor closely and transfuse if Hgb<7 or with hemodynamic instability.     E coli UTI: Transition to Omnicef x 3 more days.     Chronic diastolic CHF: Appears compensated. Cont PO Bumex.    Cirrhosis: Cont lactulose and Xifaxan.     Hypothyroidism: Cont Synthroid.    Thrombocytopenia: Likely 2/2 cirrhosis. Cont to monitor.     DVT PPX: SCD to RLE. No pharmacologic DVT PPX 2/2 acute blood loss anemia and thrombocytopenia.     Pt is at high risk 2/2 traumatic left distal femur fracture, acute on chronic anemia with acute blood loss requiring transfusion, UTI, CHF, cirrhosis and advanced age.       Jamison Ramirez DO  10/16/17  11:45 AM

## 2017-10-16 NOTE — PLAN OF CARE
Problem: Fall Risk (Adult)  Goal: Identify Related Risk Factors and Signs and Symptoms  Outcome: Ongoing (interventions implemented as appropriate)    10/16/17 0004   Fall Risk   Fall Risk: Related Risk Factors age-related changes   Fall Risk: Signs and Symptoms presence of risk factors       Goal: Absence of Falls  Outcome: Ongoing (interventions implemented as appropriate)    10/16/17 0004   Fall Risk (Adult)   Absence of Falls making progress toward outcome         Problem: Mobility, Physical Impaired (Adult)  Goal: Identify Related Risk Factors and Signs and Symptoms  Outcome: Ongoing (interventions implemented as appropriate)    10/16/17 0004   Mobility, Physical Impaired   Physical Mobility, Impaired: Related Risk Factors activity intolerance   Signs and Symptoms (Physical Mobility Impaired) inability to purposefully move in environment       Goal: Enhanced Mobility Skills  Outcome: Ongoing (interventions implemented as appropriate)    10/16/17 0004   Mobility, Physical Impaired (Adult)   Enhanced Mobility Skills making progress toward outcome         Problem: Pain, Acute (Adult)  Goal: Identify Related Risk Factors and Signs and Symptoms  Outcome: Ongoing (interventions implemented as appropriate)    10/16/17 0004   Pain, Acute   Related Risk Factors (Acute Pain) positioning;patient perception;surgery   Signs and Symptoms (Acute Pain) verbalization of pain descriptors         Problem: Infection, Risk/Actual (Adult)  Goal: Identify Related Risk Factors and Signs and Symptoms  Outcome: Ongoing (interventions implemented as appropriate)    10/16/17 0004   Infection, Risk/Actual   Infection, Risk/Actual: Related Risk Factors trauma injury;skin integrity impairment;age extremes   Signs and Symptoms (Infection, Risk/Actual) blood glucose changes;body temperature changes;edema;nausea;pain         Problem: Skin Integrity Impairment, Risk/Actual (Adult)  Goal: Identify Related Risk Factors and Signs and  Symptoms  Outcome: Ongoing (interventions implemented as appropriate)    10/16/17 0004   Skin Integrity Impairment, Risk/Actual   Skin Integrity Impairment, Risk/Actual: Related Risk Factors age extremes;surgery/procedure;edema   Signs and Symptoms (Skin Integrity Impairment) edema;inflammation       Goal: Skin Integrity/Wound Healing  Outcome: Ongoing (interventions implemented as appropriate)    10/16/17 0004   Skin Integrity Impairment, Risk/Actual (Adult)   Skin Integrity/Wound Healing making progress toward outcome         Problem: Patient Care Overview (Adult)  Goal: Plan of Care Review  Outcome: Ongoing (interventions implemented as appropriate)    10/16/17 0004   Coping/Psychosocial Response Interventions   Plan Of Care Reviewed With patient   Patient Care Overview   Progress no change       Goal: Discharge Needs Assessment  Outcome: Ongoing (interventions implemented as appropriate)    10/12/17 1444 10/13/17 1300 10/14/17 2306   Discharge Needs Assessment   Concerns To Be Addressed --  --  --    Readmission Within The Last 30 Days --  --  --    Equipment Needed After Discharge commode;walker, rolling;wheelchair  (Unknown Provider) --  --    Discharge Facility/Level Of Care Needs --  --  home with home health   Discharge Disposition --  --  still a patient   Current Health   Anticipated Changes Related to Illness --  --  --    Living Environment   Transportation Available car;family or friend will provide --  --    Self-Care   Equipment Currently Used at Home --  commode;wheelchair;walker, rolling;shower chair --      10/16/17 0004   Discharge Needs Assessment   Concerns To Be Addressed basic needs concerns   Readmission Within The Last 30 Days no previous admission in last 30 days   Equipment Needed After Discharge --    Discharge Facility/Level Of Care Needs --    Discharge Disposition --    Current Health   Anticipated Changes Related to Illness inability to care for self   Living Environment    Transportation Available --    Self-Care   Equipment Currently Used at Home --

## 2017-10-17 VITALS
DIASTOLIC BLOOD PRESSURE: 49 MMHG | HEIGHT: 62 IN | OXYGEN SATURATION: 99 % | SYSTOLIC BLOOD PRESSURE: 110 MMHG | WEIGHT: 153.3 LBS | HEART RATE: 73 BPM | TEMPERATURE: 97.9 F | RESPIRATION RATE: 13 BRPM | BODY MASS INDEX: 28.21 KG/M2

## 2017-10-17 LAB
ALBUMIN SERPL-MCNC: 2.4 G/DL (ref 3.4–4.8)
ALBUMIN/GLOB SERPL: 0.9 G/DL (ref 1.5–2.5)
ALP SERPL-CCNC: 100 U/L (ref 35–104)
ALT SERPL W P-5'-P-CCNC: 22 U/L (ref 10–36)
ANION GAP SERPL CALCULATED.3IONS-SCNC: 7 MMOL/L (ref 3.6–11.2)
AST SERPL-CCNC: 55 U/L (ref 10–30)
BASOPHILS # BLD AUTO: 0.02 10*3/MM3 (ref 0–0.3)
BASOPHILS NFR BLD AUTO: 0.3 % (ref 0–2)
BILIRUB SERPL-MCNC: 2 MG/DL (ref 0.2–1.8)
BUN BLD-MCNC: 18 MG/DL (ref 7–21)
BUN/CREAT SERPL: 20 (ref 7–25)
CALCIUM SPEC-SCNC: 8.2 MG/DL (ref 7.7–10)
CHLORIDE SERPL-SCNC: 107 MMOL/L (ref 99–112)
CO2 SERPL-SCNC: 26 MMOL/L (ref 24.3–31.9)
CREAT BLD-MCNC: 0.9 MG/DL (ref 0.43–1.29)
DEPRECATED RDW RBC AUTO: 52.2 FL (ref 37–54)
EOSINOPHIL # BLD AUTO: 0.52 10*3/MM3 (ref 0–0.7)
EOSINOPHIL NFR BLD AUTO: 7.6 % (ref 0–7)
ERYTHROCYTE [DISTWIDTH] IN BLOOD BY AUTOMATED COUNT: 16.5 % (ref 11.5–14.5)
GFR SERPL CREATININE-BSD FRML MDRD: 59 ML/MIN/1.73
GLOBULIN UR ELPH-MCNC: 2.7 GM/DL
GLUCOSE BLD-MCNC: 102 MG/DL (ref 70–110)
HCT VFR BLD AUTO: 26.2 % (ref 37–47)
HGB BLD-MCNC: 8.3 G/DL (ref 12–16)
IMM GRANULOCYTES # BLD: 0.03 10*3/MM3 (ref 0–0.03)
IMM GRANULOCYTES NFR BLD: 0.4 % (ref 0–0.5)
LYMPHOCYTES # BLD AUTO: 1.2 10*3/MM3 (ref 1–3)
LYMPHOCYTES NFR BLD AUTO: 17.5 % (ref 16–46)
MAGNESIUM SERPL-MCNC: 2 MG/DL (ref 1.7–2.6)
MCH RBC QN AUTO: 29.7 PG (ref 27–33)
MCHC RBC AUTO-ENTMCNC: 31.7 G/DL (ref 33–37)
MCV RBC AUTO: 93.9 FL (ref 80–94)
MONOCYTES # BLD AUTO: 0.88 10*3/MM3 (ref 0.1–0.9)
MONOCYTES NFR BLD AUTO: 12.8 % (ref 0–12)
NEUTROPHILS # BLD AUTO: 4.21 10*3/MM3 (ref 1.4–6.5)
NEUTROPHILS NFR BLD AUTO: 61.4 % (ref 40–75)
OSMOLALITY SERPL CALC.SUM OF ELEC: 281.5 MOSM/KG (ref 273–305)
PHOSPHATE SERPL-MCNC: 3.2 MG/DL (ref 2.7–4.5)
PLATELET # BLD AUTO: 133 10*3/MM3 (ref 130–400)
PMV BLD AUTO: 10.5 FL (ref 6–10)
POTASSIUM BLD-SCNC: 3.7 MMOL/L (ref 3.5–5.3)
PROT SERPL-MCNC: 5.1 G/DL (ref 6–8)
RBC # BLD AUTO: 2.79 10*6/MM3 (ref 4.2–5.4)
SODIUM BLD-SCNC: 140 MMOL/L (ref 135–153)
WBC NRBC COR # BLD: 6.86 10*3/MM3 (ref 4.5–12.5)

## 2017-10-17 PROCEDURE — 97530 THERAPEUTIC ACTIVITIES: CPT

## 2017-10-17 PROCEDURE — 84100 ASSAY OF PHOSPHORUS: CPT | Performed by: INTERNAL MEDICINE

## 2017-10-17 PROCEDURE — 90674 CCIIV4 VAC NO PRSV 0.5 ML IM: CPT | Performed by: INTERNAL MEDICINE

## 2017-10-17 PROCEDURE — 80053 COMPREHEN METABOLIC PANEL: CPT | Performed by: INTERNAL MEDICINE

## 2017-10-17 PROCEDURE — 94799 UNLISTED PULMONARY SVC/PX: CPT

## 2017-10-17 PROCEDURE — 85025 COMPLETE CBC W/AUTO DIFF WBC: CPT | Performed by: INTERNAL MEDICINE

## 2017-10-17 PROCEDURE — 25010000002 INFLUENZA VAC SPLIT QUAD 0.5 ML SUSPENSION PREFILLED SYRINGE: Performed by: INTERNAL MEDICINE

## 2017-10-17 PROCEDURE — 99024 POSTOP FOLLOW-UP VISIT: CPT | Performed by: PHYSICIAN ASSISTANT

## 2017-10-17 PROCEDURE — 83735 ASSAY OF MAGNESIUM: CPT | Performed by: INTERNAL MEDICINE

## 2017-10-17 PROCEDURE — 99239 HOSP IP/OBS DSCHRG MGMT >30: CPT | Performed by: INTERNAL MEDICINE

## 2017-10-17 PROCEDURE — G0008 ADMIN INFLUENZA VIRUS VAC: HCPCS | Performed by: INTERNAL MEDICINE

## 2017-10-17 RX ORDER — CYANOCOBALAMIN 1000 UG/ML
1000 INJECTION, SOLUTION INTRAMUSCULAR; SUBCUTANEOUS
Start: 2017-11-12 | End: 2018-05-25 | Stop reason: SDUPTHER

## 2017-10-17 RX ORDER — CEFDINIR 300 MG/1
300 CAPSULE ORAL EVERY 12 HOURS SCHEDULED
Qty: 3 CAPSULE | Refills: 0
Start: 2017-10-17 | End: 2017-10-19

## 2017-10-17 RX ORDER — LACTULOSE 10 G/15ML
20 SOLUTION ORAL 3 TIMES DAILY
Start: 2017-10-17 | End: 2018-04-03 | Stop reason: SDUPTHER

## 2017-10-17 RX ORDER — HYDROCODONE BITARTRATE AND ACETAMINOPHEN 5; 325 MG/1; MG/1
1 TABLET ORAL EVERY 6 HOURS PRN
Qty: 15 TABLET | Refills: 0 | Status: SHIPPED | OUTPATIENT
Start: 2017-10-17 | End: 2018-03-26 | Stop reason: SDUPTHER

## 2017-10-17 RX ADMIN — Medication 10 ML: at 08:10

## 2017-10-17 RX ADMIN — HYDROCODONE BITARTRATE AND ACETAMINOPHEN 1 TABLET: 7.5; 325 TABLET ORAL at 07:55

## 2017-10-17 RX ADMIN — POLYETHYLENE GLYCOL 3350 17 G: 17 POWDER, FOR SOLUTION ORAL at 08:10

## 2017-10-17 RX ADMIN — FOLIC ACID 1 MG: 1 TABLET ORAL at 08:09

## 2017-10-17 RX ADMIN — LACTULOSE 20 G: 20 SOLUTION ORAL at 08:09

## 2017-10-17 RX ADMIN — POTASSIUM CHLORIDE 10 MEQ: 750 TABLET, FILM COATED, EXTENDED RELEASE ORAL at 08:08

## 2017-10-17 RX ADMIN — BUMETANIDE 1 MG: 1 TABLET ORAL at 08:08

## 2017-10-17 RX ADMIN — Medication 1 CAPSULE: at 08:11

## 2017-10-17 RX ADMIN — DOCUSATE SODIUM AND SENNOSIDES 2 TABLET: 8.6; 5 TABLET, FILM COATED ORAL at 08:08

## 2017-10-17 RX ADMIN — LEVOTHYROXINE SODIUM 50 MCG: 50 TABLET ORAL at 08:08

## 2017-10-17 RX ADMIN — CEFDINIR 300 MG: 300 CAPSULE ORAL at 08:09

## 2017-10-17 RX ADMIN — INFLUENZA VIRUS VACCINE 0.5 ML: 15; 15; 15; 15 SUSPENSION INTRAMUSCULAR at 12:48

## 2017-10-17 RX ADMIN — PANTOPRAZOLE SODIUM 40 MG: 40 TABLET, DELAYED RELEASE ORAL at 07:55

## 2017-10-17 RX ADMIN — RIFAXIMIN 550 MG: 550 TABLET ORAL at 08:12

## 2017-10-17 RX ADMIN — ASPIRIN 81 MG: 81 TABLET ORAL at 08:09

## 2017-10-17 NOTE — DISCHARGE INSTR - ACTIVITY
Maintain non weight bearing status and continue wearing immobilizer and up to chair only until receiving further instructions at follow up appointment.

## 2017-10-17 NOTE — PROGRESS NOTES
Discharge Planning Assessment   Mark     Patient Name: Alma Delia Garcia  MRN: [id number]  Today's Date: 10/17/2017    Admit Date: 10/12/2017       Discharge Plan       10/17/17 0922    Case Management/Social Work Plan    Plan SS received call from [facility] per Rosalie who states they are agreeable to accept the pt when medically stable.  SS will continue to follow.     Patient/Family In Agreement With Plan yes        Discharge Placement     Facility/Agency Request Status Selected? Address Phone Number Fax Number    [facility] Pending - No Request Sent     [address] [place] [id number] [phone]        Expected Discharge Date and Time     Expected Discharge Date Expected Discharge Time    Oct 16, 2017                Haleigh Scanlon

## 2017-10-17 NOTE — PROGRESS NOTES
Inpatient Progress Note  Alma Delia Garcia  Date: 10/17/17  MRN: [id number]      Subjective:    87 year old white female POD#5 ORIF left femur. Patient remains stable and more alert this am since addition of lactulose. Patient complains of pain.  Patient does complain of moderate pain into the left lower extremity as well as the right shoulder.  No new complaints.  Denies any paresthesias    Objective:    Vitals:    10/17/17 0400 10/17/17 0500 10/17/17 0600 10/17/17 0702   BP: 106/46 113/46 117/45 115/42   BP Location:       Patient Position:       Pulse: 69 70 70 67   Resp: 14 12 13    Temp: 98 °F (36.7 °C)      TempSrc: Oral      SpO2: 96% 100% 100% 98%   Weight:       Height:           Exam:       Examination of patient's left hip and lower extremity reveals Aquacel bandage with minimal postoperative drainage.  She continues to exhibit moderate swelling in the thigh and resolving ecchymosis.  Active dorsiflexion plantarflexion of the left lower extremity.  Right shoulder examination reveals no notable evidence of abnormality.  She exhibits painful range of motion active full range of motion.  No notable weakness upon stressing supraspinatus.  Internal and external rotation without pain.  No crepitus, swelling, or ecchymosis.  Neurovascular status grossly intact.     Labs:      Results from last 7 days  Lab Units 10/17/17  0125 10/16/17  0031 10/15/17  1740  10/13/17  0317  10/12/17  1956  10/12/17  0206   WBC 10*3/mm3 6.86 9.12 8.90  < > 12.39  --   --   < > 5.88   HEMOGLOBIN g/dL 8.3* 8.2* 8.4*  < > 9.2*  < >  --   < > 10.8*   HEMATOCRIT % 26.2* 25.6* 25.6*  < > 27.6*  < >  --   < > 32.5*   MCV fL 93.9 93.4 92.8  < > 90.8  --   --   < > 95.6*   MCHC g/dL 31.7* 32.0* 32.8*  < > 33.3  --   --   < > 33.2   PLATELETS 10*3/mm3 133 95* 82*  < > 52*  --   --   < > 122*   INR   --   --   --   --  1.79*  --  2.04*  --  1.17*   < > = values in this interval not displayed.        Results from last 7 days  Lab Units  10/17/17  0125 10/16/17  0031 10/15/17  1613 10/15/17  0102   SODIUM mmol/L 140 139  --  138   POTASSIUM mmol/L 3.7 3.9 4.1 3.6   MAGNESIUM mg/dL 2.0 1.8  --  1.6*   CHLORIDE mmol/L 107 107  --  108   CO2 mmol/L 26.0 28.8  --  26.3   BUN mg/dL 18 21  --  27*   CREATININE mg/dL 0.90 0.89  --  0.87   EGFR IF NONAFRICN AM mL/min/1.73 59* 60*  --  62   CALCIUM mg/dL 8.2 8.0  --  7.4*   GLUCOSE mg/dL 102 116*  --  134*   ALBUMIN g/dL 2.40* 2.30*  --  2.20*   BILIRUBIN mg/dL 2.0* 1.6  --  0.6   ALK PHOS U/L 100 90  --  77   AST (SGOT) U/L 55* 61*  --  62*   ALT (SGPT) U/L 22 20  --  20   Estimated Creatinine Clearance: 40.3 mL/min (by C-G formula based on Cr of 0.9).  No results found for: AMMONIA    Results from last 7 days  Lab Units 10/12/17  0206   TROPONIN I ng/mL <0.006         No results found for: HGBA1C  Lab Results   Component Value Date    TSH 1.771 10/12/2017    FREET4 1.22 07/13/2017         Pain Management Panel     Pain Management Panel Latest Ref Rng & Units 8/10/2017 7/25/2017    AMPHETAMINES SCREEN, URINE Negative Negative Negative    BARBITURATES SCREEN Negative Negative Negative    BENZODIAZEPINE SCREEN, URINE Negative Negative Negative    BUPRENORPHINE Negative Negative Negative    COCAINE SCREEN, URINE Negative Negative Negative    METHADONE SCREEN, URINE Negative Negative Negative             Urine Culture   Date Value Ref Range Status   10/12/2017 90,000-100,000 CFU/mL Escherichia coli (A)  Final         Radiology:  Imaging Results (last 72 hours)     Procedure Component Value Units Date/Time    XR Shoulder 2+ View Right [[id number]] Collected:  10/17/17 0636     Updated:  10/17/17 0639    Narrative:       EXAMINATION: XR SHOULDER 2+ VW RIGHT-      CLINICAL INDICATION:Pain, recent fall; S72.402A-Unspecified fracture of  lower end of left femur, initial encounter for closed fracture;  S72.492A-Other fracture of lower end of left femur, initial encounter  for closed fracture        COMPARISON: None       TECHNIQUE: 2 views right shoulder     FINDINGS:   Changes of previous ORIF for clavicle fracture. Anatomic alignment  noted. CHF/edema. Degenerative AC joint arthropathy. Degenerative change  glenohumeral joint. No acute fracture.           Impression:       No acute osseous or articular abnormalities. ORIF for old clavicle  fracture. Other findings as above.     This report was finalized on 10/17/2017 6:37 AM by Dr. Manish Sherwood MD.               Assessment:      ICD-10-CM ICD-9-CM   1. Closed fracture of distal end of left femur, unspecified fracture morphology, initial encounter S72.402A 821.20   2. Other closed fracture of distal end of left femur, initial encounter S72.492A 821.29         Plan:    Postoperative day #5 ORIF periprosthetic femur fracture.  Patient continues to remain stable.  Hemoglobin remaining 8.9.  Ongoing instructions to change dressing if saturated.  Continue knee immobilizer.  Nonweightbearing left lower extremity.    This document was signed by Sudhakar Harden PA-C October 17, 2017 8:14 AM

## 2017-10-17 NOTE — DISCHARGE PLACEMENT REQUEST
"Alma Delia Fabian (87 y.o. Female)     Date of Birth Social Security Number Address Home Phone MRN    07/26/1930  [address]  [facility] [address] [phone] [id number]    Uatsdin Marital Status          Other        Admission Date Admission Type Admitting Provider Attending Provider Department, Room/Bed    10/12/17 Emergency Britany Serrato DO Khan, Hafiz Sarfraz, MD [facility] CARE, P202/S2    Discharge Date Discharge Disposition Discharge Destination         Skilled Nursing Facility (DC - External)             Attending Provider: Delores Diamond MD     Allergies:  Ciprofloxacin    Isolation:  None   Infection:  None   Code Status:  FULL    Ht:  62\" (157.5 cm)   Wt:  153 lb 4.8 oz (69.5 kg)    Admission Cmt:  None   Principal Problem:  Closed fracture of left distal femur [S72.402A]                 Active Insurance as of 10/12/2017     Primary Coverage     Payor Plan Insurance Group Employer/Plan Group    MEDICARE MEDICARE A & B      Payor Plan Address Payor Plan Phone Number Effective From Effective To    [address] [phone] 11/1/1988     [place] [address]       Subscriber Name Subscriber Birth Date Member ID       ALMA DELIA FABIAN 7/26/1930 [id number]           Secondary Coverage     Payor Plan Insurance Group Employer/Plan Group    [place]      Payor Plan Address Payor Plan Phone Number Effective From Effective To    [address] [phone] 7/12/2017     [place] [address]       Subscriber Name Subscriber Birth Date Member ID       ALMA DELIA FABIAN 7/26/1930 [id number]                 Emergency Contacts      (Rel.) Home Phone Work Phone Mobile Phone    KeoJustin (Daughter) [phone] -- --    Yeimy Gallegos (Sister) [phone] -- --    Candace Rico [phone] -- --            Emergency Contact Information     Name Relation Home Work Mobile    DontrellskkyleeJustin Daughter [phone]      RosyYeimy Sister " [phone]      Candace Rico  [phone]            Insurance Information                MEDICARE/MEDICARE A & B Phone: [phone]    Subscriber: Alma Delia Garcia Subscriber#: [id number]    Group#:  Precert#:         [place] Phone: [phone]    Subscriber: Alma Delia Garcia Subscriber#: [id number]    Group#:  Precert#:              History & Physical      Britany Ryder Serrato DO at 10/12/2017  4:33 AM          Hospitalist History and Physical    Patient Identification:  Name: Alma Delia Garcia  Age/Sex: 87 y.o. female  :  1930  MRN: [id number]         Primary Care Physician: Galina Gonsalves MD    Chief Complaint   Patient presents with   • Fall   • Leg Injury       History of Present Illness  Patient is a 87 y.o. female presents with the following: left leg pain after a fall    The patient is an 86 yo female with past medical history significant for liver cirrhosis with history of alcohol abuse, diastolic heart failure and hypertension who presents to the ED complaining of left lower extremity pain status post a fall at home.     The patient states that she was standing in her kitchen at the sink. She states that she was using a knife to open a bag of candy when she started falling backwards. She states that she tried to catch herself. Please note that she is currently somewhat of a poor historian as she has received IV pain medication and falls asleep quickly (but is easily aroused).     She states that she is unsure if she became dizzy. She denies any dyspnea. She currently denies any chest pain. She denies any other recent falls. She denies LOC and/or head trauma. She reports chronic diarrhea due to lactulose. She reports recent urinary frequency but denies dysuria.     In the ED, the patient was found to have a left distal femur fracture. Her UA was also suggestive of a UTI. She has been admitted to the medical-surgical floor for further management.      Present during visit: PHILLIP Smith    Past History:  Past Medical History:   Diagnosis Date   • Anxiety    • Bell's palsy    • Cellulitis     LLE   • CHF (congestive heart failure)     Diastolic   • Diverticulosis    • GERD (gastroesophageal reflux disease)    • Hepatic cirrhosis    • Hypertension    • Hypothyroidism    • Osteoporosis    • Thrombocytopenia    • Trigeminal neuralgia      Past Surgical History:   Procedure Laterality Date   • BACK SURGERY      KYPHOPLASTIES    • CARDIAC CATHETERIZATION Left 08/2004   • CATARACT EXTRACTION     • CHOLECYSTECTOMY     • CLAVICLE SURGERY Right    • COLONOSCOPY  10/2004   • HIP FRACTURE SURGERY Bilateral     ARTHROPLASTIES    • PARTIAL HYSTERECTOMY     • WRIST FRACTURE SURGERY       Family History   Problem Relation Age of Onset   • Cancer Mother    • Heart disease Father    • Arthritis Sister    • Osteoporosis Sister    • Diabetes Maternal Aunt      Social History   Substance Use Topics   • Smoking status: Never Smoker   • Smokeless tobacco: Former User     Types: Snuff     Quit date: 1970   • Alcohol use No      Comment: FORMER 12 BOTTLES PER DAY FOR 15 YEARS     Prescriptions Prior to Admission   Medication Sig Dispense Refill Last Dose   • alendronate (FOSAMAX) 70 MG tablet Take 1 tablet by mouth Every 7 (Seven) Days. Takes on Mondays. 4 tablet 3 Taking   • aspirin 81 MG tablet Take 1 tablet by mouth Daily. 90 tablet 2 Taking   • bumetanide (BUMEX) 1 MG tablet Take 1 tablet by mouth 2 (Two) Times a Day. 60 tablet 3    • esomeprazole (nexIUM) 40 MG capsule Take 1 capsule by mouth Every Morning Before Breakfast. 90 capsule 0 Taking   • folic acid (FOLVITE) 1 MG tablet Take 1 tablet by mouth Daily. 30 tablet 0 Taking   • HYDROcodone-acetaminophen (NORCO) 7.5-325 MG per tablet Take 1 tablet by mouth Every 8 (Eight) Hours As Needed for Moderate Pain 90 tablet 0 Taking   • lactulose (CHRONULAC) 10 GM/15ML solution Take 30 mL by mouth 3 (Three) Times a Day. 1892 mL 0  Taking   • levothyroxine (SYNTHROID) 50 MCG tablet Take 1 tablet by mouth Daily. 90 tablet 0 Taking   • nitroglycerin (NITROSTAT) 0.4 MG SL tablet Place 1 tablet under the tongue Every 5 (Five) Minutes As Needed for chest pain. Take no more than 3 doses in 15 minutes. 25 tablet 0 Taking   • ondansetron ODT (ZOFRAN-ODT) 4 MG disintegrating tablet Dissolve 1 tablet on the tongue Every 8 (Eight) Hours As Needed for Nausea or Vomiting. 30 tablet 0 Taking   • PHARMACY MEDS TO BED CONSULT Daily. 1 each 0 Taking   • potassium chloride (K-DUR) 10 MEQ CR tablet Take 1 tablet by mouth Daily. 90 tablet 0 Taking   • rifaximin (XIFAXAN) 550 MG tablet Take 1 tablet by mouth Every 12 (Twelve) Hours. 60 tablet 1 Taking   • rOPINIRole (REQUIP) 1 MG tablet Take 1 tablet by mouth Every Night. Take 1 hour before bedtime. 30 tablet 1      Allergies: Ciprofloxacin    Review of Systems:  Review of Systems   Unable to perform ROS: Mental status change      Vital Signs  Temp:  [98.1 °F (36.7 °C)-98.2 °F (36.8 °C)] 98.1 °F (36.7 °C)  Heart Rate:  [78-88] 78  Resp:  [18-20] 20  BP: (109-166)/(41-63) 143/63  Body mass index is 27.29 kg/(m^2).    Physical Exam:  Physical Exam   Constitutional: She is oriented to person, place, and time. She appears well-developed and well-nourished. She is sleeping. No distress.   Chronically ill appearing   HENT:   Head: Normocephalic and atraumatic.   Nose: Nose normal.   Mouth/Throat: Oropharynx is clear and moist. Mucous membranes are dry.   Eyes: Conjunctivae and EOM are normal. Pupils are equal, round, and reactive to light. No scleral icterus.   Neck: Trachea normal. Neck supple. No JVD present. Carotid bruit is not present. No thyromegaly present.   Cardiovascular: Normal rate, regular rhythm, normal heart sounds and normal pulses.  Exam reveals no gallop and no friction rub.    No murmur heard.  2+ lower extremity edema   Pulmonary/Chest: Effort normal. No respiratory distress. She has decreased breath  sounds in the right lower field and the left lower field. She has no wheezes. She has no rales.   Abdominal: Soft. Bowel sounds are normal. She exhibits no distension. There is no tenderness. There is no guarding.   Musculoskeletal:   Immobilizer in place   Neurological: She is oriented to person, place, and time. She has normal strength. No cranial nerve deficit.   Sleeping but arousable and able to follow all commands   Skin: Skin is warm, dry and intact. Ecchymosis noted. No rash noted.   Erythema noted bilateral lower extremities distally   Psychiatric: She has a normal mood and affect. Her speech is normal.     Results Review:    Results from last 7 days  Lab Units 10/12/17  0206   WBC 10*3/mm3 5.88   HEMOGLOBIN g/dL 10.8*   HEMATOCRIT % 32.5*   PLATELETS 10*3/mm3 122*       Results from last 7 days  Lab Units 10/12/17  0206 10/05/17  1315   SODIUM mmol/L 140 139   POTASSIUM mmol/L 3.1* 4.4   CHLORIDE mmol/L 103 100   CO2 mmol/L 29.7 31.7   BUN mg/dL 18 29*   CREATININE mg/dL 0.96 1.33*   CALCIUM mg/dL 9.3 9.0   GLUCOSE mg/dL 144* 91       Results from last 7 days  Lab Units 10/12/17  0206 10/05/17  1315   BILIRUBIN mg/dL 1.0 0.9   ALK PHOS U/L 119* 151*   AST (SGOT) U/L 55* 36*   ALT (SGPT) U/L 26 24       Results from last 7 days  Lab Units 10/12/17  0206   TROPONIN I ng/mL <0.006       Results from last 7 days  Lab Units 10/12/17  0206   INR  1.17*       Imaging:    I have personally reviewed the EKG. Per my view: normal sinus rhythm    Imaging Results (most recent)     Procedure Component Value Units Date/Time    XR Chest 1 View [[id number]] Updated:  10/12/17 0133    XR Femur 2 View Left [[id number]] Updated:  10/12/17 0133      *Per my view: her femur xray reveals a distal femur fracture. Her chest xray reveals no obvious infiltrate or effusion. She is rotated to the right.    Assessment/Plan      -Acute, traumatic left distal femur fracture status post fall  -Possible acute UTI  -History of CHF with  diastolic dysfunction; currently in no acute exacerbation  -Chronic lower extremity edema with chronic venous stasis dermatitis/possible cellulitis  -Mild hypokalemia  -Moderate protein malnutrition  -Mild thrombocytopenia  -Macrocytic anemia  -History of cirrhosis; compensated  -Hypothyroidism    Has been admitted to the medical surgical floor.  She is currently nothing by mouth awaiting orthopedic surgery evaluation.  The patient's potassium will be supplemented and I have ordered a magnesium level.  We'll hold on any IV fluid hydration at this time given patient's history of congestive heart failure and cirrhosis.  Patient will receive as needed IV pain medication with holding parameters.  We will follow-up on the official x-ray reports.  I have started her on empiric IV antibiotic therapy with Rocephin while we await the final urine culture.  I have ordered a TSH and ammonia level.  P her CBC and CMP later on in the morning.  The patient had a troponin level that was within normal limits and had an outpatient echocardiogram performed on the day prior to admission.  Patient's home medication list will be addressed once verified.  At this point I feel that the patient is at least a moderate risk patient for any intermediate risk procedure, however, her Alicia perioperative cardiac risk score is approximately 0.94%    DVT prophylaxis: No DVT ppx for now in anticpation of surgery; no SCDs due to lower extremity swelling    Estimated Length of Stay: > 2 MNs    The patient's previous medical record from [facility] has been reviewed.    I discussed the patients findings and my recommendations with patient and nursing staff      Britany Serrato DO  10/12/17  4:33 AM     Electronically signed by Britany Serrato DO at 10/12/2017  8:07 AM        Vital Signs (last 24 hours)       10/16 0700  -  10/17 0659 10/17 0700  -  10/17 1030   Most Recent    Temp (°F) 97.5 -  98      98     98 (36.7)    Heart Rate 65 -  78    65 -   67     65    Resp 12 -  19 13    BP 98/40 -  123/53    115/42 -  125/49     125/49    SpO2 (%) 96 -  100    93 -  99     99          Intake & Output (last day)       10/16 0701 - 10/17 0700 10/17 0701 - 10/18 0700    P.O. 960     Blood      IV Piggyback 100     Total Intake(mL/kg) 1060 (15.2)     Urine (mL/kg/hr) 2800 (1.7)     Stool 0 (0)     Total Output 2800      Net -1740            Unmeasured Stool Occurrence 2 x         Hospital Medications (active)       Dose Frequency Start End    aspirin EC tablet 81 mg 81 mg Daily 10/13/2017     Sig - Route: Take 1 tablet by mouth Daily. - Oral    bisacodyl (DULCOLAX) suppository 10 mg 10 mg Daily PRN 10/12/2017     Sig - Route: Insert 1 suppository into the rectum Daily As Needed for Constipation. - Rectal    bumetanide (BUMEX) tablet 1 mg 1 mg 2 Times Daily 10/12/2017     Sig - Route: Take 1 tablet by mouth 2 (Two) Times a Day. - Oral    cefdinir (OMNICEF) capsule 300 mg 300 mg Every 12 Hours Scheduled 10/16/2017 10/19/2017    Sig - Route: Take 1 capsule by mouth Every 12 (Twelve) Hours. - Oral    cyanocobalamin injection 1,000 mcg 1,000 mcg Every 28 Days 10/15/2017     Sig - Route: Inject 1 mL into the shoulder, thigh, or buttocks Every 28 (Twenty-Eight) Days. - Intramuscular    Cosign for Ordering: Accepted by Delores Diamond MD on 10/15/2017  1:34 PM    folic acid (FOLVITE) tablet 1 mg 1 mg Daily 10/12/2017     Sig - Route: Take 1 tablet by mouth Daily. - Oral    HYDROcodone-acetaminophen (NORCO) 7.5-325 MG per tablet 1 tablet 1 tablet Every 4 Hours PRN 10/12/2017 10/22/2017    Sig - Route: Take 1 tablet by mouth Every 4 (Four) Hours As Needed for Moderate Pain . - Oral    lactulose (CHRONULAC) 10 GM/15ML solution 20 g 20 g 3 Times Daily 10/16/2017     Sig - Route: Take 30 mL by mouth 3 (Three) Times a Day. - Oral    Notes to Pharmacy: Please increase to TID if patient has no BM. Decrease to once a day if she starts havign diarrhea or more than 3 loose BM  "daily    levothyroxine (SYNTHROID, LEVOTHROID) tablet 50 mcg 50 mcg Daily 10/12/2017     Sig - Route: Take 1 tablet by mouth Daily. - Oral    Magesium Sulfate 1 gram infusion - Mg 1.6-1.9 mg/dL 1 g As Needed 10/15/2017     Sig - Route: Infuse 100 mL into a venous catheter As Needed (Mg 1.6-1.9 mg/dL.). - Intravenous    Linked Group 1:  \"Or\" Linked Group Details        magnesium hydroxide (MILK OF MAGNESIA) suspension 2400 mg/10mL 10 mL 10 mL Daily PRN 10/14/2017     Sig - Route: Take 10 mL by mouth Daily As Needed for Constipation. - Oral    Magnesium Sulfate 2 gram infusion - Mg less than or equal to 1.5 mg/dL 2 g As Needed 10/15/2017     Sig - Route: Infuse 50 mL into a venous catheter As Needed (for Mg less than or equal 1.5 mg/dL). - Intravenous    Linked Group 1:  \"Or\" Linked Group Details        morphine injection 2 mg 2 mg Every 4 Hours PRN 10/13/2017 10/22/2017    Sig - Route: Infuse 1 mL into a venous catheter Every 4 (Four) Hours As Needed for Moderate Pain . - Intravenous    Linked Group 2:  \"And\" Linked Group Details        naloxone (NARCAN) injection 0.4 mg 0.4 mg Every 5 Minutes PRN 10/13/2017     Sig - Route: Infuse 1 mL into a venous catheter Every 5 (Five) Minutes As Needed for Respiratory Depression. - Intravenous    Linked Group 2:  \"And\" Linked Group Details        nitroglycerin (NITROSTAT) SL tablet 0.4 mg 0.4 mg Every 5 Minutes PRN 10/12/2017     Sig - Route: Place 1 tablet under the tongue Every 5 (Five) Minutes As Needed for Chest Pain. - Sublingual    ondansetron (ZOFRAN) injection 4 mg 4 mg Every 6 Hours PRN 10/12/2017     Sig - Route: Infuse 2 mL into a venous catheter Every 6 (Six) Hours As Needed for Nausea or Vomiting. - Intravenous    pantoprazole (PROTONIX) EC tablet 40 mg 40 mg Every Morning 10/13/2017     Sig - Route: Take 1 tablet by mouth Every Morning. - Oral    pneumococcal polysaccharide 23-valent (PNEUMOVAX-23) vaccine 0.5 mL 0.5 mL During Hospitalization 10/12/2017     Sig - " "Route: Inject 0.5 mL into the shoulder, thigh, or buttocks During Hospitalization for Immunization. - Intramuscular    Cosign for Ordering: Accepted by Delores Diamond MD on 10/13/2017  8:51 AM    polyethylene glycol (MIRALAX) powder 17 g 17 g Daily 10/14/2017     Sig - Route: Take 17 g by mouth Daily. - Oral    potassium chloride (K-DUR,KLOR-CON) ER tablet 10 mEq 10 mEq Daily 10/12/2017     Sig - Route: Take 1 tablet by mouth Daily. - Oral    potassium chloride 10 mEq in 100 mL IVPB 10 mEq Every 1 Hour PRN 10/12/2017     Sig - Route: Infuse 100 mL into a venous catheter Every 1 (One) Hour As Needed (See admin Instructions.). - Intravenous    potassium phosphate 15 mmol in sodium chloride 0.9 % 100 mL infusion 15 mmol As Needed 10/15/2017     Sig - Route: Infuse 15 mmol into a venous catheter As Needed (Peripheral IV - Phosphorus 1.8 - 2.5). - Intravenous    Linked Group 3:  \"Or\" Linked Group Details        potassium phosphate 30 mmol in sodium chloride 0.9 % 250 mL infusion 30 mmol As Needed 10/15/2017     Sig - Route: Infuse 30 mmol into a venous catheter As Needed (Peripheral IV - Phosphorus 1.3 - 1.7). - Intravenous    Linked Group 3:  \"Or\" Linked Group Details        potassium phosphate 45 mmol in sodium chloride 0.9 % 500 mL infusion 45 mmol As Needed 10/15/2017     Sig - Route: Infuse 45 mmol into a venous catheter As Needed (Peripheral IV - Phosphorus Less Than 1.3). - Intravenous    Linked Group 3:  \"Or\" Linked Group Details        rifaximin (XIFAXAN) tablet 550 mg 550 mg Every 12 Hours Scheduled 10/12/2017     Sig - Route: Take 1 tablet by mouth Every 12 (Twelve) Hours. - Oral    Risaquad-2 capsule 1 capsule 1 capsule Daily 10/13/2017     Sig - Route: Take 1 capsule by mouth Daily. - Oral    rOPINIRole (REQUIP) tablet 1 mg 1 mg Nightly 10/12/2017     Sig - Route: Take 1 tablet by mouth Every Night. - Oral    sennosides-docusate sodium (SENOKOT-S) 8.6-50 MG tablet 2 tablet 2 tablet 2 Times Daily " "10/12/2017     Sig - Route: Take 2 tablets by mouth 2 (Two) Times a Day. - Oral    sodium chloride 0.9 % flush 1-10 mL 1-10 mL As Needed 10/12/2017     Sig - Route: Infuse 1-10 mL into a venous catheter As Needed for Line Care. - Intravenous    sodium chloride 0.9 % flush 1-10 mL 1-10 mL As Needed 10/12/2017     Sig - Route: Infuse 1-10 mL into a venous catheter As Needed for Line Care. - Intravenous    sodium chloride 0.9 % flush 10 mL 10 mL Every 12 Hours Scheduled 10/16/2017     Sig - Route: 10 mL by Intracatheter route Every 12 (Twelve) Hours. - Intracatheter    Cosign for Ordering: Required by Delores Diamond MD    sodium chloride 0.9 % flush 10 mL 10 mL As Needed 10/16/2017     Sig - Route: 10 mL by Intracatheter route As Needed for Line Care (After Medication Administration). - Intracatheter    Cosign for Ordering: Required by Delores Diamond MD    sodium phosphates 15 mmol in sodium chloride 0.9 % 250 mL IVPB 15 mmol As Needed 10/15/2017     Sig - Route: Infuse 15 mmol into a venous catheter As Needed (Peripheral IV - Phosphorus 1.8 - 2.5 & Potassium Greater Than 4). - Intravenous    Linked Group 3:  \"Or\" Linked Group Details        sodium phosphates 30 mmol in sodium chloride 0.9 % 250 mL IVPB 30 mmol As Needed 10/15/2017     Sig - Route: Infuse 30 mmol into a venous catheter As Needed (Peripheral IV - Phosphorus 1.3-1.7 & Potassium Greater Than 4). - Intravenous    Linked Group 3:  \"Or\" Linked Group Details        sodium phosphates 45 mmol in sodium chloride 0.9 % 500 mL IVPB 45 mmol As Needed 10/15/2017     Sig - Route: Infuse 45 mmol into a venous catheter As Needed (Peripheral IV - Phosphorus Less Than 1.3 & Potassium Greater Than 4). - Intravenous    Linked Group 3:  \"Or\" Linked Group Details        cefTRIAXone (ROCEPHIN) 1 g/100 mL 0.9% NS (MBP) (Discontinued) 1 g Every 24 Hours 10/12/2017 10/16/2017    Sig - Route: Infuse 100 mL into a venous catheter Daily. - Intravenous    lactulose " (CHRONULAC) 10 GM/15ML solution 20 g (Discontinued) 20 g 2 Times Daily 10/14/2017 10/16/2017    Sig - Route: Take 30 mL by mouth 2 (Two) Times a Day. - Oral    Notes to Pharmacy: Please increase to TID if patient has no BM. Decrease to once a day if she starts havign diarrhea or more than 3 loose BM daily    Magesium Sulfate 1 gram infusion - Mg 1.6-1.9 mg/dL (Discontinued) 1 g Once 10/16/2017 10/16/2017    Sig - Route: Infuse 100 mL into a venous catheter 1 (One) Time. - Intravenous    Reason for Discontinue: Duplicate order    potassium phosphate 15 mmol in sodium chloride 0.9 % 100 mL infusion (Discontinued) 15 mmol Once 10/16/2017 10/16/2017    Sig - Route: Infuse 15 mmol into a venous catheter 1 (One) Time. - Intravenous    Reason for Discontinue: Duplicate order            Lab Results (last 24 hours)     Procedure Component Value Units Date/Time    CBC & Differential [[id number]] Collected:  10/17/17 0125    Specimen:  Blood Updated:  10/17/17 0141    Narrative:       The following orders were created for panel order CBC & Differential.  Procedure                               Abnormality         Status                     ---------                               -----------         ------                     CBC Auto Differential[[id number]]        Abnormal            Final result                 Please view results for these tests on the individual orders.    CBC Auto Differential [[id number]]  (Abnormal) Collected:  10/17/17 0125    Specimen:  Blood Updated:  10/17/17 0141     WBC 6.86 10*3/mm3      RBC 2.79 (L) 10*6/mm3      Hemoglobin 8.3 (L) g/dL      Hematocrit 26.2 (L) %      MCV 93.9 fL      MCH 29.7 pg      MCHC 31.7 (L) g/dL      RDW 16.5 (H) %      RDW-SD 52.2 fl      MPV 10.5 (H) fL      Platelets 133 10*3/mm3      Neutrophil % 61.4 %      Lymphocyte % 17.5 %      Monocyte % 12.8 (H) %      Eosinophil % 7.6 (H) %      Basophil % 0.3 %      Immature Grans % 0.4 %      Neutrophils, Absolute 4.21  10*3/mm3      Lymphocytes, Absolute 1.20 10*3/mm3      Monocytes, Absolute 0.88 10*3/mm3      Eosinophils, Absolute 0.52 10*3/mm3      Basophils, Absolute 0.02 10*3/mm3      Immature Grans, Absolute 0.03 10*3/mm3     Magnesium [[id number]]  (Normal) Collected:  10/17/17 0125    Specimen:  Blood Updated:  10/17/17 0209     Magnesium 2.0 mg/dL     Comprehensive Metabolic Panel [[id number]]  (Abnormal) Collected:  10/17/17 0125    Specimen:  Blood Updated:  10/17/17 0211     Glucose 102 mg/dL      BUN 18 mg/dL      Creatinine 0.90 mg/dL      Sodium 140 mmol/L      Potassium 3.7 mmol/L      Chloride 107 mmol/L      CO2 26.0 mmol/L      Calcium 8.2 mg/dL      Total Protein 5.1 (L) g/dL      Albumin 2.40 (L) g/dL      ALT (SGPT) 22 U/L      AST (SGOT) 55 (H) U/L      Alkaline Phosphatase 100 U/L       Note New Reference Ranges        Total Bilirubin 2.0 (H) mg/dL       1+ Icteric        eGFR Non African Amer 59 (L) mL/min/1.73      Globulin 2.7 gm/dL      A/G Ratio 0.9 (L) g/dL      BUN/Creatinine Ratio 20.0     Anion Gap 7.0 mmol/L     Narrative:       The MDRD GFR formula is only valid for adults with stable renal function between ages 18 and 70.    Phosphorus [[id number]]  (Normal) Collected:  10/17/17 0125    Specimen:  Blood Updated:  10/17/17 0211     Phosphorus 3.2 mg/dL     Osmolality, Calculated [[id number]]  (Normal) Collected:  10/17/17 0125    Specimen:  Blood Updated:  10/17/17 0211     Osmolality Calc 281.5 mOsm/kg         Imaging Results (last 24 hours)     Procedure Component Value Units Date/Time    XR Shoulder 2+ View Right [[id number]] Collected:  10/17/17 0636     Updated:  10/17/17 0639    Narrative:       EXAMINATION: XR SHOULDER 2+ VW RIGHT-      CLINICAL INDICATION:Pain, recent fall; S72.402A-Unspecified fracture of  lower end of left femur, initial encounter for closed fracture;  S72.492A-Other fracture of lower end of left femur, initial encounter  for closed fracture        COMPARISON: None       TECHNIQUE: 2 views right shoulder     FINDINGS:   Changes of previous ORIF for clavicle fracture. Anatomic alignment  noted. CHF/edema. Degenerative AC joint arthropathy. Degenerative change  glenohumeral joint. No acute fracture.           Impression:       No acute osseous or articular abnormalities. ORIF for old clavicle  fracture. Other findings as above.     This report was finalized on 10/17/2017 6:37 AM by Dr. Manish Sherwood MD.           Operative/Procedure Notes (last 24 hours) (Notes from 10/16/2017 10:31 AM through 10/17/2017 10:31 AM)     No notes of this type exist for this encounter.           Physician Progress Notes (last 24 hours) (Notes from 10/16/2017 10:31 AM through 10/17/2017 10:31 AM)      Jamison Ramirez DO at 10/16/2017 11:45 AM  Version 1 of 1         Subjective     History:   Alma Delia Garcia is a 87 y.o. female admitted on 10/12/2017 secondary to Closed fracture of left distal femur     Procedures:   10/12/17: ORIF of distal end of left femur    Transfusions:  10/12/17: 2 units of PRBC's  10/15/17: 2 units of PRBC's    Patient seen and examined with PHILLIP Clemente. Awake and alert. Currently sitting in bedside chair. Currently c/o left hip pain and right shoulder pain. Confused to year but otherwise oriented. Denies CP, dyspnea or palpitations. Denies fever or chills. Reports BM last PM. No acute events overnight per RN.     History taken from: patient, chart, and RN.      Objective     Vital Signs  Temp:  [98 °F (36.7 °C)-98.3 °F (36.8 °C)] 98.3 °F (36.8 °C)  Heart Rate:  [64-81] 70  Resp:  [18] 18  BP: ()/(33-55) 123/50    Intake/Output Summary (Last 24 hours) at 10/16/17 1145  Last data filed at 10/16/17 1003   Gross per 24 hour   Intake             1600 ml   Output             2000 ml   Net             -400 ml         Physical Exam:  General:    Awake, alert, in no acute distress, chronically ill appearing   Heart:      Normal S1 and S2. Regular rate and rhythm. No  significant murmur, rubs or gallops appreciated.   Lungs:     Respirations regular, even and unlabored. Lungs clear to auscultation B/L. No wheezes, rales or rhonchi.   Abdomen:   Soft and nontender. No guarding, rebound tenderness or  organomegaly noted. Bowel sounds present x 4.   Extremities:  (+) B/L LE edema. (+) left knee immobilizer. (+) left hip dressing.      Results Review:      Results from last 7 days  Lab Units 10/16/17  0031 10/15/17  1740 10/15/17  0411 10/14/17  1003 10/13/17  0317 10/12/17  1957 10/12/17  1543 10/12/17  0509   WBC 10*3/mm3 9.12 8.90 10.24 13.98* 12.39  --  9.73 5.81   HEMOGLOBIN g/dL 8.2* 8.4* 6.0* 7.3* 9.2* 9.1* 5.8* 9.9*   PLATELETS 10*3/mm3 95* 82* 77* 60* 52*  --  80* 103*       Results from last 7 days  Lab Units 10/16/17  0031 10/15/17  1613 10/15/17  0102 10/14/17  1536 10/13/17  0317 10/12/17  0509 10/12/17  0206   SODIUM mmol/L 139  --  138 139 143 141 140   POTASSIUM mmol/L 3.9 4.1 3.6 4.3 4.6 3.5 3.1*   CHLORIDE mmol/L 107  --  108 109 112 105 103   CO2 mmol/L 28.8  --  26.3 26.3 24.2* 27.9 29.7   BUN mg/dL 21  --  27* 29* 21 18 18   CREATININE mg/dL 0.89  --  0.87 0.92 0.82 0.84 0.96   CALCIUM mg/dL 8.0  --  7.4* 7.8 8.1 9.0 9.3   GLUCOSE mg/dL 116*  --  134* 118* 102 112* 144*       Results from last 7 days  Lab Units 10/16/17  0031 10/15/17  0102 10/14/17  1536 10/13/17  0317 10/12/17  0509 10/12/17  0206   BILIRUBIN mg/dL 1.6 0.6 1.2 2.1* 0.9 1.0   ALK PHOS U/L 90 77 67 51 113* 119*   AST (SGOT) U/L 61* 62* 61* 47* 50* 55*   ALT (SGPT) U/L 20 20 19 15 23 26       Results from last 7 days  Lab Units 10/16/17  0031 10/15/17  0102 10/14/17  1536 10/12/17  0509   MAGNESIUM mg/dL 1.8 1.6* 1.7 1.7       Results from last 7 days  Lab Units 10/13/17  0317 10/12/17  1956 10/12/17  0206   INR  1.79* 2.04* 1.17*       Results from last 7 days  Lab Units 10/12/17  0206   TROPONIN I ng/mL <0.006       Imaging Results (last 24 hours)     ** No results found for the last 24 hours.  **            Medications:    aspirin 81 mg Oral Daily   bumetanide 1 mg Oral BID   ceftriaxone 1 g Intravenous Q24H   cyanocobalamin 1,000 mcg Intramuscular Q28 Days   folic acid 1 mg Oral Daily   lactulose 20 g Oral TID   levothyroxine 50 mcg Oral Daily   pantoprazole 40 mg Oral QAM   polyethylene glycol 17 g Oral Daily   potassium chloride 10 mEq Oral Daily   rifaximin 550 mg Oral Q12H   Risaquad-2 1 capsule Oral Daily   rOPINIRole 1 mg Oral Nightly   sennosides-docusate sodium 2 tablet Oral BID              Assessment/Plan   Acute traumatic left distal femur fracture s/p fall: S/P ORIF. Cont management per orthopedic surgery. Cont PT/OT. Pt expresses interest in short term rehab. Discussed with SS who will begin working on placement. Orthopedic surgery input appreciated.     Acute on chronic normocytic anemia with acute blood loss: S/P transfusions this admission including intraoperatively. H&H stable this AM. Cont to monitor closely and transfuse if Hgb<7 or with hemodynamic instability.     E coli UTI: Transition to Omnicef x 3 more days.     Chronic diastolic CHF: Appears compensated. Cont PO Bumex.    Cirrhosis: Cont lactulose and Xifaxan.     Hypothyroidism: Cont Synthroid.    Thrombocytopenia: Likely 2/2 cirrhosis. Cont to monitor.     DVT PPX: SCD to RLE. No pharmacologic DVT PPX 2/2 acute blood loss anemia and thrombocytopenia.     Pt is at high risk 2/2 traumatic left distal femur fracture, acute on chronic anemia with acute blood loss requiring transfusion, UTI, CHF, cirrhosis and advanced age.       Jamison Ramirez DO  10/16/17  11:45 AM     Electronically signed by Jamison Ramirez DO at 10/16/2017 11:59 AM      WILMER Lynne at 10/17/2017  8:14 AM  Version 1 of 1         Inpatient Progress Note  Alma Delia Garcia  Date: 10/17/17  MRN: [id number]      Subjective:    87 year old white female POD#5 ORIF left femur. Patient remains stable and more alert this am since addition of  lactulose. Patient complains of pain.  Patient does complain of moderate pain into the left lower extremity as well as the right shoulder.  No new complaints.  Denies any paresthesias    Objective:    Vitals:    10/17/17 0400 10/17/17 0500 10/17/17 0600 10/17/17 0702   BP: 106/46 113/46 117/45 115/42   BP Location:       Patient Position:       Pulse: 69 70 70 67   Resp: 14 12 13    Temp: 98 °F (36.7 °C)      TempSrc: Oral      SpO2: 96% 100% 100% 98%   Weight:       Height:           Exam:       Examination of patient's left hip and lower extremity reveals Aquacel bandage with minimal postoperative drainage.  She continues to exhibit moderate swelling in the thigh and resolving ecchymosis.  Active dorsiflexion plantarflexion of the left lower extremity.  Right shoulder examination reveals no notable evidence of abnormality.  She exhibits painful range of motion active full range of motion.  No notable weakness upon stressing supraspinatus.  Internal and external rotation without pain.  No crepitus, swelling, or ecchymosis.  Neurovascular status grossly intact.     Labs:      Results from last 7 days  Lab Units 10/17/17  0125 10/16/17  0031 10/15/17  1740  10/13/17  0317  10/12/17  1956  10/12/17  0206   WBC 10*3/mm3 6.86 9.12 8.90  < > 12.39  --   --   < > 5.88   HEMOGLOBIN g/dL 8.3* 8.2* 8.4*  < > 9.2*  < >  --   < > 10.8*   HEMATOCRIT % 26.2* 25.6* 25.6*  < > 27.6*  < >  --   < > 32.5*   MCV fL 93.9 93.4 92.8  < > 90.8  --   --   < > 95.6*   MCHC g/dL 31.7* 32.0* 32.8*  < > 33.3  --   --   < > 33.2   PLATELETS 10*3/mm3 133 95* 82*  < > 52*  --   --   < > 122*   INR   --   --   --   --  1.79*  --  2.04*  --  1.17*   < > = values in this interval not displayed.        Results from last 7 days  Lab Units 10/17/17  0125 10/16/17  0031 10/15/17  1613 10/15/17  0102   SODIUM mmol/L 140 139  --  138   POTASSIUM mmol/L 3.7 3.9 4.1 3.6   MAGNESIUM mg/dL 2.0 1.8  --  1.6*   CHLORIDE mmol/L 107 107  --  108   CO2 mmol/L  26.0 28.8  --  26.3   BUN mg/dL 18 21  --  27*   CREATININE mg/dL 0.90 0.89  --  0.87   EGFR IF NONAFRICN AM mL/min/1.73 59* 60*  --  62   CALCIUM mg/dL 8.2 8.0  --  7.4*   GLUCOSE mg/dL 102 116*  --  134*   ALBUMIN g/dL 2.40* 2.30*  --  2.20*   BILIRUBIN mg/dL 2.0* 1.6  --  0.6   ALK PHOS U/L 100 90  --  77   AST (SGOT) U/L 55* 61*  --  62*   ALT (SGPT) U/L 22 20  --  20   Estimated Creatinine Clearance: 40.3 mL/min (by C-G formula based on Cr of 0.9).  No results found for: AMMONIA    Results from last 7 days  Lab Units 10/12/17  0206   TROPONIN I ng/mL <0.006         No results found for: HGBA1C  Lab Results   Component Value Date    TSH 1.771 10/12/2017    FREET4 1.22 07/13/2017         Pain Management Panel     Pain Management Panel Latest Ref Rng & Units 8/10/2017 7/25/2017    AMPHETAMINES SCREEN, URINE Negative Negative Negative    BARBITURATES SCREEN Negative Negative Negative    BENZODIAZEPINE SCREEN, URINE Negative Negative Negative    BUPRENORPHINE Negative Negative Negative    COCAINE SCREEN, URINE Negative Negative Negative    METHADONE SCREEN, URINE Negative Negative Negative             Urine Culture   Date Value Ref Range Status   10/12/2017 90,000-100,000 CFU/mL Escherichia coli (A)  Final         Radiology:  Imaging Results (last 72 hours)     Procedure Component Value Units Date/Time    XR Shoulder 2+ View Right [[id number]] Collected:  10/17/17 0636     Updated:  10/17/17 0639    Narrative:       EXAMINATION: XR SHOULDER 2+ VW RIGHT-      CLINICAL INDICATION:Pain, recent fall; S72.402A-Unspecified fracture of  lower end of left femur, initial encounter for closed fracture;  S72.492A-Other fracture of lower end of left femur, initial encounter  for closed fracture        COMPARISON: None      TECHNIQUE: 2 views right shoulder     FINDINGS:   Changes of previous ORIF for clavicle fracture. Anatomic alignment  noted. CHF/edema. Degenerative AC joint arthropathy. Degenerative change  glenohumeral  joint. No acute fracture.           Impression:       No acute osseous or articular abnormalities. ORIF for old clavicle  fracture. Other findings as above.     This report was finalized on 10/17/2017 6:37 AM by Dr. Manish Sherwood MD.               Assessment:      ICD-10-CM ICD-9-CM   1. Closed fracture of distal end of left femur, unspecified fracture morphology, initial encounter S72.402A 821.20   2. Other closed fracture of distal end of left femur, initial encounter S72.492A 821.29         Plan:    Postoperative day #5 ORIF periprosthetic femur fracture.  Patient continues to remain stable.  Hemoglobin remaining 8.9.  Ongoing instructions to change dressing if saturated.  Continue knee immobilizer.  Nonweightbearing left lower extremity.    This document was signed by Sudhakar Harden PA-C 2017 8:14 AM       Electronically signed by WILMER Lynne at 10/17/2017  8:24 AM          Discharge Order     Start     Ordered    10/17/17 0949  Discharge patient  Once     Comments:  Please arrange transportation to SNF via BLS ambulance.   Expected Discharge Date:  10/17/17    Discharge Disposition:  Skilled Nursing Facility (DC - External)        10/17/17 0951        [address] [address]  Phone:  [phone]  Fax:          Patient:     Alma Delia Garcia MRN:  [id number]    [address] [place] [address] :  1930  SSN:    Phone: [phone] Sex:  F      INSURANCE PAYOR PLAN GROUP # SUBSCRIBER ID   Primary:  Secondary: MEDICARE [place] MEDICAID [id number]  [id number]   [id number]  [id number]   Admitting Diagnosis: Closed fracture of distal end of left femur, unspecified fracture morphology, initial encounter [S72.402A]  Order Date:  Oct 17, 2017               OK from Ortho standpoint to discharge to rehab/nursing facility. Maintain non-weight bearing status and continue immobilizer until follow up appt.       (Order ID: [id number])     Diagnosis:          Priority:  Routine Expected Date:   Expiration Date:        Interval:   Count:    Comments: OK from Ortho standpoint to discharge to rehab/nursing facility. Maintain non-weight bearing status and continue immobilizer until follow up appt.        Specimen Type:   Specimen Source:   Specimen Taken Date:   Specimen Taken Time:                         Verbal Order Mode: Telephone with readback   Authorizing Provider: Karson Pierre MD  Authorizing Provider's NPI: [id number]     Order Entered By: Jennifer Rivera RN 10/17/2017  9:26 AM     Electronically signed by:

## 2017-10-17 NOTE — PROGRESS NOTES
Discharge Planning Assessment   Mark     Patient Name: Alma Delia Garcia  MRN: [id number]  Today's Date: 10/17/2017    Admit Date: 10/12/2017       Discharge Plan       10/17/17 1033    Final Note    Final Note Pt being discharged to [facility] on this date.  SS faxed orders and AVS to [facility] at [phone].  Nurse please call report to [facility] at [phone].  Pt will be transported  to the nursing home via EMS.  No other needs at this time.           Haleigh Scanlon

## 2017-10-17 NOTE — PLAN OF CARE
Problem: Mobility, Physical Impaired (Adult)  Goal: Enhanced Mobility Skills  Outcome: Ongoing (interventions implemented as appropriate)  Goal: Enhanced Functionality Ability  Outcome: Ongoing (interventions implemented as appropriate)    Problem: Pain, Acute (Adult)  Goal: Acceptable Pain Control/Comfort Level  Outcome: Ongoing (interventions implemented as appropriate)    Problem: Infection, Risk/Actual (Adult)  Goal: Infection Prevention/Resolution  Outcome: Ongoing (interventions implemented as appropriate)    Problem: Skin Integrity Impairment, Risk/Actual (Adult)  Goal: Identify Related Risk Factors and Signs and Symptoms  Outcome: Ongoing (interventions implemented as appropriate)  Goal: Skin Integrity/Wound Healing  Outcome: Ongoing (interventions implemented as appropriate)    Problem: Patient Care Overview (Adult)  Goal: Plan of Care Review  Outcome: Ongoing (interventions implemented as appropriate)

## 2017-10-17 NOTE — THERAPY EVALUATION
Acute Care - Physical Therapy Treatment Note   [facility]     Patient Name: Alma Delia Garcia  : 1930  MRN: [id number]  Today's Date: 10/17/2017  Onset of Illness/Injury or Date of Surgery Date: 10/12/17  Date of Referral to PT: 10/13/17  Referring Physician: Dr. Pierre    Admit Date: 10/12/2017    Visit Dx:    ICD-10-CM ICD-9-CM   1. Closed fracture of distal end of left femur, unspecified fracture morphology, initial encounter S72.402A 821.20   2. Other closed fracture of distal end of left femur, initial encounter S72.492A 821.29     Patient Active Problem List   Diagnosis   • Hepatic cirrhosis*   • HTN (hypertension)*   • Anxiety disorder*   • Osteopetrosis*   • Hypothyroidism*   • GERD (gastroesophageal reflux disease)*   • Chronic pain syndrome due to fractured spine and left rib fracture*   • Anemia, chronic disease   • Arthralgia of hip   • Left knee pain   • Fracture of proximal end of tibia and fibula   • Primary insomnia   • Thrombocytopenia   • Renal failure   • Peripheral vascular disease   • Bilateral leg edema   • Cramp of both lower extremities   • Hepatic encephalopathy   • UTI (urinary tract infection)   • Hypokalemia   • Chronic systolic heart failure   • Closed fracture of left distal femur               Adult Rehabilitation Note       10/17/17 1000 10/16/17 1549       Rehab Assessment/Intervention    Discipline physical therapist  -BC physical therapy assistant  -RF     Document Type therapy note (daily note)  -BC therapy note (daily note)  -RF     Subjective Information agree to therapy  -BC agree to therapy;complains of;pain  -RF     Patient Effort, Rehab Treatment adequate  -BC adequate  -RF     Symptoms Noted During/After Treatment  fatigue;increased pain  -RF     Precautions/Limitations  fall precautions;non-weight bearing status  -RF     Patient Response to Treatment  Pt demonstrates maxX2/dep assistance for transferring from bed to chair and chair to bed. Pt demonstrates increased  pain with all activity and mobility.   -RF     Recorded by [BC] Rosa Stringer, PT [RF] Kiah Bonds, PTA     Pain Assessment    Pain Assessment Gregory-Baker FACES  -BC Gregory-Baker FACES  -RF     Gregory-Reynolds FACES Pain Rating 4  -BC 8  -RF     Pain Location Leg  -BC Leg  -RF     Pain Orientation Left  -BC Left  -RF     Pain Intervention(s)  Repositioned;Rest  -RF     Recorded by [BC] Rosa Stringer, PT [RF] Kiah Bonds, PTA     Cognitive Assessment/Intervention    Current Cognitive/Communication Assessment functional  -BC functional  -RF     Orientation Status oriented to;person  -BC oriented to;person  -RF     Follows Commands/Answers Questions able to follow single-step instructions;needs cueing  -BC able to follow single-step instructions;needs cueing  -RF     Recorded by [BC] Rosa Stringer, PT [RF] Kiah Bonds, PTA     Bed Mobility, Assessment/Treatment    Bed Mobility, Assistive Device draw sheet  -BC draw sheet  -RF     Bed Mobility, Scoot/Bridge, [place] dependent (less than 25% patient effort);2 person assist required  -BC dependent (less than 25% patient effort);2 person assist required  -RF     Bed Mob, Supine to Sit, [place] dependent (less than 25% patient effort)  -BC dependent (less than 25% patient effort)  -RF     Bed Mob, Sit to Supine, [place] dependent (less than 25% patient effort)  -BC dependent (less than 25% patient effort)  -RF     Recorded by [BC] Rosa Stringer, PT [RF] Kiah Bonds, PTA     Transfer Assessment/Treatment    Transfers, Sit-Stand [place] dependent (less than 25% patient effort);2 person assist required  -BC dependent (less than 25% patient effort);2 person assist required  -RF     Transfers, Stand-Sit [place] dependent (less than 25% patient effort);2 person assist required  -BC dependent (less than 25% patient effort);2 person assist required  -RF     Transfer, Impairments strength decreased  -BC strength decreased  -RF      Recorded by [BC] Rosa Stringer, PT [RF] Kiah Bonds PTA     Gait Assessment/Treatment    Gait, [place] Level dependent (less than 25% patient effort);2 person assist required  -BC dependent (less than 25% patient effort);2 person assist required  -RF     Gait, Assistive Device rolling walker  -BC rolling walker  -RF     Gait, Distance (Feet) 3  -BC 3  -RF     Gait, Comment  Short distance from bed to chair.   -RF     Recorded by [BC] Rosa Stringer, PT [RF] Kiah Bonds PTA     Therapy Exercises    Bilateral Lower Extremities AROM:;AAROM:;10 reps;supine  -BC AROM:;AAROM:;10 reps;supine  -RF     Recorded by [BC] Rosa Stringer, PT [RF] Kiah Bonds PTA     Positioning and Restraints    Pre-Treatment Position in bed  -BC in bed  -RF     Post Treatment Position wheelchair  -BC wheelchair  -RF     In Bed  supine;call light within reach;encouraged to call for assist   in PM  -RF     In Chair notified nsg;sitting;call light within reach;encouraged to call for assist;reclined  -BC reclined;call light within reach;encouraged to call for assist   In AM  -RF     Recorded by [BC] Rosa Stringer, PT [RF] Kiah Bonds PTA       User Key  (r) = Recorded By, (t) = Taken By, (c) = Cosigned By    Initials Name Effective Dates    BC Rosa Stringer, PT 03/14/16 -     RF Kiah Bonds, PTA 07/19/17 -                 IP PT Goals       10/13/17 1415          Bed Mobility PT LTG    Bed Mobility PT LTG, Date Established 10/13/17  -BC      Bed Mobility PT LTG, Time to Achieve by discharge  -BC      Bed Mobility PT LTG, Activity Type all bed mobility  -BC      Bed Mobility PT LTG, [place] Level minimum assist (75% patient effort)  -BC      Bed Mobility PT Goal  LTG, Assist Device bed rails  -BC      Transfer Training PT LTG    Transfer Training PT LTG, Date Established 10/13/17  -BC      Transfer Training PT LTG, Time to Achieve by discharge  -BC      Transfer Training PT LTG, Activity Type all  transfers  -BC      Transfer Training PT LTG, [place] Level minimum assist (75% patient effort)  -BC      Transfer Training PT LTG, Assist Device walker, rolling  -BC      Gait Training PT LTG    Gait Training Goal PT LTG, Date Established 10/13/17  -BC      Gait Training Goal PT LTG, Time to Achieve by discharge  -BC      Gait Training Goal PT LTG, [place] Level minimum assist (75% patient effort)  -BC      Gait Training Goal PT LTG, Assist Device walker, rolling  -BC      Gait Training Goal PT LTG, Distance to Achieve 10  -BC        User Key  (r) = Recorded By, (t) = Taken By, (c) = Cosigned By    Initials Name Provider Type    JULISSA Stringer, PT Physical Therapist          Physical Therapy Education     Title: PT OT SLP Therapies (Active)     Topic: Physical Therapy (Active)     Point: Mobility training (Active)    Learning Progress Summary    Learner Readiness Method Response Comment Documented by Status   Patient Acceptance E NR,NL  BC 10/17/17 1058 Active    Acceptance E VU,NR  RF 10/16/17 1554 Done    Acceptance E NR,NL  RR 10/16/17 0008 Active    Acceptance E NR  KF 10/15/17 1557 Active    Acceptance E VU,NR  RR 10/14/17 2312 Done    Acceptance E VU  AD 10/14/17 1121 Done    Acceptance E NR  KL 10/13/17 1616 Active    Acceptance E NL  BC 10/13/17 1414 Active    Acceptance E VU  CD 10/12/17 2213 Done    Acceptance E VU,NR   10/12/17 0433 Done               Point: Home exercise program (Active)    Learning Progress Summary    Learner Readiness Method Response Comment Documented by Status   Patient Acceptance E NR,NL  BC 10/17/17 1058 Active    Acceptance E VU,NR  RF 10/16/17 1554 Done    Acceptance E NR,NL  RR 10/16/17 0008 Active    Acceptance E NR  KF 10/15/17 1557 Active    Acceptance E VU,NR  RR 10/14/17 2312 Done    Acceptance E VU  AD 10/14/17 1121 Done    Acceptance E NR  KL 10/13/17 1616 Active    Acceptance E NL  BC 10/13/17 1414 Active    Acceptance E VU  CD 10/12/17 2213 Done     Acceptance E VU,NR   10/12/17 0433 Done               Point: Body mechanics (Active)    Learning Progress Summary    Learner Readiness Method Response Comment Documented by Status   Patient Acceptance E NR,NL  BC 10/17/17 1058 Active    Acceptance E VU,NR   10/16/17 1554 Done    Acceptance E NR,NL  RR 10/16/17 0008 Active    Acceptance E NR  KF 10/15/17 1557 Active    Acceptance E VU,NR  RR 10/14/17 2312 Done    Acceptance E VU  AD 10/14/17 1121 Done    Acceptance E NR   10/13/17 1616 Active    Acceptance E NL  BC 10/13/17 1414 Active    Acceptance E VU  CD 10/12/17 2213 Done    Acceptance E VU,NR   10/12/17 0433 Done               Point: Precautions (Active)    Learning Progress Summary    Learner Readiness Method Response Comment Documented by Status   Patient Acceptance E NR,NL  BC 10/17/17 1058 Active    Acceptance E VU,NR   10/16/17 1554 Done    Acceptance E NR,NL  RR 10/16/17 0008 Active    Acceptance E NR   10/15/17 1557 Active    Acceptance E VU,NR  RR 10/14/17 2312 Done    Acceptance E VU  AD 10/14/17 1121 Done    Acceptance E NR   10/13/17 1616 Active    Acceptance E NL  BC 10/13/17 1414 Active    Acceptance E VU   10/12/17 2213 Done    Acceptance E VU,NR   10/12/17 0433 Done                      User Key     Initials Effective Dates Name Provider Type Discipline     06/16/16 -  Aubrie Rudd, RN Registered Nurse Nurse     02/04/16 -  Ashley Claudene Dalton, PT Physical Therapist PT    RR 11/02/16 -  Lacey López, RN Registered Nurse Nurse    BC 03/14/16 -  Rosa Stringer, PT Physical Therapist PT     10/18/16 -  Mimi Bethea, RN Registered Nurse Nurse     12/19/16 -  Sarah Curtis, RN Registered Nurse Nurse     07/19/17 -  Kiah Bonds, PTA Physical Therapy Assistant PT     07/19/17 -  Breanna Resendez, RN Registered Nurse Nurse                    PT Recommendation and Plan  Planned Therapy Interventions: balance training, bed mobility training,  gait training, home exercise program, patient/family education, strengthening, transfer training  PT Frequency: 2 times/day, 5 times/wk, per priority policy             Outcome Measures       10/17/17 1000 10/16/17 1500       How much help from another person do you currently need...    Turning from your back to your side while in flat bed without using bedrails? 1  -BC 1  -RF     Moving from lying on back to sitting on the side of a flat bed without bedrails? 1  -BC 1  -RF     Moving to and from a bed to a chair (including a wheelchair)? 1  -BC 1  -RF     Standing up from a chair using your arms (e.g., wheelchair, bedside chair)? 1  -BC 1  -RF     Climbing 3-5 steps with a railing? 1  -BC 1  -RF     To walk in hospital room? 1  -BC 1  -RF     AM-PAC 6 Clicks Score 6  -BC 6  -RF     Functional Assessment    Outcome Measure Options AM-PAC 6 Clicks Basic Mobility (PT)  -BC AM-PAC 6 Clicks Basic Mobility (PT)  -RF       User Key  (r) = Recorded By, (t) = Taken By, (c) = Cosigned By    Initials Name Provider Type    BC Rosa Stringer, PT Physical Therapist    RF Kiah Bonds, PTA Physical Therapy Assistant           Time Calculation:         PT Charges       10/17/17 1059          Time Calculation    Start Time --   30  -BC      PT Received On 10/17/17  -BC        User Key  (r) = Recorded By, (t) = Taken By, (c) = Cosigned By    Initials Name Provider Type    BC Rosa Stringer PT Physical Therapist          Therapy Charges for Today     Code Description Service Date Service Provider Modifiers Qty    34782417765  PT THERAPEUTIC ACT EA 15 MIN 10/17/2017 Rosa Stringer, PT GP 2    18708943287 HC PT THER SUPP EA 15 MIN 10/17/2017 Rosa Stringer, PT GP 2          PT G-Codes  Outcome Measure Options: AM-PAC 6 Clicks Basic Mobility (PT)  Score: 7  Functional Limitation: Mobility: Walking and moving around  Mobility: Walking and Moving Around Current Status (): At least 80 percent but less than 100 percent  impaired, limited or restricted  Mobility: Walking and Moving Around Goal Status (): At least 60 percent but less than 80 percent impaired, limited or restricted    Rosa Stringer, PT  10/17/2017

## 2017-10-17 NOTE — DISCHARGE SUMMARY
Date of Admission: 10/12/2017    Date of Discharge: 10/17/2017    PCP: Galina Gonsalves MD    Admission Diagnosis:   Please see admission H&P    Discharge Diagnosis:   Acute traumatic left distal femur fracture  Acute on chronic normocytic anemia with acute blood loss  E coli UTI  Chronic diastolic CHF  Cirrhosis  Hypothyroidism  Thrombocytopenia    Procedures Performed:  10/12/17: ORIF of distal end of left femur       Transfusions:  10/12/17: 2 units of PRBC's  10/15/17: 2 units of PRBC's    Consults:   Consults     Date and Time Order Name Status Description    10/12/2017 0337 Inpatient Consult to Orthopedic Surgery Completed             History of Present Illness:  Alma Delia Garcia is a 87 y.o. female who presented to [facility] ED with CC of left lower extremity pain s/p a fall at home. Please see H&P for complete details.    In the ED, workup revealed a left distal femur fracture. Her UA was suggestive of a UTI.        Hospital Course  Alma Delia Garcia was admitted to the med/surg floor for further evaluation and treatment. She was started on IV Rocephin empirically for treatment of her UTI. PRN analgesics were added and orthopedic surgery consulted for further input.     She was evaluated by orthopedic surgery later that morning and taken to the OR where she underwent an ORIF of the distal end of the left femur. In the setting of her cirrhosis she did experience a significant amount of blood loss requiring blood transfusions intraoperatively. She was transferred to the PCU for closer monitoring postoperatively.    Her BP was monitored closely and would remain overall stable with SBP ranging from the 90s-low 100s. She was transfused 2 additional units postoperatively and her H&H would stabilize. PT/OT were consulted to begin working with the patient daily. Her urine culture was finalized as E coli and she was transitioned to Omnicef. Disposition was discussed and both she and her family were agreeable to short  term SNF placement.     Ms. Garcia was seen and examined on 10/17/17 with PHILLIP Clemente. She remained awake and alert. She was hemodynamically stable with stable routine AM labs. A bed was available at [facility] and arrangements were made for discharge. Instructions were given for 1 week follow up with orthopedic surgery.     Condition on Discharge:  Stable    Vital Signs  Vitals:    10/17/17 0902   BP: 125/49   Pulse: 65   Resp:    Temp:    SpO2: 99%       Physical Exam:  General:    Awake, alert, in no acute distress, chronically ill appearing   Heart:      Normal S1 and S2. Regular rate and rhythm. No significant murmur, rubs or gallops appreciated.   Lungs:     Respirations regular, even and unlabored. Lungs clear to auscultation B/L. No wheezes, rales or rhonchi.   Abdomen:   Soft and nontender. No guarding, rebound tenderness or  organomegaly noted. Bowel sounds present x 4.   Extremities:  (+) B/L LE edema. (+) left knee immobilizer. (+) left hip dressing with mild drainage          Discharge Disposition:   SNF      Discharge Medications:   Alma Delia Garcia   Home Medication Instructions YESI:[id number]    Printed on:10/17/17 0951   Medication Information                      alendronate (FOSAMAX) 70 MG tablet  Take 1 tablet by mouth Every 7 (Seven) Days. Takes on Mondays.             aspirin 81 MG tablet  Take 1 tablet by mouth Daily.             bumetanide (BUMEX) 1 MG tablet  Take 1 tablet by mouth 2 (Two) Times a Day.             cefdinir (OMNICEF) 300 MG capsule  Take 1 capsule by mouth Every 12 (Twelve) Hours for 3 doses. Indications: Urinary Tract Infection             cyanocobalamin 1000 MCG/ML injection  Inject 1 mL into the shoulder, thigh, or buttocks Every 28 (Twenty-Eight) Days.             esomeprazole (nexIUM) 40 MG capsule  Take 1 capsule by mouth Every Morning Before Breakfast.             folic acid (FOLVITE) 1 MG tablet  Take 1 tablet by mouth Daily.              HYDROcodone-acetaminophen (NORCO) 5-325 MG per tablet  Take 1 tablet by mouth Every 6 (Six) Hours As Needed for Moderate Pain .             lactulose (CHRONULAC) 10 GM/15ML solution  Take 30 mL by mouth 3 (Three) Times a Day.             levothyroxine (SYNTHROID) 50 MCG tablet  Take 1 tablet by mouth Daily.             nitroglycerin (NITROSTAT) 0.4 MG SL tablet  Place 1 tablet under the tongue Every 5 (Five) Minutes As Needed for chest pain. Take no more than 3 doses in 15 minutes.             potassium chloride (K-DUR) 10 MEQ CR tablet  Take 1 tablet by mouth Daily.             rifaximin (XIFAXAN) 550 MG tablet  Take 1 tablet by mouth Every 12 (Twelve) Hours.             rOPINIRole (REQUIP) 1 MG tablet  Take 1 tablet by mouth Every Night. Take 1 hour before bedtime.                   Discharge Diet:   regular diet       Dietary Orders            Start     Ordered    10/12/17 1820  Diet Regular  Diet Effective Now     Question:  Diet Texture / Consistency  Answer:  Regular    10/12/17 1819          Activity at Discharge:  Non-weight bearing LLE per ortho recs     Follow-up Appointments:  Additional Instructions for the Follow-ups that You Need to Schedule     Additional Discharge Follow-Up (Specify Provider)    As directed    To:  Follow up with Dr. Pierre on 10/26/17.       Discharge Follow-up with PCP    As directed    Follow Up Details:  Pt to be seen by Dr. Hopkins at the [facility]. Follow up with Dr. Gonsalves once discharged from [facility].             Follow-up Information     Follow up with Karson Pierre MD Follow up on 10/26/2017.    Specialty:  Orthopedic Surgery    Why:  follow up 2 weeks from surgery date (10/12)    Contact information:    [address] [place] [address]  [phone]          Follow up with Galina Gonsalves MD .    Specialty:  Cardiology    Why:  Pt to be seen by Dr. Hopkins at the [facility]. Follow up with Dr. Gonsalves once discharged from [facility].    Contact information:     [address] [place] [address]  [phone]          Your Scheduled Appointments     Dec 07, 2017  3:00 PM EST   Follow Up with Galina Gonsalves MD   [facility] CARDIOLOGY (--)    [address] [place] [address]   [phone]           Arrive 15 minutes prior to appointment.                    Test Results Pending at Discharge:  None     Jamison Ramirez DO  10/17/17  9:51 AM      Time: Greater than 30 minutes spent on this discharge.

## 2017-10-18 ENCOUNTER — EPISODE CHANGES (OUTPATIENT)
Dept: CASE MANAGEMENT | Facility: OTHER | Age: 82
End: 2017-10-18

## 2017-10-26 ENCOUNTER — OFFICE VISIT (OUTPATIENT)
Dept: ORTHOPEDIC SURGERY | Facility: CLINIC | Age: 82
End: 2017-10-26

## 2017-10-26 ENCOUNTER — HOSPITAL ENCOUNTER (OUTPATIENT)
Dept: GENERAL RADIOLOGY | Facility: HOSPITAL | Age: 82
Discharge: HOME | End: 2017-10-26
Attending: ORTHOPAEDIC SURGERY | Admitting: ORTHOPAEDIC SURGERY

## 2017-10-26 DIAGNOSIS — Z98.890 STATUS POST OPEN REDUCTION WITH INTERNAL FIXATION OF FRACTURE: Primary | ICD-10-CM

## 2017-10-26 DIAGNOSIS — Z87.81 S/P ORIF (OPEN REDUCTION INTERNAL FIXATION) FRACTURE: ICD-10-CM

## 2017-10-26 DIAGNOSIS — Z87.81 STATUS POST OPEN REDUCTION WITH INTERNAL FIXATION OF FRACTURE: Primary | ICD-10-CM

## 2017-10-26 DIAGNOSIS — Z98.890 S/P ORIF (OPEN REDUCTION INTERNAL FIXATION) FRACTURE: ICD-10-CM

## 2017-10-26 PROCEDURE — 73552 X-RAY EXAM OF FEMUR 2/>: CPT | Performed by: RADIOLOGY

## 2017-10-26 PROCEDURE — 99024 POSTOP FOLLOW-UP VISIT: CPT | Performed by: ORTHOPAEDIC SURGERY

## 2017-10-26 PROCEDURE — 73552 X-RAY EXAM OF FEMUR 2/>: CPT

## 2017-10-26 NOTE — PROGRESS NOTES
Patient: Alma Delia Garcia    YOB: 1930        History of Present Illness: 87-year-old white female with significant osteoporosis who presents for follow-up status post open reduction internal fixation of the distal periprosthetic femur fracture extended into the supracondylar area.  Patient previously had had a revision hip with a long stem put in and this broke at the tip of that long stem.  Patient is doing okay she still complains of pain especially with therapy but there are no new complaints    Past Medical History:   Diagnosis Date   • Acute renal failure    • Anxiety    • Bell's palsy    • Cellulitis     LLE   • CHF (congestive heart failure)     Diastolic   • Diverticulosis    • GERD (gastroesophageal reflux disease)    • Hepatic cirrhosis    • History of transfusion    • Hypertension    • Hypothyroidism    • Osteoporosis    • Thrombocytopenia    • Trigeminal neuralgia         Social History     Social History   • Marital status:      Spouse name: N/A   • Number of children: N/A   • Years of education: N/A     Occupational History   • Not on file.     Social History Main Topics   • Smoking status: Never Smoker   • Smokeless tobacco: Former User     Types: Snuff     Quit date: 1970   • Alcohol use No      Comment: FORMER 12 BOTTLES PER DAY FOR 15 YEARS   • Drug use: No   • Sexual activity: Defer     Other Topics Concern   • Not on file     Social History Narrative    LIVES WITH GRANDDAUGHTER DEVON IN [place]            Physical Exam: 87 y.o. female  General Appearance:    Alert and oriented x 3, cooperative, in no acute distress                 There were no vitals filed for this visit.         Exam today shows her wound is clean dry and intact and appears to be healing well.  She has some mild swelling of 5 that's decreasing from compared to when the in the hospital.  Her left foot grossly neurovascularly intact with some mild swelling.  She has some early rubbing on the posterior of  her calf from her knee immobilizer but is superficial at this point.    Radiology:       X-rays show no change in alignment of the fracture fixation or the fracture fragments      Assessment/Plan:    Status post ORIF of a comminuted supracondylar periprosthetic fracture.  We took the staples out today placed Steri-Strips.  At this point we put her in a T ROM brace held her out in full extension.  She is to continue strict nonweightbearing on this.  They can remove the brace for bathing purposes for skin checks in the therapist can begin gentle range of motion of the knee as the patient tolerates.  We'll see her back approximate5 weeks for follow-up x-rays and check      Discussion/Summary:        This chart was completed utilizing the dragon speech recognition software.  Grammatical errors, random word insertions, pronoun errors, and incomplete sentences or occasional consequences of the system due to software limitations, ambient noise, and hardware issues.  Any questions or concerns about the content, text, or information contained within the body of this dictation should be directly addressed to the physician for clarification        This document was signed by Karson Pierre M.D. October 26, 2017 3:29 PM

## 2017-10-31 ENCOUNTER — PATIENT OUTREACH (OUTPATIENT)
Dept: CASE MANAGEMENT | Facility: OTHER | Age: 82
End: 2017-10-31

## 2017-10-31 NOTE — OUTREACH NOTE
Skilled Nursing Facility Discharge Flowsheet:     Skilled Nursing Facility Discharge Assessment 10/31/2017   Acute Facility Discharged From [place]   Acute Discharge Date [id number]   Name of the Skilled Nursing Facility? [facility]   Tier Level of the Skilled Nursing Facility 2   Purpose of SNF Admission PT;OT   Estimated length of stay for the patient? UNKNOWN   Who is the insurance provider or payor of patient stay? Medicare   Progression of Patient? Participating in therapy for expected short term stay.

## 2017-11-07 ENCOUNTER — PATIENT OUTREACH (OUTPATIENT)
Dept: CASE MANAGEMENT | Facility: OTHER | Age: 82
End: 2017-11-07

## 2017-11-07 NOTE — OUTREACH NOTE
Skilled Nursing Facility Discharge Flowsheet:     Skilled Nursing Facility Discharge Assessment 11/7/2017   Acute Facility Discharged From [place]   Acute Discharge Date 10/17/2017   Name of the Skilled Nursing Facility? [facility]   Tier Level of the Skilled Nursing Facility 2   Purpose of SNF Admission PT;OT   Estimated length of stay for the patient? -   Who is the insurance provider or payor of patient stay? Medicare;Other (Comment)   Progression of Patient? Patient continues to participate in therapy to return home.

## 2017-11-27 DIAGNOSIS — Z98.890 S/P ORIF (OPEN REDUCTION INTERNAL FIXATION) FRACTURE: Primary | ICD-10-CM

## 2017-11-27 DIAGNOSIS — Z87.81 S/P ORIF (OPEN REDUCTION INTERNAL FIXATION) FRACTURE: Primary | ICD-10-CM

## 2017-11-28 ENCOUNTER — OFFICE VISIT (OUTPATIENT)
Dept: ORTHOPEDIC SURGERY | Facility: CLINIC | Age: 82
End: 2017-11-28

## 2017-11-28 ENCOUNTER — PATIENT OUTREACH (OUTPATIENT)
Dept: CASE MANAGEMENT | Facility: OTHER | Age: 82
End: 2017-11-28

## 2017-11-28 ENCOUNTER — HOSPITAL ENCOUNTER (OUTPATIENT)
Dept: GENERAL RADIOLOGY | Facility: HOSPITAL | Age: 82
Discharge: HOME | End: 2017-11-28
Attending: ORTHOPAEDIC SURGERY | Admitting: ORTHOPAEDIC SURGERY

## 2017-11-28 VITALS — BODY MASS INDEX: 24.48 KG/M2 | HEIGHT: 62 IN | WEIGHT: 133 LBS

## 2017-11-28 DIAGNOSIS — Z98.890 S/P ORIF (OPEN REDUCTION INTERNAL FIXATION) FRACTURE: Primary | ICD-10-CM

## 2017-11-28 DIAGNOSIS — Z87.81 S/P ORIF (OPEN REDUCTION INTERNAL FIXATION) FRACTURE: Primary | ICD-10-CM

## 2017-11-28 DIAGNOSIS — Z98.890 S/P ORIF (OPEN REDUCTION INTERNAL FIXATION) FRACTURE: ICD-10-CM

## 2017-11-28 DIAGNOSIS — Z87.81 S/P ORIF (OPEN REDUCTION INTERNAL FIXATION) FRACTURE: ICD-10-CM

## 2017-11-28 DIAGNOSIS — Z96.649 PERIPROSTHETIC FRACTURE OF SHAFT OF FEMUR: ICD-10-CM

## 2017-11-28 DIAGNOSIS — M97.8XXA PERIPROSTHETIC FRACTURE OF SHAFT OF FEMUR: ICD-10-CM

## 2017-11-28 PROCEDURE — 73552 X-RAY EXAM OF FEMUR 2/>: CPT | Performed by: RADIOLOGY

## 2017-11-28 PROCEDURE — 73552 X-RAY EXAM OF FEMUR 2/>: CPT

## 2017-11-28 PROCEDURE — 99024 POSTOP FOLLOW-UP VISIT: CPT | Performed by: ORTHOPAEDIC SURGERY

## 2017-11-28 NOTE — OUTREACH NOTE
Skilled Nursing Facility Discharge Flowsheet:     Skilled Nursing Facility Discharge Assessment 11/28/2017   Acute Facility Discharged From [place]   Acute Discharge Date 10/17/2017   Name of the Skilled Nursing Facility? [facility]   Tier Level of the Skilled Nursing Facility 2   Purpose of SNF Admission PT;OT   Estimated length of stay for the patient? UNKNOWN   Who is the insurance provider or payor of patient stay? Medicare;Other (Comment)   Progression of Patient? Patient continues to participate in therapy to return home.

## 2017-11-28 NOTE — PROGRESS NOTES
Patient: Alma Delia Garcia  YOB: 1930  Date of Encounter: 11/28/2017        History of Present Illness:     Alma Delia Garcia is a 87 y.o. female 6 weeks s/p periprosthetic femoral shaft fracture. She continues to remain in SNF with T-ROM brace locked in extension and non weightbearing. She has been compliant with this. Once again she had previously undergone a revision hip with a long stem with the acute fracture coming to the tip of this. She is doing reasonable today, decreasing pain. No new complaints.     Social History     Occupational History   • Not on file.     Social History Main Topics   • Smoking status: Never Smoker   • Smokeless tobacco: Former User     Types: Snuff     Quit date: 1970   • Alcohol use No      Comment: FORMER 12 BOTTLES PER DAY FOR 15 YEARS   • Drug use: No   • Sexual activity: Defer         Physical Exam: 87 y.o. female .  General Appearance:    Well appearing, cooperative, in no acute distress.       Patient is alert and oriented x 3.          Musculoskeletal: left lower extremity exam reveals incisions clean, dry and intact. Well healed. There is a small skin abrasion covered with dry dressing to the posterior lateral lower leg at the distal portion of the T-ROM brace. She flexes to 70 degrees with full extension. No varus or valgus instability. Improved overall swelling. Neurovascular status grossly intact.       Radiology:       Today's 3 view xrays reveal no change in alignment of the hardware. There is no significant evidence of bony callus formation.     Assessment:    ICD-10-CM ICD-9-CM   1. S/P ORIF (open reduction internal fixation) fracture Z96.7 V45.89    Z87.81 V15.51   2. Periprosthetic fracture of shaft of femur M97.8XXA 996.44    Z96.649        Plan:    We're going to continue with conservative restrictions with weightbearing at this point secondary to the limited evidence of bony callus formation. Situation was discussed with patient and we will continue to  reinforce the superficial skin abrasion of the posterior lateral leg.  Patient will continue nonweightbearing status over the next 4 weeks. She may remove the tibial ROM brace with physical therapy with implementation of gentle range of motion.  Return in 4 weeks for repeat x-rays and further evaluation.        Discussion and Summary:    Written by Sudhakar Harden PA-C, acting as scribe for Dr. Pierre      CC: Galina Gonsalves MD

## 2017-12-05 ENCOUNTER — PATIENT OUTREACH (OUTPATIENT)
Dept: CASE MANAGEMENT | Facility: OTHER | Age: 82
End: 2017-12-05

## 2017-12-05 NOTE — OUTREACH NOTE
Skilled Nursing Facility Discharge Flowsheet:     Skilled Nursing Facility Discharge Assessment 12/5/2017   Acute Facility Discharged From [place]   Acute Discharge Date 10/17/2017   Name of the Skilled Nursing Facility? [facility]   Tier Level of the Skilled Nursing Facility 2   Purpose of SNF Admission PT;OT   Estimated length of stay for the patient? 12/27/17   Who is the insurance provider or payor of patient stay? Medicare;Other (Comment)   Progression of Patient? Participating and progressing to return home.

## 2017-12-07 ENCOUNTER — OFFICE VISIT (OUTPATIENT)
Dept: CARDIOLOGY | Facility: CLINIC | Age: 82
End: 2017-12-07

## 2017-12-07 VITALS
OXYGEN SATURATION: 97 % | BODY MASS INDEX: 24.48 KG/M2 | DIASTOLIC BLOOD PRESSURE: 70 MMHG | HEART RATE: 72 BPM | WEIGHT: 133 LBS | SYSTOLIC BLOOD PRESSURE: 122 MMHG | HEIGHT: 62 IN

## 2017-12-07 DIAGNOSIS — I50.32 CHRONIC DIASTOLIC HEART FAILURE (HCC): ICD-10-CM

## 2017-12-07 DIAGNOSIS — I10 ESSENTIAL HYPERTENSION: ICD-10-CM

## 2017-12-07 DIAGNOSIS — I73.9 PERIPHERAL VASCULAR DISEASE (HCC): ICD-10-CM

## 2017-12-07 DIAGNOSIS — D63.8 ANEMIA, CHRONIC DISEASE: ICD-10-CM

## 2017-12-07 DIAGNOSIS — E03.4 HYPOTHYROIDISM DUE TO ACQUIRED ATROPHY OF THYROID: ICD-10-CM

## 2017-12-07 DIAGNOSIS — R60.0 BILATERAL LEG EDEMA: ICD-10-CM

## 2017-12-07 DIAGNOSIS — K74.60 CIRRHOSIS OF LIVER WITHOUT ASCITES, UNSPECIFIED HEPATIC CIRRHOSIS TYPE (HCC): Primary | ICD-10-CM

## 2017-12-07 DIAGNOSIS — F41.1 GENERALIZED ANXIETY DISORDER: ICD-10-CM

## 2017-12-07 DIAGNOSIS — G89.4 CHRONIC PAIN SYNDROME: ICD-10-CM

## 2017-12-07 DIAGNOSIS — I35.1 NONRHEUMATIC AORTIC VALVE INSUFFICIENCY: ICD-10-CM

## 2017-12-07 DIAGNOSIS — Q78.2 OSTEOPETROSIS: ICD-10-CM

## 2017-12-07 PROCEDURE — 99213 OFFICE O/P EST LOW 20 MIN: CPT | Performed by: INTERNAL MEDICINE

## 2017-12-07 RX ORDER — MULTIVITAMIN
TABLET ORAL
COMMUNITY
End: 2019-04-15

## 2017-12-07 NOTE — PROGRESS NOTES
subjective     Chief Complaint   Patient presents with   • Hypertension   • Congestive Heart Failure   • Heartburn     History of Present Illness  Patient is 87 years old white female who had femoral shaft fracture it has been 9 weeks.  She has been in the nursing home.  She is undergoing physical therapy and doing reasonably well today.  She still having lot of pain.  She is following closely with Dr. nunez also.    Patient is here for follow-up of multiple chronic medical problems..  Patient has history of osteoporosis.  She is taking Fosamax.  She has a hepatic cirrhosis with multiple episodes of hepatic encephalopathy and hepatic coma.  Currently she is doing very well.  Awake alert oriented no GI symptoms.  She is taking Xifaxan and lactulose which gives her some diarrhea but overall she is doing very well.    For pain control she is taking hydrocodone.  She also has hypothyroidism on Synthroid replacement therapy and is doing very well.  GI symptoms are controlled with Nexium.  Patient has marked bilateral lower extremity edema and gets recurrent ascites.  She is taking Bumex and potassium.  Patient has lot of edema on the affected leg but the other leg has minimal edema.  Appetite is good    Past Surgical History:   Procedure Laterality Date   • BACK SURGERY      KYPHOPLASTIES    • CARDIAC CATHETERIZATION Left 08/2004   • CARDIOVASCULAR STRESS TEST  01/25/2015   • CATARACT EXTRACTION     • CHOLECYSTECTOMY     • CLAVICLE SURGERY Right    • COLONOSCOPY  10/2004   • ECHO - CONVERTED  11/2012   • FEMUR OPEN REDUCTION INTERNAL FIXATION Left 10/12/2017    Procedure: FEMUR OPEN REDUCTION INTERNAL FIXATION;  Surgeon: Karson Pierre MD;  Location: [facility];  Service:    • HIP FRACTURE SURGERY Bilateral     ARTHROPLASTIES    • PARTIAL HYSTERECTOMY     • WRIST FRACTURE SURGERY       Family History   Problem Relation Age of Onset   • Cancer Mother    • Heart disease Father    • Arthritis Sister    •  Osteoporosis Sister    • Diabetes Maternal Aunt      Past Medical History:   Diagnosis Date   • Acute renal failure    • Anxiety    • Bell's palsy    • Cellulitis     LLE   • CHF (congestive heart failure)     Diastolic   • Diverticulosis    • GERD (gastroesophageal reflux disease)    • Hepatic cirrhosis    • History of transfusion    • Hypertension    • Hypothyroidism    • Osteoporosis    • Thrombocytopenia    • Trigeminal neuralgia      Patient Active Problem List   Diagnosis   • Hepatic cirrhosis*   • HTN (hypertension)*   • Anxiety disorder*   • Osteopetrosis*   • Hypothyroidism*   • GERD (gastroesophageal reflux disease)*   • Chronic pain syndrome due to fractured spine and left rib fracture*   • Anemia, chronic disease   • Arthralgia of hip   • Left knee pain   • Fracture of proximal end of tibia and fibula   • Primary insomnia   • Thrombocytopenia   • Peripheral vascular disease   • Bilateral leg edema   • Cramp of both lower extremities   • Hepatic encephalopathy   • UTI (urinary tract infection)   • Hypokalemia   • Closed fracture of left distal femur   • Status post open reduction with internal fixation of fracture   • Nonrheumatic aortic valve insufficiency, mild   • Chronic diastolic heart failure, mild       Social History   Substance Use Topics   • Smoking status: Never Smoker   • Smokeless tobacco: Former User     Types: Snuff     Quit date: 1970   • Alcohol use No      Comment: FORMER 12 BOTTLES PER DAY FOR 15 YEARS       Allergies   Allergen Reactions   • Ciprofloxacin        Current Outpatient Prescriptions on File Prior to Visit   Medication Sig   • aspirin 81 MG tablet Take 1 tablet by mouth Daily.   • bumetanide (BUMEX) 1 MG tablet Take 1 tablet by mouth 2 (Two) Times a Day.   • cyanocobalamin 1000 MCG/ML injection Inject 1 mL into the shoulder, thigh, or buttocks Every 28 (Twenty-Eight) Days.   • esomeprazole (nexIUM) 40 MG capsule Take 1 capsule by mouth Every Morning Before Breakfast.   •  "folic acid (FOLVITE) 1 MG tablet Take 1 tablet by mouth Daily.   • HYDROcodone-acetaminophen (NORCO) 5-325 MG per tablet Take 1 tablet by mouth Every 6 (Six) Hours As Needed for Moderate Pain .   • lactulose (CHRONULAC) 10 GM/15ML solution Take 30 mL by mouth 3 (Three) Times a Day.   • levothyroxine (SYNTHROID) 50 MCG tablet Take 1 tablet by mouth Daily.   • nitroglycerin (NITROSTAT) 0.4 MG SL tablet Place 1 tablet under the tongue Every 5 (Five) Minutes As Needed for chest pain. Take no more than 3 doses in 15 minutes.   • potassium chloride (K-DUR) 10 MEQ CR tablet Take 1 tablet by mouth Daily.   • rifaximin (XIFAXAN) 550 MG tablet Take 1 tablet by mouth Every 12 (Twelve) Hours.   • rOPINIRole (REQUIP) 1 MG tablet Take 1 tablet by mouth Every Night. Take 1 hour before bedtime.   • alendronate (FOSAMAX) 70 MG tablet Take 1 tablet by mouth Every 7 (Seven) Days. Takes on Mondays.     No current facility-administered medications on file prior to visit.          The following portions of the patient's history were reviewed and updated as appropriate: allergies, current medications, past family history, past medical history, past social history, past surgical history and problem list.    Review of Systems   Constitution: Positive for malaise/fatigue.   HENT: Negative.    Eyes: Negative.    Cardiovascular: Positive for leg swelling.   Respiratory: Negative.    Endocrine: Negative.    Hematologic/Lymphatic: Negative.    Skin: Negative.    Musculoskeletal: Positive for arthritis, back pain, myalgias and stiffness.   Gastrointestinal: Negative.    Genitourinary: Negative.    Neurological: Negative.    Psychiatric/Behavioral: The patient has insomnia and is nervous/anxious.    Allergic/Immunologic: Negative.           Objective:     /70 (BP Location: Left arm, Patient Position: Sitting, Cuff Size: Adult)  Pulse 72  Ht 157.5 cm (62.01\")  Wt 60.3 kg (133 lb)  SpO2 97%  BMI 24.32 kg/m2  Physical Exam "   Constitutional: She appears well-developed and well-nourished. No distress.   HENT:   Head: Normocephalic and atraumatic.   Mouth/Throat: Oropharynx is clear and moist. No oropharyngeal exudate.   Eyes: Conjunctivae and EOM are normal. Pupils are equal, round, and reactive to light. No scleral icterus.   Neck: Normal range of motion. Neck supple. No JVD present. No tracheal deviation present. No thyromegaly present.   Cardiovascular: Normal rate, regular rhythm, normal heart sounds and intact distal pulses.  PMI is not displaced.  Exam reveals no gallop, no friction rub and no decreased pulses.    No murmur heard.  Pulses:       Carotid pulses are 3+ on the right side, and 3+ on the left side.       Radial pulses are 3+ on the right side, and 3+ on the left side.   Pulmonary/Chest: Effort normal and breath sounds normal. No respiratory distress. She has no wheezes. She has no rales. She exhibits no tenderness.   Abdominal: Soft. Bowel sounds are normal. She exhibits no distension, no abdominal bruit and no mass. There is no splenomegaly or hepatomegaly. There is no tenderness. There is no rebound and no guarding.   Musculoskeletal: Normal range of motion. She exhibits edema. She exhibits no tenderness or deformity.   Lymphadenopathy:     She has no cervical adenopathy.   Neurological: She is alert. She has normal reflexes. No cranial nerve deficit. She exhibits normal muscle tone. Coordination normal.   Skin: Skin is warm and dry. No rash noted. She is not diaphoretic. No erythema.   Psychiatric: She has a normal mood and affect. Her behavior is normal. Judgment and thought content normal.         Lab Review  Lab Results   Component Value Date     10/17/2017    K 3.7 10/17/2017     10/17/2017    BUN 18 10/17/2017    CREATININE 0.90 10/17/2017    GLUCOSE 102 10/17/2017    CALCIUM 8.2 10/17/2017    ALT 22 10/17/2017    ALKPHOS 100 10/17/2017    LABIL2 0.9 (L) 10/17/2017     Lab Results   Component Value  Date    CKTOTAL 99 07/25/2017     Lab Results   Component Value Date    WBC 6.86 10/17/2017    HGB 8.3 (L) 10/17/2017    HCT 26.2 (L) 10/17/2017     10/17/2017     Lab Results   Component Value Date    INR 1.79 (H) 10/13/2017    INR 2.04 (H) 10/12/2017    INR 1.17 (H) 10/12/2017     Lab Results   Component Value Date    MG 2.0 10/17/2017     Lab Results   Component Value Date    TSH 1.771 10/12/2017     Lab Results   Component Value Date    BNP 11.0 10/05/2017     Lab Results   Component Value Date    CHLPL 161 04/08/2016    CHOL 186 04/18/2017    TRIG 72 04/18/2017     (H) 04/18/2017    LDLCALC 66 04/18/2017    VLDL 14.4 04/18/2017    LDLHDL 0.62 04/18/2017         Procedures       I personally viewed and interpreted the patient's LAB data         Assessment:     1. Cirrhosis of liver without ascites, unspecified hepatic cirrhosis type    2. Essential hypertension    3. Peripheral vascular disease    4. Hypothyroidism due to acquired atrophy of thyroid    5. Osteopetrosis*    6. Anemia, chronic disease    7. Generalized anxiety disorder    8. Bilateral leg edema    9. Chronic pain syndrome due to fractured spine and left rib fracture*    10. Nonrheumatic aortic valve insufficiency, mild    11. Chronic diastolic heart failure, mild          Plan:      No change in therapy was made.  Patient is awake alert oriented.  Her ammonia level was 22.  She was instructed to continue rifampin and lactulose as prescribed at the nursing home.  Patient has diastolic heart failure mild aortic insufficiency clinically there is no heart failure she will continue current medications.  She does have history of chronic disease anemia  She will also continue Bumex for leg edema.  Follow-up scheduled      Return in about 3 months (around 3/7/2018).

## 2017-12-10 PROBLEM — I35.1 NONRHEUMATIC AORTIC VALVE INSUFFICIENCY: Status: ACTIVE | Noted: 2017-12-10

## 2017-12-10 PROBLEM — I50.32 CHRONIC DIASTOLIC HEART FAILURE (HCC): Status: ACTIVE | Noted: 2017-12-10

## 2017-12-10 PROBLEM — N19 RENAL FAILURE: Status: RESOLVED | Noted: 2017-01-09 | Resolved: 2017-12-10

## 2017-12-13 ENCOUNTER — PATIENT OUTREACH (OUTPATIENT)
Dept: CASE MANAGEMENT | Facility: OTHER | Age: 82
End: 2017-12-13

## 2017-12-13 NOTE — OUTREACH NOTE
Skilled Nursing Facility Discharge Flowsheet:     Skilled Nursing Facility Discharge Assessment 12/13/2017   Acute Facility Discharged From [place]   Acute Discharge Date 10/17/2017   Name of the Skilled Nursing Facility? [facility]   Tier Level of the Skilled Nursing Facility 2   Purpose of SNF Admission PT;OT   Estimated length of stay for the patient? 12/27/17   Who is the insurance provider or payor of patient stay? Medicare;Other (Comment)   Progression of Patient? Participating and progressing to return home.

## 2017-12-19 ENCOUNTER — PATIENT OUTREACH (OUTPATIENT)
Dept: CASE MANAGEMENT | Facility: OTHER | Age: 82
End: 2017-12-19

## 2017-12-19 NOTE — OUTREACH NOTE
Skilled Nursing Facility Discharge Flowsheet:     Skilled Nursing Facility Discharge Assessment 12/19/2017   Acute Facility Discharged From [place]   Acute Discharge Date 10/17/2017   Name of the Skilled Nursing Facility? [facility]   Tier Level of the Skilled Nursing Facility 2   Purpose of SNF Admission PT;OT   Estimated length of stay for the patient? 12/27/17   Who is the insurance provider or payor of patient stay? Medicare;Other (Comment)   Progression of Patient? Participating and progressing to return home.

## 2017-12-26 ENCOUNTER — PATIENT OUTREACH (OUTPATIENT)
Dept: CASE MANAGEMENT | Facility: OTHER | Age: 82
End: 2017-12-26

## 2017-12-26 NOTE — OUTREACH NOTE
Skilled Nursing Facility Discharge Flowsheet:     Skilled Nursing Facility Discharge Assessment 12/26/2017   Acute Facility Discharged From [place]   Acute Discharge Date 10/17/2017   Name of the Skilled Nursing Facility? [facility]   Tier Level of the Skilled Nursing Facility 2   Purpose of SNF Admission PT;OT   Estimated length of stay for the patient? Unknown   Who is the insurance provider or payor of patient stay? Medicare;Other (Comment)   Progression of Patient? Patient has physician appointment on 12/28/17 to determine ELOS

## 2017-12-27 DIAGNOSIS — Z87.81 S/P ORIF (OPEN REDUCTION INTERNAL FIXATION) FRACTURE: Primary | ICD-10-CM

## 2017-12-27 DIAGNOSIS — Z98.890 S/P ORIF (OPEN REDUCTION INTERNAL FIXATION) FRACTURE: Primary | ICD-10-CM

## 2017-12-28 ENCOUNTER — OFFICE VISIT (OUTPATIENT)
Dept: ORTHOPEDIC SURGERY | Facility: CLINIC | Age: 82
End: 2017-12-28

## 2017-12-28 ENCOUNTER — HOSPITAL ENCOUNTER (OUTPATIENT)
Dept: GENERAL RADIOLOGY | Facility: HOSPITAL | Age: 82
Discharge: HOME | End: 2017-12-28
Attending: ORTHOPAEDIC SURGERY | Admitting: ORTHOPAEDIC SURGERY

## 2017-12-28 VITALS — BODY MASS INDEX: 24.48 KG/M2 | HEIGHT: 62 IN | WEIGHT: 133 LBS

## 2017-12-28 DIAGNOSIS — Z98.890 S/P ORIF (OPEN REDUCTION INTERNAL FIXATION) FRACTURE: ICD-10-CM

## 2017-12-28 DIAGNOSIS — Z87.81 S/P ORIF (OPEN REDUCTION INTERNAL FIXATION) FRACTURE: Primary | ICD-10-CM

## 2017-12-28 DIAGNOSIS — Z87.81 S/P ORIF (OPEN REDUCTION INTERNAL FIXATION) FRACTURE: ICD-10-CM

## 2017-12-28 DIAGNOSIS — Z96.649 PERIPROSTHETIC FRACTURE OF SHAFT OF FEMUR: ICD-10-CM

## 2017-12-28 DIAGNOSIS — M97.8XXA PERIPROSTHETIC FRACTURE OF SHAFT OF FEMUR: ICD-10-CM

## 2017-12-28 DIAGNOSIS — Z98.890 S/P ORIF (OPEN REDUCTION INTERNAL FIXATION) FRACTURE: Primary | ICD-10-CM

## 2017-12-28 PROCEDURE — 73552 X-RAY EXAM OF FEMUR 2/>: CPT | Performed by: RADIOLOGY

## 2017-12-28 PROCEDURE — 73552 X-RAY EXAM OF FEMUR 2/>: CPT

## 2017-12-28 PROCEDURE — 99024 POSTOP FOLLOW-UP VISIT: CPT | Performed by: ORTHOPAEDIC SURGERY

## 2017-12-28 NOTE — PROGRESS NOTES
"Patient: Alma eDlia Garcia    YOB: 1930        History of Present Illness: Patient is approximately 11 weeks status post an open reduction internal fixation of a left distal femur supracondylar fracture jaime prosthetic below a long stem hip prosthesis.  Has been in a brace.  No specific complaints today other little bit of soreness around her knee.  Therapy is been doing gentle range of motion of the knee    Past Medical History:   Diagnosis Date   • Acute renal failure    • Anxiety    • Bell's palsy    • Cellulitis     LLE   • CHF (congestive heart failure)     Diastolic   • Diverticulosis    • GERD (gastroesophageal reflux disease)    • Hepatic cirrhosis    • History of transfusion    • Hypertension    • Hypothyroidism    • Osteoporosis    • Thrombocytopenia    • Trigeminal neuralgia         Social History     Social History   • Marital status:      Spouse name: N/A   • Number of children: N/A   • Years of education: N/A     Occupational History   • Not on file.     Social History Main Topics   • Smoking status: Never Smoker   • Smokeless tobacco: Former User     Types: Snuff     Quit date: 1970   • Alcohol use No      Comment: FORMER 12 BOTTLES PER DAY FOR 15 YEARS   • Drug use: No   • Sexual activity: Defer     Other Topics Concern   • Not on file     Social History Narrative    LIVES WITH GRANDDAUGHTER DEVON IN [place]            Physical Exam: 87 y.o. female  General Appearance:    Alert and oriented x 3, cooperative, in no acute distress                   Vitals:    12/28/17 1427   Weight: 60.3 kg (133 lb)   Height: 157.5 cm (62.01\")          Wound is clean dry and intact well-healed.  No calf tenderness.  Left foot is grossly neurovascular intact.  She has full extension and knee and she can flex 50 or 60° fairly easily I didn't push her past this.  Trace of swelling around the knee but no significant warmth.  Radiology:       X-rays are essentially unchanged with the supracondylar " plate and fixation.  Looks like there might be some early callus starting to form  Assessment/Plan:    Healing left distal femur periprosthetic fracture.  Plan at this time is she can take the brace off at night loss she's asleep is all she remains in bed.  We'll have therapy start gentle touchdown weightbearing with progression as she tolerates with the brace on.  They can unlock the brace to help with her walking.  Told her quite systemically that pain should be her guide if she is getting a lot of pain she needs to slow down swelling also.  Continue working on range of motion of the knee.  We'll see her back in approximate 6 weeks for follow-up x-ray and check  Discussion/Summary:        This chart was completed utilizing the dragon speech recognition software.  Grammatical errors, random word insertions, pronoun errors, and incomplete sentences or occasional consequences of the system due to software limitations, ambient noise, and hardware issues.  Any questions or concerns about the content, text, or information contained within the body of this dictation should be directly addressed to the physician for clarification        This document was signed by Karson Pierre M.D. December 28, 2017 2:37 PM

## 2018-01-03 ENCOUNTER — PATIENT OUTREACH (OUTPATIENT)
Dept: CASE MANAGEMENT | Facility: OTHER | Age: 83
End: 2018-01-03

## 2018-01-03 NOTE — OUTREACH NOTE
Skilled Nursing Facility Discharge Flowsheet:     Skilled Nursing Facility Discharge Assessment 1/3/2018   Acute Facility Discharged From [place]   Acute Discharge Date 10/17/2017   Name of the Skilled Nursing Facility? [facility]   Tier Level of the Skilled Nursing Facility 2   Purpose of SNF Admission PT;OT   Estimated length of stay for the patient? Unknown   Who is the insurance provider or payor of patient stay? Medicare;Other (Comment)   Progression of Patient? Patient is still at facility and participating in therapy.

## 2018-01-10 ENCOUNTER — PATIENT OUTREACH (OUTPATIENT)
Dept: CASE MANAGEMENT | Facility: OTHER | Age: 83
End: 2018-01-10

## 2018-01-10 NOTE — OUTREACH NOTE
Skilled Nursing Facility Discharge Flowsheet:     Skilled Nursing Facility Discharge Assessment 1/10/2018   Acute Facility Discharged From [place]   Acute Discharge Date 10/17/2017   Name of the Skilled Nursing Facility? [facility]   Tier Level of the Skilled Nursing Facility 2   Purpose of SNF Admission PT;OT   Estimated length of stay for the patient? Pt. D/Cd 01/06/18 to [facility]   Who is the insurance provider or payor of patient stay? Medicare;Other (Comment)   Progression of Patient? D/Cd on 01/06/18 to [facility]   Skilled Nursing Discharge Date? 1/6/2018   Where was the patient discharged to? Other (Comment)   Home Health Agency at discharge? No   DME Needed at Discharge? No   Are there any medication concerns at Discharge No   Does the patient have a follow-up appointment? No

## 2018-01-12 ENCOUNTER — EPISODE CHANGES (OUTPATIENT)
Dept: CASE MANAGEMENT | Facility: OTHER | Age: 83
End: 2018-01-12

## 2018-02-01 ENCOUNTER — CONSULT (OUTPATIENT)
Dept: GASTROENTEROLOGY | Facility: CLINIC | Age: 83
End: 2018-02-01

## 2018-02-01 VITALS
WEIGHT: 142 LBS | OXYGEN SATURATION: 94 % | DIASTOLIC BLOOD PRESSURE: 57 MMHG | BODY MASS INDEX: 25.16 KG/M2 | HEART RATE: 66 BPM | SYSTOLIC BLOOD PRESSURE: 132 MMHG | HEIGHT: 63 IN

## 2018-02-01 DIAGNOSIS — R11.0 NAUSEA: ICD-10-CM

## 2018-02-01 DIAGNOSIS — R10.11 RIGHT UPPER QUADRANT ABDOMINAL PAIN: ICD-10-CM

## 2018-02-01 DIAGNOSIS — R19.7 DIARRHEA, UNSPECIFIED TYPE: ICD-10-CM

## 2018-02-01 DIAGNOSIS — R18.8 CIRRHOSIS OF LIVER WITH ASCITES, UNSPECIFIED HEPATIC CIRRHOSIS TYPE (HCC): Primary | ICD-10-CM

## 2018-02-01 DIAGNOSIS — K74.60 CIRRHOSIS OF LIVER WITH ASCITES, UNSPECIFIED HEPATIC CIRRHOSIS TYPE (HCC): Primary | ICD-10-CM

## 2018-02-01 PROCEDURE — 99214 OFFICE O/P EST MOD 30 MIN: CPT | Performed by: PHYSICIAN ASSISTANT

## 2018-02-01 RX ORDER — ONDANSETRON 4 MG/1
4 TABLET, FILM COATED ORAL EVERY 8 HOURS PRN
COMMUNITY
End: 2019-04-15

## 2018-02-01 RX ORDER — ACETAMINOPHEN 500 MG
500 TABLET ORAL EVERY 6 HOURS PRN
COMMUNITY
End: 2018-05-25

## 2018-02-01 RX ORDER — ERGOCALCIFEROL 1.25 MG/1
50000 CAPSULE ORAL WEEKLY
COMMUNITY
End: 2018-04-10 | Stop reason: SDUPTHER

## 2018-02-01 NOTE — PROGRESS NOTES
Chief Complaint   Patient presents with   • Hepatic Disease     Alma Delia Garcia is a 87 y.o. female who presents to the office today at the request of Dr. Elly Kent for Hepatic Disease.    HPI    The patient was seen for a GI evaluation due to hepatic disease.  The patient states that for the last two weeks she has not had an appetite and looking at food makes her sick.  She has cirrhosis of the liver and has history of alcohol abuse.  Patient is a resident of [facility].  The patient reports that she had vomiting three weeks ago.  She also reports that she often has diarrhea due to lactulose.  Patient is currently having mild pain in her RUQ that radiates around her right flank, but reports that this is chronic and it waxes and wanes.  She denies hematochezia and melena.   Case was discussed with Dr. Hopkins who reported that last month the patient was in the hospital in [place] and her liver chemistries along with her ammonia level were elevated.  Since that time, they have stabilized.  The patient has had multiple ammonia levels drawn this month and the only elevation was on 1/2.  It was 53 which is only two points above normal.  She is unsure if she has ever had an EGD or colonoscopy.  Medical, surgical, social, and family histories were reviewed and are listed below.    Review of Systems   Constitutional: Negative for chills, fatigue and fever.   HENT: Negative for trouble swallowing.    Eyes: Positive for visual disturbance.   Respiratory: Positive for shortness of breath.    Cardiovascular: Positive for chest pain.   Gastrointestinal: Positive for abdominal distention, abdominal pain, constipation, diarrhea, nausea and vomiting. Negative for anal bleeding, blood in stool and rectal pain.   Endocrine: Positive for cold intolerance.   Genitourinary: Negative for difficulty urinating.   Neurological: Positive for dizziness, tremors and headaches. Negative for syncope and light-headedness.    Psychiatric/Behavioral: Positive for sleep disturbance. The patient is nervous/anxious.        ACTIVE PROBLEMS:   Specialty Problems        Gastroenterology Problems    GERD (gastroesophageal reflux disease)*        Hepatic cirrhosis*              PAST MEDICAL HISTORY:  Past Medical History:   Diagnosis Date   • Acute renal failure    • Anxiety    • Bell's palsy    • Cellulitis     LLE   • CHF (congestive heart failure)     Diastolic   • Diverticulosis    • GERD (gastroesophageal reflux disease)    • Hepatic cirrhosis    • History of transfusion    • Hypertension    • Hypothyroidism    • Osteoporosis    • Thrombocytopenia    • Trigeminal neuralgia        SURGICAL HISTORY:  Past Surgical History:   Procedure Laterality Date   • BACK SURGERY      KYPHOPLASTIES    • CARDIAC CATHETERIZATION Left 08/2004   • CARDIOVASCULAR STRESS TEST  01/25/2015   • CATARACT EXTRACTION     • CHOLECYSTECTOMY     • CLAVICLE SURGERY Right    • COLONOSCOPY  10/2004   • ECHO - CONVERTED  11/2012   • FEMUR OPEN REDUCTION INTERNAL FIXATION Left 10/12/2017    Procedure: FEMUR OPEN REDUCTION INTERNAL FIXATION;  Surgeon: Karson Pierre MD;  Location: [facility];  Service:    • HIP FRACTURE SURGERY Bilateral     ARTHROPLASTIES    • PARTIAL HYSTERECTOMY     • WRIST FRACTURE SURGERY         FAMILY HISTORY:  Family History   Problem Relation Age of Onset   • Cancer Mother    • Heart disease Father    • Arthritis Sister    • Osteoporosis Sister    • Diabetes Maternal Aunt        SOCIAL HISTORY:  Social History   Substance Use Topics   • Smoking status: Never Smoker   • Smokeless tobacco: Former User     Types: Snuff     Quit date: 1970   • Alcohol use No      Comment: FORMER 12 BOTTLES PER DAY FOR 15 YEARS       CURRENT MEDICATION:    Current Outpatient Prescriptions:   •  acetaminophen (TYLENOL) 500 MG tablet, Take 500 mg by mouth Every 6 (Six) Hours As Needed for Mild Pain ., Disp: , Rfl:   •  aspirin 81 MG tablet, Take 1 tablet by  mouth Daily., Disp: 90 tablet, Rfl: 2  •  bumetanide (BUMEX) 1 MG tablet, Take 1 tablet by mouth 2 (Two) Times a Day., Disp: 60 tablet, Rfl: 3  •  Calcium Carbonate-Vitamin D (OYST-LILA-D 500 PO), Take  by mouth., Disp: , Rfl:   •  cyanocobalamin 1000 MCG/ML injection, Inject 1 mL into the shoulder, thigh, or buttocks Every 28 (Twenty-Eight) Days., Disp: , Rfl:   •  esomeprazole (nexIUM) 40 MG capsule, Take 1 capsule by mouth Every Morning Before Breakfast., Disp: 90 capsule, Rfl: 0  •  Ferrous Sulfate 90 (18 Fe) MG tablet, Take  by mouth., Disp: , Rfl:   •  HYDROcodone-acetaminophen (NORCO) 5-325 MG per tablet, Take 1 tablet by mouth Every 6 (Six) Hours As Needed for Moderate Pain ., Disp: 15 tablet, Rfl: 0  •  lactulose (CHRONULAC) 10 GM/15ML solution, Take 30 mL by mouth 3 (Three) Times a Day., Disp: , Rfl:   •  levothyroxine (SYNTHROID) 50 MCG tablet, Take 1 tablet by mouth Daily., Disp: 90 tablet, Rfl: 0  •  nitroglycerin (NITROSTAT) 0.4 MG SL tablet, Place 1 tablet under the tongue Every 5 (Five) Minutes As Needed for chest pain. Take no more than 3 doses in 15 minutes., Disp: 25 tablet, Rfl: 0  •  ondansetron (ZOFRAN) 4 MG tablet, Take 4 mg by mouth Every 8 (Eight) Hours As Needed for Nausea or Vomiting., Disp: , Rfl:   •  potassium chloride (K-DUR) 10 MEQ CR tablet, Take 1 tablet by mouth Daily., Disp: 90 tablet, Rfl: 0  •  rifaximin (XIFAXAN) 550 MG tablet, Take 1 tablet by mouth Every 12 (Twelve) Hours., Disp: 60 tablet, Rfl: 1  •  rOPINIRole (REQUIP) 1 MG tablet, Take 1 tablet by mouth Every Night. Take 1 hour before bedtime., Disp: 30 tablet, Rfl: 1  •  teriparatide (FORTEO) 600 MCG/2.4ML injection, Inject 20 mcg under the skin Daily., Disp: , Rfl:   •  vitamin D (ERGOCALCIFEROL) 32241 units capsule capsule, Take 50,000 Units by mouth 1 (One) Time Per Week., Disp: , Rfl:   •  alendronate (FOSAMAX) 70 MG tablet, Take 1 tablet by mouth Every 7 (Seven) Days. Takes on Mondays., Disp: 4 tablet, Rfl: 3  •   "folic acid (FOLVITE) 1 MG tablet, Take 1 tablet by mouth Daily., Disp: 30 tablet, Rfl: 0  •  Multiple Vitamin (MULTI VITAMIN DAILY) tablet, Take  by mouth., Disp: , Rfl:     ALLERGIES:  Ciprofloxacin    VISIT VITALS:  /57  Pulse 66  Ht 160 cm (63\")  Wt 64.4 kg (142 lb)  SpO2 94%  BMI 25.15 kg/m2    PHYSICAL EXAMINATION:  Physical Exam   Constitutional: She is oriented to person, place, and time. She appears well-developed and well-nourished. No distress.   HENT:   Head: Normocephalic and atraumatic.   Right Ear: External ear normal.   Left Ear: External ear normal.   Nose: Nose normal.   Mouth/Throat: Oropharynx is clear and moist. No oropharyngeal exudate.   Eyes: Conjunctivae and EOM are normal. Pupils are equal, round, and reactive to light. Right eye exhibits no discharge. Left eye exhibits no discharge. No scleral icterus.   Neck: Normal range of motion. Neck supple. No JVD present. No tracheal deviation present. No thyromegaly present.   Cardiovascular: Normal rate, regular rhythm, normal heart sounds and intact distal pulses.  Exam reveals no gallop and no friction rub.    No murmur heard.  Pulmonary/Chest: Effort normal and breath sounds normal. No stridor. No respiratory distress. She has no wheezes. She has no rales. She exhibits no tenderness.   Abdominal: Soft. Bowel sounds are normal. She exhibits distension. She exhibits no mass. There is tenderness (RUQ). There is no rebound and no guarding. No hernia.   Probable ascites   Musculoskeletal: She exhibits no edema, tenderness or deformity.   Patient has a leg brace on her LLE due to a fracture   Lymphadenopathy:     She has no cervical adenopathy.   Neurological: She is alert and oriented to person, place, and time. She has normal reflexes. She displays normal reflexes. No cranial nerve deficit. She exhibits normal muscle tone. Coordination abnormal.   Skin: Skin is warm and dry. No rash noted. She is not diaphoretic. No erythema. No pallor. "   Psychiatric: She has a normal mood and affect. Her behavior is normal. Judgment and thought content normal.       Assessment/Plan      Diagnosis Plan   1. Cirrhosis of liver with ascites, unspecified hepatic cirrhosis type     2. Right upper quadrant abdominal pain     3. Diarrhea, unspecified type     4. Nausea       The patient's ammonia level is being well controlled by her lactulose.  We will see her every three months for follow up visits to monitor her liver chemistries and ascites.  We will request records for recent CMP to check liver enzymes.  Patient voiced understanding and agreement of recommendations.  Return in about 3 months (around 5/1/2018) for Recheck.           WILMER Putnam

## 2018-02-14 DIAGNOSIS — Z87.81 S/P ORIF (OPEN REDUCTION INTERNAL FIXATION) FRACTURE: Primary | ICD-10-CM

## 2018-02-14 DIAGNOSIS — Z98.890 S/P ORIF (OPEN REDUCTION INTERNAL FIXATION) FRACTURE: Primary | ICD-10-CM

## 2018-02-15 ENCOUNTER — OFFICE VISIT (OUTPATIENT)
Dept: ORTHOPEDIC SURGERY | Facility: CLINIC | Age: 83
End: 2018-02-15

## 2018-02-15 ENCOUNTER — HOSPITAL ENCOUNTER (OUTPATIENT)
Dept: GENERAL RADIOLOGY | Facility: HOSPITAL | Age: 83
Discharge: HOME | End: 2018-02-15
Attending: ORTHOPAEDIC SURGERY | Admitting: ORTHOPAEDIC SURGERY

## 2018-02-15 VITALS — WEIGHT: 140 LBS | HEIGHT: 63 IN | BODY MASS INDEX: 24.8 KG/M2

## 2018-02-15 DIAGNOSIS — Z87.81 S/P ORIF (OPEN REDUCTION INTERNAL FIXATION) FRACTURE: ICD-10-CM

## 2018-02-15 DIAGNOSIS — Z96.649 PERIPROSTHETIC FRACTURE OF SHAFT OF FEMUR: ICD-10-CM

## 2018-02-15 DIAGNOSIS — Z98.890 S/P ORIF (OPEN REDUCTION INTERNAL FIXATION) FRACTURE: Primary | ICD-10-CM

## 2018-02-15 DIAGNOSIS — Z87.81 S/P ORIF (OPEN REDUCTION INTERNAL FIXATION) FRACTURE: Primary | ICD-10-CM

## 2018-02-15 DIAGNOSIS — Z98.890 S/P ORIF (OPEN REDUCTION INTERNAL FIXATION) FRACTURE: ICD-10-CM

## 2018-02-15 DIAGNOSIS — M97.8XXA PERIPROSTHETIC FRACTURE OF SHAFT OF FEMUR: ICD-10-CM

## 2018-02-15 PROCEDURE — 99213 OFFICE O/P EST LOW 20 MIN: CPT | Performed by: ORTHOPAEDIC SURGERY

## 2018-02-15 PROCEDURE — 73552 X-RAY EXAM OF FEMUR 2/>: CPT

## 2018-02-15 PROCEDURE — 73552 X-RAY EXAM OF FEMUR 2/>: CPT | Performed by: RADIOLOGY

## 2018-02-15 NOTE — PROGRESS NOTES
"Patient: Alma Delia Garcia    YOB: 1930        History of Present Illness: Patient is proximally 4 months status post open reduction internal fixation of a periprosthetic distal femur fracture.  Doing well at this point with no specific complaints.  Says she really hasn't been walking much.  She's been taking the brace off at night for sleeping.    Past Medical History:   Diagnosis Date   • Acute renal failure    • Anxiety    • Bell's palsy    • Cellulitis     LLE   • CHF (congestive heart failure)     Diastolic   • Diverticulosis    • GERD (gastroesophageal reflux disease)    • Hepatic cirrhosis    • History of transfusion    • Hypertension    • Hypothyroidism    • Osteoporosis    • Thrombocytopenia    • Trigeminal neuralgia         Social History     Social History   • Marital status:      Spouse name: N/A   • Number of children: N/A   • Years of education: N/A     Occupational History   • Not on file.     Social History Main Topics   • Smoking status: Never Smoker   • Smokeless tobacco: Former User     Types: Snuff     Quit date: 1970   • Alcohol use No      Comment: FORMER 12 BOTTLES PER DAY FOR 15 YEARS   • Drug use: No   • Sexual activity: Defer     Other Topics Concern   • Not on file     Social History Narrative    LIVES WITH GRANDDAUGHTER DEVON IN [place]            Physical Exam: 87 y.o. female  General Appearance:    Alert and oriented x 3, cooperative, in no acute distress                   Vitals:    02/15/18 1447   Weight: 63.5 kg (140 lb)   Height: 160 cm (63\")          Exam shows she HAS full extension and she can flex to about 90°.  No obvious tenderness or instability at the fracture site.  Wound is well-healed.  She has some generalized lower chemise swelling in both legs.      Radiology:       X-rays performed today show no change in alignment of the fracture.  Some callus is noted.  No change in the metal fixation      Assessment/Plan: Healing distal femoral shaft " periprosthetic fracture.  Plan at this time is to continue her brace but I unlock it to 90° and she can start progressive ambulation as tolerates with the brace on.  She may still remove it for sleeping at night.  We'll see her back in 6 weeks for final x-ray and check                Discussion/Summary:        This chart was completed utilizing the dragon speech recognition software.  Grammatical errors, random word insertions, pronoun errors, and incomplete sentences or occasional consequences of the system due to software limitations, ambient noise, and hardware issues.  Any questions or concerns about the content, text, or information contained within the body of this dictation should be directly addressed to the physician for clarification        This document was signed by Karson Pierre M.D. February 15, 2018 2:55 PM

## 2018-02-22 ENCOUNTER — EPISODE CHANGES (OUTPATIENT)
Dept: CASE MANAGEMENT | Facility: OTHER | Age: 83
End: 2018-02-22

## 2018-02-27 ENCOUNTER — PATIENT OUTREACH (OUTPATIENT)
Dept: CASE MANAGEMENT | Facility: OTHER | Age: 83
End: 2018-02-27

## 2018-02-27 NOTE — OUTREACH NOTE
Outreach with patient's daughter, Justin, who reports patient has returned to [facility] &  and is continuing therapy to return home.  Jutsin agrees to follow up call in 1 month to see if patient has been discharged and if any needs are identified.  Will continue to monitor and outreach as needed.

## 2018-03-12 ENCOUNTER — LAB REQUISITION (OUTPATIENT)
Dept: LAB | Facility: HOSPITAL | Age: 83
End: 2018-03-12

## 2018-03-12 DIAGNOSIS — K74.60 CIRRHOSIS OF LIVER (HCC): ICD-10-CM

## 2018-03-12 LAB
AMMONIA BLD-SCNC: 60 UMOL/L (ref 11–51)
BASOPHILS # BLD AUTO: 0.04 10*3/MM3 (ref 0–0.3)
BASOPHILS NFR BLD AUTO: 1.1 % (ref 0–2)
DEPRECATED RDW RBC AUTO: 43.6 FL (ref 37–54)
EOSINOPHIL # BLD AUTO: 0.36 10*3/MM3 (ref 0–0.7)
EOSINOPHIL NFR BLD AUTO: 9.9 % (ref 0–7)
ERYTHROCYTE [DISTWIDTH] IN BLOOD BY AUTOMATED COUNT: 12.7 % (ref 11.5–14.5)
HCT VFR BLD AUTO: 33.4 % (ref 37–47)
HGB BLD-MCNC: 11.7 G/DL (ref 12–16)
IMM GRANULOCYTES # BLD: 0 10*3/MM3 (ref 0–0.03)
IMM GRANULOCYTES NFR BLD: 0 % (ref 0–0.5)
LYMPHOCYTES # BLD AUTO: 1.37 10*3/MM3 (ref 1–3)
LYMPHOCYTES NFR BLD AUTO: 37.8 % (ref 16–46)
MCH RBC QN AUTO: 34.2 PG (ref 27–33)
MCHC RBC AUTO-ENTMCNC: 35 G/DL (ref 33–37)
MCV RBC AUTO: 97.7 FL (ref 80–94)
MONOCYTES # BLD AUTO: 0.39 10*3/MM3 (ref 0.1–0.9)
MONOCYTES NFR BLD AUTO: 10.8 % (ref 0–12)
NEUTROPHILS # BLD AUTO: 1.46 10*3/MM3 (ref 1.4–6.5)
NEUTROPHILS NFR BLD AUTO: 40.4 % (ref 40–75)
PLATELET # BLD AUTO: 98 10*3/MM3 (ref 130–400)
PMV BLD AUTO: 11.2 FL (ref 6–10)
RBC # BLD AUTO: 3.42 10*6/MM3 (ref 4.2–5.4)
WBC NRBC COR # BLD: 3.62 10*3/MM3 (ref 4.5–12.5)

## 2018-03-12 PROCEDURE — 85025 COMPLETE CBC W/AUTO DIFF WBC: CPT | Performed by: INTERNAL MEDICINE

## 2018-03-12 PROCEDURE — 82140 ASSAY OF AMMONIA: CPT | Performed by: INTERNAL MEDICINE

## 2018-03-13 ENCOUNTER — TELEPHONE (OUTPATIENT)
Dept: CARDIOLOGY | Facility: CLINIC | Age: 83
End: 2018-03-13

## 2018-03-13 DIAGNOSIS — Z74.09 MOBILITY POOR: Primary | ICD-10-CM

## 2018-03-13 NOTE — TELEPHONE ENCOUNTER
----- Message from Galina Gonsalves MD sent at 3/13/2018  4:35 PM EDT -----  Regarding: RE: Bed  Okay  ----- Message -----  From: Jackie Ty MA  Sent: 3/13/2018   3:46 PM  To: Galina Gonsalves MD  Subject: Bed                                              [facility] went to see her today. He ability to get in and out of the bed has declined significantly. They are requesting a Hospital Bed. Is this ok?              [facility]

## 2018-03-19 ENCOUNTER — LAB REQUISITION (OUTPATIENT)
Dept: LAB | Facility: HOSPITAL | Age: 83
End: 2018-03-19

## 2018-03-19 DIAGNOSIS — J44.9 CHRONIC OBSTRUCTIVE PULMONARY DISEASE (HCC): ICD-10-CM

## 2018-03-19 DIAGNOSIS — I10 ESSENTIAL (PRIMARY) HYPERTENSION: ICD-10-CM

## 2018-03-19 LAB
AMMONIA BLD-SCNC: 28 UMOL/L (ref 11–51)
ANION GAP SERPL CALCULATED.3IONS-SCNC: 7.6 MMOL/L (ref 3.6–11.2)
BASOPHILS # BLD AUTO: 0.03 10*3/MM3 (ref 0–0.3)
BASOPHILS NFR BLD AUTO: 0.7 % (ref 0–2)
BUN BLD-MCNC: 18 MG/DL (ref 7–21)
BUN/CREAT SERPL: 15.3 (ref 7–25)
CALCIUM SPEC-SCNC: 9.6 MG/DL (ref 7.7–10)
CHLORIDE SERPL-SCNC: 105 MMOL/L (ref 99–112)
CO2 SERPL-SCNC: 30.4 MMOL/L (ref 24.3–31.9)
CREAT BLD-MCNC: 1.18 MG/DL (ref 0.43–1.29)
DEPRECATED RDW RBC AUTO: 46.3 FL (ref 37–54)
EOSINOPHIL # BLD AUTO: 0.23 10*3/MM3 (ref 0–0.7)
EOSINOPHIL NFR BLD AUTO: 5.6 % (ref 0–7)
ERYTHROCYTE [DISTWIDTH] IN BLOOD BY AUTOMATED COUNT: 13.3 % (ref 11.5–14.5)
GFR SERPL CREATININE-BSD FRML MDRD: 43 ML/MIN/1.73
GLUCOSE BLD-MCNC: 106 MG/DL (ref 70–110)
HCT VFR BLD AUTO: 37.5 % (ref 37–47)
HGB BLD-MCNC: 13.1 G/DL (ref 12–16)
IMM GRANULOCYTES # BLD: 0 10*3/MM3 (ref 0–0.03)
IMM GRANULOCYTES NFR BLD: 0 % (ref 0–0.5)
LYMPHOCYTES # BLD AUTO: 1.72 10*3/MM3 (ref 1–3)
LYMPHOCYTES NFR BLD AUTO: 42.2 % (ref 16–46)
MCH RBC QN AUTO: 34.4 PG (ref 27–33)
MCHC RBC AUTO-ENTMCNC: 34.9 G/DL (ref 33–37)
MCV RBC AUTO: 98.4 FL (ref 80–94)
MONOCYTES # BLD AUTO: 0.52 10*3/MM3 (ref 0.1–0.9)
MONOCYTES NFR BLD AUTO: 12.7 % (ref 0–12)
NEUTROPHILS # BLD AUTO: 1.58 10*3/MM3 (ref 1.4–6.5)
NEUTROPHILS NFR BLD AUTO: 38.8 % (ref 40–75)
OSMOLALITY SERPL CALC.SUM OF ELEC: 287.3 MOSM/KG (ref 273–305)
PLATELET # BLD AUTO: 117 10*3/MM3 (ref 130–400)
PMV BLD AUTO: 10.8 FL (ref 6–10)
POTASSIUM BLD-SCNC: 3.5 MMOL/L (ref 3.5–5.3)
RBC # BLD AUTO: 3.81 10*6/MM3 (ref 4.2–5.4)
SODIUM BLD-SCNC: 143 MMOL/L (ref 135–153)
WBC NRBC COR # BLD: 4.08 10*3/MM3 (ref 4.5–12.5)

## 2018-03-19 PROCEDURE — 85025 COMPLETE CBC W/AUTO DIFF WBC: CPT | Performed by: INTERNAL MEDICINE

## 2018-03-19 PROCEDURE — 82140 ASSAY OF AMMONIA: CPT | Performed by: INTERNAL MEDICINE

## 2018-03-19 PROCEDURE — 80048 BASIC METABOLIC PNL TOTAL CA: CPT | Performed by: INTERNAL MEDICINE

## 2018-03-26 ENCOUNTER — OFFICE VISIT (OUTPATIENT)
Dept: CARDIOLOGY | Facility: CLINIC | Age: 83
End: 2018-03-26

## 2018-03-26 VITALS
HEIGHT: 63 IN | HEART RATE: 95 BPM | OXYGEN SATURATION: 90 % | SYSTOLIC BLOOD PRESSURE: 122 MMHG | DIASTOLIC BLOOD PRESSURE: 68 MMHG | BODY MASS INDEX: 24.8 KG/M2 | WEIGHT: 140 LBS

## 2018-03-26 DIAGNOSIS — K74.60 CIRRHOSIS OF LIVER WITHOUT ASCITES, UNSPECIFIED HEPATIC CIRRHOSIS TYPE (HCC): ICD-10-CM

## 2018-03-26 DIAGNOSIS — I73.9 PERIPHERAL VASCULAR DISEASE (HCC): ICD-10-CM

## 2018-03-26 DIAGNOSIS — I35.1 NONRHEUMATIC AORTIC VALVE INSUFFICIENCY: ICD-10-CM

## 2018-03-26 DIAGNOSIS — I10 ESSENTIAL HYPERTENSION: Primary | ICD-10-CM

## 2018-03-26 DIAGNOSIS — M80.00XS AGE-RELATED OSTEOPOROSIS WITH CURRENT PATHOLOGICAL FRACTURE, SEQUELA: ICD-10-CM

## 2018-03-26 DIAGNOSIS — E61.1 IRON DEFICIENCY: ICD-10-CM

## 2018-03-26 DIAGNOSIS — E55.9 VITAMIN D DEFICIENCY: ICD-10-CM

## 2018-03-26 DIAGNOSIS — E53.8 B12 DEFICIENCY: ICD-10-CM

## 2018-03-26 DIAGNOSIS — E03.4 HYPOTHYROIDISM DUE TO ACQUIRED ATROPHY OF THYROID: ICD-10-CM

## 2018-03-26 PROCEDURE — 99213 OFFICE O/P EST LOW 20 MIN: CPT | Performed by: INTERNAL MEDICINE

## 2018-03-26 RX ORDER — ESOMEPRAZOLE MAGNESIUM 40 MG/1
40 CAPSULE, DELAYED RELEASE ORAL
Qty: 90 CAPSULE | Refills: 3 | Status: SHIPPED | OUTPATIENT
Start: 2018-03-26 | End: 2019-04-09 | Stop reason: SDUPTHER

## 2018-03-26 RX ORDER — ALENDRONATE SODIUM 70 MG/1
70 TABLET ORAL
Qty: 4 TABLET | Refills: 12 | Status: CANCELLED | OUTPATIENT
Start: 2018-03-26

## 2018-03-26 RX ORDER — HYDROCODONE BITARTRATE AND ACETAMINOPHEN 5; 325 MG/1; MG/1
1 TABLET ORAL EVERY 6 HOURS PRN
Qty: 30 TABLET | Refills: 0 | Status: SHIPPED | OUTPATIENT
Start: 2018-03-26 | End: 2018-04-26 | Stop reason: SDUPTHER

## 2018-03-26 RX ORDER — POTASSIUM CHLORIDE 750 MG/1
10 TABLET, FILM COATED, EXTENDED RELEASE ORAL DAILY
Qty: 90 TABLET | Refills: 3 | Status: SHIPPED | OUTPATIENT
Start: 2018-03-26 | End: 2018-05-25

## 2018-03-26 RX ORDER — FOLIC ACID 1 MG/1
1 TABLET ORAL DAILY
Qty: 90 TABLET | Refills: 3 | Status: SHIPPED | OUTPATIENT
Start: 2018-03-26 | End: 2019-04-15

## 2018-03-26 RX ORDER — LEVOTHYROXINE SODIUM 0.05 MG/1
50 TABLET ORAL DAILY
Qty: 90 TABLET | Refills: 3 | Status: SHIPPED | OUTPATIENT
Start: 2018-03-26 | End: 2019-04-09 | Stop reason: SDUPTHER

## 2018-03-26 RX ORDER — BUMETANIDE 1 MG/1
1 TABLET ORAL 2 TIMES DAILY
Qty: 180 TABLET | Refills: 3 | Status: SHIPPED | OUTPATIENT
Start: 2018-03-26 | End: 2018-11-29 | Stop reason: SDUPTHER

## 2018-03-26 RX ORDER — ROPINIROLE 1 MG/1
1 TABLET, FILM COATED ORAL NIGHTLY
Qty: 90 TABLET | Refills: 3 | Status: SHIPPED | OUTPATIENT
Start: 2018-03-26 | End: 2019-04-09 | Stop reason: SDUPTHER

## 2018-03-26 RX ORDER — NITROGLYCERIN 0.4 MG/1
0.4 TABLET SUBLINGUAL
Qty: 25 TABLET | Refills: 0 | Status: SHIPPED | OUTPATIENT
Start: 2018-03-26

## 2018-03-26 RX ORDER — BLOOD PRESSURE TEST KIT
KIT MISCELLANEOUS
Qty: 1 EACH | Refills: 12 | Status: SHIPPED | OUTPATIENT
Start: 2018-03-26 | End: 2019-04-15

## 2018-03-26 NOTE — PROGRESS NOTES
subjective     Chief Complaint   Patient presents with   • Hypertension   • Hypothyroidism   • Peripheral Vascular Disease   • Follow-up     History of Present Illness  Patient is 87 years old white female who has hepatic cirrhosis with multiple episodes of hepatic coma.  She seems to be doing significantly better.  Patient is here for follow-up.  Blood pressure is very well controlled with current medications.  She also has hypothyroidism controlled with medications.  She complains of nausea and off and on vomiting.  She is taking Zofran.  There is no dysphagia.  No hematemesis or melena.  Patient is taking Xifaxan.    Her Fosamax was discontinued and she has been taking Forteo.  Patient also complains of severe back pain she recently had fracture and seeing Dr. nunez.  She is taking Norco 5/325.    Past Surgical History:   Procedure Laterality Date   • BACK SURGERY      KYPHOPLASTIES    • CARDIAC CATHETERIZATION Left 08/2004   • CARDIOVASCULAR STRESS TEST  01/25/2015   • CATARACT EXTRACTION     • CHOLECYSTECTOMY     • CLAVICLE SURGERY Right    • COLONOSCOPY  10/2004   • ECHO - CONVERTED  11/2012   • FEMUR OPEN REDUCTION INTERNAL FIXATION Left 10/12/2017    Procedure: FEMUR OPEN REDUCTION INTERNAL FIXATION;  Surgeon: Karson Pierre MD;  Location: [facility];  Service:    • HIP FRACTURE SURGERY Bilateral     ARTHROPLASTIES    • PARTIAL HYSTERECTOMY     • WRIST FRACTURE SURGERY       Family History   Problem Relation Age of Onset   • Cancer Mother    • Heart disease Father    • Arthritis Sister    • Osteoporosis Sister    • Diabetes Maternal Aunt      Past Medical History:   Diagnosis Date   • Acute renal failure    • Anxiety    • Bell's palsy    • Cellulitis     LLE   • CHF (congestive heart failure)     Diastolic   • Diverticulosis    • GERD (gastroesophageal reflux disease)    • Hepatic cirrhosis    • History of transfusion    • Hypertension    • Hypothyroidism    • Osteoporosis    • Thrombocytopenia     • Trigeminal neuralgia      Patient Active Problem List   Diagnosis   • Hepatic cirrhosis*   • HTN (hypertension)*   • Anxiety disorder*   • Osteopetrosis*   • Hypothyroidism*   • GERD (gastroesophageal reflux disease)*   • Chronic pain syndrome due to fractured spine and left rib fracture*   • Anemia, chronic disease   • Arthralgia of hip   • Left knee pain   • Fracture of proximal end of tibia and fibula   • Primary insomnia   • Thrombocytopenia   • Peripheral vascular disease   • Bilateral leg edema   • Cramp of both lower extremities   • Hepatic encephalopathy   • UTI (urinary tract infection)   • Hypokalemia   • Closed fracture of left distal femur   • Status post open reduction with internal fixation of fracture   • Nonrheumatic aortic valve insufficiency, mild   • Chronic diastolic heart failure, mild   • Periprosthetic fracture of shaft of femur   • S/P ORIF (open reduction internal fixation) fracture   • Diarrhea   • Right upper quadrant abdominal pain   • Nausea       Social History   Substance Use Topics   • Smoking status: Never Smoker   • Smokeless tobacco: Former User     Types: Snuff     Quit date: 1970   • Alcohol use No      Comment: FORMER 12 BOTTLES PER DAY FOR 15 YEARS       Allergies   Allergen Reactions   • Ciprofloxacin        Current Outpatient Prescriptions on File Prior to Visit   Medication Sig   • acetaminophen (TYLENOL) 500 MG tablet Take 500 mg by mouth Every 6 (Six) Hours As Needed for Mild Pain .   • Calcium Carbonate-Vitamin D (OYST-LILA-D 500 PO) Take  by mouth.   • cyanocobalamin 1000 MCG/ML injection Inject 1 mL into the shoulder, thigh, or buttocks Every 28 (Twenty-Eight) Days.   • Ferrous Sulfate 90 (18 Fe) MG tablet Take  by mouth.   • lactulose (CHRONULAC) 10 GM/15ML solution Take 30 mL by mouth 3 (Three) Times a Day.   • Multiple Vitamin (MULTI VITAMIN DAILY) tablet Take  by mouth.   • ondansetron (ZOFRAN) 4 MG tablet Take 4 mg by mouth Every 8 (Eight) Hours As Needed for  Nausea or Vomiting.   • teriparatide (FORTEO) 600 MCG/2.4ML injection Inject 20 mcg under the skin Daily.   • vitamin D (ERGOCALCIFEROL) 24749 units capsule capsule Take 50,000 Units by mouth 1 (One) Time Per Week.   • [DISCONTINUED] alendronate (FOSAMAX) 70 MG tablet Take 1 tablet by mouth Every 7 (Seven) Days. Takes on Mondays.   • [DISCONTINUED] aspirin 81 MG tablet Take 1 tablet by mouth Daily.   • [DISCONTINUED] bumetanide (BUMEX) 1 MG tablet Take 1 tablet by mouth 2 (Two) Times a Day.   • [DISCONTINUED] esomeprazole (nexIUM) 40 MG capsule Take 1 capsule by mouth Every Morning Before Breakfast.   • [DISCONTINUED] folic acid (FOLVITE) 1 MG tablet Take 1 tablet by mouth Daily.   • [DISCONTINUED] HYDROcodone-acetaminophen (NORCO) 5-325 MG per tablet Take 1 tablet by mouth Every 6 (Six) Hours As Needed for Moderate Pain .   • [DISCONTINUED] levothyroxine (SYNTHROID) 50 MCG tablet Take 1 tablet by mouth Daily.   • [DISCONTINUED] nitroglycerin (NITROSTAT) 0.4 MG SL tablet Place 1 tablet under the tongue Every 5 (Five) Minutes As Needed for chest pain. Take no more than 3 doses in 15 minutes.   • [DISCONTINUED] potassium chloride (K-DUR) 10 MEQ CR tablet Take 1 tablet by mouth Daily.   • [DISCONTINUED] rifaximin (XIFAXAN) 550 MG tablet Take 1 tablet by mouth Every 12 (Twelve) Hours.   • [DISCONTINUED] rOPINIRole (REQUIP) 1 MG tablet Take 1 tablet by mouth Every Night. Take 1 hour before bedtime.     No current facility-administered medications on file prior to visit.          The following portions of the patient's history were reviewed and updated as appropriate: allergies, current medications, past family history, past medical history, past social history, past surgical history and problem list.    Review of Systems   Constitution: Positive for weakness and malaise/fatigue.   HENT: Negative.  Negative for congestion.    Eyes: Negative.    Cardiovascular: Negative.  Negative for chest pain, cyanosis, dyspnea on  "exertion, irregular heartbeat, leg swelling, near-syncope, orthopnea, palpitations, paroxysmal nocturnal dyspnea and syncope.   Respiratory: Negative.  Negative for shortness of breath.    Hematologic/Lymphatic: Negative.    Musculoskeletal: Positive for back pain and joint pain.   Gastrointestinal: Positive for nausea and vomiting.   Neurological: Negative for headaches.          Objective:     /68 (BP Location: Left arm, Patient Position: Sitting)   Pulse 95   Ht 160 cm (63\")   Wt 63.5 kg (140 lb)   SpO2 90%   BMI 24.80 kg/m²   Physical Exam   Constitutional: She appears well-developed and well-nourished. No distress.   HENT:   Head: Normocephalic and atraumatic.   Mouth/Throat: Oropharynx is clear and moist. No oropharyngeal exudate.   Eyes: Conjunctivae and EOM are normal. Pupils are equal, round, and reactive to light. No scleral icterus.   Neck: Normal range of motion. Neck supple. No JVD present. No tracheal deviation present. No thyromegaly present.   Cardiovascular: Normal rate, regular rhythm, normal heart sounds and intact distal pulses.  PMI is not displaced.  Exam reveals no gallop, no friction rub and no decreased pulses.    No murmur heard.  Pulses:       Carotid pulses are 3+ on the right side, and 3+ on the left side.       Radial pulses are 3+ on the right side, and 3+ on the left side.   Pulmonary/Chest: Effort normal and breath sounds normal. No respiratory distress. She has no wheezes. She has no rales. She exhibits no tenderness.   Abdominal: Soft. Bowel sounds are normal. She exhibits no distension, no abdominal bruit and no mass. There is no splenomegaly or hepatomegaly. There is no tenderness. There is no rebound and no guarding.   Musculoskeletal: Normal range of motion. She exhibits no edema, tenderness or deformity.   Lymphadenopathy:     She has no cervical adenopathy.   Neurological: She is alert. She has normal reflexes. No cranial nerve deficit. She exhibits normal muscle " tone. Coordination normal.   Skin: Skin is warm and dry. No rash noted. She is not diaphoretic. No erythema.   Psychiatric: She has a normal mood and affect. Her behavior is normal. Judgment and thought content normal.         Lab Review  Lab Results   Component Value Date     03/19/2018    K 3.5 03/19/2018     03/19/2018    BUN 18 03/19/2018    CREATININE 1.18 03/19/2018    GLUCOSE 106 03/19/2018    CALCIUM 9.6 03/19/2018    ALT 22 10/17/2017    ALKPHOS 100 10/17/2017    LABIL2 0.9 (L) 10/17/2017     Lab Results   Component Value Date    CKTOTAL 99 07/25/2017     Lab Results   Component Value Date    WBC 4.08 (L) 03/19/2018    HGB 13.1 03/19/2018    HCT 37.5 03/19/2018     (L) 03/19/2018     Lab Results   Component Value Date    INR 1.79 (H) 10/13/2017    INR 2.04 (H) 10/12/2017    INR 1.17 (H) 10/12/2017     Lab Results   Component Value Date    MG 2.0 10/17/2017     Lab Results   Component Value Date    TSH 1.771 10/12/2017     Lab Results   Component Value Date    BNP 11.0 10/05/2017     Lab Results   Component Value Date    CHLPL 161 04/08/2016    CHOL 186 04/18/2017    TRIG 72 04/18/2017     (H) 04/18/2017    VLDL 14.4 04/18/2017    LDLHDL 0.62 04/18/2017     Lab Results   Component Value Date    LDL 66 04/18/2017    LDL 77 01/09/2017       Procedures       I personally viewed and interpreted the patient's LAB data         Assessment:     1. Essential hypertension    2. Nonrheumatic aortic valve insufficiency, mild    3. Peripheral vascular disease    4. Cirrhosis of liver without ascites, unspecified hepatic cirrhosis type    5. Hypothyroidism due to acquired atrophy of thyroid    6. Iron deficiency    7. B12 deficiency    8. Age-related osteoporosis with current pathological fracture, sequela    9. Vitamin D deficiency          Plan:      Patient has hepatic cirrhosis.  She is taking medications regularly.  She is awake alert and oriented.  Ammonia level has been normal.  Daily she  has been having some nausea and vomiting.  Abdomen is soft nontender.  She was advised to continue Zofran.    She also complains of severe back pain and needs refill of her pain medications.  Side effect of pain medications was discussed including addiction death.  She has no constipation.  Blood pressure is very well controlled.  Refills of all medications were given.  Lab work scheduled through home health        Return in about 1 month (around 4/26/2018).

## 2018-03-28 DIAGNOSIS — S72.492D OTHER CLOSED FRACTURE OF DISTAL END OF LEFT FEMUR WITH ROUTINE HEALING, SUBSEQUENT ENCOUNTER: Primary | ICD-10-CM

## 2018-03-29 ENCOUNTER — HOSPITAL ENCOUNTER (OUTPATIENT)
Dept: GENERAL RADIOLOGY | Facility: HOSPITAL | Age: 83
Discharge: HOME | End: 2018-03-29
Attending: ORTHOPAEDIC SURGERY | Admitting: ORTHOPAEDIC SURGERY

## 2018-03-29 ENCOUNTER — OFFICE VISIT (OUTPATIENT)
Dept: ORTHOPEDIC SURGERY | Facility: CLINIC | Age: 83
End: 2018-03-29

## 2018-03-29 VITALS — HEIGHT: 63 IN

## 2018-03-29 DIAGNOSIS — Z98.890 S/P ORIF (OPEN REDUCTION INTERNAL FIXATION) FRACTURE: ICD-10-CM

## 2018-03-29 DIAGNOSIS — S72.492D OTHER CLOSED FRACTURE OF DISTAL END OF LEFT FEMUR WITH ROUTINE HEALING, SUBSEQUENT ENCOUNTER: ICD-10-CM

## 2018-03-29 DIAGNOSIS — S72.492D OTHER CLOSED FRACTURE OF DISTAL END OF LEFT FEMUR WITH ROUTINE HEALING, SUBSEQUENT ENCOUNTER: Primary | ICD-10-CM

## 2018-03-29 DIAGNOSIS — Z87.81 S/P ORIF (OPEN REDUCTION INTERNAL FIXATION) FRACTURE: ICD-10-CM

## 2018-03-29 PROBLEM — S72.402D CLOSED FRACTURE OF LOWER END OF LEFT FEMUR WITH ROUTINE HEALING: Status: ACTIVE | Noted: 2017-10-12

## 2018-03-29 PROCEDURE — 73552 X-RAY EXAM OF FEMUR 2/>: CPT | Performed by: RADIOLOGY

## 2018-03-29 PROCEDURE — 99213 OFFICE O/P EST LOW 20 MIN: CPT | Performed by: ORTHOPAEDIC SURGERY

## 2018-03-29 PROCEDURE — 73552 X-RAY EXAM OF FEMUR 2/>: CPT

## 2018-03-29 NOTE — PROGRESS NOTES
"Patient: Alma Delia Garcia    YOB: 1930        History of Present Illness: Patient is about 6 months status post a open reduction internal fixation of a left distal femur periprosthetic fracture. Her left leg today.  No specific complaints regarding her left leg today.  States she does get some right hip pain.  Denies any paresthesias.  Occasional swelling    Past Medical History:   Diagnosis Date   • Acute renal failure    • Anxiety    • Bell's palsy    • Cellulitis     LLE   • CHF (congestive heart failure)     Diastolic   • Diverticulosis    • GERD (gastroesophageal reflux disease)    • Hepatic cirrhosis    • History of transfusion    • Hypertension    • Hypothyroidism    • Osteoporosis    • Thrombocytopenia    • Trigeminal neuralgia         Social History     Social History   • Marital status:      Spouse name: N/A   • Number of children: N/A   • Years of education: N/A     Occupational History   • Not on file.     Social History Main Topics   • Smoking status: Never Smoker   • Smokeless tobacco: Former User     Types: Snuff     Quit date: 1970   • Alcohol use No      Comment: FORMER 12 BOTTLES PER DAY FOR 15 YEARS   • Drug use: No   • Sexual activity: Defer     Other Topics Concern   • Not on file     Social History Narrative    LIVES WITH GRANDDAUGHTER DEVON IN [place]            Physical Exam: 87 y.o. female  General Appearance:    Alert and oriented x 3, cooperative, in no acute distress                   Vitals:    03/29/18 1104   Height: 160 cm (63\")          Patient ambulates slowly with a walker.  She has just about full extension of her knee in flexion about 100°.  The wound is clean dry and intact.  He has some very mild swelling of her lower foot and ankle.  Grossly neurovascular intact      Radiology:       X-rays taken today show no change in alignment of the metal fixation and additional callus is noted.  No change in position      Assessment/Plan: Healing distal femoral " periprosthetic fracture around a long hip stem.  Clinically I think the patient's probably doing as well as she could expect for her age and a number of surgeries she's had on his left leg.  Continue activities as she tolerates.  I've not scheduled her to come back unless his any future problems.          Discussed the patient's BMI with her. BMI is within normal parameters. No follow-up required.      Discussion/Summary:        This chart was completed utilizing the dragon speech recognition software.  Grammatical errors, random word insertions, pronoun errors, and incomplete sentences or occasional consequences of the system due to software limitations, ambient noise, and hardware issues.  Any questions or concerns about the content, text, or information contained within the body of this dictation should be directly addressed to the physician for clarification        This document was signed by Karson Pierre M.D. March 29, 2018 11:09 AM

## 2018-04-02 ENCOUNTER — LAB REQUISITION (OUTPATIENT)
Dept: LAB | Facility: HOSPITAL | Age: 83
End: 2018-04-02

## 2018-04-02 DIAGNOSIS — K74.69 OTHER CIRRHOSIS OF LIVER (HCC): ICD-10-CM

## 2018-04-02 DIAGNOSIS — D64.9 ANEMIA: ICD-10-CM

## 2018-04-02 LAB
AMMONIA BLD-SCNC: 73 UMOL/L (ref 11–51)
BASOPHILS # BLD AUTO: 0.03 10*3/MM3 (ref 0–0.3)
BASOPHILS NFR BLD AUTO: 0.8 % (ref 0–2)
DEPRECATED RDW RBC AUTO: 44.1 FL (ref 37–54)
EOSINOPHIL # BLD AUTO: 0.21 10*3/MM3 (ref 0–0.7)
EOSINOPHIL NFR BLD AUTO: 5.3 % (ref 0–7)
ERYTHROCYTE [DISTWIDTH] IN BLOOD BY AUTOMATED COUNT: 13.2 % (ref 11.5–14.5)
HCT VFR BLD AUTO: 33.4 % (ref 37–47)
HGB BLD-MCNC: 11.6 G/DL (ref 12–16)
IMM GRANULOCYTES # BLD: 0.01 10*3/MM3 (ref 0–0.03)
IMM GRANULOCYTES NFR BLD: 0.3 % (ref 0–0.5)
LYMPHOCYTES # BLD AUTO: 1.19 10*3/MM3 (ref 1–3)
LYMPHOCYTES NFR BLD AUTO: 30.1 % (ref 16–46)
MCH RBC QN AUTO: 33.5 PG (ref 27–33)
MCHC RBC AUTO-ENTMCNC: 34.7 G/DL (ref 33–37)
MCV RBC AUTO: 96.5 FL (ref 80–94)
MONOCYTES # BLD AUTO: 0.52 10*3/MM3 (ref 0.1–0.9)
MONOCYTES NFR BLD AUTO: 13.1 % (ref 0–12)
NEUTROPHILS # BLD AUTO: 2 10*3/MM3 (ref 1.4–6.5)
NEUTROPHILS NFR BLD AUTO: 50.4 % (ref 40–75)
PLATELET # BLD AUTO: 122 10*3/MM3 (ref 130–400)
PMV BLD AUTO: 11.3 FL (ref 6–10)
RBC # BLD AUTO: 3.46 10*6/MM3 (ref 4.2–5.4)
WBC NRBC COR # BLD: 3.96 10*3/MM3 (ref 4.5–12.5)

## 2018-04-02 PROCEDURE — 82140 ASSAY OF AMMONIA: CPT | Performed by: INTERNAL MEDICINE

## 2018-04-02 PROCEDURE — 85025 COMPLETE CBC W/AUTO DIFF WBC: CPT | Performed by: INTERNAL MEDICINE

## 2018-04-03 ENCOUNTER — TELEPHONE (OUTPATIENT)
Dept: CARDIOLOGY | Facility: CLINIC | Age: 83
End: 2018-04-03

## 2018-04-03 DIAGNOSIS — R79.89 INCREASED AMMONIA LEVEL: Primary | ICD-10-CM

## 2018-04-03 DIAGNOSIS — K74.60 CIRRHOSIS OF LIVER WITHOUT ASCITES, UNSPECIFIED HEPATIC CIRRHOSIS TYPE (HCC): ICD-10-CM

## 2018-04-03 RX ORDER — LACTULOSE 10 G/15ML
20 SOLUTION ORAL 4 TIMES DAILY
Start: 2018-04-03 | End: 2018-05-29 | Stop reason: SDUPTHER

## 2018-04-03 NOTE — TELEPHONE ENCOUNTER
MD Roxanne Chan MA             Ammonia level is high.  Increase lactulose by 1.  And get GI consultation with Dr. Kyle

## 2018-04-09 ENCOUNTER — LAB REQUISITION (OUTPATIENT)
Dept: LAB | Facility: HOSPITAL | Age: 83
End: 2018-04-09

## 2018-04-09 DIAGNOSIS — D64.9 ANEMIA: ICD-10-CM

## 2018-04-09 DIAGNOSIS — K74.60 CIRRHOSIS OF LIVER (HCC): ICD-10-CM

## 2018-04-09 LAB
AMMONIA BLD-SCNC: 52 UMOL/L (ref 11–51)
BASOPHILS # BLD AUTO: 0.02 10*3/MM3 (ref 0–0.3)
BASOPHILS NFR BLD AUTO: 0.4 % (ref 0–2)
DEPRECATED RDW RBC AUTO: 48.1 FL (ref 37–54)
EOSINOPHIL # BLD AUTO: 0.24 10*3/MM3 (ref 0–0.7)
EOSINOPHIL NFR BLD AUTO: 4.7 % (ref 0–7)
ERYTHROCYTE [DISTWIDTH] IN BLOOD BY AUTOMATED COUNT: 13.8 % (ref 11.5–14.5)
HCT VFR BLD AUTO: 34.5 % (ref 37–47)
HGB BLD-MCNC: 12 G/DL (ref 12–16)
IMM GRANULOCYTES # BLD: 0.01 10*3/MM3 (ref 0–0.03)
IMM GRANULOCYTES NFR BLD: 0.2 % (ref 0–0.5)
LYMPHOCYTES # BLD AUTO: 1.45 10*3/MM3 (ref 1–3)
LYMPHOCYTES NFR BLD AUTO: 28.2 % (ref 16–46)
MCH RBC QN AUTO: 33.1 PG (ref 27–33)
MCHC RBC AUTO-ENTMCNC: 34.8 G/DL (ref 33–37)
MCV RBC AUTO: 95.3 FL (ref 80–94)
MONOCYTES # BLD AUTO: 0.59 10*3/MM3 (ref 0.1–0.9)
MONOCYTES NFR BLD AUTO: 11.5 % (ref 0–12)
NEUTROPHILS # BLD AUTO: 2.84 10*3/MM3 (ref 1.4–6.5)
NEUTROPHILS NFR BLD AUTO: 55 % (ref 40–75)
PLATELET # BLD AUTO: 115 10*3/MM3 (ref 130–400)
PMV BLD AUTO: 10.8 FL (ref 6–10)
RBC # BLD AUTO: 3.62 10*6/MM3 (ref 4.2–5.4)
WBC NRBC COR # BLD: 5.15 10*3/MM3 (ref 4.5–12.5)

## 2018-04-09 PROCEDURE — 85025 COMPLETE CBC W/AUTO DIFF WBC: CPT | Performed by: INTERNAL MEDICINE

## 2018-04-09 PROCEDURE — 82140 ASSAY OF AMMONIA: CPT | Performed by: INTERNAL MEDICINE

## 2018-04-10 ENCOUNTER — TELEPHONE (OUTPATIENT)
Dept: CARDIOLOGY | Facility: CLINIC | Age: 83
End: 2018-04-10

## 2018-04-10 RX ORDER — OXCARBAZEPINE 150 MG/1
150 TABLET, FILM COATED ORAL EVERY 12 HOURS SCHEDULED
Qty: 180 TABLET | Refills: 0 | Status: SHIPPED | OUTPATIENT
Start: 2018-04-10 | End: 2019-04-15

## 2018-04-10 RX ORDER — ERGOCALCIFEROL 1.25 MG/1
50000 CAPSULE ORAL WEEKLY
Qty: 12 CAPSULE | Refills: 0 | Status: SHIPPED | OUTPATIENT
Start: 2018-04-10 | End: 2019-01-01 | Stop reason: SDUPTHER

## 2018-04-10 NOTE — TELEPHONE ENCOUNTER
Pt was also needing refills on Vit D & Xifaxan       MD Roxanne Chan MA             Lab work better continue medications

## 2018-04-10 NOTE — TELEPHONE ENCOUNTER
----- Message from Galina Gonsalves MD sent at 4/10/2018 10:58 AM EDT -----  Regarding: RE: Meds   That is okay  ----- Message -----  From: Jackie Ty MA  Sent: 4/10/2018  10:39 AM  To: Galina Gonsalves MD  Subject: Meds                                             Pt daughter called requesting refills on Trileptal 150 mg Bid for Chrystell. It is not on her med list. She said they had her on it in the nursing home. Would you like to continue?

## 2018-04-19 ENCOUNTER — TELEPHONE (OUTPATIENT)
Dept: CARDIOLOGY | Facility: CLINIC | Age: 83
End: 2018-04-19

## 2018-04-19 NOTE — TELEPHONE ENCOUNTER
Calling stating patient's daughter passed away. They are requesting to reschedule labs until Monday. OK per .

## 2018-04-23 ENCOUNTER — LAB REQUISITION (OUTPATIENT)
Dept: LAB | Facility: HOSPITAL | Age: 83
End: 2018-04-23

## 2018-04-23 ENCOUNTER — TELEPHONE (OUTPATIENT)
Dept: CARDIOLOGY | Facility: CLINIC | Age: 83
End: 2018-04-23

## 2018-04-23 DIAGNOSIS — K70.30 ALCOHOLIC CIRRHOSIS OF LIVER WITHOUT ASCITES (HCC): ICD-10-CM

## 2018-04-23 LAB
AMMONIA BLD-SCNC: 118 UMOL/L (ref 11–51)
BASOPHILS # BLD AUTO: 0.05 10*3/MM3 (ref 0–0.3)
BASOPHILS NFR BLD AUTO: 1.2 % (ref 0–2)
DEPRECATED RDW RBC AUTO: 48.2 FL (ref 37–54)
EOSINOPHIL # BLD AUTO: 0.4 10*3/MM3 (ref 0–0.7)
EOSINOPHIL NFR BLD AUTO: 9.3 % (ref 0–7)
ERYTHROCYTE [DISTWIDTH] IN BLOOD BY AUTOMATED COUNT: 14.1 % (ref 11.5–14.5)
HCT VFR BLD AUTO: 38.1 % (ref 37–47)
HGB BLD-MCNC: 12.8 G/DL (ref 12–16)
IMM GRANULOCYTES # BLD: 0 10*3/MM3 (ref 0–0.03)
IMM GRANULOCYTES NFR BLD: 0 % (ref 0–0.5)
LYMPHOCYTES # BLD AUTO: 1.55 10*3/MM3 (ref 1–3)
LYMPHOCYTES NFR BLD AUTO: 36 % (ref 16–46)
MCH RBC QN AUTO: 33.1 PG (ref 27–33)
MCHC RBC AUTO-ENTMCNC: 33.6 G/DL (ref 33–37)
MCV RBC AUTO: 98.4 FL (ref 80–94)
MONOCYTES # BLD AUTO: 0.56 10*3/MM3 (ref 0.1–0.9)
MONOCYTES NFR BLD AUTO: 13 % (ref 0–12)
NEUTROPHILS # BLD AUTO: 1.75 10*3/MM3 (ref 1.4–6.5)
NEUTROPHILS NFR BLD AUTO: 40.5 % (ref 40–75)
PLATELET # BLD AUTO: 112 10*3/MM3 (ref 130–400)
PMV BLD AUTO: 12 FL (ref 6–10)
RBC # BLD AUTO: 3.87 10*6/MM3 (ref 4.2–5.4)
WBC NRBC COR # BLD: 4.31 10*3/MM3 (ref 4.5–12.5)

## 2018-04-23 PROCEDURE — 85025 COMPLETE CBC W/AUTO DIFF WBC: CPT | Performed by: INTERNAL MEDICINE

## 2018-04-23 PROCEDURE — 82140 ASSAY OF AMMONIA: CPT | Performed by: INTERNAL MEDICINE

## 2018-04-23 NOTE — TELEPHONE ENCOUNTER
----- Message from Galina Gonsalves MD sent at 4/23/2018  4:41 PM EDT -----  Ammonia level is again high.  She needs to make sure that she is taking lecture low-dose 4 times a day

## 2018-04-25 ENCOUNTER — LAB (OUTPATIENT)
Dept: LAB | Facility: HOSPITAL | Age: 83
End: 2018-04-25
Attending: INTERNAL MEDICINE

## 2018-04-25 DIAGNOSIS — E53.8 B12 DEFICIENCY: ICD-10-CM

## 2018-04-25 DIAGNOSIS — E03.4 HYPOTHYROIDISM DUE TO ACQUIRED ATROPHY OF THYROID: ICD-10-CM

## 2018-04-25 DIAGNOSIS — E55.9 VITAMIN D DEFICIENCY: ICD-10-CM

## 2018-04-25 DIAGNOSIS — K74.60 CIRRHOSIS OF LIVER WITHOUT ASCITES, UNSPECIFIED HEPATIC CIRRHOSIS TYPE (HCC): ICD-10-CM

## 2018-04-25 DIAGNOSIS — M80.00XS AGE-RELATED OSTEOPOROSIS WITH CURRENT PATHOLOGICAL FRACTURE, SEQUELA: ICD-10-CM

## 2018-04-25 DIAGNOSIS — I10 ESSENTIAL HYPERTENSION: ICD-10-CM

## 2018-04-25 DIAGNOSIS — I73.9 PERIPHERAL VASCULAR DISEASE (HCC): ICD-10-CM

## 2018-04-25 DIAGNOSIS — E61.1 IRON DEFICIENCY: ICD-10-CM

## 2018-04-25 LAB
25(OH)D3 SERPL-MCNC: 81 NG/ML
ALBUMIN SERPL-MCNC: 3.8 G/DL (ref 3.4–4.8)
ALBUMIN/GLOB SERPL: 0.9 G/DL (ref 1.5–2.5)
ALP SERPL-CCNC: 136 U/L (ref 35–104)
ALT SERPL W P-5'-P-CCNC: 33 U/L (ref 10–36)
ANION GAP SERPL CALCULATED.3IONS-SCNC: 9 MMOL/L (ref 3.6–11.2)
AST SERPL-CCNC: 56 U/L (ref 10–30)
BASOPHILS # BLD AUTO: 0.04 10*3/MM3 (ref 0–0.3)
BASOPHILS NFR BLD AUTO: 0.8 % (ref 0–2)
BILIRUB SERPL-MCNC: 1.9 MG/DL (ref 0.2–1.8)
BUN BLD-MCNC: 17 MG/DL (ref 7–21)
BUN/CREAT SERPL: 16.7 (ref 7–25)
CALCIUM SPEC-SCNC: 9.9 MG/DL (ref 7.7–10)
CHLORIDE SERPL-SCNC: 100 MMOL/L (ref 99–112)
CHOLEST SERPL-MCNC: 185 MG/DL (ref 0–200)
CO2 SERPL-SCNC: 30 MMOL/L (ref 24.3–31.9)
CREAT BLD-MCNC: 1.02 MG/DL (ref 0.43–1.29)
DEPRECATED RDW RBC AUTO: 47.8 FL (ref 37–54)
EOSINOPHIL # BLD AUTO: 0.2 10*3/MM3 (ref 0–0.7)
EOSINOPHIL NFR BLD AUTO: 4.1 % (ref 0–7)
ERYTHROCYTE [DISTWIDTH] IN BLOOD BY AUTOMATED COUNT: 13.9 % (ref 11.5–14.5)
GFR SERPL CREATININE-BSD FRML MDRD: 51 ML/MIN/1.73
GLOBULIN UR ELPH-MCNC: 4.1 GM/DL
GLUCOSE BLD-MCNC: 98 MG/DL (ref 70–110)
HCT VFR BLD AUTO: 39.8 % (ref 37–47)
HDLC SERPL-MCNC: 87 MG/DL (ref 60–100)
HGB BLD-MCNC: 13.6 G/DL (ref 12–16)
IMM GRANULOCYTES # BLD: 0.01 10*3/MM3 (ref 0–0.03)
IMM GRANULOCYTES NFR BLD: 0.2 % (ref 0–0.5)
IRON 24H UR-MRATE: 155 MCG/DL (ref 49–151)
LDLC SERPL CALC-MCNC: 64 MG/DL (ref 0–100)
LDLC/HDLC SERPL: 0.74 {RATIO}
LYMPHOCYTES # BLD AUTO: 1.59 10*3/MM3 (ref 1–3)
LYMPHOCYTES NFR BLD AUTO: 32.3 % (ref 16–46)
MCH RBC QN AUTO: 33.6 PG (ref 27–33)
MCHC RBC AUTO-ENTMCNC: 34.2 G/DL (ref 33–37)
MCV RBC AUTO: 98.3 FL (ref 80–94)
MONOCYTES # BLD AUTO: 0.66 10*3/MM3 (ref 0.1–0.9)
MONOCYTES NFR BLD AUTO: 13.4 % (ref 0–12)
NEUTROPHILS # BLD AUTO: 2.42 10*3/MM3 (ref 1.4–6.5)
NEUTROPHILS NFR BLD AUTO: 49.2 % (ref 40–75)
OSMOLALITY SERPL CALC.SUM OF ELEC: 279.1 MOSM/KG (ref 273–305)
PLATELET # BLD AUTO: 118 10*3/MM3 (ref 130–400)
PMV BLD AUTO: 11.6 FL (ref 6–10)
POTASSIUM BLD-SCNC: 4.1 MMOL/L (ref 3.5–5.3)
PROT SERPL-MCNC: 7.9 G/DL (ref 6–8)
RBC # BLD AUTO: 4.05 10*6/MM3 (ref 4.2–5.4)
SODIUM BLD-SCNC: 139 MMOL/L (ref 135–153)
T4 FREE SERPL-MCNC: 1.4 NG/DL (ref 0.89–1.76)
TRIGL SERPL-MCNC: 169 MG/DL (ref 0–150)
TSH SERPL DL<=0.05 MIU/L-ACNC: 1.16 MIU/ML (ref 0.55–4.78)
VIT B12 BLD-MCNC: 778 PG/ML (ref 211–911)
VLDLC SERPL-MCNC: 33.8 MG/DL
WBC NRBC COR # BLD: 4.92 10*3/MM3 (ref 4.5–12.5)

## 2018-04-25 PROCEDURE — 83540 ASSAY OF IRON: CPT

## 2018-04-25 PROCEDURE — 84439 ASSAY OF FREE THYROXINE: CPT

## 2018-04-25 PROCEDURE — 80061 LIPID PANEL: CPT

## 2018-04-25 PROCEDURE — 82306 VITAMIN D 25 HYDROXY: CPT

## 2018-04-25 PROCEDURE — 80053 COMPREHEN METABOLIC PANEL: CPT

## 2018-04-25 PROCEDURE — 84443 ASSAY THYROID STIM HORMONE: CPT

## 2018-04-25 PROCEDURE — 85025 COMPLETE CBC W/AUTO DIFF WBC: CPT

## 2018-04-25 PROCEDURE — 82607 VITAMIN B-12: CPT

## 2018-04-26 ENCOUNTER — OFFICE VISIT (OUTPATIENT)
Dept: CARDIOLOGY | Facility: CLINIC | Age: 83
End: 2018-04-26

## 2018-04-26 ENCOUNTER — TELEPHONE (OUTPATIENT)
Dept: CARDIOLOGY | Facility: CLINIC | Age: 83
End: 2018-04-26

## 2018-04-26 VITALS
HEIGHT: 63 IN | OXYGEN SATURATION: 94 % | DIASTOLIC BLOOD PRESSURE: 66 MMHG | SYSTOLIC BLOOD PRESSURE: 144 MMHG | BODY MASS INDEX: 25.16 KG/M2 | HEART RATE: 82 BPM | WEIGHT: 142 LBS

## 2018-04-26 DIAGNOSIS — I35.1 NONRHEUMATIC AORTIC VALVE INSUFFICIENCY: ICD-10-CM

## 2018-04-26 DIAGNOSIS — R19.7 DIARRHEA, UNSPECIFIED TYPE: ICD-10-CM

## 2018-04-26 DIAGNOSIS — K74.60 CIRRHOSIS OF LIVER WITHOUT ASCITES, UNSPECIFIED HEPATIC CIRRHOSIS TYPE (HCC): Primary | ICD-10-CM

## 2018-04-26 DIAGNOSIS — F51.01 PRIMARY INSOMNIA: ICD-10-CM

## 2018-04-26 DIAGNOSIS — I10 ESSENTIAL HYPERTENSION: ICD-10-CM

## 2018-04-26 DIAGNOSIS — F41.1 GENERALIZED ANXIETY DISORDER: ICD-10-CM

## 2018-04-26 DIAGNOSIS — E03.4 HYPOTHYROIDISM DUE TO ACQUIRED ATROPHY OF THYROID: ICD-10-CM

## 2018-04-26 DIAGNOSIS — G89.4 CHRONIC PAIN SYNDROME: ICD-10-CM

## 2018-04-26 DIAGNOSIS — I73.9 PERIPHERAL VASCULAR DISEASE (HCC): ICD-10-CM

## 2018-04-26 PROCEDURE — 99214 OFFICE O/P EST MOD 30 MIN: CPT | Performed by: INTERNAL MEDICINE

## 2018-04-26 RX ORDER — HYDROCODONE BITARTRATE AND ACETAMINOPHEN 5; 325 MG/1; MG/1
1 TABLET ORAL EVERY 6 HOURS PRN
Qty: 30 TABLET | Refills: 0 | Status: SHIPPED | OUTPATIENT
Start: 2018-04-26 | End: 2018-05-25 | Stop reason: SDUPTHER

## 2018-04-26 NOTE — PROGRESS NOTES
subjective     Chief Complaint   Patient presents with   • Hypertension   • Pain   • Back Pain   • Follow-up     History of Present Illness  Patient is 87 years old white female who has history of hepatic cirrhosis and multiple episodes of hepatic encephalopathy and hepatic coma.   patient wants to increase her pain medications.  She is taking hydrocodone.  He was discussed with the patient that she already has hepatic encephalopathy and gets confused easily and has had multiple fractures and falls easily would like not to increase Norco.  Patient referral will be made.    Recently patient had ammonia level which is quite high.  Patient is medically alert with no specific complaints.  She is taking lactulose 3 a day however she is not taking Xifaxan because is quite expensive and apparently it was rejected.    Blood pressure is very well controlled with current medications.    Patient complains of lot of back pain and wants to increase Norco therapy.    Past Surgical History:   Procedure Laterality Date   • BACK SURGERY      KYPHOPLASTIES    • CARDIAC CATHETERIZATION Left 08/2004   • CARDIOVASCULAR STRESS TEST  01/25/2015   • CATARACT EXTRACTION     • CHOLECYSTECTOMY     • CLAVICLE SURGERY Right    • COLONOSCOPY  10/2004   • ECHO - CONVERTED  11/2012   • FEMUR OPEN REDUCTION INTERNAL FIXATION Left 10/12/2017    Procedure: FEMUR OPEN REDUCTION INTERNAL FIXATION;  Surgeon: Karson Pierre MD;  Location: [facility];  Service:    • HIP FRACTURE SURGERY Bilateral     ARTHROPLASTIES    • PARTIAL HYSTERECTOMY     • WRIST FRACTURE SURGERY       Family History   Problem Relation Age of Onset   • Cancer Mother    • Heart disease Father    • Arthritis Sister    • Osteoporosis Sister    • Diabetes Maternal Aunt      Past Medical History:   Diagnosis Date   • Acute renal failure    • Anxiety    • Bell's palsy    • Cellulitis     LLE   • CHF (congestive heart failure)     Diastolic   • Diverticulosis    • GERD  (gastroesophageal reflux disease)    • Hepatic cirrhosis    • History of transfusion    • Hypertension    • Hypothyroidism    • Osteoporosis    • Thrombocytopenia    • Trigeminal neuralgia      Patient Active Problem List   Diagnosis   • Hepatic cirrhosis*   • HTN (hypertension)*   • Anxiety disorder*   • Osteopetrosis*   • Hypothyroidism*   • GERD (gastroesophageal reflux disease)*   • Chronic pain syndrome due to fractured spine and left rib fracture*   • Anemia, chronic disease   • Arthralgia of hip   • Left knee pain   • Fracture of proximal end of tibia and fibula   • Primary insomnia   • Thrombocytopenia   • Peripheral vascular disease   • Bilateral leg edema   • Cramp of both lower extremities   • Hepatic encephalopathy   • UTI (urinary tract infection)   • Hypokalemia   • Closed fracture of lower end of left femur with routine healing   • Status post open reduction with internal fixation of fracture   • Nonrheumatic aortic valve insufficiency, mild   • Chronic diastolic heart failure, mild   • Periprosthetic fracture of shaft of femur   • S/P ORIF (open reduction internal fixation) fracture   • Diarrhea   • Right upper quadrant abdominal pain   • Nausea       Social History   Substance Use Topics   • Smoking status: Never Smoker   • Smokeless tobacco: Former User     Types: Snuff     Quit date: 1970   • Alcohol use No      Comment: FORMER 12 BOTTLES PER DAY FOR 15 YEARS       Allergies   Allergen Reactions   • Ciprofloxacin        Current Outpatient Prescriptions on File Prior to Visit   Medication Sig   • acetaminophen (TYLENOL) 500 MG tablet Take 500 mg by mouth Every 6 (Six) Hours As Needed for Mild Pain .   • Alcohol Swabs pads Uses as directed.   • aspirin 81 MG tablet Take 1 tablet by mouth Daily.   • bumetanide (BUMEX) 1 MG tablet Take 1 tablet by mouth 2 (Two) Times a Day.   • Calcium Carbonate-Vitamin D (OYST-LILA-D 500 PO) Take  by mouth.   • cyanocobalamin 1000 MCG/ML injection Inject 1 mL into the  shoulder, thigh, or buttocks Every 28 (Twenty-Eight) Days.   • esomeprazole (nexIUM) 40 MG capsule Take 1 capsule by mouth Every Morning Before Breakfast.   • folic acid (FOLVITE) 1 MG tablet Take 1 tablet by mouth Daily.   • Insulin Pen Needle (BD AUTOSHIELD DUO) 30G X 5 MM misc Use as directed   • lactulose (CHRONULAC) 10 GM/15ML solution Take 30 mL by mouth 4 (Four) Times a Day.   • levothyroxine (SYNTHROID) 50 MCG tablet Take 1 tablet by mouth Daily.   • Multiple Vitamin (MULTI VITAMIN DAILY) tablet Take  by mouth.   • nitroglycerin (NITROSTAT) 0.4 MG SL tablet Place 1 tablet under the tongue Every 5 (Five) Minutes As Needed for Chest Pain. Take no more than 3 doses in 15 minutes.   • ondansetron (ZOFRAN) 4 MG tablet Take 4 mg by mouth Every 8 (Eight) Hours As Needed for Nausea or Vomiting.   • OXcarbazepine (TRILEPTAL) 150 MG tablet Take 1 tablet by mouth Every 12 (Twelve) Hours.   • potassium chloride (K-DUR) 10 MEQ CR tablet Take 1 tablet by mouth Daily.   • rifaximin (XIFAXAN) 550 MG tablet Take 1 tablet by mouth Every 12 (Twelve) Hours.   • rOPINIRole (REQUIP) 1 MG tablet Take 1 tablet by mouth Every Night. Take 1 hour before bedtime.   • teriparatide (FORTEO) 600 MCG/2.4ML injection Inject 0.08 mL under the skin Daily.   • vitamin D (ERGOCALCIFEROL) 13526 units capsule capsule Take 1 capsule by mouth 1 (One) Time Per Week.     No current facility-administered medications on file prior to visit.          The following portions of the patient's history were reviewed and updated as appropriate: allergies, current medications, past family history, past medical history, past social history, past surgical history and problem list.    Review of Systems   Constitution: Positive for weakness and malaise/fatigue.   HENT: Negative.  Negative for congestion.    Eyes: Negative.    Cardiovascular: Negative.  Negative for chest pain, cyanosis, dyspnea on exertion, irregular heartbeat, leg swelling, near-syncope,  "orthopnea, palpitations, paroxysmal nocturnal dyspnea and syncope.   Respiratory: Negative.  Negative for shortness of breath.    Hematologic/Lymphatic: Negative.    Musculoskeletal: Positive for back pain and myalgias.   Gastrointestinal: Negative.    Neurological: Negative for headaches.   Psychiatric/Behavioral: The patient has insomnia and is nervous/anxious.           Objective:     /66 (BP Location: Left arm, Patient Position: Sitting)   Pulse 82   Ht 160 cm (63\")   Wt 64.4 kg (142 lb)   SpO2 94%   BMI 25.15 kg/m²   Physical Exam   Constitutional: She appears well-developed and well-nourished. No distress.   HENT:   Head: Normocephalic and atraumatic.   Mouth/Throat: Oropharynx is clear and moist. No oropharyngeal exudate.   Eyes: Conjunctivae and EOM are normal. Pupils are equal, round, and reactive to light. No scleral icterus.   Neck: Normal range of motion. Neck supple. No JVD present. No tracheal deviation present. No thyromegaly present.   Cardiovascular: Normal rate, regular rhythm, normal heart sounds and intact distal pulses.  PMI is not displaced.  Exam reveals no gallop, no friction rub and no decreased pulses.    No murmur heard.  Pulses:       Carotid pulses are 3+ on the right side, and 3+ on the left side.       Radial pulses are 3+ on the right side, and 3+ on the left side.   Pulmonary/Chest: Effort normal and breath sounds normal. No respiratory distress. She has no wheezes. She has no rales. She exhibits no tenderness.   Abdominal: Soft. Bowel sounds are normal. She exhibits no distension, no abdominal bruit and no mass. There is no splenomegaly or hepatomegaly. There is no tenderness. There is no rebound and no guarding.   Musculoskeletal: Normal range of motion. She exhibits no edema, tenderness or deformity.   Lymphadenopathy:     She has no cervical adenopathy.   Neurological: She is alert. She has normal reflexes. No cranial nerve deficit. She exhibits normal muscle tone. " Coordination normal.   Skin: Skin is warm and dry. No rash noted. She is not diaphoretic. No erythema.   Psychiatric: She has a normal mood and affect. Her behavior is normal. Judgment and thought content normal.         Lab Review  Lab Results   Component Value Date     04/25/2018    K 4.1 04/25/2018     04/25/2018    BUN 17 04/25/2018    CREATININE 1.02 04/25/2018    GLUCOSE 98 04/25/2018    CALCIUM 9.9 04/25/2018    ALT 33 04/25/2018    ALKPHOS 136 (H) 04/25/2018    LABIL2 0.9 (L) 04/25/2018     Lab Results   Component Value Date    CKTOTAL 99 07/25/2017     Lab Results   Component Value Date    WBC 4.92 04/25/2018    HGB 13.6 04/25/2018    HCT 39.8 04/25/2018     (L) 04/25/2018     Lab Results   Component Value Date    INR 1.79 (H) 10/13/2017    INR 2.04 (H) 10/12/2017    INR 1.17 (H) 10/12/2017     Lab Results   Component Value Date    MG 2.0 10/17/2017     Lab Results   Component Value Date    TSH 1.156 04/25/2018     Lab Results   Component Value Date    BNP 11.0 10/05/2017     Lab Results   Component Value Date    CHLPL 161 04/08/2016    CHOL 185 04/25/2018    TRIG 169 (H) 04/25/2018    HDL 87 04/25/2018    VLDL 33.8 04/25/2018    LDLHDL 0.74 04/25/2018     Lab Results   Component Value Date    LDL 64 04/25/2018    LDL 66 04/18/2017       Procedures       I personally viewed and interpreted the patient's LAB data         Assessment:     1. Cirrhosis of liver without ascites, unspecified hepatic cirrhosis type    2. Essential hypertension    3. Nonrheumatic aortic valve insufficiency, mild    4. Peripheral vascular disease    5. Generalized anxiety disorder    6. Chronic pain syndrome due to fractured spine and left rib fracture*    7. Diarrhea, unspecified type    8. Hypothyroidism due to acquired atrophy of thyroid    9. Primary insomnia          Plan:      Labs reviewed with the patient.  Lipid panel is normal.  Thrombocytopenia is noted probably related to hepatic cirrhosis  Ammonia  level is quite high which also is due to hepatic cirrhosis    INR is elevated without any Coumadin therapy is also is due to liver dysfunction.  Patient was advised to increase his lactulose dose from three-day to four-day  Xifaxan preauthorization will be tried again.  Patient was advised not to increase Norco therapy however will make pain clinic appointment.  Follow-up scheduled    No Follow-up on file.

## 2018-04-26 NOTE — TELEPHONE ENCOUNTER
Called spoke with daughter, told her to stop Ferrous Sulfate ~ Per Dr. Gonsalves . She voiced understanding.     MD Roxanne Chan MA             Stop iron therapy, ferrous sulfate

## 2018-05-03 ENCOUNTER — OFFICE VISIT (OUTPATIENT)
Dept: GASTROENTEROLOGY | Facility: CLINIC | Age: 83
End: 2018-05-03

## 2018-05-03 VITALS
HEART RATE: 78 BPM | HEIGHT: 63 IN | SYSTOLIC BLOOD PRESSURE: 138 MMHG | BODY MASS INDEX: 25.16 KG/M2 | DIASTOLIC BLOOD PRESSURE: 55 MMHG | OXYGEN SATURATION: 94 % | WEIGHT: 142 LBS

## 2018-05-03 DIAGNOSIS — K74.60 CIRRHOSIS OF LIVER WITHOUT ASCITES, UNSPECIFIED HEPATIC CIRRHOSIS TYPE (HCC): Primary | ICD-10-CM

## 2018-05-03 DIAGNOSIS — K76.82 HEPATIC ENCEPHALOPATHY (HCC): ICD-10-CM

## 2018-05-03 PROCEDURE — 99214 OFFICE O/P EST MOD 30 MIN: CPT | Performed by: PHYSICIAN ASSISTANT

## 2018-05-03 NOTE — PROGRESS NOTES
Chief Complaint   Patient presents with   • Hepatic Disease       Alma Delia Garcia is a 87 y.o. female who presents to the office today for follow up appointment regarding Hepatic Disease.    HPI  Patient states that today, she is feeling well without any changed. She has been trying to get xifaxan approved but has not started taking it yet. She has had episodes of HE with confusion in the past but none recently.     She has cirrhosis of the liver and has history of alcohol abuse.  Patient is a [facility] of [facility].  There is history of hospitalizations related to elevated liver chemistries and ammonia. She will become very confused when this occurs per nursing staff who accompanies patient.  She is unsure if she has ever had an EGD or colonoscopy.      5/14/2018:  Ammonia normal  WBC 4.50 - 12.50 10*3/mm3 3.18     RBC 4.20 - 5.40 10*6/mm3 3.42     Hemoglobin 12.0 - 16.0 g/dL 11.5     Hematocrit 37.0 - 47.0 % 33.4     MCV 80.0 - 94.0 fL 97.7     MCH 27.0 - 33.0 pg 33.6     MCHC 33.0 - 37.0 g/dL 34.4    RDW 11.5 - 14.5 % 14.3    RDW-SD 37.0 - 54.0 fl 47.7    MPV 6.0 - 10.0 fL 11.3     Platelets 130 - 400 10*3/mm3 116       4/25/20118:  Glucose 70 - 110 mg/dL 98    BUN 7 - 21 mg/dL 17    Creatinine 0.43 - 1.29 mg/dL 1.02    Sodium 135 - 153 mmol/L 139    Potassium 3.5 - 5.3 mmol/L 4.1    Chloride 99 - 112 mmol/L 100    CO2 24.3 - 31.9 mmol/L 30.0    Calcium 7.7 - 10.0 mg/dL 9.9    Total Protein 6.0 - 8.0 g/dL 7.9    Albumin 3.40 - 4.80 g/dL 3.80    ALT (SGPT) 10 - 36 U/L 33    AST (SGOT) 10 - 30 U/L 56     Alkaline Phosphatase 35 - 104 U/L 136     Comment: Note New Reference Ranges   Total Bilirubin 0.2 - 1.8 mg/dL 1.9     Comment: 1+ Icteric    eGFR Non African Amer >60 mL/min/1.73 51     Globulin gm/dL 4.1    A/G Ratio 1.5 - 2.5 g/dL 0.9     BUN/Creatinine Ratio 7.0 - 25.0 16.7    Anion Gap 3.6 - 11.2 mmol/L 9.0      12/2016 CT abd/pelv:  FINDINGS: The images acquired through the bases of the lungs  show  interstitial lung disease changes in both lung bases with some pleural  thickening along the dependent wall of the chest. The liver shows  dilatation of the bile ducts in the liver. The common hepatic and common  bile ducts were both dilated but not significantly changed from 09/2016.  The spleen was not enlarged. The pancreas shows no evidence of mass. A  nasogastric tube is seen in the stomach. The kidneys showed no evidence  of obstruction. There was no ascites or free air. There is a moderate  amount of gas and stool throughout the colon suggesting constipation.  There were no ventral hernias.     IMPRESSION:  Diffuse dilatation of the bile ducts in the liver and the  extrahepatic biliary tree is seen; this was also present back in  09/2016. There is interstitial lung disease in the lung bases. There is  a moderate amount of gas and stool throughout the colon suggesting  constipation.    Review of Systems   Constitutional: Positive for fatigue.   HENT: Positive for trouble swallowing.    Respiratory: Positive for choking and shortness of breath.    Cardiovascular: Positive for chest pain.   Gastrointestinal: Positive for abdominal distention, abdominal pain and diarrhea. Negative for anal bleeding, blood in stool, constipation, nausea, rectal pain and vomiting.   Neurological: Positive for dizziness, light-headedness and headaches.     Specialty Problems        Gastroenterology Problems    GERD (gastroesophageal reflux disease)*        Hepatic cirrhosis*            Past Medical History:   Diagnosis Date   • Acute renal failure    • Anxiety    • Bell's palsy    • Cellulitis     LLE   • CHF (congestive heart failure)     Diastolic   • Diverticulosis    • GERD (gastroesophageal reflux disease)    • Hepatic cirrhosis    • History of transfusion    • Hypertension    • Hypothyroidism    • Osteoporosis    • Thrombocytopenia    • Trigeminal neuralgia      Past Surgical History:   Procedure Laterality Date   • BACK  SURGERY      KYPHOPLASTIES    • CARDIAC CATHETERIZATION Left 08/2004   • CARDIOVASCULAR STRESS TEST  01/25/2015   • CATARACT EXTRACTION     • CHOLECYSTECTOMY     • CLAVICLE SURGERY Right    • COLONOSCOPY  10/2004   • ECHO - CONVERTED  11/2012   • FEMUR OPEN REDUCTION INTERNAL FIXATION Left 10/12/2017    Procedure: FEMUR OPEN REDUCTION INTERNAL FIXATION;  Surgeon: Karson Pierre MD;  Location: [facility];  Service:    • HIP FRACTURE SURGERY Bilateral     ARTHROPLASTIES    • PARTIAL HYSTERECTOMY     • WRIST FRACTURE SURGERY       Family History   Problem Relation Age of Onset   • Cancer Mother    • Heart disease Father    • Arthritis Sister    • Osteoporosis Sister    • Diabetes Maternal Aunt      Social History   Substance Use Topics   • Smoking status: Never Smoker   • Smokeless tobacco: Former User     Types: Snuff     Quit date: 1970   • Alcohol use No      Comment: FORMER 12 BOTTLES PER DAY FOR 15 YEARS       CURRENT MEDICATION:  •  acetaminophen (TYLENOL) 500 MG tablet, Take 500 mg by mouth Every 6 (Six) Hours As Needed for Mild Pain ., Disp: , Rfl:   •  Alcohol Swabs pads, Uses as directed., Disp: 1 each, Rfl: 12  •  aspirin 81 MG tablet, Take 1 tablet by mouth Daily., Disp: 90 tablet, Rfl: 3  •  bumetanide (BUMEX) 1 MG tablet, Take 1 tablet by mouth 2 (Two) Times a Day., Disp: 180 tablet, Rfl: 3  •  Calcium Carbonate-Vitamin D (OYST-LILA-D 500 PO), Take  by mouth., Disp: , Rfl:   •  cyanocobalamin 1000 MCG/ML injection, Inject 1 mL into the shoulder, thigh, or buttocks Every 28 (Twenty-Eight) Days., Disp: , Rfl:   •  esomeprazole (nexIUM) 40 MG capsule, Take 1 capsule by mouth Every Morning Before Breakfast., Disp: 90 capsule, Rfl: 3  •  folic acid (FOLVITE) 1 MG tablet, Take 1 tablet by mouth Daily., Disp: 90 tablet, Rfl: 3  •  HYDROcodone-acetaminophen (NORCO) 5-325 MG per tablet, Take 1 tablet by mouth Every 6 (Six) Hours As Needed for Moderate Pain ., Disp: 30 tablet, Rfl: 0  •  Insulin Pen  "Needle (BD AUTOSHIELD DUO) 30G X 5 MM misc, Use as directed, Disp: 1 each, Rfl: 12  •  lactulose (CHRONULAC) 10 GM/15ML solution, Take 30 mL by mouth 4 (Four) Times a Day., Disp: , Rfl:   •  levothyroxine (SYNTHROID) 50 MCG tablet, Take 1 tablet by mouth Daily., Disp: 90 tablet, Rfl: 3  •  Multiple Vitamin (MULTI VITAMIN DAILY) tablet, Take  by mouth., Disp: , Rfl:   •  nitroglycerin (NITROSTAT) 0.4 MG SL tablet, Place 1 tablet under the tongue Every 5 (Five) Minutes As Needed for Chest Pain. Take no more than 3 doses in 15 minutes., Disp: 25 tablet  •  ondansetron (ZOFRAN) 4 MG tablet, Take 4 mg by mouth Every 8 (Eight) Hours As Needed for Nausea or Vomiting., Disp: , Rfl:   •  OXcarbazepine (TRILEPTAL) 150 MG tablet, Take 1 tablet by mouth Every 12 (Twelve) Hours., Disp: 180 tablet, Rfl: 0  •  potassium chloride (K-DUR) 10 MEQ CR tablet, Take 1 tablet by mouth Daily., Disp: 90 tablet, Rfl: 3  •  rOPINIRole (REQUIP) 1 MG tablet, Take 1 tablet by mouth Every Night. Take 1 hour before bedtime., Disp: 90 tablet, Rfl: 3  •  teriparatide (FORTEO) 600 MCG/2.4ML injection, Inject 0.08 mL under the skin Daily., Disp: 2.4 mL, Rfl: 12  •  vitamin D (ERGOCALCIFEROL) 32055 units capsule capsule, Take 1 capsule by mouth 1 (One) Time Per Week., Disp: 12 capsule, Rfl: 0  •  rifaximin (XIFAXAN) 550 MG tablet, Take 1 tablet by mouth Every 12 (Twelve) Hours., Disp: 12 tablet, Rfl: 0    ALLERGIES:  Ciprofloxacin    VISIT VITALS:  /55   Pulse 78   Ht 160 cm (63\")   Wt 64.4 kg (142 lb)   SpO2 94%   BMI 25.15 kg/m²     Physical Exam   Constitutional: She is oriented to person, place, and time. She appears well-developed and well-nourished. No distress.   HENT:   Head: Normocephalic and atraumatic.   Nose: Nose normal.   Mouth/Throat: Oropharynx is clear and moist.   Eyes: Conjunctivae are normal. Right eye exhibits no discharge. Left eye exhibits no discharge. No scleral icterus.   Neck: Normal range of motion. No JVD " present.   Cardiovascular: Normal rate, regular rhythm and normal heart sounds.  Exam reveals no gallop and no friction rub.    No murmur heard.  Pulmonary/Chest: Effort normal and breath sounds normal. No respiratory distress. She has no wheezes. She has no rales. She exhibits no tenderness.   Abdominal: Soft. Bowel sounds are normal. She exhibits no mass. There is tenderness (generalized, mild).   Musculoskeletal: Normal range of motion. She exhibits no edema or deformity.   Mobility via wheelchair   Neurological: She is alert and oriented to person, place, and time. Coordination abnormal.   Skin: Skin is warm and dry. No rash noted. She is not diaphoretic. No erythema.   Psychiatric: She has a normal mood and affect. Her behavior is normal. Judgment and thought content normal.   Vitals reviewed.      Assessment/Plan     1. Cirrhosis of liver without ascites, unspecified hepatic cirrhosis type    2. Hepatic encephalopathy      Orders Placed This Encounter   Procedures   • US Liver     She will complete US liver as recommended. She will start taking Xifaxan 550 mg BID for treatment of Hepatic Encephalopathy. Orders sent to nursing home where patient resides. She will have nurse call with concerns. Due to patient's age, we will be conservative with management and monitoring.           Return in about 2 months (around 7/3/2018) for recheck liver disease.      Electronically signed 5/29/2018 12:00 PM  Selena Castro PA-C, [facility] Gastroenter[facility]

## 2018-05-07 ENCOUNTER — LAB REQUISITION (OUTPATIENT)
Dept: LAB | Facility: HOSPITAL | Age: 83
End: 2018-05-07

## 2018-05-07 DIAGNOSIS — K74.69 OTHER CIRRHOSIS OF LIVER (HCC): ICD-10-CM

## 2018-05-07 DIAGNOSIS — D64.9 ANEMIA: ICD-10-CM

## 2018-05-07 LAB
AMMONIA BLD-SCNC: 71 UMOL/L (ref 11–51)
BASOPHILS # BLD AUTO: 0.04 10*3/MM3 (ref 0–0.3)
BASOPHILS NFR BLD AUTO: 1.2 % (ref 0–2)
DEPRECATED RDW RBC AUTO: 49.3 FL (ref 37–54)
EOSINOPHIL # BLD AUTO: 0.15 10*3/MM3 (ref 0–0.7)
EOSINOPHIL NFR BLD AUTO: 4.5 % (ref 0–7)
ERYTHROCYTE [DISTWIDTH] IN BLOOD BY AUTOMATED COUNT: 14.2 % (ref 11.5–14.5)
HCT VFR BLD AUTO: 33.6 % (ref 37–47)
HGB BLD-MCNC: 11.5 G/DL (ref 12–16)
IMM GRANULOCYTES # BLD: 0.01 10*3/MM3 (ref 0–0.03)
IMM GRANULOCYTES NFR BLD: 0.3 % (ref 0–0.5)
LYMPHOCYTES # BLD AUTO: 1.14 10*3/MM3 (ref 1–3)
LYMPHOCYTES NFR BLD AUTO: 33.9 % (ref 16–46)
MCH RBC QN AUTO: 32.6 PG (ref 27–33)
MCHC RBC AUTO-ENTMCNC: 34.2 G/DL (ref 33–37)
MCV RBC AUTO: 95.2 FL (ref 80–94)
MONOCYTES # BLD AUTO: 0.45 10*3/MM3 (ref 0.1–0.9)
MONOCYTES NFR BLD AUTO: 13.4 % (ref 0–12)
NEUTROPHILS # BLD AUTO: 1.57 10*3/MM3 (ref 1.4–6.5)
NEUTROPHILS NFR BLD AUTO: 46.7 % (ref 40–75)
PLATELET # BLD AUTO: 84 10*3/MM3 (ref 130–400)
PMV BLD AUTO: 11.2 FL (ref 6–10)
RBC # BLD AUTO: 3.53 10*6/MM3 (ref 4.2–5.4)
WBC NRBC COR # BLD: 3.36 10*3/MM3 (ref 4.5–12.5)

## 2018-05-07 PROCEDURE — 85025 COMPLETE CBC W/AUTO DIFF WBC: CPT | Performed by: INTERNAL MEDICINE

## 2018-05-07 PROCEDURE — 82140 ASSAY OF AMMONIA: CPT | Performed by: INTERNAL MEDICINE

## 2018-05-14 ENCOUNTER — LAB REQUISITION (OUTPATIENT)
Dept: LAB | Facility: HOSPITAL | Age: 83
End: 2018-05-14

## 2018-05-14 DIAGNOSIS — D64.9 ANEMIA: ICD-10-CM

## 2018-05-14 DIAGNOSIS — I10 ESSENTIAL (PRIMARY) HYPERTENSION: ICD-10-CM

## 2018-05-14 LAB
AMMONIA BLD-SCNC: 45 UMOL/L (ref 11–51)
BASOPHILS # BLD AUTO: 0.02 10*3/MM3 (ref 0–0.3)
BASOPHILS NFR BLD AUTO: 0.6 % (ref 0–2)
DEPRECATED RDW RBC AUTO: 47.7 FL (ref 37–54)
EOSINOPHIL # BLD AUTO: 0.14 10*3/MM3 (ref 0–0.7)
EOSINOPHIL NFR BLD AUTO: 4.4 % (ref 0–7)
ERYTHROCYTE [DISTWIDTH] IN BLOOD BY AUTOMATED COUNT: 14.3 % (ref 11.5–14.5)
HCT VFR BLD AUTO: 33.4 % (ref 37–47)
HGB BLD-MCNC: 11.5 G/DL (ref 12–16)
IMM GRANULOCYTES # BLD: 0.01 10*3/MM3 (ref 0–0.03)
IMM GRANULOCYTES NFR BLD: 0.3 % (ref 0–0.5)
LYMPHOCYTES # BLD AUTO: 0.94 10*3/MM3 (ref 1–3)
LYMPHOCYTES NFR BLD AUTO: 29.6 % (ref 16–46)
MCH RBC QN AUTO: 33.6 PG (ref 27–33)
MCHC RBC AUTO-ENTMCNC: 34.4 G/DL (ref 33–37)
MCV RBC AUTO: 97.7 FL (ref 80–94)
MONOCYTES # BLD AUTO: 0.38 10*3/MM3 (ref 0.1–0.9)
MONOCYTES NFR BLD AUTO: 11.9 % (ref 0–12)
NEUTROPHILS # BLD AUTO: 1.69 10*3/MM3 (ref 1.4–6.5)
NEUTROPHILS NFR BLD AUTO: 53.2 % (ref 40–75)
PLATELET # BLD AUTO: 116 10*3/MM3 (ref 130–400)
PMV BLD AUTO: 11.3 FL (ref 6–10)
RBC # BLD AUTO: 3.42 10*6/MM3 (ref 4.2–5.4)
WBC NRBC COR # BLD: 3.18 10*3/MM3 (ref 4.5–12.5)

## 2018-05-14 PROCEDURE — 85025 COMPLETE CBC W/AUTO DIFF WBC: CPT | Performed by: INTERNAL MEDICINE

## 2018-05-14 PROCEDURE — 82140 ASSAY OF AMMONIA: CPT | Performed by: INTERNAL MEDICINE

## 2018-05-21 ENCOUNTER — APPOINTMENT (OUTPATIENT)
Dept: ULTRASOUND IMAGING | Facility: HOSPITAL | Age: 83
End: 2018-05-21

## 2018-05-22 ENCOUNTER — LAB REQUISITION (OUTPATIENT)
Dept: LAB | Facility: HOSPITAL | Age: 83
End: 2018-05-22

## 2018-05-22 DIAGNOSIS — K70.30 ALCOHOLIC CIRRHOSIS OF LIVER WITHOUT ASCITES (HCC): ICD-10-CM

## 2018-05-22 LAB
BASOPHILS # BLD AUTO: 0.03 10*3/MM3 (ref 0–0.3)
BASOPHILS NFR BLD AUTO: 0.7 % (ref 0–2)
DEPRECATED RDW RBC AUTO: 51.1 FL (ref 37–54)
EOSINOPHIL # BLD AUTO: 0.12 10*3/MM3 (ref 0–0.7)
EOSINOPHIL NFR BLD AUTO: 2.8 % (ref 0–7)
ERYTHROCYTE [DISTWIDTH] IN BLOOD BY AUTOMATED COUNT: 14.5 % (ref 11.5–14.5)
HCT VFR BLD AUTO: 33.4 % (ref 37–47)
HGB BLD-MCNC: 11.4 G/DL (ref 12–16)
IMM GRANULOCYTES # BLD: 0.01 10*3/MM3 (ref 0–0.03)
IMM GRANULOCYTES NFR BLD: 0.2 % (ref 0–0.5)
LYMPHOCYTES # BLD AUTO: 0.81 10*3/MM3 (ref 1–3)
LYMPHOCYTES NFR BLD AUTO: 18.8 % (ref 16–46)
MCH RBC QN AUTO: 33.1 PG (ref 27–33)
MCHC RBC AUTO-ENTMCNC: 34.1 G/DL (ref 33–37)
MCV RBC AUTO: 97.1 FL (ref 80–94)
MONOCYTES # BLD AUTO: 0.54 10*3/MM3 (ref 0.1–0.9)
MONOCYTES NFR BLD AUTO: 12.5 % (ref 0–12)
NEUTROPHILS # BLD AUTO: 2.8 10*3/MM3 (ref 1.4–6.5)
NEUTROPHILS NFR BLD AUTO: 65 % (ref 40–75)
PLATELET # BLD AUTO: 103 10*3/MM3 (ref 130–400)
PMV BLD AUTO: 11.3 FL (ref 6–10)
RBC # BLD AUTO: 3.44 10*6/MM3 (ref 4.2–5.4)
WBC NRBC COR # BLD: 4.31 10*3/MM3 (ref 4.5–12.5)

## 2018-05-22 PROCEDURE — 85025 COMPLETE CBC W/AUTO DIFF WBC: CPT | Performed by: INTERNAL MEDICINE

## 2018-05-25 ENCOUNTER — OFFICE VISIT (OUTPATIENT)
Dept: CARDIOLOGY | Facility: CLINIC | Age: 83
End: 2018-05-25

## 2018-05-25 VITALS
OXYGEN SATURATION: 96 % | WEIGHT: 140 LBS | HEIGHT: 63 IN | HEART RATE: 78 BPM | SYSTOLIC BLOOD PRESSURE: 144 MMHG | BODY MASS INDEX: 24.8 KG/M2 | DIASTOLIC BLOOD PRESSURE: 68 MMHG

## 2018-05-25 DIAGNOSIS — I50.32 CHRONIC DIASTOLIC HEART FAILURE (HCC): ICD-10-CM

## 2018-05-25 DIAGNOSIS — G89.4 CHRONIC PAIN SYNDROME: ICD-10-CM

## 2018-05-25 DIAGNOSIS — K74.60 CIRRHOSIS OF LIVER WITHOUT ASCITES, UNSPECIFIED HEPATIC CIRRHOSIS TYPE (HCC): Primary | ICD-10-CM

## 2018-05-25 DIAGNOSIS — R60.0 BILATERAL LEG EDEMA: ICD-10-CM

## 2018-05-25 DIAGNOSIS — I10 ESSENTIAL HYPERTENSION: ICD-10-CM

## 2018-05-25 PROCEDURE — 99213 OFFICE O/P EST LOW 20 MIN: CPT | Performed by: INTERNAL MEDICINE

## 2018-05-25 RX ORDER — CYANOCOBALAMIN 1000 UG/ML
1000 INJECTION, SOLUTION INTRAMUSCULAR; SUBCUTANEOUS
Qty: 10 ML | Refills: 1 | Status: SHIPPED | OUTPATIENT
Start: 2018-05-25 | End: 2018-10-31 | Stop reason: DRUGHIGH

## 2018-05-25 RX ORDER — HYDROCODONE BITARTRATE AND ACETAMINOPHEN 5; 325 MG/1; MG/1
1 TABLET ORAL DAILY PRN
Qty: 30 TABLET | Refills: 0 | Status: SHIPPED | OUTPATIENT
Start: 2018-05-25 | End: 2018-05-25 | Stop reason: SDUPTHER

## 2018-05-25 RX ORDER — HYDROCODONE BITARTRATE AND ACETAMINOPHEN 5; 325 MG/1; MG/1
1 TABLET ORAL EVERY 6 HOURS PRN
Qty: 30 TABLET | Refills: 0 | Status: SHIPPED | OUTPATIENT
Start: 2018-05-25 | End: 2018-05-25 | Stop reason: SDUPTHER

## 2018-05-25 RX ORDER — SPIRONOLACTONE 25 MG/1
25 TABLET ORAL DAILY
Qty: 30 TABLET | Refills: 11 | Status: SHIPPED | OUTPATIENT
Start: 2018-05-25 | End: 2018-08-13

## 2018-05-25 RX ORDER — HYDROCODONE BITARTRATE AND ACETAMINOPHEN 5; 325 MG/1; MG/1
1 TABLET ORAL 2 TIMES DAILY PRN
Qty: 60 TABLET | Refills: 0 | Status: SHIPPED | OUTPATIENT
Start: 2018-05-25 | End: 2018-06-05 | Stop reason: SDUPTHER

## 2018-05-25 NOTE — PROGRESS NOTES
subjective     Chief Complaint   Patient presents with   • Hip Pain   • Knee Pain   • Pain   • Follow-up     History of Present Illness  Patient is 87 years old white female who was brought to the office accompanied by her daughter who insisted patient's pain medication should be increased.  Patient complains of knee pain and hip pain back pain.  She has prior multiple healed fractures in the past.    Patient also has a hepatic cirrhosis and multiple episodes of hepatic encephalopathy and coma.  She is taking lactulose but states that she cannot afford Xifaxan.  Even after authorization of the medicine is very expensive.      Patient complains of bilateral lower extremity edema which is gradually getting worse.  Patient used to have a lot of ascites and multiple abdominal paracenteses but now she does not have increasing ascites.    Blood pressure is controlled.  Patient also has hypothyroidism on Synthroid replacement therapy  She is taking Nexium and has no GI complaints at this time.      Patient is also taking Forteo for osteoporosis.  Which was started at the hospital  Requip for leg cramps which was restarted at the hospital  And Trileptal which apparently was started in March 2018 at the nursing home for neuropathy.    Past Surgical History:   Procedure Laterality Date   • BACK SURGERY      KYPHOPLASTIES    • CARDIAC CATHETERIZATION Left 08/2004   • CARDIOVASCULAR STRESS TEST  01/25/2015   • CATARACT EXTRACTION     • CHOLECYSTECTOMY     • CLAVICLE SURGERY Right    • COLONOSCOPY  10/2004   • ECHO - CONVERTED  11/2012   • FEMUR OPEN REDUCTION INTERNAL FIXATION Left 10/12/2017    Procedure: FEMUR OPEN REDUCTION INTERNAL FIXATION;  Surgeon: Karson Pierre MD;  Location: [facility];  Service:    • HIP FRACTURE SURGERY Bilateral     ARTHROPLASTIES    • PARTIAL HYSTERECTOMY     • WRIST FRACTURE SURGERY       Family History   Problem Relation Age of Onset   • Cancer Mother    • Heart disease Father    •  Arthritis Sister    • Osteoporosis Sister    • Diabetes Maternal Aunt      Past Medical History:   Diagnosis Date   • Acute renal failure    • Anxiety    • Bell's palsy    • Cellulitis     LLE   • CHF (congestive heart failure)     Diastolic   • Diverticulosis    • GERD (gastroesophageal reflux disease)    • Hepatic cirrhosis    • History of transfusion    • Hypertension    • Hypothyroidism    • Osteoporosis    • Thrombocytopenia    • Trigeminal neuralgia      Patient Active Problem List   Diagnosis   • Hepatic cirrhosis*   • HTN (hypertension)*   • Anxiety disorder*   • Osteopetrosis*   • Hypothyroidism*   • GERD (gastroesophageal reflux disease)*   • Chronic pain syndrome due to fractured spine and left rib fracture*   • Anemia, chronic disease   • Arthralgia of hip   • Left knee pain   • Fracture of proximal end of tibia and fibula   • Primary insomnia   • Thrombocytopenia   • Peripheral vascular disease   • Bilateral leg edema   • Cramp of both lower extremities   • Hepatic encephalopathy   • UTI (urinary tract infection)   • Hypokalemia   • Closed fracture of lower end of left femur with routine healing   • Status post open reduction with internal fixation of fracture   • Nonrheumatic aortic valve insufficiency, mild   • Chronic diastolic heart failure, mild   • Periprosthetic fracture of shaft of femur   • S/P ORIF (open reduction internal fixation) fracture   • Diarrhea   • Right upper quadrant abdominal pain   • Nausea       Social History   Substance Use Topics   • Smoking status: Never Smoker   • Smokeless tobacco: Former User     Types: Snuff     Quit date: 1970   • Alcohol use No      Comment: FORMER 12 BOTTLES PER DAY FOR 15 YEARS       Allergies   Allergen Reactions   • Ciprofloxacin        Current Outpatient Prescriptions on File Prior to Visit   Medication Sig   • Alcohol Swabs pads Uses as directed.   • aspirin 81 MG tablet Take 1 tablet by mouth Daily.   • bumetanide (BUMEX) 1 MG tablet Take 1  tablet by mouth 2 (Two) Times a Day.   • Calcium Carbonate-Vitamin D (OYST-LILA-D 500 PO) Take  by mouth.   • esomeprazole (nexIUM) 40 MG capsule Take 1 capsule by mouth Every Morning Before Breakfast.   • folic acid (FOLVITE) 1 MG tablet Take 1 tablet by mouth Daily.   • Insulin Pen Needle (BD AUTOSHIELD DUO) 30G X 5 MM misc Use as directed   • lactulose (CHRONULAC) 10 GM/15ML solution Take 30 mL by mouth 4 (Four) Times a Day.   • levothyroxine (SYNTHROID) 50 MCG tablet Take 1 tablet by mouth Daily.   • Multiple Vitamin (MULTI VITAMIN DAILY) tablet Take  by mouth.   • nitroglycerin (NITROSTAT) 0.4 MG SL tablet Place 1 tablet under the tongue Every 5 (Five) Minutes As Needed for Chest Pain. Take no more than 3 doses in 15 minutes.   • ondansetron (ZOFRAN) 4 MG tablet Take 4 mg by mouth Every 8 (Eight) Hours As Needed for Nausea or Vomiting.   • OXcarbazepine (TRILEPTAL) 150 MG tablet Take 1 tablet by mouth Every 12 (Twelve) Hours.   • rOPINIRole (REQUIP) 1 MG tablet Take 1 tablet by mouth Every Night. Take 1 hour before bedtime.   • teriparatide (FORTEO) 600 MCG/2.4ML injection Inject 0.08 mL under the skin Daily.   • vitamin D (ERGOCALCIFEROL) 82970 units capsule capsule Take 1 capsule by mouth 1 (One) Time Per Week.   • [DISCONTINUED] acetaminophen (TYLENOL) 500 MG tablet Take 500 mg by mouth Every 6 (Six) Hours As Needed for Mild Pain .   • [DISCONTINUED] cyanocobalamin 1000 MCG/ML injection Inject 1 mL into the shoulder, thigh, or buttocks Every 28 (Twenty-Eight) Days.   • [DISCONTINUED] HYDROcodone-acetaminophen (NORCO) 5-325 MG per tablet Take 1 tablet by mouth Every 6 (Six) Hours As Needed for Moderate Pain .   • [DISCONTINUED] potassium chloride (K-DUR) 10 MEQ CR tablet Take 1 tablet by mouth Daily.   • rifaximin (XIFAXAN) 550 MG tablet Take 1 tablet by mouth Every 12 (Twelve) Hours.     No current facility-administered medications on file prior to visit.          The following portions of the patient's  "history were reviewed and updated as appropriate: allergies, current medications, past family history, past medical history, past social history, past surgical history and problem list.    Review of Systems   Constitution: Positive for malaise/fatigue.   HENT: Negative.  Negative for congestion.    Eyes: Negative.    Cardiovascular: Positive for leg swelling. Negative for chest pain, cyanosis, dyspnea on exertion, irregular heartbeat, near-syncope, orthopnea, palpitations, paroxysmal nocturnal dyspnea and syncope.   Respiratory: Negative.  Negative for shortness of breath.    Endocrine: Negative.    Hematologic/Lymphatic: Negative.    Skin: Negative.    Musculoskeletal: Positive for arthritis, back pain, joint pain, myalgias and stiffness.   Gastrointestinal: Negative.    Genitourinary: Negative.    Neurological: Negative.  Negative for headaches.   Psychiatric/Behavioral: The patient is nervous/anxious.           Objective:     /68 (BP Location: Left arm, Patient Position: Sitting)   Pulse 78   Ht 160 cm (63\")   Wt 63.5 kg (140 lb)   SpO2 96%   BMI 24.80 kg/m²   Physical Exam   Constitutional: She appears well-developed and well-nourished. No distress.   HENT:   Head: Normocephalic and atraumatic.   Mouth/Throat: Oropharynx is clear and moist. No oropharyngeal exudate.   Eyes: Conjunctivae and EOM are normal. Pupils are equal, round, and reactive to light. No scleral icterus.   Neck: Normal range of motion. Neck supple. No JVD present. No tracheal deviation present. No thyromegaly present.   Cardiovascular: Normal rate, regular rhythm, normal heart sounds and intact distal pulses.  PMI is not displaced.  Exam reveals no gallop, no friction rub and no decreased pulses.    No murmur heard.  Pulses:       Carotid pulses are 3+ on the right side, and 3+ on the left side.       Radial pulses are 3+ on the right side, and 3+ on the left side.   Pulmonary/Chest: Effort normal and breath sounds normal. No " respiratory distress. She has no wheezes. She has no rales. She exhibits no tenderness.   Abdominal: Soft. Bowel sounds are normal. She exhibits no distension, no abdominal bruit and no mass. There is no splenomegaly or hepatomegaly. There is no tenderness. There is no rebound and no guarding.   Musculoskeletal: Normal range of motion. She exhibits edema. She exhibits no tenderness or deformity.   Marked bilateral lower extremity edema up to the groin area   Lymphadenopathy:     She has no cervical adenopathy.   Neurological: She is alert. She has normal reflexes. No cranial nerve deficit. She exhibits normal muscle tone. Coordination normal.   Skin: Skin is warm and dry. No rash noted. She is not diaphoretic. No erythema.   Psychiatric: She has a normal mood and affect. Her behavior is normal. Judgment and thought content normal.         Lab Review  Lab Results   Component Value Date     04/25/2018    K 4.1 04/25/2018     04/25/2018    BUN 17 04/25/2018    CREATININE 1.02 04/25/2018    GLUCOSE 98 04/25/2018    CALCIUM 9.9 04/25/2018    ALT 33 04/25/2018    ALKPHOS 136 (H) 04/25/2018    LABIL2 0.9 (L) 04/25/2018     Lab Results   Component Value Date    CKTOTAL 99 07/25/2017     Lab Results   Component Value Date    WBC 4.31 (L) 05/22/2018    HGB 11.4 (L) 05/22/2018    HCT 33.4 (L) 05/22/2018     (L) 05/22/2018     Lab Results   Component Value Date    INR 1.79 (H) 10/13/2017    INR 2.04 (H) 10/12/2017    INR 1.17 (H) 10/12/2017     Lab Results   Component Value Date    MG 2.0 10/17/2017     Lab Results   Component Value Date    TSH 1.156 04/25/2018     Lab Results   Component Value Date    BNP 11.0 10/05/2017     Lab Results   Component Value Date    CHLPL 161 04/08/2016    CHOL 185 04/25/2018    TRIG 169 (H) 04/25/2018    HDL 87 04/25/2018    VLDL 33.8 04/25/2018    LDLHDL 0.74 04/25/2018     Lab Results   Component Value Date    LDL 64 04/25/2018    LDL 66 04/18/2017       Procedures       I  personally viewed and interpreted the patient's LAB data         Assessment:     1. Cirrhosis of liver without ascites, unspecified hepatic cirrhosis type    2. Chronic diastolic heart failure, mild    3. Essential hypertension    4. Bilateral leg edema    5. Chronic pain syndrome due to fractured spine and left rib fracture*          Plan:      Patient has hepatic cirrhosis with multiple episodes of hepatic encephalopathy and coma.  She apparently has been taking extra Tylenol and she was advised to DC Tylenol  Norco will be increased to 5 twice a day when necessary    Patient has developed marked bilateral lower extremity edema she was advised to continue Bumex 1 mg twice a day and add Aldactone 25 daily    She will DC potassium.  Follow-up scheduled      No Follow-up on file.

## 2018-05-28 ENCOUNTER — LAB REQUISITION (OUTPATIENT)
Dept: LAB | Facility: HOSPITAL | Age: 83
End: 2018-05-28

## 2018-05-28 DIAGNOSIS — K70.30 ALCOHOLIC CIRRHOSIS OF LIVER WITHOUT ASCITES (HCC): ICD-10-CM

## 2018-05-28 LAB
AMMONIA BLD-SCNC: 72 UMOL/L (ref 11–51)
BASOPHILS # BLD AUTO: 0.02 10*3/MM3 (ref 0–0.3)
BASOPHILS NFR BLD AUTO: 0.5 % (ref 0–2)
DEPRECATED RDW RBC AUTO: 50.3 FL (ref 37–54)
EOSINOPHIL # BLD AUTO: 0.35 10*3/MM3 (ref 0–0.7)
EOSINOPHIL NFR BLD AUTO: 9 % (ref 0–7)
ERYTHROCYTE [DISTWIDTH] IN BLOOD BY AUTOMATED COUNT: 14.7 % (ref 11.5–14.5)
HCT VFR BLD AUTO: 35.7 % (ref 37–47)
HGB BLD-MCNC: 12.1 G/DL (ref 12–16)
IMM GRANULOCYTES # BLD: 0 10*3/MM3 (ref 0–0.03)
IMM GRANULOCYTES NFR BLD: 0 % (ref 0–0.5)
LYMPHOCYTES # BLD AUTO: 1.32 10*3/MM3 (ref 1–3)
LYMPHOCYTES NFR BLD AUTO: 34.1 % (ref 16–46)
MCH RBC QN AUTO: 33.3 PG (ref 27–33)
MCHC RBC AUTO-ENTMCNC: 33.9 G/DL (ref 33–37)
MCV RBC AUTO: 98.3 FL (ref 80–94)
MONOCYTES # BLD AUTO: 0.4 10*3/MM3 (ref 0.1–0.9)
MONOCYTES NFR BLD AUTO: 10.3 % (ref 0–12)
NEUTROPHILS # BLD AUTO: 1.78 10*3/MM3 (ref 1.4–6.5)
NEUTROPHILS NFR BLD AUTO: 46.1 % (ref 40–75)
PLATELET # BLD AUTO: 117 10*3/MM3 (ref 130–400)
PMV BLD AUTO: 11.4 FL (ref 6–10)
RBC # BLD AUTO: 3.63 10*6/MM3 (ref 4.2–5.4)
WBC NRBC COR # BLD: 3.87 10*3/MM3 (ref 4.5–12.5)

## 2018-05-28 PROCEDURE — 85025 COMPLETE CBC W/AUTO DIFF WBC: CPT | Performed by: INTERNAL MEDICINE

## 2018-05-28 PROCEDURE — 82140 ASSAY OF AMMONIA: CPT | Performed by: INTERNAL MEDICINE

## 2018-05-29 ENCOUNTER — TELEPHONE (OUTPATIENT)
Dept: CARDIOLOGY | Facility: CLINIC | Age: 83
End: 2018-05-29

## 2018-05-29 RX ORDER — LACTULOSE 10 G/15ML
20 SOLUTION ORAL
Start: 2018-05-29 | End: 2019-04-18 | Stop reason: HOSPADM

## 2018-05-29 NOTE — TELEPHONE ENCOUNTER
----- Message from Galina Gonsalves MD sent at 5/29/2018 12:11 PM EDT -----  Ammonia level is elevated make sure taking Xifaxan and increase lactulose by 1

## 2018-06-04 ENCOUNTER — HOSPITAL ENCOUNTER (OUTPATIENT)
Dept: ULTRASOUND IMAGING | Facility: HOSPITAL | Age: 83
Discharge: HOME | End: 2018-06-04
Admitting: PHYSICIAN ASSISTANT

## 2018-06-04 DIAGNOSIS — K74.60 CIRRHOSIS OF LIVER WITHOUT ASCITES, UNSPECIFIED HEPATIC CIRRHOSIS TYPE (HCC): ICD-10-CM

## 2018-06-04 PROCEDURE — 76705 ECHO EXAM OF ABDOMEN: CPT | Performed by: RADIOLOGY

## 2018-06-04 PROCEDURE — 76705 ECHO EXAM OF ABDOMEN: CPT

## 2018-06-05 ENCOUNTER — OFFICE VISIT (OUTPATIENT)
Dept: CARDIOLOGY | Facility: CLINIC | Age: 83
End: 2018-06-05

## 2018-06-05 VITALS
HEART RATE: 67 BPM | BODY MASS INDEX: 24.45 KG/M2 | HEIGHT: 63 IN | SYSTOLIC BLOOD PRESSURE: 136 MMHG | OXYGEN SATURATION: 96 % | DIASTOLIC BLOOD PRESSURE: 72 MMHG | WEIGHT: 138 LBS

## 2018-06-05 DIAGNOSIS — F41.1 GENERALIZED ANXIETY DISORDER: ICD-10-CM

## 2018-06-05 DIAGNOSIS — G89.4 CHRONIC PAIN SYNDROME: Primary | ICD-10-CM

## 2018-06-05 DIAGNOSIS — R60.0 BILATERAL LEG EDEMA: ICD-10-CM

## 2018-06-05 DIAGNOSIS — E03.4 HYPOTHYROIDISM DUE TO ACQUIRED ATROPHY OF THYROID: ICD-10-CM

## 2018-06-05 DIAGNOSIS — K74.60 CIRRHOSIS OF LIVER WITHOUT ASCITES, UNSPECIFIED HEPATIC CIRRHOSIS TYPE (HCC): ICD-10-CM

## 2018-06-05 PROCEDURE — 99213 OFFICE O/P EST LOW 20 MIN: CPT | Performed by: INTERNAL MEDICINE

## 2018-06-05 RX ORDER — HYDROCODONE BITARTRATE AND ACETAMINOPHEN 5; 325 MG/1; MG/1
1 TABLET ORAL 2 TIMES DAILY PRN
Qty: 60 TABLET | Refills: 0 | Status: SHIPPED | OUTPATIENT
Start: 2018-06-05 | End: 2018-07-25 | Stop reason: SDUPTHER

## 2018-06-05 NOTE — PROGRESS NOTES
subjective     Chief Complaint   Patient presents with   • Knee Pain   • Pain   • Back Pain     History of Present Illness    Patient is 87 years old white female with history of hepatic cirrhosis and multiple episodes of hepatic encephalopathy.  Ammonia level was quite high.  She could not afford Xifaxan.  Lactulose dose was increased.  She is having diarrhea but tolerating it very well.  Ammonia level will be checked again.  She sees be doing very well.    She also had the ascites and the lower extremity edema which has improved significantly.    She also has back pain due to fractured spine.  She is taking low-dose Norco pain is fairly well controlled.  She is here for follow-up and checkup.    Past Surgical History:   Procedure Laterality Date   • BACK SURGERY      KYPHOPLASTIES    • CARDIAC CATHETERIZATION Left 08/2004   • CARDIOVASCULAR STRESS TEST  01/25/2015   • CATARACT EXTRACTION     • CHOLECYSTECTOMY     • CLAVICLE SURGERY Right    • COLONOSCOPY  10/2004   • ECHO - CONVERTED  11/2012   • FEMUR OPEN REDUCTION INTERNAL FIXATION Left 10/12/2017    Procedure: FEMUR OPEN REDUCTION INTERNAL FIXATION;  Surgeon: Karson Pierre MD;  Location: [facility];  Service:    • HIP FRACTURE SURGERY Bilateral     ARTHROPLASTIES    • PARTIAL HYSTERECTOMY     • WRIST FRACTURE SURGERY       Family History   Problem Relation Age of Onset   • Cancer Mother    • Heart disease Father    • Arthritis Sister    • Osteoporosis Sister    • Diabetes Maternal Aunt      Past Medical History:   Diagnosis Date   • Acute renal failure    • Anxiety    • Bell's palsy    • Cellulitis     LLE   • CHF (congestive heart failure)     Diastolic   • Diverticulosis    • GERD (gastroesophageal reflux disease)    • Hepatic cirrhosis    • History of transfusion    • Hypertension    • Hypothyroidism    • Osteoporosis    • Thrombocytopenia    • Trigeminal neuralgia      Patient Active Problem List   Diagnosis   • Hepatic cirrhosis*   • HTN  (hypertension)*   • Anxiety disorder*   • Osteopetrosis*   • Hypothyroidism*   • GERD (gastroesophageal reflux disease)*   • Chronic pain syndrome due to fractured spine and left rib fracture*   • Anemia, chronic disease   • Arthralgia of hip   • Left knee pain   • Fracture of proximal end of tibia and fibula   • Primary insomnia   • Thrombocytopenia   • Peripheral vascular disease   • Bilateral leg edema   • Cramp of both lower extremities   • Hepatic encephalopathy   • UTI (urinary tract infection)   • Hypokalemia   • Closed fracture of lower end of left femur with routine healing   • Status post open reduction with internal fixation of fracture   • Nonrheumatic aortic valve insufficiency, mild   • Chronic diastolic heart failure, mild   • Periprosthetic fracture of shaft of femur   • S/P ORIF (open reduction internal fixation) fracture   • Diarrhea   • Right upper quadrant abdominal pain   • Nausea       Social History   Substance Use Topics   • Smoking status: Never Smoker   • Smokeless tobacco: Former User     Types: Snuff     Quit date: 1970   • Alcohol use No      Comment: FORMER 12 BOTTLES PER DAY FOR 15 YEARS       Allergies   Allergen Reactions   • Ciprofloxacin        Current Outpatient Prescriptions on File Prior to Visit   Medication Sig   • Alcohol Swabs pads Uses as directed.   • aspirin 81 MG tablet Take 1 tablet by mouth Daily.   • bumetanide (BUMEX) 1 MG tablet Take 1 tablet by mouth 2 (Two) Times a Day.   • Calcium Carbonate-Vitamin D (OYST-LILA-D 500 PO) Take  by mouth.   • cyanocobalamin 1000 MCG/ML injection Inject 1 mL into the shoulder, thigh, or buttocks Every 28 (Twenty-Eight) Days.   • esomeprazole (nexIUM) 40 MG capsule Take 1 capsule by mouth Every Morning Before Breakfast.   • folic acid (FOLVITE) 1 MG tablet Take 1 tablet by mouth Daily.   • Insulin Pen Needle (BD AUTOSHIELD DUO) 30G X 5 MM misc Use as directed   • lactulose (CHRONULAC) 10 GM/15ML solution Take 30 mL by mouth 5 (Five)  "Times a Day.   • levothyroxine (SYNTHROID) 50 MCG tablet Take 1 tablet by mouth Daily.   • Multiple Vitamin (MULTI VITAMIN DAILY) tablet Take  by mouth.   • nitroglycerin (NITROSTAT) 0.4 MG SL tablet Place 1 tablet under the tongue Every 5 (Five) Minutes As Needed for Chest Pain. Take no more than 3 doses in 15 minutes.   • ondansetron (ZOFRAN) 4 MG tablet Take 4 mg by mouth Every 8 (Eight) Hours As Needed for Nausea or Vomiting.   • OXcarbazepine (TRILEPTAL) 150 MG tablet Take 1 tablet by mouth Every 12 (Twelve) Hours.   • rifaximin (XIFAXAN) 550 MG tablet Take 1 tablet by mouth Every 12 (Twelve) Hours.   • rOPINIRole (REQUIP) 1 MG tablet Take 1 tablet by mouth Every Night. Take 1 hour before bedtime.   • spironolactone (ALDACTONE) 25 MG tablet Take 1 tablet by mouth Daily.   • teriparatide (FORTEO) 600 MCG/2.4ML injection Inject 0.08 mL under the skin Daily.   • vitamin D (ERGOCALCIFEROL) 06753 units capsule capsule Take 1 capsule by mouth 1 (One) Time Per Week.   • [DISCONTINUED] HYDROcodone-acetaminophen (NORCO) 5-325 MG per tablet Take 1 tablet by mouth 2 (Two) Times a Day As Needed for Moderate Pain .     No current facility-administered medications on file prior to visit.          The following portions of the patient's history were reviewed and updated as appropriate: allergies, current medications, past family history, past medical history, past social history, past surgical history and problem list.    Review of Systems   Constitution: Positive for weakness and malaise/fatigue.   HENT: Negative.    Eyes: Negative.    Cardiovascular: Positive for leg swelling.   Respiratory: Negative.    Musculoskeletal: Positive for back pain and myalgias.   Gastrointestinal: Positive for diarrhea.          Objective:     /72 (BP Location: Left arm, Patient Position: Sitting)   Pulse 67   Ht 160 cm (63\")   Wt 62.6 kg (138 lb)   SpO2 96%   BMI 24.45 kg/m²   Physical Exam   Constitutional: She appears " well-developed and well-nourished. No distress.   HENT:   Head: Normocephalic and atraumatic.   Mouth/Throat: Oropharynx is clear and moist. No oropharyngeal exudate.   Eyes: Conjunctivae and EOM are normal. Pupils are equal, round, and reactive to light. No scleral icterus.   Neck: Normal range of motion. Neck supple. No JVD present. No tracheal deviation present. No thyromegaly present.   Cardiovascular: Normal rate, regular rhythm, normal heart sounds and intact distal pulses.  PMI is not displaced.  Exam reveals no gallop, no friction rub and no decreased pulses.    No murmur heard.  Pulses:       Carotid pulses are 3+ on the right side, and 3+ on the left side.       Radial pulses are 3+ on the right side, and 3+ on the left side.   Pulmonary/Chest: Effort normal and breath sounds normal. No respiratory distress. She has no wheezes. She has no rales. She exhibits no tenderness.   Abdominal: Soft. Bowel sounds are normal. She exhibits no distension, no abdominal bruit and no mass. There is no splenomegaly or hepatomegaly. There is no tenderness. There is no rebound and no guarding.   Musculoskeletal: Normal range of motion. She exhibits edema. She exhibits no tenderness or deformity.   Lymphadenopathy:     She has no cervical adenopathy.   Neurological: She is alert. She has normal reflexes. No cranial nerve deficit. She exhibits normal muscle tone. Coordination normal.   Skin: Skin is warm and dry. No rash noted. She is not diaphoretic. No erythema.   Psychiatric: She has a normal mood and affect. Her behavior is normal. Judgment and thought content normal.         Lab Review  Lab Results   Component Value Date     04/25/2018    K 4.1 04/25/2018     04/25/2018    BUN 17 04/25/2018    CREATININE 1.02 04/25/2018    GLUCOSE 98 04/25/2018    CALCIUM 9.9 04/25/2018    ALT 33 04/25/2018    ALKPHOS 136 (H) 04/25/2018    LABIL2 0.9 (L) 04/25/2018     Lab Results   Component Value Date    CKTOTAL 99  07/25/2017     Lab Results   Component Value Date    WBC 3.87 (L) 05/28/2018    HGB 12.1 05/28/2018    HCT 35.7 (L) 05/28/2018     (L) 05/28/2018     Lab Results   Component Value Date    INR 1.79 (H) 10/13/2017    INR 2.04 (H) 10/12/2017    INR 1.17 (H) 10/12/2017     Lab Results   Component Value Date    MG 2.0 10/17/2017     Lab Results   Component Value Date    TSH 1.156 04/25/2018     Lab Results   Component Value Date    BNP 11.0 10/05/2017     Lab Results   Component Value Date    CHLPL 161 04/08/2016    CHOL 185 04/25/2018    TRIG 169 (H) 04/25/2018    HDL 87 04/25/2018    VLDL 33.8 04/25/2018    LDLHDL 0.74 04/25/2018     Lab Results   Component Value Date    LDL 64 04/25/2018    LDL 66 04/18/2017       Procedures       I personally viewed and interpreted the patient's LAB data         Assessment:     1. Chronic pain syndrome due to fractured spine and left rib fracture*    2. Bilateral leg edema    3. Generalized anxiety disorder    4. Cirrhosis of liver without ascites, unspecified hepatic cirrhosis type    5. Hypothyroidism due to acquired atrophy of thyroid          Plan:        Patient has significant amount of lower back pain.  Narco was continued.  Side effects were discussed.  Ankle edema is significantly better.  Bumex was continued.  Along with Aldactone.  Lab work will be checked next visit.    Patient complains of diarrhea with lactulose however she is tolerating it very wellwill be continued.  Synthroid 50 µg was continued.  Follow-up with lab work scheduled      No Follow-up on file.

## 2018-06-27 ENCOUNTER — HOSPITAL ENCOUNTER (EMERGENCY)
Facility: HOSPITAL | Age: 83
Discharge: HOME | End: 2018-06-28
Attending: INTERNAL MEDICINE | Admitting: INTERNAL MEDICINE

## 2018-06-27 DIAGNOSIS — K74.60 CIRRHOSIS OF LIVER WITHOUT ASCITES, UNSPECIFIED HEPATIC CIRRHOSIS TYPE (HCC): Primary | ICD-10-CM

## 2018-06-27 LAB
APTT PPP: 36 SECONDS (ref 23.8–36.1)
BASOPHILS # BLD AUTO: 0.01 10*3/MM3 (ref 0–0.3)
BASOPHILS NFR BLD AUTO: 0.3 % (ref 0–2)
DEPRECATED RDW RBC AUTO: 46.2 FL (ref 37–54)
EOSINOPHIL # BLD AUTO: 0.22 10*3/MM3 (ref 0–0.7)
EOSINOPHIL NFR BLD AUTO: 6.2 % (ref 0–7)
ERYTHROCYTE [DISTWIDTH] IN BLOOD BY AUTOMATED COUNT: 13.5 % (ref 11.5–14.5)
HCT VFR BLD AUTO: 36.9 % (ref 37–47)
HGB BLD-MCNC: 12.7 G/DL (ref 12–16)
IMM GRANULOCYTES # BLD: 0.01 10*3/MM3 (ref 0–0.03)
IMM GRANULOCYTES NFR BLD: 0.3 % (ref 0–0.5)
INR PPP: 1.28 (ref 0.9–1.1)
LYMPHOCYTES # BLD AUTO: 0.88 10*3/MM3 (ref 1–3)
LYMPHOCYTES NFR BLD AUTO: 24.7 % (ref 16–46)
MCH RBC QN AUTO: 33.7 PG (ref 27–33)
MCHC RBC AUTO-ENTMCNC: 34.4 G/DL (ref 33–37)
MCV RBC AUTO: 97.9 FL (ref 80–94)
MONOCYTES # BLD AUTO: 0.34 10*3/MM3 (ref 0.1–0.9)
MONOCYTES NFR BLD AUTO: 9.6 % (ref 0–12)
NEUTROPHILS # BLD AUTO: 2.1 10*3/MM3 (ref 1.4–6.5)
NEUTROPHILS NFR BLD AUTO: 58.9 % (ref 40–75)
PLATELET # BLD AUTO: 95 10*3/MM3 (ref 130–400)
PMV BLD AUTO: 10.7 FL (ref 6–10)
PROTHROMBIN TIME: 16.2 SECONDS (ref 11–15.4)
RBC # BLD AUTO: 3.77 10*6/MM3 (ref 4.2–5.4)
WBC NRBC COR # BLD: 3.56 10*3/MM3 (ref 4.5–12.5)

## 2018-06-27 PROCEDURE — 36415 COLL VENOUS BLD VENIPUNCTURE: CPT

## 2018-06-27 PROCEDURE — 85730 THROMBOPLASTIN TIME PARTIAL: CPT | Performed by: NURSE PRACTITIONER

## 2018-06-27 PROCEDURE — 85025 COMPLETE CBC W/AUTO DIFF WBC: CPT | Performed by: INTERNAL MEDICINE

## 2018-06-27 PROCEDURE — 85610 PROTHROMBIN TIME: CPT | Performed by: NURSE PRACTITIONER

## 2018-06-27 PROCEDURE — 80053 COMPREHEN METABOLIC PANEL: CPT | Performed by: NURSE PRACTITIONER

## 2018-06-27 PROCEDURE — 82140 ASSAY OF AMMONIA: CPT | Performed by: NURSE PRACTITIONER

## 2018-06-27 PROCEDURE — 99283 EMERGENCY DEPT VISIT LOW MDM: CPT

## 2018-06-28 VITALS
BODY MASS INDEX: 26.13 KG/M2 | SYSTOLIC BLOOD PRESSURE: 162 MMHG | HEART RATE: 70 BPM | HEIGHT: 62 IN | RESPIRATION RATE: 18 BRPM | DIASTOLIC BLOOD PRESSURE: 78 MMHG | OXYGEN SATURATION: 98 % | WEIGHT: 142 LBS | TEMPERATURE: 98 F

## 2018-06-28 LAB
ALBUMIN SERPL-MCNC: 3.5 G/DL (ref 3.4–4.8)
ALBUMIN/GLOB SERPL: 0.9 G/DL (ref 1.5–2.5)
ALP SERPL-CCNC: 133 U/L (ref 35–104)
ALT SERPL W P-5'-P-CCNC: 20 U/L (ref 10–36)
AMMONIA BLD-SCNC: 72 UMOL/L (ref 11–51)
ANION GAP SERPL CALCULATED.3IONS-SCNC: 4.6 MMOL/L (ref 3.6–11.2)
AST SERPL-CCNC: 39 U/L (ref 10–30)
BACTERIA UR QL AUTO: ABNORMAL /HPF
BILIRUB SERPL-MCNC: 1.1 MG/DL (ref 0.2–1.8)
BILIRUB UR QL STRIP: NEGATIVE
BUN BLD-MCNC: 23 MG/DL (ref 7–21)
BUN/CREAT SERPL: 18.3 (ref 7–25)
CALCIUM SPEC-SCNC: 9.3 MG/DL (ref 7.7–10)
CHLORIDE SERPL-SCNC: 104 MMOL/L (ref 99–112)
CLARITY UR: ABNORMAL
CO2 SERPL-SCNC: 30.4 MMOL/L (ref 24.3–31.9)
COLOR UR: ABNORMAL
CREAT BLD-MCNC: 1.26 MG/DL (ref 0.43–1.29)
GFR SERPL CREATININE-BSD FRML MDRD: 40 ML/MIN/1.73
GLOBULIN UR ELPH-MCNC: 3.8 GM/DL
GLUCOSE BLD-MCNC: 103 MG/DL (ref 70–110)
GLUCOSE UR STRIP-MCNC: NEGATIVE MG/DL
HGB UR QL STRIP.AUTO: NEGATIVE
HYALINE CASTS UR QL AUTO: ABNORMAL /LPF
KETONES UR QL STRIP: ABNORMAL
LEUKOCYTE ESTERASE UR QL STRIP.AUTO: ABNORMAL
NITRITE UR QL STRIP: NEGATIVE
OSMOLALITY SERPL CALC.SUM OF ELEC: 281.5 MOSM/KG (ref 273–305)
PH UR STRIP.AUTO: 6 [PH] (ref 5–8)
POTASSIUM BLD-SCNC: 4.3 MMOL/L (ref 3.5–5.3)
PROT SERPL-MCNC: 7.3 G/DL (ref 6–8)
PROT UR QL STRIP: NEGATIVE
RBC # UR: ABNORMAL /HPF
REF LAB TEST METHOD: ABNORMAL
SODIUM BLD-SCNC: 139 MMOL/L (ref 135–153)
SP GR UR STRIP: 1.02 (ref 1–1.03)
SQUAMOUS #/AREA URNS HPF: ABNORMAL /HPF
UROBILINOGEN UR QL STRIP: ABNORMAL
WBC UR QL AUTO: ABNORMAL /HPF

## 2018-06-28 PROCEDURE — 81001 URINALYSIS AUTO W/SCOPE: CPT | Performed by: NURSE PRACTITIONER

## 2018-07-03 ENCOUNTER — TELEPHONE (OUTPATIENT)
Dept: GASTROENTEROLOGY | Facility: CLINIC | Age: 83
End: 2018-07-03

## 2018-07-03 ENCOUNTER — OFFICE VISIT (OUTPATIENT)
Dept: GASTROENTEROLOGY | Facility: CLINIC | Age: 83
End: 2018-07-03

## 2018-07-03 VITALS
DIASTOLIC BLOOD PRESSURE: 60 MMHG | WEIGHT: 138 LBS | HEART RATE: 77 BPM | SYSTOLIC BLOOD PRESSURE: 119 MMHG | HEIGHT: 63 IN | BODY MASS INDEX: 24.45 KG/M2 | OXYGEN SATURATION: 95 %

## 2018-07-03 DIAGNOSIS — K74.60 CIRRHOSIS OF LIVER WITHOUT ASCITES, UNSPECIFIED HEPATIC CIRRHOSIS TYPE (HCC): Primary | ICD-10-CM

## 2018-07-03 DIAGNOSIS — B18.2 CHRONIC HEPATITIS C WITHOUT HEPATIC COMA (HCC): ICD-10-CM

## 2018-07-03 DIAGNOSIS — R74.8 ELEVATED LIVER ENZYMES: ICD-10-CM

## 2018-07-03 PROCEDURE — 99214 OFFICE O/P EST MOD 30 MIN: CPT | Performed by: PHYSICIAN ASSISTANT

## 2018-07-03 NOTE — TELEPHONE ENCOUNTER
Gary or Flynn Cruz said to please get approval for xifaxan and call patient to let her know. She uses [facility]

## 2018-07-03 NOTE — PROGRESS NOTES
Chief Complaint   Patient presents with   • Hepatic Disease       Alma Delia Garcia is a 87 y.o. female who presents to the office today for follow up appointment for Hepatic Disease  .    HPI    The patient was seen for a follow up visit.  She had an US of her liver which revealed cirrhosis.  She also has a history of Hepatitis C from blood transfusion in the 1960's.  Her ammonia level was 72 last week. Patient's daughter who is her caretaker reports that she started lactulose 5 times per day one week ago.  The patient reports one episode of nausea with dry heaves but denies abdominal pain, hematochezia and melena.  Recent lab work revealed a normal ALT, AST of 39 and an alkaline phosphatase of 133.  Patient has her labs drawn weekly for her ammonia level.  Xifaxin was ordered last visit by her Cardiologist and it was denied.      Review of Systems   Constitutional: Positive for fatigue. Negative for unexpected weight change.   HENT: Positive for trouble swallowing.    Eyes: Positive for visual disturbance.   Respiratory: Positive for choking and shortness of breath.    Cardiovascular: Positive for chest pain, palpitations and leg swelling.   Gastrointestinal: Positive for abdominal distention, abdominal pain, diarrhea, nausea and vomiting. Negative for anal bleeding, blood in stool and constipation.   Endocrine: Positive for heat intolerance.   Genitourinary: Negative.    Musculoskeletal: Positive for arthralgias, back pain, gait problem and myalgias.   Skin: Positive for color change.   Allergic/Immunologic: Negative for environmental allergies.   Neurological: Positive for dizziness and light-headedness. Negative for syncope.   Hematological: Bruises/bleeds easily.   Psychiatric/Behavioral: Negative for sleep disturbance. The patient is nervous/anxious.        ACTIVE PROBLEMS:   Specialty Problems        Gastroenterology Problems    GERD (gastroesophageal reflux disease)*        Hepatic cirrhosis*               PAST MEDICAL HISTORY:  Past Medical History:   Diagnosis Date   • Acute renal failure (CMS/HCC)    • Anxiety    • Bell's palsy    • Cellulitis     LLE   • CHF (congestive heart failure) (CMS/HCC)     Diastolic   • Diverticulosis    • GERD (gastroesophageal reflux disease)    • Hepatic cirrhosis (CMS/HCC)    • History of transfusion    • Hypertension    • Hypothyroidism    • Osteoporosis    • Thrombocytopenia (CMS/HCC)    • Trigeminal neuralgia        SURGICAL HISTORY:  Past Surgical History:   Procedure Laterality Date   • BACK SURGERY      KYPHOPLASTIES    • CARDIAC CATHETERIZATION Left 08/2004   • CARDIOVASCULAR STRESS TEST  01/25/2015   • CATARACT EXTRACTION     • CHOLECYSTECTOMY     • CLAVICLE SURGERY Right    • COLONOSCOPY  10/2004   • ECHO - CONVERTED  11/2012   • FEMUR OPEN REDUCTION INTERNAL FIXATION Left 10/12/2017    Procedure: FEMUR OPEN REDUCTION INTERNAL FIXATION;  Surgeon: Karson Pierre MD;  Location[facility];  Service:    • HIP FRACTURE SURGERY Bilateral     ARTHROPLASTIES    • PARTIAL HYSTERECTOMY     • WRIST FRACTURE SURGERY         FAMILY HISTORY:  Family History   Problem Relation Age of Onset   • Cancer Mother    • Heart disease Father    • Arthritis Sister    • Osteoporosis Sister    • Diabetes Maternal Aunt        SOCIAL HISTORY:  Social History   Substance Use Topics   • Smoking status: Never Smoker   • Smokeless tobacco: Former User     Types: Snuff     Quit date: 1970   • Alcohol use No      Comment: FORMER 12 BOTTLES PER DAY FOR 15 YEARS       CURRENT MEDICATION:    Current Outpatient Prescriptions:   •  Alcohol Swabs pads, Uses as directed., Disp: 1 each, Rfl: 12  •  aspirin 81 MG tablet, Take 1 tablet by mouth Daily., Disp: 90 tablet, Rfl: 3  •  bumetanide (BUMEX) 1 MG tablet, Take 1 tablet by mouth 2 (Two) Times a Day., Disp: 180 tablet, Rfl: 3  •  Calcium Carbonate-Vitamin D (OYST-LILA-D 500 PO), Take  by mouth., Disp: , Rfl:   •  cyanocobalamin 1000 MCG/ML  injection, Inject 1 mL into the shoulder, thigh, or buttocks Every 28 (Twenty-Eight) Days., Disp: 10 mL, Rfl: 1  •  esomeprazole (nexIUM) 40 MG capsule, Take 1 capsule by mouth Every Morning Before Breakfast., Disp: 90 capsule, Rfl: 3  •  folic acid (FOLVITE) 1 MG tablet, Take 1 tablet by mouth Daily., Disp: 90 tablet, Rfl: 3  •  HYDROcodone-acetaminophen (NORCO) 5-325 MG per tablet, Take 1 tablet by mouth 2 (Two) Times a Day As Needed for Moderate Pain . Fill on or after 6/25/18., Disp: 60 tablet, Rfl: 0  •  Insulin Pen Needle (BD AUTOSHIELD DUO) 30G X 5 MM misc, Use as directed, Disp: 1 each, Rfl: 12  •  lactulose (CHRONULAC) 10 GM/15ML solution, Take 30 mL by mouth 5 (Five) Times a Day., Disp: , Rfl:   •  levothyroxine (SYNTHROID) 50 MCG tablet, Take 1 tablet by mouth Daily., Disp: 90 tablet, Rfl: 3  •  Multiple Vitamin (MULTI VITAMIN DAILY) tablet, Take  by mouth., Disp: , Rfl:   •  nitroglycerin (NITROSTAT) 0.4 MG SL tablet, Place 1 tablet under the tongue Every 5 (Five) Minutes As Needed for Chest Pain. Take no more than 3 doses in 15 minutes., Disp: 25 tablet, Rfl: 0  •  ondansetron (ZOFRAN) 4 MG tablet, Take 4 mg by mouth Every 8 (Eight) Hours As Needed for Nausea or Vomiting., Disp: , Rfl:   •  OXcarbazepine (TRILEPTAL) 150 MG tablet, Take 1 tablet by mouth Every 12 (Twelve) Hours., Disp: 180 tablet, Rfl: 0  •  rifaximin (XIFAXAN) 550 MG tablet, Take 1 tablet by mouth Every 12 (Twelve) Hours., Disp: 12 tablet, Rfl: 0  •  rOPINIRole (REQUIP) 1 MG tablet, Take 1 tablet by mouth Every Night. Take 1 hour before bedtime., Disp: 90 tablet, Rfl: 3  •  spironolactone (ALDACTONE) 25 MG tablet, Take 1 tablet by mouth Daily., Disp: 30 tablet, Rfl: 11  •  teriparatide (FORTEO) 600 MCG/2.4ML injection, Inject 0.08 mL under the skin Daily., Disp: 2.4 mL, Rfl: 12  •  vitamin D (ERGOCALCIFEROL) 76324 units capsule capsule, Take 1 capsule by mouth 1 (One) Time Per Week., Disp: 12 capsule, Rfl:  "0    ALLERGIES:  Ciprofloxacin    VISIT VITALS:  /60   Pulse 77   Ht 160 cm (63\")   Wt 62.6 kg (138 lb)   SpO2 95%   BMI 24.45 kg/m²     Physical Exam   Constitutional: She is oriented to person, place, and time. She appears well-developed and well-nourished. No distress.   HENT:   Head: Normocephalic and atraumatic.   Right Ear: External ear normal.   Left Ear: External ear normal.   Nose: Nose normal.   Mouth/Throat: Oropharynx is clear and moist. No oropharyngeal exudate.   Eyes: Conjunctivae and EOM are normal. Pupils are equal, round, and reactive to light. Right eye exhibits no discharge. Left eye exhibits no discharge. No scleral icterus.   Neck: Normal range of motion. Neck supple. No JVD present. No tracheal deviation present. No thyromegaly present.   Cardiovascular: Normal rate, regular rhythm, normal heart sounds and intact distal pulses.  Exam reveals no gallop and no friction rub.    No murmur heard.  Pulmonary/Chest: Effort normal and breath sounds normal. No stridor. No respiratory distress. She has no wheezes. She has no rales. She exhibits no tenderness.   Abdominal: Soft. Bowel sounds are normal. She exhibits no distension and no mass. There is no tenderness. There is no rebound and no guarding. No hernia.   Genitourinary: Rectal exam shows guaiac negative stool.   Musculoskeletal: She exhibits no edema, tenderness or deformity.   Lymphadenopathy:     She has no cervical adenopathy.   Neurological: She is alert and oriented to person, place, and time. She has normal reflexes. She displays normal reflexes. No cranial nerve deficit. She exhibits normal muscle tone. Coordination normal.   Skin: Skin is warm and dry. No rash noted. She is not diaphoretic. No erythema. No pallor.   Psychiatric: She has a normal mood and affect. Her behavior is normal. Judgment and thought content normal.       Assessment/Plan      Diagnosis Plan   1. Cirrhosis of liver without ascites, unspecified hepatic " cirrhosis type (CMS/HCC)     2. Elevated liver enzymes     3. Chronic hepatitis C without hepatic coma (CMS/HCC)       We will also attempt to get Xifaxin covered.  She is interested in being seen at our Hepatitis C clinic.  She voiced understanding and agreement of recommendations.  Return in about 3 months (around 10/3/2018) for Recheck.         Patient's Body mass index is 24.45 kg/m². BMI is within normal parameters. No follow-up required.      WILMER Putnam

## 2018-07-05 ENCOUNTER — LAB REQUISITION (OUTPATIENT)
Dept: LAB | Facility: HOSPITAL | Age: 83
End: 2018-07-05

## 2018-07-05 ENCOUNTER — TELEPHONE (OUTPATIENT)
Dept: GASTROENTEROLOGY | Facility: CLINIC | Age: 83
End: 2018-07-05

## 2018-07-05 DIAGNOSIS — D64.9 ANEMIA: ICD-10-CM

## 2018-07-05 DIAGNOSIS — R50.9 FEVER: ICD-10-CM

## 2018-07-05 LAB
AMMONIA BLD-SCNC: 79 UMOL/L (ref 11–51)
ANION GAP SERPL CALCULATED.3IONS-SCNC: 8.4 MMOL/L (ref 3.6–11.2)
BASOPHILS # BLD AUTO: 0.02 10*3/MM3 (ref 0–0.3)
BASOPHILS NFR BLD AUTO: 0.5 % (ref 0–2)
BUN BLD-MCNC: 19 MG/DL (ref 7–21)
BUN/CREAT SERPL: 15.2 (ref 7–25)
CALCIUM SPEC-SCNC: 8.2 MG/DL (ref 7.7–10)
CHLORIDE SERPL-SCNC: 107 MMOL/L (ref 99–112)
CO2 SERPL-SCNC: 21.6 MMOL/L (ref 24.3–31.9)
CREAT BLD-MCNC: 1.25 MG/DL (ref 0.43–1.29)
DEPRECATED RDW RBC AUTO: 43.7 FL (ref 37–54)
EOSINOPHIL # BLD AUTO: 0.22 10*3/MM3 (ref 0–0.7)
EOSINOPHIL NFR BLD AUTO: 5.8 % (ref 0–7)
ERYTHROCYTE [DISTWIDTH] IN BLOOD BY AUTOMATED COUNT: 13.2 % (ref 11.5–14.5)
GFR SERPL CREATININE-BSD FRML MDRD: 41 ML/MIN/1.73
GLUCOSE BLD-MCNC: 108 MG/DL (ref 70–110)
HCT VFR BLD AUTO: 30 % (ref 37–47)
HGB BLD-MCNC: 10.6 G/DL (ref 12–16)
IMM GRANULOCYTES # BLD: 0.01 10*3/MM3 (ref 0–0.03)
IMM GRANULOCYTES NFR BLD: 0.3 % (ref 0–0.5)
LYMPHOCYTES # BLD AUTO: 1.26 10*3/MM3 (ref 1–3)
LYMPHOCYTES NFR BLD AUTO: 33.3 % (ref 16–46)
MCH RBC QN AUTO: 33.7 PG (ref 27–33)
MCHC RBC AUTO-ENTMCNC: 35.3 G/DL (ref 33–37)
MCV RBC AUTO: 95.2 FL (ref 80–94)
MONOCYTES # BLD AUTO: 0.58 10*3/MM3 (ref 0.1–0.9)
MONOCYTES NFR BLD AUTO: 15.3 % (ref 0–12)
NEUTROPHILS # BLD AUTO: 1.69 10*3/MM3 (ref 1.4–6.5)
NEUTROPHILS NFR BLD AUTO: 44.8 % (ref 40–75)
OSMOLALITY SERPL CALC.SUM OF ELEC: 276.6 MOSM/KG (ref 273–305)
PLATELET # BLD AUTO: 94 10*3/MM3 (ref 130–400)
PMV BLD AUTO: 11.7 FL (ref 6–10)
POTASSIUM BLD-SCNC: 3.5 MMOL/L (ref 3.5–5.3)
RBC # BLD AUTO: 3.15 10*6/MM3 (ref 4.2–5.4)
SODIUM BLD-SCNC: 137 MMOL/L (ref 135–153)
WBC NRBC COR # BLD: 3.78 10*3/MM3 (ref 4.5–12.5)

## 2018-07-05 PROCEDURE — 82140 ASSAY OF AMMONIA: CPT | Performed by: INTERNAL MEDICINE

## 2018-07-05 PROCEDURE — 85025 COMPLETE CBC W/AUTO DIFF WBC: CPT | Performed by: INTERNAL MEDICINE

## 2018-07-05 PROCEDURE — 80048 BASIC METABOLIC PNL TOTAL CA: CPT | Performed by: INTERNAL MEDICINE

## 2018-07-05 NOTE — TELEPHONE ENCOUNTER
Can you please order Xifaxan so I can do a PA on it? I can not initiate PA for a different provider. Thank you.

## 2018-07-09 ENCOUNTER — TELEPHONE (OUTPATIENT)
Dept: GASTROENTEROLOGY | Facility: CLINIC | Age: 83
End: 2018-07-09

## 2018-07-09 NOTE — TELEPHONE ENCOUNTER
Spoke with patients daughter Alethea and let her know that patients Xifaxan was approved. She voiced understanding.

## 2018-07-09 NOTE — TELEPHONE ENCOUNTER
Patient's Xifaxan was approved per Cover [employer]. I tried to call patient to inform her but there was no answer and no way to leave a message.

## 2018-07-13 ENCOUNTER — EPISODE CHANGES (OUTPATIENT)
Dept: CASE MANAGEMENT | Facility: OTHER | Age: 83
End: 2018-07-13

## 2018-07-25 ENCOUNTER — OFFICE VISIT (OUTPATIENT)
Dept: CARDIOLOGY | Facility: CLINIC | Age: 83
End: 2018-07-25

## 2018-07-25 VITALS
WEIGHT: 138 LBS | HEART RATE: 71 BPM | SYSTOLIC BLOOD PRESSURE: 144 MMHG | HEIGHT: 63 IN | DIASTOLIC BLOOD PRESSURE: 66 MMHG | BODY MASS INDEX: 24.45 KG/M2 | OXYGEN SATURATION: 93 %

## 2018-07-25 DIAGNOSIS — M25.551 PAIN OF RIGHT HIP JOINT: Primary | ICD-10-CM

## 2018-07-25 DIAGNOSIS — Q78.2 OSTEOPETROSIS: ICD-10-CM

## 2018-07-25 DIAGNOSIS — R25.2 CRAMP OF BOTH LOWER EXTREMITIES: ICD-10-CM

## 2018-07-25 DIAGNOSIS — E03.4 HYPOTHYROIDISM DUE TO ACQUIRED ATROPHY OF THYROID: ICD-10-CM

## 2018-07-25 DIAGNOSIS — F41.1 GENERALIZED ANXIETY DISORDER: ICD-10-CM

## 2018-07-25 DIAGNOSIS — K74.69 OTHER CIRRHOSIS OF LIVER (HCC): ICD-10-CM

## 2018-07-25 PROCEDURE — 99213 OFFICE O/P EST LOW 20 MIN: CPT | Performed by: INTERNAL MEDICINE

## 2018-07-25 RX ORDER — HYDROCODONE BITARTRATE AND ACETAMINOPHEN 5; 325 MG/1; MG/1
1 TABLET ORAL 2 TIMES DAILY PRN
Qty: 60 TABLET | Refills: 0 | Status: SHIPPED | OUTPATIENT
Start: 2018-07-25 | End: 2018-08-13 | Stop reason: SDUPTHER

## 2018-07-25 NOTE — PROGRESS NOTES
subjective     Chief Complaint   Patient presents with   • Pain     Right Leg    • Abdominal Pain   • Follow-up     History of Present Illness    Patient is 87 years old white female who has history of hepatic cirrhosis.  She also has a history of arthritis and chronic severe back pain.  Patient has had multiple injuries with fractures of tibia fibula and fracture of spine and ribs.  Fractures have been healed but she still has chronic pain.  She is taking low-dose Norco.    Patient also has vague abdominal discomfort.  She is following with Dr. Kyle.  Hepatic cirrhosis is fairly well under control at this time.  Patient has had multiple episodes of hepatic encephalopathy.  She could not afford  Xifaxan however it has been preauthorized by Dr. Kyle office now.    Past Surgical History:   Procedure Laterality Date   • BACK SURGERY      KYPHOPLASTIES    • CARDIAC CATHETERIZATION Left 08/2004   • CARDIOVASCULAR STRESS TEST  01/25/2015   • CATARACT EXTRACTION     • CHOLECYSTECTOMY     • CLAVICLE SURGERY Right    • COLONOSCOPY  10/2004   • ECHO - CONVERTED  11/2012   • FEMUR OPEN REDUCTION INTERNAL FIXATION Left 10/12/2017    Procedure: FEMUR OPEN REDUCTION INTERNAL FIXATION;  Surgeon: Karson Pierre MD;  Location: [facility];  Service:    • HIP FRACTURE SURGERY Bilateral     ARTHROPLASTIES    • PARTIAL HYSTERECTOMY     • WRIST FRACTURE SURGERY       Family History   Problem Relation Age of Onset   • Cancer Mother    • Heart disease Father    • Arthritis Sister    • Osteoporosis Sister    • Diabetes Maternal Aunt      Past Medical History:   Diagnosis Date   • Acute renal failure (CMS/HCC)    • Anxiety    • Bell's palsy    • Cellulitis     LLE   • CHF (congestive heart failure) (CMS/HCC)     Diastolic   • Diverticulosis    • GERD (gastroesophageal reflux disease)    • Hepatic cirrhosis (CMS/HCC)    • History of transfusion    • Hypertension    • Hypothyroidism    • Osteoporosis    • Thrombocytopenia  (CMS/HCC)    • Trigeminal neuralgia      Patient Active Problem List   Diagnosis   • Hepatic cirrhosis*   • HTN (hypertension)*   • Anxiety disorder*   • Osteopetrosis*   • Hypothyroidism*   • GERD (gastroesophageal reflux disease)*   • Chronic pain syndrome due to fractured spine and left rib fracture*   • Anemia, chronic disease   • Arthralgia of hip   • Left knee pain   • Fracture of proximal end of tibia and fibula   • Primary insomnia   • Thrombocytopenia (CMS/HCC)   • Peripheral vascular disease (CMS/HCC)   • Bilateral leg edema   • Cramp of both lower extremities   • Hepatic encephalopathy (CMS/HCC)   • UTI (urinary tract infection)   • Hypokalemia   • Closed fracture of lower end of left femur with routine healing   • Status post open reduction with internal fixation of fracture   • Nonrheumatic aortic valve insufficiency, mild   • Chronic diastolic heart failure, mild   • Periprosthetic fracture of shaft of femur   • S/P ORIF (open reduction internal fixation) fracture   • Diarrhea   • Right upper quadrant abdominal pain   • Nausea   • Elevated liver enzymes   • Chronic hepatitis C without hepatic coma (CMS/HCC)       Social History   Substance Use Topics   • Smoking status: Never Smoker   • Smokeless tobacco: Former User     Types: Snuff     Quit date: 1970   • Alcohol use No      Comment: FORMER 12 BOTTLES PER DAY FOR 15 YEARS       Allergies   Allergen Reactions   • Ciprofloxacin        Current Outpatient Prescriptions on File Prior to Visit   Medication Sig   • Alcohol Swabs pads Uses as directed.   • aspirin 81 MG tablet Take 1 tablet by mouth Daily.   • bumetanide (BUMEX) 1 MG tablet Take 1 tablet by mouth 2 (Two) Times a Day.   • Calcium Carbonate-Vitamin D (OYST-LILA-D 500 PO) Take  by mouth.   • cyanocobalamin 1000 MCG/ML injection Inject 1 mL into the shoulder, thigh, or buttocks Every 28 (Twenty-Eight) Days.   • esomeprazole (nexIUM) 40 MG capsule Take 1 capsule by mouth Every Morning Before  Breakfast.   • folic acid (FOLVITE) 1 MG tablet Take 1 tablet by mouth Daily.   • Insulin Pen Needle (BD AUTOSHIELD DUO) 30G X 5 MM misc Use as directed   • lactulose (CHRONULAC) 10 GM/15ML solution Take 30 mL by mouth 5 (Five) Times a Day.   • levothyroxine (SYNTHROID) 50 MCG tablet Take 1 tablet by mouth Daily.   • Multiple Vitamin (MULTI VITAMIN DAILY) tablet Take  by mouth.   • nitroglycerin (NITROSTAT) 0.4 MG SL tablet Place 1 tablet under the tongue Every 5 (Five) Minutes As Needed for Chest Pain. Take no more than 3 doses in 15 minutes.   • ondansetron (ZOFRAN) 4 MG tablet Take 4 mg by mouth Every 8 (Eight) Hours As Needed for Nausea or Vomiting.   • OXcarbazepine (TRILEPTAL) 150 MG tablet Take 1 tablet by mouth Every 12 (Twelve) Hours.   • rifaximin (XIFAXAN) 550 MG tablet Take 1 tablet by mouth Every 12 (Twelve) Hours.   • rOPINIRole (REQUIP) 1 MG tablet Take 1 tablet by mouth Every Night. Take 1 hour before bedtime.   • spironolactone (ALDACTONE) 25 MG tablet Take 1 tablet by mouth Daily.   • teriparatide (FORTEO) 600 MCG/2.4ML injection Inject 0.08 mL under the skin Daily.   • vitamin D (ERGOCALCIFEROL) 33989 units capsule capsule Take 1 capsule by mouth 1 (One) Time Per Week.   • [DISCONTINUED] HYDROcodone-acetaminophen (NORCO) 5-325 MG per tablet Take 1 tablet by mouth 2 (Two) Times a Day As Needed for Moderate Pain . Fill on or after 6/25/18.     No current facility-administered medications on file prior to visit.          The following portions of the patient's history were reviewed and updated as appropriate: allergies, current medications, past family history, past medical history, past social history, past surgical history and problem list.    Review of Systems   Constitution: Positive for weakness.   HENT: Negative.  Negative for congestion.    Eyes: Negative.    Cardiovascular: Negative.  Negative for chest pain, cyanosis, dyspnea on exertion, irregular heartbeat, leg swelling, near-syncope,  "orthopnea, palpitations, paroxysmal nocturnal dyspnea and syncope.   Respiratory: Positive for shortness of breath.    Endocrine: Negative.    Hematologic/Lymphatic: Negative.    Skin: Negative.    Musculoskeletal: Positive for arthritis, back pain, joint pain, myalgias and stiffness.   Gastrointestinal: Positive for bloating, anorexia and diarrhea.   Neurological: Negative for headaches.          Objective:     /66 (BP Location: Left arm, Patient Position: Sitting)   Pulse 71   Ht 160 cm (63\")   Wt 62.6 kg (138 lb)   SpO2 93%   BMI 24.45 kg/m²   Physical Exam   Constitutional: She appears well-developed and well-nourished. No distress.   HENT:   Head: Normocephalic and atraumatic.   Mouth/Throat: Oropharynx is clear and moist. No oropharyngeal exudate.   Eyes: Pupils are equal, round, and reactive to light. Conjunctivae and EOM are normal. No scleral icterus.   Neck: Normal range of motion. Neck supple. No JVD present. No tracheal deviation present. No thyromegaly present.   Cardiovascular: Normal rate, regular rhythm, normal heart sounds and intact distal pulses.  PMI is not displaced.  Exam reveals no gallop, no friction rub and no decreased pulses.    No murmur heard.  Pulses:       Carotid pulses are 3+ on the right side, and 3+ on the left side.       Radial pulses are 3+ on the right side, and 3+ on the left side.   Pulmonary/Chest: Effort normal and breath sounds normal. No respiratory distress. She has no wheezes. She has no rales. She exhibits no tenderness.   Abdominal: Soft. Bowel sounds are normal. She exhibits no distension, no abdominal bruit and no mass. There is no splenomegaly or hepatomegaly. There is no tenderness. There is no rebound and no guarding.   Musculoskeletal: Normal range of motion. She exhibits no edema, tenderness or deformity.   Lymphadenopathy:     She has no cervical adenopathy.   Neurological: She is alert. She has normal reflexes. No cranial nerve deficit. She " exhibits normal muscle tone. Coordination normal.   Skin: Skin is warm and dry. No rash noted. She is not diaphoretic. No erythema.   Psychiatric: She has a normal mood and affect. Her behavior is normal. Judgment and thought content normal.         Lab Review  Lab Results   Component Value Date     07/05/2018    K 3.5 07/05/2018     07/05/2018    BUN 19 07/05/2018    CREATININE 1.25 07/05/2018    GLUCOSE 108 07/05/2018    CALCIUM 8.2 07/05/2018    ALT 20 06/27/2018    ALKPHOS 133 (H) 06/27/2018    LABIL2 0.9 (L) 04/08/2016     Lab Results   Component Value Date    CKTOTAL 99 07/25/2017     Lab Results   Component Value Date    WBC 3.78 (L) 07/05/2018    HGB 10.6 (L) 07/05/2018    HCT 30.0 (L) 07/05/2018    PLT 94 (L) 07/05/2018     Lab Results   Component Value Date    INR 1.28 (H) 06/27/2018    INR 1.79 (H) 10/13/2017    INR 2.04 (H) 10/12/2017     Lab Results   Component Value Date    MG 2.0 10/17/2017     Lab Results   Component Value Date    TSH 1.156 04/25/2018     Lab Results   Component Value Date    BNP 11.0 10/05/2017     Lab Results   Component Value Date    CHLPL 161 04/08/2016    CHOL 185 04/25/2018    TRIG 169 (H) 04/25/2018    HDL 87 04/25/2018    VLDL 33.8 04/25/2018    LDLHDL 0.74 04/25/2018     Lab Results   Component Value Date    LDL 64 04/25/2018    LDL 66 04/18/2017       Procedures       I personally viewed and interpreted the patient's LAB data         Assessment:     1. Pain of right hip joint    2. Other cirrhosis of liver (CMS/HCC)    3. Generalized anxiety disorder    4. Cramp of both lower extremities    5. Hypothyroidism due to acquired atrophy of thyroid    6. Osteopetrosis*          Plan:      Patient has chronic pain syndrome with multiple old healed fractures.  She was advised to minimize her narcotic use.  Prescriptions were given.  Patient has hypothyroidism Synthroid dose will be continued.  Diuretic therapy with Aldactone and Bumex was continued.  Follow-up in one  month.  Lab work scheduled        Return in about 1 month (around 8/25/2018).

## 2018-07-27 ENCOUNTER — LAB REQUISITION (OUTPATIENT)
Dept: LAB | Facility: HOSPITAL | Age: 83
End: 2018-07-27

## 2018-07-27 DIAGNOSIS — K74.60 CIRRHOSIS OF LIVER (HCC): ICD-10-CM

## 2018-07-27 LAB
AMMONIA BLD-SCNC: 92 UMOL/L (ref 11–51)
BASOPHILS # BLD AUTO: 0.04 10*3/MM3 (ref 0–0.3)
BASOPHILS NFR BLD AUTO: 1 % (ref 0–2)
DEPRECATED RDW RBC AUTO: 43.6 FL (ref 37–54)
EOSINOPHIL # BLD AUTO: 0.42 10*3/MM3 (ref 0–0.7)
EOSINOPHIL NFR BLD AUTO: 10 % (ref 0–7)
ERYTHROCYTE [DISTWIDTH] IN BLOOD BY AUTOMATED COUNT: 13 % (ref 11.5–14.5)
HCT VFR BLD AUTO: 34.7 % (ref 37–47)
HGB BLD-MCNC: 11.9 G/DL (ref 12–16)
IMM GRANULOCYTES # BLD: 0.01 10*3/MM3 (ref 0–0.03)
IMM GRANULOCYTES NFR BLD: 0.2 % (ref 0–0.5)
LYMPHOCYTES # BLD AUTO: 1.6 10*3/MM3 (ref 1–3)
LYMPHOCYTES NFR BLD AUTO: 38.1 % (ref 16–46)
MCH RBC QN AUTO: 32.9 PG (ref 27–33)
MCHC RBC AUTO-ENTMCNC: 34.3 G/DL (ref 33–37)
MCV RBC AUTO: 95.9 FL (ref 80–94)
MONOCYTES # BLD AUTO: 0.53 10*3/MM3 (ref 0.1–0.9)
MONOCYTES NFR BLD AUTO: 12.6 % (ref 0–12)
NEUTROPHILS # BLD AUTO: 1.6 10*3/MM3 (ref 1.4–6.5)
NEUTROPHILS NFR BLD AUTO: 38.1 % (ref 40–75)
PLATELET # BLD AUTO: 86 10*3/MM3 (ref 130–400)
PMV BLD AUTO: 12 FL (ref 6–10)
RBC # BLD AUTO: 3.62 10*6/MM3 (ref 4.2–5.4)
WBC NRBC COR # BLD: 4.2 10*3/MM3 (ref 4.5–12.5)

## 2018-07-27 PROCEDURE — 82140 ASSAY OF AMMONIA: CPT | Performed by: INTERNAL MEDICINE

## 2018-07-27 PROCEDURE — 85025 COMPLETE CBC W/AUTO DIFF WBC: CPT | Performed by: INTERNAL MEDICINE

## 2018-08-01 ENCOUNTER — OFFICE VISIT (OUTPATIENT)
Dept: PHARMACY | Facility: HOSPITAL | Age: 83
End: 2018-08-01

## 2018-08-01 ENCOUNTER — TELEPHONE (OUTPATIENT)
Dept: CARDIOLOGY | Facility: CLINIC | Age: 83
End: 2018-08-01

## 2018-08-01 DIAGNOSIS — B18.2 HEP C W/O COMA, CHRONIC (HCC): Primary | ICD-10-CM

## 2018-08-01 DIAGNOSIS — K76.82 HEPATIC ENCEPHALOPATHY (HCC): ICD-10-CM

## 2018-08-01 PROCEDURE — 99213 OFFICE O/P EST LOW 20 MIN: CPT | Performed by: PHYSICIAN ASSISTANT

## 2018-08-01 NOTE — TELEPHONE ENCOUNTER
----- Message from Galina Gonsalves MD sent at 7/30/2018 12:04 PM EDT -----  Ammonia level is 92.  Make sure taking medications regularly both lactulose and Xifaxan

## 2018-08-01 NOTE — TELEPHONE ENCOUNTER
Explained results to Jessica the emergency contact. She stated she would contact Alma Delia and explain the results.

## 2018-08-03 ENCOUNTER — LAB REQUISITION (OUTPATIENT)
Dept: LAB | Facility: HOSPITAL | Age: 83
End: 2018-08-03

## 2018-08-03 DIAGNOSIS — D64.9 ANEMIA: ICD-10-CM

## 2018-08-03 DIAGNOSIS — K74.60 CIRRHOSIS OF LIVER (HCC): ICD-10-CM

## 2018-08-03 LAB
AMMONIA BLD-SCNC: 45 UMOL/L (ref 11–51)
BASOPHILS # BLD AUTO: 0.03 10*3/MM3 (ref 0–0.3)
BASOPHILS NFR BLD AUTO: 0.8 % (ref 0–2)
DEPRECATED RDW RBC AUTO: 44 FL (ref 37–54)
EOSINOPHIL # BLD AUTO: 0.15 10*3/MM3 (ref 0–0.7)
EOSINOPHIL NFR BLD AUTO: 4 % (ref 0–7)
ERYTHROCYTE [DISTWIDTH] IN BLOOD BY AUTOMATED COUNT: 13.4 % (ref 11.5–14.5)
HCT VFR BLD AUTO: 35.7 % (ref 37–47)
HGB BLD-MCNC: 12.2 G/DL (ref 12–16)
IMM GRANULOCYTES # BLD: 0 10*3/MM3 (ref 0–0.03)
IMM GRANULOCYTES NFR BLD: 0 % (ref 0–0.5)
LYMPHOCYTES # BLD AUTO: 1.13 10*3/MM3 (ref 1–3)
LYMPHOCYTES NFR BLD AUTO: 30.1 % (ref 16–46)
MCH RBC QN AUTO: 33.1 PG (ref 27–33)
MCHC RBC AUTO-ENTMCNC: 34.2 G/DL (ref 33–37)
MCV RBC AUTO: 96.7 FL (ref 80–94)
MONOCYTES # BLD AUTO: 0.38 10*3/MM3 (ref 0.1–0.9)
MONOCYTES NFR BLD AUTO: 10.1 % (ref 0–12)
NEUTROPHILS # BLD AUTO: 2.07 10*3/MM3 (ref 1.4–6.5)
NEUTROPHILS NFR BLD AUTO: 55 % (ref 40–75)
PLATELET # BLD AUTO: 99 10*3/MM3 (ref 130–400)
PMV BLD AUTO: 11.6 FL (ref 6–10)
RBC # BLD AUTO: 3.69 10*6/MM3 (ref 4.2–5.4)
WBC NRBC COR # BLD: 3.76 10*3/MM3 (ref 4.5–12.5)

## 2018-08-03 PROCEDURE — 82140 ASSAY OF AMMONIA: CPT | Performed by: INTERNAL MEDICINE

## 2018-08-03 PROCEDURE — 85025 COMPLETE CBC W/AUTO DIFF WBC: CPT | Performed by: INTERNAL MEDICINE

## 2018-08-10 ENCOUNTER — LAB REQUISITION (OUTPATIENT)
Dept: LAB | Facility: HOSPITAL | Age: 83
End: 2018-08-10

## 2018-08-10 DIAGNOSIS — D64.9 ANEMIA: ICD-10-CM

## 2018-08-10 LAB
AMMONIA BLD-SCNC: 89 UMOL/L (ref 11–51)
BASOPHILS # BLD AUTO: 0.02 10*3/MM3 (ref 0–0.3)
BASOPHILS NFR BLD AUTO: 0.5 % (ref 0–2)
DEPRECATED RDW RBC AUTO: 44.8 FL (ref 37–54)
EOSINOPHIL # BLD AUTO: 0.21 10*3/MM3 (ref 0–0.7)
EOSINOPHIL NFR BLD AUTO: 5 % (ref 0–7)
ERYTHROCYTE [DISTWIDTH] IN BLOOD BY AUTOMATED COUNT: 13.5 % (ref 11.5–14.5)
HCT VFR BLD AUTO: 31.8 % (ref 37–47)
HGB BLD-MCNC: 11.1 G/DL (ref 12–16)
IMM GRANULOCYTES # BLD: 0 10*3/MM3 (ref 0–0.03)
IMM GRANULOCYTES NFR BLD: 0 % (ref 0–0.5)
LYMPHOCYTES # BLD AUTO: 1.28 10*3/MM3 (ref 1–3)
LYMPHOCYTES NFR BLD AUTO: 30.4 % (ref 16–46)
MCH RBC QN AUTO: 33.4 PG (ref 27–33)
MCHC RBC AUTO-ENTMCNC: 34.9 G/DL (ref 33–37)
MCV RBC AUTO: 95.8 FL (ref 80–94)
MONOCYTES # BLD AUTO: 0.49 10*3/MM3 (ref 0.1–0.9)
MONOCYTES NFR BLD AUTO: 11.6 % (ref 0–12)
NEUTROPHILS # BLD AUTO: 2.21 10*3/MM3 (ref 1.4–6.5)
NEUTROPHILS NFR BLD AUTO: 52.5 % (ref 40–75)
PLATELET # BLD AUTO: 106 10*3/MM3 (ref 130–400)
PMV BLD AUTO: 10.9 FL (ref 6–10)
RBC # BLD AUTO: 3.32 10*6/MM3 (ref 4.2–5.4)
WBC NRBC COR # BLD: 4.21 10*3/MM3 (ref 4.5–12.5)

## 2018-08-10 PROCEDURE — 82140 ASSAY OF AMMONIA: CPT | Performed by: INTERNAL MEDICINE

## 2018-08-10 PROCEDURE — 85025 COMPLETE CBC W/AUTO DIFF WBC: CPT | Performed by: INTERNAL MEDICINE

## 2018-08-13 ENCOUNTER — OFFICE VISIT (OUTPATIENT)
Dept: CARDIOLOGY | Facility: CLINIC | Age: 83
End: 2018-08-13

## 2018-08-13 VITALS
OXYGEN SATURATION: 98 % | WEIGHT: 141 LBS | SYSTOLIC BLOOD PRESSURE: 120 MMHG | DIASTOLIC BLOOD PRESSURE: 60 MMHG | BODY MASS INDEX: 24.98 KG/M2 | HEIGHT: 63 IN | HEART RATE: 68 BPM

## 2018-08-13 DIAGNOSIS — R60.0 BILATERAL LEG EDEMA: ICD-10-CM

## 2018-08-13 DIAGNOSIS — I35.1 NONRHEUMATIC AORTIC VALVE INSUFFICIENCY: ICD-10-CM

## 2018-08-13 DIAGNOSIS — F41.1 GENERALIZED ANXIETY DISORDER: ICD-10-CM

## 2018-08-13 DIAGNOSIS — I10 ESSENTIAL HYPERTENSION: ICD-10-CM

## 2018-08-13 DIAGNOSIS — K74.60 CIRRHOSIS OF LIVER WITHOUT ASCITES, UNSPECIFIED HEPATIC CIRRHOSIS TYPE (HCC): Primary | ICD-10-CM

## 2018-08-13 DIAGNOSIS — G89.4 CHRONIC PAIN SYNDROME: ICD-10-CM

## 2018-08-13 DIAGNOSIS — D63.8 ANEMIA, CHRONIC DISEASE: ICD-10-CM

## 2018-08-13 DIAGNOSIS — E03.4 HYPOTHYROIDISM DUE TO ACQUIRED ATROPHY OF THYROID: ICD-10-CM

## 2018-08-13 PROCEDURE — 99213 OFFICE O/P EST LOW 20 MIN: CPT | Performed by: INTERNAL MEDICINE

## 2018-08-13 RX ORDER — HYDROCODONE BITARTRATE AND ACETAMINOPHEN 5; 325 MG/1; MG/1
1 TABLET ORAL 2 TIMES DAILY PRN
Qty: 60 TABLET | Refills: 0 | Status: SHIPPED | OUTPATIENT
Start: 2018-08-13 | End: 2018-10-31 | Stop reason: SDUPTHER

## 2018-08-13 RX ORDER — SPIRONOLACTONE 25 MG/1
50 TABLET ORAL DAILY
Qty: 30 TABLET | Refills: 11 | Status: SHIPPED | OUTPATIENT
Start: 2018-08-13 | End: 2018-11-05 | Stop reason: SDUPTHER

## 2018-08-13 NOTE — PROGRESS NOTES
subjective     Chief Complaint   Patient presents with   • right hip pain   • Chronic pain syndrome due to fractured spine and left rib fr   • Med Refill     pending     History of Present Illness  Patient is 88 years old white female who is remarkably good health considering her age and medical problems.  Patient is ambulatory without assistance.  Awake alert oriented.  Patient states that she been having lot of pain in the legs and right hip.  Patient wants refill of medications for pain.  She is taking hydrocodone 5/325.    She is hypothyroid on Synthroid replacement therapy thyroid functions have been normal.    She complains of bilateral lower extremity edema.  She is taking Aldactone 25 mg daily that will be increased.    Patient has hepatic cirrhosis ammonia level is elevated although she is taking Xifaxan twice a day and lactulose 3 times a day.  She has 3-4 bowel movements a day.    Past Surgical History:   Procedure Laterality Date   • BACK SURGERY      KYPHOPLASTIES    • CARDIAC CATHETERIZATION Left 08/2004   • CARDIOVASCULAR STRESS TEST  01/25/2015   • CATARACT EXTRACTION     • CHOLECYSTECTOMY     • CLAVICLE SURGERY Right    • COLONOSCOPY  10/2004   • ECHO - CONVERTED  11/2012   • FEMUR OPEN REDUCTION INTERNAL FIXATION Left 10/12/2017    Procedure: FEMUR OPEN REDUCTION INTERNAL FIXATION;  Surgeon: Karson Pierre MD;  Location: [facility];  Service:    • HIP FRACTURE SURGERY Bilateral     ARTHROPLASTIES    • PARTIAL HYSTERECTOMY     • WRIST FRACTURE SURGERY       Family History   Problem Relation Age of Onset   • Cancer Mother    • Heart disease Father    • Arthritis Sister    • Osteoporosis Sister    • Diabetes Maternal Aunt      Past Medical History:   Diagnosis Date   • Acute renal failure (CMS/HCC)    • Anxiety    • Bell's palsy    • Cellulitis     LLE   • CHF (congestive heart failure) (CMS/HCC)     Diastolic   • Diverticulosis    • GERD (gastroesophageal reflux disease)    • Hepatic  cirrhosis (CMS/HCC)    • History of transfusion    • Hypertension    • Hypothyroidism    • Osteoporosis    • Thrombocytopenia (CMS/HCC)    • Trigeminal neuralgia      Patient Active Problem List   Diagnosis   • Hepatic cirrhosis*   • HTN (hypertension)*   • Anxiety disorder*   • Osteopetrosis*   • Hypothyroidism*   • GERD (gastroesophageal reflux disease)*   • Chronic pain syndrome due to fractured spine and left rib fracture*   • Anemia, chronic disease   • Arthralgia of hip   • Left knee pain   • Fracture of proximal end of tibia and fibula   • Primary insomnia   • Thrombocytopenia (CMS/HCC)   • Peripheral vascular disease (CMS/HCC)   • Bilateral leg edema   • Cramp of both lower extremities   • Hepatic encephalopathy (CMS/HCC)   • UTI (urinary tract infection)   • Hypokalemia   • Closed fracture of lower end of left femur with routine healing   • Status post open reduction with internal fixation of fracture   • Nonrheumatic aortic valve insufficiency, mild   • Chronic diastolic heart failure, mild   • Periprosthetic fracture of shaft of femur   • S/P ORIF (open reduction internal fixation) fracture   • Diarrhea   • Right upper quadrant abdominal pain   • Nausea   • Elevated liver enzymes   • Chronic hepatitis C without hepatic coma (CMS/HCC)       Social History   Substance Use Topics   • Smoking status: Never Smoker   • Smokeless tobacco: Former User     Types: Snuff     Quit date: 1970   • Alcohol use No      Comment: FORMER 12 BOTTLES PER DAY FOR 15 YEARS       Allergies   Allergen Reactions   • Ciprofloxacin        Current Outpatient Prescriptions on File Prior to Visit   Medication Sig   • Alcohol Swabs pads Uses as directed.   • aspirin 81 MG tablet Take 1 tablet by mouth Daily.   • bumetanide (BUMEX) 1 MG tablet Take 1 tablet by mouth 2 (Two) Times a Day.   • Calcium Carbonate-Vitamin D (OYST-LILA-D 500 PO) Take  by mouth.   • cyanocobalamin 1000 MCG/ML injection Inject 1 mL into the shoulder, thigh, or  buttocks Every 28 (Twenty-Eight) Days.   • esomeprazole (nexIUM) 40 MG capsule Take 1 capsule by mouth Every Morning Before Breakfast.   • folic acid (FOLVITE) 1 MG tablet Take 1 tablet by mouth Daily.   • Insulin Pen Needle (BD AUTOSHIELD DUO) 30G X 5 MM misc Use as directed   • lactulose (CHRONULAC) 10 GM/15ML solution Take 30 mL by mouth 5 (Five) Times a Day.   • levothyroxine (SYNTHROID) 50 MCG tablet Take 1 tablet by mouth Daily.   • Multiple Vitamin (MULTI VITAMIN DAILY) tablet Take  by mouth.   • nitroglycerin (NITROSTAT) 0.4 MG SL tablet Place 1 tablet under the tongue Every 5 (Five) Minutes As Needed for Chest Pain. Take no more than 3 doses in 15 minutes.   • ondansetron (ZOFRAN) 4 MG tablet Take 4 mg by mouth Every 8 (Eight) Hours As Needed for Nausea or Vomiting.   • OXcarbazepine (TRILEPTAL) 150 MG tablet Take 1 tablet by mouth Every 12 (Twelve) Hours.   • rifaximin (XIFAXAN) 550 MG tablet Take 1 tablet by mouth Every 12 (Twelve) Hours.   • rOPINIRole (REQUIP) 1 MG tablet Take 1 tablet by mouth Every Night. Take 1 hour before bedtime.   • teriparatide (FORTEO) 600 MCG/2.4ML injection Inject 0.08 mL under the skin Daily.   • vitamin D (ERGOCALCIFEROL) 44780 units capsule capsule Take 1 capsule by mouth 1 (One) Time Per Week.   • [DISCONTINUED] HYDROcodone-acetaminophen (NORCO) 5-325 MG per tablet Take 1 tablet by mouth 2 (Two) Times a Day As Needed for Moderate Pain .   • [DISCONTINUED] spironolactone (ALDACTONE) 25 MG tablet Take 1 tablet by mouth Daily.     No current facility-administered medications on file prior to visit.          The following portions of the patient's history were reviewed and updated as appropriate: allergies, current medications, past family history, past medical history, past social history, past surgical history and problem list.    Review of Systems   Constitution: Negative.   HENT: Negative.  Negative for congestion.    Eyes: Negative.    Cardiovascular: Positive for leg  "swelling. Negative for chest pain, cyanosis, dyspnea on exertion, irregular heartbeat, near-syncope, orthopnea, palpitations, paroxysmal nocturnal dyspnea and syncope.   Respiratory: Negative.  Negative for shortness of breath.    Hematologic/Lymphatic: Negative.    Musculoskeletal: Positive for back pain, joint pain, myalgias and stiffness.   Gastrointestinal: Positive for heartburn.   Neurological: Negative.  Negative for headaches.   Psychiatric/Behavioral: The patient has insomnia and is nervous/anxious.           Objective:     /60 (BP Location: Left arm, Patient Position: Sitting, Cuff Size: Adult)   Pulse 68   Ht 160 cm (63\")   Wt 64 kg (141 lb)   SpO2 98%   BMI 24.98 kg/m²   Physical Exam   Constitutional: She appears well-developed and well-nourished. No distress.   HENT:   Head: Normocephalic and atraumatic.   Mouth/Throat: Oropharynx is clear and moist. No oropharyngeal exudate.   Eyes: Pupils are equal, round, and reactive to light. Conjunctivae and EOM are normal. No scleral icterus.   Neck: Normal range of motion. Neck supple. No JVD present. No tracheal deviation present. No thyromegaly present.   Cardiovascular: Normal rate, regular rhythm, normal heart sounds and intact distal pulses.  PMI is not displaced.  Exam reveals no gallop, no friction rub and no decreased pulses.    No murmur heard.  Pulses:       Carotid pulses are 3+ on the right side, and 3+ on the left side.       Radial pulses are 3+ on the right side, and 3+ on the left side.   Pulmonary/Chest: Effort normal and breath sounds normal. No respiratory distress. She has no wheezes. She has no rales. She exhibits no tenderness.   Abdominal: Soft. Bowel sounds are normal. She exhibits no distension, no abdominal bruit and no mass. There is no splenomegaly or hepatomegaly. There is no tenderness. There is no rebound and no guarding.   Musculoskeletal: Normal range of motion. She exhibits edema. She exhibits no tenderness or " deformity.   Lymphadenopathy:     She has no cervical adenopathy.   Neurological: She is alert. She has normal reflexes. No cranial nerve deficit. She exhibits normal muscle tone. Coordination normal.   Skin: Skin is warm and dry. No rash noted. She is not diaphoretic. No erythema.   Psychiatric: She has a normal mood and affect. Her behavior is normal. Judgment and thought content normal.         Lab Review  Lab Results   Component Value Date     07/05/2018    K 3.5 07/05/2018     07/05/2018    BUN 19 07/05/2018    CREATININE 1.25 07/05/2018    GLUCOSE 108 07/05/2018    CALCIUM 8.2 07/05/2018    ALT 20 06/27/2018    ALKPHOS 133 (H) 06/27/2018    LABIL2 0.9 (L) 04/08/2016     Lab Results   Component Value Date    CKTOTAL 99 07/25/2017     Lab Results   Component Value Date    WBC 4.21 (L) 08/10/2018    HGB 11.1 (L) 08/10/2018    HCT 31.8 (L) 08/10/2018     (L) 08/10/2018     Lab Results   Component Value Date    INR 1.28 (H) 06/27/2018    INR 1.79 (H) 10/13/2017    INR 2.04 (H) 10/12/2017     Lab Results   Component Value Date    MG 2.0 10/17/2017     Lab Results   Component Value Date    TSH 1.156 04/25/2018     Lab Results   Component Value Date    BNP 11.0 10/05/2017     Lab Results   Component Value Date    CHLPL 161 04/08/2016    CHOL 185 04/25/2018    TRIG 169 (H) 04/25/2018    HDL 87 04/25/2018    VLDL 33.8 04/25/2018    LDLHDL 0.74 04/25/2018     Lab Results   Component Value Date    LDL 64 04/25/2018    LDL 66 04/18/2017       Procedures       I personally viewed and interpreted the patient's LAB data         Assessment:     1. Cirrhosis of liver without ascites, unspecified hepatic cirrhosis type (CMS/HCC)    2. Chronic pain syndrome due to fractured spine and left rib fracture*    3. Bilateral leg edema    4. Generalized anxiety disorder    5. Anemia, chronic disease    6. Hypothyroidism due to acquired atrophy of thyroid    7. Essential hypertension    8. Nonrheumatic aortic valve  insufficiency, mild          Plan:     Patient has hepatic cirrhosis with the pancytopenia.  She is doing very well.  She is oriented ×3 is ambulatory without assistance.  Ammonia level has gone up which was discussed with the patient dietary restrictions were discussed.  His of flexion low-dose and Xifaxan was discussed and explained.  Because of bilateral lower extremity edema Aldactone dose was increased.  Blood pressure is normal medications will be continued.  Thyroid was also continued.  No change in therapy except increased dose of Aldactone.  Follow-up scheduled        No Follow-up on file.

## 2018-08-16 ENCOUNTER — LAB REQUISITION (OUTPATIENT)
Dept: LAB | Facility: HOSPITAL | Age: 83
End: 2018-08-16

## 2018-08-16 DIAGNOSIS — K70.30 ALCOHOLIC CIRRHOSIS OF LIVER WITHOUT ASCITES (HCC): ICD-10-CM

## 2018-08-16 LAB
AMMONIA BLD-SCNC: 123 UMOL/L (ref 11–51)
BASOPHILS # BLD AUTO: 0.03 10*3/MM3 (ref 0–0.3)
BASOPHILS NFR BLD AUTO: 0.8 % (ref 0–2)
DEPRECATED RDW RBC AUTO: 46.4 FL (ref 37–54)
EOSINOPHIL # BLD AUTO: 0.33 10*3/MM3 (ref 0–0.7)
EOSINOPHIL NFR BLD AUTO: 9.2 % (ref 0–7)
ERYTHROCYTE [DISTWIDTH] IN BLOOD BY AUTOMATED COUNT: 13.9 % (ref 11.5–14.5)
HCT VFR BLD AUTO: 33.2 % (ref 37–47)
HGB BLD-MCNC: 11.3 G/DL (ref 12–16)
IMM GRANULOCYTES # BLD: 0.01 10*3/MM3 (ref 0–0.03)
IMM GRANULOCYTES NFR BLD: 0.3 % (ref 0–0.5)
LYMPHOCYTES # BLD AUTO: 1.44 10*3/MM3 (ref 1–3)
LYMPHOCYTES NFR BLD AUTO: 40 % (ref 16–46)
MCH RBC QN AUTO: 33 PG (ref 27–33)
MCHC RBC AUTO-ENTMCNC: 34 G/DL (ref 33–37)
MCV RBC AUTO: 97.1 FL (ref 80–94)
MONOCYTES # BLD AUTO: 0.45 10*3/MM3 (ref 0.1–0.9)
MONOCYTES NFR BLD AUTO: 12.5 % (ref 0–12)
NEUTROPHILS # BLD AUTO: 1.34 10*3/MM3 (ref 1.4–6.5)
NEUTROPHILS NFR BLD AUTO: 37.2 % (ref 40–75)
PLATELET # BLD AUTO: 98 10*3/MM3 (ref 130–400)
PMV BLD AUTO: 11 FL (ref 6–10)
RBC # BLD AUTO: 3.42 10*6/MM3 (ref 4.2–5.4)
WBC NRBC COR # BLD: 3.6 10*3/MM3 (ref 4.5–12.5)

## 2018-08-16 PROCEDURE — 82140 ASSAY OF AMMONIA: CPT | Performed by: INTERNAL MEDICINE

## 2018-08-16 PROCEDURE — 85025 COMPLETE CBC W/AUTO DIFF WBC: CPT | Performed by: INTERNAL MEDICINE

## 2018-08-21 NOTE — PROGRESS NOTES
: 1930    Chief Complaint   Patient presents with   • Hepatitis C       Alma Delia Garcia is a 88 y.o. female who presents to the Hepatitis C clinic today as an established patient for evaluation of Hepatitis C.    History of Present Illness:  Patient is unable to give history due to dementia and has caregiver with her today. Caregiver reports that patient has had Hepatitis C for years, treatment naive and was directed to be seen at Hep C clinic by GI provider to be considered for therapy. She does have cirrhosis and history of alcohol abuse, no longer drinking. There is history of hospitalizations related to elevated liver chemistries and ammonia. Xifaxan was recently approved by her insurance after pre-authorization and she has been taking it as recommended 500 mg BID for treatment of HE. Since then, she has been having 3 soft BMs per day without drinking as much lactulose as previous. There have not been any recent episodes of confusion.      2018:  Ammonia normal  WBC 4.50 - 12.50 10*3/mm3 3.18     RBC 4.20 - 5.40 10*6/mm3 3.42     Hemoglobin 12.0 - 16.0 g/dL 11.5     Hematocrit 37.0 - 47.0 % 33.4     MCV 80.0 - 94.0 fL 97.7     MCH 27.0 - 33.0 pg 33.6     MCHC 33.0 - 37.0 g/dL 34.4    RDW 11.5 - 14.5 % 14.3    RDW-SD 37.0 - 54.0 fl 47.7    MPV 6.0 - 10.0 fL 11.3     Platelets 130 - 400 10*3/mm3 116        :  Glucose 70 - 110 mg/dL 98    BUN 7 - 21 mg/dL 17    Creatinine 0.43 - 1.29 mg/dL 1.02    Sodium 135 - 153 mmol/L 139    Potassium 3.5 - 5.3 mmol/L 4.1    Chloride 99 - 112 mmol/L 100    CO2 24.3 - 31.9 mmol/L 30.0    Calcium 7.7 - 10.0 mg/dL 9.9    Total Protein 6.0 - 8.0 g/dL 7.9    Albumin 3.40 - 4.80 g/dL 3.80    ALT (SGPT) 10 - 36 U/L 33    AST (SGOT) 10 - 30 U/L 56     Alkaline Phosphatase 35 - 104 U/L 136     Comment: Note New Reference Ranges   Total Bilirubin 0.2 - 1.8 mg/dL 1.9     Comment: 1+ Icteric    eGFR Non African Amer >60 mL/min/1.73 51     Globulin gm/dL 4.1    A/G  Ratio 1.5 - 2.5 g/dL 0.9     BUN/Creatinine Ratio 7.0 - 25.0 16.7    Anion Gap 3.6 - 11.2 mmol/L 9.0       12/2016 CT abd/pelv:  FINDINGS: The images acquired through the bases of the lungs show  interstitial lung disease changes in both lung bases with some pleural  thickening along the dependent wall of the chest. The liver shows  dilatation of the bile ducts in the liver. The common hepatic and common  bile ducts were both dilated but not significantly changed from 09/2016.  The spleen was not enlarged. The pancreas shows no evidence of mass. A  nasogastric tube is seen in the stomach. The kidneys showed no evidence  of obstruction. There was no ascites or free air. There is a moderate  amount of gas and stool throughout the colon suggesting constipation.  There were no ventral hernias.     IMPRESSION:  Diffuse dilatation of the bile ducts in the liver and the  extrahepatic biliary tree is seen; this was also present back in  09/2016. There is interstitial lung disease in the lung bases. There is  a moderate amount of gas and stool throughout the colon suggesting  constipation.     US liver 6/4/2018:  FINDINGS:  Multiple real-time images were obtained. The liver is  somewhat small and echogenic consistent with fibrosis and cirrhosis. No  focal lesions were seen within the liver. Color Doppler flow was seen in  the hepatic veins and inferior vena cava. There was no ascites. The  biliary tree was not dilated. The common hepatic portion of the bile  duct measured 2.9 mm.  IMPRESSION:  Small echogenic liver without evidence of focal abnormality.    Review of Systems   Unable to perform ROS: Dementia (unable to answer ROS questions)     Past Medical History:   Diagnosis Date   • Acute renal failure (CMS/HCC)    • Anxiety    • Bell's palsy    • Cellulitis     LLE   • CHF (congestive heart failure) (CMS/HCC)     Diastolic   • Diverticulosis    • GERD (gastroesophageal reflux disease)    • Hepatic cirrhosis (CMS/HCC)     • History of transfusion    • Hypertension    • Hypothyroidism    • Osteoporosis    • Thrombocytopenia (CMS/HCC)    • Trigeminal neuralgia        Past Surgical History:   Procedure Laterality Date   • BACK SURGERY      KYPHOPLASTIES    • CARDIAC CATHETERIZATION Left 08/2004   • CARDIOVASCULAR STRESS TEST  01/25/2015   • CATARACT EXTRACTION     • CHOLECYSTECTOMY     • CLAVICLE SURGERY Right    • COLONOSCOPY  10/2004   • ECHO - CONVERTED  11/2012   • FEMUR OPEN REDUCTION INTERNAL FIXATION Left 10/12/2017    Procedure: FEMUR OPEN REDUCTION INTERNAL FIXATION;  Surgeon: Karson Pierre MD;  Location: [facility];  Service:    • HIP FRACTURE SURGERY Bilateral     ARTHROPLASTIES    • PARTIAL HYSTERECTOMY     • WRIST FRACTURE SURGERY         Family History   Problem Relation Age of Onset   • Cancer Mother    • Heart disease Father    • Arthritis Sister    • Osteoporosis Sister    • Diabetes Maternal Aunt        Social History     Social History   • Marital status:      Social History Main Topics   • Smoking status: Never Smoker   • Smokeless tobacco: Former User     Types: Snuff     Quit date: 1970   • Alcohol use No      Comment: FORMER 12 BOTTLES PER DAY FOR 15 YEARS   • Drug use: No   • Sexual activity: Defer     Other Topics Concern   • Not on file     Social History Narrative    LIVES WITH GRANDDAUGHTER DEVON IN [place]        Current Outpatient Prescriptions:   •  Alcohol Swabs pads, Uses as directed., Disp: 1 each, Rfl: 12  •  aspirin 81 MG tablet, Take 1 tablet by mouth Daily., Disp: 90 tablet, Rfl: 3  •  bumetanide (BUMEX) 1 MG tablet, Take 1 tablet by mouth 2 (Two) Times a Day., Disp: 180 tablet, Rfl: 3  •  Calcium Carbonate-Vitamin D (OYST-LILA-D 500 PO), Take  by mouth., Disp: , Rfl:   •  cyanocobalamin 1000 MCG/ML injection, Inject 1 mL into the shoulder, thigh, or buttocks Every 28 (Twenty-Eight) Days., Disp: 10 mL, Rfl: 1  •  esomeprazole (nexIUM) 40 MG capsule, Take 1 capsule by mouth  Every Morning Before Breakfast., Disp: 90 capsule, Rfl: 3  •  folic acid (FOLVITE) 1 MG tablet, Take 1 tablet by mouth Daily., Disp: 90 tablet, Rfl: 3  •  HYDROcodone-acetaminophen (NORCO) 5-325 MG per tablet, Take 1 tablet by mouth 2 (Two) Times a Day As Needed for Moderate Pain ., Disp: 60 tablet, Rfl: 0  •  Insulin Pen Needle (BD AUTOSHIELD DUO) 30G X 5 MM misc, Use as directed, Disp: 1 each, Rfl: 12  •  lactulose (CHRONULAC) 10 GM/15ML solution, Take 30 mL by mouth 5 (Five) Times a Day., Disp: , Rfl:   •  levothyroxine (SYNTHROID) 50 MCG tablet, Take 1 tablet by mouth Daily., Disp: 90 tablet, Rfl: 3  •  Multiple Vitamin (MULTI VITAMIN DAILY) tablet, Take  by mouth., Disp: , Rfl:   •  nitroglycerin (NITROSTAT) 0.4 MG SL tablet, Place 1 tablet under the tongue Every 5 (Five) Minutes As Needed for Chest Pain. Take no more than 3 doses in 15 minutes., Disp: 25 tablet, Rfl: 0  •  ondansetron (ZOFRAN) 4 MG tablet, Take 4 mg by mouth Every 8 (Eight) Hours As Needed for Nausea or Vomiting., Disp: , Rfl:   •  OXcarbazepine (TRILEPTAL) 150 MG tablet, Take 1 tablet by mouth Every 12 (Twelve) Hours., Disp: 180 tablet, Rfl: 0  •  rifaximin (XIFAXAN) 550 MG tablet, Take 1 tablet by mouth Every 12 (Twelve) Hours., Disp: 60 tablet, Rfl: 5  •  rOPINIRole (REQUIP) 1 MG tablet, Take 1 tablet by mouth Every Night. Take 1 hour before bedtime., Disp: 90 tablet, Rfl: 3  •  spironolactone (ALDACTONE) 25 MG tablet, Take 2 tablets by mouth Daily., Disp: 30 tablet, Rfl: 11  •  teriparatide (FORTEO) 600 MCG/2.4ML injection, Inject 0.08 mL under the skin Daily., Disp: 2.4 mL, Rfl: 12  •  vitamin D (ERGOCALCIFEROL) 39816 units capsule capsule, Take 1 capsule by mouth 1 (One) Time Per Week., Disp: 12 capsule, Rfl: 0    Allergies:   Ciprofloxacin    Vitals:  There were no vitals taken for this visit.    Physical Exam   Constitutional: She appears well-developed and well-nourished. No distress.   HENT:   Head: Normocephalic and atraumatic.    Nose: Nose normal.   Mouth/Throat: Oropharynx is clear and moist.   Poor dentition   Eyes: Conjunctivae are normal. Right eye exhibits no discharge. Left eye exhibits no discharge. No scleral icterus.   Neck: Normal range of motion. No JVD present.   Cardiovascular: Normal rate, regular rhythm and normal heart sounds.  Exam reveals no gallop and no friction rub.    No murmur heard.  Pulmonary/Chest: Effort normal and breath sounds normal. No respiratory distress. She has no wheezes. She has no rales. She exhibits no tenderness.   Abdominal: Soft. Bowel sounds are normal. She exhibits no mass. There is no tenderness.   Musculoskeletal: She exhibits edema (mild b/l LEs). She exhibits no deformity.   Mobility with wheelchair   Neurological: She is alert. Coordination normal.   Oriented to person   Skin: Skin is warm and dry. No rash noted. She is not diaphoretic. No erythema.   Vitals reviewed.      Assessment/Plan:  1. Hep C w/o coma, chronic (CMS/HCC)    2. Hepatic encephalopathy (CMS/HCC)      Patient has progressed cirrhosis with small liver. She has had Hep C for many years. She has dementia and currently requires continuous care from family. She is not interested in undergoing treatment for Hep C and she does have decompensation of her cirrhosis with hepatic encephalopathy as well. She is not a candidate for Hep C treatment. Due to age and health conditions, I recommend conservative treatment and monitoring. There is no need for repeated US liver for monitoring for HCC. She can continue with treatment of hepatic encephalopathy with Xifaxan and lactulose with careful BM monitoring. Liver enzymes will be monitored by PCP.       Return if symptoms worsen or fail to improve.      Electronically signed 8/23/2018 10:49 AM  Selena Castro PA-C, [facility]

## 2018-08-26 ENCOUNTER — APPOINTMENT (OUTPATIENT)
Dept: GENERAL RADIOLOGY | Facility: HOSPITAL | Age: 83
End: 2018-08-26

## 2018-08-26 ENCOUNTER — HOSPITAL ENCOUNTER (EMERGENCY)
Facility: HOSPITAL | Age: 83
Discharge: ANOTHER HOSPITAL | End: 2018-08-26
Attending: INTERNAL MEDICINE | Admitting: INTERNAL MEDICINE

## 2018-08-26 ENCOUNTER — APPOINTMENT (OUTPATIENT)
Dept: CT IMAGING | Facility: HOSPITAL | Age: 83
End: 2018-08-26

## 2018-08-26 DIAGNOSIS — A41.9 SEPTIC SHOCK (HCC): Primary | ICD-10-CM

## 2018-08-26 DIAGNOSIS — I21.4 NSTEMI (NON-ST ELEVATED MYOCARDIAL INFARCTION) (HCC): ICD-10-CM

## 2018-08-26 DIAGNOSIS — N39.0 URINARY TRACT INFECTION WITHOUT HEMATURIA, SITE UNSPECIFIED: ICD-10-CM

## 2018-08-26 DIAGNOSIS — R65.21 SEPTIC SHOCK (HCC): Primary | ICD-10-CM

## 2018-08-26 LAB
A-A DO2: 89.5 MMHG (ref 0–300)
ALBUMIN SERPL-MCNC: 3.3 G/DL (ref 3.4–4.8)
ALBUMIN/GLOB SERPL: 0.9 G/DL (ref 1.5–2.5)
ALP SERPL-CCNC: 152 U/L (ref 35–104)
ALT SERPL W P-5'-P-CCNC: 41 U/L (ref 10–36)
AMMONIA BLD-SCNC: 62 UMOL/L (ref 11–51)
ANION GAP SERPL CALCULATED.3IONS-SCNC: 13.8 MMOL/L (ref 3.6–11.2)
ARTERIAL PATENCY WRIST A: ABNORMAL
AST SERPL-CCNC: 80 U/L (ref 10–30)
ATMOSPHERIC PRESS: 733 MMHG
BACTERIA UR QL AUTO: ABNORMAL /HPF
BASE EXCESS BLDA CALC-SCNC: -3.2 MMOL/L
BASOPHILS # BLD AUTO: 0.01 10*3/MM3 (ref 0–0.3)
BASOPHILS NFR BLD AUTO: 0.1 % (ref 0–2)
BDY SITE: ABNORMAL
BILIRUB SERPL-MCNC: 2.5 MG/DL (ref 0.2–1.8)
BILIRUB UR QL STRIP: NEGATIVE
BODY TEMPERATURE: 98.6 C
BUN BLD-MCNC: 38 MG/DL (ref 7–21)
BUN/CREAT SERPL: 20 (ref 7–25)
CALCIUM SPEC-SCNC: 9.1 MG/DL (ref 7.7–10)
CHLORIDE SERPL-SCNC: 106 MMOL/L (ref 99–112)
CHOLEST SERPL-MCNC: 150 MG/DL (ref 0–200)
CLARITY UR: ABNORMAL
CO2 SERPL-SCNC: 18.2 MMOL/L (ref 24.3–31.9)
COHGB MFR BLD: 1.5 % (ref 0–5)
COLOR UR: YELLOW
CREAT BLD-MCNC: 1.9 MG/DL (ref 0.43–1.29)
D-LACTATE SERPL-SCNC: 1.9 MMOL/L (ref 0.5–2)
D-LACTATE SERPL-SCNC: 4.4 MMOL/L (ref 0.5–2)
DEPRECATED RDW RBC AUTO: 51.8 FL (ref 37–54)
EOSINOPHIL # BLD AUTO: 0.01 10*3/MM3 (ref 0–0.7)
EOSINOPHIL NFR BLD AUTO: 0.1 % (ref 0–7)
ERYTHROCYTE [DISTWIDTH] IN BLOOD BY AUTOMATED COUNT: 14.8 % (ref 11.5–14.5)
GFR SERPL CREATININE-BSD FRML MDRD: 25 ML/MIN/1.73
GLOBULIN UR ELPH-MCNC: 3.7 GM/DL
GLUCOSE BLD-MCNC: 112 MG/DL (ref 70–110)
GLUCOSE BLDC GLUCOMTR-MCNC: 118 MG/DL (ref 70–130)
GLUCOSE UR STRIP-MCNC: NEGATIVE MG/DL
HCO3 BLDA-SCNC: 18.7 MMOL/L (ref 22–26)
HCT VFR BLD AUTO: 31.3 % (ref 37–47)
HCT VFR BLD CALC: 36 % (ref 37–47)
HDLC SERPL-MCNC: 68 MG/DL (ref 60–100)
HGB BLD-MCNC: 11 G/DL (ref 12–16)
HGB BLDA-MCNC: 12.4 G/DL (ref 12–16)
HGB UR QL STRIP.AUTO: ABNORMAL
HOLD SPECIMEN: NORMAL
HOROWITZ INDEX BLD+IHG-RTO: 28 %
HYALINE CASTS UR QL AUTO: ABNORMAL /LPF
IMM GRANULOCYTES # BLD: 0.04 10*3/MM3 (ref 0–0.03)
IMM GRANULOCYTES NFR BLD: 0.5 % (ref 0–0.5)
KETONES UR QL STRIP: NEGATIVE
LDLC SERPL CALC-MCNC: 58 MG/DL (ref 0–100)
LDLC/HDLC SERPL: 0.86 {RATIO}
LEUKOCYTE ESTERASE UR QL STRIP.AUTO: ABNORMAL
LYMPHOCYTES # BLD AUTO: 0.44 10*3/MM3 (ref 1–3)
LYMPHOCYTES NFR BLD AUTO: 6 % (ref 16–46)
MCH RBC QN AUTO: 35.6 PG (ref 27–33)
MCHC RBC AUTO-ENTMCNC: 35.1 G/DL (ref 33–37)
MCV RBC AUTO: 101.3 FL (ref 80–94)
METHGB BLD QL: 0.4 % (ref 0–3)
MODALITY: ABNORMAL
MONOCYTES # BLD AUTO: 0.08 10*3/MM3 (ref 0.1–0.9)
MONOCYTES NFR BLD AUTO: 1.1 % (ref 0–12)
NEUTROPHILS # BLD AUTO: 6.73 10*3/MM3 (ref 1.4–6.5)
NEUTROPHILS NFR BLD AUTO: 92.2 % (ref 40–75)
NITRITE UR QL STRIP: NEGATIVE
OSMOLALITY SERPL CALC.SUM OF ELEC: 285.5 MOSM/KG (ref 273–305)
OXYHGB MFR BLDV: 93.6 % (ref 85–100)
PCO2 BLDA: 25 MM HG (ref 35–45)
PCO2 TEMP ADJ BLD: ABNORMAL MM HG (ref 35–45)
PH BLDA: 7.49 PH UNITS (ref 7.35–7.45)
PH UR STRIP.AUTO: <=5 [PH] (ref 5–8)
PH, TEMP CORRECTED: ABNORMAL PH UNITS (ref 7.35–7.45)
PLATELET # BLD AUTO: 51 10*3/MM3 (ref 130–400)
PMV BLD AUTO: 11.5 FL (ref 6–10)
PO2 BLDA: 73.1 MM HG (ref 80–100)
PO2 TEMP ADJ BLD: ABNORMAL MM HG (ref 83–108)
POTASSIUM BLD-SCNC: 3.4 MMOL/L (ref 3.5–5.3)
PROT SERPL-MCNC: 7 G/DL (ref 6–8)
PROT UR QL STRIP: NEGATIVE
RBC # BLD AUTO: 3.09 10*6/MM3 (ref 4.2–5.4)
RBC # UR: ABNORMAL /HPF
REF LAB TEST METHOD: ABNORMAL
SAO2 % BLDCOA: 95.4 % (ref 90–100)
SODIUM BLD-SCNC: 138 MMOL/L (ref 135–153)
SP GR UR STRIP: 1.01 (ref 1–1.03)
SQUAMOUS #/AREA URNS HPF: ABNORMAL /HPF
TRIGL SERPL-MCNC: 118 MG/DL (ref 0–150)
TROPONIN I SERPL-MCNC: 1.8 NG/ML
TROPONIN I SERPL-MCNC: 2.54 NG/ML
UROBILINOGEN UR QL STRIP: ABNORMAL
VLDLC SERPL-MCNC: 23.6 MG/DL
WBC NRBC COR # BLD: 7.31 10*3/MM3 (ref 4.5–12.5)
WBC UR QL AUTO: ABNORMAL /HPF
WHOLE BLOOD HOLD SPECIMEN: NORMAL
WHOLE BLOOD HOLD SPECIMEN: NORMAL

## 2018-08-26 PROCEDURE — 87077 CULTURE AEROBIC IDENTIFY: CPT | Performed by: PHYSICIAN ASSISTANT

## 2018-08-26 PROCEDURE — 93005 ELECTROCARDIOGRAM TRACING: CPT | Performed by: PHYSICIAN ASSISTANT

## 2018-08-26 PROCEDURE — 82375 ASSAY CARBOXYHB QUANT: CPT | Performed by: PHYSICIAN ASSISTANT

## 2018-08-26 PROCEDURE — 70450 CT HEAD/BRAIN W/O DYE: CPT | Performed by: RADIOLOGY

## 2018-08-26 PROCEDURE — 82962 GLUCOSE BLOOD TEST: CPT

## 2018-08-26 PROCEDURE — 71045 X-RAY EXAM CHEST 1 VIEW: CPT | Performed by: RADIOLOGY

## 2018-08-26 PROCEDURE — 99285 EMERGENCY DEPT VISIT HI MDM: CPT

## 2018-08-26 PROCEDURE — 87186 SC STD MICRODIL/AGAR DIL: CPT | Performed by: PHYSICIAN ASSISTANT

## 2018-08-26 PROCEDURE — 83050 HGB METHEMOGLOBIN QUAN: CPT | Performed by: PHYSICIAN ASSISTANT

## 2018-08-26 PROCEDURE — 80053 COMPREHEN METABOLIC PANEL: CPT | Performed by: PHYSICIAN ASSISTANT

## 2018-08-26 PROCEDURE — 87150 DNA/RNA AMPLIFIED PROBE: CPT | Performed by: PHYSICIAN ASSISTANT

## 2018-08-26 PROCEDURE — 93010 ELECTROCARDIOGRAM REPORT: CPT | Performed by: INTERNAL MEDICINE

## 2018-08-26 PROCEDURE — 82140 ASSAY OF AMMONIA: CPT | Performed by: PHYSICIAN ASSISTANT

## 2018-08-26 PROCEDURE — 84484 ASSAY OF TROPONIN QUANT: CPT | Performed by: INTERNAL MEDICINE

## 2018-08-26 PROCEDURE — 96366 THER/PROPH/DIAG IV INF ADDON: CPT

## 2018-08-26 PROCEDURE — 87086 URINE CULTURE/COLONY COUNT: CPT | Performed by: PHYSICIAN ASSISTANT

## 2018-08-26 PROCEDURE — 85025 COMPLETE CBC W/AUTO DIFF WBC: CPT | Performed by: PHYSICIAN ASSISTANT

## 2018-08-26 PROCEDURE — 80061 LIPID PANEL: CPT | Performed by: PHYSICIAN ASSISTANT

## 2018-08-26 PROCEDURE — 25010000002 VANCOMYCIN PER 500 MG: Performed by: PHYSICIAN ASSISTANT

## 2018-08-26 PROCEDURE — 81001 URINALYSIS AUTO W/SCOPE: CPT | Performed by: PHYSICIAN ASSISTANT

## 2018-08-26 PROCEDURE — 71045 X-RAY EXAM CHEST 1 VIEW: CPT

## 2018-08-26 PROCEDURE — 84484 ASSAY OF TROPONIN QUANT: CPT | Performed by: PHYSICIAN ASSISTANT

## 2018-08-26 PROCEDURE — 36415 COLL VENOUS BLD VENIPUNCTURE: CPT

## 2018-08-26 PROCEDURE — 87040 BLOOD CULTURE FOR BACTERIA: CPT | Performed by: PHYSICIAN ASSISTANT

## 2018-08-26 PROCEDURE — 83605 ASSAY OF LACTIC ACID: CPT | Performed by: PHYSICIAN ASSISTANT

## 2018-08-26 PROCEDURE — 96367 TX/PROPH/DG ADDL SEQ IV INF: CPT

## 2018-08-26 PROCEDURE — 82805 BLOOD GASES W/O2 SATURATION: CPT | Performed by: PHYSICIAN ASSISTANT

## 2018-08-26 PROCEDURE — 74176 CT ABD & PELVIS W/O CONTRAST: CPT

## 2018-08-26 PROCEDURE — 96365 THER/PROPH/DIAG IV INF INIT: CPT

## 2018-08-26 PROCEDURE — 70450 CT HEAD/BRAIN W/O DYE: CPT

## 2018-08-26 PROCEDURE — 25010000002 PIPERACILLIN-TAZOBACTAM: Performed by: PHYSICIAN ASSISTANT

## 2018-08-26 PROCEDURE — 36600 WITHDRAWAL OF ARTERIAL BLOOD: CPT | Performed by: PHYSICIAN ASSISTANT

## 2018-08-26 PROCEDURE — 74176 CT ABD & PELVIS W/O CONTRAST: CPT | Performed by: RADIOLOGY

## 2018-08-26 RX ORDER — ASPIRIN 81 MG/1
324 TABLET, CHEWABLE ORAL ONCE
Status: COMPLETED | OUTPATIENT
Start: 2018-08-26 | End: 2018-08-26

## 2018-08-26 RX ORDER — ACETAMINOPHEN 650 MG/1
650 SUPPOSITORY RECTAL ONCE
Status: COMPLETED | OUTPATIENT
Start: 2018-08-26 | End: 2018-08-26

## 2018-08-26 RX ORDER — ACETAMINOPHEN 650 MG/1
SUPPOSITORY RECTAL
Status: COMPLETED
Start: 2018-08-26 | End: 2018-08-26

## 2018-08-26 RX ORDER — SODIUM CHLORIDE 0.9 % (FLUSH) 0.9 %
10 SYRINGE (ML) INJECTION AS NEEDED
Status: DISCONTINUED | OUTPATIENT
Start: 2018-08-26 | End: 2018-08-26 | Stop reason: HOSPADM

## 2018-08-26 RX ADMIN — SODIUM CHLORIDE 1000 ML: 9 INJECTION, SOLUTION INTRAVENOUS at 15:04

## 2018-08-26 RX ADMIN — NOREPINEPHRINE BITARTRATE 0.02 MCG/KG/MIN: 1 INJECTION INTRAVENOUS at 17:41

## 2018-08-26 RX ADMIN — PIPERACILLIN SODIUM,TAZOBACTAM SODIUM 3.38 G: 3; .375 INJECTION, POWDER, FOR SOLUTION INTRAVENOUS at 15:05

## 2018-08-26 RX ADMIN — VANCOMYCIN HYDROCHLORIDE 1250 MG: 5 INJECTION, POWDER, LYOPHILIZED, FOR SOLUTION INTRAVENOUS at 15:36

## 2018-08-26 RX ADMIN — ASPIRIN 324 MG: 81 TABLET, CHEWABLE ORAL at 19:52

## 2018-08-26 RX ADMIN — ACETAMINOPHEN 650 MG: 650 SUPPOSITORY RECTAL at 13:50

## 2018-08-26 RX ADMIN — SODIUM CHLORIDE 1920 ML: 9 INJECTION, SOLUTION INTRAVENOUS at 15:04

## 2018-08-27 LAB — BACTERIA BLD CULT: ABNORMAL

## 2018-08-28 VITALS
HEART RATE: 66 BPM | RESPIRATION RATE: 18 BRPM | DIASTOLIC BLOOD PRESSURE: 47 MMHG | BODY MASS INDEX: 24.98 KG/M2 | OXYGEN SATURATION: 95 % | WEIGHT: 141 LBS | HEIGHT: 63 IN | SYSTOLIC BLOOD PRESSURE: 85 MMHG | TEMPERATURE: 99.1 F

## 2018-08-29 LAB
BACTERIA SPEC AEROBE CULT: ABNORMAL
GRAM STN SPEC: ABNORMAL
GRAM STN SPEC: ABNORMAL
ISOLATED FROM: ABNORMAL
ISOLATED FROM: ABNORMAL

## 2018-09-11 ENCOUNTER — TRANSCRIBE ORDERS (OUTPATIENT)
Dept: ADMINISTRATIVE | Facility: HOSPITAL | Age: 83
End: 2018-09-11

## 2018-09-11 ENCOUNTER — LAB (OUTPATIENT)
Dept: LAB | Facility: HOSPITAL | Age: 83
End: 2018-09-11
Attending: INTERNAL MEDICINE

## 2018-09-11 DIAGNOSIS — B19.21 HEPATITIS C VIRUS INFECTION WITH HEPATIC COMA, UNSPECIFIED CHRONICITY: ICD-10-CM

## 2018-09-11 DIAGNOSIS — B19.21 HEPATITIS C VIRUS INFECTION WITH HEPATIC COMA, UNSPECIFIED CHRONICITY: Primary | ICD-10-CM

## 2018-09-11 LAB — AMMONIA BLD-SCNC: 41 UMOL/L (ref 11–51)

## 2018-09-11 PROCEDURE — 82140 ASSAY OF AMMONIA: CPT

## 2018-09-11 PROCEDURE — 36415 COLL VENOUS BLD VENIPUNCTURE: CPT

## 2018-09-12 ENCOUNTER — TRANSCRIBE ORDERS (OUTPATIENT)
Dept: INFUSION THERAPY | Facility: HOSPITAL | Age: 83
End: 2018-09-12

## 2018-09-12 DIAGNOSIS — K74.60 CIRRHOSIS OF LIVER WITHOUT ASCITES, UNSPECIFIED HEPATIC CIRRHOSIS TYPE (HCC): ICD-10-CM

## 2018-09-12 DIAGNOSIS — Z45.2 EXHAUSTED VASCULAR ACCESS: Primary | ICD-10-CM

## 2018-09-18 ENCOUNTER — HOSPITAL ENCOUNTER (OUTPATIENT)
Dept: INFUSION THERAPY | Facility: HOSPITAL | Age: 83
Discharge: HOME | End: 2018-09-18
Attending: INTERNAL MEDICINE | Admitting: INTERNAL MEDICINE

## 2018-09-18 VITALS
SYSTOLIC BLOOD PRESSURE: 146 MMHG | DIASTOLIC BLOOD PRESSURE: 48 MMHG | TEMPERATURE: 98.1 F | HEART RATE: 63 BPM | RESPIRATION RATE: 16 BRPM

## 2018-09-18 DIAGNOSIS — K74.60 CIRRHOSIS OF LIVER WITHOUT ASCITES, UNSPECIFIED HEPATIC CIRRHOSIS TYPE (HCC): ICD-10-CM

## 2018-09-18 DIAGNOSIS — Z45.2 EXHAUSTED VASCULAR ACCESS: ICD-10-CM

## 2018-09-18 LAB
AMMONIA BLD-SCNC: 40 UMOL/L (ref 11–51)
BASOPHILS # BLD AUTO: 0.02 10*3/MM3 (ref 0–0.3)
BASOPHILS NFR BLD AUTO: 0.6 % (ref 0–2)
DEPRECATED RDW RBC AUTO: 61.4 FL (ref 37–54)
EOSINOPHIL # BLD AUTO: 0.23 10*3/MM3 (ref 0–0.7)
EOSINOPHIL NFR BLD AUTO: 6.9 % (ref 0–7)
ERYTHROCYTE [DISTWIDTH] IN BLOOD BY AUTOMATED COUNT: 17.1 % (ref 11.5–14.5)
HCT VFR BLD AUTO: 31.7 % (ref 37–47)
HGB BLD-MCNC: 10.2 G/DL (ref 12–16)
IMM GRANULOCYTES # BLD: 0 10*3/MM3 (ref 0–0.03)
IMM GRANULOCYTES NFR BLD: 0 % (ref 0–0.5)
LYMPHOCYTES # BLD AUTO: 1.46 10*3/MM3 (ref 1–3)
LYMPHOCYTES NFR BLD AUTO: 43.8 % (ref 16–46)
MCH RBC QN AUTO: 33.4 PG (ref 27–33)
MCHC RBC AUTO-ENTMCNC: 32.2 G/DL (ref 33–37)
MCV RBC AUTO: 103.9 FL (ref 80–94)
MONOCYTES # BLD AUTO: 0.28 10*3/MM3 (ref 0.1–0.9)
MONOCYTES NFR BLD AUTO: 8.4 % (ref 0–12)
NEUTROPHILS # BLD AUTO: 1.34 10*3/MM3 (ref 1.4–6.5)
NEUTROPHILS NFR BLD AUTO: 40.3 % (ref 40–75)
PLATELET # BLD AUTO: 117 10*3/MM3 (ref 130–400)
PMV BLD AUTO: 11.3 FL (ref 6–10)
RBC # BLD AUTO: 3.05 10*6/MM3 (ref 4.2–5.4)
WBC NRBC COR # BLD: 3.33 10*3/MM3 (ref 4.5–12.5)

## 2018-09-18 PROCEDURE — 36415 COLL VENOUS BLD VENIPUNCTURE: CPT

## 2018-09-18 PROCEDURE — 85025 COMPLETE CBC W/AUTO DIFF WBC: CPT | Performed by: INTERNAL MEDICINE

## 2018-09-18 PROCEDURE — C1751 CATH, INF, PER/CENT/MIDLINE: HCPCS

## 2018-09-18 PROCEDURE — 82140 ASSAY OF AMMONIA: CPT | Performed by: INTERNAL MEDICINE

## 2018-09-18 NOTE — NURSING NOTE
Spoke with Dr Hopkins office about Patient labs and that Picc line is not recommended for patients with GFR of less than 30. Dr garcía stated to obtain labs and send patient back to nursing home and she will schedule an appointment with Dr Mauricio for a port placement. Eplained to pt and she verbalizes understanding.

## 2018-09-18 NOTE — NURSING NOTE
An 18 gauge power glide was inserted into th RUE basilic vein x1 attemt after 0.5cc 1% lidocaine was given interdermal at insertion site and blood was obtained for labs. Powerglide was removed and cobain and 2x2 applied. Pt instructed to leave in place for 4 hours. Pt verbalized understanding. No bleeding noted. toya well

## 2018-09-20 DIAGNOSIS — Z01.818 PREOPERATIVE CLEARANCE: Primary | ICD-10-CM

## 2018-09-24 ENCOUNTER — OFFICE VISIT (OUTPATIENT)
Dept: SURGERY | Facility: CLINIC | Age: 83
End: 2018-09-24

## 2018-09-24 VITALS — DIASTOLIC BLOOD PRESSURE: 71 MMHG | HEART RATE: 69 BPM | SYSTOLIC BLOOD PRESSURE: 140 MMHG

## 2018-09-24 DIAGNOSIS — I87.8 POOR VENOUS ACCESS: Primary | ICD-10-CM

## 2018-09-24 PROCEDURE — 99203 OFFICE O/P NEW LOW 30 MIN: CPT | Performed by: SURGERY

## 2018-09-26 NOTE — PROGRESS NOTES
Subjective   Alma Delia Garcia is a 88 y.o. female is being seen for consultation today at the request of Galina Gonsalves MD    Alma Delia Garcia is a 88 y.o. female88-year-old female with poor venous access and need for frequent Labstix and medication administrations.  She is sent for port placement.  No chronic anticoagulation.  She is on 81 mg aspirin daily.  No previous right neck surgery for central lines.        Past Medical History:   Diagnosis Date   • Acute renal failure (CMS/HCC)    • Anxiety    • Bell's palsy    • Cellulitis     LLE   • CHF (congestive heart failure) (CMS/HCC)     Diastolic   • Diverticulosis    • GERD (gastroesophageal reflux disease)    • Hepatic cirrhosis (CMS/HCC)    • History of transfusion    • Hypertension    • Hypothyroidism    • Osteoporosis    • Thrombocytopenia (CMS/HCC)    • Trigeminal neuralgia        Family History   Problem Relation Age of Onset   • Cancer Mother    • Heart disease Father    • Arthritis Sister    • Osteoporosis Sister    • Diabetes Maternal Aunt        Social History     Social History   • Marital status:      Spouse name: N/A   • Number of children: N/A   • Years of education: N/A     Occupational History   • Not on file.     Social History Main Topics   • Smoking status: Never Smoker   • Smokeless tobacco: Former User     Types: Snuff     Quit date: 1970   • Alcohol use No      Comment: FORMER 12 BOTTLES PER DAY FOR 15 YEARS   • Drug use: No   • Sexual activity: Defer     Other Topics Concern   • Not on file     Social History Narrative    LIVES WITH GRANDDAUGHTER DEVON IN [place]        Past Surgical History:   Procedure Laterality Date   • BACK SURGERY      KYPHOPLASTIES    • CARDIAC CATHETERIZATION Left 08/2004   • CARDIOVASCULAR STRESS TEST  01/25/2015   • CATARACT EXTRACTION     • CHOLECYSTECTOMY     • CLAVICLE SURGERY Right    • COLONOSCOPY  10/2004   • ECHO - CONVERTED  11/2012   • FEMUR OPEN REDUCTION INTERNAL FIXATION Left  10/12/2017    Procedure: FEMUR OPEN REDUCTION INTERNAL FIXATION;  Surgeon: Karson Pierre MD;  [facility];  Service:    • HIP FRACTURE SURGERY Bilateral     ARTHROPLASTIES    • PARTIAL HYSTERECTOMY     • WRIST FRACTURE SURGERY         Review of Systems   Constitutional: Negative for activity change, appetite change, chills and fever.   HENT: Negative for sore throat and trouble swallowing.    Eyes: Negative for visual disturbance.   Respiratory: Negative for cough and shortness of breath.    Cardiovascular: Negative for chest pain and palpitations.   Gastrointestinal: Negative for abdominal distention, abdominal pain, blood in stool, constipation, diarrhea, nausea and vomiting.   Endocrine: Negative for cold intolerance and heat intolerance.   Genitourinary: Negative for dysuria.   Musculoskeletal: Negative for joint swelling.   Skin: Negative for color change, rash and wound.   Allergic/Immunologic: Negative for immunocompromised state.   Neurological: Negative for dizziness, seizures, weakness and headaches.   Hematological: Negative for adenopathy. Does not bruise/bleed easily.   Psychiatric/Behavioral: Negative for agitation and confusion.         /71   Pulse 69   LMP  (LMP Unknown)   Objective   Physical Exam   Constitutional: She is oriented to person, place, and time. She appears well-developed.   HENT:   Head: Normocephalic and atraumatic.   Mouth/Throat: Mucous membranes are normal.   Eyes: Pupils are equal, round, and reactive to light. Conjunctivae are normal.   Neck: Neck supple. No JVD present. No tracheal deviation present. No thyromegaly present.   Cardiovascular: Normal rate and regular rhythm.  Exam reveals no gallop and no friction rub.    No murmur heard.  Pulmonary/Chest: Effort normal and breath sounds normal.   Abdominal: Soft. She exhibits no distension. There is no splenomegaly or hepatomegaly. There is no tenderness. No hernia.   Musculoskeletal: Normal range of  motion. She exhibits no deformity.   Neurological: She is alert and oriented to person, place, and time.   Skin: Skin is warm and dry.   Psychiatric: She has a normal mood and affect.         Alma Delia was seen today for consult for portacath placement.    Diagnoses and all orders for this visit:    Poor venous access  -     Case Request; Standing  -     ceFAZolin (ANCEF) IVPB (duplex) 2 g; Infuse 2,000 mg into a venous catheter 1 (One) Time.  -     Case Request    Other orders  -     Follow anesthesia standing orders.  -     Provide instructions to patient on NPO status  -     Clorhexidine skin prep  -     Follow Anesthesia Guidelines / Standing Orders; Standing  -     Verify NPO Status; Standing  -     Obtain informed consent; Standing  -     SCD (sequential compression device)- to be placed on patient in Pre-op; Standing        Assessment     Alma Delia Garcia is a 88 y.o. female with need for port placement due to poor venous access and need for frequent medication administrations and lab draws.  Risks and benefits of the procedure been discussed with the patient and she will be scheduled for port placement.    Patient's There is no height or weight on file to calculate BMI. BMI is above normal parameters. Recommendations include: educational material.

## 2018-09-27 ENCOUNTER — APPOINTMENT (OUTPATIENT)
Dept: PREADMISSION TESTING | Facility: HOSPITAL | Age: 83
End: 2018-09-27

## 2018-09-28 ENCOUNTER — HOSPITAL ENCOUNTER (OUTPATIENT)
Facility: HOSPITAL | Age: 83
Setting detail: HOSPITAL OUTPATIENT SURGERY
Discharge: HOME | End: 2018-09-28
Attending: SURGERY | Admitting: SURGERY

## 2018-09-28 ENCOUNTER — ANESTHESIA EVENT (OUTPATIENT)
Dept: PERIOP | Facility: HOSPITAL | Age: 83
End: 2018-09-28

## 2018-09-28 ENCOUNTER — APPOINTMENT (OUTPATIENT)
Dept: GENERAL RADIOLOGY | Facility: HOSPITAL | Age: 83
End: 2018-09-28

## 2018-09-28 ENCOUNTER — ANESTHESIA (OUTPATIENT)
Dept: PERIOP | Facility: HOSPITAL | Age: 83
End: 2018-09-28

## 2018-09-28 VITALS
SYSTOLIC BLOOD PRESSURE: 143 MMHG | WEIGHT: 149 LBS | BODY MASS INDEX: 26.4 KG/M2 | HEIGHT: 63 IN | HEART RATE: 62 BPM | RESPIRATION RATE: 16 BRPM | TEMPERATURE: 98.2 F | OXYGEN SATURATION: 96 % | DIASTOLIC BLOOD PRESSURE: 87 MMHG

## 2018-09-28 DIAGNOSIS — I87.8 POOR VENOUS ACCESS: ICD-10-CM

## 2018-09-28 PROCEDURE — 25010000002 FENTANYL CITRATE (PF) 100 MCG/2ML SOLUTION: Performed by: NURSE ANESTHETIST, CERTIFIED REGISTERED

## 2018-09-28 PROCEDURE — 25010000003 CEFAZOLIN PER 500 MG: Performed by: SURGERY

## 2018-09-28 PROCEDURE — 76000 FLUOROSCOPY <1 HR PHYS/QHP: CPT

## 2018-09-28 PROCEDURE — 25010000002 HEPARIN FLUSH (PORCINE) 100 UNIT/ML SOLUTION: Performed by: SURGERY

## 2018-09-28 PROCEDURE — 71045 X-RAY EXAM CHEST 1 VIEW: CPT

## 2018-09-28 PROCEDURE — C1788 PORT, INDWELLING, IMP: HCPCS | Performed by: SURGERY

## 2018-09-28 PROCEDURE — 77001 FLUOROGUIDE FOR VEIN DEVICE: CPT | Performed by: SURGERY

## 2018-09-28 PROCEDURE — 71045 X-RAY EXAM CHEST 1 VIEW: CPT | Performed by: RADIOLOGY

## 2018-09-28 PROCEDURE — 36561 INSERT TUNNELED CV CATH: CPT | Performed by: SURGERY

## 2018-09-28 PROCEDURE — 76937 US GUIDE VASCULAR ACCESS: CPT | Performed by: SURGERY

## 2018-09-28 PROCEDURE — 25010000002 PROPOFOL 1000 MG/ML EMULSION: Performed by: NURSE ANESTHETIST, CERTIFIED REGISTERED

## 2018-09-28 DEVICE — VACCESS CT POWER-INJECTABLE IMPLANTABLE PORT (WITH SUTURE PLUGS) (8F)
Type: IMPLANTABLE DEVICE | Status: FUNCTIONAL
Brand: VACCESS

## 2018-09-28 RX ORDER — ONDANSETRON 2 MG/ML
4 INJECTION INTRAMUSCULAR; INTRAVENOUS ONCE AS NEEDED
Status: DISCONTINUED | OUTPATIENT
Start: 2018-09-28 | End: 2018-09-28 | Stop reason: HOSPADM

## 2018-09-28 RX ORDER — IPRATROPIUM BROMIDE AND ALBUTEROL SULFATE 2.5; .5 MG/3ML; MG/3ML
3 SOLUTION RESPIRATORY (INHALATION) ONCE AS NEEDED
Status: DISCONTINUED | OUTPATIENT
Start: 2018-09-28 | End: 2018-09-28 | Stop reason: HOSPADM

## 2018-09-28 RX ORDER — LIDOCAINE HYDROCHLORIDE 20 MG/ML
INJECTION, SOLUTION INFILTRATION; PERINEURAL AS NEEDED
Status: DISCONTINUED | OUTPATIENT
Start: 2018-09-28 | End: 2018-09-28 | Stop reason: HOSPADM

## 2018-09-28 RX ORDER — SODIUM CHLORIDE, SODIUM LACTATE, POTASSIUM CHLORIDE, CALCIUM CHLORIDE 600; 310; 30; 20 MG/100ML; MG/100ML; MG/100ML; MG/100ML
125 INJECTION, SOLUTION INTRAVENOUS CONTINUOUS
Status: DISCONTINUED | OUTPATIENT
Start: 2018-09-28 | End: 2018-09-28 | Stop reason: HOSPADM

## 2018-09-28 RX ORDER — OXYCODONE HYDROCHLORIDE AND ACETAMINOPHEN 5; 325 MG/1; MG/1
1 TABLET ORAL ONCE AS NEEDED
Status: DISCONTINUED | OUTPATIENT
Start: 2018-09-28 | End: 2018-09-28 | Stop reason: HOSPADM

## 2018-09-28 RX ORDER — SODIUM CHLORIDE 0.9 % (FLUSH) 0.9 %
1-10 SYRINGE (ML) INJECTION AS NEEDED
Status: DISCONTINUED | OUTPATIENT
Start: 2018-09-28 | End: 2018-09-28 | Stop reason: HOSPADM

## 2018-09-28 RX ORDER — MIDAZOLAM HYDROCHLORIDE 1 MG/ML
1 INJECTION INTRAMUSCULAR; INTRAVENOUS
Status: DISCONTINUED | OUTPATIENT
Start: 2018-09-28 | End: 2018-09-28 | Stop reason: HOSPADM

## 2018-09-28 RX ORDER — FENTANYL CITRATE 50 UG/ML
INJECTION, SOLUTION INTRAMUSCULAR; INTRAVENOUS AS NEEDED
Status: DISCONTINUED | OUTPATIENT
Start: 2018-09-28 | End: 2018-09-28 | Stop reason: SURG

## 2018-09-28 RX ORDER — FENTANYL CITRATE 50 UG/ML
50 INJECTION, SOLUTION INTRAMUSCULAR; INTRAVENOUS
Status: DISCONTINUED | OUTPATIENT
Start: 2018-09-28 | End: 2018-09-28 | Stop reason: HOSPADM

## 2018-09-28 RX ORDER — MIDAZOLAM HYDROCHLORIDE 1 MG/ML
2 INJECTION INTRAMUSCULAR; INTRAVENOUS
Status: DISCONTINUED | OUTPATIENT
Start: 2018-09-28 | End: 2018-09-28 | Stop reason: HOSPADM

## 2018-09-28 RX ORDER — MEPERIDINE HYDROCHLORIDE 25 MG/ML
12.5 INJECTION INTRAMUSCULAR; INTRAVENOUS; SUBCUTANEOUS
Status: DISCONTINUED | OUTPATIENT
Start: 2018-09-28 | End: 2018-09-28 | Stop reason: HOSPADM

## 2018-09-28 RX ORDER — MAGNESIUM HYDROXIDE 1200 MG/15ML
LIQUID ORAL AS NEEDED
Status: DISCONTINUED | OUTPATIENT
Start: 2018-09-28 | End: 2018-09-28 | Stop reason: HOSPADM

## 2018-09-28 RX ADMIN — PROPOFOL 100 MCG/KG/MIN: 10 INJECTION, EMULSION INTRAVENOUS at 11:19

## 2018-09-28 RX ADMIN — SODIUM CHLORIDE, POTASSIUM CHLORIDE, SODIUM LACTATE AND CALCIUM CHLORIDE 125 ML/HR: 600; 310; 30; 20 INJECTION, SOLUTION INTRAVENOUS at 10:17

## 2018-09-28 RX ADMIN — FENTANYL CITRATE 50 MCG: 50 INJECTION INTRAMUSCULAR; INTRAVENOUS at 11:21

## 2018-09-28 RX ADMIN — CEFAZOLIN SODIUM 2 G: 2 SOLUTION INTRAVENOUS at 11:15

## 2018-09-28 RX ADMIN — FENTANYL CITRATE 50 MCG: 50 INJECTION INTRAMUSCULAR; INTRAVENOUS at 11:15

## 2018-09-28 NOTE — ANESTHESIA POSTPROCEDURE EVALUATION
Patient: Alma Delia Garcia    Procedure Summary     Date:  09/28/18 Room / Location:  [facility] OR 02 /  [facility] OR    Anesthesia Start:  1115 Anesthesia Stop:  1149    Procedure:  INSERTION OF PORTACATH (N/A ) Diagnosis:       Poor venous access      (Poor venous access [I87.8])    Surgeon:  Vic Mauricio MD Provider:  Beni Corrales MD    Anesthesia Type:  general ASA Status:  3          Anesthesia Type: general  Last vitals  BP   (P) 116/54 (09/28/18 1150)   Temp   (P) 97.7 °F (36.5 °C) (09/28/18 1150)   Pulse   (P) 59 (09/28/18 1150)   Resp   (P) 18 (09/28/18 1150)     SpO2   (P) 96 % (09/28/18 1150)     Post Anesthesia Care and Evaluation    Patient location during evaluation: PHASE II  Patient participation: complete - patient participated  Level of consciousness: awake and alert  Pain score: 0  Pain management: adequate  Airway patency: patent  Anesthetic complications: No anesthetic complications    Cardiovascular status: acceptable  Respiratory status: acceptable  Hydration status: acceptable

## 2018-09-28 NOTE — ANESTHESIA PREPROCEDURE EVALUATION
Anesthesia Evaluation     no history of anesthetic complications:  NPO Solid Status: > 8 hours  NPO Liquid Status: > 8 hours           Airway   Mallampati: II  TM distance: >3 FB  Neck ROM: full  No difficulty expected  Dental - normal exam   (+) poor dentition    Pulmonary - normal exam   Cardiovascular - normal exam    (+) hypertension, CHF, PVD,       Neuro/Psych  (+) numbness, psychiatric history,     GI/Hepatic/Renal/Endo    (+)  GERD,  hepatitis C, liver disease, hypothyroidism,     Musculoskeletal     Abdominal  - normal exam   Substance History      OB/GYN          Other                      Anesthesia Plan    ASA 3     general     intravenous induction   Anesthetic plan, all risks, benefits, and alternatives have been provided, discussed and informed consent has been obtained with: patient.

## 2018-10-09 ENCOUNTER — OFFICE VISIT (OUTPATIENT)
Dept: SURGERY | Facility: CLINIC | Age: 83
End: 2018-10-09

## 2018-10-09 VITALS — HEIGHT: 63 IN | WEIGHT: 149 LBS | BODY MASS INDEX: 26.4 KG/M2

## 2018-10-09 DIAGNOSIS — I87.8 POOR VENOUS ACCESS: Primary | ICD-10-CM

## 2018-10-09 PROCEDURE — 99024 POSTOP FOLLOW-UP VISIT: CPT | Performed by: SURGERY

## 2018-10-09 NOTE — PROGRESS NOTES
Subjective   Alma Delia Garcia is a 88 y.o. female  is here today for follow-up.         Alma Delia Garcia is a 88 y.o. female here for follow up after port placement.  The patient's port is functioning well and her incisions have healed nicely.  No complaints no concerns for infection and no complication noted on examination her history.    Alma Delia was seen today for s/p port placement.    Diagnoses and all orders for this visit:    Poor venous access        Assessment     Alma Delia Garcia is a 88 y.o. female status post port placement.  Her port sites are healing well and the port is functioning nicely.  Follow-up as needed.

## 2018-10-31 ENCOUNTER — OFFICE VISIT (OUTPATIENT)
Dept: CARDIOLOGY | Facility: CLINIC | Age: 83
End: 2018-10-31

## 2018-10-31 VITALS
DIASTOLIC BLOOD PRESSURE: 66 MMHG | OXYGEN SATURATION: 96 % | HEART RATE: 84 BPM | BODY MASS INDEX: 24.1 KG/M2 | WEIGHT: 136 LBS | SYSTOLIC BLOOD PRESSURE: 134 MMHG | HEIGHT: 63 IN

## 2018-10-31 DIAGNOSIS — R60.0 BILATERAL LEG EDEMA: ICD-10-CM

## 2018-10-31 DIAGNOSIS — D69.6 THROMBOCYTOPENIA (HCC): ICD-10-CM

## 2018-10-31 DIAGNOSIS — E03.4 HYPOTHYROIDISM DUE TO ACQUIRED ATROPHY OF THYROID: ICD-10-CM

## 2018-10-31 DIAGNOSIS — Q78.2 OSTEOPETROSIS: ICD-10-CM

## 2018-10-31 DIAGNOSIS — F41.1 GENERALIZED ANXIETY DISORDER: ICD-10-CM

## 2018-10-31 DIAGNOSIS — I50.32 CHRONIC DIASTOLIC HEART FAILURE (HCC): ICD-10-CM

## 2018-10-31 DIAGNOSIS — K74.60 CIRRHOSIS OF LIVER WITHOUT ASCITES, UNSPECIFIED HEPATIC CIRRHOSIS TYPE (HCC): Primary | ICD-10-CM

## 2018-10-31 PROBLEM — N18.30 STAGE 3 CHRONIC KIDNEY DISEASE (HCC): Status: ACTIVE | Noted: 2018-10-31

## 2018-10-31 PROCEDURE — 99214 OFFICE O/P EST MOD 30 MIN: CPT | Performed by: INTERNAL MEDICINE

## 2018-10-31 RX ORDER — HYDROCODONE BITARTRATE AND ACETAMINOPHEN 5; 325 MG/1; MG/1
1 TABLET ORAL 2 TIMES DAILY PRN
Qty: 60 TABLET | Refills: 0 | Status: SHIPPED | OUTPATIENT
Start: 2018-10-31 | End: 2018-11-29 | Stop reason: SDUPTHER

## 2018-10-31 NOTE — PROGRESS NOTES
subjective     Chief Complaint   Patient presents with   • Nursing Home Follow UP   • Follow-up     History of Present Illness  Patient is 88 years old elderly white female was discharged from the nursing home 2 weeks ago.  She has a Port-A-Cath in place.  Nursing home discharged her with home health referral however a home health has never gone and visit the patient.  Nobody has flushed the catheter or checked the catheter site.  She seems to be doing very well but is not sure of Port-A-Cath is working .  Home health was advised to check the patient and they will draw some blood if Port-A-Cath is not working and will need to be removed.  It probably should be removed anyway.    Patient has hepatic cirrhosis and has multiple episodes of hepatic encephalopathy.  She is taking Xifaxan and lactulose.    She was in septic shock and was transferred to [facility] and from there she was transferred to nursing home in [place] with a diagnoses of urinary tract infection possible pyelonephritis and septic shock.  She also had acute encephalopathy and non-STEMI.  Records from [facility] were reviewed    Patient currently seems to be doing fairly well.  She is taking Xifaxan and lactulose.  She is mentally alert.    Overall edema is better with the Bumex and Aldactone    She is taking Synthroid 50 µg daily and also is taking low-dose Norco therapy.  These medications from the nursing home were reviewed    Past Surgical History:   Procedure Laterality Date   • BACK SURGERY      KYPHOPLASTIES    • CARDIAC CATHETERIZATION Left 08/2004   • CARDIOVASCULAR STRESS TEST  01/25/2015   • CATARACT EXTRACTION     • CHOLECYSTECTOMY     • CLAVICLE SURGERY Right    • COLONOSCOPY  10/2004   • ECHO - CONVERTED  11/2012   • FEMUR OPEN REDUCTION INTERNAL FIXATION Left 10/12/2017    Procedure: FEMUR OPEN REDUCTION INTERNAL FIXATION;  Surgeon: Karson Pierre MD;  Location: [facility];  Service:    • HIP  FRACTURE SURGERY Bilateral     ARTHROPLASTIES    • PARTIAL HYSTERECTOMY     • PORTACATH PLACEMENT N/A 9/28/2018    Procedure: INSERTION OF PORTACATH;  Surgeon: Vic Mauricio MD;  Location: [facility];  Service: General   • WRIST FRACTURE SURGERY       Family History   Problem Relation Age of Onset   • Cancer Mother    • Heart disease Father    • Arthritis Sister    • Osteoporosis Sister    • Diabetes Maternal Aunt      Past Medical History:   Diagnosis Date   • Acute renal failure (CMS/HCC)    • Alcohol abuse     in remission   • Anxiety    • Bell's palsy    • Cellulitis     LLE   • CHF (congestive heart failure) (CMS/HCC)     Diastolic   • CHF (congestive heart failure) (CMS/HCC)    • Constipation    • Coronary artery disease    • Diverticulosis    • GERD (gastroesophageal reflux disease)    • Hepatic cirrhosis (CMS/HCC)    • History of transfusion    • Hypertension    • Hypothyroidism    • MI (mitral incompetence)    • Osteoporosis    • Osteoporosis    • Restless leg syndrome    • Thrombocytopenia (CMS/HCC)    • Trigeminal neuralgia      Patient Active Problem List   Diagnosis   • Hepatic cirrhosis*   • HTN (hypertension)*   • Anxiety disorder*   • Osteopetrosis*   • Hypothyroidism*   • GERD (gastroesophageal reflux disease)*   • Chronic pain syndrome due to fractured spine and left rib fracture*   • Anemia, chronic disease   • Arthralgia of hip   • Left knee pain   • Fracture of proximal end of tibia and fibula   • Primary insomnia   • Thrombocytopenia (CMS/HCC)   • Peripheral vascular disease (CMS/HCC)   • Bilateral leg edema   • Cramp of both lower extremities   • Hepatic encephalopathy (CMS/HCC)   • UTI (urinary tract infection)   • Hypokalemia   • Closed fracture of lower end of left femur with routine healing   • Status post open reduction with internal fixation of fracture   • Nonrheumatic aortic valve insufficiency, mild   • Chronic diastolic heart failure, mild   • Periprosthetic fracture of shaft  of femur   • S/P ORIF (open reduction internal fixation) fracture   • Diarrhea   • Right upper quadrant abdominal pain   • Nausea   • Elevated liver enzymes   • Chronic hepatitis C without hepatic coma (CMS/HCC)   • Poor venous access   • Stage 3 chronic kidney disease (CMS/HCC)       Social History   Substance Use Topics   • Smoking status: Never Smoker   • Smokeless tobacco: Former User     Types: Snuff     Quit date: 1970   • Alcohol use No      Comment: FORMER 12 BOTTLES PER DAY FOR 15 YEARS       Allergies   Allergen Reactions   • Ciprofloxacin        Current Outpatient Prescriptions on File Prior to Visit   Medication Sig   • Alcohol Swabs pads Uses as directed.   • aspirin 81 MG tablet Take 1 tablet by mouth Daily.   • bumetanide (BUMEX) 1 MG tablet Take 1 tablet by mouth 2 (Two) Times a Day.   • Calcium Carbonate-Vitamin D (OYST-LILA-D 500 PO) Take  by mouth.   • esomeprazole (nexIUM) 40 MG capsule Take 1 capsule by mouth Every Morning Before Breakfast.   • folic acid (FOLVITE) 1 MG tablet Take 1 tablet by mouth Daily.   • Insulin Pen Needle (BD AUTOSHIELD DUO) 30G X 5 MM misc Use as directed   • lactulose (CHRONULAC) 10 GM/15ML solution Take 30 mL by mouth 5 (Five) Times a Day.   • levothyroxine (SYNTHROID) 50 MCG tablet Take 1 tablet by mouth Daily.   • Multiple Vitamin (MULTI VITAMIN DAILY) tablet Take  by mouth.   • nitroglycerin (NITROSTAT) 0.4 MG SL tablet Place 1 tablet under the tongue Every 5 (Five) Minutes As Needed for Chest Pain. Take no more than 3 doses in 15 minutes.   • ondansetron (ZOFRAN) 4 MG tablet Take 4 mg by mouth Every 8 (Eight) Hours As Needed for Nausea or Vomiting.   • OXcarbazepine (TRILEPTAL) 150 MG tablet Take 1 tablet by mouth Every 12 (Twelve) Hours.   • rifaximin (XIFAXAN) 550 MG tablet Take 1 tablet by mouth Every 12 (Twelve) Hours.   • rOPINIRole (REQUIP) 1 MG tablet Take 1 tablet by mouth Every Night. Take 1 hour before bedtime.   • spironolactone (ALDACTONE) 25 MG tablet  "Take 2 tablets by mouth Daily.   • teriparatide (FORTEO) 600 MCG/2.4ML injection Inject 0.08 mL under the skin Daily.   • vitamin D (ERGOCALCIFEROL) 53159 units capsule capsule Take 1 capsule by mouth 1 (One) Time Per Week.   • [DISCONTINUED] HYDROcodone-acetaminophen (NORCO) 5-325 MG per tablet Take 1 tablet by mouth 2 (Two) Times a Day As Needed for Moderate Pain .   • [DISCONTINUED] cyanocobalamin 1000 MCG/ML injection Inject 1 mL into the shoulder, thigh, or buttocks Every 28 (Twenty-Eight) Days.     No current facility-administered medications on file prior to visit.          The following portions of the patient's history were reviewed and updated as appropriate: allergies, current medications, past family history, past medical history, past social history, past surgical history and problem list.    Review of Systems   Constitution: Positive for weakness and malaise/fatigue.   HENT: Negative.  Negative for congestion.    Eyes: Negative.    Cardiovascular: Positive for leg swelling. Negative for chest pain, cyanosis, dyspnea on exertion, irregular heartbeat, near-syncope, orthopnea, palpitations, paroxysmal nocturnal dyspnea and syncope.   Respiratory: Negative.  Negative for shortness of breath.    Endocrine: Negative.    Hematologic/Lymphatic: Negative.    Skin: Negative.    Musculoskeletal: Positive for arthritis, back pain and myalgias.   Gastrointestinal: Positive for bloating.   Genitourinary: Negative.    Neurological: Negative for difficulty with concentration, disturbances in coordination, excessive daytime sleepiness, dizziness and headaches.   Psychiatric/Behavioral: The patient has insomnia and is nervous/anxious.           Objective:     /66 (BP Location: Left arm, Patient Position: Sitting)   Pulse 84   Ht 160 cm (63\")   Wt 61.7 kg (136 lb)   LMP  (LMP Unknown)   SpO2 96%   BMI 24.09 kg/m²   Physical Exam   Constitutional: She appears well-developed and well-nourished. No distress. "   HENT:   Head: Normocephalic and atraumatic.   Mouth/Throat: Oropharynx is clear and moist. No oropharyngeal exudate.   Eyes: Pupils are equal, round, and reactive to light. Conjunctivae and EOM are normal. No scleral icterus.   Neck: Normal range of motion. Neck supple. No JVD present. No tracheal deviation present. No thyromegaly present.   Cardiovascular: Normal rate, regular rhythm, normal heart sounds and intact distal pulses.  PMI is not displaced.  Exam reveals no gallop, no friction rub and no decreased pulses.    No murmur heard.  Pulses:       Carotid pulses are 3+ on the right side, and 3+ on the left side.       Radial pulses are 3+ on the right side, and 3+ on the left side.   Pulmonary/Chest: Effort normal and breath sounds normal. No respiratory distress. She has no wheezes. She has no rales. She exhibits no tenderness.   Abdominal: Soft. Bowel sounds are normal. She exhibits no distension, no abdominal bruit and no mass. There is no splenomegaly or hepatomegaly. There is no tenderness. There is no rebound and no guarding.   Musculoskeletal: Normal range of motion. She exhibits edema. She exhibits no tenderness or deformity.   Lymphadenopathy:     She has no cervical adenopathy.   Neurological: She is alert. She has normal reflexes. No cranial nerve deficit. She exhibits normal muscle tone. Coordination normal.   Skin: Skin is warm and dry. No rash noted. She is not diaphoretic. No erythema.   Psychiatric: She has a normal mood and affect. Her behavior is normal. Judgment and thought content normal.         Lab Review  Lab Results   Component Value Date     08/26/2018    K 3.4 (L) 08/26/2018     08/26/2018    BUN 38 (H) 08/26/2018    CREATININE 1.90 (H) 08/26/2018    GLUCOSE 112 (H) 08/26/2018    CALCIUM 9.1 08/26/2018    ALT 41 (H) 08/26/2018    ALKPHOS 152 (H) 08/26/2018    LABIL2 0.9 (L) 04/08/2016     Lab Results   Component Value Date    CKTOTAL 99 07/25/2017     Lab Results    Component Value Date    WBC 3.33 (L) 09/18/2018    HGB 10.2 (L) 09/18/2018    HCT 31.7 (L) 09/18/2018     (L) 09/18/2018     Lab Results   Component Value Date    INR 1.28 (H) 06/27/2018    INR 1.79 (H) 10/13/2017    INR 2.04 (H) 10/12/2017     Lab Results   Component Value Date    MG 2.0 10/17/2017     Lab Results   Component Value Date    TSH 1.156 04/25/2018     Lab Results   Component Value Date    BNP 11.0 10/05/2017     Lab Results   Component Value Date    CHLPL 161 04/08/2016    CHOL 150 08/26/2018    TRIG 118 08/26/2018    HDL 68 08/26/2018    VLDL 23.6 08/26/2018    LDLHDL 0.86 08/26/2018     Lab Results   Component Value Date    LDL 58 08/26/2018    LDL 64 04/25/2018       Procedures       I personally viewed and interpreted the patient's LAB data         Assessment:     1. Cirrhosis of liver without ascites, unspecified hepatic cirrhosis type (CMS/HCC)    2. Chronic diastolic heart failure, mild    3. Thrombocytopenia (CMS/HCC)    4. Osteopetrosis*    5. Hypothyroidism due to acquired atrophy of thyroid    6. Bilateral leg edema    7. Generalized anxiety disorder          Plan:      Patient was discharged from the nursing home 2 days ago.  She has cirrhosis of the liver and present septic encephalopathy.  Records from [facility] emergency room and [facility] along with nursing home records were reviewed and medication reconciled.    Patient apparently had pyelonephritis UTI associated sepsis with septic shock now she is doing very well.    She has a Port-A-Cath in place which has not been looked after since discharge from nursing home.  Also there is no recent lab work available.  Arrangements were made for home health nurse to check the Port-A-Cath and draw blood.  Port-A-Cath probably need to be removed.  Follow-up scheduled        No Follow-up on file.

## 2018-11-05 ENCOUNTER — LAB REQUISITION (OUTPATIENT)
Dept: LAB | Facility: HOSPITAL | Age: 83
End: 2018-11-05

## 2018-11-05 ENCOUNTER — TELEPHONE (OUTPATIENT)
Dept: CARDIOLOGY | Facility: CLINIC | Age: 83
End: 2018-11-05

## 2018-11-05 DIAGNOSIS — E87.5 HIGH SERUM POTASSIUM LEVEL: ICD-10-CM

## 2018-11-05 DIAGNOSIS — E87.6 DECREASED POTASSIUM IN THE BLOOD: ICD-10-CM

## 2018-11-05 DIAGNOSIS — E03.9 HYPOTHYROIDISM: ICD-10-CM

## 2018-11-05 DIAGNOSIS — R79.89 INCREASED AMMONIA LEVEL: Primary | ICD-10-CM

## 2018-11-05 DIAGNOSIS — D51.0 VITAMIN B12 DEFICIENCY ANEMIA DUE TO INTRINSIC FACTOR DEFICIENCY: ICD-10-CM

## 2018-11-05 DIAGNOSIS — J44.9 CHRONIC OBSTRUCTIVE PULMONARY DISEASE (HCC): ICD-10-CM

## 2018-11-05 DIAGNOSIS — K70.30 ALCOHOLIC CIRRHOSIS OF LIVER WITHOUT ASCITES (HCC): ICD-10-CM

## 2018-11-05 DIAGNOSIS — I11.0 HYPERTENSIVE HEART DISEASE WITH HEART FAILURE (HCC): ICD-10-CM

## 2018-11-05 DIAGNOSIS — I50.9 HEART FAILURE (HCC): ICD-10-CM

## 2018-11-05 LAB
ALBUMIN SERPL-MCNC: 3.3 G/DL (ref 3.4–4.8)
ALBUMIN/GLOB SERPL: 0.9 G/DL (ref 1.5–2.5)
ALP SERPL-CCNC: 112 U/L (ref 35–104)
ALT SERPL W P-5'-P-CCNC: 35 U/L (ref 10–36)
AMMONIA BLD-SCNC: 85 UMOL/L (ref 11–51)
ANION GAP SERPL CALCULATED.3IONS-SCNC: 6.9 MMOL/L (ref 3.6–11.2)
AST SERPL-CCNC: 52 U/L (ref 10–30)
BASOPHILS # BLD AUTO: 0.04 10*3/MM3 (ref 0–0.3)
BASOPHILS NFR BLD AUTO: 1 % (ref 0–2)
BILIRUB SERPL-MCNC: 1 MG/DL (ref 0.2–1.8)
BNP SERPL-MCNC: 33 PG/ML (ref 0–100)
BUN BLD-MCNC: 20 MG/DL (ref 7–21)
BUN/CREAT SERPL: 13.8 (ref 7–25)
CALCIUM SPEC-SCNC: 9.2 MG/DL (ref 7.7–10)
CHLORIDE SERPL-SCNC: 107 MMOL/L (ref 99–112)
CHOLEST SERPL-MCNC: 139 MG/DL (ref 0–200)
CO2 SERPL-SCNC: 26.1 MMOL/L (ref 24.3–31.9)
CREAT BLD-MCNC: 1.45 MG/DL (ref 0.43–1.29)
DEPRECATED RDW RBC AUTO: 44 FL (ref 37–54)
EOSINOPHIL # BLD AUTO: 0.26 10*3/MM3 (ref 0–0.7)
EOSINOPHIL NFR BLD AUTO: 6.7 % (ref 0–7)
ERYTHROCYTE [DISTWIDTH] IN BLOOD BY AUTOMATED COUNT: 13.2 % (ref 11.5–14.5)
GFR SERPL CREATININE-BSD FRML MDRD: 34 ML/MIN/1.73
GLOBULIN UR ELPH-MCNC: 3.6 GM/DL
GLUCOSE BLD-MCNC: 93 MG/DL (ref 70–110)
HCT VFR BLD AUTO: 34.9 % (ref 37–47)
HDLC SERPL-MCNC: 85 MG/DL (ref 60–100)
HGB BLD-MCNC: 11.9 G/DL (ref 12–16)
IMM GRANULOCYTES # BLD: 0 10*3/MM3 (ref 0–0.03)
IMM GRANULOCYTES NFR BLD: 0 % (ref 0–0.5)
LDLC SERPL CALC-MCNC: 35 MG/DL (ref 0–100)
LDLC/HDLC SERPL: 0.42 {RATIO}
LYMPHOCYTES # BLD AUTO: 1.43 10*3/MM3 (ref 1–3)
LYMPHOCYTES NFR BLD AUTO: 36.9 % (ref 16–46)
MCH RBC QN AUTO: 32.9 PG (ref 27–33)
MCHC RBC AUTO-ENTMCNC: 34.1 G/DL (ref 33–37)
MCV RBC AUTO: 96.4 FL (ref 80–94)
MONOCYTES # BLD AUTO: 0.3 10*3/MM3 (ref 0.1–0.9)
MONOCYTES NFR BLD AUTO: 7.7 % (ref 0–12)
NEUTROPHILS # BLD AUTO: 1.85 10*3/MM3 (ref 1.4–6.5)
NEUTROPHILS NFR BLD AUTO: 47.7 % (ref 40–75)
OSMOLALITY SERPL CALC.SUM OF ELEC: 281.7 MOSM/KG (ref 273–305)
PLATELET # BLD AUTO: 127 10*3/MM3 (ref 130–400)
PMV BLD AUTO: 10.6 FL (ref 6–10)
POTASSIUM BLD-SCNC: 3.4 MMOL/L (ref 3.5–5.3)
PROT SERPL-MCNC: 6.9 G/DL (ref 6–8)
RBC # BLD AUTO: 3.62 10*6/MM3 (ref 4.2–5.4)
SODIUM BLD-SCNC: 140 MMOL/L (ref 135–153)
T4 SERPL-MCNC: 4.4 MCG/DL (ref 4.5–10.9)
TRIGL SERPL-MCNC: 93 MG/DL (ref 0–150)
TSH SERPL DL<=0.05 MIU/L-ACNC: 1.83 MIU/ML (ref 0.55–4.78)
VIT B12 BLD-MCNC: 1382 PG/ML (ref 211–911)
VLDLC SERPL-MCNC: 18.6 MG/DL
WBC NRBC COR # BLD: 3.88 10*3/MM3 (ref 4.5–12.5)

## 2018-11-05 PROCEDURE — 84436 ASSAY OF TOTAL THYROXINE: CPT | Performed by: INTERNAL MEDICINE

## 2018-11-05 PROCEDURE — 80061 LIPID PANEL: CPT | Performed by: INTERNAL MEDICINE

## 2018-11-05 PROCEDURE — 80053 COMPREHEN METABOLIC PANEL: CPT | Performed by: INTERNAL MEDICINE

## 2018-11-05 PROCEDURE — 84443 ASSAY THYROID STIM HORMONE: CPT | Performed by: INTERNAL MEDICINE

## 2018-11-05 PROCEDURE — 82607 VITAMIN B-12: CPT | Performed by: INTERNAL MEDICINE

## 2018-11-05 PROCEDURE — 83880 ASSAY OF NATRIURETIC PEPTIDE: CPT | Performed by: INTERNAL MEDICINE

## 2018-11-05 PROCEDURE — 85025 COMPLETE CBC W/AUTO DIFF WBC: CPT | Performed by: INTERNAL MEDICINE

## 2018-11-05 PROCEDURE — 82140 ASSAY OF AMMONIA: CPT | Performed by: INTERNAL MEDICINE

## 2018-11-05 RX ORDER — SPIRONOLACTONE 25 MG/1
25 TABLET ORAL 3 TIMES DAILY
Qty: 270 TABLET | Refills: 3 | Status: SHIPPED | OUTPATIENT
Start: 2018-11-05 | End: 2018-11-29 | Stop reason: SDUPTHER

## 2018-11-05 NOTE — TELEPHONE ENCOUNTER
"Called [facility] agency gave updated medication and orders for labs in 2 weeks. Spoke to \"NA\"   "

## 2018-11-05 NOTE — TELEPHONE ENCOUNTER
----- Message from Galina Gonsalves MD sent at 11/5/2018 11:33 AM EST -----  Potassium is low increase Aldactone 25 mg 3 daily  Ammonia level is  high continue Xifaxan twice a day and take lactulose 5 times a day  Ammonia level and potassium level in 2 weeks

## 2018-11-15 ENCOUNTER — LAB REQUISITION (OUTPATIENT)
Dept: LAB | Facility: HOSPITAL | Age: 83
End: 2018-11-15

## 2018-11-15 DIAGNOSIS — E87.6 HYPOKALEMIA: ICD-10-CM

## 2018-11-15 DIAGNOSIS — K70.30 ALCOHOLIC CIRRHOSIS OF LIVER WITHOUT ASCITES (HCC): ICD-10-CM

## 2018-11-15 LAB
AMMONIA BLD-SCNC: 66 UMOL/L (ref 11–51)
POTASSIUM BLD-SCNC: 2.9 MMOL/L (ref 3.5–5.3)

## 2018-11-15 PROCEDURE — 82140 ASSAY OF AMMONIA: CPT | Performed by: INTERNAL MEDICINE

## 2018-11-15 PROCEDURE — 84132 ASSAY OF SERUM POTASSIUM: CPT | Performed by: INTERNAL MEDICINE

## 2018-11-16 ENCOUNTER — TELEPHONE (OUTPATIENT)
Dept: CARDIOLOGY | Facility: CLINIC | Age: 83
End: 2018-11-16

## 2018-11-16 DIAGNOSIS — Z95.828 PORT-A-CATH IN PLACE: Primary | ICD-10-CM

## 2018-11-16 NOTE — TELEPHONE ENCOUNTER
"Called [facility] to find out why the patient's cath has not been addressed by their nurse. Spoke to \"Sherice\" they are NOT going to take care of the patient's port a cath due to the insurance not covering it. They did not contact the office to let  be aware of this issues. They stated the office as closed and we don't have an answering service. I stated the office is open 5 days a week and if you call during business hours this could of been taking care of. They stated the nurse was \"taking care of other patients\". I am going to refer the back to  to see about removed of the cath at this point.    "

## 2018-11-23 NOTE — PROGRESS NOTES
Discharge Planning Assessment  FIDEL Flores     Patient Name: Alma Delia Garcia  MRN: [id number]  Today's Date: 7/15/2017    Admit Date: 7/12/2017       Discharge Plan       07/15/17 1158    Final Note    Final Note Pt is being discharged home on this date. No further needs at this time.         Discharge Placement     No information found        Expected Discharge Date and Time     Expected Discharge Date Expected Discharge Time    Jul 15, 2017           Ama Ramirez     Follow up with your PMD  Use an anti-acid medication like PEPCID or ZANTAC every day for 2 weeks  Return to ER for new or worsening symptoms.

## 2018-11-28 ENCOUNTER — PROCEDURE VISIT (OUTPATIENT)
Dept: SURGERY | Facility: CLINIC | Age: 83
End: 2018-11-28

## 2018-11-28 VITALS — WEIGHT: 136 LBS | HEIGHT: 63 IN | BODY MASS INDEX: 24.1 KG/M2

## 2018-11-28 DIAGNOSIS — I87.8 POOR VENOUS ACCESS: Primary | ICD-10-CM

## 2018-11-28 PROCEDURE — 99212 OFFICE O/P EST SF 10 MIN: CPT | Performed by: SURGERY

## 2018-11-29 ENCOUNTER — OFFICE VISIT (OUTPATIENT)
Dept: CARDIOLOGY | Facility: CLINIC | Age: 83
End: 2018-11-29

## 2018-11-29 VITALS
DIASTOLIC BLOOD PRESSURE: 56 MMHG | HEART RATE: 64 BPM | BODY MASS INDEX: 26.93 KG/M2 | SYSTOLIC BLOOD PRESSURE: 134 MMHG | WEIGHT: 152 LBS

## 2018-11-29 DIAGNOSIS — D69.6 THROMBOCYTOPENIA (HCC): ICD-10-CM

## 2018-11-29 DIAGNOSIS — R60.0 BILATERAL LEG EDEMA: ICD-10-CM

## 2018-11-29 DIAGNOSIS — K74.60 CIRRHOSIS OF LIVER WITHOUT ASCITES, UNSPECIFIED HEPATIC CIRRHOSIS TYPE (HCC): Primary | ICD-10-CM
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
## 2018-11-29 DIAGNOSIS — N18.30 STAGE 3 CHRONIC KIDNEY DISEASE (HCC): ICD-10-CM

## 2018-11-29 DIAGNOSIS — F41.1 GENERALIZED ANXIETY DISORDER: ICD-10-CM

## 2018-11-29 DIAGNOSIS — E87.6 HYPOKALEMIA: ICD-10-CM

## 2018-11-29 DIAGNOSIS — I50.32 CHRONIC DIASTOLIC HEART FAILURE (HCC): ICD-10-CM

## 2018-11-29 DIAGNOSIS — E03.4 HYPOTHYROIDISM DUE TO ACQUIRED ATROPHY OF THYROID: ICD-10-CM

## 2018-11-29 PROCEDURE — 99214 OFFICE O/P EST MOD 30 MIN: CPT | Performed by: INTERNAL MEDICINE

## 2018-11-29 RX ORDER — SPIRONOLACTONE 25 MG/1
75 TABLET ORAL DAILY
Qty: 270 TABLET | Refills: 3 | Status: SHIPPED | OUTPATIENT
Start: 2018-11-29 | End: 2019-02-28 | Stop reason: SDUPTHER

## 2018-11-29 RX ORDER — HYDROCODONE BITARTRATE AND ACETAMINOPHEN 5; 325 MG/1; MG/1
1 TABLET ORAL 2 TIMES DAILY PRN
Qty: 60 TABLET | Refills: 0 | Status: SHIPPED | OUTPATIENT
Start: 2018-11-29 | End: 2018-12-28 | Stop reason: SDUPTHER

## 2018-11-29 RX ORDER — BUMETANIDE 1 MG/1
1 TABLET ORAL 2 TIMES DAILY
Qty: 180 TABLET | Refills: 3 | Status: SHIPPED | OUTPATIENT
Start: 2018-11-29 | End: 2019-04-09 | Stop reason: SDUPTHER

## 2018-11-29 NOTE — PROGRESS NOTES
subjective     Chief Complaint   Patient presents with   • Fall     Left leg pain   • Med Refill   • Cough   • Severe leg swelling     History of Present Illness  Patient is 88 years old white female who has history of hepatic cirrhosis and multiple episodes of hepatic encephalopathy.  Recently patient had lab work done potassium was 2.9.  She has been taking Bumex and Aldactone 25 mg twice a day.  She was called and informed to increase Aldactone to 3 times a day.  Patient stopped both Bumex and Aldactone and insists that somebody called and told her to stop both the diuretics.  She is here now with marked lower extremity edema.  She has gained a lot of weight.  At around 15 pounds.  She complains of shortness of breath and redness and swelling of both lower extremities.    Ammonia level was also mildly elevated she was advised to increase lactulose and continue Xifaxan.  Patient stated that she cannot increase lactulose anymore.    She also has chronic back pain and disc disease.  She has been taking Norco low-dose and needs refills.  Hypothyroidism is being treated with Synthroid 50 µg daily lab work will be checked in medication be continued.  No drug side effects.    Past Surgical History:   Procedure Laterality Date   • BACK SURGERY      KYPHOPLASTIES    • CARDIAC CATHETERIZATION Left 08/2004   • CARDIOVASCULAR STRESS TEST  01/25/2015   • CATARACT EXTRACTION     • CHOLECYSTECTOMY     • CLAVICLE SURGERY Right    • COLONOSCOPY  10/2004   • ECHO - CONVERTED  11/2012   • HIP FRACTURE SURGERY Bilateral     ARTHROPLASTIES    • PARTIAL HYSTERECTOMY     • WRIST FRACTURE SURGERY       Family History   Problem Relation Age of Onset   • Cancer Mother    • Heart disease Father    • Arthritis Sister    • Osteoporosis Sister    • Diabetes Maternal Aunt      Past Medical History:   Diagnosis Date   • Acute renal failure (CMS/HCC)    • Alcohol abuse     in remission   • Anxiety    • Bell's palsy    • Cellulitis     LLE   •  CHF (congestive heart failure) (CMS/HCC)     Diastolic   • CHF (congestive heart failure) (CMS/HCC)    • Constipation    • Coronary artery disease    • Diverticulosis    • GERD (gastroesophageal reflux disease)    • Hepatic cirrhosis (CMS/HCC)    • History of transfusion    • Hypertension    • Hypothyroidism    • MI (mitral incompetence)    • Osteoporosis    • Osteoporosis    • Restless leg syndrome    • Thrombocytopenia (CMS/HCC)    • Trigeminal neuralgia      Patient Active Problem List   Diagnosis   • Hepatic cirrhosis*   • HTN (hypertension)*   • Anxiety disorder*   • Osteopetrosis*   • Hypothyroidism*   • GERD (gastroesophageal reflux disease)*   • Chronic pain syndrome due to fractured spine and left rib fracture*   • Anemia, chronic disease   • Arthralgia of hip   • Left knee pain   • Fracture of proximal end of tibia and fibula   • Primary insomnia   • Thrombocytopenia (CMS/HCC)   • Peripheral vascular disease (CMS/HCC)   • Bilateral leg edema   • Cramp of both lower extremities   • Hepatic encephalopathy (CMS/HCC)   • UTI (urinary tract infection)   • Hypokalemia   • Closed fracture of lower end of left femur with routine healing   • Status post open reduction with internal fixation of fracture   • Nonrheumatic aortic valve insufficiency, mild   • Chronic diastolic heart failure, mild   • Periprosthetic fracture of shaft of femur   • S/P ORIF (open reduction internal fixation) fracture   • Diarrhea   • Right upper quadrant abdominal pain   • Nausea   • Elevated liver enzymes   • Chronic hepatitis C without hepatic coma (CMS/HCC)   • Poor venous access   • Stage 3 chronic kidney disease (CMS/HCC)       Social History     Tobacco Use   • Smoking status: Never Smoker   • Smokeless tobacco: Former User     Types: Snuff   Substance Use Topics   • Alcohol use: No     Comment: FORMER 12 BOTTLES PER DAY FOR 15 YEARS   • Drug use: No       Allergies   Allergen Reactions   • Ciprofloxacin        Current Outpatient  Medications on File Prior to Visit   Medication Sig   • Alcohol Swabs pads Uses as directed.   • aspirin 81 MG tablet Take 1 tablet by mouth Daily.   • Calcium Carbonate-Vitamin D (OYST-LILA-D 500 PO) Take  by mouth.   • esomeprazole (nexIUM) 40 MG capsule Take 1 capsule by mouth Every Morning Before Breakfast.   • folic acid (FOLVITE) 1 MG tablet Take 1 tablet by mouth Daily.   • Insulin Pen Needle (BD AUTOSHIELD DUO) 30G X 5 MM misc Use as directed   • lactulose (CHRONULAC) 10 GM/15ML solution Take 30 mL by mouth 5 (Five) Times a Day.   • levothyroxine (SYNTHROID) 50 MCG tablet Take 1 tablet by mouth Daily.   • Multiple Vitamin (MULTI VITAMIN DAILY) tablet Take  by mouth.   • nitroglycerin (NITROSTAT) 0.4 MG SL tablet Place 1 tablet under the tongue Every 5 (Five) Minutes As Needed for Chest Pain. Take no more than 3 doses in 15 minutes.   • ondansetron (ZOFRAN) 4 MG tablet Take 4 mg by mouth Every 8 (Eight) Hours As Needed for Nausea or Vomiting.   • OXcarbazepine (TRILEPTAL) 150 MG tablet Take 1 tablet by mouth Every 12 (Twelve) Hours.   • rifaximin (XIFAXAN) 550 MG tablet Take 1 tablet by mouth Every 12 (Twelve) Hours.   • rOPINIRole (REQUIP) 1 MG tablet Take 1 tablet by mouth Every Night. Take 1 hour before bedtime.   • teriparatide (FORTEO) 600 MCG/2.4ML injection Inject 0.08 mL under the skin Daily.   • vitamin D (ERGOCALCIFEROL) 29512 units capsule capsule Take 1 capsule by mouth 1 (One) Time Per Week.   • [DISCONTINUED] HYDROcodone-acetaminophen (NORCO) 5-325 MG per tablet Take 1 tablet by mouth 2 (Two) Times a Day As Needed for Moderate Pain .   • [DISCONTINUED] bumetanide (BUMEX) 1 MG tablet Take 1 tablet by mouth 2 (Two) Times a Day.   • [DISCONTINUED] spironolactone (ALDACTONE) 25 MG tablet Take 1 tablet by mouth 3 (Three) Times a Day.     No current facility-administered medications on file prior to visit.          The following portions of the patient's history were reviewed and updated as  appropriate: allergies, current medications, past family history, past medical history, past social history, past surgical history and problem list.    Review of Systems   Constitution: Negative.   HENT: Negative.  Negative for congestion.    Eyes: Negative.    Cardiovascular: Positive for leg swelling. Negative for chest pain, cyanosis, dyspnea on exertion, irregular heartbeat, near-syncope, orthopnea, palpitations, paroxysmal nocturnal dyspnea and syncope.   Respiratory: Positive for shortness of breath.    Hematologic/Lymphatic: Negative.    Musculoskeletal: Negative.    Gastrointestinal: Negative.    Neurological: Negative.  Negative for headaches.          Objective:     /56 (BP Location: Left arm)   Pulse 64   Wt 68.9 kg (152 lb)   LMP  (LMP Unknown)   BMI 26.93 kg/m²   Physical Exam   Constitutional: She appears well-developed and well-nourished. No distress.   HENT:   Head: Normocephalic and atraumatic.   Mouth/Throat: Oropharynx is clear and moist. No oropharyngeal exudate.   Eyes: Conjunctivae and EOM are normal. Pupils are equal, round, and reactive to light. No scleral icterus.   Neck: Normal range of motion. Neck supple. No JVD present. No tracheal deviation present. No thyromegaly present.   Cardiovascular: Normal rate, regular rhythm, normal heart sounds and intact distal pulses. PMI is not displaced. Exam reveals no gallop, no friction rub and no decreased pulses.   No murmur heard.  Pulses:       Carotid pulses are 3+ on the right side, and 3+ on the left side.       Radial pulses are 3+ on the right side, and 3+ on the left side.   Pulmonary/Chest: Effort normal and breath sounds normal. No respiratory distress. She has no wheezes. She has no rales. She exhibits no tenderness.   Abdominal: Soft. Bowel sounds are normal. She exhibits no distension, no abdominal bruit and no mass. There is no splenomegaly or hepatomegaly. There is no tenderness. There is no rebound and no guarding.    Musculoskeletal: Normal range of motion. She exhibits edema. She exhibits no tenderness or deformity.   Lymphadenopathy:     She has no cervical adenopathy.   Neurological: She is alert. She has normal reflexes. No cranial nerve deficit. She exhibits normal muscle tone. Coordination normal.   Skin: Skin is warm and dry. No rash noted. She is not diaphoretic. No erythema.   Psychiatric: She has a normal mood and affect. Her behavior is normal. Judgment and thought content normal.         Lab Review  Lab Results   Component Value Date     11/05/2018    K 2.9 (C) 11/15/2018     11/05/2018    BUN 20 11/05/2018    CREATININE 1.45 (H) 11/05/2018    GLUCOSE 93 11/05/2018    CALCIUM 9.2 11/05/2018    ALT 35 11/05/2018    ALKPHOS 112 (H) 11/05/2018    LABIL2 0.9 (L) 04/08/2016     Lab Results   Component Value Date    CKTOTAL 99 07/25/2017     Lab Results   Component Value Date    WBC 3.88 (L) 11/05/2018    HGB 11.9 (L) 11/05/2018    HCT 34.9 (L) 11/05/2018     (L) 11/05/2018     Lab Results   Component Value Date    INR 1.28 (H) 06/27/2018    INR 1.79 (H) 10/13/2017    INR 2.04 (H) 10/12/2017     Lab Results   Component Value Date    MG 2.0 10/17/2017     Lab Results   Component Value Date    TSH 1.830 11/05/2018     Lab Results   Component Value Date    BNP 33.0 11/05/2018     Lab Results   Component Value Date    CHLPL 161 04/08/2016    CHOL 139 11/05/2018    TRIG 93 11/05/2018    HDL 85 11/05/2018    VLDL 18.6 11/05/2018    LDLHDL 0.42 11/05/2018     Lab Results   Component Value Date    LDL 35 11/05/2018    LDL 58 08/26/2018       Procedures       I personally viewed and interpreted the patient's LAB data         Assessment:     1. Cirrhosis of liver without ascites, unspecified hepatic cirrhosis type (CMS/HCC)    2. Chronic diastolic heart failure, mild    3. Thrombocytopenia (CMS/HCC)    4. Stage 3 chronic kidney disease (CMS/HCC)    5. Hypothyroidism due to acquired atrophy of thyroid    6.  Hypokalemia    7. Bilateral leg edema    8. Generalized anxiety disorder          Plan:      Patient has hepatic cirrhosis with elevated ammonia level.  Was advised to take Xifaxan twice a day and lactulose 5 times daily.    She has developed a lot of lower extremity edema and abdominal swelling after she inadvertently stopped her Bumex and Aldactone.  Patient was advised to resume Bumex and increase Aldactone to 3 a day    She has renal failure and renal functions will need be watched closely but at this time she does need to restart diuretic therapy    Potassium was also quite low that would need to be rechecked in one week.  Synthroid was continued  [place] refill was given  Side effects were explained particularly because of her history of hepatic encephalopathy  Follow-up scheduled home health referral for 1 week lab work        No Follow-up on file.

## 2018-12-18 ENCOUNTER — TELEPHONE (OUTPATIENT)
Dept: SURGERY | Facility: CLINIC | Age: 83
End: 2018-12-18

## 2018-12-18 NOTE — TELEPHONE ENCOUNTER
Patient's care giver is aware of her appointment with Ortho on 12/20/18. Scheduled appointment with Alec. Contact information for providers office was given to the patient in case she needs to cancel.     BC 12/18 @ 1:56pm

## 2018-12-20 ENCOUNTER — HOSPITAL ENCOUNTER (OUTPATIENT)
Dept: GENERAL RADIOLOGY | Facility: HOSPITAL | Age: 83
Discharge: HOME | End: 2018-12-20
Attending: ORTHOPAEDIC SURGERY | Admitting: ORTHOPAEDIC SURGERY

## 2018-12-20 ENCOUNTER — OFFICE VISIT (OUTPATIENT)
Dept: ORTHOPEDIC SURGERY | Facility: CLINIC | Age: 83
End: 2018-12-20

## 2018-12-20 VITALS — BODY MASS INDEX: 26.91 KG/M2 | WEIGHT: 151.9 LBS | HEIGHT: 63 IN

## 2018-12-20 DIAGNOSIS — M70.62 TROCHANTERIC BURSITIS OF LEFT HIP: Primary | ICD-10-CM

## 2018-12-20 DIAGNOSIS — M79.605 LEFT LEG PAIN: ICD-10-CM

## 2018-12-20 DIAGNOSIS — M79.652 LEFT THIGH PAIN: Primary | ICD-10-CM

## 2018-12-20 PROCEDURE — 99213 OFFICE O/P EST LOW 20 MIN: CPT | Performed by: ORTHOPAEDIC SURGERY

## 2018-12-20 PROCEDURE — 73552 X-RAY EXAM OF FEMUR 2/>: CPT | Performed by: RADIOLOGY

## 2018-12-20 PROCEDURE — 20610 DRAIN/INJ JOINT/BURSA W/O US: CPT | Performed by: ORTHOPAEDIC SURGERY

## 2018-12-20 PROCEDURE — 73552 X-RAY EXAM OF FEMUR 2/>: CPT

## 2018-12-20 RX ADMIN — BUPIVACAINE HYDROCHLORIDE 5 ML: 5 INJECTION, SOLUTION EPIDURAL; INTRACAUDAL at 09:58

## 2018-12-20 RX ADMIN — METHYLPREDNISOLONE ACETATE 80 MG: 40 INJECTION, SUSPENSION INTRA-ARTICULAR; INTRALESIONAL; INTRAMUSCULAR; SOFT TISSUE at 09:58

## 2018-12-20 NOTE — PROGRESS NOTES
Patient: Alma Delia Garcia    YOB: 1930    Chief Complaint   Patient presents with   • Left Leg - Pain, Edema, Follow-up         History of Present Illness: Patient presents with complaint of leg pain and bilateral swelling.  Apparently she had a fall or 2.  She describes is pain in her back to the outside of her hips left greater than right.  Sometimes her whole leg hurts.    Past Medical History:   Diagnosis Date   • Acute renal failure (CMS/HCC)    • Alcohol abuse     in remission   • Anxiety    • Bell's palsy    • Cellulitis     LLE   • CHF (congestive heart failure) (CMS/HCC)     Diastolic   • CHF (congestive heart failure) (CMS/HCC)    • Constipation    • Coronary artery disease    • Diverticulosis    • GERD (gastroesophageal reflux disease)    • Hepatic cirrhosis (CMS/HCC)    • History of transfusion    • Hypertension    • Hypothyroidism    • MI (mitral incompetence)    • Osteoporosis    • Osteoporosis    • Restless leg syndrome    • Thrombocytopenia (CMS/HCC)    • Trigeminal neuralgia         Social History     Socioeconomic History   • Marital status:      Spouse name: Not on file   • Number of children: Not on file   • Years of education: Not on file   • Highest education level: Not on file   Social Needs   • Financial resource strain: Not on file   • Food insecurity - worry: Not on file   • Food insecurity - inability: Not on file   • Transportation needs - medical: Not on file   • Transportation needs - non-medical: Not on file   Occupational History   • Not on file   Tobacco Use   • Smoking status: Never Smoker   • Smokeless tobacco: Former User     Types: Snuff   Substance and Sexual Activity   • Alcohol use: No     Comment: FORMER 12 BOTTLES PER DAY FOR 15 YEARS   • Drug use: No   • Sexual activity: Defer   Other Topics Concern   • Not on file   Social History Narrative    LIVES WITH GRANDDAUGHTER DEVON IN [place]            Physical Exam: 88 y.o. female  General Appearance:     "Alert and oriented x 3, cooperative, in no acute distress                   Vitals:    12/20/18 0853   Weight: 68.9 kg (151 lb 14.4 oz)   Height: 160 cm (62.99\")          Exam shows gentle range of motion of the knee in hip show no obvious discomfort.  She has moderate bilateral lower extremity swelling.  She does have some point tenderness over the greater trochanter of her left hip.  Her previous wounds are well-healed.  There is no redness or erythema or warmth      Radiology:       X-rays taken of her left leg shows she's got no change in alignment of the previous total hip arthroplasty in the metal fixation for the periprosthetic fracture.  Good callus is noted at the fracture site.  Some minor degenerative changes of the knee    Large Joint Arthrocentesis: L greater trochanteric bursa  Date/Time: 12/20/2018 9:58 AM  Consent given by: patient  Supporting Documentation  Indications: pain and diagnostic evaluation   Procedure Details  Location: hip - L greater trochanteric bursa  Preparation: Patient was prepped and draped in the usual sterile fashion  Needle size: 22 G  Approach: lateral  Medications administered: 80 mg methylPREDNISolone acetate 40 MG/ML; 5 mL bupivacaine (PF) 0.5 %            Assessment/Plan: Left leg pain and swelling.  Have discussed with the patient and her daughter that x-rays look fine there is no new abnormalities noted.  They need to discuss the swelling with her medical doctors as is nothing I can do for that.  I think a lot of her pain may be coming from her chronic back pain.  We'll try one injection over her left greater trochanter just to see if that gives her any relief at all.  At this point there is not any indication for any surgical intervention.  We'll see her back as needed              Patient's Body mass index is 26.91 kg/m². BMI is within normal parameters. No follow-up required.      Discussion/Summary:                This chart was completed utilizing the dragon speech " recognition software.  Grammatical errors, random word insertions, pronoun errors, and incomplete sentences or occasional consequences of the system due to software limitations, ambient noise, and hardware issues.  Any questions or concerns about the content, text, or information contained within the body of this dictation should be directly addressed to the physician for clarification        This document was signed by Karson Pierre M.D. December 20, 2018 9:56 AM

## 2018-12-21 RX ORDER — METHYLPREDNISOLONE ACETATE 40 MG/ML
80 INJECTION, SUSPENSION INTRA-ARTICULAR; INTRALESIONAL; INTRAMUSCULAR; SOFT TISSUE
Status: COMPLETED | OUTPATIENT
Start: 2018-12-20 | End: 2018-12-20

## 2018-12-21 RX ORDER — BUPIVACAINE HYDROCHLORIDE 5 MG/ML
5 INJECTION, SOLUTION EPIDURAL; INTRACAUDAL
Status: COMPLETED | OUTPATIENT
Start: 2018-12-20 | End: 2018-12-20

## 2018-12-28 ENCOUNTER — LAB (OUTPATIENT)
Dept: LAB | Facility: HOSPITAL | Age: 83
End: 2018-12-28
Attending: INTERNAL MEDICINE

## 2018-12-28 ENCOUNTER — OFFICE VISIT (OUTPATIENT)
Dept: CARDIOLOGY | Facility: CLINIC | Age: 83
End: 2018-12-28

## 2018-12-28 VITALS
SYSTOLIC BLOOD PRESSURE: 140 MMHG | OXYGEN SATURATION: 97 % | HEIGHT: 63 IN | HEART RATE: 58 BPM | WEIGHT: 139 LBS | DIASTOLIC BLOOD PRESSURE: 60 MMHG | BODY MASS INDEX: 24.63 KG/M2

## 2018-12-28 DIAGNOSIS — I10 ESSENTIAL HYPERTENSION: ICD-10-CM

## 2018-12-28 DIAGNOSIS — F41.1 GENERALIZED ANXIETY DISORDER: ICD-10-CM

## 2018-12-28 DIAGNOSIS — E03.4 HYPOTHYROIDISM DUE TO ACQUIRED ATROPHY OF THYROID: ICD-10-CM

## 2018-12-28 DIAGNOSIS — G89.4 CHRONIC PAIN SYNDROME: ICD-10-CM

## 2018-12-28 DIAGNOSIS — I35.1 NONRHEUMATIC AORTIC VALVE INSUFFICIENCY: ICD-10-CM

## 2018-12-28 DIAGNOSIS — K74.60 CIRRHOSIS OF LIVER WITHOUT ASCITES, UNSPECIFIED HEPATIC CIRRHOSIS TYPE (HCC): ICD-10-CM

## 2018-12-28 DIAGNOSIS — E87.6 HYPOKALEMIA: ICD-10-CM

## 2018-12-28 DIAGNOSIS — E87.6 HYPOKALEMIA: Primary | ICD-10-CM

## 2018-12-28 DIAGNOSIS — I50.32 CHRONIC DIASTOLIC HEART FAILURE (HCC): ICD-10-CM

## 2018-12-28 LAB
ANION GAP SERPL CALCULATED.3IONS-SCNC: 7.7 MMOL/L (ref 3.6–11.2)
BUN BLD-MCNC: 27 MG/DL (ref 7–21)
BUN/CREAT SERPL: 15.6 (ref 7–25)
CALCIUM SPEC-SCNC: 9.4 MG/DL (ref 7.7–10)
CHLORIDE SERPL-SCNC: 106 MMOL/L (ref 99–112)
CO2 SERPL-SCNC: 32.3 MMOL/L (ref 24.3–31.9)
CREAT BLD-MCNC: 1.73 MG/DL (ref 0.43–1.29)
GFR SERPL CREATININE-BSD FRML MDRD: 28 ML/MIN/1.73
GLUCOSE BLD-MCNC: 95 MG/DL (ref 70–110)
OSMOLALITY SERPL CALC.SUM OF ELEC: 295.5 MOSM/KG (ref 273–305)
POTASSIUM BLD-SCNC: 3.8 MMOL/L (ref 3.5–5.3)
SODIUM BLD-SCNC: 146 MMOL/L (ref 135–153)

## 2018-12-28 PROCEDURE — 80048 BASIC METABOLIC PNL TOTAL CA: CPT

## 2018-12-28 PROCEDURE — 99213 OFFICE O/P EST LOW 20 MIN: CPT | Performed by: INTERNAL MEDICINE

## 2018-12-28 RX ORDER — HYDROCODONE BITARTRATE AND ACETAMINOPHEN 5; 325 MG/1; MG/1
1 TABLET ORAL 2 TIMES DAILY PRN
Qty: 60 TABLET | Refills: 0 | Status: SHIPPED | OUTPATIENT
Start: 2018-12-28 | End: 2019-01-18 | Stop reason: SDUPTHER

## 2018-12-28 NOTE — PROGRESS NOTES
subjective     Chief Complaint   Patient presents with   • cirrhosis of liver without ascites   • Follow-up   • Fatigue     Increased fatigued      History of Present Illness  Patient is 88 years old white female who has cirrhosis of the liver with ascites.  Patient has significant lower extremity edema.  She had been taking Bumex and Aldactone.  Her potassium was low last visit.  She is now feeling better.  She also has a history of hepatic encephalopathy she is taking lactulose and Xifaxan.  No drug side effects.  Anxiety is very well controlled with current medications.  She also has chronic back pain and is taking Narco.  Patient is aware of the drug side effects including addiction fall and other more serious complications.  Patient is compliant with that drug use    Past Surgical History:   Procedure Laterality Date   • BACK SURGERY      KYPHOPLASTIES    • CARDIAC CATHETERIZATION Left 08/2004   • CARDIOVASCULAR STRESS TEST  01/25/2015   • CATARACT EXTRACTION     • CHOLECYSTECTOMY     • CLAVICLE SURGERY Right    • COLONOSCOPY  10/2004   • ECHO - CONVERTED  11/2012   • FEMUR OPEN REDUCTION INTERNAL FIXATION Left 10/12/2017    Procedure: FEMUR OPEN REDUCTION INTERNAL FIXATION;  Surgeon: Karson Pierre MD;  Location: [facility];  Service:    • HIP FRACTURE SURGERY Bilateral     ARTHROPLASTIES    • PARTIAL HYSTERECTOMY     • PORTACATH PLACEMENT N/A 9/28/2018    Procedure: INSERTION OF PORTACATH;  Surgeon: Vic Mauricio MD;  Location: [facility] OR;  Service: General   • WRIST FRACTURE SURGERY       Family History   Problem Relation Age of Onset   • Cancer Mother    • Heart disease Father    • Arthritis Sister    • Osteoporosis Sister    • Diabetes Maternal Aunt      Past Medical History:   Diagnosis Date   • Acute renal failure (CMS/HCC)    • Alcohol abuse     in remission   • Anxiety    • Bell's palsy    • Cellulitis     LLE   • CHF (congestive heart failure) (CMS/HCC)     Diastolic   • CHF  (congestive heart failure) (CMS/HCC)    • Constipation    • Coronary artery disease    • Diverticulosis    • GERD (gastroesophageal reflux disease)    • Hepatic cirrhosis (CMS/HCC)    • History of transfusion    • Hypertension    • Hypothyroidism    • MI (mitral incompetence)    • Osteoporosis    • Osteoporosis    • Restless leg syndrome    • Thrombocytopenia (CMS/HCC)    • Trigeminal neuralgia      Patient Active Problem List   Diagnosis   • Hepatic cirrhosis*   • HTN (hypertension)*   • Anxiety disorder*   • Osteopetrosis*   • Hypothyroidism*   • GERD (gastroesophageal reflux disease)*   • Chronic pain syndrome due to fractured spine and left rib fracture*   • Anemia, chronic disease   • Arthralgia of hip   • Left leg pain   • Fracture of proximal end of tibia and fibula   • Primary insomnia   • Thrombocytopenia (CMS/HCC)   • Peripheral vascular disease (CMS/HCC)   • Bilateral leg edema   • Cramp of both lower extremities   • Hepatic encephalopathy (CMS/HCC)   • UTI (urinary tract infection)   • Hypokalemia   • Closed fracture of lower end of left femur with routine healing   • Status post open reduction with internal fixation of fracture   • Nonrheumatic aortic valve insufficiency, mild   • Chronic diastolic heart failure, mild   • Periprosthetic fracture of shaft of femur   • S/P ORIF (open reduction internal fixation) fracture   • Diarrhea   • Right upper quadrant abdominal pain   • Nausea   • Elevated liver enzymes   • Chronic hepatitis C without hepatic coma (CMS/HCC)   • Poor venous access   • Stage 3 chronic kidney disease (CMS/HCC)   • Trochanteric bursitis of left hip       Social History     Tobacco Use   • Smoking status: Never Smoker   • Smokeless tobacco: Former User     Types: Snuff   Substance Use Topics   • Alcohol use: No     Comment: FORMER 12 BOTTLES PER DAY FOR 15 YEARS   • Drug use: No       Allergies   Allergen Reactions   • Ciprofloxacin        Current Outpatient Medications on File Prior to  Visit   Medication Sig   • Alcohol Swabs pads Uses as directed.   • aspirin 81 MG tablet Take 1 tablet by mouth Daily.   • bumetanide (BUMEX) 1 MG tablet Take 1 tablet by mouth 2 (Two) Times a Day.   • Calcium Carbonate-Vitamin D (OYST-LILA-D 500 PO) Take  by mouth.   • esomeprazole (nexIUM) 40 MG capsule Take 1 capsule by mouth Every Morning Before Breakfast.   • folic acid (FOLVITE) 1 MG tablet Take 1 tablet by mouth Daily.   • Insulin Pen Needle (BD AUTOSHIELD DUO) 30G X 5 MM misc Use as directed   • lactulose (CHRONULAC) 10 GM/15ML solution Take 30 mL by mouth 5 (Five) Times a Day.   • levothyroxine (SYNTHROID) 50 MCG tablet Take 1 tablet by mouth Daily.   • Multiple Vitamin (MULTI VITAMIN DAILY) tablet Take  by mouth.   • nitroglycerin (NITROSTAT) 0.4 MG SL tablet Place 1 tablet under the tongue Every 5 (Five) Minutes As Needed for Chest Pain. Take no more than 3 doses in 15 minutes.   • ondansetron (ZOFRAN) 4 MG tablet Take 4 mg by mouth Every 8 (Eight) Hours As Needed for Nausea or Vomiting.   • OXcarbazepine (TRILEPTAL) 150 MG tablet Take 1 tablet by mouth Every 12 (Twelve) Hours.   • rifaximin (XIFAXAN) 550 MG tablet Take 1 tablet by mouth Every 12 (Twelve) Hours.   • rOPINIRole (REQUIP) 1 MG tablet Take 1 tablet by mouth Every Night. Take 1 hour before bedtime.   • spironolactone (ALDACTONE) 25 MG tablet Take 3 tablets by mouth Daily.   • teriparatide (FORTEO) 600 MCG/2.4ML injection Inject 0.08 mL under the skin Daily.   • vitamin D (ERGOCALCIFEROL) 14747 units capsule capsule Take 1 capsule by mouth 1 (One) Time Per Week.   • [DISCONTINUED] HYDROcodone-acetaminophen (NORCO) 5-325 MG per tablet Take 1 tablet by mouth 2 (Two) Times a Day As Needed for Moderate Pain .     No current facility-administered medications on file prior to visit.          The following portions of the patient's history were reviewed and updated as appropriate: allergies, current medications, past family history, past medical  "history, past social history, past surgical history and problem list.    Review of Systems   Constitution: Negative.   HENT: Negative.  Negative for congestion.    Eyes: Negative.    Cardiovascular: Positive for leg swelling. Negative for chest pain, cyanosis, dyspnea on exertion, irregular heartbeat, near-syncope, orthopnea, palpitations, paroxysmal nocturnal dyspnea and syncope.   Respiratory: Negative.  Negative for shortness of breath.    Hematologic/Lymphatic: Negative.    Musculoskeletal: Positive for back pain, joint pain, myalgias and stiffness.   Gastrointestinal: Negative.    Neurological: Negative.  Negative for headaches.   Psychiatric/Behavioral: The patient has insomnia and is nervous/anxious.           Objective:     /60   Pulse 58   Ht 160 cm (63\")   Wt 63 kg (139 lb)   LMP  (LMP Unknown)   SpO2 97%   BMI 24.62 kg/m²   Physical Exam   Constitutional: She appears well-developed and well-nourished. No distress.   HENT:   Head: Normocephalic and atraumatic.   Mouth/Throat: Oropharynx is clear and moist. No oropharyngeal exudate.   Eyes: Conjunctivae and EOM are normal. Pupils are equal, round, and reactive to light. No scleral icterus.   Neck: Normal range of motion. Neck supple. No JVD present. No tracheal deviation present. No thyromegaly present.   Cardiovascular: Normal rate, regular rhythm, normal heart sounds and intact distal pulses. PMI is not displaced. Exam reveals no gallop, no friction rub and no decreased pulses.   No murmur heard.  Pulses:       Carotid pulses are 3+ on the right side, and 3+ on the left side.       Radial pulses are 3+ on the right side, and 3+ on the left side.   Pulmonary/Chest: Effort normal and breath sounds normal. No respiratory distress. She has no wheezes. She has no rales. She exhibits no tenderness.   Abdominal: Soft. Bowel sounds are normal. She exhibits no distension, no abdominal bruit and no mass. There is no splenomegaly or hepatomegaly. There " is no tenderness. There is no rebound and no guarding.   Musculoskeletal: Normal range of motion. She exhibits edema. She exhibits no tenderness or deformity.   Lymphadenopathy:     She has no cervical adenopathy.   Neurological: She is alert. She has normal reflexes. No cranial nerve deficit. She exhibits normal muscle tone. Coordination normal.   Skin: Skin is warm and dry. No rash noted. She is not diaphoretic. No erythema.   Psychiatric: She has a normal mood and affect. Her behavior is normal. Judgment and thought content normal.         Lab Review  Lab Results   Component Value Date     11/05/2018    K 2.9 (C) 11/15/2018     11/05/2018    BUN 20 11/05/2018    CREATININE 1.45 (H) 11/05/2018    GLUCOSE 93 11/05/2018    CALCIUM 9.2 11/05/2018    ALT 35 11/05/2018    ALKPHOS 112 (H) 11/05/2018    LABIL2 0.9 (L) 04/08/2016     Lab Results   Component Value Date    CKTOTAL 99 07/25/2017     Lab Results   Component Value Date    WBC 3.88 (L) 11/05/2018    HGB 11.9 (L) 11/05/2018    HCT 34.9 (L) 11/05/2018     (L) 11/05/2018         Lab Results   Component Value Date    TSH 1.830 11/05/2018     Lab Results   Component Value Date    BNP 33.0 11/05/2018     Lab Results   Component Value Date    CHLPL 161 04/08/2016    CHOL 139 11/05/2018    TRIG 93 11/05/2018    HDL 85 11/05/2018    VLDL 18.6 11/05/2018    LDLHDL 0.42 11/05/2018     Lab Results   Component Value Date    LDL 35 11/05/2018    LDL 58 08/26/2018       Procedures       I personally viewed and interpreted the patient's LAB data         Assessment:     1. Hypokalemia    2. Chronic diastolic heart failure, mild    3. Nonrheumatic aortic valve insufficiency, mild    4. Essential hypertension    5. Hypothyroidism due to acquired atrophy of thyroid    6. Cirrhosis of liver without ascites, unspecified hepatic cirrhosis type (CMS/HCC)    7. Generalized anxiety disorder    8. Chronic pain syndrome due to fractured spine and left rib fracture*           Plan:      Patient has history of hypokalemia.  She was advised to continue Aldactone and Bumex.  BMP will be checked today.  Refills of nerve medications and pain medications were given.  Side effects were explained.  Follow-up scheduled        No Follow-up on file.

## 2019-01-01 ENCOUNTER — HOSPITAL ENCOUNTER (OUTPATIENT)
Dept: GENERAL RADIOLOGY | Facility: HOSPITAL | Age: 84
Discharge: HOME | End: 2019-07-23

## 2019-01-01 ENCOUNTER — APPOINTMENT (OUTPATIENT)
Dept: GENERAL RADIOLOGY | Facility: HOSPITAL | Age: 84
End: 2019-01-01

## 2019-01-01 ENCOUNTER — OFFICE VISIT (OUTPATIENT)
Dept: CARDIOLOGY | Facility: CLINIC | Age: 84
End: 2019-01-01

## 2019-01-01 ENCOUNTER — APPOINTMENT (OUTPATIENT)
Dept: CARDIOLOGY | Facility: HOSPITAL | Age: 84
End: 2019-01-01

## 2019-01-01 ENCOUNTER — READMISSION MANAGEMENT (OUTPATIENT)
Dept: CALL CENTER | Facility: HOSPITAL | Age: 84
End: 2019-01-01

## 2019-01-01 ENCOUNTER — LAB (OUTPATIENT)
Dept: LAB | Facility: HOSPITAL | Age: 84
End: 2019-01-01

## 2019-01-01 ENCOUNTER — PRIOR AUTHORIZATION (OUTPATIENT)
Dept: CARDIOLOGY | Facility: CLINIC | Age: 84
End: 2019-01-01

## 2019-01-01 ENCOUNTER — HOSPITAL ENCOUNTER (INPATIENT)
Facility: HOSPITAL | Age: 84
LOS: 3 days | Discharge: HOME | End: 2019-10-29
Attending: FAMILY MEDICINE | Admitting: INTERNAL MEDICINE

## 2019-01-01 ENCOUNTER — APPOINTMENT (OUTPATIENT)
Dept: CT IMAGING | Facility: HOSPITAL | Age: 84
End: 2019-01-01

## 2019-01-01 ENCOUNTER — TELEPHONE (OUTPATIENT)
Dept: CARDIOLOGY | Facility: CLINIC | Age: 84
End: 2019-01-01

## 2019-01-01 ENCOUNTER — TRANSCRIBE ORDERS (OUTPATIENT)
Dept: ADMINISTRATIVE | Facility: HOSPITAL | Age: 84
End: 2019-01-01

## 2019-01-01 ENCOUNTER — HOSPITAL ENCOUNTER (EMERGENCY)
Facility: HOSPITAL | Age: 84
Discharge: HOME | End: 2019-12-12
Attending: EMERGENCY MEDICINE | Admitting: EMERGENCY MEDICINE

## 2019-01-01 ENCOUNTER — HOSPITAL ENCOUNTER (OUTPATIENT)
Dept: CARDIOLOGY | Facility: HOSPITAL | Age: 84
Discharge: HOME | End: 2019-07-23
Admitting: PSYCHIATRY & NEUROLOGY

## 2019-01-01 ENCOUNTER — TRANSCRIBE ORDERS (OUTPATIENT)
Dept: HOSPITALIST | Facility: HOSPITAL | Age: 84
End: 2019-01-01

## 2019-01-01 ENCOUNTER — APPOINTMENT (OUTPATIENT)
Dept: ULTRASOUND IMAGING | Facility: HOSPITAL | Age: 84
End: 2019-01-01

## 2019-01-01 ENCOUNTER — HOSPITAL ENCOUNTER (OUTPATIENT)
Dept: BONE DENSITY | Facility: HOSPITAL | Age: 84
Discharge: HOME | End: 2019-05-28
Admitting: INTERNAL MEDICINE

## 2019-01-01 VITALS
TEMPERATURE: 97.6 F | WEIGHT: 124.5 LBS | HEART RATE: 75 BPM | HEIGHT: 62 IN | DIASTOLIC BLOOD PRESSURE: 52 MMHG | OXYGEN SATURATION: 97 % | SYSTOLIC BLOOD PRESSURE: 136 MMHG | BODY MASS INDEX: 22.91 KG/M2 | RESPIRATION RATE: 18 BRPM

## 2019-01-01 VITALS
OXYGEN SATURATION: 97 % | SYSTOLIC BLOOD PRESSURE: 158 MMHG | RESPIRATION RATE: 16 BRPM | HEART RATE: 60 BPM | DIASTOLIC BLOOD PRESSURE: 55 MMHG | TEMPERATURE: 97.6 F | BODY MASS INDEX: 25.52 KG/M2 | HEIGHT: 60 IN | WEIGHT: 130 LBS

## 2019-01-01 VITALS
BODY MASS INDEX: 21.85 KG/M2 | DIASTOLIC BLOOD PRESSURE: 76 MMHG | HEIGHT: 64 IN | OXYGEN SATURATION: 97 % | HEART RATE: 71 BPM | SYSTOLIC BLOOD PRESSURE: 136 MMHG | WEIGHT: 128 LBS

## 2019-01-01 VITALS
BODY MASS INDEX: 22.53 KG/M2 | WEIGHT: 132 LBS | HEIGHT: 64 IN | HEART RATE: 54 BPM | OXYGEN SATURATION: 98 % | DIASTOLIC BLOOD PRESSURE: 62 MMHG | SYSTOLIC BLOOD PRESSURE: 128 MMHG

## 2019-01-01 VITALS
HEART RATE: 68 BPM | RESPIRATION RATE: 16 BRPM | BODY MASS INDEX: 22.65 KG/M2 | SYSTOLIC BLOOD PRESSURE: 144 MMHG | HEIGHT: 64 IN | OXYGEN SATURATION: 97 % | DIASTOLIC BLOOD PRESSURE: 68 MMHG

## 2019-01-01 DIAGNOSIS — I50.32 CHRONIC DIASTOLIC HEART FAILURE (HCC): ICD-10-CM

## 2019-01-01 DIAGNOSIS — R42 DIZZINESS AND GIDDINESS: ICD-10-CM

## 2019-01-01 DIAGNOSIS — M81.0 AGE-RELATED OSTEOPOROSIS WITHOUT CURRENT PATHOLOGICAL FRACTURE: ICD-10-CM

## 2019-01-01 DIAGNOSIS — F41.1 GENERALIZED ANXIETY DISORDER: ICD-10-CM

## 2019-01-01 DIAGNOSIS — I35.1 NONRHEUMATIC AORTIC VALVE INSUFFICIENCY: ICD-10-CM

## 2019-01-01 DIAGNOSIS — R42 LIGHTHEADEDNESS: ICD-10-CM

## 2019-01-01 DIAGNOSIS — M54.5 LOW BACK PAIN, UNSPECIFIED BACK PAIN LATERALITY, UNSPECIFIED CHRONICITY, WITH SCIATICA PRESENCE UNSPECIFIED: ICD-10-CM

## 2019-01-01 DIAGNOSIS — K70.30 ALCOHOLIC CIRRHOSIS OF LIVER WITHOUT ASCITES (HCC): ICD-10-CM

## 2019-01-01 DIAGNOSIS — R42 DIZZINESS: ICD-10-CM

## 2019-01-01 DIAGNOSIS — M54.2 CERVICALGIA: ICD-10-CM

## 2019-01-01 DIAGNOSIS — K74.60 CIRRHOSIS OF LIVER WITHOUT ASCITES, UNSPECIFIED HEPATIC CIRRHOSIS TYPE (HCC): ICD-10-CM

## 2019-01-01 DIAGNOSIS — K76.82 ACUTE HEPATIC ENCEPHALOPATHY (HCC): Primary | ICD-10-CM

## 2019-01-01 DIAGNOSIS — R60.0 BILATERAL LEG EDEMA: ICD-10-CM

## 2019-01-01 DIAGNOSIS — R42 DIZZINESS AND GIDDINESS: Primary | ICD-10-CM

## 2019-01-01 DIAGNOSIS — Z23 NEED FOR VACCINATION: ICD-10-CM

## 2019-01-01 DIAGNOSIS — E03.4 HYPOTHYROIDISM DUE TO ACQUIRED ATROPHY OF THYROID: Chronic | ICD-10-CM

## 2019-01-01 DIAGNOSIS — I50.32 CHRONIC DIASTOLIC HEART FAILURE (HCC): Chronic | ICD-10-CM

## 2019-01-01 DIAGNOSIS — E86.0 DEHYDRATION: Primary | ICD-10-CM

## 2019-01-01 DIAGNOSIS — Z23 NEED FOR IMMUNIZATION AGAINST INFLUENZA: ICD-10-CM

## 2019-01-01 DIAGNOSIS — I10 ESSENTIAL HYPERTENSION: Primary | ICD-10-CM

## 2019-01-01 DIAGNOSIS — I10 ESSENTIAL HYPERTENSION: ICD-10-CM

## 2019-01-01 DIAGNOSIS — K74.60 HEPATIC CIRRHOSIS, UNSPECIFIED HEPATIC CIRRHOSIS TYPE, UNSPECIFIED WHETHER ASCITES PRESENT (HCC): Primary | ICD-10-CM

## 2019-01-01 DIAGNOSIS — K74.60 CIRRHOSIS OF LIVER WITHOUT ASCITES, UNSPECIFIED HEPATIC CIRRHOSIS TYPE (HCC): Primary | Chronic | ICD-10-CM

## 2019-01-01 DIAGNOSIS — E03.4 HYPOTHYROIDISM DUE TO ACQUIRED ATROPHY OF THYROID: ICD-10-CM

## 2019-01-01 DIAGNOSIS — R89.9 ABNORMAL LABORATORY TEST RESULT: ICD-10-CM

## 2019-01-01 LAB
25(OH)D3 SERPL-MCNC: 41 NG/ML (ref 30–100)
6-ACETYL MORPHINE: NEGATIVE
A-A DO2: 22.2 MMHG (ref 0–300)
ALBUMIN SERPL-MCNC: 2.43 G/DL (ref 3.5–5.2)
ALBUMIN SERPL-MCNC: 2.53 G/DL (ref 3.5–5.2)
ALBUMIN SERPL-MCNC: 2.91 G/DL (ref 3.5–5.2)
ALBUMIN SERPL-MCNC: 3.11 G/DL (ref 3.5–5.2)
ALBUMIN SERPL-MCNC: 3.12 G/DL (ref 3.5–5.2)
ALBUMIN/GLOB SERPL: 0.6 G/DL
ALBUMIN/GLOB SERPL: 0.7 G/DL
ALP SERPL-CCNC: 149 U/L (ref 39–117)
ALP SERPL-CCNC: 175 U/L (ref 39–117)
ALP SERPL-CCNC: 180 U/L (ref 39–117)
ALP SERPL-CCNC: 238 U/L (ref 39–117)
ALP SERPL-CCNC: 239 U/L (ref 39–117)
ALT SERPL W P-5'-P-CCNC: 25 U/L (ref 1–33)
ALT SERPL W P-5'-P-CCNC: 26 U/L (ref 1–33)
ALT SERPL W P-5'-P-CCNC: 29 U/L (ref 1–33)
ALT SERPL W P-5'-P-CCNC: 40 U/L (ref 1–33)
ALT SERPL W P-5'-P-CCNC: 43 U/L (ref 1–33)
AMMONIA BLD-SCNC: 178 UMOL/L (ref 11–51)
AMMONIA BLD-SCNC: 54 UMOL/L (ref 11–51)
AMPHET+METHAMPHET UR QL: NEGATIVE
ANION GAP SERPL CALCULATED.3IONS-SCNC: 11.2 MMOL/L (ref 5–15)
ANION GAP SERPL CALCULATED.3IONS-SCNC: 11.2 MMOL/L (ref 5–15)
ANION GAP SERPL CALCULATED.3IONS-SCNC: 13.5 MMOL/L (ref 5–15)
ANION GAP SERPL CALCULATED.3IONS-SCNC: 6.8 MMOL/L (ref 5–15)
ANION GAP SERPL CALCULATED.3IONS-SCNC: 9 MMOL/L (ref 5–15)
APAP SERPL-MCNC: <5 MCG/ML (ref 10–30)
APTT PPP: 35.2 SECONDS (ref 23.8–36.1)
ARTERIAL PATENCY WRIST A: ABNORMAL
AST SERPL-CCNC: 47 U/L (ref 1–32)
AST SERPL-CCNC: 48 U/L (ref 1–32)
AST SERPL-CCNC: 64 U/L (ref 1–32)
AST SERPL-CCNC: 80 U/L (ref 1–32)
AST SERPL-CCNC: 89 U/L (ref 1–32)
ATMOSPHERIC PRESS: 720 MMHG
BACTERIA BLD CULT: ABNORMAL
BACTERIA BLD CULT: ABNORMAL
BACTERIA SPEC AEROBE CULT: ABNORMAL
BACTERIA SPEC AEROBE CULT: NORMAL
BACTERIA UR QL AUTO: NORMAL /HPF
BARBITURATES UR QL SCN: NEGATIVE
BASE EXCESS BLDA CALC-SCNC: 5.8 MMOL/L (ref 0–2)
BASOPHILS # BLD AUTO: 0.03 10*3/MM3 (ref 0–0.2)
BASOPHILS # BLD AUTO: 0.04 10*3/MM3 (ref 0–0.2)
BASOPHILS # BLD AUTO: 0.05 10*3/MM3 (ref 0–0.2)
BASOPHILS # BLD AUTO: 0.05 10*3/MM3 (ref 0–0.2)
BASOPHILS # BLD AUTO: 0.06 10*3/MM3 (ref 0–0.2)
BASOPHILS NFR BLD AUTO: 0.6 % (ref 0–1.5)
BASOPHILS NFR BLD AUTO: 0.9 % (ref 0–1.5)
BASOPHILS NFR BLD AUTO: 1 % (ref 0–1.5)
BASOPHILS NFR BLD AUTO: 1 % (ref 0–1.5)
BASOPHILS NFR BLD AUTO: 1.2 % (ref 0–1.5)
BDY SITE: ABNORMAL
BENZODIAZ UR QL SCN: NEGATIVE
BH CV ECHO MEAS - % IVS THICK: 43.1 %
BH CV ECHO MEAS - % LVPW THICK: 31.5 %
BH CV ECHO MEAS - ACS: 1.3 CM
BH CV ECHO MEAS - AI DEC SLOPE: 214.7 CM/SEC^2
BH CV ECHO MEAS - AI MAX PG: 70.6 MMHG
BH CV ECHO MEAS - AI MAX VEL: 420.2 CM/SEC
BH CV ECHO MEAS - AI P1/2T: 573.1 MSEC
BH CV ECHO MEAS - AO MAX PG: 7.4 MMHG
BH CV ECHO MEAS - AO MEAN PG: 3.9 MMHG
BH CV ECHO MEAS - AO ROOT AREA (BSA CORRECTED): 2
BH CV ECHO MEAS - AO ROOT AREA: 7.5 CM^2
BH CV ECHO MEAS - AO ROOT DIAM: 3.1 CM
BH CV ECHO MEAS - AO V2 MAX: 135.7 CM/SEC
BH CV ECHO MEAS - AO V2 MEAN: 92.6 CM/SEC
BH CV ECHO MEAS - AO V2 VTI: 30.6 CM
BH CV ECHO MEAS - BSA(HAYCOCK): 1.6 M^2
BH CV ECHO MEAS - BSA: 1.6 M^2
BH CV ECHO MEAS - BZI_BMI: 22.7 KILOGRAMS/M^2
BH CV ECHO MEAS - BZI_METRIC_HEIGHT: 157.5 CM
BH CV ECHO MEAS - BZI_METRIC_WEIGHT: 56.2 KG
BH CV ECHO MEAS - EDV(CUBED): 78.8 ML
BH CV ECHO MEAS - EDV(MOD-SP4): 62 ML
BH CV ECHO MEAS - EDV(TEICH): 82.4 ML
BH CV ECHO MEAS - EF(CUBED): 80.5 %
BH CV ECHO MEAS - EF(MOD-SP4): 75.8 %
BH CV ECHO MEAS - EF(TEICH): 73.3 %
BH CV ECHO MEAS - ESV(CUBED): 15.4 ML
BH CV ECHO MEAS - ESV(MOD-SP4): 15 ML
BH CV ECHO MEAS - ESV(TEICH): 22 ML
BH CV ECHO MEAS - FS: 42 %
BH CV ECHO MEAS - IVS/LVPW: 0.72
BH CV ECHO MEAS - IVSD: 0.95 CM
BH CV ECHO MEAS - IVSS: 1.4 CM
BH CV ECHO MEAS - LA DIMENSION: 3 CM
BH CV ECHO MEAS - LA/AO: 0.98
BH CV ECHO MEAS - LV DIASTOLIC VOL/BSA (35-75): 39.7 ML/M^2
BH CV ECHO MEAS - LV MASS(C)D: 168.4 GRAMS
BH CV ECHO MEAS - LV MASS(C)DI: 107.9 GRAMS/M^2
BH CV ECHO MEAS - LV MASS(C)S: 131.3 GRAMS
BH CV ECHO MEAS - LV MASS(C)SI: 84.2 GRAMS/M^2
BH CV ECHO MEAS - LV SYSTOLIC VOL/BSA (12-30): 9.6 ML/M^2
BH CV ECHO MEAS - LVIDD: 4.3 CM
BH CV ECHO MEAS - LVIDS: 2.5 CM
BH CV ECHO MEAS - LVLD AP4: 6.8 CM
BH CV ECHO MEAS - LVLS AP4: 5.5 CM
BH CV ECHO MEAS - LVOT AREA (M): 3.1 CM^2
BH CV ECHO MEAS - LVOT AREA: 3.3 CM^2
BH CV ECHO MEAS - LVOT DIAM: 2 CM
BH CV ECHO MEAS - LVPWD: 1.3 CM
BH CV ECHO MEAS - LVPWS: 1.7 CM
BH CV ECHO MEAS - MV A MAX VEL: 110.6 CM/SEC
BH CV ECHO MEAS - MV E MAX VEL: 79 CM/SEC
BH CV ECHO MEAS - MV E/A: 0.71
BH CV ECHO MEAS - PA ACC SLOPE: 1028 CM/SEC^2
BH CV ECHO MEAS - PA ACC TIME: 0.11 SEC
BH CV ECHO MEAS - PA PR(ACCEL): 31.5 MMHG
BH CV ECHO MEAS - RAP SYSTOLE: 10 MMHG
BH CV ECHO MEAS - RVSP: 28.9 MMHG
BH CV ECHO MEAS - SI(AO): 147.4 ML/M^2
BH CV ECHO MEAS - SI(CUBED): 40.6 ML/M^2
BH CV ECHO MEAS - SI(MOD-SP4): 30.1 ML/M^2
BH CV ECHO MEAS - SI(TEICH): 38.7 ML/M^2
BH CV ECHO MEAS - SV(AO): 229.9 ML
BH CV ECHO MEAS - SV(CUBED): 63.4 ML
BH CV ECHO MEAS - SV(MOD-SP4): 47 ML
BH CV ECHO MEAS - SV(TEICH): 60.4 ML
BH CV ECHO MEAS - TR MAX VEL: 217.2 CM/SEC
BILIRUB SERPL-MCNC: 1 MG/DL (ref 0.2–1.2)
BILIRUB SERPL-MCNC: 1.2 MG/DL (ref 0.2–1.2)
BILIRUB SERPL-MCNC: 1.5 MG/DL (ref 0.2–1.2)
BILIRUB UR QL STRIP: NEGATIVE
BILIRUB UR QL STRIP: NEGATIVE
BODY TEMPERATURE: 0 C
BUN BLD-MCNC: 16 MG/DL (ref 8–23)
BUN BLD-MCNC: 21 MG/DL (ref 8–23)
BUN BLD-MCNC: 22 MG/DL (ref 8–23)
BUN BLD-MCNC: 24 MG/DL (ref 8–23)
BUN BLD-MCNC: 28 MG/DL (ref 8–23)
BUN/CREAT SERPL: 19.5 (ref 7–25)
BUN/CREAT SERPL: 21 (ref 7–25)
BUN/CREAT SERPL: 23.8 (ref 7–25)
BUN/CREAT SERPL: 23.9 (ref 7–25)
BUN/CREAT SERPL: 25.7 (ref 7–25)
BUPRENORPHINE SERPL-MCNC: NEGATIVE NG/ML
CA-I BLD-MCNC: 4.6 MG/DL (ref 4.6–5.3)
CALCIUM SPEC-SCNC: 8.3 MG/DL (ref 8.6–10.5)
CALCIUM SPEC-SCNC: 8.4 MG/DL (ref 8.6–10.5)
CALCIUM SPEC-SCNC: 9.5 MG/DL (ref 8.6–10.5)
CANNABINOIDS SERPL QL: NEGATIVE
CHLORIDE SERPL-SCNC: 106 MMOL/L (ref 98–107)
CHLORIDE SERPL-SCNC: 107 MMOL/L (ref 98–107)
CHLORIDE SERPL-SCNC: 111 MMOL/L (ref 98–107)
CHLORIDE SERPL-SCNC: 111 MMOL/L (ref 98–107)
CHLORIDE SERPL-SCNC: 114 MMOL/L (ref 98–107)
CHOLEST SERPL-MCNC: 199 MG/DL (ref 0–200)
CHOLEST SERPL-MCNC: 206 MG/DL (ref 0–200)
CLARITY UR: CLEAR
CLARITY UR: CLEAR
CO2 BLDA-SCNC: 30.6 MMOL/L (ref 22–33)
CO2 SERPL-SCNC: 22 MMOL/L (ref 22–29)
CO2 SERPL-SCNC: 22.2 MMOL/L (ref 22–29)
CO2 SERPL-SCNC: 25.8 MMOL/L (ref 22–29)
CO2 SERPL-SCNC: 25.8 MMOL/L (ref 22–29)
CO2 SERPL-SCNC: 26.5 MMOL/L (ref 22–29)
COCAINE UR QL: NEGATIVE
COHGB MFR BLD: 0.9 % (ref 0–5)
COLOR UR: ABNORMAL
COLOR UR: YELLOW
CREAT BLD-MCNC: 0.82 MG/DL (ref 0.57–1)
CREAT BLD-MCNC: 0.88 MG/DL (ref 0.57–1)
CREAT BLD-MCNC: 1.01 MG/DL (ref 0.57–1)
CREAT BLD-MCNC: 1.05 MG/DL (ref 0.57–1)
CREAT BLD-MCNC: 1.09 MG/DL (ref 0.57–1)
CRP SERPL-MCNC: 3.36 MG/DL (ref 0–0.5)
CRP SERPL-MCNC: 3.77 MG/DL (ref 0–0.5)
D-LACTATE SERPL-SCNC: 1.3 MMOL/L (ref 0.5–2)
D-LACTATE SERPL-SCNC: 2 MMOL/L (ref 0.5–2)
DEPRECATED RDW RBC AUTO: 54 FL (ref 37–54)
DEPRECATED RDW RBC AUTO: 56.9 FL (ref 37–54)
DEPRECATED RDW RBC AUTO: 60.1 FL (ref 37–54)
DEPRECATED RDW RBC AUTO: 60.5 FL (ref 37–54)
DEPRECATED RDW RBC AUTO: 61.7 FL (ref 37–54)
EOSINOPHIL # BLD AUTO: 0.41 10*3/MM3 (ref 0–0.4)
EOSINOPHIL # BLD AUTO: 0.71 10*3/MM3 (ref 0–0.4)
EOSINOPHIL # BLD AUTO: 0.75 10*3/MM3 (ref 0–0.4)
EOSINOPHIL # BLD AUTO: 0.86 10*3/MM3 (ref 0–0.4)
EOSINOPHIL # BLD AUTO: 1.03 10*3/MM3 (ref 0–0.4)
EOSINOPHIL NFR BLD AUTO: 10.6 % (ref 0.3–6.2)
EOSINOPHIL NFR BLD AUTO: 12.4 % (ref 0.3–6.2)
EOSINOPHIL NFR BLD AUTO: 15 % (ref 0.3–6.2)
EOSINOPHIL NFR BLD AUTO: 17.4 % (ref 0.3–6.2)
EOSINOPHIL NFR BLD AUTO: 20.2 % (ref 0.3–6.2)
ERYTHROCYTE [DISTWIDTH] IN BLOOD BY AUTOMATED COUNT: 15 % (ref 12.3–15.4)
ERYTHROCYTE [DISTWIDTH] IN BLOOD BY AUTOMATED COUNT: 15.2 % (ref 12.3–15.4)
ERYTHROCYTE [DISTWIDTH] IN BLOOD BY AUTOMATED COUNT: 15.8 % (ref 12.3–15.4)
ERYTHROCYTE [DISTWIDTH] IN BLOOD BY AUTOMATED COUNT: 15.9 % (ref 12.3–15.4)
ERYTHROCYTE [DISTWIDTH] IN BLOOD BY AUTOMATED COUNT: 15.9 % (ref 12.3–15.4)
ETHANOL BLD-MCNC: <10 MG/DL (ref 0–10)
ETHANOL UR QL: <0.01 %
FLUAV AG NPH QL: NEGATIVE
FLUBV AG NPH QL IA: NEGATIVE
FOLATE SERPL-MCNC: 13.4 NG/ML (ref 4.78–24.2)
GFR SERPL CREATININE-BSD FRML MDRD: 47 ML/MIN/1.73
GFR SERPL CREATININE-BSD FRML MDRD: 49 ML/MIN/1.73
GFR SERPL CREATININE-BSD FRML MDRD: 52 ML/MIN/1.73
GFR SERPL CREATININE-BSD FRML MDRD: 61 ML/MIN/1.73
GFR SERPL CREATININE-BSD FRML MDRD: 66 ML/MIN/1.73
GLOBULIN UR ELPH-MCNC: 3.8 GM/DL
GLOBULIN UR ELPH-MCNC: 3.9 GM/DL
GLOBULIN UR ELPH-MCNC: 4 GM/DL
GLOBULIN UR ELPH-MCNC: 4.5 GM/DL
GLOBULIN UR ELPH-MCNC: 4.6 GM/DL
GLUCOSE BLD-MCNC: 100 MG/DL (ref 65–99)
GLUCOSE BLD-MCNC: 104 MG/DL (ref 65–99)
GLUCOSE BLD-MCNC: 89 MG/DL (ref 65–99)
GLUCOSE BLD-MCNC: 94 MG/DL (ref 65–99)
GLUCOSE BLD-MCNC: 95 MG/DL (ref 65–99)
GLUCOSE UR STRIP-MCNC: NEGATIVE MG/DL
GLUCOSE UR STRIP-MCNC: NEGATIVE MG/DL
GRAM STN SPEC: ABNORMAL
HAV IGM SERPL QL IA: NORMAL
HBA1C MFR BLD: 4.6 % (ref 4.8–5.6)
HBV CORE IGM SERPL QL IA: NORMAL
HBV SURFACE AG SERPL QL IA: NORMAL
HCO3 BLDA-SCNC: 29.4 MMOL/L (ref 20–26)
HCT VFR BLD AUTO: 33 % (ref 34–46.6)
HCT VFR BLD AUTO: 33.8 % (ref 34–46.6)
HCT VFR BLD AUTO: 36.1 % (ref 34–46.6)
HCT VFR BLD AUTO: 36.5 % (ref 34–46.6)
HCT VFR BLD AUTO: 38.9 % (ref 34–46.6)
HCT VFR BLD CALC: 37 % (ref 38–51)
HCV AB SER DONR QL: NORMAL
HDLC SERPL-MCNC: 103 MG/DL (ref 40–60)
HDLC SERPL-MCNC: 117 MG/DL (ref 40–60)
HGB BLD-MCNC: 10.6 G/DL (ref 12–15.9)
HGB BLD-MCNC: 10.9 G/DL (ref 12–15.9)
HGB BLD-MCNC: 11.2 G/DL (ref 12–15.9)
HGB BLD-MCNC: 11.8 G/DL (ref 12–15.9)
HGB BLD-MCNC: 12.3 G/DL (ref 12–15.9)
HGB BLDA-MCNC: 12.1 G/DL (ref 13.5–17.5)
HGB UR QL STRIP.AUTO: NEGATIVE
HGB UR QL STRIP.AUTO: NEGATIVE
HOLD SPECIMEN: NORMAL
HOLD SPECIMEN: NORMAL
HOROWITZ INDEX BLD+IHG-RTO: 21 %
HYALINE CASTS UR QL AUTO: NORMAL /LPF
IMM GRANULOCYTES # BLD AUTO: 0.01 10*3/MM3 (ref 0–0.05)
IMM GRANULOCYTES NFR BLD AUTO: 0.2 % (ref 0–0.5)
IMM GRANULOCYTES NFR BLD AUTO: 0.3 % (ref 0–0.5)
INR PPP: 1.16 (ref 0.9–1.1)
ISOLATED FROM: ABNORMAL
ISOLATED FROM: ABNORMAL
KETONES UR QL STRIP: ABNORMAL
KETONES UR QL STRIP: NEGATIVE
LDLC SERPL CALC-MCNC: 69 MG/DL (ref 0–100)
LDLC SERPL CALC-MCNC: 84 MG/DL (ref 0–100)
LDLC/HDLC SERPL: 0.59 {RATIO}
LDLC/HDLC SERPL: 0.82 {RATIO}
LEUKOCYTE ESTERASE UR QL STRIP.AUTO: ABNORMAL
LEUKOCYTE ESTERASE UR QL STRIP.AUTO: NEGATIVE
LV EF 2D ECHO EST: 65 %
LYMPHOCYTES # BLD AUTO: 0.86 10*3/MM3 (ref 0.7–3.1)
LYMPHOCYTES # BLD AUTO: 1.18 10*3/MM3 (ref 0.7–3.1)
LYMPHOCYTES # BLD AUTO: 1.18 10*3/MM3 (ref 0.7–3.1)
LYMPHOCYTES # BLD AUTO: 1.61 10*3/MM3 (ref 0.7–3.1)
LYMPHOCYTES # BLD AUTO: 1.74 10*3/MM3 (ref 0.7–3.1)
LYMPHOCYTES NFR BLD AUTO: 19.5 % (ref 19.6–45.3)
LYMPHOCYTES NFR BLD AUTO: 22.2 % (ref 19.6–45.3)
LYMPHOCYTES NFR BLD AUTO: 28.1 % (ref 19.6–45.3)
LYMPHOCYTES NFR BLD AUTO: 28.9 % (ref 19.6–45.3)
LYMPHOCYTES NFR BLD AUTO: 34.1 % (ref 19.6–45.3)
Lab: ABNORMAL
MAGNESIUM SERPL-MCNC: 1.8 MG/DL (ref 1.6–2.4)
MAGNESIUM SERPL-MCNC: 2 MG/DL (ref 1.6–2.4)
MAGNESIUM SERPL-MCNC: 2 MG/DL (ref 1.6–2.4)
MAGNESIUM SERPL-MCNC: 2.1 MG/DL (ref 1.6–2.4)
MAXIMAL PREDICTED HEART RATE: 131 BPM
MCH RBC QN AUTO: 32.1 PG (ref 26.6–33)
MCH RBC QN AUTO: 32.3 PG (ref 26.6–33)
MCH RBC QN AUTO: 32.5 PG (ref 26.6–33)
MCH RBC QN AUTO: 32.7 PG (ref 26.6–33)
MCH RBC QN AUTO: 33.2 PG (ref 26.6–33)
MCHC RBC AUTO-ENTMCNC: 31 G/DL (ref 31.5–35.7)
MCHC RBC AUTO-ENTMCNC: 31.6 G/DL (ref 31.5–35.7)
MCHC RBC AUTO-ENTMCNC: 32.1 G/DL (ref 31.5–35.7)
MCHC RBC AUTO-ENTMCNC: 32.2 G/DL (ref 31.5–35.7)
MCHC RBC AUTO-ENTMCNC: 32.3 G/DL (ref 31.5–35.7)
MCV RBC AUTO: 100.9 FL (ref 79–97)
MCV RBC AUTO: 101.6 FL (ref 79–97)
MCV RBC AUTO: 101.9 FL (ref 79–97)
MCV RBC AUTO: 102.8 FL (ref 79–97)
MCV RBC AUTO: 104 FL (ref 79–97)
METHADONE UR QL SCN: NEGATIVE
METHGB BLD QL: 0.3 % (ref 0–3)
MODALITY: ABNORMAL
MONOCYTES # BLD AUTO: 0.42 10*3/MM3 (ref 0.1–0.9)
MONOCYTES # BLD AUTO: 0.44 10*3/MM3 (ref 0.1–0.9)
MONOCYTES # BLD AUTO: 0.44 10*3/MM3 (ref 0.1–0.9)
MONOCYTES # BLD AUTO: 0.51 10*3/MM3 (ref 0.1–0.9)
MONOCYTES # BLD AUTO: 0.56 10*3/MM3 (ref 0.1–0.9)
MONOCYTES NFR BLD AUTO: 10.8 % (ref 5–12)
MONOCYTES NFR BLD AUTO: 10.8 % (ref 5–12)
MONOCYTES NFR BLD AUTO: 8.6 % (ref 5–12)
MONOCYTES NFR BLD AUTO: 8.9 % (ref 5–12)
MONOCYTES NFR BLD AUTO: 9.2 % (ref 5–12)
NEUTROPHILS # BLD AUTO: 1.69 10*3/MM3 (ref 1.7–7)
NEUTROPHILS # BLD AUTO: 1.86 10*3/MM3 (ref 1.7–7)
NEUTROPHILS # BLD AUTO: 2.14 10*3/MM3 (ref 1.7–7)
NEUTROPHILS # BLD AUTO: 2.68 10*3/MM3 (ref 1.7–7)
NEUTROPHILS # BLD AUTO: 3.5 10*3/MM3 (ref 1.7–7)
NEUTROPHILS NFR BLD AUTO: 36.3 % (ref 42.7–76)
NEUTROPHILS NFR BLD AUTO: 41.5 % (ref 42.7–76)
NEUTROPHILS NFR BLD AUTO: 46.9 % (ref 42.7–76)
NEUTROPHILS NFR BLD AUTO: 55.1 % (ref 42.7–76)
NEUTROPHILS NFR BLD AUTO: 57.7 % (ref 42.7–76)
NITRITE UR QL STRIP: NEGATIVE
NITRITE UR QL STRIP: NEGATIVE
NOTE: ABNORMAL
NRBC BLD AUTO-RTO: 0 /100 WBC (ref 0–0.2)
NT-PROBNP SERPL-MCNC: 454.8 PG/ML (ref 5–1800)
OPIATES UR QL: NEGATIVE
OXYCODONE UR QL SCN: NEGATIVE
OXYHGB MFR BLDV: 94.7 % (ref 94–99)
PCO2 BLDA: 38.1 MM HG (ref 35–45)
PCO2 TEMP ADJ BLD: ABNORMAL MM[HG]
PCP UR QL SCN: NEGATIVE
PH BLDA: 7.5 PH UNITS (ref 7.35–7.45)
PH UR STRIP.AUTO: 7.5 [PH] (ref 5–8)
PH UR STRIP.AUTO: >=9 [PH] (ref 5–8)
PH, TEMP CORRECTED: ABNORMAL
PHOSPHATE SERPL-MCNC: 2 MG/DL (ref 2.5–4.5)
PHOSPHATE SERPL-MCNC: 2.7 MG/DL (ref 2.5–4.5)
PLATELET # BLD AUTO: 114 10*3/MM3 (ref 140–450)
PLATELET # BLD AUTO: 118 10*3/MM3 (ref 140–450)
PLATELET # BLD AUTO: 133 10*3/MM3 (ref 140–450)
PLATELET # BLD AUTO: 137 10*3/MM3 (ref 140–450)
PLATELET # BLD AUTO: 141 10*3/MM3 (ref 140–450)
PMV BLD AUTO: 10.3 FL (ref 6–12)
PMV BLD AUTO: 10.4 FL (ref 6–12)
PMV BLD AUTO: 10.6 FL (ref 6–12)
PO2 BLDA: 76.2 MM HG (ref 83–108)
PO2 TEMP ADJ BLD: ABNORMAL MM[HG]
POTASSIUM BLD-SCNC: 3.4 MMOL/L (ref 3.5–5.2)
POTASSIUM BLD-SCNC: 3.6 MMOL/L (ref 3.5–5.2)
POTASSIUM BLD-SCNC: 3.8 MMOL/L (ref 3.5–5.2)
POTASSIUM BLD-SCNC: 4.1 MMOL/L (ref 3.5–5.2)
POTASSIUM BLD-SCNC: 4.1 MMOL/L (ref 3.5–5.2)
POTASSIUM BLD-SCNC: 4.2 MMOL/L (ref 3.5–5.2)
PROCALCITONIN SERPL-MCNC: 0.34 NG/ML (ref 0.1–0.25)
PROT SERPL-MCNC: 6.3 G/DL (ref 6–8.5)
PROT SERPL-MCNC: 6.4 G/DL (ref 6–8.5)
PROT SERPL-MCNC: 6.8 G/DL (ref 6–8.5)
PROT SERPL-MCNC: 7.6 G/DL (ref 6–8.5)
PROT SERPL-MCNC: 7.7 G/DL (ref 6–8.5)
PROT UR QL STRIP: NEGATIVE
PROT UR QL STRIP: NEGATIVE
PROTHROMBIN TIME: 15.4 SECONDS (ref 11–15.4)
RBC # BLD AUTO: 3.24 10*6/MM3 (ref 3.77–5.28)
RBC # BLD AUTO: 3.35 10*6/MM3 (ref 3.77–5.28)
RBC # BLD AUTO: 3.47 10*6/MM3 (ref 3.77–5.28)
RBC # BLD AUTO: 3.55 10*6/MM3 (ref 3.77–5.28)
RBC # BLD AUTO: 3.83 10*6/MM3 (ref 3.77–5.28)
RBC # UR: NORMAL /HPF
REF LAB TEST METHOD: NORMAL
SALICYLATES SERPL-MCNC: <0.3 MG/DL
SAO2 % BLDCOA: 95.8 % (ref 94–99)
SODIUM BLD-SCNC: 140 MMOL/L (ref 136–145)
SODIUM BLD-SCNC: 143 MMOL/L (ref 136–145)
SODIUM BLD-SCNC: 145 MMOL/L (ref 136–145)
SODIUM BLD-SCNC: 147 MMOL/L (ref 136–145)
SODIUM BLD-SCNC: 148 MMOL/L (ref 136–145)
SP GR UR STRIP: 1.01 (ref 1–1.03)
SP GR UR STRIP: 1.02 (ref 1–1.03)
SQUAMOUS #/AREA URNS HPF: NORMAL /HPF
STRESS TARGET HR: 111 BPM
TRIGL SERPL-MCNC: 65 MG/DL (ref 0–150)
TRIGL SERPL-MCNC: 95 MG/DL (ref 0–150)
TROPONIN T SERPL-MCNC: <0.01 NG/ML (ref 0–0.03)
TROPONIN T SERPL-MCNC: <0.01 NG/ML (ref 0–0.03)
TSH SERPL DL<=0.05 MIU/L-ACNC: 3.84 UIU/ML (ref 0.27–4.2)
UROBILINOGEN UR QL STRIP: ABNORMAL
UROBILINOGEN UR QL STRIP: ABNORMAL
VENTILATOR MODE: ABNORMAL
VIT B12 BLD-MCNC: 643 PG/ML (ref 211–946)
VLDLC SERPL-MCNC: 13 MG/DL
VLDLC SERPL-MCNC: 19 MG/DL (ref 5–40)
WBC NRBC COR # BLD: 3.88 10*3/MM3 (ref 3.4–10.8)
WBC NRBC COR # BLD: 4.08 10*3/MM3 (ref 3.4–10.8)
WBC NRBC COR # BLD: 5.11 10*3/MM3 (ref 3.4–10.8)
WBC NRBC COR # BLD: 5.72 10*3/MM3 (ref 3.4–10.8)
WBC NRBC COR # BLD: 6.06 10*3/MM3 (ref 3.4–10.8)
WBC UR QL AUTO: NORMAL /HPF
WHOLE BLOOD HOLD SPECIMEN: NORMAL
WHOLE BLOOD HOLD SPECIMEN: NORMAL

## 2019-01-01 PROCEDURE — 25010000002 HYDRALAZINE PER 20 MG: Performed by: PHYSICIAN ASSISTANT

## 2019-01-01 PROCEDURE — 87150 DNA/RNA AMPLIFIED PROBE: CPT | Performed by: INTERNAL MEDICINE

## 2019-01-01 PROCEDURE — 85025 COMPLETE CBC W/AUTO DIFF WBC: CPT | Performed by: INTERNAL MEDICINE

## 2019-01-01 PROCEDURE — 36415 COLL VENOUS BLD VENIPUNCTURE: CPT

## 2019-01-01 PROCEDURE — 72110 X-RAY EXAM L-2 SPINE 4/>VWS: CPT

## 2019-01-01 PROCEDURE — 99214 OFFICE O/P EST MOD 30 MIN: CPT | Performed by: INTERNAL MEDICINE

## 2019-01-01 PROCEDURE — 97116 GAIT TRAINING THERAPY: CPT

## 2019-01-01 PROCEDURE — 93010 ELECTROCARDIOGRAM REPORT: CPT | Performed by: INTERNAL MEDICINE

## 2019-01-01 PROCEDURE — 77080 DXA BONE DENSITY AXIAL: CPT

## 2019-01-01 PROCEDURE — 80307 DRUG TEST PRSMV CHEM ANLYZR: CPT | Performed by: FAMILY MEDICINE

## 2019-01-01 PROCEDURE — 87040 BLOOD CULTURE FOR BACTERIA: CPT | Performed by: EMERGENCY MEDICINE

## 2019-01-01 PROCEDURE — 76700 US EXAM ABDOM COMPLETE: CPT | Performed by: RADIOLOGY

## 2019-01-01 PROCEDURE — P9612 CATHETERIZE FOR URINE SPEC: HCPCS

## 2019-01-01 PROCEDURE — 70450 CT HEAD/BRAIN W/O DYE: CPT

## 2019-01-01 PROCEDURE — 25010000002 HEPARIN (PORCINE) PER 1000 UNITS: Performed by: INTERNAL MEDICINE

## 2019-01-01 PROCEDURE — 82330 ASSAY OF CALCIUM: CPT | Performed by: FAMILY MEDICINE

## 2019-01-01 PROCEDURE — 85730 THROMBOPLASTIN TIME PARTIAL: CPT | Performed by: FAMILY MEDICINE

## 2019-01-01 PROCEDURE — 87040 BLOOD CULTURE FOR BACTERIA: CPT | Performed by: FAMILY MEDICINE

## 2019-01-01 PROCEDURE — 25010000002 THIAMINE PER 100 MG: Performed by: INTERNAL MEDICINE

## 2019-01-01 PROCEDURE — 92526 ORAL FUNCTION THERAPY: CPT

## 2019-01-01 PROCEDURE — 85025 COMPLETE CBC W/AUTO DIFF WBC: CPT | Performed by: FAMILY MEDICINE

## 2019-01-01 PROCEDURE — 87040 BLOOD CULTURE FOR BACTERIA: CPT

## 2019-01-01 PROCEDURE — 74230 X-RAY XM SWLNG FUNCJ C+: CPT

## 2019-01-01 PROCEDURE — 80053 COMPREHEN METABOLIC PANEL: CPT | Performed by: INTERNAL MEDICINE

## 2019-01-01 PROCEDURE — 81001 URINALYSIS AUTO W/SCOPE: CPT | Performed by: EMERGENCY MEDICINE

## 2019-01-01 PROCEDURE — 76700 US EXAM ABDOM COMPLETE: CPT

## 2019-01-01 PROCEDURE — 86140 C-REACTIVE PROTEIN: CPT | Performed by: FAMILY MEDICINE

## 2019-01-01 PROCEDURE — 70450 CT HEAD/BRAIN W/O DYE: CPT | Performed by: RADIOLOGY

## 2019-01-01 PROCEDURE — 90653 IIV ADJUVANT VACCINE IM: CPT | Performed by: INTERNAL MEDICINE

## 2019-01-01 PROCEDURE — 87147 CULTURE TYPE IMMUNOLOGIC: CPT | Performed by: INTERNAL MEDICINE

## 2019-01-01 PROCEDURE — 97530 THERAPEUTIC ACTIVITIES: CPT

## 2019-01-01 PROCEDURE — 94799 UNLISTED PULMONARY SVC/PX: CPT

## 2019-01-01 PROCEDURE — 90732 PPSV23 VACC 2 YRS+ SUBQ/IM: CPT | Performed by: INTERNAL MEDICINE

## 2019-01-01 PROCEDURE — 82746 ASSAY OF FOLIC ACID SERUM: CPT | Performed by: PHYSICIAN ASSISTANT

## 2019-01-01 PROCEDURE — 84100 ASSAY OF PHOSPHORUS: CPT | Performed by: FAMILY MEDICINE

## 2019-01-01 PROCEDURE — 82607 VITAMIN B-12: CPT | Performed by: PHYSICIAN ASSISTANT

## 2019-01-01 PROCEDURE — 72170 X-RAY EXAM OF PELVIS: CPT

## 2019-01-01 PROCEDURE — 85025 COMPLETE CBC W/AUTO DIFF WBC: CPT | Performed by: PHYSICIAN ASSISTANT

## 2019-01-01 PROCEDURE — 80061 LIPID PANEL: CPT | Performed by: PHYSICIAN ASSISTANT

## 2019-01-01 PROCEDURE — 87804 INFLUENZA ASSAY W/OPTIC: CPT | Performed by: FAMILY MEDICINE

## 2019-01-01 PROCEDURE — 99223 1ST HOSP IP/OBS HIGH 75: CPT | Performed by: PHYSICIAN ASSISTANT

## 2019-01-01 PROCEDURE — 82140 ASSAY OF AMMONIA: CPT | Performed by: FAMILY MEDICINE

## 2019-01-01 PROCEDURE — 63710000001 DIPHENHYDRAMINE PER 50 MG: Performed by: INTERNAL MEDICINE

## 2019-01-01 PROCEDURE — 85025 COMPLETE CBC W/AUTO DIFF WBC: CPT | Performed by: EMERGENCY MEDICINE

## 2019-01-01 PROCEDURE — 93880 EXTRACRANIAL BILAT STUDY: CPT | Performed by: RADIOLOGY

## 2019-01-01 PROCEDURE — 73522 X-RAY EXAM HIPS BI 3-4 VIEWS: CPT | Performed by: RADIOLOGY

## 2019-01-01 PROCEDURE — 99233 SBSQ HOSP IP/OBS HIGH 50: CPT | Performed by: INTERNAL MEDICINE

## 2019-01-01 PROCEDURE — 99232 SBSQ HOSP IP/OBS MODERATE 35: CPT | Performed by: INTERNAL MEDICINE

## 2019-01-01 PROCEDURE — 97162 PT EVAL MOD COMPLEX 30 MIN: CPT

## 2019-01-01 PROCEDURE — 85610 PROTHROMBIN TIME: CPT | Performed by: FAMILY MEDICINE

## 2019-01-01 PROCEDURE — 99213 OFFICE O/P EST LOW 20 MIN: CPT | Performed by: INTERNAL MEDICINE

## 2019-01-01 PROCEDURE — 71045 X-RAY EXAM CHEST 1 VIEW: CPT

## 2019-01-01 PROCEDURE — 93306 TTE W/DOPPLER COMPLETE: CPT | Performed by: INTERNAL MEDICINE

## 2019-01-01 PROCEDURE — 87186 SC STD MICRODIL/AGAR DIL: CPT | Performed by: FAMILY MEDICINE

## 2019-01-01 PROCEDURE — 77080 DXA BONE DENSITY AXIAL: CPT | Performed by: RADIOLOGY

## 2019-01-01 PROCEDURE — 73522 X-RAY EXAM HIPS BI 3-4 VIEWS: CPT

## 2019-01-01 PROCEDURE — 36600 WITHDRAWAL OF ARTERIAL BLOOD: CPT

## 2019-01-01 PROCEDURE — 99284 EMERGENCY DEPT VISIT MOD MDM: CPT

## 2019-01-01 PROCEDURE — 87150 DNA/RNA AMPLIFIED PROBE: CPT | Performed by: FAMILY MEDICINE

## 2019-01-01 PROCEDURE — 84443 ASSAY THYROID STIM HORMONE: CPT | Performed by: FAMILY MEDICINE

## 2019-01-01 PROCEDURE — 93005 ELECTROCARDIOGRAM TRACING: CPT | Performed by: FAMILY MEDICINE

## 2019-01-01 PROCEDURE — 80053 COMPREHEN METABOLIC PANEL: CPT | Performed by: EMERGENCY MEDICINE

## 2019-01-01 PROCEDURE — 82375 ASSAY CARBOXYHB QUANT: CPT

## 2019-01-01 PROCEDURE — 80074 ACUTE HEPATITIS PANEL: CPT | Performed by: INTERNAL MEDICINE

## 2019-01-01 PROCEDURE — 83880 ASSAY OF NATRIURETIC PEPTIDE: CPT | Performed by: EMERGENCY MEDICINE

## 2019-01-01 PROCEDURE — 80061 LIPID PANEL: CPT

## 2019-01-01 PROCEDURE — 72050 X-RAY EXAM NECK SPINE 4/5VWS: CPT

## 2019-01-01 PROCEDURE — G0008 ADMIN INFLUENZA VIRUS VAC: HCPCS | Performed by: INTERNAL MEDICINE

## 2019-01-01 PROCEDURE — 80053 COMPREHEN METABOLIC PANEL: CPT | Performed by: FAMILY MEDICINE

## 2019-01-01 PROCEDURE — G0009 ADMIN PNEUMOCOCCAL VACCINE: HCPCS | Performed by: INTERNAL MEDICINE

## 2019-01-01 PROCEDURE — 84145 PROCALCITONIN (PCT): CPT | Performed by: FAMILY MEDICINE

## 2019-01-01 PROCEDURE — 87147 CULTURE TYPE IMMUNOLOGIC: CPT | Performed by: FAMILY MEDICINE

## 2019-01-01 PROCEDURE — 25010000002 VANCOMYCIN 5 G RECONSTITUTED SOLUTION 5,000 MG VIAL: Performed by: INTERNAL MEDICINE

## 2019-01-01 PROCEDURE — 82805 BLOOD GASES W/O2 SATURATION: CPT

## 2019-01-01 PROCEDURE — 99285 EMERGENCY DEPT VISIT HI MDM: CPT

## 2019-01-01 PROCEDURE — 93880 EXTRACRANIAL BILAT STUDY: CPT

## 2019-01-01 PROCEDURE — 92611 MOTION FLUOROSCOPY/SWALLOW: CPT

## 2019-01-01 PROCEDURE — 82140 ASSAY OF AMMONIA: CPT | Performed by: PHYSICIAN ASSISTANT

## 2019-01-01 PROCEDURE — 83605 ASSAY OF LACTIC ACID: CPT | Performed by: EMERGENCY MEDICINE

## 2019-01-01 PROCEDURE — 83036 HEMOGLOBIN GLYCOSYLATED A1C: CPT | Performed by: PHYSICIAN ASSISTANT

## 2019-01-01 PROCEDURE — 83735 ASSAY OF MAGNESIUM: CPT | Performed by: FAMILY MEDICINE

## 2019-01-01 PROCEDURE — 72052 X-RAY EXAM NECK SPINE 6/>VWS: CPT | Performed by: RADIOLOGY

## 2019-01-01 PROCEDURE — 87040 BLOOD CULTURE FOR BACTERIA: CPT | Performed by: INTERNAL MEDICINE

## 2019-01-01 PROCEDURE — 99239 HOSP IP/OBS DSCHRG MGMT >30: CPT | Performed by: INTERNAL MEDICINE

## 2019-01-01 PROCEDURE — 83605 ASSAY OF LACTIC ACID: CPT | Performed by: FAMILY MEDICINE

## 2019-01-01 PROCEDURE — 84132 ASSAY OF SERUM POTASSIUM: CPT | Performed by: INTERNAL MEDICINE

## 2019-01-01 PROCEDURE — 83735 ASSAY OF MAGNESIUM: CPT | Performed by: EMERGENCY MEDICINE

## 2019-01-01 PROCEDURE — 93306 TTE W/DOPPLER COMPLETE: CPT

## 2019-01-01 PROCEDURE — 72110 X-RAY EXAM L-2 SPINE 4/>VWS: CPT | Performed by: RADIOLOGY

## 2019-01-01 PROCEDURE — 83050 HGB METHEMOGLOBIN QUAN: CPT

## 2019-01-01 PROCEDURE — 97110 THERAPEUTIC EXERCISES: CPT

## 2019-01-01 PROCEDURE — 84100 ASSAY OF PHOSPHORUS: CPT | Performed by: INTERNAL MEDICINE

## 2019-01-01 PROCEDURE — 83735 ASSAY OF MAGNESIUM: CPT | Performed by: INTERNAL MEDICINE

## 2019-01-01 PROCEDURE — 84484 ASSAY OF TROPONIN QUANT: CPT | Performed by: FAMILY MEDICINE

## 2019-01-01 PROCEDURE — 84484 ASSAY OF TROPONIN QUANT: CPT | Performed by: EMERGENCY MEDICINE

## 2019-01-01 PROCEDURE — 74230 X-RAY XM SWLNG FUNCJ C+: CPT | Performed by: RADIOLOGY

## 2019-01-01 PROCEDURE — 81003 URINALYSIS AUTO W/O SCOPE: CPT | Performed by: FAMILY MEDICINE

## 2019-01-01 PROCEDURE — 97166 OT EVAL MOD COMPLEX 45 MIN: CPT

## 2019-01-01 PROCEDURE — 82306 VITAMIN D 25 HYDROXY: CPT | Performed by: PHYSICIAN ASSISTANT

## 2019-01-01 PROCEDURE — 80053 COMPREHEN METABOLIC PANEL: CPT | Performed by: PHYSICIAN ASSISTANT

## 2019-01-01 PROCEDURE — 86140 C-REACTIVE PROTEIN: CPT | Performed by: INTERNAL MEDICINE

## 2019-01-01 RX ORDER — LACTULOSE 10 G/15ML
30 SOLUTION ORAL 2 TIMES DAILY
Status: DISCONTINUED | OUTPATIENT
Start: 2019-01-01 | End: 2019-01-01

## 2019-01-01 RX ORDER — MULTIVITAMIN
1 TABLET ORAL DAILY
Status: DISCONTINUED | OUTPATIENT
Start: 2019-01-01 | End: 2019-01-01 | Stop reason: HOSPADM

## 2019-01-01 RX ORDER — SODIUM CHLORIDE 0.9 % (FLUSH) 0.9 %
10 SYRINGE (ML) INJECTION EVERY 12 HOURS SCHEDULED
Status: DISCONTINUED | OUTPATIENT
Start: 2019-01-01 | End: 2019-01-01 | Stop reason: HOSPADM

## 2019-01-01 RX ORDER — ERGOCALCIFEROL 1.25 MG/1
CAPSULE ORAL
Qty: 12 CAPSULE | Refills: 0 | Status: SHIPPED | OUTPATIENT
Start: 2019-01-01 | End: 2020-01-01 | Stop reason: SDUPTHER

## 2019-01-01 RX ORDER — MULTIVITAMIN
1 TABLET ORAL DAILY
Qty: 30 TABLET | Refills: 0 | Status: SHIPPED | OUTPATIENT
Start: 2019-01-01 | End: 2020-01-01 | Stop reason: SDUPTHER

## 2019-01-01 RX ORDER — LACTULOSE 10 G/15ML
10 SOLUTION ORAL 3 TIMES DAILY
Status: DISCONTINUED | OUTPATIENT
Start: 2019-01-01 | End: 2019-01-01 | Stop reason: HOSPADM

## 2019-01-01 RX ORDER — SODIUM CHLORIDE 0.9 % (FLUSH) 0.9 %
10 SYRINGE (ML) INJECTION AS NEEDED
Status: DISCONTINUED | OUTPATIENT
Start: 2019-01-01 | End: 2019-01-01 | Stop reason: HOSPADM

## 2019-01-01 RX ORDER — NITROGLYCERIN 0.4 MG/1
0.4 TABLET SUBLINGUAL
Status: DISCONTINUED | OUTPATIENT
Start: 2019-01-01 | End: 2019-01-01 | Stop reason: HOSPADM

## 2019-01-01 RX ORDER — LEVOTHYROXINE SODIUM 0.07 MG/1
75 TABLET ORAL DAILY
Status: DISCONTINUED | OUTPATIENT
Start: 2019-01-01 | End: 2019-01-01 | Stop reason: HOSPADM

## 2019-01-01 RX ORDER — POTASSIUM CHLORIDE 20 MEQ/1
40 TABLET, EXTENDED RELEASE ORAL EVERY 4 HOURS
Status: COMPLETED | OUTPATIENT
Start: 2019-01-01 | End: 2019-01-01

## 2019-01-01 RX ORDER — LACTULOSE 10 G/15ML
SOLUTION ORAL
Qty: 4730 ML | Refills: 0 | OUTPATIENT
Start: 2019-01-01

## 2019-01-01 RX ORDER — HEPARIN SODIUM 5000 [USP'U]/ML
5000 INJECTION, SOLUTION INTRAVENOUS; SUBCUTANEOUS EVERY 12 HOURS SCHEDULED
Status: DISCONTINUED | OUTPATIENT
Start: 2019-01-01 | End: 2019-01-01 | Stop reason: HOSPADM

## 2019-01-01 RX ORDER — ERGOCALCIFEROL 1.25 MG/1
50000 CAPSULE ORAL WEEKLY
COMMUNITY
End: 2019-01-01 | Stop reason: SDUPTHER

## 2019-01-01 RX ORDER — ROPINIROLE 1 MG/1
1 TABLET, FILM COATED ORAL NIGHTLY
COMMUNITY

## 2019-01-01 RX ORDER — BUMETANIDE 1 MG/1
1 TABLET ORAL DAILY PRN
Status: DISCONTINUED | OUTPATIENT
Start: 2019-01-01 | End: 2019-01-01 | Stop reason: HOSPADM

## 2019-01-01 RX ORDER — BUMETANIDE 1 MG/1
1 TABLET ORAL DAILY PRN
Qty: 30 TABLET | Refills: 2 | Status: SHIPPED | OUTPATIENT
Start: 2019-01-01 | End: 2020-01-01 | Stop reason: SDUPTHER

## 2019-01-01 RX ORDER — EMOLLIENT COMBINATION NO.92
LOTION (ML) TOPICAL
Status: DISCONTINUED | OUTPATIENT
Start: 2019-01-01 | End: 2019-01-01 | Stop reason: HOSPADM

## 2019-01-01 RX ORDER — DIPHENHYDRAMINE HCL 25 MG
25 CAPSULE ORAL ONCE
Status: COMPLETED | OUTPATIENT
Start: 2019-01-01 | End: 2019-01-01

## 2019-01-01 RX ORDER — HYDROCODONE BITARTRATE AND ACETAMINOPHEN 10; 325 MG/1; MG/1
1 TABLET ORAL EVERY 12 HOURS PRN
Status: DISCONTINUED | OUTPATIENT
Start: 2019-01-01 | End: 2019-01-01 | Stop reason: HOSPADM

## 2019-01-01 RX ORDER — OXCARBAZEPINE 300 MG/1
300 TABLET, FILM COATED ORAL NIGHTLY
Status: DISCONTINUED | OUTPATIENT
Start: 2019-01-01 | End: 2019-01-01 | Stop reason: HOSPADM

## 2019-01-01 RX ORDER — LACTULOSE 10 G/15ML
30 SOLUTION ORAL ONCE
Status: COMPLETED | OUTPATIENT
Start: 2019-01-01 | End: 2019-01-01

## 2019-01-01 RX ORDER — MAGNESIUM SULFATE HEPTAHYDRATE 40 MG/ML
2 INJECTION, SOLUTION INTRAVENOUS AS NEEDED
Status: DISCONTINUED | OUTPATIENT
Start: 2019-01-01 | End: 2019-01-01 | Stop reason: HOSPADM

## 2019-01-01 RX ORDER — POTASSIUM CHLORIDE 20 MEQ/1
40 TABLET, EXTENDED RELEASE ORAL AS NEEDED
Status: DISCONTINUED | OUTPATIENT
Start: 2019-01-01 | End: 2019-01-01 | Stop reason: HOSPADM

## 2019-01-01 RX ORDER — HYDROCODONE BITARTRATE AND ACETAMINOPHEN 10; 325 MG/1; MG/1
1 TABLET ORAL EVERY 12 HOURS PRN
Status: ON HOLD | COMMUNITY
End: 2020-01-01 | Stop reason: SDUPTHER

## 2019-01-01 RX ORDER — PANTOPRAZOLE SODIUM 40 MG/1
40 TABLET, DELAYED RELEASE ORAL EVERY MORNING
Status: DISCONTINUED | OUTPATIENT
Start: 2019-01-01 | End: 2019-01-01

## 2019-01-01 RX ORDER — HYDRALAZINE HYDROCHLORIDE 20 MG/ML
10 INJECTION INTRAMUSCULAR; INTRAVENOUS EVERY 6 HOURS PRN
Status: DISCONTINUED | OUTPATIENT
Start: 2019-01-01 | End: 2019-01-01 | Stop reason: HOSPADM

## 2019-01-01 RX ORDER — PANTOPRAZOLE SODIUM 40 MG/1
40 TABLET, DELAYED RELEASE ORAL
Status: DISCONTINUED | OUTPATIENT
Start: 2019-01-01 | End: 2019-01-01 | Stop reason: HOSPADM

## 2019-01-01 RX ORDER — ASPIRIN 81 MG/1
81 TABLET ORAL DAILY
COMMUNITY

## 2019-01-01 RX ORDER — ESOMEPRAZOLE MAGNESIUM 40 MG/1
CAPSULE, DELAYED RELEASE ORAL
Qty: 90 CAPSULE | Refills: 0 | Status: SHIPPED | OUTPATIENT
Start: 2019-01-01 | End: 2020-01-01 | Stop reason: ALTCHOICE

## 2019-01-01 RX ORDER — ESOMEPRAZOLE MAGNESIUM 40 MG/1
40 CAPSULE, DELAYED RELEASE ORAL
COMMUNITY
End: 2019-01-01

## 2019-01-01 RX ORDER — ROPINIROLE 1 MG/1
1 TABLET, FILM COATED ORAL NIGHTLY
Status: DISCONTINUED | OUTPATIENT
Start: 2019-01-01 | End: 2019-01-01 | Stop reason: HOSPADM

## 2019-01-01 RX ORDER — NITROGLYCERIN 0.4 MG/1
0.4 TABLET SUBLINGUAL
Status: CANCELLED | OUTPATIENT
Start: 2019-01-01

## 2019-01-01 RX ORDER — PEN NEEDLE, DIABETIC 31 GX5/16"
NEEDLE, DISPOSABLE MISCELLANEOUS
Qty: 90 EACH | Refills: 3 | Status: SHIPPED | OUTPATIENT
Start: 2019-01-01 | End: 2019-01-01

## 2019-01-01 RX ORDER — LACTULOSE 10 G/15ML
SOLUTION ORAL
Qty: 4730 ML | Refills: 0 | Status: ON HOLD | OUTPATIENT
Start: 2019-01-01 | End: 2019-01-01

## 2019-01-01 RX ORDER — MAGNESIUM OXIDE 400 MG/1
TABLET ORAL
Qty: 60 TABLET | Refills: 1 | Status: SHIPPED | OUTPATIENT
Start: 2019-01-01 | End: 2020-01-01 | Stop reason: SDUPTHER

## 2019-01-01 RX ORDER — POTASSIUM CHLORIDE 1.5 G/1.77G
40 POWDER, FOR SOLUTION ORAL EVERY 4 HOURS
Status: COMPLETED | OUTPATIENT
Start: 2019-01-01 | End: 2019-01-01

## 2019-01-01 RX ORDER — POTASSIUM CHLORIDE 1.5 G/1.77G
40 POWDER, FOR SOLUTION ORAL AS NEEDED
Status: DISCONTINUED | OUTPATIENT
Start: 2019-01-01 | End: 2019-01-01 | Stop reason: HOSPADM

## 2019-01-01 RX ORDER — MAGNESIUM SULFATE HEPTAHYDRATE 40 MG/ML
4 INJECTION, SOLUTION INTRAVENOUS ONCE
Status: COMPLETED | OUTPATIENT
Start: 2019-01-01 | End: 2019-01-01

## 2019-01-01 RX ORDER — BUMETANIDE 1 MG/1
1 TABLET ORAL DAILY PRN
COMMUNITY
End: 2019-01-01 | Stop reason: SDUPTHER

## 2019-01-01 RX ORDER — DEXTROSE MONOHYDRATE 50 MG/ML
50 INJECTION, SOLUTION INTRAVENOUS CONTINUOUS
Status: DISCONTINUED | OUTPATIENT
Start: 2019-01-01 | End: 2019-01-01

## 2019-01-01 RX ORDER — RIFAXIMIN 550 MG/1
TABLET ORAL
Qty: 180 TABLET | Refills: 0 | Status: SHIPPED | OUTPATIENT
Start: 2019-01-01 | End: 2020-01-01 | Stop reason: SDUPTHER

## 2019-01-01 RX ORDER — LACTULOSE 10 G/15ML
SOLUTION ORAL
Qty: 4730 ML | Refills: 12 | Status: SHIPPED | OUTPATIENT
Start: 2019-01-01 | End: 2020-01-01

## 2019-01-01 RX ORDER — OXCARBAZEPINE 300 MG/1
300 TABLET, FILM COATED ORAL EVERY MORNING
Status: DISCONTINUED | OUTPATIENT
Start: 2019-01-01 | End: 2019-01-01 | Stop reason: HOSPADM

## 2019-01-01 RX ORDER — LACTULOSE 10 G/15ML
10 SOLUTION ORAL 3 TIMES DAILY
COMMUNITY
End: 2019-01-01 | Stop reason: SDUPTHER

## 2019-01-01 RX ORDER — ERGOCALCIFEROL 1.25 MG/1
50000 CAPSULE ORAL WEEKLY
Qty: 12 CAPSULE | Refills: 0 | Status: ON HOLD | OUTPATIENT
Start: 2019-01-01 | End: 2019-01-01

## 2019-01-01 RX ORDER — OXCARBAZEPINE 150 MG/1
300 TABLET, FILM COATED ORAL 2 TIMES DAILY
COMMUNITY
End: 2020-01-01

## 2019-01-01 RX ORDER — LACTULOSE 10 G/15ML
SOLUTION ORAL
Qty: 4730 ML | Refills: 0 | Status: SHIPPED | OUTPATIENT
Start: 2019-01-01 | End: 2020-01-01 | Stop reason: SDUPTHER

## 2019-01-01 RX ORDER — ASPIRIN 81 MG/1
81 TABLET ORAL DAILY
Status: DISCONTINUED | OUTPATIENT
Start: 2019-01-01 | End: 2019-01-01 | Stop reason: HOSPADM

## 2019-01-01 RX ORDER — MAGNESIUM SULFATE HEPTAHYDRATE 40 MG/ML
4 INJECTION, SOLUTION INTRAVENOUS AS NEEDED
Status: DISCONTINUED | OUTPATIENT
Start: 2019-01-01 | End: 2019-01-01 | Stop reason: HOSPADM

## 2019-01-01 RX ORDER — POTASSIUM CHLORIDE 7.45 MG/ML
10 INJECTION INTRAVENOUS
Status: DISCONTINUED | OUTPATIENT
Start: 2019-01-01 | End: 2019-01-01 | Stop reason: HOSPADM

## 2019-01-01 RX ORDER — ERGOCALCIFEROL 1.25 MG/1
CAPSULE ORAL
Qty: 4 CAPSULE | Refills: 0 | Status: SHIPPED | OUTPATIENT
Start: 2019-01-01 | End: 2020-01-01

## 2019-01-01 RX ADMIN — LACTULOSE 30 G: 20 SOLUTION ORAL at 08:42

## 2019-01-01 RX ADMIN — THIAMINE HYDROCHLORIDE 100 MG: 100 INJECTION, SOLUTION INTRAMUSCULAR; INTRAVENOUS at 18:44

## 2019-01-01 RX ADMIN — THIAMINE HYDROCHLORIDE 100 MG: 100 INJECTION, SOLUTION INTRAMUSCULAR; INTRAVENOUS at 08:50

## 2019-01-01 RX ADMIN — SODIUM CHLORIDE, PRESERVATIVE FREE 10 ML: 5 INJECTION INTRAVENOUS at 21:47

## 2019-01-01 RX ADMIN — VANCOMYCIN HYDROCHLORIDE 1000 MG: 5 INJECTION, POWDER, LYOPHILIZED, FOR SOLUTION INTRAVENOUS at 16:44

## 2019-01-01 RX ADMIN — LACTULOSE 30 G: 20 SOLUTION ORAL at 20:56

## 2019-01-01 RX ADMIN — SODIUM CHLORIDE, PRESERVATIVE FREE 10 ML: 5 INJECTION INTRAVENOUS at 01:31

## 2019-01-01 RX ADMIN — SODIUM CHLORIDE, PRESERVATIVE FREE 10 ML: 5 INJECTION INTRAVENOUS at 08:58

## 2019-01-01 RX ADMIN — Medication 1 TABLET: at 08:50

## 2019-01-01 RX ADMIN — HYDRALAZINE HYDROCHLORIDE 10 MG: 20 INJECTION INTRAMUSCULAR; INTRAVENOUS at 16:03

## 2019-01-01 RX ADMIN — HEPARIN SODIUM 5000 UNITS: 5000 INJECTION INTRAVENOUS; SUBCUTANEOUS at 08:50

## 2019-01-01 RX ADMIN — ROPINIROLE HYDROCHLORIDE 1 MG: 1 TABLET, FILM COATED ORAL at 23:43

## 2019-01-01 RX ADMIN — THIAMINE HYDROCHLORIDE 100 MG: 100 INJECTION, SOLUTION INTRAMUSCULAR; INTRAVENOUS at 08:42

## 2019-01-01 RX ADMIN — MAGNESIUM GLUCONATE 500 MG ORAL TABLET 400 MG: 500 TABLET ORAL at 13:15

## 2019-01-01 RX ADMIN — LACTULOSE 10 G: 20 SOLUTION ORAL at 08:50

## 2019-01-01 RX ADMIN — Medication: at 23:43

## 2019-01-01 RX ADMIN — POTASSIUM CHLORIDE 40 MEQ: 1500 TABLET, EXTENDED RELEASE ORAL at 16:53

## 2019-01-01 RX ADMIN — HEPARIN SODIUM 5000 UNITS: 5000 INJECTION INTRAVENOUS; SUBCUTANEOUS at 20:57

## 2019-01-01 RX ADMIN — RIFAXIMIN 550 MG: 550 TABLET ORAL at 18:19

## 2019-01-01 RX ADMIN — PANTOPRAZOLE SODIUM 40 MG: 40 TABLET, DELAYED RELEASE ORAL at 05:43

## 2019-01-01 RX ADMIN — PANTOPRAZOLE SODIUM 40 MG: 40 TABLET, DELAYED RELEASE ORAL at 05:24

## 2019-01-01 RX ADMIN — OXCARBAZEPINE 300 MG: 300 TABLET, FILM COATED ORAL at 13:16

## 2019-01-01 RX ADMIN — Medication 1 TABLET: at 08:42

## 2019-01-01 RX ADMIN — ASPIRIN 81 MG: 81 TABLET, COATED ORAL at 08:50

## 2019-01-01 RX ADMIN — OXCARBAZEPINE 300 MG: 300 TABLET, FILM COATED ORAL at 05:55

## 2019-01-01 RX ADMIN — DIPHENHYDRAMINE HYDROCHLORIDE 25 MG: 25 CAPSULE ORAL at 18:19

## 2019-01-01 RX ADMIN — LEVOTHYROXINE SODIUM 75 MCG: 75 TABLET ORAL at 08:50

## 2019-01-01 RX ADMIN — POTASSIUM CHLORIDE 40 MEQ: 1.5 POWDER, FOR SOLUTION ORAL at 03:12

## 2019-01-01 RX ADMIN — LACTULOSE 10 G: 20 SOLUTION ORAL at 16:52

## 2019-01-01 RX ADMIN — LACTULOSE 10 G: 20 SOLUTION ORAL at 16:00

## 2019-01-01 RX ADMIN — Medication 1 TABLET: at 08:57

## 2019-01-01 RX ADMIN — HEPARIN SODIUM 5000 UNITS: 5000 INJECTION INTRAVENOUS; SUBCUTANEOUS at 21:47

## 2019-01-01 RX ADMIN — POTASSIUM CHLORIDE 40 MEQ: 1500 TABLET, EXTENDED RELEASE ORAL at 13:16

## 2019-01-01 RX ADMIN — RIFAXIMIN 550 MG: 550 TABLET ORAL at 08:50

## 2019-01-01 RX ADMIN — LACTULOSE 10 G: 20 SOLUTION ORAL at 21:47

## 2019-01-01 RX ADMIN — RIFAXIMIN 550 MG: 550 TABLET ORAL at 21:47

## 2019-01-01 RX ADMIN — HEPARIN SODIUM 5000 UNITS: 5000 INJECTION INTRAVENOUS; SUBCUTANEOUS at 01:26

## 2019-01-01 RX ADMIN — RIFAXIMIN 550 MG: 550 TABLET ORAL at 08:42

## 2019-01-01 RX ADMIN — ASPIRIN 81 MG: 81 TABLET, COATED ORAL at 13:15

## 2019-01-01 RX ADMIN — OXCARBAZEPINE 300 MG: 300 TABLET, FILM COATED ORAL at 23:43

## 2019-01-01 RX ADMIN — HEPARIN SODIUM 5000 UNITS: 5000 INJECTION INTRAVENOUS; SUBCUTANEOUS at 08:42

## 2019-01-01 RX ADMIN — POTASSIUM CHLORIDE 40 MEQ: 1.5 POWDER, FOR SOLUTION ORAL at 05:24

## 2019-01-01 RX ADMIN — LACTULOSE 30 G: 20 SOLUTION ORAL at 08:57

## 2019-01-01 RX ADMIN — DEXTROSE MONOHYDRATE 50 ML/HR: 50 INJECTION, SOLUTION INTRAVENOUS at 08:57

## 2019-01-01 RX ADMIN — SODIUM PHOSPHATE, MONOBASIC, MONOHYDRATE 15 MMOL: 276; 142 INJECTION, SOLUTION INTRAVENOUS at 09:27

## 2019-01-01 RX ADMIN — DEXTROSE MONOHYDRATE 50 ML/HR: 50 INJECTION, SOLUTION INTRAVENOUS at 16:44

## 2019-01-01 RX ADMIN — SODIUM CHLORIDE 1000 ML: 9 INJECTION, SOLUTION INTRAVENOUS at 18:17

## 2019-01-01 RX ADMIN — MAGNESIUM SULFATE HEPTAHYDRATE 4 G: 40 INJECTION, SOLUTION INTRAVENOUS at 13:42

## 2019-01-01 RX ADMIN — OFLOXACIN 50000 UNITS: 300 TABLET, COATED ORAL at 13:15

## 2019-01-01 RX ADMIN — SODIUM CHLORIDE, PRESERVATIVE FREE 10 ML: 5 INJECTION INTRAVENOUS at 21:02

## 2019-01-01 RX ADMIN — LACTULOSE 30 G: 20 SOLUTION ORAL at 21:29

## 2019-01-01 RX ADMIN — LACTULOSE 30 G: 20 SOLUTION ORAL at 01:20

## 2019-01-01 RX ADMIN — LEVOTHYROXINE SODIUM 75 MCG: 75 TABLET ORAL at 13:15

## 2019-01-01 RX ADMIN — MAGNESIUM GLUCONATE 500 MG ORAL TABLET 400 MG: 500 TABLET ORAL at 08:50

## 2019-01-18 ENCOUNTER — LAB (OUTPATIENT)
Dept: LAB | Facility: HOSPITAL | Age: 84
End: 2019-01-18

## 2019-01-18 ENCOUNTER — TELEPHONE (OUTPATIENT)
Dept: CARDIOLOGY | Facility: CLINIC | Age: 84
End: 2019-01-18

## 2019-01-18 ENCOUNTER — OFFICE VISIT (OUTPATIENT)
Dept: CARDIOLOGY | Facility: CLINIC | Age: 84
End: 2019-01-18

## 2019-01-18 VITALS
DIASTOLIC BLOOD PRESSURE: 64 MMHG | HEART RATE: 71 BPM | OXYGEN SATURATION: 97 % | HEIGHT: 63 IN | BODY MASS INDEX: 24.8 KG/M2 | WEIGHT: 140 LBS | SYSTOLIC BLOOD PRESSURE: 126 MMHG

## 2019-01-18 DIAGNOSIS — N18.30 STAGE 3 CHRONIC KIDNEY DISEASE (HCC): ICD-10-CM

## 2019-01-18 DIAGNOSIS — K74.60 CIRRHOSIS OF LIVER WITHOUT ASCITES, UNSPECIFIED HEPATIC CIRRHOSIS TYPE (HCC): ICD-10-CM

## 2019-01-18 DIAGNOSIS — E03.4 HYPOTHYROIDISM DUE TO ACQUIRED ATROPHY OF THYROID: ICD-10-CM

## 2019-01-18 DIAGNOSIS — M25.551 PAIN OF RIGHT HIP JOINT: ICD-10-CM

## 2019-01-18 DIAGNOSIS — I50.32 CHRONIC DIASTOLIC HEART FAILURE (HCC): ICD-10-CM

## 2019-01-18 DIAGNOSIS — E87.6 HYPOKALEMIA: ICD-10-CM

## 2019-01-18 DIAGNOSIS — R25.2 CRAMPS, EXTREMITY: ICD-10-CM

## 2019-01-18 DIAGNOSIS — I10 ESSENTIAL HYPERTENSION: Primary | ICD-10-CM

## 2019-01-18 DIAGNOSIS — D69.6 THROMBOCYTOPENIA (HCC): ICD-10-CM

## 2019-01-18 DIAGNOSIS — F51.01 PRIMARY INSOMNIA: ICD-10-CM

## 2019-01-18 LAB
ALBUMIN SERPL-MCNC: 3.6 G/DL (ref 3.4–4.8)
ALBUMIN/GLOB SERPL: 1 G/DL (ref 1.5–2.5)
ALP SERPL-CCNC: 146 U/L (ref 35–104)
ALT SERPL W P-5'-P-CCNC: 43 U/L (ref 10–36)
AMMONIA BLD-SCNC: 41 UMOL/L (ref 11–51)
ANION GAP SERPL CALCULATED.3IONS-SCNC: 11 MMOL/L (ref 3.6–11.2)
AST SERPL-CCNC: 62 U/L (ref 10–30)
BASOPHILS # BLD AUTO: 0.03 10*3/MM3 (ref 0–0.3)
BASOPHILS NFR BLD AUTO: 0.8 % (ref 0–2)
BILIRUB SERPL-MCNC: 1.9 MG/DL (ref 0.2–1.8)
BUN BLD-MCNC: 28 MG/DL (ref 7–21)
BUN/CREAT SERPL: 19.7 (ref 7–25)
CALCIUM SPEC-SCNC: 9.4 MG/DL (ref 7.7–10)
CHLORIDE SERPL-SCNC: 98 MMOL/L (ref 99–112)
CO2 SERPL-SCNC: 28 MMOL/L (ref 24.3–31.9)
CREAT BLD-MCNC: 1.42 MG/DL (ref 0.43–1.29)
DEPRECATED RDW RBC AUTO: 51.7 FL (ref 37–54)
EOSINOPHIL # BLD AUTO: 0.17 10*3/MM3 (ref 0–0.7)
EOSINOPHIL NFR BLD AUTO: 4.4 % (ref 0–7)
ERYTHROCYTE [DISTWIDTH] IN BLOOD BY AUTOMATED COUNT: 15 % (ref 11.5–14.5)
GFR SERPL CREATININE-BSD FRML MDRD: 35 ML/MIN/1.73
GLOBULIN UR ELPH-MCNC: 3.6 GM/DL
GLUCOSE BLD-MCNC: 89 MG/DL (ref 70–110)
HAV IGM SERPL QL IA: NORMAL
HBV CORE IGM SERPL QL IA: NORMAL
HBV SURFACE AG SERPL QL IA: NORMAL
HCT VFR BLD AUTO: 36 % (ref 37–47)
HCV AB SER DONR QL: NORMAL
HGB BLD-MCNC: 12.2 G/DL (ref 12–16)
IMM GRANULOCYTES # BLD AUTO: 0 10*3/MM3 (ref 0–0.03)
IMM GRANULOCYTES NFR BLD AUTO: 0 % (ref 0–0.5)
LYMPHOCYTES # BLD AUTO: 1.18 10*3/MM3 (ref 1–3)
LYMPHOCYTES NFR BLD AUTO: 30.7 % (ref 16–46)
MAGNESIUM SERPL-MCNC: 1.3 MG/DL (ref 1.7–2.6)
MCH RBC QN AUTO: 33.4 PG (ref 27–33)
MCHC RBC AUTO-ENTMCNC: 33.9 G/DL (ref 33–37)
MCV RBC AUTO: 98.6 FL (ref 80–94)
MONOCYTES # BLD AUTO: 0.41 10*3/MM3 (ref 0.1–0.9)
MONOCYTES NFR BLD AUTO: 10.7 % (ref 0–12)
NEUTROPHILS # BLD AUTO: 2.05 10*3/MM3 (ref 1.4–6.5)
NEUTROPHILS NFR BLD AUTO: 53.4 % (ref 40–75)
OSMOLALITY SERPL CALC.SUM OF ELEC: 278.8 MOSM/KG (ref 273–305)
PLATELET # BLD AUTO: 81 10*3/MM3 (ref 130–400)
PMV BLD AUTO: 11.7 FL (ref 6–10)
POTASSIUM BLD-SCNC: 3.4 MMOL/L (ref 3.5–5.3)
PROT SERPL-MCNC: 7.2 G/DL (ref 6–8)
RBC # BLD AUTO: 3.65 10*6/MM3 (ref 4.2–5.4)
SODIUM BLD-SCNC: 137 MMOL/L (ref 135–153)
WBC NRBC COR # BLD: 3.84 10*3/MM3 (ref 4.5–12.5)

## 2019-01-18 PROCEDURE — 82140 ASSAY OF AMMONIA: CPT

## 2019-01-18 PROCEDURE — 83735 ASSAY OF MAGNESIUM: CPT

## 2019-01-18 PROCEDURE — 80053 COMPREHEN METABOLIC PANEL: CPT | Performed by: INTERNAL MEDICINE

## 2019-01-18 PROCEDURE — 80074 ACUTE HEPATITIS PANEL: CPT

## 2019-01-18 PROCEDURE — 99213 OFFICE O/P EST LOW 20 MIN: CPT | Performed by: INTERNAL MEDICINE

## 2019-01-18 PROCEDURE — 36415 COLL VENOUS BLD VENIPUNCTURE: CPT | Performed by: INTERNAL MEDICINE

## 2019-01-18 PROCEDURE — 85025 COMPLETE CBC W/AUTO DIFF WBC: CPT

## 2019-01-18 RX ORDER — HYDROCODONE BITARTRATE AND ACETAMINOPHEN 5; 325 MG/1; MG/1
1 TABLET ORAL 2 TIMES DAILY PRN
Qty: 60 TABLET | Refills: 0 | Status: SHIPPED | OUTPATIENT
Start: 2019-01-18 | End: 2019-02-28 | Stop reason: SDUPTHER

## 2019-01-18 RX ORDER — MAGNESIUM OXIDE 400 MG/1
800 TABLET ORAL DAILY
Qty: 60 TABLET | Refills: 1 | Status: SHIPPED | OUTPATIENT
Start: 2019-01-18 | End: 2019-02-28 | Stop reason: SDUPTHER

## 2019-01-18 NOTE — TELEPHONE ENCOUNTER
Called Alma Delia with no response so I contacted her sister Yeimy and explained her labs and need for medication. Yeimy voiced understanding and stated she would go get the medication for Alma Delia.

## 2019-01-18 NOTE — PROGRESS NOTES
subjective     Chief Complaint   Patient presents with   • Back Pain   • Follow-up     History of Present Illness    Patient is 88 years old white female who has history of hepatic cirrhosis, renal failure and diastolic heart failure.  Patient is here for follow-up.  She complains of back pain which is a chronic problem.  Patient has disc disease and is taking low-dose Norco.    She has had multiple episodes of hepatic encephalopathy.  She is taking lactulose and Xifaxan.  Ammonia level has been good.  Overall she sees be doing better.  Past Surgical History:   Procedure Laterality Date   • BACK SURGERY      KYPHOPLASTIES    • CARDIAC CATHETERIZATION Left 08/2004   • CARDIOVASCULAR STRESS TEST  01/25/2015   • CATARACT EXTRACTION     • CHOLECYSTECTOMY     • CLAVICLE SURGERY Right    • COLONOSCOPY  10/2004   • ECHO - CONVERTED  11/2012   • FEMUR OPEN REDUCTION INTERNAL FIXATION Left 10/12/2017    Procedure: FEMUR OPEN REDUCTION INTERNAL FIXATION;  Surgeon: Karson Pierre MD;  Location: [facility];  Service:    • HIP FRACTURE SURGERY Bilateral     ARTHROPLASTIES    • PARTIAL HYSTERECTOMY     • PORTACATH PLACEMENT N/A 9/28/2018    Procedure: INSERTION OF PORTACATH;  Surgeon: Vic Mauricio MD;  Location: [facility];  Service: General   • WRIST FRACTURE SURGERY       Family History   Problem Relation Age of Onset   • Cancer Mother    • Heart disease Father    • Arthritis Sister    • Osteoporosis Sister    • Diabetes Maternal Aunt      Past Medical History:   Diagnosis Date   • Acute renal failure (CMS/HCC)    • Alcohol abuse     in remission   • Anxiety    • Bell's palsy    • Cellulitis     LLE   • CHF (congestive heart failure) (CMS/HCC)     Diastolic   • CHF (congestive heart failure) (CMS/HCC)    • Constipation    • Coronary artery disease    • Diverticulosis    • GERD (gastroesophageal reflux disease)    • Hepatic cirrhosis (CMS/HCC)    • History of transfusion    • Hypertension    • Hypothyroidism     • MI (mitral incompetence)    • Osteoporosis    • Osteoporosis    • Restless leg syndrome    • Thrombocytopenia (CMS/HCC)    • Trigeminal neuralgia      Patient Active Problem List   Diagnosis   • Hepatic cirrhosis*   • HTN (hypertension)*   • Anxiety disorder*   • Osteopetrosis*   • Hypothyroidism*   • GERD (gastroesophageal reflux disease)*   • Chronic pain syndrome due to fractured spine and left rib fracture*   • Anemia, chronic disease   • Arthralgia of hip   • Left leg pain   • Fracture of proximal end of tibia and fibula   • Primary insomnia   • Thrombocytopenia (CMS/HCC)   • Peripheral vascular disease (CMS/HCC)   • Bilateral leg edema   • Cramp of both lower extremities   • Hepatic encephalopathy (CMS/HCC)   • UTI (urinary tract infection)   • Hypokalemia   • Closed fracture of lower end of left femur with routine healing   • Status post open reduction with internal fixation of fracture   • Nonrheumatic aortic valve insufficiency, mild   • Chronic diastolic heart failure, mild   • Periprosthetic fracture of shaft of femur   • S/P ORIF (open reduction internal fixation) fracture   • Diarrhea   • Right upper quadrant abdominal pain   • Nausea   • Elevated liver enzymes   • Poor venous access   • Stage 3 chronic kidney disease (CMS/HCC)   • Trochanteric bursitis of left hip       Social History     Tobacco Use   • Smoking status: Never Smoker   • Smokeless tobacco: Former User     Types: Snuff   Substance Use Topics   • Alcohol use: No     Comment: FORMER 12 BOTTLES PER DAY FOR 15 YEARS   • Drug use: No       Allergies   Allergen Reactions   • Ciprofloxacin        Current Outpatient Medications on File Prior to Visit   Medication Sig   • Alcohol Swabs pads Uses as directed.   • aspirin 81 MG tablet Take 1 tablet by mouth Daily.   • bumetanide (BUMEX) 1 MG tablet Take 1 tablet by mouth 2 (Two) Times a Day.   • Calcium Carbonate-Vitamin D (OYST-LILA-D 500 PO) Take  by mouth.   • esomeprazole (nexIUM) 40 MG  capsule Take 1 capsule by mouth Every Morning Before Breakfast.   • folic acid (FOLVITE) 1 MG tablet Take 1 tablet by mouth Daily.   • Insulin Pen Needle (BD AUTOSHIELD DUO) 30G X 5 MM misc Use as directed   • lactulose (CHRONULAC) 10 GM/15ML solution Take 30 mL by mouth 5 (Five) Times a Day.   • levothyroxine (SYNTHROID) 50 MCG tablet Take 1 tablet by mouth Daily.   • Multiple Vitamin (MULTI VITAMIN DAILY) tablet Take  by mouth.   • nitroglycerin (NITROSTAT) 0.4 MG SL tablet Place 1 tablet under the tongue Every 5 (Five) Minutes As Needed for Chest Pain. Take no more than 3 doses in 15 minutes.   • ondansetron (ZOFRAN) 4 MG tablet Take 4 mg by mouth Every 8 (Eight) Hours As Needed for Nausea or Vomiting.   • OXcarbazepine (TRILEPTAL) 150 MG tablet Take 1 tablet by mouth Every 12 (Twelve) Hours.   • rifaximin (XIFAXAN) 550 MG tablet Take 1 tablet by mouth Every 12 (Twelve) Hours.   • rOPINIRole (REQUIP) 1 MG tablet Take 1 tablet by mouth Every Night. Take 1 hour before bedtime.   • spironolactone (ALDACTONE) 25 MG tablet Take 3 tablets by mouth Daily.   • teriparatide (FORTEO) 600 MCG/2.4ML injection Inject 0.08 mL under the skin Daily.   • vitamin D (ERGOCALCIFEROL) 65429 units capsule capsule Take 1 capsule by mouth 1 (One) Time Per Week.     No current facility-administered medications on file prior to visit.          The following portions of the patient's history were reviewed and updated as appropriate: allergies, current medications, past family history, past medical history, past social history, past surgical history and problem list.    Review of Systems   Constitution: Negative.   HENT: Negative.  Negative for congestion.    Eyes: Negative.    Cardiovascular: Negative.  Negative for chest pain, cyanosis, dyspnea on exertion, irregular heartbeat, leg swelling, near-syncope, orthopnea, palpitations, paroxysmal nocturnal dyspnea and syncope.   Respiratory: Negative.  Negative for shortness of breath.   "  Hematologic/Lymphatic: Negative.    Musculoskeletal: Positive for arthritis and back pain.   Gastrointestinal: Negative.    Neurological: Negative.  Negative for headaches.   Psychiatric/Behavioral: The patient is nervous/anxious.           Objective:     /64 (BP Location: Left arm, Patient Position: Sitting)   Pulse 71   Ht 160 cm (63\")   Wt 63.5 kg (140 lb)   LMP  (LMP Unknown)   SpO2 97%   BMI 24.80 kg/m²   Physical Exam   Constitutional: She appears well-developed and well-nourished. No distress.   HENT:   Head: Normocephalic and atraumatic.   Mouth/Throat: Oropharynx is clear and moist. No oropharyngeal exudate.   Eyes: Conjunctivae and EOM are normal. Pupils are equal, round, and reactive to light. No scleral icterus.   Neck: Normal range of motion. Neck supple. No JVD present. No tracheal deviation present. No thyromegaly present.   Cardiovascular: Normal rate, regular rhythm, normal heart sounds and intact distal pulses. PMI is not displaced. Exam reveals no gallop, no friction rub and no decreased pulses.   No murmur heard.  Pulses:       Carotid pulses are 3+ on the right side, and 3+ on the left side.       Radial pulses are 3+ on the right side, and 3+ on the left side.   Pulmonary/Chest: Effort normal and breath sounds normal. No respiratory distress. She has no wheezes. She has no rales. She exhibits no tenderness.   Abdominal: Soft. Bowel sounds are normal. She exhibits no distension, no abdominal bruit and no mass. There is no splenomegaly or hepatomegaly. There is no tenderness. There is no rebound and no guarding.   Musculoskeletal: Normal range of motion. She exhibits no edema, tenderness or deformity.   Lymphadenopathy:     She has no cervical adenopathy.   Neurological: She is alert. She has normal reflexes. No cranial nerve deficit. She exhibits normal muscle tone. Coordination normal.   Skin: Skin is warm and dry. No rash noted. She is not diaphoretic. No erythema.   Psychiatric: " She has a normal mood and affect. Her behavior is normal. Judgment and thought content normal.         Lab Review  Lab Results   Component Value Date     01/18/2019    K 3.4 (L) 01/18/2019    CL 98 (L) 01/18/2019    BUN 28 (H) 01/18/2019    CREATININE 1.42 (H) 01/18/2019    GLUCOSE 89 01/18/2019    CALCIUM 9.4 01/18/2019    ALT 43 (H) 01/18/2019    ALKPHOS 146 (H) 01/18/2019    LABIL2 0.9 (L) 04/08/2016     Lab Results   Component Value Date    CKTOTAL 99 07/25/2017     Lab Results   Component Value Date    WBC 3.84 (L) 01/18/2019    HGB 12.2 01/18/2019    HCT 36.0 (L) 01/18/2019    PLT 81 (L) 01/18/2019     Lab Results   Component Value Date    INR 1.28 (H) 06/27/2018    INR 1.79 (H) 10/13/2017    INR 2.04 (H) 10/12/2017     Lab Results   Component Value Date    MG 1.3 (C) 01/18/2019     Lab Results   Component Value Date    TSH 1.830 11/05/2018     Lab Results   Component Value Date    BNP 33.0 11/05/2018     Lab Results   Component Value Date    CHLPL 161 04/08/2016    CHOL 139 11/05/2018    TRIG 93 11/05/2018    HDL 85 11/05/2018    VLDL 18.6 11/05/2018    LDLHDL 0.42 11/05/2018     Lab Results   Component Value Date    LDL 35 11/05/2018    LDL 58 08/26/2018       Procedures       I personally viewed and interpreted the patient's LAB data         Assessment:     1. Essential hypertension    2. Stage 3 chronic kidney disease (CMS/HCC)    3. Primary insomnia    4. Hypokalemia    5. Hypothyroidism due to acquired atrophy of thyroid    6. Cirrhosis of liver without ascites, unspecified hepatic cirrhosis type (CMS/HCC)    7. Cramps, extremity    8. Chronic diastolic heart failure, mild    9. Thrombocytopenia (CMS/HCC)    10. Pain of right hip joint          Plan:      Patient is here for follow-up.  She sees be doing very well.  She says that she was told by Dr. Hopkins that she has hepatitis C.  To my knowledge patient never had hepatitis C  Hepatitis profile was checked.  There is no evidence of hepatitis A,  B or C  Refills of medications were given.  Follow-up scheduled        No Follow-up on file.

## 2019-02-28 ENCOUNTER — TELEPHONE (OUTPATIENT)
Dept: CARDIOLOGY | Facility: CLINIC | Age: 84
End: 2019-02-28

## 2019-02-28 ENCOUNTER — OFFICE VISIT (OUTPATIENT)
Dept: CARDIOLOGY | Facility: CLINIC | Age: 84
End: 2019-02-28

## 2019-02-28 ENCOUNTER — LAB (OUTPATIENT)
Dept: LAB | Facility: HOSPITAL | Age: 84
End: 2019-02-28

## 2019-02-28 VITALS
WEIGHT: 139 LBS | SYSTOLIC BLOOD PRESSURE: 144 MMHG | HEIGHT: 63 IN | DIASTOLIC BLOOD PRESSURE: 76 MMHG | HEART RATE: 58 BPM | OXYGEN SATURATION: 97 % | BODY MASS INDEX: 24.63 KG/M2

## 2019-02-28 DIAGNOSIS — R74.8 ELEVATED LIVER ENZYMES: ICD-10-CM

## 2019-02-28 DIAGNOSIS — K74.60 CIRRHOSIS OF LIVER WITHOUT ASCITES, UNSPECIFIED HEPATIC CIRRHOSIS TYPE (HCC): ICD-10-CM

## 2019-02-28 DIAGNOSIS — F41.1 GENERALIZED ANXIETY DISORDER: ICD-10-CM

## 2019-02-28 DIAGNOSIS — N18.30 STAGE 3 CHRONIC KIDNEY DISEASE (HCC): ICD-10-CM

## 2019-02-28 DIAGNOSIS — I50.32 CHRONIC DIASTOLIC HEART FAILURE (HCC): ICD-10-CM

## 2019-02-28 DIAGNOSIS — I73.9 PERIPHERAL VASCULAR DISEASE (HCC): ICD-10-CM

## 2019-02-28 DIAGNOSIS — E83.42 HYPOMAGNESEMIA: ICD-10-CM

## 2019-02-28 DIAGNOSIS — E87.6 HYPOKALEMIA: ICD-10-CM

## 2019-02-28 DIAGNOSIS — E03.4 HYPOTHYROIDISM DUE TO ACQUIRED ATROPHY OF THYROID: ICD-10-CM

## 2019-02-28 DIAGNOSIS — I10 ESSENTIAL HYPERTENSION: ICD-10-CM

## 2019-02-28 DIAGNOSIS — G89.4 CHRONIC PAIN SYNDROME: ICD-10-CM

## 2019-02-28 DIAGNOSIS — D63.8 ANEMIA, CHRONIC DISEASE: ICD-10-CM

## 2019-02-28 DIAGNOSIS — I50.32 CHRONIC DIASTOLIC HEART FAILURE (HCC): Primary | ICD-10-CM

## 2019-02-28 LAB
ALBUMIN SERPL-MCNC: 3.5 G/DL (ref 3.4–4.8)
ALBUMIN/GLOB SERPL: 1 G/DL (ref 1.5–2.5)
ALP SERPL-CCNC: 133 U/L (ref 35–104)
ALT SERPL W P-5'-P-CCNC: 39 U/L (ref 10–36)
AMMONIA BLD-SCNC: 45 UMOL/L (ref 11–51)
ANION GAP SERPL CALCULATED.3IONS-SCNC: 7.4 MMOL/L (ref 3.6–11.2)
AST SERPL-CCNC: 60 U/L (ref 10–30)
BASOPHILS # BLD AUTO: 0.03 10*3/MM3 (ref 0–0.3)
BASOPHILS NFR BLD AUTO: 0.8 % (ref 0–2)
BILIRUB SERPL-MCNC: 1.4 MG/DL (ref 0.2–1.8)
BUN BLD-MCNC: 32 MG/DL (ref 7–21)
BUN/CREAT SERPL: 21.5 (ref 7–25)
CALCIUM SPEC-SCNC: 9.8 MG/DL (ref 7.7–10)
CHLORIDE SERPL-SCNC: 106 MMOL/L (ref 99–112)
CHOLEST SERPL-MCNC: 180 MG/DL (ref 0–200)
CO2 SERPL-SCNC: 25.6 MMOL/L (ref 24.3–31.9)
CREAT BLD-MCNC: 1.49 MG/DL (ref 0.43–1.29)
DEPRECATED RDW RBC AUTO: 50.5 FL (ref 37–54)
EOSINOPHIL # BLD AUTO: 0.14 10*3/MM3 (ref 0–0.7)
EOSINOPHIL NFR BLD AUTO: 3.7 % (ref 0–7)
ERYTHROCYTE [DISTWIDTH] IN BLOOD BY AUTOMATED COUNT: 14.5 % (ref 11.5–14.5)
GFR SERPL CREATININE-BSD FRML MDRD: 33 ML/MIN/1.73
GLOBULIN UR ELPH-MCNC: 3.5 GM/DL
GLUCOSE BLD-MCNC: 85 MG/DL (ref 70–110)
HCT VFR BLD AUTO: 35 % (ref 37–47)
HDLC SERPL-MCNC: 89 MG/DL (ref 60–100)
HGB BLD-MCNC: 12.1 G/DL (ref 12–16)
IMM GRANULOCYTES # BLD AUTO: 0.01 10*3/MM3 (ref 0–0.03)
IMM GRANULOCYTES NFR BLD AUTO: 0.3 % (ref 0–0.5)
LDLC SERPL CALC-MCNC: 65 MG/DL (ref 0–100)
LDLC/HDLC SERPL: 0.73 {RATIO}
LYMPHOCYTES # BLD AUTO: 1.27 10*3/MM3 (ref 1–3)
LYMPHOCYTES NFR BLD AUTO: 33.6 % (ref 16–46)
MAGNESIUM SERPL-MCNC: 1.3 MG/DL (ref 1.7–2.6)
MCH RBC QN AUTO: 32.9 PG (ref 27–33)
MCHC RBC AUTO-ENTMCNC: 34.6 G/DL (ref 33–37)
MCV RBC AUTO: 95.1 FL (ref 80–94)
MONOCYTES # BLD AUTO: 0.5 10*3/MM3 (ref 0.1–0.9)
MONOCYTES NFR BLD AUTO: 13.2 % (ref 0–12)
NEUTROPHILS # BLD AUTO: 1.83 10*3/MM3 (ref 1.4–6.5)
NEUTROPHILS NFR BLD AUTO: 48.4 % (ref 40–75)
OSMOLALITY SERPL CALC.SUM OF ELEC: 283.7 MOSM/KG (ref 273–305)
PLATELET # BLD AUTO: 103 10*3/MM3 (ref 130–400)
PMV BLD AUTO: 11.2 FL (ref 6–10)
POTASSIUM BLD-SCNC: 3 MMOL/L (ref 3.5–5.3)
PROT SERPL-MCNC: 7 G/DL (ref 6–8)
RBC # BLD AUTO: 3.68 10*6/MM3 (ref 4.2–5.4)
SODIUM BLD-SCNC: 139 MMOL/L (ref 135–153)
TRIGL SERPL-MCNC: 129 MG/DL (ref 0–150)
VLDLC SERPL-MCNC: 25.8 MG/DL
WBC NRBC COR # BLD: 3.78 10*3/MM3 (ref 4.5–12.5)

## 2019-02-28 PROCEDURE — 80061 LIPID PANEL: CPT

## 2019-02-28 PROCEDURE — 83735 ASSAY OF MAGNESIUM: CPT

## 2019-02-28 PROCEDURE — 36415 COLL VENOUS BLD VENIPUNCTURE: CPT

## 2019-02-28 PROCEDURE — 85025 COMPLETE CBC W/AUTO DIFF WBC: CPT

## 2019-02-28 PROCEDURE — 80053 COMPREHEN METABOLIC PANEL: CPT

## 2019-02-28 PROCEDURE — 99213 OFFICE O/P EST LOW 20 MIN: CPT | Performed by: INTERNAL MEDICINE

## 2019-02-28 PROCEDURE — 82140 ASSAY OF AMMONIA: CPT

## 2019-02-28 RX ORDER — SPIRONOLACTONE 25 MG/1
100 TABLET ORAL DAILY
Qty: 360 TABLET | Refills: 0 | Status: SHIPPED | OUTPATIENT
Start: 2019-02-28 | End: 2019-04-15

## 2019-02-28 RX ORDER — MAGNESIUM OXIDE 400 MG/1
400 TABLET ORAL 3 TIMES DAILY
Qty: 270 TABLET | Refills: 0 | Status: SHIPPED | OUTPATIENT
Start: 2019-02-28 | End: 2019-04-15

## 2019-02-28 RX ORDER — HYDROCODONE BITARTRATE AND ACETAMINOPHEN 5; 325 MG/1; MG/1
1 TABLET ORAL 2 TIMES DAILY PRN
Qty: 60 TABLET | Refills: 0 | Status: SHIPPED | OUTPATIENT
Start: 2019-02-28 | End: 2019-03-28 | Stop reason: SDUPTHER

## 2019-02-28 NOTE — TELEPHONE ENCOUNTER
----- Message from Galina Gonsalves MD sent at 2/28/2019 12:50 PM EST -----  Potassium and magnesium are both low increase magnesium 400 3 daily  Increase  Aldactone 4 daily

## 2019-03-16 ENCOUNTER — APPOINTMENT (OUTPATIENT)
Dept: GENERAL RADIOLOGY | Facility: HOSPITAL | Age: 84
End: 2019-03-16

## 2019-03-16 ENCOUNTER — HOSPITAL ENCOUNTER (EMERGENCY)
Facility: HOSPITAL | Age: 84
Discharge: HOME | End: 2019-03-17
Attending: EMERGENCY MEDICINE | Admitting: EMERGENCY MEDICINE

## 2019-03-16 DIAGNOSIS — E86.0 MILD DEHYDRATION: Primary | ICD-10-CM

## 2019-03-16 LAB
ALBUMIN SERPL-MCNC: 3.2 G/DL (ref 3.5–5.2)
ALBUMIN/GLOB SERPL: 0.9 G/DL
ALP SERPL-CCNC: 127 U/L (ref 39–117)
ALT SERPL W P-5'-P-CCNC: 28 U/L (ref 1–33)
ANION GAP SERPL CALCULATED.3IONS-SCNC: 10.5 MMOL/L
AST SERPL-CCNC: 49 U/L (ref 1–32)
BASOPHILS # BLD AUTO: 0.03 10*3/MM3 (ref 0–0.2)
BASOPHILS NFR BLD AUTO: 0.8 % (ref 0–1.5)
BILIRUB SERPL-MCNC: 1.7 MG/DL (ref 0.1–1.2)
BILIRUB UR QL STRIP: NEGATIVE
BUN BLD-MCNC: 31 MG/DL (ref 8–23)
BUN/CREAT SERPL: 24.4 (ref 7–25)
CALCIUM SPEC-SCNC: 9.8 MG/DL (ref 8.6–10.5)
CHLORIDE SERPL-SCNC: 106 MMOL/L (ref 98–107)
CLARITY UR: CLEAR
CO2 SERPL-SCNC: 27.5 MMOL/L (ref 22–29)
COLOR UR: YELLOW
CREAT BLD-MCNC: 1.27 MG/DL (ref 0.57–1)
DEPRECATED RDW RBC AUTO: 52.5 FL (ref 37–54)
EOSINOPHIL # BLD AUTO: 0.11 10*3/MM3 (ref 0–0.4)
EOSINOPHIL NFR BLD AUTO: 3 % (ref 0.3–6.2)
ERYTHROCYTE [DISTWIDTH] IN BLOOD BY AUTOMATED COUNT: 15.1 % (ref 12.3–15.4)
GFR SERPL CREATININE-BSD FRML MDRD: 40 ML/MIN/1.73
GLOBULIN UR ELPH-MCNC: 3.4 GM/DL
GLUCOSE BLD-MCNC: 101 MG/DL (ref 65–99)
GLUCOSE UR STRIP-MCNC: NEGATIVE MG/DL
HCT VFR BLD AUTO: 36.7 % (ref 34–46.6)
HGB BLD-MCNC: 12.1 G/DL (ref 12–15.9)
HGB UR QL STRIP.AUTO: NEGATIVE
IMM GRANULOCYTES # BLD AUTO: 0.01 10*3/MM3 (ref 0–0.05)
IMM GRANULOCYTES NFR BLD AUTO: 0.3 % (ref 0–0.5)
KETONES UR QL STRIP: NEGATIVE
LEUKOCYTE ESTERASE UR QL STRIP.AUTO: NEGATIVE
LYMPHOCYTES # BLD AUTO: 0.88 10*3/MM3 (ref 0.7–3.1)
LYMPHOCYTES NFR BLD AUTO: 23.8 % (ref 19.6–45.3)
MAGNESIUM SERPL-MCNC: 1.9 MG/DL (ref 1.6–2.4)
MCH RBC QN AUTO: 33 PG (ref 26.6–33)
MCHC RBC AUTO-ENTMCNC: 33 G/DL (ref 31.5–35.7)
MCV RBC AUTO: 100 FL (ref 79–97)
MONOCYTES # BLD AUTO: 0.44 10*3/MM3 (ref 0.1–0.9)
MONOCYTES NFR BLD AUTO: 11.9 % (ref 5–12)
NEUTROPHILS # BLD AUTO: 2.23 10*3/MM3 (ref 1.4–7)
NEUTROPHILS NFR BLD AUTO: 60.2 % (ref 42.7–76)
NITRITE UR QL STRIP: NEGATIVE
PH UR STRIP.AUTO: >=9 [PH] (ref 5–8)
PLATELET # BLD AUTO: 88 10*3/MM3 (ref 140–450)
PMV BLD AUTO: 11.3 FL (ref 6–12)
POTASSIUM BLD-SCNC: 4 MMOL/L (ref 3.5–5.2)
PROT SERPL-MCNC: 6.6 G/DL (ref 6–8.5)
PROT UR QL STRIP: NEGATIVE
RBC # BLD AUTO: 3.67 10*6/MM3 (ref 3.77–5.28)
SODIUM BLD-SCNC: 144 MMOL/L (ref 136–145)
SP GR UR STRIP: 1.02 (ref 1–1.03)
UROBILINOGEN UR QL STRIP: ABNORMAL
WBC NRBC COR # BLD: 3.7 10*3/MM3 (ref 3.4–10.8)

## 2019-03-16 PROCEDURE — 71045 X-RAY EXAM CHEST 1 VIEW: CPT | Performed by: RADIOLOGY

## 2019-03-16 PROCEDURE — 96360 HYDRATION IV INFUSION INIT: CPT

## 2019-03-16 PROCEDURE — 71045 X-RAY EXAM CHEST 1 VIEW: CPT

## 2019-03-16 PROCEDURE — 80053 COMPREHEN METABOLIC PANEL: CPT | Performed by: EMERGENCY MEDICINE

## 2019-03-16 PROCEDURE — 36415 COLL VENOUS BLD VENIPUNCTURE: CPT

## 2019-03-16 PROCEDURE — 83735 ASSAY OF MAGNESIUM: CPT | Performed by: EMERGENCY MEDICINE

## 2019-03-16 PROCEDURE — 99285 EMERGENCY DEPT VISIT HI MDM: CPT

## 2019-03-16 PROCEDURE — 85025 COMPLETE CBC W/AUTO DIFF WBC: CPT | Performed by: EMERGENCY MEDICINE

## 2019-03-16 PROCEDURE — 81003 URINALYSIS AUTO W/O SCOPE: CPT | Performed by: EMERGENCY MEDICINE

## 2019-03-16 PROCEDURE — P9612 CATHETERIZE FOR URINE SPEC: HCPCS

## 2019-03-16 RX ADMIN — SODIUM CHLORIDE 1000 ML: 9 INJECTION, SOLUTION INTRAVENOUS at 22:24

## 2019-03-17 ENCOUNTER — APPOINTMENT (OUTPATIENT)
Dept: CT IMAGING | Facility: HOSPITAL | Age: 84
End: 2019-03-17

## 2019-03-17 VITALS
WEIGHT: 145 LBS | BODY MASS INDEX: 25.69 KG/M2 | SYSTOLIC BLOOD PRESSURE: 161 MMHG | DIASTOLIC BLOOD PRESSURE: 71 MMHG | OXYGEN SATURATION: 99 % | HEIGHT: 63 IN | HEART RATE: 66 BPM | RESPIRATION RATE: 20 BRPM | TEMPERATURE: 98.1 F

## 2019-03-17 LAB
A-A DO2: 162.8 MMHG (ref 0–300)
AMMONIA BLD-SCNC: 69 UMOL/L (ref 11–51)
ARTERIAL PATENCY WRIST A: ABNORMAL
ATMOSPHERIC PRESS: 735 MMHG
BASE EXCESS BLDA CALC-SCNC: 2.2 MMOL/L
BDY SITE: ABNORMAL
BODY TEMPERATURE: 98.6 C
COHGB MFR BLD: 1 % (ref 0–5)
HCO3 BLDA-SCNC: 24.9 MMOL/L (ref 22–26)
HCT VFR BLD CALC: 34 % (ref 37–47)
HGB BLDA-MCNC: 11.6 G/DL (ref 12–16)
HOROWITZ INDEX BLD+IHG-RTO: 41 %
METHGB BLD QL: 0.3 % (ref 0–3)
MODALITY: ABNORMAL
OXYHGB MFR BLDV: 94.4 % (ref 85–100)
PCO2 BLDA: 32.2 MM HG (ref 35–45)
PH BLDA: 7.51 PH UNITS (ref 7.35–7.45)
PO2 BLDA: 82.3 MM HG (ref 80–100)
SAO2 % BLDCOA: 95.6 % (ref 90–100)

## 2019-03-17 PROCEDURE — 82375 ASSAY CARBOXYHB QUANT: CPT | Performed by: NURSE PRACTITIONER

## 2019-03-17 PROCEDURE — 82140 ASSAY OF AMMONIA: CPT | Performed by: NURSE PRACTITIONER

## 2019-03-17 PROCEDURE — 82805 BLOOD GASES W/O2 SATURATION: CPT | Performed by: NURSE PRACTITIONER

## 2019-03-17 PROCEDURE — 83050 HGB METHEMOGLOBIN QUAN: CPT | Performed by: NURSE PRACTITIONER

## 2019-03-17 PROCEDURE — 70450 CT HEAD/BRAIN W/O DYE: CPT | Performed by: RADIOLOGY

## 2019-03-17 PROCEDURE — 36600 WITHDRAWAL OF ARTERIAL BLOOD: CPT | Performed by: NURSE PRACTITIONER

## 2019-03-17 PROCEDURE — 70450 CT HEAD/BRAIN W/O DYE: CPT

## 2019-03-17 RX ORDER — LACTULOSE 10 G/15ML
20 SOLUTION ORAL ONCE
Status: COMPLETED | OUTPATIENT
Start: 2019-03-17 | End: 2019-03-17

## 2019-03-17 RX ADMIN — LACTULOSE 20 G: 20 SOLUTION ORAL at 01:41

## 2019-03-17 NOTE — ED NOTES
Called [facility] at this time to transport pt back to residence. Dispatch states will call Yasmin Griggs  03/17/19 [id number]

## 2019-03-17 NOTE — ED NOTES
Pt waiting on ride from [facility]. Pt's daughter is at bedside but is now leaving to go home and prepare for her mother's return.        Hailey Holloway RN  03/17/19 [id number]

## 2019-03-28 ENCOUNTER — OFFICE VISIT (OUTPATIENT)
Dept: CARDIOLOGY | Facility: CLINIC | Age: 84
End: 2019-03-28

## 2019-03-28 VITALS
DIASTOLIC BLOOD PRESSURE: 62 MMHG | HEIGHT: 63 IN | SYSTOLIC BLOOD PRESSURE: 138 MMHG | BODY MASS INDEX: 24.8 KG/M2 | HEART RATE: 84 BPM | WEIGHT: 140 LBS | OXYGEN SATURATION: 95 %

## 2019-03-28 DIAGNOSIS — G89.4 CHRONIC PAIN SYNDROME: ICD-10-CM

## 2019-03-28 DIAGNOSIS — E03.4 HYPOTHYROIDISM DUE TO ACQUIRED ATROPHY OF THYROID: ICD-10-CM

## 2019-03-28 DIAGNOSIS — K74.60 CIRRHOSIS OF LIVER WITHOUT ASCITES, UNSPECIFIED HEPATIC CIRRHOSIS TYPE (HCC): ICD-10-CM

## 2019-03-28 DIAGNOSIS — F41.1 GENERALIZED ANXIETY DISORDER: Primary | ICD-10-CM

## 2019-03-28 PROCEDURE — 99213 OFFICE O/P EST LOW 20 MIN: CPT | Performed by: INTERNAL MEDICINE

## 2019-03-28 RX ORDER — HYDROCODONE BITARTRATE AND ACETAMINOPHEN 5; 325 MG/1; MG/1
1 TABLET ORAL 2 TIMES DAILY PRN
Qty: 60 TABLET | Refills: 0 | Status: ON HOLD | OUTPATIENT
Start: 2019-03-28 | End: 2019-04-18 | Stop reason: SDUPTHER

## 2019-03-28 NOTE — PROGRESS NOTES
subjective     Chief Complaint   Patient presents with   • Hypertension   • Back Pain   • Follow-up     History of Present Illness  Patient is 88 years old white female who has had multiple fractures in the past and has chronic pain syndrome.  She is here for refills.  She is taking Norco 5 twice daily.  Patient has osteoporosis and is taking Forteo injection  Patient has hepatic cirrhosis and multiple episodes of hepatic encephalopathy.  She recently went to the emergency room where ammonia level was again high.  She is supposed to be taking lactulose 5 times a day but she is taking 3-4 times a day.  She is taking Xifaxan regularly.    Past Surgical History:   Procedure Laterality Date   • BACK SURGERY      KYPHOPLASTIES    • CARDIAC CATHETERIZATION Left 08/2004   • CARDIOVASCULAR STRESS TEST  01/25/2015   • CATARACT EXTRACTION     • CHOLECYSTECTOMY     • CLAVICLE SURGERY Right    • COLONOSCOPY  10/2004   • ECHO - CONVERTED  11/2012   • FEMUR OPEN REDUCTION INTERNAL FIXATION Left 10/12/2017    Procedure: FEMUR OPEN REDUCTION INTERNAL FIXATION;  Surgeon: Karson Pierre MD;  Location: [facility];  Service:    • HIP FRACTURE SURGERY Bilateral     ARTHROPLASTIES    • PARTIAL HYSTERECTOMY     • PORTACATH PLACEMENT N/A 9/28/2018    Procedure: INSERTION OF PORTACATH;  Surgeon: Vic Mauricio MD;  Location: [facility] OR;  Service: General   • WRIST FRACTURE SURGERY       Family History   Problem Relation Age of Onset   • Cancer Mother    • Heart disease Father    • Arthritis Sister    • Osteoporosis Sister    • Diabetes Maternal Aunt      Past Medical History:   Diagnosis Date   • Acute renal failure (CMS/HCC)    • Alcohol abuse     in remission   • Anxiety    • Bell's palsy    • Cellulitis     LLE   • CHF (congestive heart failure) (CMS/HCC)     Diastolic   • CHF (congestive heart failure) (CMS/HCC)    • Constipation    • Coronary artery disease    • Diverticulosis    • GERD (gastroesophageal reflux  disease)    • Hepatic cirrhosis (CMS/HCC)    • Hepatitis C    • History of transfusion    • Hypertension    • Hypothyroidism    • MI (mitral incompetence)    • Osteoporosis    • Osteoporosis    • Restless leg syndrome    • Thrombocytopenia (CMS/HCC)    • Trigeminal neuralgia      Patient Active Problem List   Diagnosis   • Hepatic cirrhosis*   • HTN (hypertension)*   • Anxiety disorder*   • Osteopetrosis*   • Hypothyroidism*   • GERD (gastroesophageal reflux disease)*   • Chronic pain syndrome due to fractured spine and left rib fracture*   • Anemia, chronic disease   • Arthralgia of hip   • Left leg pain   • Fracture of proximal end of tibia and fibula   • Primary insomnia   • Thrombocytopenia (CMS/HCC)   • Peripheral vascular disease (CMS/HCC)   • Bilateral leg edema   • Cramp of both lower extremities   • Hepatic encephalopathy (CMS/HCC)   • UTI (urinary tract infection)   • Hypokalemia   • Closed fracture of lower end of left femur with routine healing   • Status post open reduction with internal fixation of fracture   • Nonrheumatic aortic valve insufficiency, mild   • Chronic diastolic heart failure, mild   • Periprosthetic fracture of shaft of femur   • S/P ORIF (open reduction internal fixation) fracture   • Diarrhea   • Right upper quadrant abdominal pain   • Nausea   • Elevated liver enzymes   • Poor venous access   • Stage 3 chronic kidney disease (CMS/HCC)   • Trochanteric bursitis of left hip       Social History     Tobacco Use   • Smoking status: Never Smoker   • Smokeless tobacco: Former User     Types: Snuff   Substance Use Topics   • Alcohol use: No     Comment: FORMER 12 BOTTLES PER DAY FOR 15 YEARS   • Drug use: No       Allergies   Allergen Reactions   • Ciprofloxacin        Current Outpatient Medications on File Prior to Visit   Medication Sig   • Alcohol Swabs pads Uses as directed.   • aspirin 81 MG tablet Take 1 tablet by mouth Daily.   • bumetanide (BUMEX) 1 MG tablet Take 1 tablet by mouth  2 (Two) Times a Day.   • Calcium Carbonate-Vitamin D (OYST-LILA-D 500 PO) Take  by mouth.   • esomeprazole (nexIUM) 40 MG capsule Take 1 capsule by mouth Every Morning Before Breakfast.   • folic acid (FOLVITE) 1 MG tablet Take 1 tablet by mouth Daily.   • Insulin Pen Needle (BD AUTOSHIELD DUO) 30G X 5 MM misc Use as directed   • lactulose (CHRONULAC) 10 GM/15ML solution Take 30 mL by mouth 5 (Five) Times a Day.   • levothyroxine (SYNTHROID) 50 MCG tablet Take 1 tablet by mouth Daily.   • magnesium oxide (MAG-OX) 400 MG tablet Take 1 tablet by mouth 3 (Three) Times a Day.   • Multiple Vitamin (MULTI VITAMIN DAILY) tablet Take  by mouth.   • nitroglycerin (NITROSTAT) 0.4 MG SL tablet Place 1 tablet under the tongue Every 5 (Five) Minutes As Needed for Chest Pain. Take no more than 3 doses in 15 minutes.   • ondansetron (ZOFRAN) 4 MG tablet Take 4 mg by mouth Every 8 (Eight) Hours As Needed for Nausea or Vomiting.   • OXcarbazepine (TRILEPTAL) 150 MG tablet Take 1 tablet by mouth Every 12 (Twelve) Hours.   • rifaximin (XIFAXAN) 550 MG tablet Take 1 tablet by mouth Every 12 (Twelve) Hours.   • rOPINIRole (REQUIP) 1 MG tablet Take 1 tablet by mouth Every Night. Take 1 hour before bedtime.   • spironolactone (ALDACTONE) 25 MG tablet Take 4 tablets by mouth Daily.   • teriparatide (FORTEO) 600 MCG/2.4ML injection Inject 0.08 mL under the skin Daily.   • vitamin D (ERGOCALCIFEROL) 24607 units capsule capsule Take 1 capsule by mouth 1 (One) Time Per Week.   • [DISCONTINUED] HYDROcodone-acetaminophen (NORCO) 5-325 MG per tablet Take 1 tablet by mouth 2 (Two) Times a Day As Needed for Moderate Pain .     No current facility-administered medications on file prior to visit.          The following portions of the patient's history were reviewed and updated as appropriate: allergies, current medications, past family history, past medical history, past social history, past surgical history and problem list.    Review of Systems  "  Constitution: Negative.   HENT: Negative.  Negative for congestion.    Eyes: Negative.    Cardiovascular: Negative.  Negative for chest pain, cyanosis, dyspnea on exertion, irregular heartbeat, leg swelling, near-syncope, orthopnea, palpitations, paroxysmal nocturnal dyspnea and syncope.   Respiratory: Negative.  Negative for shortness of breath.    Hematologic/Lymphatic: Negative.    Musculoskeletal: Positive for back pain.   Gastrointestinal: Negative.    Neurological: Negative.  Negative for headaches.   Psychiatric/Behavioral: The patient is nervous/anxious.           Objective:     /62 (BP Location: Left arm, Patient Position: Sitting)   Pulse 84   Ht 160 cm (63\")   Wt 63.5 kg (140 lb)   LMP  (LMP Unknown)   SpO2 95%   BMI 24.80 kg/m²   Physical Exam   Constitutional: She appears well-developed and well-nourished. No distress.   HENT:   Head: Normocephalic and atraumatic.   Mouth/Throat: Oropharynx is clear and moist. No oropharyngeal exudate.   Eyes: Conjunctivae and EOM are normal. Pupils are equal, round, and reactive to light. No scleral icterus.   Neck: Normal range of motion. Neck supple. No JVD present. No tracheal deviation present. No thyromegaly present.   Cardiovascular: Normal rate, regular rhythm, normal heart sounds and intact distal pulses. PMI is not displaced. Exam reveals no gallop, no friction rub and no decreased pulses.   No murmur heard.  Pulses:       Carotid pulses are 3+ on the right side, and 3+ on the left side.       Radial pulses are 3+ on the right side, and 3+ on the left side.   Pulmonary/Chest: Effort normal and breath sounds normal. No respiratory distress. She has no wheezes. She has no rales. She exhibits no tenderness.   Abdominal: Soft. Bowel sounds are normal. She exhibits no distension, no abdominal bruit and no mass. There is no splenomegaly or hepatomegaly. There is no tenderness. There is no rebound and no guarding.   Musculoskeletal: Normal range of " motion. She exhibits no edema, tenderness or deformity.   Lymphadenopathy:     She has no cervical adenopathy.   Neurological: She is alert. She has normal reflexes. No cranial nerve deficit. She exhibits normal muscle tone. Coordination normal.   Skin: Skin is warm and dry. No rash noted. She is not diaphoretic. No erythema.   Psychiatric: She has a normal mood and affect. Her behavior is normal. Judgment and thought content normal.         Lab Review  Lab Results   Component Value Date     03/16/2019    K 4.0 03/16/2019     03/16/2019    BUN 31 (H) 03/16/2019    CREATININE 1.27 (H) 03/16/2019    GLUCOSE 101 (H) 03/16/2019    CALCIUM 9.8 03/16/2019    ALT 28 03/16/2019    ALKPHOS 127 (H) 03/16/2019    LABIL2 0.9 (L) 04/08/2016     Lab Results   Component Value Date    CKTOTAL 99 07/25/2017     Lab Results   Component Value Date    WBC 3.70 03/16/2019    HGB 12.1 03/16/2019    HCT 36.7 03/16/2019    PLT 88 (L) 03/16/2019     Lab Results   Component Value Date    INR 1.28 (H) 06/27/2018    INR 1.79 (H) 10/13/2017    INR 2.04 (H) 10/12/2017     Lab Results   Component Value Date    MG 1.9 03/16/2019     Lab Results   Component Value Date    TSH 1.830 11/05/2018     Lab Results   Component Value Date    BNP 33.0 11/05/2018     Lab Results   Component Value Date    CHLPL 161 04/08/2016    CHOL 180 02/28/2019    TRIG 129 02/28/2019    HDL 89 02/28/2019    VLDL 25.8 02/28/2019    LDLHDL 0.73 02/28/2019     Lab Results   Component Value Date    LDL 65 02/28/2019    LDL 35 11/05/2018       Procedures       I personally viewed and interpreted the patient's LAB data         Assessment:     1. Generalized anxiety disorder    2. Chronic pain syndrome due to fractured spine and left rib fracture*    3. Cirrhosis of liver without ascites, unspecified hepatic cirrhosis type (CMS/HCC)    4. Hypothyroidism due to acquired atrophy of thyroid          Plan:     Patient was recently in the hospital.  Lab work reviewed.   Creatinine was 1.27.  Platelet count is low at 88 but it is  stable.  Patient had lipid panel a week before which is also normal.  Refills of medications were given.  Side effects discussed and explained.  Follow-up scheduled      No Follow-up on file.

## 2019-04-09 RX ORDER — ASPIRIN 81 MG/1
TABLET ORAL
Qty: 90 TABLET | Refills: 0 | Status: ON HOLD | OUTPATIENT
Start: 2019-04-09 | End: 2019-01-01

## 2019-04-09 RX ORDER — ROPINIROLE 1 MG/1
1 TABLET, FILM COATED ORAL NIGHTLY
Qty: 90 TABLET | Refills: 0 | Status: ON HOLD | OUTPATIENT
Start: 2019-04-09 | End: 2019-01-01

## 2019-04-09 RX ORDER — LEVOTHYROXINE SODIUM 0.05 MG/1
50 TABLET ORAL DAILY
Qty: 90 TABLET | Refills: 0 | Status: SHIPPED | OUTPATIENT
Start: 2019-04-09 | End: 2019-04-25

## 2019-04-09 RX ORDER — BUMETANIDE 1 MG/1
TABLET ORAL
Qty: 180 TABLET | Refills: 0 | Status: SHIPPED | OUTPATIENT
Start: 2019-04-09 | End: 2019-04-18 | Stop reason: HOSPADM

## 2019-04-09 RX ORDER — ESOMEPRAZOLE MAGNESIUM 40 MG/1
40 CAPSULE, DELAYED RELEASE ORAL
Qty: 90 CAPSULE | Refills: 0 | Status: ON HOLD | OUTPATIENT
Start: 2019-04-09 | End: 2019-01-01

## 2019-04-10 ENCOUNTER — TELEPHONE (OUTPATIENT)
Dept: CARDIOLOGY | Facility: CLINIC | Age: 84
End: 2019-04-10

## 2019-04-10 DIAGNOSIS — E53.8 B12 DEFICIENCY: Primary | ICD-10-CM

## 2019-04-10 NOTE — TELEPHONE ENCOUNTER
PT GRAND DAUGHTER CALLED AND SAID SARAH NEEDS A REFILL ON   INJECTION CYANOCOBALAMIN 1000 MCG 1 TIME EVERY 28 DAYS SENT TO [facility]

## 2019-04-10 NOTE — TELEPHONE ENCOUNTER
5 months ago the patient's B12 level was elevated this needs to be re-checked prior to starting injection therapy again.

## 2019-04-15 ENCOUNTER — APPOINTMENT (OUTPATIENT)
Dept: CT IMAGING | Facility: HOSPITAL | Age: 84
End: 2019-04-15

## 2019-04-15 ENCOUNTER — HOSPITAL ENCOUNTER (INPATIENT)
Facility: HOSPITAL | Age: 84
LOS: 3 days | Discharge: NURSING, REHAB OR LONG-TERM CARE | End: 2019-04-18
Attending: EMERGENCY MEDICINE | Admitting: INTERNAL MEDICINE

## 2019-04-15 ENCOUNTER — APPOINTMENT (OUTPATIENT)
Dept: GENERAL RADIOLOGY | Facility: HOSPITAL | Age: 84
End: 2019-04-15

## 2019-04-15 DIAGNOSIS — K76.82 HEPATIC ENCEPHALOPATHY (HCC): Primary | ICD-10-CM

## 2019-04-15 LAB
6-ACETYL MORPHINE: NEGATIVE
A-A DO2: 18.2 MMHG (ref 0–300)
ALBUMIN SERPL-MCNC: 3.42 G/DL (ref 3.5–5.2)
ALBUMIN/GLOB SERPL: 1 G/DL
ALP SERPL-CCNC: 157 U/L (ref 39–117)
ALT SERPL W P-5'-P-CCNC: 20 U/L (ref 1–33)
AMMONIA BLD-SCNC: 263 UMOL/L (ref 11–51)
AMPHET+METHAMPHET UR QL: NEGATIVE
ANION GAP SERPL CALCULATED.3IONS-SCNC: 12.1 MMOL/L
APAP SERPL-MCNC: <5 MCG/ML (ref 10–30)
APTT PPP: 34 SECONDS (ref 23.8–36.1)
ARTERIAL PATENCY WRIST A: POSITIVE
AST SERPL-CCNC: 45 U/L (ref 1–32)
ATMOSPHERIC PRESS: 725 MMHG
BACTERIA UR QL AUTO: ABNORMAL /HPF
BARBITURATES UR QL SCN: NEGATIVE
BASE EXCESS BLDA CALC-SCNC: 1.3 MMOL/L
BASOPHILS # BLD AUTO: 0.02 10*3/MM3 (ref 0–0.2)
BASOPHILS NFR BLD AUTO: 0.6 % (ref 0–1.5)
BDY SITE: ABNORMAL
BENZODIAZ UR QL SCN: NEGATIVE
BILIRUB SERPL-MCNC: 1.1 MG/DL (ref 0.2–1.2)
BILIRUB UR QL STRIP: NEGATIVE
BODY TEMPERATURE: 98.6 C
BUN BLD-MCNC: 37 MG/DL (ref 8–23)
BUN/CREAT SERPL: 25.7 (ref 7–25)
BUPRENORPHINE SERPL-MCNC: NEGATIVE NG/ML
CALCIUM SPEC-SCNC: 9.1 MG/DL (ref 8.6–10.5)
CANNABINOIDS SERPL QL: NEGATIVE
CHLORIDE SERPL-SCNC: 104 MMOL/L (ref 98–107)
CLARITY UR: ABNORMAL
CO2 SERPL-SCNC: 23.9 MMOL/L (ref 22–29)
COCAINE UR QL: NEGATIVE
COHGB MFR BLD: 0.8 % (ref 0–5)
COLOR UR: YELLOW
CREAT BLD-MCNC: 1.44 MG/DL (ref 0.57–1)
CRP SERPL-MCNC: 0.61 MG/DL (ref 0–0.5)
D-LACTATE SERPL-SCNC: 1.6 MMOL/L (ref 0.5–2)
DEPRECATED RDW RBC AUTO: 53.6 FL (ref 37–54)
EOSINOPHIL # BLD AUTO: 0.19 10*3/MM3 (ref 0–0.4)
EOSINOPHIL NFR BLD AUTO: 5.9 % (ref 0.3–6.2)
ERYTHROCYTE [DISTWIDTH] IN BLOOD BY AUTOMATED COUNT: 14.3 % (ref 12.3–15.4)
ETHANOL BLD-MCNC: <10 MG/DL (ref 0–10)
ETHANOL UR QL: <0.01 %
FLUAV AG NPH QL: NEGATIVE
FLUBV AG NPH QL IA: NEGATIVE
GFR SERPL CREATININE-BSD FRML MDRD: 34 ML/MIN/1.73
GLOBULIN UR ELPH-MCNC: 3.5 GM/DL
GLUCOSE BLD-MCNC: 93 MG/DL (ref 65–99)
GLUCOSE UR STRIP-MCNC: NEGATIVE MG/DL
HCO3 BLDA-SCNC: 24.9 MMOL/L (ref 22–26)
HCT VFR BLD AUTO: 36.1 % (ref 34–46.6)
HCT VFR BLD CALC: 34 % (ref 37–47)
HGB BLD-MCNC: 11.6 G/DL (ref 12–15.9)
HGB BLDA-MCNC: 11.5 G/DL (ref 12–16)
HGB UR QL STRIP.AUTO: ABNORMAL
HOLD SPECIMEN: NORMAL
HOLD SPECIMEN: NORMAL
HOROWITZ INDEX BLD+IHG-RTO: 21 %
HYALINE CASTS UR QL AUTO: ABNORMAL /LPF
IMM GRANULOCYTES # BLD AUTO: 0 10*3/MM3 (ref 0–0.05)
IMM GRANULOCYTES NFR BLD AUTO: 0 % (ref 0–0.5)
INR PPP: 1.28 (ref 0.9–1.1)
KETONES UR QL STRIP: NEGATIVE
LEUKOCYTE ESTERASE UR QL STRIP.AUTO: ABNORMAL
LIPASE SERPL-CCNC: 68 U/L (ref 13–60)
LYMPHOCYTES # BLD AUTO: 1.15 10*3/MM3 (ref 0.7–3.1)
LYMPHOCYTES NFR BLD AUTO: 35.7 % (ref 19.6–45.3)
MAGNESIUM SERPL-MCNC: 1.9 MG/DL (ref 1.6–2.4)
MCH RBC QN AUTO: 33.1 PG (ref 26.6–33)
MCHC RBC AUTO-ENTMCNC: 32.1 G/DL (ref 31.5–35.7)
MCV RBC AUTO: 103.1 FL (ref 79–97)
METHADONE UR QL SCN: NEGATIVE
METHGB BLD QL: 0.3 % (ref 0–3)
MODALITY: ABNORMAL
MONOCYTES # BLD AUTO: 0.45 10*3/MM3 (ref 0.1–0.9)
MONOCYTES NFR BLD AUTO: 14 % (ref 5–12)
NEUTROPHILS # BLD AUTO: 1.41 10*3/MM3 (ref 1.4–7)
NEUTROPHILS NFR BLD AUTO: 43.8 % (ref 42.7–76)
NITRITE UR QL STRIP: NEGATIVE
OPIATES UR QL: NEGATIVE
OXYCODONE UR QL SCN: NEGATIVE
OXYHGB MFR BLDV: 94.4 % (ref 85–100)
PCO2 BLDA: 35.6 MM HG (ref 35–45)
PCP UR QL SCN: NEGATIVE
PH BLDA: 7.46 PH UNITS (ref 7.35–7.45)
PH UR STRIP.AUTO: >=9 [PH] (ref 5–8)
PLATELET # BLD AUTO: 93 10*3/MM3 (ref 140–450)
PMV BLD AUTO: 10.9 FL (ref 6–12)
PO2 BLDA: 81.6 MM HG (ref 80–100)
POTASSIUM BLD-SCNC: 4.3 MMOL/L (ref 3.5–5.2)
PROT SERPL-MCNC: 6.9 G/DL (ref 6–8.5)
PROT UR QL STRIP: ABNORMAL
PROTHROMBIN TIME: 16.3 SECONDS (ref 11–15.4)
RBC # BLD AUTO: 3.5 10*6/MM3 (ref 3.77–5.28)
RBC # UR: ABNORMAL /HPF
REF LAB TEST METHOD: ABNORMAL
SALICYLATES SERPL-MCNC: <0.3 MG/DL
SAO2 % BLDCOA: 95.4 % (ref 90–100)
SODIUM BLD-SCNC: 140 MMOL/L (ref 136–145)
SP GR UR STRIP: 1.02 (ref 1–1.03)
SQUAMOUS #/AREA URNS HPF: ABNORMAL /HPF
T4 FREE SERPL-MCNC: 0.69 NG/DL (ref 0.93–1.7)
TROPONIN T SERPL-MCNC: <0.01 NG/ML (ref 0–0.03)
TSH SERPL DL<=0.05 MIU/L-ACNC: 6.65 MIU/ML (ref 0.27–4.2)
UROBILINOGEN UR QL STRIP: ABNORMAL
WBC NRBC COR # BLD: 3.22 10*3/MM3 (ref 3.4–10.8)
WBC UR QL AUTO: ABNORMAL /HPF
WHOLE BLOOD HOLD SPECIMEN: NORMAL
WHOLE BLOOD HOLD SPECIMEN: NORMAL

## 2019-04-15 PROCEDURE — 36600 WITHDRAWAL OF ARTERIAL BLOOD: CPT | Performed by: EMERGENCY MEDICINE

## 2019-04-15 PROCEDURE — 80307 DRUG TEST PRSMV CHEM ANLYZR: CPT | Performed by: EMERGENCY MEDICINE

## 2019-04-15 PROCEDURE — 83735 ASSAY OF MAGNESIUM: CPT | Performed by: EMERGENCY MEDICINE

## 2019-04-15 PROCEDURE — 82375 ASSAY CARBOXYHB QUANT: CPT | Performed by: EMERGENCY MEDICINE

## 2019-04-15 PROCEDURE — 71045 X-RAY EXAM CHEST 1 VIEW: CPT

## 2019-04-15 PROCEDURE — 82746 ASSAY OF FOLIC ACID SERUM: CPT | Performed by: PHYSICIAN ASSISTANT

## 2019-04-15 PROCEDURE — 85730 THROMBOPLASTIN TIME PARTIAL: CPT | Performed by: EMERGENCY MEDICINE

## 2019-04-15 PROCEDURE — 71045 X-RAY EXAM CHEST 1 VIEW: CPT | Performed by: RADIOLOGY

## 2019-04-15 PROCEDURE — 82607 VITAMIN B-12: CPT | Performed by: PHYSICIAN ASSISTANT

## 2019-04-15 PROCEDURE — 85025 COMPLETE CBC W/AUTO DIFF WBC: CPT | Performed by: EMERGENCY MEDICINE

## 2019-04-15 PROCEDURE — 87186 SC STD MICRODIL/AGAR DIL: CPT | Performed by: EMERGENCY MEDICINE

## 2019-04-15 PROCEDURE — 83050 HGB METHEMOGLOBIN QUAN: CPT | Performed by: EMERGENCY MEDICINE

## 2019-04-15 PROCEDURE — 25010000002 CEFTRIAXONE: Performed by: EMERGENCY MEDICINE

## 2019-04-15 PROCEDURE — 70450 CT HEAD/BRAIN W/O DYE: CPT

## 2019-04-15 PROCEDURE — 87040 BLOOD CULTURE FOR BACTERIA: CPT | Performed by: EMERGENCY MEDICINE

## 2019-04-15 PROCEDURE — 84439 ASSAY OF FREE THYROXINE: CPT | Performed by: PHYSICIAN ASSISTANT

## 2019-04-15 PROCEDURE — 82805 BLOOD GASES W/O2 SATURATION: CPT | Performed by: EMERGENCY MEDICINE

## 2019-04-15 PROCEDURE — 85610 PROTHROMBIN TIME: CPT | Performed by: EMERGENCY MEDICINE

## 2019-04-15 PROCEDURE — 84484 ASSAY OF TROPONIN QUANT: CPT | Performed by: EMERGENCY MEDICINE

## 2019-04-15 PROCEDURE — 99285 EMERGENCY DEPT VISIT HI MDM: CPT

## 2019-04-15 PROCEDURE — 70450 CT HEAD/BRAIN W/O DYE: CPT | Performed by: RADIOLOGY

## 2019-04-15 PROCEDURE — 87086 URINE CULTURE/COLONY COUNT: CPT | Performed by: EMERGENCY MEDICINE

## 2019-04-15 PROCEDURE — 99223 1ST HOSP IP/OBS HIGH 75: CPT | Performed by: INTERNAL MEDICINE

## 2019-04-15 PROCEDURE — 87181 SC STD AGAR DILUTION PER AGT: CPT | Performed by: EMERGENCY MEDICINE

## 2019-04-15 PROCEDURE — 25010000002 MAGNESIUM SULFATE 2 GM/50ML SOLUTION: Performed by: INTERNAL MEDICINE

## 2019-04-15 PROCEDURE — 87804 INFLUENZA ASSAY W/OPTIC: CPT | Performed by: EMERGENCY MEDICINE

## 2019-04-15 PROCEDURE — 83690 ASSAY OF LIPASE: CPT | Performed by: EMERGENCY MEDICINE

## 2019-04-15 PROCEDURE — 93010 ELECTROCARDIOGRAM REPORT: CPT | Performed by: INTERNAL MEDICINE

## 2019-04-15 PROCEDURE — 87077 CULTURE AEROBIC IDENTIFY: CPT | Performed by: EMERGENCY MEDICINE

## 2019-04-15 PROCEDURE — 86140 C-REACTIVE PROTEIN: CPT | Performed by: EMERGENCY MEDICINE

## 2019-04-15 PROCEDURE — 82140 ASSAY OF AMMONIA: CPT | Performed by: EMERGENCY MEDICINE

## 2019-04-15 PROCEDURE — 84443 ASSAY THYROID STIM HORMONE: CPT | Performed by: EMERGENCY MEDICINE

## 2019-04-15 PROCEDURE — 83605 ASSAY OF LACTIC ACID: CPT | Performed by: EMERGENCY MEDICINE

## 2019-04-15 PROCEDURE — 80053 COMPREHEN METABOLIC PANEL: CPT | Performed by: EMERGENCY MEDICINE

## 2019-04-15 PROCEDURE — 93005 ELECTROCARDIOGRAM TRACING: CPT | Performed by: EMERGENCY MEDICINE

## 2019-04-15 PROCEDURE — 81001 URINALYSIS AUTO W/SCOPE: CPT | Performed by: EMERGENCY MEDICINE

## 2019-04-15 RX ORDER — ROPINIROLE 1 MG/1
1 TABLET, FILM COATED ORAL NIGHTLY
Status: CANCELLED | OUTPATIENT
Start: 2019-04-15

## 2019-04-15 RX ORDER — HYDROCODONE BITARTRATE AND ACETAMINOPHEN 5; 325 MG/1; MG/1
1 TABLET ORAL 2 TIMES DAILY PRN
Status: CANCELLED | OUTPATIENT
Start: 2019-04-15

## 2019-04-15 RX ORDER — SODIUM CHLORIDE 0.9 % (FLUSH) 0.9 %
3 SYRINGE (ML) INJECTION EVERY 12 HOURS SCHEDULED
Status: DISCONTINUED | OUTPATIENT
Start: 2019-04-15 | End: 2019-04-19 | Stop reason: HOSPADM

## 2019-04-15 RX ORDER — SODIUM CHLORIDE 0.9 % (FLUSH) 0.9 %
10 SYRINGE (ML) INJECTION AS NEEDED
Status: DISCONTINUED | OUTPATIENT
Start: 2019-04-15 | End: 2019-04-19 | Stop reason: HOSPADM

## 2019-04-15 RX ORDER — LACTULOSE 10 G/15ML
30 SOLUTION ORAL ONCE
Status: COMPLETED | OUTPATIENT
Start: 2019-04-15 | End: 2019-04-15

## 2019-04-15 RX ORDER — MAGNESIUM SULFATE HEPTAHYDRATE 40 MG/ML
2 INJECTION, SOLUTION INTRAVENOUS ONCE
Status: COMPLETED | OUTPATIENT
Start: 2019-04-15 | End: 2019-04-15

## 2019-04-15 RX ORDER — ERGOCALCIFEROL 1.25 MG/1
50000 CAPSULE ORAL WEEKLY
Status: CANCELLED | OUTPATIENT
Start: 2019-04-15

## 2019-04-15 RX ORDER — ASPIRIN 81 MG/1
81 TABLET ORAL DAILY
Status: CANCELLED | OUTPATIENT
Start: 2019-04-15

## 2019-04-15 RX ORDER — OXCARBAZEPINE 300 MG/1
150 TABLET, FILM COATED ORAL EVERY MORNING
Status: CANCELLED | OUTPATIENT
Start: 2019-04-16

## 2019-04-15 RX ORDER — LEVOTHYROXINE SODIUM 0.05 MG/1
50 TABLET ORAL
Status: DISCONTINUED | OUTPATIENT
Start: 2019-04-16 | End: 2019-04-19 | Stop reason: HOSPADM

## 2019-04-15 RX ORDER — LACTULOSE 10 G/15ML
30 SOLUTION ORAL 3 TIMES DAILY
Status: DISCONTINUED | OUTPATIENT
Start: 2019-04-16 | End: 2019-04-16

## 2019-04-15 RX ORDER — MAGNESIUM OXIDE 400 MG/1
800 TABLET ORAL DAILY
Status: DISCONTINUED | OUTPATIENT
Start: 2019-04-16 | End: 2019-04-19 | Stop reason: HOSPADM

## 2019-04-15 RX ORDER — OXCARBAZEPINE 300 MG/1
300 TABLET, FILM COATED ORAL NIGHTLY
Status: CANCELLED | OUTPATIENT
Start: 2019-04-15

## 2019-04-15 RX ORDER — OXCARBAZEPINE 150 MG/1
300 TABLET, FILM COATED ORAL EVERY MORNING
Status: ON HOLD | COMMUNITY
End: 2019-01-01

## 2019-04-15 RX ORDER — LEVOTHYROXINE SODIUM 0.05 MG/1
50 TABLET ORAL DAILY
Status: CANCELLED | OUTPATIENT
Start: 2019-04-15

## 2019-04-15 RX ORDER — BUMETANIDE 1 MG/1
1 TABLET ORAL 2 TIMES DAILY
Status: CANCELLED | OUTPATIENT
Start: 2019-04-15

## 2019-04-15 RX ORDER — LANSOPRAZOLE
30 KIT EVERY MORNING
Status: DISCONTINUED | OUTPATIENT
Start: 2019-04-16 | End: 2019-04-18

## 2019-04-15 RX ORDER — NITROGLYCERIN 0.4 MG/1
0.4 TABLET SUBLINGUAL
Status: DISCONTINUED | OUTPATIENT
Start: 2019-04-15 | End: 2019-04-19 | Stop reason: HOSPADM

## 2019-04-15 RX ORDER — SODIUM CHLORIDE 0.9 % (FLUSH) 0.9 %
3-10 SYRINGE (ML) INJECTION AS NEEDED
Status: DISCONTINUED | OUTPATIENT
Start: 2019-04-15 | End: 2019-04-19 | Stop reason: HOSPADM

## 2019-04-15 RX ORDER — NITROGLYCERIN 0.4 MG/1
0.4 TABLET SUBLINGUAL
Status: CANCELLED | OUTPATIENT
Start: 2019-04-15

## 2019-04-15 RX ORDER — MAGNESIUM OXIDE 400 MG/1
400 TABLET ORAL DAILY
COMMUNITY
End: 2019-01-01 | Stop reason: SDUPTHER

## 2019-04-15 RX ORDER — LACTULOSE 10 G/15ML
20 SOLUTION ORAL
Status: CANCELLED | OUTPATIENT
Start: 2019-04-15

## 2019-04-15 RX ORDER — MAGNESIUM OXIDE 400 MG/1
800 TABLET ORAL DAILY
Status: CANCELLED | OUTPATIENT
Start: 2019-04-15

## 2019-04-15 RX ORDER — ASPIRIN 81 MG/1
81 TABLET, CHEWABLE ORAL DAILY
Status: DISCONTINUED | OUTPATIENT
Start: 2019-04-16 | End: 2019-04-19 | Stop reason: HOSPADM

## 2019-04-15 RX ORDER — OXCARBAZEPINE 150 MG/1
300 TABLET, FILM COATED ORAL NIGHTLY
Status: ON HOLD | COMMUNITY
End: 2019-01-01

## 2019-04-15 RX ORDER — LACTULOSE 10 G/15ML
30 SOLUTION ORAL 3 TIMES DAILY
Status: DISCONTINUED | OUTPATIENT
Start: 2019-04-15 | End: 2019-04-15

## 2019-04-15 RX ORDER — PANTOPRAZOLE SODIUM 40 MG/1
40 TABLET, DELAYED RELEASE ORAL EVERY MORNING
Status: CANCELLED | OUTPATIENT
Start: 2019-04-16

## 2019-04-15 RX ADMIN — LACTULOSE 30 G: 10 SOLUTION ORAL at 21:14

## 2019-04-15 RX ADMIN — MAGNESIUM SULFATE IN WATER 2 G: 40 INJECTION, SOLUTION INTRAVENOUS at 23:16

## 2019-04-15 RX ADMIN — CEFTRIAXONE 2 G: 2 INJECTION, POWDER, FOR SOLUTION INTRAMUSCULAR; INTRAVENOUS at 13:58

## 2019-04-15 RX ADMIN — LACTULOSE 30 G: 20 SOLUTION ORAL at 15:58

## 2019-04-15 RX ADMIN — RIFAXIMIN 550 MG: 550 TABLET ORAL at 23:17

## 2019-04-15 RX ADMIN — SODIUM CHLORIDE, PRESERVATIVE FREE 3 ML: 5 INJECTION INTRAVENOUS at 21:14

## 2019-04-15 NOTE — ED NOTES
Pt resting on stretcher at this time, pt remains confused but is calm at this time. [place] and pillow provided, pt encouraged to reposition with assistance, oral care provided, pt brief changed. Fall precautions maintained.     Alannah Ferrara, PHILLIP  04/15/19 [id number]

## 2019-04-15 NOTE — ED NOTES
Pt remains nonverbal, uncooperative, pt is calm at this time, pt alert, skin pwd, no resp distress. Pt is waiting on xray to result to confirm ng tube placement. Fall precautions maintained.     Alannah Ferrara, RN  04/15/19 [id number]

## 2019-04-15 NOTE — ED PROVIDER NOTES
Subjective   Patient is an 88-year-old female who arrives by way of EMS for an altered mental status.  No family accompanies her.  There is staff from the nursing home where patient apparently does outpatient rehabilitation accompanying her today.  They advised me that her home life is terrible, that she is not well taken care of at home, and that she has had no medications, food or liquids since Saturday.  This is all of the history that I have available to me.  Patient is noncommunicative.            Review of Systems   Unable to perform ROS: Mental status change       Past Medical History:   Diagnosis Date   • Acute renal failure (CMS/HCC)    • Alcohol abuse     in remission   • Anxiety    • Bell's palsy    • Cellulitis     LLE   • CHF (congestive heart failure) (CMS/HCC)     Diastolic   • CHF (congestive heart failure) (CMS/HCC)    • Constipation    • Coronary artery disease    • Diverticulosis    • GERD (gastroesophageal reflux disease)    • Hepatic cirrhosis (CMS/HCC)    • Hepatitis C    • History of transfusion    • Hypertension    • Hypothyroidism    • MI (mitral incompetence)    • Osteoporosis    • Osteoporosis    • Restless leg syndrome    • Thrombocytopenia (CMS/HCC)    • Trigeminal neuralgia        Allergies   Allergen Reactions   • Ciprofloxacin        Past Surgical History:   Procedure Laterality Date   • BACK SURGERY      KYPHOPLASTIES    • CARDIAC CATHETERIZATION Left 08/2004   • CARDIOVASCULAR STRESS TEST  01/25/2015   • CATARACT EXTRACTION     • CHOLECYSTECTOMY     • CLAVICLE SURGERY Right    • COLONOSCOPY  10/2004   • ECHO - CONVERTED  11/2012   • FEMUR OPEN REDUCTION INTERNAL FIXATION Left 10/12/2017    Procedure: FEMUR OPEN REDUCTION INTERNAL FIXATION;  Surgeon: Karson Pierre MD;  Location: [facility];  Service:    • HIP FRACTURE SURGERY Bilateral     ARTHROPLASTIES    • PARTIAL HYSTERECTOMY     • PORTACATH PLACEMENT N/A 9/28/2018    Procedure: INSERTION OF PORTACATH;  Surgeon:  Vic Mauricio MD;  Location: [facility] OR;  Service: General   • WRIST FRACTURE SURGERY         Family History   Problem Relation Age of Onset   • Cancer Mother    • Heart disease Father    • Arthritis Sister    • Osteoporosis Sister    • Diabetes Maternal Aunt        Social History     Socioeconomic History   • Marital status:      Spouse name: Not on file   • Number of children: Not on file   • Years of education: Not on file   • Highest education level: Not on file   Tobacco Use   • Smoking status: Never Smoker   • Smokeless tobacco: Former User     Types: Snuff   Substance and Sexual Activity   • Alcohol use: No     Comment: FORMER 12 BOTTLES PER DAY FOR 15 YEARS   • Drug use: No   • Sexual activity: Defer   Social History Narrative    LIVES WITH GRANDDAUGHTER DEVON IN [place]            Objective   Physical Exam   Constitutional:   A chronically ill appearing female who is quite somnolent.  She responds to a sternal rub with groaning, opens her eyes very briefly, does not communicate or follow commands.   HENT:   Head: Normocephalic and atraumatic.   Eyes: EOM are normal. Pupils are equal, round, and reactive to light. No scleral icterus.   Neck: Normal range of motion. Neck supple. No neck rigidity. No tracheal deviation present.   Cardiovascular: Normal rate, regular rhythm and intact distal pulses.   Pulmonary/Chest: Effort normal and breath sounds normal. No respiratory distress. She exhibits no tenderness.   Abdominal: Soft. Bowel sounds are normal. There is no tenderness. There is no rebound and no guarding.   Musculoskeletal: Normal range of motion. She exhibits no tenderness.   Neurological:   Mental status is described above.  There are no obvious focal deficits.   Skin: Skin is warm and dry. Capillary refill takes less than 2 seconds. No cyanosis. No pallor.   Psychiatric:   Patient is very somnolent.   Nursing note and vitals reviewed.      Procedures   EKG shows sinus rhythm with a  rate of 65.  No apparent acute ischemia.  XR Chest 1 View   Final Result   Stable chest. No acute changes identified.       This report was finalized on 4/15/2019 11:27 AM by Dr. Manish Sherwood MD.          CT Head Without Contrast   Final Result   No acute intracranial pathology. Nothing is seen on this exam to   specifically account for the patient's symptoms.       This report was finalized on 4/15/2019 11:05 AM by Dr. Byron Mcintosh MD.          XR Chest 1 View    (Results Pending)     Results for orders placed or performed during the hospital encounter of 04/15/19   Influenza Antigen, Rapid - Swab, Nasopharynx   Result Value Ref Range    Influenza A Ag, EIA Negative Negative    Influenza B Ag, EIA Negative Negative   Comprehensive Metabolic Panel   Result Value Ref Range    Glucose 93 65 - 99 mg/dL    BUN 37 (H) 8 - 23 mg/dL    Creatinine 1.44 (H) 0.57 - 1.00 mg/dL    Sodium 140 136 - 145 mmol/L    Potassium 4.3 3.5 - 5.2 mmol/L    Chloride 104 98 - 107 mmol/L    CO2 23.9 22.0 - 29.0 mmol/L    Calcium 9.1 8.6 - 10.5 mg/dL    Total Protein 6.9 6.0 - 8.5 g/dL    Albumin 3.42 (L) 3.50 - 5.20 g/dL    ALT (SGPT) 20 1 - 33 U/L    AST (SGOT) 45 (H) 1 - 32 U/L    Alkaline Phosphatase 157 (H) 39 - 117 U/L    Total Bilirubin 1.1 0.2 - 1.2 mg/dL    eGFR Non African Amer 34 (L) >60 mL/min/1.73    Globulin 3.5 gm/dL    A/G Ratio 1.0 g/dL    BUN/Creatinine Ratio 25.7 (H) 7.0 - 25.0    Anion Gap 12.1 mmol/L   Lipase   Result Value Ref Range    Lipase 68 (H) 13 - 60 U/L   Urinalysis With Culture If Indicated - Urine, Catheter   Result Value Ref Range    Color, UA Yellow Yellow, Straw    Appearance, UA Cloudy (A) Clear    pH, UA >=9.0 (H) 5.0 - 8.0    Specific Gravity, UA 1.018 1.005 - 1.030    Glucose, UA Negative Negative    Ketones, UA Negative Negative    Bilirubin, UA Negative Negative    Blood, UA Moderate (2+) (A) Negative    Protein, UA Trace (A) Negative    Leuk Esterase, UA Large (3+) (A) Negative    Nitrite, UA  Negative Negative    Urobilinogen, UA 2.0 E.U./dL (A) 0.2 - 1.0 E.U./dL   Blood Gas, Arterial   Result Value Ref Range    Site Arterial: left radial     Chris's Test Positive     pH, Arterial 7.462 (H) 7.350 - 7.450 pH units    pCO2, Arterial 35.6 35.0 - 45.0 mm Hg    pO2, Arterial 81.6 80.0 - 100.0 mm Hg    HCO3, Arterial 24.9 22.0 - 26.0 mmol/L    Base Excess, Arterial 1.3 mmol/L    O2 Saturation, Arterial 95.4 90.0 - 100.0 %    Hemoglobin, Blood Gas 11.5 (L) 12 - 16 g/dL    Hematocrit, Blood Gas 34.0 (L) 37.0 - 47.0 %    Oxyhemoglobin 94.4 85 - 100 %    Methemoglobin 0.30 0.00 - 3.00 %    Carboxyhemoglobin 0.8 0 - 5 %    A-a Gradiant 18.2 0.0 - 300.0 mmHg    Temperature 98.6 C    Barometric Pressure for Blood Gas 725 mmHg    Modality Room Air     FIO2 21 %   Troponin   Result Value Ref Range    Troponin T <0.010 0.000 - 0.030 ng/mL   Lactic Acid, Plasma   Result Value Ref Range    Lactate 1.6 0.5 - 2.0 mmol/L   C-reactive Protein   Result Value Ref Range    C-Reactive Protein 0.61 (H) 0.00 - 0.50 mg/dL   Protime-INR   Result Value Ref Range    Protime 16.3 (H) 11.0 - 15.4 Seconds    INR 1.28 (H) 0.90 - 1.10   aPTT   Result Value Ref Range    PTT 34.0 23.8 - 36.1 seconds   Acetaminophen Level   Result Value Ref Range    Acetaminophen <5.0 (L) 10.0 - 30.0 mcg/mL   Ethanol   Result Value Ref Range    Ethanol <10 0 - 10 mg/dL    Ethanol % <0.010 %   Urine Drug Screen - Urine, Catheter   Result Value Ref Range    Amphetamine Screen, Urine Negative Negative    Barbiturates Screen, Urine Negative Negative    Benzodiazepine Screen, Urine Negative Negative    Cocaine Screen, Urine Negative Negative    Methadone Screen, Urine Negative Negative    Opiate Screen Negative Negative    Phencyclidine (PCP), Urine Negative Negative    THC, Screen, Urine Negative Negative    6-ACETYL MORPHINE Negative Negative    Buprenorphine, Screen, Urine Negative Negative    Oxycodone Screen, Urine Negative Negative   Salicylate Level   Result  Value Ref Range    Salicylate <0.3 <=30.0 mg/dL   Magnesium   Result Value Ref Range    Magnesium 1.9 1.6 - 2.4 mg/dL   TSH   Result Value Ref Range    TSH 6.650 (H) 0.270 - 4.200 mIU/mL   Ammonia   Result Value Ref Range    Ammonia 263 (H) 11 - 51 umol/L   CBC Auto Differential   Result Value Ref Range    WBC 3.22 (L) 3.40 - 10.80 10*3/mm3    RBC 3.50 (L) 3.77 - 5.28 10*6/mm3    Hemoglobin 11.6 (L) 12.0 - 15.9 g/dL    Hematocrit 36.1 34.0 - 46.6 %    .1 (H) 79.0 - 97.0 fL    MCH 33.1 (H) 26.6 - 33.0 pg    MCHC 32.1 31.5 - 35.7 g/dL    RDW 14.3 12.3 - 15.4 %    RDW-SD 53.6 37.0 - 54.0 fl    MPV 10.9 6.0 - 12.0 fL    Platelets 93 (L) 140 - 450 10*3/mm3    Neutrophil % 43.8 42.7 - 76.0 %    Lymphocyte % 35.7 19.6 - 45.3 %    Monocyte % 14.0 (H) 5.0 - 12.0 %    Eosinophil % 5.9 0.3 - 6.2 %    Basophil % 0.6 0.0 - 1.5 %    Immature Grans % 0.0 0.0 - 0.5 %    Neutrophils, Absolute 1.41 1.40 - 7.00 10*3/mm3    Lymphocytes, Absolute 1.15 0.70 - 3.10 10*3/mm3    Monocytes, Absolute 0.45 0.10 - 0.90 10*3/mm3    Eosinophils, Absolute 0.19 0.00 - 0.40 10*3/mm3    Basophils, Absolute 0.02 0.00 - 0.20 10*3/mm3    Immature Grans, Absolute 0.00 0.00 - 0.05 10*3/mm3   Urinalysis, Microscopic Only - Urine, Catheter   Result Value Ref Range    RBC, UA 21-30 (A) None Seen, 0-2 /HPF    WBC, UA Too Numerous to Count (A) None Seen, 0-2 /HPF    Bacteria, UA 1+ (A) None Seen /HPF    Squamous Epithelial Cells, UA 21-30 (A) None Seen, 0-2 /HPF    Hyaline Casts, UA None Seen None Seen /LPF    Methodology Automated Microscopy    Light Blue Top   Result Value Ref Range    Extra Tube hold for add-on    Green Top (Gel)   Result Value Ref Range    Extra Tube Hold for add-ons.    Lavender Top   Result Value Ref Range    Extra Tube hold for add-on    Gold Top - SST   Result Value Ref Range    Extra Tube Hold for add-ons.                 ED Course  ED Course as of Apr 15 1440   Mon Apr 15, 2019   1437 Case discussed with Dr. Ramirez.  He is  admitting patient to the PCU for further care.  [CM]      ED Course User Index  [CM] Lorenzo Mckeon MD                  [facility]      Final diagnoses:   Hepatic encephalopathy (CMS/HCC)             Please note that portions of this note were completed with a voice recognition program. Efforts were made to edit the dictations, but occasionally words are mistranscribed.       Lorenzo Mckeon MD  04/15/19 [id number]

## 2019-04-15 NOTE — ED NOTES
Pt is now resting with eyes closed, pt remains nonverbal, skin pwd, no resp distress, family is now at bedside. Hr 62, normal sinus noted. 18 gauge in left ac remains patent with no s/s of infiltration. poc updated. Fall precautions maintained. Pt turned and repositioned, oral care provided.      Alannah Ferrara, RN  04/15/19 [id number]

## 2019-04-15 NOTE — PLAN OF CARE
Problem: Fall Risk (Adult)  Goal: Identify Related Risk Factors and Signs and Symptoms  Outcome: Outcome(s) achieved Date Met: 04/15/19    Goal: Absence of Fall  Outcome: Ongoing (interventions implemented as appropriate)      Problem: Skin Injury Risk (Adult)  Goal: Identify Related Risk Factors and Signs and Symptoms  Outcome: Outcome(s) achieved Date Met: 04/15/19    Goal: Skin Health and Integrity  Outcome: Ongoing (interventions implemented as appropriate)

## 2019-04-15 NOTE — ED NOTES
Pt assisted to turn, pt resting quietly on stretcher, pt is calm, skin pwd, no resp distress, pt awakens to verbal stimuli, fall precautions maintained.     Alannah Ferrara, RN  04/15/19 [id number]

## 2019-04-15 NOTE — H&P
[facility] HOSPITALIST HISTORY AND PHYSICAL    Patient Identification:  Name:  Alma Delia Garcia  Age:  88 y.o.  Sex:  female  :  1930  MRN:  [id number]   Visit Number:  [id number]  Primary Care Physician:  Galina Gonsalves MD     4/15/2019 10:20 AM    Subjective     Chief complaint:   Chief Complaint   Patient presents with   • Altered Mental Status     History of presenting illness:   Ms. Garcia is a 88 y.o. female with past medical history significant for cirrhosis of the liver, history of alcoholism, hepatitis C, stage III CKD, hypothyroidism, congestive heart failure, hypertension, and reported history of coronary artery disease.  She presented to the emergency department of [facility] on 4/15/2019 with complaints of altered mental status. She has had multiple admissions in the past with acute hepatic encephalopathy due to refusing to take her lactulose intermittently. The patient is currently confused and unable to provide history. Her daughter at bedside reports that she has not taken her lactulose in 2 days due to it constantly causing her to have diarrhea. Since then, she has been weak, confused, and intermittently combative at home. Yesterday, she tried to take a bath with her clothes on and then walked around the house without clothing, which is not usual for Ms. Garcia.     Upon arrival to the ED, vitals were /80, respirations 18, SPO2 100% on room air, rate 71, afebrile.  Ammonia level 263.  Creatinine 1.44 which appears to be around her baseline.  CT of the head without contrast showed no acute intracranial abnormality.  Urinalysis did show evidence of acute urinary tract infection. Patient has been admitted to the progressive care unit of [facility] for further evaluation and therapy.     James: Pt seen and examined with PHILLIP Ortega in the PCU. Pt briefly arouses to verbal and tactile stimuli. Able to tell me her name but unable to provide answer  any other questions or follow commands. No family at bedside during my encounter. History obtained from ED provider and chart.   ---------------------------------------------------------------------------------------------------------------------   Review of Systems   Unable to perform ROS: Mental status change      ---------------------------------------------------------------------------------------------------------------------   Past Medical History:   Diagnosis Date   • Acute renal failure (CMS/HCC)    • Alcohol abuse     in remission   • Anxiety    • Bell's palsy    • Cellulitis     LLE   • CHF (congestive heart failure) (CMS/HCC)     Diastolic   • CHF (congestive heart failure) (CMS/HCC)    • Constipation    • Coronary artery disease    • Diverticulosis    • GERD (gastroesophageal reflux disease)    • Hepatic cirrhosis (CMS/HCC)    • Hepatitis C    • History of transfusion    • Hypertension    • Hypothyroidism    • MI (mitral incompetence)    • Osteoporosis    • Osteoporosis    • Restless leg syndrome    • Thrombocytopenia (CMS/HCC)    • Trigeminal neuralgia      Past Surgical History:   Procedure Laterality Date   • BACK SURGERY      KYPHOPLASTIES    • CARDIAC CATHETERIZATION Left 08/2004   • CARDIOVASCULAR STRESS TEST  01/25/2015   • CATARACT EXTRACTION     • CHOLECYSTECTOMY     • CLAVICLE SURGERY Right    • COLONOSCOPY  10/2004   • ECHO - CONVERTED  11/2012   • FEMUR OPEN REDUCTION INTERNAL FIXATION Left 10/12/2017    Procedure: FEMUR OPEN REDUCTION INTERNAL FIXATION;  Surgeon: Karson Pierre MD;  Location: [facility];  Service:    • HIP FRACTURE SURGERY Bilateral     ARTHROPLASTIES    • PARTIAL HYSTERECTOMY     • PORTACATH PLACEMENT N/A 9/28/2018    Procedure: INSERTION OF PORTACATH;  Surgeon: Vic Mauricio MD;  Location: [facility];  Service: General   • WRIST FRACTURE SURGERY       Family History   Problem Relation Age of Onset   • Cancer Mother    • Heart disease Father    •  Arthritis Sister    • Osteoporosis Sister    • Diabetes Maternal Aunt      Social History     Socioeconomic History   • Marital status:      Spouse name: Not on file   • Number of children: Not on file   • Years of education: Not on file   • Highest education level: Not on file   Tobacco Use   • Smoking status: Never Smoker   • Smokeless tobacco: Former User     Types: Snuff   Substance and Sexual Activity   • Alcohol use: No     Comment: FORMER 12 BOTTLES PER DAY FOR 15 YEARS   • Drug use: No   • Sexual activity: Defer   Social History Narrative    LIVES WITH GRANDDAUGHTER DEVON IN [place]      ---------------------------------------------------------------------------------------------------------------------   Allergies:  Ciprofloxacin  ---------------------------------------------------------------------------------------------------------------------   Prior to Admission Medications     Prescriptions Last Dose Informant Patient Reported? Taking?    ASPIRIN ADULT LOW STRENGTH 81 MG EC tablet Unknown Pharmacy No No    TAKE 1 TABLET BY MOUTH DAILY.    bumetanide (BUMEX) 1 MG tablet Unknown Pharmacy No No    TAKE ONE TABLET BY MOUTH TWICE A DAY    esomeprazole (nexIUM) 40 MG capsule Unknown Pharmacy No No    TAKE 1 CAPSULE BY MOUTH EVERY MORNING BEFORE BREAKFAST.    HYDROcodone-acetaminophen (NORCO) 5-325 MG per tablet Unknown Pharmacy No No    Take 1 tablet by mouth 2 (Two) Times a Day As Needed for Moderate Pain .    lactulose (CHRONULAC) 10 GM/15ML solution Unknown Pharmacy No No    Take 30 mL by mouth 5 (Five) Times a Day.    levothyroxine (SYNTHROID, LEVOTHROID) 50 MCG tablet Unknown Pharmacy No No    TAKE 1 TABLET BY MOUTH DAILY.    magnesium oxide (MAG-OX) 400 MG tablet Unknown Pharmacy Yes No    Take 800 mg by mouth Daily.    nitroglycerin (NITROSTAT) 0.4 MG SL tablet Unknown  No No    Place 1 tablet under the tongue Every 5 (Five) Minutes As Needed for Chest Pain. Take no more than 3 doses in 15  minutes.    OXcarbazepine (TRILEPTAL) 150 MG tablet Unknown Pharmacy Yes No    Take 150 mg by mouth Every Morning.    OXcarbazepine (TRILEPTAL) 150 MG tablet Unknown Pharmacy Yes No    Take 300 mg by mouth Every Night.    rifaximin (XIFAXAN) 550 MG tablet Unknown Pharmacy No No    Take 1 tablet by mouth Every 12 (Twelve) Hours.    rOPINIRole (REQUIP) 1 MG tablet Unknown Pharmacy No No    TAKE 1 TABLET BY MOUTH EVERY NIGHT. TAKE 1 HOUR BEFORE BEDTIME.    teriparatide (FORTEO) 600 MCG/2.4ML injection   No No    Inject 0.08 mL under the skin Daily.    vitamin D (ERGOCALCIFEROL) 83465 units capsule capsule Unknown Pharmacy No No    Take 1 capsule by mouth 1 (One) Time Per Week.        ---------------------------------------------------------------------------------------------------------------------  Objective   Hospital Scheduled Meds:        ---------------------------------------------------------------------------------------------------------------------   Vital Signs:  Temp:  [97.9 °F (36.6 °C)] 97.9 °F (36.6 °C)  Heart Rate:  [51-81] 65  Resp:  [18] 18  BP: (101-175)/(36-80) 175/78  Mean Arterial Pressure (Non-Invasive) for the past 24 hrs (Last 3 readings):   Noninvasive MAP (mmHg)   04/15/19 1603 103   04/15/19 1547 95   04/15/19 1532 86     SpO2 Percentage    04/15/19 1547 04/15/19 1603 04/15/19 1605   SpO2: 98% 100% 98%     SpO2:  [95 %-100 %] 98 %  on   ;   Device (Oxygen Therapy): room air    Body mass index is 24.03 kg/m².  Wt Readings from Last 3 Encounters:   04/15/19 63.5 kg (140 lb)   03/28/19 63.5 kg (140 lb)   03/16/19 65.8 kg (145 lb)     ---------------------------------------------------------------------------------------------------------------------   Physical Exam:  Constitutional:  Well-developed and well-nourished.  No respiratory distress. Confused, moans intermittently. (Chronically ill appearing)     HENT:  Head: Normocephalic and atraumatic.  Mouth:  Moist mucous membranes. NG tube in  place.    Eyes:  Conjunctivae and EOM are normal. No scleral icterus. No erythema or drainage.   Neck:  Neck supple.  No JVD present. No carotid bruits noted.   Cardiovascular:  Normal rate, regular rhythm and normal heart sounds with no murmur.  Pulmonary/Chest:  No respiratory distress, no wheezes. Few rales at the left base.   Abdominal:  Soft.  Bowel sounds are present x4.  No distension and no tenderness.   Musculoskeletal:  No edema, no tenderness, and no deformity.  No red or swollen joints anywhere.    Neurological: Confused. Moans intermittently. Does not respond to questioning. (Pt briefly arouses during my encounter and able to tell me her name. Does not answer any other questions or follow commands).   Skin:  Skin is warm and dry.  No rash noted.  No pallor.   Psychiatric: Confused.   Peripheral vascular:  No edema and 2+ pedal pulses bilaterally.   Genitourinary: No hsieh catheter in place.   ---------------------------------------------------------------------------------------------------------------------  EKG:  Pending cardiology interpretation. Per my read, reveals sinus arrhythmia at 65 bpm, poor R wave progression, nonspecific ST/T abnormalities borderline QTC at 457 ms.    Telemetry: Sinus rhythm in the 60s and 70s.    I have personally reviewed the EKG/Telemetry strip.  ---------------------------------------------------------------------------------------------------------------------   Results from last 7 days   Lab Units 04/15/19  1220   TROPONIN T ng/mL <0.010         Results from last 7 days   Lab Units 04/15/19  1053   PH, ARTERIAL pH units 7.462*   PO2 ART mm Hg 81.6   PCO2, ARTERIAL mm Hg 35.6   HCO3 ART mmol/L 24.9     Results from last 7 days   Lab Units 04/15/19  1220 04/15/19  1216   CRP mg/dL 0.61*  --    LACTATE mmol/L  --  1.6   WBC 10*3/mm3 3.22*  --    HEMOGLOBIN g/dL 11.6*  --    HEMATOCRIT % 36.1  --    MCV fL 103.1*  --    MCHC g/dL 32.1  --    PLATELETS 10*3/mm3 93*  --     INR  1.28*  --      Results from last 7 days   Lab Units 04/15/19  1220   SODIUM mmol/L 140   POTASSIUM mmol/L 4.3   MAGNESIUM mg/dL 1.9   CHLORIDE mmol/L 104   CO2 mmol/L 23.9   BUN mg/dL 37*   CREATININE mg/dL 1.44*   EGFR IF NONAFRICN AM mL/min/1.73 34*   CALCIUM mg/dL 9.1   GLUCOSE mg/dL 93   ALBUMIN g/dL 3.42*   BILIRUBIN mg/dL 1.1   ALK PHOS U/L 157*   AST (SGOT) U/L 45*   ALT (SGPT) U/L 20   Estimated Creatinine Clearance: 27.1 mL/min (A) (by C-G formula based on SCr of 1.44 mg/dL (H)).  Ammonia   Date Value Ref Range Status   04/15/2019 263 (H) 11 - 51 umol/L Final     No results found for: HGBA1C, POCGLU  Lab Results   Component Value Date    HGBA1C 4.8 11/24/2015     Lab Results   Component Value Date    TSH 6.650 (H) 04/15/2019    FREET4 1.40 04/25/2018     Pain Management Panel     Pain Management Panel Latest Ref Rng & Units 4/15/2019 8/10/2017    AMPHETAMINES SCREEN, URINE Negative Negative Negative    BARBITURATES SCREEN Negative Negative Negative    BENZODIAZEPINE SCREEN, URINE Negative Negative Negative    BUPRENORPHINE Negative Negative Negative    COCAINE SCREEN, URINE Negative Negative Negative    METHADONE SCREEN, URINE Negative Negative Negative        I have personally reviewed the above laboratory results.   ---------------------------------------------------------------------------------------------------------------------  Imaging Results (last 7 days)     Procedure Component Value Units Date/Time    XR Chest 1 View [[id number]] Collected:  04/15/19 1533     Updated:  04/15/19 1539    Narrative:       EXAMINATION: XR CHEST 1 VW-      CLINICAL INDICATION:     ng tube confirmation; K72.90-Hepatic failure,  unspecified without coma     TECHNIQUE:  XR CHEST 1 VW-      COMPARISON: 04/15/2019 11:00 AM      FINDINGS:   Right Port-A-Cath stable in appearance and positioning. NG tube with tip  in the region of mid stomach. Cardiac megaly and pulmonary vascular  congestion noted. No effusion or  pneumothorax. No acute bony changes.       Impression:       1. Support devices as above. Specifically, NG tube with tip in the  region of mid stomach.  2. Otherwise stable chest.     This report was finalized on 4/15/2019 3:34 PM by Dr. Manish Sherwood MD.       XR Chest 1 View [[id number]] Collected:  04/15/19 1126     Updated:  04/15/19 1137    Narrative:       EXAMINATION: XR CHEST 1 VW-      CLINICAL INDICATION:     ams     TECHNIQUE:  XR CHEST 1 VW-      COMPARISON: 03/16/2019      FINDINGS:   Stable chronic lung changes. Right Port-A-Cath noted with tip in the  region of distal SVC.  Lungs are aerated.   Heart size, mediastinum, and pulmonary vascularity are unremarkable.   No pneumothorax.   No effusions.   No acute osseous findings.  Left rib fractures.       Impression:       Stable chest. No acute changes identified.     This report was finalized on 4/15/2019 11:27 AM by Dr. Manish Sherwood MD.       CT Head Without Contrast [[id number]] Collected:  04/15/19 1105     Updated:  04/15/19 1110    Narrative:       CT HEAD WO CONTRAST-     CLINICAL INDICATION: ams        COMPARISON: 03/17/2019      TECHNIQUE: Axial images of the brain were obtained with out intravenous  contrast.  Reformatted images were created in the sagittal and coronal  planes.     DOSE:     Radiation dose reduction techniques were utilized per ALARA protocol.  Automated exposure control was initiated through either or CareDose or  DoseRigMyClasses software packages by  protocol.           FINDINGS:   Today's study shows no mass, hemorrhage, or midline shift.   Cerebral atrophy  There is no evidence of acute ischemia.  I do not see epidural or subdural hematoma.  The gray-white differentiation is appropriate.   The bone window setting images show no destructive calvarial lesion or  acute calvarial fracture.   The posterior fossa is unremarkable.          Impression:       No acute intracranial pathology. Nothing is seen on this exam  to  specifically account for the patient's symptoms.     This report was finalized on 4/15/2019 11:05 AM by Dr. Byron Mcintosh MD.           I have personally reviewed the above radiology results.   ---------------------------------------------------------------------------------------------------------------------  Assessment & Plan      -Cirrhosis of the liver due to prior alcoholism and hepatitis C with acute hepatic encephalopathy: NG tube placed in the ED with 30g lactulose given. Order scheduled lactulose to be given via NG. Repeat Ammonia level in AM. Neuro checks every 4 hours. Place on fall precautions with a bed alarm. Will keep her NPO for now until she is more awake.     -Acute urinary tract infection: Urine culture ordered in the ED. Continue IV rocephin. Repeat CBC in AM.     -Pancytopenia: Appears to be chronic and could be due to liver cirrhosis. Check vitamin b12 and folate levels. Repeat CBC in AM.     -Stage III CKD: Appears to be near baseline.  Avoid nephrotoxic medications. Will hold home Bumex for now as she is NPO. Follow-up on a.m. Labs.    -Borderline prolonged QTc, 457ms: Check magnesium level and replace if needed. Avoid QT prolonging agents. Monitor on telemetry.     -Hypothyroidism: TSH is mildly elevated with low free T4. Continue home dose of levothyroxine for now. Will discuss medication compliance with patient once she is alert. If she has been compliant with home Rx will make Rx adjustment.     -Essential hypertension: Controlled.     -DVT Prophylaxis: TEDs/SCUDs given pancytopenia.     The patient is considered to be a high risk patient due to: hepatic encephalopathy, UTI.     Code Status: FULL CODE.     I have discussed the patient's assessment and plan with the patient's daughter at bedside and admitting physician, Dr. Ramirez.     WILMER Guevara  04/15/19  4:21  PM  ---------------------------------------------------------------------------------------------------------------------     I have reviewed the notes, assessments, and/or procedures performed by Savannah Peters PA-C. I concur with her/his documentation of Alma Delia Garcia.    Jamison Ramirez D.O.

## 2019-04-15 NOTE — PROGRESS NOTES
Discharge Planning Assessment   Mark     Patient Name: Alma Delia Garcia  MRN: [id number]  Today's Date: 4/15/2019    Admit Date: 4/15/2019    Discharge Needs Assessment     Row Name 04/15/19 1520       Living Environment    Lives With  child(jojo), adult    Current Living Arrangements  home/apartment/condo    Primary Care Provided by  self    Family Caregiver if Needed  child(jojo), adult;sibling(s)    Quality of Family Relationships  involved;helpful;supportive    Able to Return to Prior Arrangements  yes       Resource/Environmental Concerns    Resource/Environmental Concerns  none       Transition Planning    Patient/Family Anticipates Transition to  home       Discharge Needs Assessment    Equipment Currently Used at Home  walker, rolling    Equipment Needed After Discharge  none        Discharge Plan     Row Name 04/15/19 1521       Plan    Plan  SS spoke with the pt's daughter Christina Ambrose on this date. Pt lives at home with her daughter, son in law, and grandchild. Pt does not have home health services at this time. Pt has utilizes home health in the past. Pt utilizes rolling walker and wheelchair. Pt does not have a POA or living will. Pt utilizes [facility] and PCP is Dr. Gonsalves. SS spoke with [facility] and [facility] on this date per Rosalie who states that pt has a bill of $399.00. [facility] and [facility], is not agreeable to accept pt until the bill has been paid. SS will follow up with family in the AM.     Patient/Family in Agreement with Plan  yes     Demographic Summary     Row Name 04/15/19 9870       General Information    Admission Type  inpatient    Arrived From  home    Reason for Consult  discharge planning    Preferred Language  English     Used During This Interaction  yes         Ama Ramirez

## 2019-04-16 ENCOUNTER — APPOINTMENT (OUTPATIENT)
Dept: GENERAL RADIOLOGY | Facility: HOSPITAL | Age: 84
End: 2019-04-16

## 2019-04-16 LAB
ALBUMIN SERPL-MCNC: 3.4 G/DL (ref 3.5–5.2)
ALBUMIN/GLOB SERPL: 1 G/DL
ALP SERPL-CCNC: 132 U/L (ref 39–117)
ALT SERPL W P-5'-P-CCNC: 20 U/L (ref 1–33)
AMMONIA BLD-SCNC: 50 UMOL/L (ref 11–51)
ANION GAP SERPL CALCULATED.3IONS-SCNC: 10.5 MMOL/L
AST SERPL-CCNC: 48 U/L (ref 1–32)
BASOPHILS # BLD AUTO: 0.04 10*3/MM3 (ref 0–0.2)
BASOPHILS NFR BLD AUTO: 0.8 % (ref 0–1.5)
BILIRUB SERPL-MCNC: 1.6 MG/DL (ref 0.2–1.2)
BUN BLD-MCNC: 31 MG/DL (ref 8–23)
BUN/CREAT SERPL: 25.4 (ref 7–25)
CALCIUM SPEC-SCNC: 9.7 MG/DL (ref 8.6–10.5)
CHLORIDE SERPL-SCNC: 108 MMOL/L (ref 98–107)
CO2 SERPL-SCNC: 20.5 MMOL/L (ref 22–29)
CREAT BLD-MCNC: 1.22 MG/DL (ref 0.57–1)
CRP SERPL-MCNC: 0.63 MG/DL (ref 0–0.5)
DEPRECATED RDW RBC AUTO: 51.3 FL (ref 37–54)
EOSINOPHIL # BLD AUTO: 0.16 10*3/MM3 (ref 0–0.4)
EOSINOPHIL NFR BLD AUTO: 3.2 % (ref 0.3–6.2)
ERYTHROCYTE [DISTWIDTH] IN BLOOD BY AUTOMATED COUNT: 14.2 % (ref 12.3–15.4)
FOLATE SERPL-MCNC: >20 NG/ML (ref 4.78–24.2)
GFR SERPL CREATININE-BSD FRML MDRD: 42 ML/MIN/1.73
GLOBULIN UR ELPH-MCNC: 3.4 GM/DL
GLUCOSE BLD-MCNC: 93 MG/DL (ref 65–99)
HCT VFR BLD AUTO: 35.8 % (ref 34–46.6)
HGB BLD-MCNC: 11.9 G/DL (ref 12–15.9)
IMM GRANULOCYTES # BLD AUTO: 0.01 10*3/MM3 (ref 0–0.05)
IMM GRANULOCYTES NFR BLD AUTO: 0.2 % (ref 0–0.5)
INR PPP: 1.3 (ref 0.9–1.1)
LYMPHOCYTES # BLD AUTO: 1.25 10*3/MM3 (ref 0.7–3.1)
LYMPHOCYTES NFR BLD AUTO: 24.8 % (ref 19.6–45.3)
MAGNESIUM SERPL-MCNC: 2.6 MG/DL (ref 1.6–2.4)
MCH RBC QN AUTO: 33.6 PG (ref 26.6–33)
MCHC RBC AUTO-ENTMCNC: 33.2 G/DL (ref 31.5–35.7)
MCV RBC AUTO: 101.1 FL (ref 79–97)
MONOCYTES # BLD AUTO: 0.56 10*3/MM3 (ref 0.1–0.9)
MONOCYTES NFR BLD AUTO: 11.1 % (ref 5–12)
NEUTROPHILS # BLD AUTO: 3.02 10*3/MM3 (ref 1.4–7)
NEUTROPHILS NFR BLD AUTO: 59.9 % (ref 42.7–76)
PHOSPHATE SERPL-MCNC: 2.5 MG/DL (ref 2.5–4.5)
PLATELET # BLD AUTO: 100 10*3/MM3 (ref 140–450)
PMV BLD AUTO: 11.1 FL (ref 6–12)
POTASSIUM BLD-SCNC: 3.6 MMOL/L (ref 3.5–5.2)
PROT SERPL-MCNC: 6.8 G/DL (ref 6–8.5)
PROTHROMBIN TIME: 16.4 SECONDS (ref 11–15.4)
RBC # BLD AUTO: 3.54 10*6/MM3 (ref 3.77–5.28)
SODIUM BLD-SCNC: 139 MMOL/L (ref 136–145)
VIT B12 BLD-MCNC: 926 PG/ML (ref 211–946)
WBC NRBC COR # BLD: 5.04 10*3/MM3 (ref 3.4–10.8)

## 2019-04-16 PROCEDURE — 85025 COMPLETE CBC W/AUTO DIFF WBC: CPT | Performed by: PHYSICIAN ASSISTANT

## 2019-04-16 PROCEDURE — 80053 COMPREHEN METABOLIC PANEL: CPT | Performed by: PHYSICIAN ASSISTANT

## 2019-04-16 PROCEDURE — 92610 EVALUATE SWALLOWING FUNCTION: CPT | Performed by: SPEECH-LANGUAGE PATHOLOGIST

## 2019-04-16 PROCEDURE — 85610 PROTHROMBIN TIME: CPT | Performed by: INTERNAL MEDICINE

## 2019-04-16 PROCEDURE — 92611 MOTION FLUOROSCOPY/SWALLOW: CPT | Performed by: SPEECH-LANGUAGE PATHOLOGIST

## 2019-04-16 PROCEDURE — 84100 ASSAY OF PHOSPHORUS: CPT | Performed by: INTERNAL MEDICINE

## 2019-04-16 PROCEDURE — 25010000003 POTASSIUM CHLORIDE 10 MEQ/100ML SOLUTION: Performed by: INTERNAL MEDICINE

## 2019-04-16 PROCEDURE — 25010000002 CEFTRIAXONE: Performed by: PHYSICIAN ASSISTANT

## 2019-04-16 PROCEDURE — 83735 ASSAY OF MAGNESIUM: CPT | Performed by: INTERNAL MEDICINE

## 2019-04-16 PROCEDURE — 86140 C-REACTIVE PROTEIN: CPT | Performed by: INTERNAL MEDICINE

## 2019-04-16 PROCEDURE — 82140 ASSAY OF AMMONIA: CPT | Performed by: PHYSICIAN ASSISTANT

## 2019-04-16 PROCEDURE — 74230 X-RAY XM SWLNG FUNCJ C+: CPT

## 2019-04-16 PROCEDURE — 74230 X-RAY XM SWLNG FUNCJ C+: CPT | Performed by: RADIOLOGY

## 2019-04-16 PROCEDURE — 99233 SBSQ HOSP IP/OBS HIGH 50: CPT | Performed by: INTERNAL MEDICINE

## 2019-04-16 RX ORDER — POTASSIUM CHLORIDE 7.45 MG/ML
10 INJECTION INTRAVENOUS
Status: COMPLETED | OUTPATIENT
Start: 2019-04-16 | End: 2019-04-16

## 2019-04-16 RX ORDER — LACTULOSE 10 G/15ML
30 SOLUTION ORAL 3 TIMES DAILY
Status: DISCONTINUED | OUTPATIENT
Start: 2019-04-16 | End: 2019-04-19 | Stop reason: HOSPADM

## 2019-04-16 RX ORDER — L.ACID,PARA/B.BIFIDUM/S.THERM 8B CELL
1 CAPSULE ORAL DAILY
Status: DISCONTINUED | OUTPATIENT
Start: 2019-04-16 | End: 2019-04-19 | Stop reason: HOSPADM

## 2019-04-16 RX ADMIN — ASPIRIN 81 MG: 81 TABLET, CHEWABLE ORAL at 08:06

## 2019-04-16 RX ADMIN — LACTULOSE 30 G: 10 SOLUTION ORAL at 08:02

## 2019-04-16 RX ADMIN — RIFAXIMIN 550 MG: 550 TABLET ORAL at 20:41

## 2019-04-16 RX ADMIN — LEVOTHYROXINE SODIUM 50 MCG: 50 TABLET ORAL at 08:06

## 2019-04-16 RX ADMIN — POTASSIUM CHLORIDE 10 MEQ: 7.46 INJECTION, SOLUTION INTRAVENOUS at 15:40

## 2019-04-16 RX ADMIN — Medication 1 CAPSULE: at 16:11

## 2019-04-16 RX ADMIN — LACTULOSE 30 G: 10 SOLUTION ORAL at 20:41

## 2019-04-16 RX ADMIN — CEFTRIAXONE 1 G: 1 INJECTION, POWDER, FOR SOLUTION INTRAMUSCULAR; INTRAVENOUS at 08:02

## 2019-04-16 RX ADMIN — POTASSIUM CHLORIDE 10 MEQ: 7.46 INJECTION, SOLUTION INTRAVENOUS at 15:38

## 2019-04-16 RX ADMIN — MAGNESIUM OXIDE 800 MG: 400 TABLET ORAL at 08:07

## 2019-04-16 RX ADMIN — LANSOPRAZOLE 30 MG: KIT at 07:54

## 2019-04-16 RX ADMIN — POTASSIUM CHLORIDE 10 MEQ: 7.46 INJECTION, SOLUTION INTRAVENOUS at 12:17

## 2019-04-16 RX ADMIN — SODIUM CHLORIDE, PRESERVATIVE FREE 3 ML: 5 INJECTION INTRAVENOUS at 08:08

## 2019-04-16 RX ADMIN — LACTULOSE 30 G: 10 SOLUTION ORAL at 15:49

## 2019-04-16 RX ADMIN — POTASSIUM CHLORIDE 10 MEQ: 7.46 INJECTION, SOLUTION INTRAVENOUS at 11:15

## 2019-04-16 RX ADMIN — RIFAXIMIN 550 MG: 550 TABLET ORAL at 08:05

## 2019-04-16 NOTE — PROGRESS NOTES
Subjective     History:   Alma Delia Garcia is a 88 y.o. female admitted on 4/15/2019 secondary to <principal problem not specified>     Procedures: None    CC: Follow up encephalopathy    Patient seen and examined with PHILLIP Ortega. Sleeping upon my arrival but easily awakens. Oriented to person but otherwise confused. Able to follow simple commands. Reports back pain. RN reports patient pulled out her NG. RN reports BM's with lactulose. No acute events overnight per RN.     History taken from: patient, chart, and RN.      Objective     Vital Signs  Temp:  [97.5 °F (36.4 °C)-98.6 °F (37 °C)] 98.1 °F (36.7 °C)  Heart Rate:  [51-81] 51  Resp:  [11-22] 11  BP: (101-175)/(36-79) 135/48    Intake/Output Summary (Last 24 hours) at 4/16/2019 1251  Last data filed at 4/16/2019 1217  Gross per 24 hour   Intake 600 ml   Output 100 ml   Net 500 ml         Physical Exam:  General:    Awake, more alert today but remains confused, in no acute distress, chronically ill appearing    Heart:      Normal S1 and S2. Regular rate and rhythm. No significant murmur, rubs or gallops appreciated.   Lungs:     Respirations regular, even and unlabored. A few scattered rales. No wheezes or rhonchi.    Abdomen:   Soft and nontender. No guarding, rebound tenderness or  organomegaly noted. Bowel sounds present x 4.   Extremities:  No clubbing, cyanosis or edema noted. Moves UE and LE equally B/L.     Results Review:    Results from last 7 days   Lab Units 04/16/19  0312 04/15/19  1220   WBC 10*3/mm3 5.04 3.22*   HEMOGLOBIN g/dL 11.9* 11.6*   PLATELETS 10*3/mm3 100* 93*     Results from last 7 days   Lab Units 04/16/19  0312 04/15/19  1220   SODIUM mmol/L 139 140   POTASSIUM mmol/L 3.6 4.3   CHLORIDE mmol/L 108* 104   CO2 mmol/L 20.5* 23.9   BUN mg/dL 31* 37*   CREATININE mg/dL 1.22* 1.44*   CALCIUM mg/dL 9.7 9.1   GLUCOSE mg/dL 93 93     Results from last 7 days   Lab Units 04/16/19  0312 04/15/19  1220   BILIRUBIN mg/dL 1.6* 1.1   ALK PHOS U/L 132*  157*   AST (SGOT) U/L 48* 45*   ALT (SGPT) U/L 20 20     Results from last 7 days   Lab Units 04/16/19  0312 04/15/19  1220   MAGNESIUM mg/dL 2.6* 1.9     Results from last 7 days   Lab Units 04/16/19  0312 04/15/19  1220   INR  1.30* 1.28*     Results from last 7 days   Lab Units 04/15/19  1220   TROPONIN T ng/mL <0.010       Imaging Results (last 24 hours)     Procedure Component Value Units Date/Time    FL Video Swallow [[id number]] Collected:  04/16/19 1107     Updated:  04/16/19 1110    Narrative:       EXAMINATION: FL VIDEO SWALLOW-      CLINICAL INDICATION:     r/u aspiration; K72.90-Hepatic failure,  unspecified without coma     TECHNIQUE: Modified barium swallow was performed utilizing video  fluoroscopy in conjunction with the Speech Pathology team. The patient  ingested multiple barium media including thin barium, nectar, and honey  liquid as well as barium pudding and a barium pudding coated cracker.   FLUOROSCOPY TIME: 1.3 minutes     COMPARISON: NONE      FINDINGS: Premature loss. Vallecular pooling is noted. Episodes of  penetration noted with thin liquid consistencies. Aspiration also noted  with thin liquid consistency when presented with cup. Cheko aspiration  noted with thin liquid consistency when presented with straw. No  additional episodes of aspiration or penetration..       Impression:       Aspiration with thin liquids. Please see dysphasia notes for  further details.     This report was finalized on 4/16/2019 11:08 AM by Dr. Manish Sherwood MD.       XR Chest 1 View [[id number]] Updated:  04/16/19 1102    XR Chest 1 View [[id number]] Collected:  04/15/19 1533     Updated:  04/15/19 1539    Narrative:       EXAMINATION: XR CHEST 1 VW-      CLINICAL INDICATION:     ng tube confirmation; K72.90-Hepatic failure,  unspecified without coma     TECHNIQUE:  XR CHEST 1 VW-      COMPARISON: 04/15/2019 11:00 AM      FINDINGS:   Right Port-A-Cath stable in appearance and positioning. NG tube with tip  in  the region of mid stomach. Cardiac megaly and pulmonary vascular  congestion noted. No effusion or pneumothorax. No acute bony changes.       Impression:       1. Support devices as above. Specifically, NG tube with tip in the  region of mid stomach.  2. Otherwise stable chest.     This report was finalized on 4/15/2019 3:34 PM by Dr. Manish Sherwood MD.               Medications:    aspirin 81 mg Oral Daily   ceftriaxone 1 g Intravenous Q24H   lactulose 30 g Oral TID   lansoprazole 30 mg Nasogastric QAM   levothyroxine 50 mcg Oral Q AM   magnesium oxide 800 mg Oral Daily   potassium chloride 10 mEq Intravenous Q1H   rifaximin 550 mg Oral Q12H   sodium chloride 3 mL Intravenous Q12H              Assessment/Plan   Acute hepatic encephalopathy in the setting of liver cirrhosis with hx of alcohol abuse and Hep C: Pt has a hx of noncompliance with lactulose. Appears more alert today but remains confused. Ammonia improved. Pt has pulled out her NG. Lactulose has now been scheduled PO.  Cont rifaximin. Holding potentially sedating medications. Cont neuro checks with fall precautions. Diet has been started per SLP recs. Cont supportive treatment.    UTI: WBC is stable with minimally elevated CRP. Urine culture revealing growth of Proteus. Cont Rocephin empirically pending final culture results.     Pancytopenia: Appears chronic and likely related to underlying cirrhosis. Vit B12 and folate are replete. Stable at present. Cont to monitor.     CKD III: Creatine appears stable. Holding home Bumex until pt's PO intake improves to avoid dehydration.     Hypothyroidism: TSH is elevated with low free T4. However, I have concerns regarding pt's compliance with home medications. Will cont current dose of levothyroxine and discuss further with patient once she is more alert.     Essential HTN: BP is currently stable. Cont to monitor.     DVT PPX: SCD's. No pharmacologic DVT PPX 2/2 thrombocytopenia.     Pt is at high risk 2/2 acute  hepatic encephalopathy, UTI, cirrhosis, pancytopenia, CKD and advanced age.         Jamison Ramirez DO  04/16/19  12:51 PM

## 2019-04-16 NOTE — PLAN OF CARE
DYSPHAGIA THERAPY PLAN OF CARE:    Alma Delia Garcia will benefit from formal dysphagia therapy X3-5 days per week at 15-30 minutes sessions, as functionally tolerated, for 5-7 Days, to address:    Long Term Goal:  Pt will accept least restrictive diet tolerance w/o overt s/s aspiration.     Short Term Goals:  1. Pt will perform OROM/ERVIN exercises x3 sets x10 reps w/ min cues.  2. Pt will perform resistive breathing exercises x3 sets x5 reps w/resistance of 1-4 To improve respiratory support and control.   3. Pt will perform laryngeal adduction/elevation exercises x3 sets x10 reps w/ min cues.   4. Pt will perform Shaker exercises sustained x3 sets x5 reps for 30+ seconds over 3 consecutive sessions.   5. Pt will perform Shaker exercises repetitive x3 sets x12 reps w/ mod cues.   6. Pt will perform compensatory techniques during meals w/ min cues.  7. Pt will participate in instrumental re-evaluation of swallowing fnx in 7-10 days, pending progress towards this poc.      Thank you-  BREE Plascencia.S., CCC-SLP

## 2019-04-16 NOTE — PLAN OF CARE
Problem: Confusion, Acute (Adult)  Goal: Cognitive/Functional Impairments Minimized  Outcome: Ongoing (interventions implemented as appropriate)

## 2019-04-16 NOTE — MBS/VFSS/FEES
Acute Care - Speech Language Pathology   Swallow Re-Evaluation [facility]   MODIFIED BARIUM SWALLOW STUDY     Patient Name: Alma Delia Garcia  : 1930  MRN: [id number]  Today's Date: 2019      Admit Date: 4/15/2019    Alma Delia Garcia  presents to the radiology suite this am from P204/S2 to participate in an instrumental MBS to assess safety/efficacy of swallowing fnx, determine safest/least restrictive diet. Pt has significant h/o HTN, anxiety, osteoprosis, hypothyroidism, GERD, chronic pain, anemia, athralgia, L leg pain, insomnia, thrombocytopenia, peripheral vascular disease, bilateral leg edema, UTI, hypokalemia, closed fx of L femur w/ internal fixation, CHF, diarrhea, R upper quadrant pain, nausea, and Bell's Palsy. Pt is admitted to [facility] w/ hepatic encephalopathy. Pt is s/p clinical dysphagia evaluation this am w/ overt s/s of aspiration w/ all po presentations styles of thin liquids. RNJordan provides verbal clearance for pt to be transported to radiology suite via wheelchair to participate this am.         Social History     Socioeconomic History   • Marital status:      Spouse name: Not on file   • Number of children: Not on file   • Years of education: Not on file   • Highest education level: Not on file   Tobacco Use   • Smoking status: Never Smoker   • Smokeless tobacco: Former User     Types: Snuff   Substance and Sexual Activity   • Alcohol use: No     Comment: FORMER 12 BOTTLES PER DAY FOR 15 YEARS   • Drug use: No   • Sexual activity: Defer   Social History Narrative    LIVES WITH GRANDDAUGHTER DEVON IN [place]         Chest xray on 4/15/19 revealed stable chest per radiology report.        Diet Orders (active) (From admission, onward)    Start     Ordered    19 1122  Diet Soft Texture; Chopped; Nectar / Syrup Thick  Diet Effective Now     Comments:  Meds whole in puree  Up for meals    19 1121        Ms. Garcia is observed on ra w/o complications tolerating well.      Risks and benefits of the procedure are explained w/ verbalizing understanding/agreement to participate, proceed per protocol.     Pt is positioned upright and centered in a wheel chair to accept multiple po presentations of solid cracker, puree, honey thick, nectar thick, and thin liquids via spoon, cup and straw, along w/ whole placebo pill in puree. Pt is able to self feed.     All views are from the lateral plane.     Facial/oral structures are symmetrical upon observation w/o lingual deviation upon protrusion. Oral mucosa are moist, pink and clean. Secretions are clear, thin and well controlled. OROM/ERVIN is generally weak to imitate oral postures. Gag is not assessed. Volitional cough is weak in intensity, clear in quality, nonproductive. Vocal quality is adequate in intensity, clear in quality w/ intelligible speech. Pt is a/a and cooperative to particpate.  Pt  is oriented to person and current year only, follows simple directives, and participates in simple conversational exchanges.     Upon po presentations, adequate bolus anticipation w/ good labial seal for bolus clearance via spoon bowl, cup rim stability and suction via straw.  Bolus formation, manipulation, and control are mildly impaired w/ increased oral prep time/rotary mastication pattern. A-p transit is mild/trace delayed w/o oral residue. Tongue base retraction and linguavelar seal are incomplete w/ premature spillage to the bilateral pyriforms w/ thin liquids. No laryngeal penetration or aspiration is evidenced before the swallow.     Pharyngeal swallow is mildly delayed w/ mildly weak hyolaryngeal elevation and delayed epiglottic inversion. Pharyngeal contraction is adequate w/o significant residue. Laryngeal penetration w/ thin liquids via cup and straw. Intermittent microaspiration of thin liquids via cup presentations. Cough response elicited. Cheko aspiration w/ thin liquids via straw presentations. Cough response elicited.  Re-evaluation of nectar thick liquids revealed no laryngeal penetration or aspiration. Pt is noted w/ cough response AFTER ALL PO PRESENTATIONS. No other laryngeal penetration or aspiration evidenced during or after the swallow.     Upper 1/3 esophageal sweep reveals no mucosal abnormalities. Motility is wfl w/o retrograde flow noted.     Pt is able to manipulate and swallow whole barium pill in puree presentation w/o difficulty.        Visit Dx:     ICD-10-CM ICD-9-CM   1. Hepatic encephalopathy (CMS/HCC) K72.90 572.2     Patient Active Problem List   Diagnosis   • Hepatic cirrhosis*   • HTN (hypertension)*   • Anxiety disorder*   • Osteopetrosis*   • Hypothyroidism*   • GERD (gastroesophageal reflux disease)*   • Chronic pain syndrome due to fractured spine and left rib fracture*   • Anemia, chronic disease   • Arthralgia of hip   • Left leg pain   • Fracture of proximal end of tibia and fibula   • Primary insomnia   • Thrombocytopenia (CMS/HCC)   • Peripheral vascular disease (CMS/HCC)   • Bilateral leg edema   • Cramp of both lower extremities   • Hepatic encephalopathy (CMS/HCC)   • UTI (urinary tract infection)   • Hypokalemia   • Closed fracture of lower end of left femur with routine healing   • Status post open reduction with internal fixation of fracture   • Nonrheumatic aortic valve insufficiency, mild   • Chronic diastolic heart failure, mild   • Periprosthetic fracture of shaft of femur   • S/P ORIF (open reduction internal fixation) fracture   • Diarrhea   • Right upper quadrant abdominal pain   • Nausea   • Elevated liver enzymes   • Poor venous access   • Stage 3 chronic kidney disease (CMS/HCC)   • Trochanteric bursitis of left hip     Past Medical History:   Diagnosis Date   • Acute renal failure (CMS/HCC)    • Alcohol abuse     in remission   • Anxiety    • Bell's palsy    • Cellulitis     LLE   • CHF (congestive heart failure) (CMS/HCC)     Diastolic   • CHF (congestive heart failure) (CMS/HCC)     • Constipation    • Coronary artery disease    • Diverticulosis    • GERD (gastroesophageal reflux disease)    • Hepatic cirrhosis (CMS/HCC)    • Hepatitis C    • History of transfusion    • Hypertension    • Hypothyroidism    • MI (mitral incompetence)    • Osteoporosis    • Osteoporosis    • Restless leg syndrome    • Thrombocytopenia (CMS/HCC)    • Trigeminal neuralgia      Past Surgical History:   Procedure Laterality Date   • BACK SURGERY      KYPHOPLASTIES    • CARDIAC CATHETERIZATION Left 08/2004   • CARDIOVASCULAR STRESS TEST  01/25/2015   • CATARACT EXTRACTION     • CHOLECYSTECTOMY     • CLAVICLE SURGERY Right    • COLONOSCOPY  10/2004   • ECHO - CONVERTED  11/2012   • FEMUR OPEN REDUCTION INTERNAL FIXATION Left 10/12/2017    Procedure: FEMUR OPEN REDUCTION INTERNAL FIXATION;  Surgeon: Karson Pierre MD;  Location: [facility] OR;  Service:    • HIP FRACTURE SURGERY Bilateral     ARTHROPLASTIES    • PARTIAL HYSTERECTOMY     • PORTACATH PLACEMENT N/A 9/28/2018    Procedure: INSERTION OF PORTACATH;  Surgeon: Vic Mauricio MD;  Location: [facility] OR;  Service: General   • WRIST FRACTURE SURGERY           EDUCATION  The patient has been educated in the following areas:   Dysphagia (Swallowing Impairment) Oral Care/Hydration Modified Diet Instruction.    SLP Recommendation and Plan    Impression: Per these evaluation results, Ms. Garcia presents w/ a mild-moderate oropharyngeal dysphagia. Bolus formation, manipulation, and control are mildly impaired w/ increased oral prep time/rotary mastication pattern. A-p transit is mild/trace delayed w/o oral residue. Laryngeal penetration w/ thin liquids via cup and straw. Intermittent microaspiration of thin liquids via cup presentations. Cough response elicited. Cheko aspiration w/ thin liquids via straw presentations. Cough response elicited. Re-evaluation of nectar thick liquids revealed no laryngeal penetration or aspiration. Pt is noted w/ cough  response AFTER ALL PO PRESENTATIONS. No other laryngeal penetration or aspiration evidenced across this evaluation. Pt is felt to most benefit from initiation of modified po diet of mechanical soft, chopped w/ NECTAR thick liquids only. Meds whole in puree.     Recommendations:   1. Mechanical soft, chopped, w/ NECTAR thick liquids  2. Meds whole in puree.   3. DIANNE precautions.   4. Oral care protocol.  5. Universal aspiration precautions  6. Upright and centered for all po intake  7. Formal dysphagia tx      SLP to f/u for diet tolerance/dysphagia tx as pt is able to functionally participate.      D/w pt results and recommendations w/ verbal understanding and agreement. Sign posted above bed for staff/family reference.    D/w RN Jordan, results and recommendations w/ verbal understanding and agreement.     Thank you for allowing me to participate in the care of your patient-  Lisa Martino M.S., CCC-SLP  Plan of Care Reviewed With: patient  Plan of Care Review  Plan of Care Reviewed With: patient  Outcome Summary: MBS completed this am. Intermittent microaspiration w/ thin liquids via cup. Cheko aspiration w/ thin liquids via straw. Cough response elicited w/ all po trials across this evaluation. Initiate modified po diet of mechanical soft, chopped w/ NECTAR thick liquids only. Meds whole in puree. SLP to f/u for dysphagia tx/diet tolerance           Time Calculation:   Time Calculation- SLP     Row Name 04/16/19 1134             Time Calculation- SLP    SLP - Next Appointment  04/17/19  -        User Key  (r) = Recorded By, (t) = Taken By, (c) = Cosigned By    Initials Name Provider Type     Lisa Martino, MS CCC-SLP Speech and Language Pathologist          Therapy Charges for Today     Code Description Service Date Service Provider Modifiers Qty    35167422791 HC ST MOTION FLUORO EVAL SWALLOW 8 4/16/2019 Lisa Martino, MS CCC-SLP GN 1    [id number] HC ST EVAL ORAL PHARYNG SWALLOW 3 4/16/2019 Gunner  Lisa EDGE, MS CCC-SLP GN 1               Lisa Martino, MS PRINCE-SLP  4/16/2019

## 2019-04-16 NOTE — PLAN OF CARE
Problem: Patient Care Overview  Goal: Plan of Care Review  Outcome: Ongoing (interventions implemented as appropriate)   04/16/19 1453   Coping/Psychosocial   Plan of Care Reviewed With patient   Plan of Care Review   Progress no change       Problem: Fall Risk (Adult)  Goal: Absence of Fall  Outcome: Ongoing (interventions implemented as appropriate)   04/16/19 1453   Fall Risk (Adult)   Absence of Fall making progress toward outcome       Problem: Confusion, Acute (Adult)  Goal: Safety  Outcome: Ongoing (interventions implemented as appropriate)   04/16/19 1453   Confusion, Acute (Adult)   Safety making progress toward outcome       Problem: Skin Injury Risk (Adult)  Goal: Skin Health and Integrity  Outcome: Ongoing (interventions implemented as appropriate)   04/16/19 1453   Skin Injury Risk (Adult)   Skin Health and Integrity making progress toward outcome       Problem: Wound (Includes Pressure Injury) (Adult)  Goal: Signs and Symptoms of Listed Potential Problems Will be Absent, Minimized or Managed (Wound)  Outcome: Ongoing (interventions implemented as appropriate)   04/16/19 1453   Goal/Outcome Evaluation   Problems Assessed (Wound) all   Problems Present (Wound) none

## 2019-04-16 NOTE — PLAN OF CARE
Problem: Patient Care Overview  Goal: Plan of Care Review  Outcome: Ongoing (interventions implemented as appropriate)    Goal: Discharge Needs Assessment  Outcome: Ongoing (interventions implemented as appropriate)      Problem: Fall Risk (Adult)  Goal: Absence of Fall  Outcome: Ongoing (interventions implemented as appropriate)      Problem: Skin Injury Risk (Adult)  Goal: Skin Health and Integrity  Outcome: Ongoing (interventions implemented as appropriate)

## 2019-04-16 NOTE — DISCHARGE PLACEMENT REQUEST
"Alma Delia Fabian (88 y.o. Female)     Date of Birth Social Security Number Address Home Phone MRN    07/26/1930  [address]  [facility] [address] [phone] [id number]    Holiness Marital Status          Congregational        Admission Date Admission Type Admitting Provider Attending Provider Department, Room/Bed    4/15/19 Emergency Jamison Ramirez DO Troxell, Christopher A, DO [facility], P204/S2    Discharge Date Discharge Disposition Discharge Destination                       Attending Provider:  Jamison Ramirez DO    Allergies:  Ciprofloxacin    Isolation:  None   Infection:  None   Code Status:  CPR    Ht:  162.6 cm (64\")   Wt:  65.1 kg (143 lb 9.6 oz)    Admission Cmt:  None   Principal Problem:  None                Active Insurance as of 4/15/2019     Primary Coverage     Payor Plan Insurance Group Employer/Plan Group    MEDICARE MEDICARE A & B      Payor Plan Address Payor Plan Phone Number Payor Plan Fax Number Effective Dates    [address] [phone]  11/1/1988 - None Entered    [place] [address]       Subscriber Name Subscriber Birth Date Member ID       ALMA DELIA FABIAN 7/26/1930 [id number]                 Emergency Contacts      (Rel.) Home Phone Work Phone Mobile Phone    Yeimy Gallegos (Sister) [phone] -- --    Marybeth Baltazar (Sister) [phone] -- --    Alethea Fabian (Daughter) [phone] -- [phone]    UBALDO RUANO (Sister) [phone] -- --            Emergency Contact Information     Name Relation Home Work Mobile    Yeimy Gallegos Sister [phone]      Marybeth Baltazar Sister [phone]      Alethea Fabian Daughter [phone]    UBALDO RUANO Sister [phone]            Insurance Information                MEDICARE/MEDICARE A & B Phone: [phone]    Subscriber: Alma Delia Fabian Subscriber#: [id number]    Group#:  Precert#:              History & Physical      Jamison Ramirez, " DO at 4/15/2019  4:21 PM               [facility] HOSPITALIST HISTORY AND PHYSICAL    Patient Identification:  Name:  Alma Delia Garcia  Age:  88 y.o.  Sex:  female  :  1930  MRN:  [id number]   Visit Number:  [id number]  Primary Care Physician:  Galina Gonsalves MD     4/15/2019 10:20 AM    Subjective     Chief complaint:   Chief Complaint   Patient presents with   • Altered Mental Status     History of presenting illness:   Ms. Garcia is a 88 y.o. female with past medical history significant for cirrhosis of the liver, history of alcoholism, hepatitis C, stage III CKD, hypothyroidism, congestive heart failure, hypertension, and reported history of coronary artery disease.  She presented to the emergency department of [facility] on 4/15/2019 with complaints of altered mental status. She has had multiple admissions in the past with acute hepatic encephalopathy due to refusing to take her lactulose intermittently. The patient is currently confused and unable to provide history. Her daughter at bedside reports that she has not taken her lactulose in 2 days due to it constantly causing her to have diarrhea. Since then, she has been weak, confused, and intermittently combative at home. Yesterday, she tried to take a bath with her clothes on and then walked around the house without clothing, which is not usual for Ms. Garcia.     Upon arrival to the ED, vitals were /80, respirations 18, SPO2 100% on room air, rate 71, afebrile.  Ammonia level 263.  Creatinine 1.44 which appears to be around her baseline.  CT of the head without contrast showed no acute intracranial abnormality.  Urinalysis did show evidence of acute urinary tract infection. Patient has been admitted to the progressive care unit of [facility] for further evaluation and therapy.     James: Pt seen and examined with PHILLIP Ortega in the PCU. Pt briefly arouses to verbal and tactile stimuli. Able to tell me her  name but unable to provide answer any other questions or follow commands. No family at bedside during my encounter. History obtained from ED provider and chart.   ---------------------------------------------------------------------------------------------------------------------   Review of Systems   Unable to perform ROS: Mental status change      ---------------------------------------------------------------------------------------------------------------------   Past Medical History:   Diagnosis Date   • Acute renal failure (CMS/HCC)    • Alcohol abuse     in remission   • Anxiety    • Bell's palsy    • Cellulitis     LLE   • CHF (congestive heart failure) (CMS/HCC)     Diastolic   • CHF (congestive heart failure) (CMS/HCC)    • Constipation    • Coronary artery disease    • Diverticulosis    • GERD (gastroesophageal reflux disease)    • Hepatic cirrhosis (CMS/HCC)    • Hepatitis C    • History of transfusion    • Hypertension    • Hypothyroidism    • MI (mitral incompetence)    • Osteoporosis    • Osteoporosis    • Restless leg syndrome    • Thrombocytopenia (CMS/HCC)    • Trigeminal neuralgia      Past Surgical History:   Procedure Laterality Date   • BACK SURGERY      KYPHOPLASTIES    • CARDIAC CATHETERIZATION Left 08/2004   • CARDIOVASCULAR STRESS TEST  01/25/2015   • CATARACT EXTRACTION     • CHOLECYSTECTOMY     • CLAVICLE SURGERY Right    • COLONOSCOPY  10/2004   • ECHO - CONVERTED  11/2012   • FEMUR OPEN REDUCTION INTERNAL FIXATION Left 10/12/2017    Procedure: FEMUR OPEN REDUCTION INTERNAL FIXATION;  Surgeon: Karson Pierre MD;  Location: [facility];  Service:    • HIP FRACTURE SURGERY Bilateral     ARTHROPLASTIES    • PARTIAL HYSTERECTOMY     • PORTACATH PLACEMENT N/A 9/28/2018    Procedure: INSERTION OF PORTACATH;  Surgeon: Vic Mauricio MD;  Location: [facility];  Service: General   • WRIST FRACTURE SURGERY       Family History   Problem Relation Age of Onset   • Cancer Mother    •  Heart disease Father    • Arthritis Sister    • Osteoporosis Sister    • Diabetes Maternal Aunt      Social History     Socioeconomic History   • Marital status:      Spouse name: Not on file   • Number of children: Not on file   • Years of education: Not on file   • Highest education level: Not on file   Tobacco Use   • Smoking status: Never Smoker   • Smokeless tobacco: Former User     Types: Snuff   Substance and Sexual Activity   • Alcohol use: No     Comment: FORMER 12 BOTTLES PER DAY FOR 15 YEARS   • Drug use: No   • Sexual activity: Defer   Social History Narrative    LIVES WITH GRANDDAUGHTER DEVON IN [place]      ---------------------------------------------------------------------------------------------------------------------   Allergies:  Ciprofloxacin  ---------------------------------------------------------------------------------------------------------------------   Prior to Admission Medications     Prescriptions Last Dose Informant Patient Reported? Taking?    ASPIRIN ADULT LOW STRENGTH 81 MG EC tablet Unknown Pharmacy No No    TAKE 1 TABLET BY MOUTH DAILY.    bumetanide (BUMEX) 1 MG tablet Unknown Pharmacy No No    TAKE ONE TABLET BY MOUTH TWICE A DAY    esomeprazole (nexIUM) 40 MG capsule Unknown Pharmacy No No    TAKE 1 CAPSULE BY MOUTH EVERY MORNING BEFORE BREAKFAST.    HYDROcodone-acetaminophen (NORCO) 5-325 MG per tablet Unknown Pharmacy No No    Take 1 tablet by mouth 2 (Two) Times a Day As Needed for Moderate Pain .    lactulose (CHRONULAC) 10 GM/15ML solution Unknown Pharmacy No No    Take 30 mL by mouth 5 (Five) Times a Day.    levothyroxine (SYNTHROID, LEVOTHROID) 50 MCG tablet Unknown Pharmacy No No    TAKE 1 TABLET BY MOUTH DAILY.    magnesium oxide (MAG-OX) 400 MG tablet Unknown Pharmacy Yes No    Take 800 mg by mouth Daily.    nitroglycerin (NITROSTAT) 0.4 MG SL tablet Unknown  No No    Place 1 tablet under the tongue Every 5 (Five) Minutes As Needed for Chest Pain. Take  no more than 3 doses in 15 minutes.    OXcarbazepine (TRILEPTAL) 150 MG tablet Unknown Pharmacy Yes No    Take 150 mg by mouth Every Morning.    OXcarbazepine (TRILEPTAL) 150 MG tablet Unknown Pharmacy Yes No    Take 300 mg by mouth Every Night.    rifaximin (XIFAXAN) 550 MG tablet Unknown Pharmacy No No    Take 1 tablet by mouth Every 12 (Twelve) Hours.    rOPINIRole (REQUIP) 1 MG tablet Unknown Pharmacy No No    TAKE 1 TABLET BY MOUTH EVERY NIGHT. TAKE 1 HOUR BEFORE BEDTIME.    teriparatide (FORTEO) 600 MCG/2.4ML injection   No No    Inject 0.08 mL under the skin Daily.    vitamin D (ERGOCALCIFEROL) 09469 units capsule capsule Unknown Pharmacy No No    Take 1 capsule by mouth 1 (One) Time Per Week.        ---------------------------------------------------------------------------------------------------------------------  Objective   [facility] Scheduled Meds:        ---------------------------------------------------------------------------------------------------------------------   Vital Signs:  Temp:  [97.9 °F (36.6 °C)] 97.9 °F (36.6 °C)  Heart Rate:  [51-81] 65  Resp:  [18] 18  BP: (101-175)/(36-80) 175/78  Mean Arterial Pressure (Non-Invasive) for the past 24 hrs (Last 3 readings):   Noninvasive MAP (mmHg)   04/15/19 1603 103   04/15/19 1547 95   04/15/19 1532 86     SpO2 Percentage    04/15/19 1547 04/15/19 1603 04/15/19 1605   SpO2: 98% 100% 98%     SpO2:  [95 %-100 %] 98 %  on   ;   Device (Oxygen Therapy): room air    Body mass index is 24.03 kg/m².  Wt Readings from Last 3 Encounters:   04/15/19 63.5 kg (140 lb)   03/28/19 63.5 kg (140 lb)   03/16/19 65.8 kg (145 lb)     ---------------------------------------------------------------------------------------------------------------------   Physical Exam:  Constitutional:  Well-developed and well-nourished.  No respiratory distress. Confused, moans intermittently. (Chronically ill appearing)     HENT:  Head: Normocephalic and atraumatic.  Mouth:  Moist  mucous membranes. NG tube in place.    Eyes:  Conjunctivae and EOM are normal. No scleral icterus. No erythema or drainage.   Neck:  Neck supple.  No JVD present. No carotid bruits noted.   Cardiovascular:  Normal rate, regular rhythm and normal heart sounds with no murmur.  Pulmonary/Chest:  No respiratory distress, no wheezes. Few rales at the left base.   Abdominal:  Soft.  Bowel sounds are present x4.  No distension and no tenderness.   Musculoskeletal:  No edema, no tenderness, and no deformity.  No red or swollen joints anywhere.    Neurological: Confused. Moans intermittently. Does not respond to questioning. (Pt briefly arouses during my encounter and able to tell me her name. Does not answer any other questions or follow commands).   Skin:  Skin is warm and dry.  No rash noted.  No pallor.   Psychiatric: Confused.   Peripheral vascular:  No edema and 2+ pedal pulses bilaterally.   Genitourinary: No hsieh catheter in place.   ---------------------------------------------------------------------------------------------------------------------  EKG:  Pending cardiology interpretation. Per my read, reveals sinus arrhythmia at 65 bpm, poor R wave progression, nonspecific ST/T abnormalities borderline QTC at 457 ms.    Telemetry: Sinus rhythm in the 60s and 70s.    I have personally reviewed the EKG/Telemetry strip.  ---------------------------------------------------------------------------------------------------------------------   Results from last 7 days   Lab Units 04/15/19  1220   TROPONIN T ng/mL <0.010         Results from last 7 days   Lab Units 04/15/19  1053   PH, ARTERIAL pH units 7.462*   PO2 ART mm Hg 81.6   PCO2, ARTERIAL mm Hg 35.6   HCO3 ART mmol/L 24.9     Results from last 7 days   Lab Units 04/15/19  1220 04/15/19  1216   CRP mg/dL 0.61*  --    LACTATE mmol/L  --  1.6   WBC 10*3/mm3 3.22*  --    HEMOGLOBIN g/dL 11.6*  --    HEMATOCRIT % 36.1  --    MCV fL 103.1*  --    MCHC g/dL 32.1  --     PLATELETS 10*3/mm3 93*  --    INR  1.28*  --      Results from last 7 days   Lab Units 04/15/19  1220   SODIUM mmol/L 140   POTASSIUM mmol/L 4.3   MAGNESIUM mg/dL 1.9   CHLORIDE mmol/L 104   CO2 mmol/L 23.9   BUN mg/dL 37*   CREATININE mg/dL 1.44*   EGFR IF NONAFRICN AM mL/min/1.73 34*   CALCIUM mg/dL 9.1   GLUCOSE mg/dL 93   ALBUMIN g/dL 3.42*   BILIRUBIN mg/dL 1.1   ALK PHOS U/L 157*   AST (SGOT) U/L 45*   ALT (SGPT) U/L 20   Estimated Creatinine Clearance: 27.1 mL/min (A) (by C-G formula based on SCr of 1.44 mg/dL (H)).  Ammonia   Date Value Ref Range Status   04/15/2019 263 (H) 11 - 51 umol/L Final     No results found for: HGBA1C, POCGLU  Lab Results   Component Value Date    HGBA1C 4.8 11/24/2015     Lab Results   Component Value Date    TSH 6.650 (H) 04/15/2019    FREET4 1.40 04/25/2018     Pain Management Panel     Pain Management Panel Latest Ref Rng & Units 4/15/2019 8/10/2017    AMPHETAMINES SCREEN, URINE Negative Negative Negative    BARBITURATES SCREEN Negative Negative Negative    BENZODIAZEPINE SCREEN, URINE Negative Negative Negative    BUPRENORPHINE Negative Negative Negative    COCAINE SCREEN, URINE Negative Negative Negative    METHADONE SCREEN, URINE Negative Negative Negative        I have personally reviewed the above laboratory results.   ---------------------------------------------------------------------------------------------------------------------  Imaging Results (last 7 days)     Procedure Component Value Units Date/Time    XR Chest 1 View [[id number]] Collected:  04/15/19 1533     Updated:  04/15/19 1539    Narrative:       EXAMINATION: XR CHEST 1 VW-      CLINICAL INDICATION:     ng tube confirmation; K72.90-Hepatic failure,  unspecified without coma     TECHNIQUE:  XR CHEST 1 VW-      COMPARISON: 04/15/2019 11:00 AM      FINDINGS:   Right Port-A-Cath stable in appearance and positioning. NG tube with tip  in the region of mid stomach. Cardiac megaly and pulmonary  vascular  congestion noted. No effusion or pneumothorax. No acute bony changes.       Impression:       1. Support devices as above. Specifically, NG tube with tip in the  region of mid stomach.  2. Otherwise stable chest.     This report was finalized on 4/15/2019 3:34 PM by Dr. Manish Sherwood MD.       XR Chest 1 View [[id number]] Collected:  04/15/19 1126     Updated:  04/15/19 1137    Narrative:       EXAMINATION: XR CHEST 1 VW-      CLINICAL INDICATION:     ams     TECHNIQUE:  XR CHEST 1 VW-      COMPARISON: 03/16/2019      FINDINGS:   Stable chronic lung changes. Right Port-A-Cath noted with tip in the  region of distal SVC.  Lungs are aerated.   Heart size, mediastinum, and pulmonary vascularity are unremarkable.   No pneumothorax.   No effusions.   No acute osseous findings.  Left rib fractures.       Impression:       Stable chest. No acute changes identified.     This report was finalized on 4/15/2019 11:27 AM by Dr. Manish Sherwood MD.       CT Head Without Contrast [[id number]] Collected:  04/15/19 1105     Updated:  04/15/19 1110    Narrative:       CT HEAD WO CONTRAST-     CLINICAL INDICATION: ams        COMPARISON: 03/17/2019      TECHNIQUE: Axial images of the brain were obtained with out intravenous  contrast.  Reformatted images were created in the sagittal and coronal  planes.     DOSE:     Radiation dose reduction techniques were utilized per ALARA protocol.  Automated exposure control was initiated through either or CareDose or  Dose[employer]ig[employer] software packages by  protocol.           FINDINGS:   Today's study shows no mass, hemorrhage, or midline shift.   Cerebral atrophy  There is no evidence of acute ischemia.  I do not see epidural or subdural hematoma.  The gray-white differentiation is appropriate.   The bone window setting images show no destructive calvarial lesion or  acute calvarial fracture.   The posterior fossa is unremarkable.          Impression:       No acute intracranial  pathology. Nothing is seen on this exam to  specifically account for the patient's symptoms.     This report was finalized on 4/15/2019 11:05 AM by Dr. Byron Mcintosh MD.           I have personally reviewed the above radiology results.   ---------------------------------------------------------------------------------------------------------------------  Assessment & Plan      -Cirrhosis of the liver due to prior alcoholism and hepatitis C with acute hepatic encephalopathy: NG tube placed in the ED with 30g lactulose given. Order scheduled lactulose to be given via NG. Repeat Ammonia level in AM. Neuro checks every 4 hours. Place on fall precautions with a bed alarm. Will keep her NPO for now until she is more awake.     -Acute urinary tract infection: Urine culture ordered in the ED. Continue IV rocephin. Repeat CBC in AM.     -Pancytopenia: Appears to be chronic and could be due to liver cirrhosis. Check vitamin b12 and folate levels. Repeat CBC in AM.     -Stage III CKD: Appears to be near baseline.  Avoid nephrotoxic medications. Will hold home Bumex for now as she is NPO. Follow-up on a.m. Labs.    -Borderline prolonged QTc, 457ms: Check magnesium level and replace if needed. Avoid QT prolonging agents. Monitor on telemetry.     -Hypothyroidism: TSH is mildly elevated with low free T4. Continue home dose of levothyroxine for now. Will discuss medication compliance with patient once she is alert. If she has been compliant with home Rx will make Rx adjustment.     -Essential hypertension: Controlled.     -DVT Prophylaxis: TEDs/SCUDs given pancytopenia.     The patient is considered to be a high risk patient due to: hepatic encephalopathy, UTI.     Code Status: FULL CODE.     I have discussed the patient's assessment and plan with the patient's daughter at bedside and admitting physician, Dr. Ramirez.     WILMER Guevara  04/15/19  4:21  PM  ---------------------------------------------------------------------------------------------------------------------     I have reviewed the notes, assessments, and/or procedures performed by Savannah Peters PA-C. I concur with her/his documentation of Alma Delia Garcia.    Jamison Ramirez D.O.     Electronically signed by Jamison Ramirez DO at 4/15/2019 10:14 PM       ICU Vital Signs     Row Name 04/16/19 1002 04/16/19 0902 04/16/19 0802 04/16/19 0800 04/16/19 0500       Vitals    Temp  --  --  --  98.1 °F (36.7 °C)  --    Temp src  --  --  --  Oral  --    Pulse  51  60  57  --  55    Resp  11  17  13  --  18    BP  135/48  144/56  141/79  --  143/49    Noninvasive MAP (mmHg)  72  74  111  --  75       Oxygen Therapy    SpO2  97 %  97 %  99 %  --  99 %    Pulse Oximetry Type  --  --  --  --  Continuous    Device (Oxygen Therapy)  --  --  --  --  room air    Row Name 04/16/19 0400 04/16/19 0300 04/16/19 0200 04/16/19 0105 04/16/19 0021       Height and Weight    Weight  65.1 kg (143 lb 9.6 oz)  --  --  --  --    Weight Method  Bed scale  --  --  --  --       Vitals    Temp  98.5 °F (36.9 °C)  --  --  --  98.6 °F (37 °C)    Pulse  79  61  60  67  --    Resp  18  18  16  12  --    BP  161/61  151/49  132/52  149/56  --    Noninvasive MAP (mmHg)  82  76  72  92  --       Oxygen Therapy    SpO2  98 %  99 %  100 %  100 %  --    Pulse Oximetry Type  Continuous  Continuous  Continuous  --  --    Device (Oxygen Therapy)  room air  room air  room air  --  --    Row Name 04/15/19 2300 04/15/19 2200 04/15/19 2100 04/15/19 2000 04/15/19 1905       Vitals    Temp  --  --  --  98.2 °F (36.8 °C)  --    Pulse  66  67  66  55  64    Resp  18  18  18  18  18    BP  141/64  172/56  135/49  139/41  151/67    Noninvasive MAP (mmHg)  79  79  78  73  74       Oxygen Therapy    SpO2  100 %  100 %  100 %  99 %  99 %    Pulse Oximetry Type  Continuous  Continuous  Continuous  Continuous  --    Device (Oxygen Therapy)  room  "air  room air  room air  room air  --    Row Name 04/15/19 1710 04/15/19 1702 04/15/19 1648 04/15/19 1643 04/15/19 1605       Height and Weight    Height  --  --  162.6 cm (64\")  --  --    Weight  --  --  67.3 kg (148 lb 4.8 oz)  --  --    Weight Method  --  --  Bed scale  --  --    Ideal Body Weight (IBW) (kg)  --  --  55  --  --    BSA (Calculated - sq m)  --  --  1.72 sq meters  --  --    BMI (Calculated)  --  --  25.4  --  --    Weight in (lb) to have BMI = 25  --  --  145.3  --  --       Vitals    Temp  --  --  97.5 °F (36.4 °C)  --  --    Temp src  --  --  Axillary  --  --    Pulse  --  57  75  68  65    Resp  --  12 22  --  --    BP  --  138/78  127/70  127/70  --    Noninvasive MAP (mmHg)  --  85  --  81  --       Oxygen Therapy    SpO2  --  98 %  99 %  98 %  98 %    Device (Oxygen Therapy)  room air  --  room air  --  --    Row Name 04/15/19 1603 04/15/19 1547 04/15/19 1532 04/15/19 1517 04/15/19 1503       Vitals    Pulse  67  60  61  59  59    BP  175/78  127/67  153/64  135/57  132/62    Noninvasive MAP (mmHg)  103  95  86  72  85       Oxygen Therapy    SpO2  100 %  98 %  100 %  98 %  97 %    Row Name 04/15/19 1447 04/15/19 1433 04/15/19 1417 04/15/19 1347 04/15/19 1317       Vitals    Pulse  69  66  81  51  55    BP  141/53  135/47  165/69  101/36  (Abnormal)   117/43    Noninvasive MAP (mmHg)  75  75  101  54  59       Oxygen Therapy    SpO2  98 %  96 %  97 %  96 %  97 %    Row Name 04/15/19 1302 04/15/19 1247 04/15/19 1232 04/15/19 1217 04/15/19 1214       Vitals    Pulse  53  54  57  57  57    BP  104/40  119/48  124/48  136/54  134/44    Noninvasive MAP (mmHg)  54  88  72  79  80       Oxygen Therapy    SpO2  97 %  97 %  100 %  99 %  95 %    Row Name 04/15/19 1207 04/15/19 1153 04/15/19 1132 04/15/19 1117 04/15/19 1047       Vitals    Pulse  54  74  52  51  52    BP  --  --  133/50  115/49  105/40    Noninvasive MAP (mmHg)  --  --  74  67  62       Oxygen Therapy    SpO2  98 %  99 %  100 %  99 %  " 98 %        Hospital Medications (active)       Dose Frequency Start End    aspirin chewable tablet 81 mg 81 mg Daily 4/16/2019     Sig - Route: Chew 1 tablet Daily. - Oral    cefTRIAXone (ROCEPHIN) 1 g/100 mL 0.9% NS (MBP) 1 g Every 24 Hours 4/16/2019 4/21/2019    Sig - Route: Infuse 100 mL into a venous catheter Daily. - Intravenous    Cosign for Ordering: Accepted by Jamison Ramirez DO on 4/15/2019 10:03 PM    cefTRIAXone (ROCEPHIN) 2 g/100 mL 0.9% NS VTB (TYRA) 2 g Once 4/15/2019 4/15/2019    Sig - Route: Infuse 100 mL into a venous catheter 1 (One) Time. - Intravenous    lactulose (CHRONULAC) 10 GM/15ML solution 30 g 30 g Once 4/15/2019 4/15/2019    Sig - Route: 45 mL by Nasogastric route 1 (One) Time. - Nasogastric    lactulose (CHRONULAC) 10 GM/15ML solution 30 g 30 g 3 Times Daily 4/16/2019     Sig - Route: Take 45 mL by mouth 3 (Three) Times a Day. - Oral    lansoprazole (FIRST) oral suspension 30 mg 30 mg Every Morning 4/16/2019     Sig - Route: 10 mL by Nasogastric route Every Morning. - Nasogastric    levothyroxine (SYNTHROID, LEVOTHROID) tablet 50 mcg 50 mcg Every Early Morning 4/16/2019     Sig - Route: Take 1 tablet by mouth Every Morning. - Oral    magnesium oxide (MAG-OX) tablet 800 mg 800 mg Daily 4/16/2019     Sig - Route: Take 2 tablets by mouth Daily. - Oral    magnesium sulfate 2g/50 mL (PREMIX) infusion 2 g Once 4/15/2019 4/15/2019    Sig - Route: Infuse 50 mL into a venous catheter 1 (One) Time. - Intravenous    nitroglycerin (NITROSTAT) SL tablet 0.4 mg 0.4 mg Every 5 Minutes PRN 4/15/2019     Sig - Route: Place 1 tablet under the tongue Every 5 (Five) Minutes As Needed for Chest Pain. - Sublingual    potassium chloride 10 mEq in 100 mL IVPB 10 mEq Every 1 Hour 4/16/2019 4/16/2019    Sig - Route: Infuse 100 mL into a venous catheter Every 1 (One) Hour. - Intravenous    rifaximin (XIFAXAN) tablet 550 mg 550 mg Every 12 Hours Scheduled 4/15/2019     Sig - Route: Take 1 tablet by mouth  "Every 12 (Twelve) Hours. - Oral    sodium chloride 0.9 % flush 10 mL 10 mL As Needed 4/15/2019     Sig - Route: Infuse 10 mL into a venous catheter As Needed for Line Care. - Intravenous    Linked Group 1:  \"And\" Linked Group Details        sodium chloride 0.9 % flush 3 mL 3 mL Every 12 Hours Scheduled 4/15/2019     Sig - Route: Infuse 3 mL into a venous catheter Every 12 (Twelve) Hours. - Intravenous    Cosign for Ordering: Accepted by Jamison Ramirez DO on 4/15/2019 10:03 PM    sodium chloride 0.9 % flush 3-10 mL 3-10 mL As Needed 4/15/2019     Sig - Route: Infuse 3-10 mL into a venous catheter As Needed for Line Care. - Intravenous    Cosign for Ordering: Accepted by Jamison Ramirez DO on 4/15/2019 10:03 PM    lactulose (CHRONULAC) 10 GM/15ML solution 30 g (Discontinued) 30 g 3 Times Daily 4/15/2019 4/15/2019    Sig - Route: Take 45 mL by mouth 3 (Three) Times a Day. - Oral    lactulose (CHRONULAC) 10 GM/15ML solution 30 g (Discontinued) 30 g 3 Times Daily 4/16/2019 4/16/2019    Sig - Route: 45 mL by Nasogastric route 3 (Three) Times a Day. - Nasogastric            Lab Results (last 24 hours)     Procedure Component Value Units Date/Time    Ammonia [[id number]]  (Normal) Collected:  04/16/19 0312    Specimen:  Blood Updated:  04/16/19 0418     Ammonia 50 umol/L     Magnesium [[id number]]  (Abnormal) Collected:  04/16/19 0312    Specimen:  Blood Updated:  04/16/19 0411     Magnesium 2.6 mg/dL     Comprehensive Metabolic Panel [[id number]]  (Abnormal) Collected:  04/16/19 0312    Specimen:  Blood Updated:  04/16/19 0410     Glucose 93 mg/dL      BUN 31 mg/dL      Creatinine 1.22 mg/dL      Sodium 139 mmol/L      Potassium 3.6 mmol/L      Chloride 108 mmol/L      CO2 20.5 mmol/L      Calcium 9.7 mg/dL      Total Protein 6.8 g/dL      Albumin 3.40 g/dL      ALT (SGPT) 20 U/L      AST (SGOT) 48 U/L      Alkaline Phosphatase 132 U/L      Total Bilirubin 1.6 mg/dL      eGFR Non African Amer 42 mL/min/1.73  "     Globulin 3.4 gm/dL      A/G Ratio 1.0 g/dL      BUN/Creatinine Ratio 25.4     Anion Gap 10.5 mmol/L     Narrative:       GFR Normal >60  Chronic Kidney Disease <60  Kidney Failure <15    C-reactive Protein [[id number]]  (Abnormal) Collected:  04/16/19 0312    Specimen:  Blood Updated:  04/16/19 0410     C-Reactive Protein 0.63 mg/dL     Phosphorus [[id number]]  (Normal) Collected:  04/16/19 0312    Specimen:  Blood Updated:  04/16/19 0410     Phosphorus 2.5 mg/dL     Protime-INR [[id number]]  (Abnormal) Collected:  04/16/19 0312    Specimen:  Blood Updated:  04/16/19 0358     Protime 16.4 Seconds      INR 1.30    Narrative:       Suggested INR therapeutic range for stable oral anticoagulant therapy:    Low Intensity therapy:   1.5-2.0  Moderate Intensity therapy:   2.0-3.0  High Intensity therapy:   2.5-4.0    CBC & Differential [[id number]] Collected:  04/16/19 0312    Specimen:  Blood Updated:  04/16/19 0352    Narrative:       The following orders were created for panel order CBC & Differential.  Procedure                               Abnormality         Status                     ---------                               -----------         ------                     CBC Auto Differential[[id number]]        Abnormal            Final result                 Please view results for these tests on the individual orders.    CBC Auto Differential [[id number]]  (Abnormal) Collected:  04/16/19 0312    Specimen:  Blood Updated:  04/16/19 0352     WBC 5.04 10*3/mm3      RBC 3.54 10*6/mm3      Hemoglobin 11.9 g/dL      Hematocrit 35.8 %      .1 fL      MCH 33.6 pg      MCHC 33.2 g/dL      RDW 14.2 %      RDW-SD 51.3 fl      MPV 11.1 fL      Platelets 100 10*3/mm3      Neutrophil % 59.9 %      Lymphocyte % 24.8 %      Monocyte % 11.1 %      Eosinophil % 3.2 %      Basophil % 0.8 %      Immature Grans % 0.2 %      Neutrophils, Absolute 3.02 10*3/mm3      Lymphocytes, Absolute 1.25 10*3/mm3      Monocytes, Absolute 0.56  10*3/mm3      Eosinophils, Absolute 0.16 10*3/mm3      Basophils, Absolute 0.04 10*3/mm3      Immature Grans, Absolute 0.01 10*3/mm3     Folate [[id number]]  (Normal) Collected:  04/15/19 1220    Specimen:  Blood Updated:  04/16/19 0236     Folate >20.00 ng/mL     Vitamin B12 [[id number]]  (Normal) Collected:  04/15/19 1220    Specimen:  Blood Updated:  04/16/19 0236     Vitamin B-12 926 pg/mL     T4, Free [[id number]]  (Abnormal) Collected:  04/15/19 1220    Specimen:  Blood Updated:  04/15/19 1755     Free T4 0.69 ng/dL     [place] Draw [[id number]] Collected:  04/15/19 1220    Specimen:  Blood Updated:  04/15/19 1330    Narrative:       The following orders were created for panel order [place] Draw.  Procedure                               Abnormality         Status                     ---------                               -----------         ------                     Light Blue Top[[id number]]                                   Final result               Green Top (Gel)[[id number]]                                  Final result               Lavender Top[[id number]]                                     Final result               Gold Top - SST[[id number]]                                   Final result                 Please view results for these tests on the individual orders.    Light Blue Top [[id number]] Collected:  04/15/19 1220    Specimen:  Blood Updated:  04/15/19 1330     Extra Tube hold for add-on     Comment: Auto resulted       Green Top (Gel) [[id number]] Collected:  04/15/19 1220    Specimen:  Blood Updated:  04/15/19 1330     Extra Tube Hold for add-ons.     Comment: Auto resulted.       Lavender Top [[id number]] Collected:  04/15/19 1220    Specimen:  Blood Updated:  04/15/19 1330     Extra Tube hold for add-on     Comment: Auto resulted       Gold Top - SST [[id number]] Collected:  04/15/19 1220    Specimen:  Blood Updated:  04/15/19 1330     Extra Tube Hold for add-ons.     Comment: Auto resulted.        Ammonia [[id number]]  (Abnormal) Collected:  04/15/19 1220    Specimen:  Blood Updated:  04/15/19 1321     Ammonia 263 umol/L     Troponin [[id number]]  (Normal) Collected:  04/15/19 1220    Specimen:  Blood Updated:  04/15/19 1305     Troponin T <0.010 ng/mL     Narrative:       Troponin T Reference Range:  <= 0.03 ng/mL-   Negative for AMI  >0.03 ng/mL-     Abnormal for myocardial necrosis.  Clinicians would have to utilize clinical acumen, EKG, Troponin and serial changes to determine if it is an Acute Myocardial Infarction or myocardial injury due to an underlying chronic condition.     TSH [[id number]]  (Abnormal) Collected:  04/15/19 1220    Specimen:  Blood Updated:  04/15/19 1305     TSH 6.650 mIU/mL     Comprehensive Metabolic Panel [[id number]]  (Abnormal) Collected:  04/15/19 1220    Specimen:  Blood Updated:  04/15/19 1302     Glucose 93 mg/dL      BUN 37 mg/dL      Creatinine 1.44 mg/dL      Sodium 140 mmol/L      Potassium 4.3 mmol/L      Chloride 104 mmol/L      CO2 23.9 mmol/L      Calcium 9.1 mg/dL      Total Protein 6.9 g/dL      Albumin 3.42 g/dL      ALT (SGPT) 20 U/L      AST (SGOT) 45 U/L      Alkaline Phosphatase 157 U/L      Total Bilirubin 1.1 mg/dL      eGFR Non African Amer 34 mL/min/1.73      Globulin 3.5 gm/dL      A/G Ratio 1.0 g/dL      BUN/Creatinine Ratio 25.7     Anion Gap 12.1 mmol/L     Narrative:       GFR Normal >60  Chronic Kidney Disease <60  Kidney Failure <15    Lipase [[id number]]  (Abnormal) Collected:  04/15/19 1220    Specimen:  Blood Updated:  04/15/19 1302     Lipase 68 U/L     Acetaminophen Level [[id number]]  (Abnormal) Collected:  04/15/19 1220    Specimen:  Blood Updated:  04/15/19 1302     Acetaminophen <5.0 mcg/mL     Ethanol [[id number]] Collected:  04/15/19 1220    Specimen:  Blood Updated:  04/15/19 1302     Ethanol <10 mg/dL      Ethanol % <0.010 %     Narrative:       >/= 80.0 legally intoxicated    Salicylate Level [[id number]]  (Normal) Collected:  04/15/19 1220     Specimen:  Blood Updated:  04/15/19 1302     Salicylate <0.3 mg/dL     C-reactive Protein [[id number]]  (Abnormal) Collected:  04/15/19 1220    Specimen:  Blood Updated:  04/15/19 1302     C-Reactive Protein 0.61 mg/dL     Magnesium [[id number]]  (Normal) Collected:  04/15/19 1220    Specimen:  Blood Updated:  04/15/19 1302     Magnesium 1.9 mg/dL     Lactic Acid, Plasma [[id number]]  (Normal) Collected:  04/15/19 1216    Specimen:  Blood Updated:  04/15/19 1300     Lactate 1.6 mmol/L     Protime-INR [[id number]]  (Abnormal) Collected:  04/15/19 1220    Specimen:  Blood Updated:  04/15/19 1242     Protime 16.3 Seconds      INR 1.28    Narrative:       Suggested INR therapeutic range for stable oral anticoagulant therapy:    Low Intensity therapy:   1.5-2.0  Moderate Intensity therapy:   2.0-3.0  High Intensity therapy:   2.5-4.0    aPTT [[id number]]  (Normal) Collected:  04/15/19 1220    Specimen:  Blood Updated:  04/15/19 1242     PTT 34.0 seconds     Narrative:       PTT Heparin Therapeutic Range:  59 - 95 seconds    Influenza Antigen, Rapid - Swab, Nasopharynx [[id number]]  (Normal) Collected:  04/15/19 1222    Specimen:  Swab from Nasopharynx Updated:  04/15/19 1240     Influenza A Ag, EIA Negative     Influenza B Ag, EIA Negative    CBC & Differential [[id number]] Collected:  04/15/19 1220    Specimen:  Blood Updated:  04/15/19 1236    Narrative:       The following orders were created for panel order CBC & Differential.  Procedure                               Abnormality         Status                     ---------                               -----------         ------                     CBC Auto Differential[[id number]]        Abnormal            Final result                 Please view results for these tests on the individual orders.    CBC Auto Differential [[id number]]  (Abnormal) Collected:  04/15/19 1220    Specimen:  Blood Updated:  04/15/19 1236     WBC 3.22 10*3/mm3      RBC 3.50 10*6/mm3       Hemoglobin 11.6 g/dL      Hematocrit 36.1 %      .1 fL      MCH 33.1 pg      MCHC 32.1 g/dL      RDW 14.3 %      RDW-SD 53.6 fl      MPV 10.9 fL      Platelets 93 10*3/mm3      Neutrophil % 43.8 %      Lymphocyte % 35.7 %      Monocyte % 14.0 %      Eosinophil % 5.9 %      Basophil % 0.6 %      Immature Grans % 0.0 %      Neutrophils, Absolute 1.41 10*3/mm3      Lymphocytes, Absolute 1.15 10*3/mm3      Monocytes, Absolute 0.45 10*3/mm3      Eosinophils, Absolute 0.19 10*3/mm3      Basophils, Absolute 0.02 10*3/mm3      Immature Grans, Absolute 0.00 10*3/mm3     Blood Culture - Blood, Arm, Right [[id number]] Collected:  04/15/19 1215    Specimen:  Blood from Arm, Right Updated:  04/15/19 1227    Blood Culture - Blood, Arm, Right [[id number]] Collected:  04/15/19 1215    Specimen:  Blood from Arm, Right Updated:  04/15/19 1227    Urinalysis, Microscopic Only - Urine, Catheter [[id number]]  (Abnormal) Collected:  04/15/19 1138    Specimen:  Urine, Catheter Updated:  04/15/19 1226     RBC, UA 21-30 /HPF      WBC, UA Too Numerous to Count /HPF      Bacteria, UA 1+ /HPF      Squamous Epithelial Cells, UA 21-30 /HPF      Hyaline Casts, UA None Seen /LPF      Methodology Automated Microscopy    Urine Culture - Urine, Urine, Catheter [[id number]] Collected:  04/15/19 1138    Specimen:  Urine, Catheter Updated:  04/15/19 1226    Urine Drug Screen - Urine, Catheter [[id number]]  (Normal) Collected:  04/15/19 1138    Specimen:  Urine, Catheter Updated:  04/15/19 1215     Amphetamine Screen, Urine Negative     Barbiturates Screen, Urine Negative     Benzodiazepine Screen, Urine Negative     Cocaine Screen, Urine Negative     Methadone Screen, Urine Negative     Opiate Screen Negative     Phencyclidine (PCP), Urine Negative     THC, Screen, Urine Negative     6-ACETYL MORPHINE Negative     Buprenorphine, Screen, Urine Negative     Oxycodone Screen, Urine Negative    Narrative:       Negative Thresholds For Drugs Screened:                   Amphetamines              1000 ng/ml               Barbiturates               200 ng/ml               Benzodiazepines            200 ng/ml              Cocaine                    300 ng/ml              Methadone                  300 ng/ml              Opiates                    300 ng/ml               Phencyclidine               25 ng/ml               THC                         50 ng/ml              6-Acetyl Morphine           10 ng/ml              Buprenorphine                5 ng/ml              Oxycodone                  300 ng/ml    The reference range for all drugs tested is negative. This report includes final unconfirmed qualitative results to be used for medical treatment purposes only. Unconfirmed results must not be used for non-medical purposes such as employment or legal testing. Clinical consideration should be applied to any drug of abuse test, especially when unconfirmed quantitative results are used.      Urinalysis With Culture If Indicated - Urine, Catheter [[id number]]  (Abnormal) Collected:  04/15/19 1138    Specimen:  Urine, Catheter Updated:  04/15/19 1206     Color, UA Yellow     Appearance, UA Cloudy     pH, UA >=9.0     Specific Gravity, UA 1.018     Glucose, UA Negative     Ketones, UA Negative     Bilirubin, UA Negative     Blood, UA Moderate (2+)     Protein, UA Trace     Leuk Esterase, UA Large (3+)     Nitrite, UA Negative     Urobilinogen, UA 2.0 E.U./dL    Blood Gas, Arterial [[id number]]  (Abnormal) Collected:  04/15/19 1053    Specimen:  Arterial Blood Updated:  04/15/19 1054     Site Arterial: left radial     Chris's Test Positive     pH, Arterial 7.462 pH units      pCO2, Arterial 35.6 mm Hg      pO2, Arterial 81.6 mm Hg      HCO3, Arterial 24.9 mmol/L      Base Excess, Arterial 1.3 mmol/L      O2 Saturation, Arterial 95.4 %      Hemoglobin, Blood Gas 11.5 g/dL      Hematocrit, Blood Gas 34.0 %      Oxyhemoglobin 94.4 %      Methemoglobin 0.30 %      Carboxyhemoglobin  0.8 %      A-a Gradiant 18.2 mmHg      Temperature 98.6 C      Barometric Pressure for Blood Gas 725 mmHg      Modality Room Air     FIO2 21 %

## 2019-04-16 NOTE — THERAPY EVALUATION
Acute Care - Speech Language Pathology   Swallow Initial Evaluation  [place]   CLINICAL DYSPHAGIA EVALUATION     Patient Name: Alma Delia Garcia  : 1930  MRN: [id number]  Today's Date: 2019      Admit Date: 4/15/2019  Alma Delia Garcia  is seen at bedside this am on PCU-204 to assess safety/efficacy of swallowing fnx, determine safest/least restrictive diet tolerance. Pt has significant h/o HTN, anxiety, osteoprosis, hypothyroidism, GERD, chronic pain, anemia, athralgia, L leg pain, insomnia, thrombocytopenia, peripheral vascular disease, bilateral leg edema, UTI, hypokalemia, closed fx of L femur w/ internal fixation, CHF, diarrhea, R upper quadrant pain, nausea, and Bell's Palsy. Pt is admitted to [facility] w/ hepatic encephalopathy.    Social History     Socioeconomic History   • Marital status:      Spouse name: Not on file   • Number of children: Not on file   • Years of education: Not on file   • Highest education level: Not on file   Tobacco Use   • Smoking status: Never Smoker   • Smokeless tobacco: Former User     Types: Snuff   Substance and Sexual Activity   • Alcohol use: No     Comment: FORMER 12 BOTTLES PER DAY FOR 15 YEARS   • Drug use: No   • Sexual activity: Defer   Social History Narrative    LIVES WITH GRANDDAUGHTER DEVON IN [place]         Chest xray on 4/15/19 revealed stable chest per radiology report.    Diet Orders (active) (From admission, onward)    Start     Ordered    19 1122  Diet Soft Texture; Chopped; Nectar / Syrup Thick  Diet Effective Now     Comments:  Meds whole in puree  Up for meals    19 1121        Ms. Garcia is observed on ra w/o complications tolerating well.     Pt is positioned upright and centered in bed to accept multiple po presentations of ice chips and thin liquids via spoon, cup and straw.  Pt is able to self feed. Further presentations deferred per observed s/s of aspiration.     Facial/oral structures are symmetrical upon observation.  Lingual protrusion reveals no deviation. Oral mucosa are moist, pink, and clean. Secretions are clear, thin, and well controlled. OROM/ERVIN is generally weak to imitate oral postures. Gag is not assessed. Volitional cough is weak in intensity, clear in quality, non-productive. Voice is adequate in intensity, clear in quality w/ intelligible speech.    Upon po presentations, adequate bolus anticipation and acceptance w/ good labial seal for bolus clearance via spoon bowl, cup rim stability and suction via straw. Bolus formation, manipulation and control are mildly weak w/ rotary mastication pattern. A-p transit is timely w/o oral residue.     Pharyngeal swallow is delayed w/ adequate hyolaryngeal elevation per palpation. Pt consumes x2 thin water via spoon bowl, x3 thin water via cup rim and x3 thin water via straw. Overt s/s of aspiration w/ all po presentations w/ elicited cough response, intermittently delayed. Further presentations deferred per observed s/s of aspiration. No silent aspiration suspected as pt is w/o changes in vocal quality, respirations or secretions post po presentations. Pt denies odynophagia.        Visit Dx:     ICD-10-CM ICD-9-CM   1. Hepatic encephalopathy (CMS/HCC) K72.90 572.2     Patient Active Problem List   Diagnosis   • Hepatic cirrhosis*   • HTN (hypertension)*   • Anxiety disorder*   • Osteopetrosis*   • Hypothyroidism*   • GERD (gastroesophageal reflux disease)*   • Chronic pain syndrome due to fractured spine and left rib fracture*   • Anemia, chronic disease   • Arthralgia of hip   • Left leg pain   • Fracture of proximal end of tibia and fibula   • Primary insomnia   • Thrombocytopenia (CMS/HCC)   • Peripheral vascular disease (CMS/HCC)   • Bilateral leg edema   • Cramp of both lower extremities   • Hepatic encephalopathy (CMS/HCC)   • UTI (urinary tract infection)   • Hypokalemia   • Closed fracture of lower end of left femur with routine healing   • Status post open reduction  with internal fixation of fracture   • Nonrheumatic aortic valve insufficiency, mild   • Chronic diastolic heart failure, mild   • Periprosthetic fracture of shaft of femur   • S/P ORIF (open reduction internal fixation) fracture   • Diarrhea   • Right upper quadrant abdominal pain   • Nausea   • Elevated liver enzymes   • Poor venous access   • Stage 3 chronic kidney disease (CMS/HCC)   • Trochanteric bursitis of left hip     Past Medical History:   Diagnosis Date   • Acute renal failure (CMS/HCC)    • Alcohol abuse     in remission   • Anxiety    • Bell's palsy    • Cellulitis     LLE   • CHF (congestive heart failure) (CMS/HCC)     Diastolic   • CHF (congestive heart failure) (CMS/HCC)    • Constipation    • Coronary artery disease    • Diverticulosis    • GERD (gastroesophageal reflux disease)    • Hepatic cirrhosis (CMS/HCC)    • Hepatitis C    • History of transfusion    • Hypertension    • Hypothyroidism    • MI (mitral incompetence)    • Osteoporosis    • Osteoporosis    • Restless leg syndrome    • Thrombocytopenia (CMS/HCC)    • Trigeminal neuralgia      Past Surgical History:   Procedure Laterality Date   • BACK SURGERY      KYPHOPLASTIES    • CARDIAC CATHETERIZATION Left 08/2004   • CARDIOVASCULAR STRESS TEST  01/25/2015   • CATARACT EXTRACTION     • CHOLECYSTECTOMY     • CLAVICLE SURGERY Right    • COLONOSCOPY  10/2004   • ECHO - CONVERTED  11/2012   • FEMUR OPEN REDUCTION INTERNAL FIXATION Left 10/12/2017    Procedure: FEMUR OPEN REDUCTION INTERNAL FIXATION;  Surgeon: Karson Pierre MD;  Location: [facility];  Service:    • HIP FRACTURE SURGERY Bilateral     ARTHROPLASTIES    • PARTIAL HYSTERECTOMY     • PORTACATH PLACEMENT N/A 9/28/2018    Procedure: INSERTION OF PORTACATH;  Surgeon: Vic Mauricio MD;  Location: [facility];  Service: General   • WRIST FRACTURE SURGERY           EDUCATION  The patient has been educated in the following areas:   Dysphagia (Swallowing Impairment) Oral  "Care/Hydration.    SLP Recommendation and Plan    Impression: Per these evaluation results, Ms. Gracia presents w/ overt s/s of aspiration w/ thin liquids via all presentation styles w/ cough response elicited, intermittently delayed. Upon further questioning, Ms. Garcia states \"I've been doing this for a while now\". Pt is felt to most benefit from instrumental MBS this am to objectively assess swallowing fnx, determine safest/least restrictive level of po intake. D/w pt risks and benefits of instrumental MBS w/ pt verbalizing agreement to participate. Plan for MBS this am w/ further recs pending.    Recommendations:  1. MBS this am w/ further recs pending    D/w pt results and recommendations w/ verbal agreement.    D/w RN Jordan results and recommendations w/ verbal agreement. He provides verbal clearance for pt to transport to radiology suite w/o nurse assist.     Thank you for allowing me to participate in the care of your patient-  Lisa Martino M.S., CCC-SLP  Plan of Care Reviewed With: patient  Plan of Care Review  Plan of Care Reviewed With: patient  Outcome Summary: MBS completed this am. Intermittent microaspiration w/ thin liquids via cup. Cheko aspiration w/ thin liquids via straw. Cough response elicited w/ all po trials across this evaluation. Initiate modified po diet of mechanical soft, chopped w/ NECTAR thick liquids only. Meds whole in puree. SLP to f/u for dysphagia tx/diet tolerance           Time Calculation:   Time Calculation- SLP     Row Name 04/16/19 1134             Time Calculation- SLP    SLP - Next Appointment  04/17/19  -        User Key  (r) = Recorded By, (t) = Taken By, (c) = Cosigned By    Initials Name Provider Type    Lisa Corona MS CCC-SLP Speech and Language Pathologist          Therapy Charges for Today     Code Description Service Date Service Provider Modifiers Qty    [id number] HC ST MOTION FLUORO EVAL SWALLOW 8 4/16/2019 Lisa Martino, MS CCC-SLP GN 1           "     Lisa Martino, MS CCC-SLP  4/16/2019

## 2019-04-16 NOTE — PLAN OF CARE
Problem: Patient Care Overview  Goal: Plan of Care Review  Outcome: Ongoing (interventions implemented as appropriate)   04/16/19 1131   Coping/Psychosocial   Plan of Care Reviewed With patient   OTHER   Outcome Summary MBS completed this am. Intermittent microaspiration w/ thin liquids via cup. Cheko aspiration w/ thin liquids via straw. Cough response elicited w/ all po trials across this evaluation. Initiate modified po diet of mechanical soft, chopped w/ NECTAR thick liquids only. Meds whole in puree. SLP to f/u for dysphagia tx/diet tolerance       Problem: Eating/Swallowing Impairment (IRF) (Adult)  Intervention: Promote Optimal Eating and Swallowing [place]   04/16/19 1131   Eating/Swallowing Management, Strategies/Techniques   Swallowing Status/Identified Need for Assessment other (see comments)  (MBS completed)   Monitoring/Assistance Required (Eating/Swallowing) monitor for symptoms of fatigue during eating;upright for 30 min following eating;stop eating activities when fatigue is present   Modified Food and Liquid Recommendations (Eating/Swallowing) nectar thick liquids;level 2, dysphagia mechanically altered diet  (chopped meats)   Strategies to Enhance Eating/Swallowing observe closely for symptoms of aspiration;upright sitting position for eating       Goal: Optimal/Safe Level of Indep, Swallowing  Outcome: Ongoing (interventions implemented as appropriate)   04/16/19 1131   Eating/Swallowing Impairment (IRF) (Adult)   Optimal/Safe Level of Indep, Swallowing demonstrating adequate progress

## 2019-04-16 NOTE — PROGRESS NOTES
Discharge Planning Assessment  [facility]     Patient Name: Alma Delia Garcia  MRN: [id number]  Today's Date: 4/16/2019    Admit Date: 4/15/2019      Discharge Plan     Row Name 04/16/19 1043       Plan    Plan  SS spoke with the pt's daughter on this date regarding nursing home placement.  SS informed the pt's daughter before any [facility] nursing home in the area would be agreeable to accept the pt she would need to take care of her 399.00 balance at [facility]. Pt's daughter is agreeable for SS to fax her information to other nursing homes in the area to be evaluated.  SS faxed information to [facility] and [facility], [facility], [facility], [facility], and [facility] on this date.  SS will continue to follow and will assist as needed.         Destination      Service Provider Request Status Selected Services Address Phone Number Fax Number    [facility] Pending - Request Sent N/A [address] , [place] [phone]    [facility] Pending - Request Sent N/A [address] [place] [phone]    [facility] Pending - Request Sent N/A [address] , [facility] [address] [phone]    [facility] Pending - Request Sent N/A [address] [address] [phone]    [facility] Pending - Request Sent N/A [address], [facility] [address] [phone]          Haleigh Scanlon

## 2019-04-16 NOTE — PROGRESS NOTES
Discharge Planning Assessment  [facility]     Patient Name: Alma Delia Garcia  MRN: [id number]  Today's Date: 4/16/2019    Admit Date: 4/15/2019           Discharge Plan     Row Name 04/16/19 2280       Plan    Plan  SS spoke with Rosalie at [facility] and she states she is interested in taking the pt once she is medically stable.  She states she will contact the pt's family and discuss possible placement. SS will follow.     Row Name 04/16/19 4013       Plan    Plan  SS spoke with the pt's daughter on this date regarding nursing home placement.  SS informed the pt's daughter before any [facility] nursing home in the area would be agreeable to accept the pt she would need to take care of her 399.00 balance at [facility]. Pt's daughter is agreeable for SS to fax her information to other nursing homes in the area to be evaluated.  SS faxed information to [facility], [facility], [facility], [facility], and [facility] on this date.  SS will continue to follow and will assist as needed.         Destination      Service Provider Request Status Selected Services Address Phone Number Fax Number    [facility] Pending - Request Sent N/A [address] , [place] [address] [phone]    [facility] Pending - Request Sent N/A [address] [place] [phone]    [facility] Pending - Request Sent N/A [address] , [place] [address] [phone]    [facility] Pending - Request Sent N/A [address] [address] [phone]    [facility] Pending - Request Sent N/A [address], [facility] [address] [phone]              Haleigh Scanlon

## 2019-04-17 LAB
ALBUMIN SERPL-MCNC: 2.94 G/DL (ref 3.5–5.2)
ALBUMIN/GLOB SERPL: 1 G/DL
ALP SERPL-CCNC: 109 U/L (ref 39–117)
ALT SERPL W P-5'-P-CCNC: 17 U/L (ref 1–33)
ANION GAP SERPL CALCULATED.3IONS-SCNC: 12.2 MMOL/L
AST SERPL-CCNC: 38 U/L (ref 1–32)
BACTERIA SPEC AEROBE CULT: ABNORMAL
BASOPHILS # BLD AUTO: 0.03 10*3/MM3 (ref 0–0.2)
BASOPHILS NFR BLD AUTO: 0.6 % (ref 0–1.5)
BILIRUB SERPL-MCNC: 1 MG/DL (ref 0.2–1.2)
BUN BLD-MCNC: 25 MG/DL (ref 8–23)
BUN/CREAT SERPL: 22.1 (ref 7–25)
CALCIUM SPEC-SCNC: 9 MG/DL (ref 8.6–10.5)
CHLORIDE SERPL-SCNC: 115 MMOL/L (ref 98–107)
CO2 SERPL-SCNC: 19.8 MMOL/L (ref 22–29)
CREAT BLD-MCNC: 1.13 MG/DL (ref 0.57–1)
CRP SERPL-MCNC: 1.37 MG/DL (ref 0–0.5)
DEPRECATED RDW RBC AUTO: 53.9 FL (ref 37–54)
EOSINOPHIL # BLD AUTO: 0.44 10*3/MM3 (ref 0–0.4)
EOSINOPHIL NFR BLD AUTO: 9.4 % (ref 0.3–6.2)
ERYTHROCYTE [DISTWIDTH] IN BLOOD BY AUTOMATED COUNT: 14.3 % (ref 12.3–15.4)
GFR SERPL CREATININE-BSD FRML MDRD: 45 ML/MIN/1.73
GLOBULIN UR ELPH-MCNC: 3.1 GM/DL
GLUCOSE BLD-MCNC: 92 MG/DL (ref 65–99)
HCT VFR BLD AUTO: 34.1 % (ref 34–46.6)
HGB BLD-MCNC: 10.8 G/DL (ref 12–15.9)
IMM GRANULOCYTES # BLD AUTO: 0.01 10*3/MM3 (ref 0–0.05)
IMM GRANULOCYTES NFR BLD AUTO: 0.2 % (ref 0–0.5)
LYMPHOCYTES # BLD AUTO: 1.38 10*3/MM3 (ref 0.7–3.1)
LYMPHOCYTES NFR BLD AUTO: 29.6 % (ref 19.6–45.3)
MAGNESIUM SERPL-MCNC: 2 MG/DL (ref 1.6–2.4)
MCH RBC QN AUTO: 32.8 PG (ref 26.6–33)
MCHC RBC AUTO-ENTMCNC: 31.7 G/DL (ref 31.5–35.7)
MCV RBC AUTO: 103.6 FL (ref 79–97)
MONOCYTES # BLD AUTO: 0.49 10*3/MM3 (ref 0.1–0.9)
MONOCYTES NFR BLD AUTO: 10.5 % (ref 5–12)
NEUTROPHILS # BLD AUTO: 2.32 10*3/MM3 (ref 1.4–7)
NEUTROPHILS NFR BLD AUTO: 49.7 % (ref 42.7–76)
PHOSPHATE SERPL-MCNC: 2.7 MG/DL (ref 2.5–4.5)
PLATELET # BLD AUTO: 96 10*3/MM3 (ref 140–450)
PMV BLD AUTO: 11.5 FL (ref 6–12)
POTASSIUM BLD-SCNC: 4.4 MMOL/L (ref 3.5–5.2)
PROT SERPL-MCNC: 6 G/DL (ref 6–8.5)
RBC # BLD AUTO: 3.29 10*6/MM3 (ref 3.77–5.28)
SODIUM BLD-SCNC: 147 MMOL/L (ref 136–145)
WBC NRBC COR # BLD: 4.67 10*3/MM3 (ref 3.4–10.8)

## 2019-04-17 PROCEDURE — 83735 ASSAY OF MAGNESIUM: CPT | Performed by: INTERNAL MEDICINE

## 2019-04-17 PROCEDURE — 99232 SBSQ HOSP IP/OBS MODERATE 35: CPT | Performed by: INTERNAL MEDICINE

## 2019-04-17 PROCEDURE — 85025 COMPLETE CBC W/AUTO DIFF WBC: CPT | Performed by: INTERNAL MEDICINE

## 2019-04-17 PROCEDURE — 80053 COMPREHEN METABOLIC PANEL: CPT | Performed by: INTERNAL MEDICINE

## 2019-04-17 PROCEDURE — 25010000002 ERTAPENEM 1 GM/100ML SOLUTION: Performed by: INTERNAL MEDICINE

## 2019-04-17 PROCEDURE — 94799 UNLISTED PULMONARY SVC/PX: CPT

## 2019-04-17 PROCEDURE — 84100 ASSAY OF PHOSPHORUS: CPT | Performed by: INTERNAL MEDICINE

## 2019-04-17 PROCEDURE — 25010000002 CEFTRIAXONE: Performed by: PHYSICIAN ASSISTANT

## 2019-04-17 PROCEDURE — 86140 C-REACTIVE PROTEIN: CPT | Performed by: INTERNAL MEDICINE

## 2019-04-17 RX ORDER — SODIUM CHLORIDE 0.9 % (FLUSH) 0.9 %
10 SYRINGE (ML) INJECTION EVERY 12 HOURS SCHEDULED
Status: DISCONTINUED | OUTPATIENT
Start: 2019-04-17 | End: 2019-04-19 | Stop reason: HOSPADM

## 2019-04-17 RX ORDER — HYDROCODONE BITARTRATE AND ACETAMINOPHEN 5; 325 MG/1; MG/1
1 TABLET ORAL EVERY 12 HOURS PRN
Status: DISCONTINUED | OUTPATIENT
Start: 2019-04-17 | End: 2019-04-19 | Stop reason: HOSPADM

## 2019-04-17 RX ORDER — SODIUM CHLORIDE 0.9 % (FLUSH) 0.9 %
10 SYRINGE (ML) INJECTION AS NEEDED
Status: DISCONTINUED | OUTPATIENT
Start: 2019-04-17 | End: 2019-04-19 | Stop reason: HOSPADM

## 2019-04-17 RX ORDER — SODIUM CHLORIDE 0.9 % (FLUSH) 0.9 %
20 SYRINGE (ML) INJECTION AS NEEDED
Status: DISCONTINUED | OUTPATIENT
Start: 2019-04-17 | End: 2019-04-19 | Stop reason: HOSPADM

## 2019-04-17 RX ADMIN — LACTULOSE 30 G: 10 SOLUTION ORAL at 09:10

## 2019-04-17 RX ADMIN — RIFAXIMIN 550 MG: 550 TABLET ORAL at 20:31

## 2019-04-17 RX ADMIN — LACTULOSE 30 G: 10 SOLUTION ORAL at 16:35

## 2019-04-17 RX ADMIN — ERTAPENEM SODIUM 1 G: 1 INJECTION, POWDER, LYOPHILIZED, FOR SOLUTION INTRAMUSCULAR; INTRAVENOUS at 16:35

## 2019-04-17 RX ADMIN — LEVOTHYROXINE SODIUM 50 MCG: 50 TABLET ORAL at 09:06

## 2019-04-17 RX ADMIN — LACTULOSE 30 G: 10 SOLUTION ORAL at 20:31

## 2019-04-17 RX ADMIN — SODIUM CHLORIDE, PRESERVATIVE FREE 3 ML: 5 INJECTION INTRAVENOUS at 09:53

## 2019-04-17 RX ADMIN — LANSOPRAZOLE 30 MG: KIT at 09:52

## 2019-04-17 RX ADMIN — CEFTRIAXONE 1 G: 1 INJECTION, POWDER, FOR SOLUTION INTRAMUSCULAR; INTRAVENOUS at 09:05

## 2019-04-17 RX ADMIN — ASPIRIN 81 MG: 81 TABLET, CHEWABLE ORAL at 09:06

## 2019-04-17 RX ADMIN — SODIUM CHLORIDE, PRESERVATIVE FREE 10 ML: 5 INJECTION INTRAVENOUS at 20:38

## 2019-04-17 RX ADMIN — MAGNESIUM OXIDE 800 MG: 400 TABLET ORAL at 09:06

## 2019-04-17 RX ADMIN — Medication 1 CAPSULE: at 09:06

## 2019-04-17 RX ADMIN — RIFAXIMIN 550 MG: 550 TABLET ORAL at 09:06

## 2019-04-17 NOTE — PROGRESS NOTES
Discharge Planning Assessment  [facility]     Patient Name: Alma Delia Garcia  MRN: [id number]  Today's Date: 4/17/2019    Admit Date: 4/15/2019      Discharge Plan     Row Name 04/17/19 1628       Plan    Plan Pt was transferred to [address] [place] [address]. SS received call from [facility] per Grace who states referral is being reviewed. SS to follow.         Destination      Service Provider Request Status Selected Services Address Phone Number Fax Number    [facility] Pending - Request Sent N/A [address] [place] [phone]    [facility] Pending - Request Sent N/A [address] [facility] [address] [phone]    [facility] Pending - Request Sent N/A [address] [address] [phone]    [facility] Pending - Request Sent N/A [address] [place] [phone]    [facility] Decl[facility] N/A [address] [facility] [address] [phone]     Tata Momin

## 2019-04-17 NOTE — PLAN OF CARE
Problem: Patient Care Overview  Goal: Individualization and Mutuality  Outcome: Ongoing (interventions implemented as appropriate)    Goal: Discharge Needs Assessment  Outcome: Ongoing (interventions implemented as appropriate)    Goal: Interprofessional Rounds/Family Conf  Outcome: Ongoing (interventions implemented as appropriate)      Problem: Confusion, Acute (Adult)  Goal: Identify Related Risk Factors and Signs and Symptoms  Outcome: Ongoing (interventions implemented as appropriate)    Goal: Cognitive/Functional Impairments Minimized  Outcome: Ongoing (interventions implemented as appropriate)

## 2019-04-18 VITALS
RESPIRATION RATE: 18 BRPM | OXYGEN SATURATION: 93 % | SYSTOLIC BLOOD PRESSURE: 138 MMHG | WEIGHT: 132.5 LBS | DIASTOLIC BLOOD PRESSURE: 64 MMHG | HEART RATE: 70 BPM | HEIGHT: 64 IN | TEMPERATURE: 98.5 F | BODY MASS INDEX: 22.62 KG/M2

## 2019-04-18 PROBLEM — A49.9 ESBL (EXTENDED SPECTRUM BETA-LACTAMASE) PRODUCING BACTERIA INFECTION: Status: ACTIVE | Noted: 2019-04-18

## 2019-04-18 PROBLEM — Z16.12 ESBL (EXTENDED SPECTRUM BETA-LACTAMASE) PRODUCING BACTERIA INFECTION: Status: ACTIVE | Noted: 2019-04-18

## 2019-04-18 LAB
ALBUMIN SERPL-MCNC: 2.92 G/DL (ref 3.5–5.2)
ALBUMIN/GLOB SERPL: 0.9 G/DL
ALP SERPL-CCNC: 108 U/L (ref 39–117)
ALT SERPL W P-5'-P-CCNC: 18 U/L (ref 1–33)
ANION GAP SERPL CALCULATED.3IONS-SCNC: 11.3 MMOL/L
AST SERPL-CCNC: 38 U/L (ref 1–32)
BASOPHILS # BLD AUTO: 0.03 10*3/MM3 (ref 0–0.2)
BASOPHILS NFR BLD AUTO: 0.7 % (ref 0–1.5)
BILIRUB SERPL-MCNC: 0.9 MG/DL (ref 0.2–1.2)
BUN BLD-MCNC: 19 MG/DL (ref 8–23)
BUN/CREAT SERPL: 19 (ref 7–25)
CALCIUM SPEC-SCNC: 9 MG/DL (ref 8.6–10.5)
CHLORIDE SERPL-SCNC: 113 MMOL/L (ref 98–107)
CO2 SERPL-SCNC: 18.7 MMOL/L (ref 22–29)
CREAT BLD-MCNC: 1 MG/DL (ref 0.57–1)
CRP SERPL-MCNC: 1.07 MG/DL (ref 0–0.5)
DEPRECATED RDW RBC AUTO: 54.1 FL (ref 37–54)
EOSINOPHIL # BLD AUTO: 0.55 10*3/MM3 (ref 0–0.4)
EOSINOPHIL NFR BLD AUTO: 12.1 % (ref 0.3–6.2)
ERYTHROCYTE [DISTWIDTH] IN BLOOD BY AUTOMATED COUNT: 14.3 % (ref 12.3–15.4)
GFR SERPL CREATININE-BSD FRML MDRD: 52 ML/MIN/1.73
GLOBULIN UR ELPH-MCNC: 3.1 GM/DL
GLUCOSE BLD-MCNC: 89 MG/DL (ref 65–99)
HCT VFR BLD AUTO: 34.9 % (ref 34–46.6)
HGB BLD-MCNC: 10.9 G/DL (ref 12–15.9)
IMM GRANULOCYTES # BLD AUTO: 0.01 10*3/MM3 (ref 0–0.05)
IMM GRANULOCYTES NFR BLD AUTO: 0.2 % (ref 0–0.5)
LYMPHOCYTES # BLD AUTO: 1.65 10*3/MM3 (ref 0.7–3.1)
LYMPHOCYTES NFR BLD AUTO: 36.3 % (ref 19.6–45.3)
MAGNESIUM SERPL-MCNC: 1.8 MG/DL (ref 1.6–2.4)
MCH RBC QN AUTO: 32.6 PG (ref 26.6–33)
MCHC RBC AUTO-ENTMCNC: 31.2 G/DL (ref 31.5–35.7)
MCV RBC AUTO: 104.5 FL (ref 79–97)
MONOCYTES # BLD AUTO: 0.56 10*3/MM3 (ref 0.1–0.9)
MONOCYTES NFR BLD AUTO: 12.3 % (ref 5–12)
NEUTROPHILS # BLD AUTO: 1.74 10*3/MM3 (ref 1.4–7)
NEUTROPHILS NFR BLD AUTO: 38.4 % (ref 42.7–76)
PLATELET # BLD AUTO: 90 10*3/MM3 (ref 140–450)
PMV BLD AUTO: 11.4 FL (ref 6–12)
POTASSIUM BLD-SCNC: 4.6 MMOL/L (ref 3.5–5.2)
PROT SERPL-MCNC: 6 G/DL (ref 6–8.5)
RBC # BLD AUTO: 3.34 10*6/MM3 (ref 3.77–5.28)
SODIUM BLD-SCNC: 143 MMOL/L (ref 136–145)
WBC NRBC COR # BLD: 4.54 10*3/MM3 (ref 3.4–10.8)

## 2019-04-18 PROCEDURE — 85025 COMPLETE CBC W/AUTO DIFF WBC: CPT | Performed by: INTERNAL MEDICINE

## 2019-04-18 PROCEDURE — 99239 HOSP IP/OBS DSCHRG MGMT >30: CPT | Performed by: PHYSICIAN ASSISTANT

## 2019-04-18 PROCEDURE — 80053 COMPREHEN METABOLIC PANEL: CPT | Performed by: INTERNAL MEDICINE

## 2019-04-18 PROCEDURE — 86140 C-REACTIVE PROTEIN: CPT | Performed by: INTERNAL MEDICINE

## 2019-04-18 PROCEDURE — 25010000002 ERTAPENEM 1 GM/100ML SOLUTION: Performed by: INTERNAL MEDICINE

## 2019-04-18 PROCEDURE — 83735 ASSAY OF MAGNESIUM: CPT | Performed by: INTERNAL MEDICINE

## 2019-04-18 PROCEDURE — 92526 ORAL FUNCTION THERAPY: CPT | Performed by: SPEECH-LANGUAGE PATHOLOGIST

## 2019-04-18 RX ORDER — MAGNESIUM SULFATE HEPTAHYDRATE 40 MG/ML
2 INJECTION, SOLUTION INTRAVENOUS ONCE
Status: DISCONTINUED | OUTPATIENT
Start: 2019-04-18 | End: 2019-04-19 | Stop reason: HOSPADM

## 2019-04-18 RX ORDER — LACTULOSE 10 G/15ML
30 SOLUTION ORAL 3 TIMES DAILY
Start: 2019-04-18 | End: 2019-01-01 | Stop reason: SDUPTHER

## 2019-04-18 RX ORDER — L.ACID,PARA/B.BIFIDUM/S.THERM 8B CELL
1 CAPSULE ORAL DAILY
Qty: 8 CAPSULE | Refills: 0
Start: 2019-04-19 | End: 2019-04-27

## 2019-04-18 RX ORDER — BUMETANIDE 1 MG/1
1 TABLET ORAL DAILY PRN
Status: ON HOLD
Start: 2019-04-18 | End: 2019-01-01

## 2019-04-18 RX ORDER — PANTOPRAZOLE SODIUM 40 MG/1
40 TABLET, DELAYED RELEASE ORAL
Status: DISCONTINUED | OUTPATIENT
Start: 2019-04-19 | End: 2019-04-19 | Stop reason: HOSPADM

## 2019-04-18 RX ORDER — HYDROCODONE BITARTRATE AND ACETAMINOPHEN 5; 325 MG/1; MG/1
1 TABLET ORAL 2 TIMES DAILY PRN
Qty: 6 TABLET | Refills: 0 | Status: SHIPPED | OUTPATIENT
Start: 2019-04-18 | End: 2019-01-01 | Stop reason: DRUGHIGH

## 2019-04-18 RX ADMIN — RIFAXIMIN 550 MG: 550 TABLET ORAL at 09:00

## 2019-04-18 RX ADMIN — Medication 1 CAPSULE: at 09:00

## 2019-04-18 RX ADMIN — HYDROCODONE BITARTRATE AND ACETAMINOPHEN 1 TABLET: 5; 325 TABLET ORAL at 20:20

## 2019-04-18 RX ADMIN — ERTAPENEM SODIUM 1 G: 1 INJECTION, POWDER, LYOPHILIZED, FOR SOLUTION INTRAMUSCULAR; INTRAVENOUS at 16:56

## 2019-04-18 RX ADMIN — LEVOTHYROXINE SODIUM 50 MCG: 50 TABLET ORAL at 09:00

## 2019-04-18 RX ADMIN — RIFAXIMIN 550 MG: 550 TABLET ORAL at 19:58

## 2019-04-18 RX ADMIN — LACTULOSE 30 G: 10 SOLUTION ORAL at 19:58

## 2019-04-18 RX ADMIN — LACTULOSE 30 G: 10 SOLUTION ORAL at 09:00

## 2019-04-18 RX ADMIN — ASPIRIN 81 MG: 81 TABLET, CHEWABLE ORAL at 09:00

## 2019-04-18 RX ADMIN — MAGNESIUM OXIDE 800 MG: 400 TABLET ORAL at 09:00

## 2019-04-18 RX ADMIN — LANSOPRAZOLE 30 MG: KIT at 05:30

## 2019-04-18 RX ADMIN — LACTULOSE 30 G: 10 SOLUTION ORAL at 16:58

## 2019-04-18 NOTE — CONSULTS
Patient has accessed working port-a-cath at this time.It flushes without difficulty and has excellent blood return.antibiotics will be given via port.

## 2019-04-18 NOTE — DISCHARGE PLACEMENT REQUEST
"Alma Delia Fabian (88 y.o. Female)     Date of Birth Social Security Number Address Home Phone MRN    07/26/1930  [address]  [facility] [address] [phone] [id number]    Muslim Marital Status          Episcopal        Admission Date Admission Type Admitting Provider Attending Provider Department, Room/Bed    4/15/19 Emergency Jamison Ramirez, Jamison Colón DO [address], 3331/1P    Discharge Date Discharge Disposition Discharge Destination         Skilled Nursing Facility (DC - External)              Attending Provider:  Jamison Ramirez DO    Allergies:  Ciprofloxacin    Isolation:  Contact   Infection:  ESBL (04/17/19)   Code Status:  CPR    Ht:  162.6 cm (64\")   Wt:  60.1 kg (132 lb 8 oz)    Admission Cmt:  None   Principal Problem:  None                Active Insurance as of 4/15/2019     Primary Coverage     Payor Plan Insurance Group Employer/Plan Group    MEDICARE MEDICARE A & B      Payor Plan Address Payor Plan Phone Number Payor Plan Fax Number Effective Dates    [address] [phone]  11/1/1988 - None Entered    [place] [id number]       Subscriber Name Subscriber Birth Date Member ID       ALMA DELIA FABIAN 7/26/1930 [id number]                 Emergency Contacts      (Rel.) Home Phone Work Phone Mobile Phone    Yeimy Gallegos (Sister) [phone] -- --    GiovannyMarybeth santos (Sister) [phone] -- --    Alethea Fabian (Daughter) [phone] -- [phone]    UBALDO RUANO (Sister) [phone] -- --              Discharge Summary     No notes of this type exist for this encounter.        Discharge Order (From admission, onward)    Start     Ordered    04/18/19 1511  Discharge patient  Once     Expected Discharge Date:  04/18/19    Discharge Disposition:  Skilled Nursing Facility (DC - External)    Physician of Record for Attribution - Please select from Treatment Team:  JAMISON RAMIREZ [[id number]]    Review needed by " CMO to determine Physician of Record:  No       Question Answer Comment   Physician of Record for Attribution - Please select from Treatment Team RAFAEL CARUSO A    Review needed by CMO to determine Physician of Record No        04/18/19 4446

## 2019-04-18 NOTE — PROGRESS NOTES
Discharge Planning Assessment  [facility]     Patient Name: Alma Delia Garcia  MRN: [id number]  Today's Date: 4/18/2019    Admit Date: 4/15/2019        Discharge Plan     Row Name 04/18/19 1620       Plan    Final Discharge Disposition Code  03 - skilled nursing facility (SNF)    Final Note SS received call this morning from Lindsey at [facility] who states referral is being reviewed. SS contacted Rosalie at [facility] who states pt can be accepted today with IV Invanz via port. SS notified Dr. Ramirez and pt is being discharged today. SS faxed information to [facility] [phone]. Nurse to call report to [facility]. SS contacted daughterAlethea at [phone] who states being agreeable for pt to be discharged to [facility]. SS completed EMS PCS form and will contact EMS when pt is ready for transport. SS to follow.     18:01 SS contacted [facility] per Cresencio. No other needs identified.         Destination - Selection Complete      Service Provider Request Status Selected Services Address Phone Number Fax Number    [facility] Selected Skilled Nursing [address] [place] [id number] [phone]    [facility] Pending - Request Sent N/A [address] [place] [address] [phone]    [facility] Pending - Request Sent N/A [address] [place] [address] [phone]    [facility] Pending - Request Sent N/A [address] , [place] [address] [phone]    [facility] Declined N/A [address] [place] [phone]     Tata Momin

## 2019-04-18 NOTE — PROGRESS NOTES
Subjective     History:   Alma Delia Garcia is a 88 y.o. female admitted on 4/15/2019 secondary to <principal problem not specified>     Procedures: None    CC: Follow up encephalopathy    Patient seen and examined with PHILLIP Alegre. Sleeping upon my arrival but easily awakens. Appears more alert today. Oriented to person and place. Confused to time. Reports chronic back pain. Denies fever or chills. No acute events overnight per RN.     History taken from: patient, chart, and RN.      Objective     Vital Signs  Temp:  [97 °F (36.1 °C)-98.5 °F (36.9 °C)] 98.1 °F (36.7 °C)  Heart Rate:  [54-95] 60  Resp:  [16-24] 18  BP: (107-150)/(45-87) 137/51    Intake/Output Summary (Last 24 hours) at 4/17/2019 2020  Last data filed at 4/17/2019 1152  Gross per 24 hour   Intake 300 ml   Output --   Net 300 ml         Physical Exam:  General:    Awake, more alert again today, in no acute distress, chronically ill appearing    Heart:      Normal S1 and S2. Regular rate and rhythm. No significant murmur, rubs or gallops appreciated.   Lungs:     Respirations regular, even and unlabored. Lungs clear to auscultation B/L. No wheezes, rales or rhonchi.     Abdomen:   Soft and nontender. No guarding, rebound tenderness or  organomegaly noted. Bowel sounds present x 4.   Extremities:  No clubbing, cyanosis or edema noted. Moves UE and LE equally B/L.     Results Review:    Results from last 7 days   Lab Units 04/17/19  0128 04/16/19  0312 04/15/19  1220   WBC 10*3/mm3 4.67 5.04 3.22*   HEMOGLOBIN g/dL 10.8* 11.9* 11.6*   PLATELETS 10*3/mm3 96* 100* 93*     Results from last 7 days   Lab Units 04/17/19  0128 04/16/19  0312 04/15/19  1220   SODIUM mmol/L 147* 139 140   POTASSIUM mmol/L 4.4 3.6 4.3   CHLORIDE mmol/L 115* 108* 104   CO2 mmol/L 19.8* 20.5* 23.9   BUN mg/dL 25* 31* 37*   CREATININE mg/dL 1.13* 1.22* 1.44*   CALCIUM mg/dL 9.0 9.7 9.1   GLUCOSE mg/dL 92 93 93     Results from last 7 days   Lab Units 04/17/19  0128 04/16/19  0311  04/15/19  1220   BILIRUBIN mg/dL 1.0 1.6* 1.1   ALK PHOS U/L 109 132* 157*   AST (SGOT) U/L 38* 48* 45*   ALT (SGPT) U/L 17 20 20     Results from last 7 days   Lab Units 04/17/19  0128 04/16/19  0312 04/15/19  1220   MAGNESIUM mg/dL 2.0 2.6* 1.9     Results from last 7 days   Lab Units 04/16/19  0312 04/15/19  1220   INR  1.30* 1.28*     Results from last 7 days   Lab Units 04/15/19  1220   TROPONIN T ng/mL <0.010       Imaging Results (last 24 hours)     ** No results found for the last 24 hours. **            Medications:    aspirin 81 mg Oral Daily   ertapenem 1 g Intravenous Q24H   lactobacillus acidophilus 1 capsule Oral Daily   lactulose 30 g Oral TID   lansoprazole 30 mg Nasogastric QAM   levothyroxine 50 mcg Oral Q AM   magnesium oxide 800 mg Oral Daily   rifaximin 550 mg Oral Q12H   sodium chloride 10 mL Intravenous Q12H   sodium chloride 3 mL Intravenous Q12H              Assessment/Plan   Acute hepatic encephalopathy in the setting of liver cirrhosis with hx of alcohol abuse and Hep C: Pt has a hx of noncompliance with lactulose. Appears more alert again today but remains slightly confused. Cont lactulose and rifaximin. Cont supportive treatment.    UTI: WBC is stable with slightly worsening CRP. Urine culture revealing growth of ESBL Proteus. [facility] in [facility].      Pancytopenia: Appears chronic and likely related to underlying cirrhosis. Vit B12 and folate are replete. Stable at present. Cont to monitor.     CKD III: Creatine appears stable. Holding home Bumex until pt's PO intake improves to avoid dehydration.     Hypothyroidism: TSH is elevated with low free T4. However, I have concerns regarding pt's compliance with home medications. Will cont current dose of levothyroxine and discuss further with patient once she is more alert.     Essential HTN: BP is currently stable. Cont to monitor.     DVT PPX: SCD's. No pharmacologic DVT PPX 2/2 thrombocytopenia.     Pt is at  high risk 2/2 acute hepatic encephalopathy, UTI, cirrhosis, pancytopenia, CKD and advanced age.         Jamison Ramirez DO  04/17/19  8:20 PM

## 2019-04-18 NOTE — PROGRESS NOTES
Patient Identification:  Name:  Alma Delia Garcia  Age:  88 y.o.  Sex:  female  :  1930  MRN:  [id number]  Visit Number:  [id number]  Primary Care Provider:  Galina Gonsalves MD    Length of stay:  3    Subjective     Chief complaint: Follow-up hepatic encephalopathy, functional decline, ESBL UTI, ESBL E.coli UTI    HPI:   Ms. Garcia is a 88 y.o. female with past medical history significant for cirrhosis of the liver, history of alcoholism, hepatitis C, stage III CKD, hypothyroidism, congestive heart failure, hypertension, and reported history of coronary artery disease.  She presented to the emergency department of [facility] on 4/15/2019 with complaints of altered mental status. She has had multiple admissions in the past with acute hepatic encephalopathy due to refusing to take her lactulose intermittently. Upon arrival to the ED, vitals were /80, respirations 18, SPO2 100% on room air, rate 71, afebrile.  Ammonia level 263.  Creatinine 1.44 which appears to be around her baseline.  CT of the head without contrast showed no acute intracranial abnormality.  Urinalysis did show evidence of acute urinary tract infection. Patient was been admitted to the progressive care unit of [facility] for further evaluation and therapy.     Hospital course:   Upon prior to admission, NG tube was placed in the emergency department 30 g of lactulose given with scheduled lactulose to follow.  Due to acute urinary tract infection IV Rocephin was started.  Vitamin B12 and folate levels were checked due to chronic pancytopenia though likely secondary to liver cirrhosis.  Creatinine was mildly elevated upon admission though in the setting of stage III chronic kidney disease and was actually near her baseline.      She did remain confused on 2019 and did pull out her nasogastric tube.  Lactulose was then scheduled p.o. with rifaximin and potentially sedating medications held.  Urine culture  initially revealed growth of Proteus and Rocephin was continued empirically at that time.  Vitamin B12 and folate levels were found to be replete.  There were some concerns with her hypothyroidism given elevated TSH and low free T4.  However ultimately given noncompliance at home with medications adjustments were not made as it was not clear if she had been consistently taking her home levothyroxine.     On 4/17/19, urine culture did return with ESBL Proteus and IV Rocephin was changed to IV Invanz with Ms. steiner then placed in contact isolation.  C-reactive protein improved at 1.07.  Only awaiting placement in addition to possible PICC line placement for IV Invanz.      Subjective:      Mrs. Steiner is evaluated on this date in her bed.  She is resting comfortably.  She is alert to self and location.  She denies chest pain, dyspnea, cough and wheeze.  She tells me she is unsure if she ate lunch today and feels very tired.    Discussed with AM PHILLIP Clemente with no known acute events overnight.   ----------------------------------------------------------------------------------------------------------------------  Current Hospital Meds:    aspirin 81 mg Oral Daily   ertapenem 1 g Intravenous Q24H   lactobacillus acidophilus 1 capsule Oral Daily   lactulose 30 g Oral TID   levothyroxine 50 mcg Oral Q AM   magnesium oxide 800 mg Oral Daily   [START ON 4/19/2019] pantoprazole 40 mg Oral Q AM   rifaximin 550 mg Oral Q12H   sodium chloride 10 mL Intravenous Q12H   sodium chloride 3 mL Intravenous Q12H        ----------------------------------------------------------------------------------------------------------------------      Objective     Vital Signs:  Temp:  [98 °F (36.7 °C)-98.5 °F (36.9 °C)] 98.4 °F (36.9 °C)  Heart Rate:  [58-62] 58  Resp:  [18-20] 18  BP: (107-138)/(45-54) 128/54      04/16/19  0400 04/17/19  0514 04/18/19  0431   Weight: 65.1 kg (143 lb 9.6 oz) 58.7 kg (129 lb 5 oz) 60.1 kg (132 lb 8 oz)     Body  mass index is 22.74 kg/m².    Intake/Output Summary (Last 24 hours) at 4/18/2019 1252  Last data filed at 4/18/2019 0429  Gross per 24 hour   Intake 120 ml   Output —   Net 120 ml     No intake/output data recorded.  Diet Soft Texture; Chopped; Nectar / Syrup Thick  ----------------------------------------------------------------------------------------------------------------------  Physical exam:  Constitutional:  Well-developed. Advanced age.  No respiratory distress.      HENT:  Head:  Normocephalic and atraumatic.  Mouth:  Moist mucous membranes.    Eyes:  Conjunctivae and EOM are normal.  Pupils are equal, round, and reactive to light.  No scleral icterus.    Neck:  Neck supple.  No JVD present.    Cardiovascular:  Regular rate, regular rhythm and normal heart sounds with no murmur.  Pulmonary/Chest:  No respiratory distress, no wheezes, no crackles, with normal breath sounds and good air movement.  Abdominal:  Soft.  Bowel sounds are normal.  No distension and no tenderness.   Musculoskeletal:  No edema, no tenderness, and no deformity.  Neurological:  Alert and oriented to person, place. Follows commands  Skin:  Skin is warm and dry. No rash noted. No pallor.   ----------------------------------------------------------------------------------------------------------------------  Tele:  SB/SR 50s-60s  ----------------------------------------------------------------------------------------------------------------------  Results from last 7 days   Lab Units 04/15/19  1220   TROPONIN T ng/mL <0.010     Results from last 7 days   Lab Units 04/18/19  0351 04/17/19  0128 04/16/19  0312 04/15/19  1220  04/15/19  1216   CRP mg/dL 1.07* 1.37* 0.63* 0.61*   < >  --    LACTATE mmol/L  --   --   --   --   --  1.6   WBC 10*3/mm3 4.54 4.67 5.04 3.22*   < >  --    HEMOGLOBIN g/dL 10.9* 10.8* 11.9* 11.6*   < >  --    HEMATOCRIT % 34.9 34.1 35.8 36.1   < >  --    MCV fL 104.5* 103.6* 101.1* 103.1*   < >  --    MCHC g/dL 31.2*  31.7 33.2 32.1   < >  --    PLATELETS 10*3/mm3 90* 96* 100* 93*   < >  --    INR   --   --  1.30* 1.28*  --   --     < > = values in this interval not displayed.     Results from last 7 days   Lab Units 04/15/19  1053   PH, ARTERIAL pH units 7.462*   PO2 ART mm Hg 81.6   PCO2, ARTERIAL mm Hg 35.6   HCO3 ART mmol/L 24.9     Results from last 7 days   Lab Units 04/18/19  0351 04/17/19  0128 04/16/19  0312   SODIUM mmol/L 143 147* 139   POTASSIUM mmol/L 4.6 4.4 3.6   MAGNESIUM mg/dL 1.8 2.0 2.6*   CHLORIDE mmol/L 113* 115* 108*   CO2 mmol/L 18.7* 19.8* 20.5*   BUN mg/dL 19 25* 31*   CREATININE mg/dL 1.00 1.13* 1.22*   EGFR IF NONAFRICN AM mL/min/1.73 52* 45* 42*   CALCIUM mg/dL 9.0 9.0 9.7   GLUCOSE mg/dL 89 92 93   ALBUMIN g/dL 2.92* 2.94* 3.40*   BILIRUBIN mg/dL 0.9 1.0 1.6*   ALK PHOS U/L 108 109 132*   AST (SGOT) U/L 38* 38* 48*   ALT (SGPT) U/L 18 17 20   Estimated Creatinine Clearance: 36.9 mL/min (by C-G formula based on SCr of 1 mg/dL).  Ammonia   Date Value Ref Range Status   04/16/2019 50 11 - 51 umol/L Final         Blood Culture   Date Value Ref Range Status   04/15/2019 No growth at 3 days  Preliminary   04/15/2019 No growth at 3 days  Preliminary     Urine Culture   Date Value Ref Range Status   04/15/2019 >100,000 CFU/mL Proteus mirabilis ESBL (C)  Final     Comment:       Consider infectious disease consult.  Susceptibility results may not correlate to clinical outcomes.           ----------------------------------------------------------------------------------------------------------------------  Imaging Results (last 24 hours)     ** No results found for the last 24 hours. **        ----------------------------------------------------------------------------------------------------------------------      Assessment/Plan     Active Hospital Problems    Diagnosis POA   • ESBL (extended spectrum beta-lactamase) producing bacteria infection [A49.9, Z16.12] Yes   • Chronic kidney disease, stage III  (moderate) (CMS/HCC) [N18.3] Unknown   • Acute UTI (urinary tract infection) [N39.0] Yes   • Hepatic encephalopathy (CMS/HCC) [K72.90] Yes         ASSESSMENT/PLAN:  · Acute hepatic encephalopathy in the setting of liver cirrhosis with history of alcohol abuse and hepatitis C: Remains confused in a pleasant manner but is alert and responsive.  Continue lactulose and rifaximin.  Continue supportive treatment.    · ESBL Proteus urinary tract infection: White blood cell count remained stable and CRP is improved being on this date.  Continue IV Invanz and contact isolation.    · Pancytopenia, peers chronic and likely related to underlying cirrhosis: Vitamin B12 and folate are replete.  We will continue to monitor while hospitalized    · Chronic kidney disease stage III: Creatinine remains stable and we will continue to hold home Bumex at present    · Hypothyroidism: As previously noted TSH is elevated with low free T4.  There remain concerns regarding home compliance.  Would recommend rechecking thyroid studies in the nursing home setting after regularly receiving medications.    · Essential hypertension: Blood pressure remained stable.      -----------  -DVT prophylaxis: SCDs  -GI prophylaxis: Lansoprazole via g-tube transitioned to oral Protonix  -Disposition: Awaiting SNF placement  -Diet: Soft chopped with nectar/syrup thick liquids.     The patient is high risk due to the following diagnoses/reasons:  Acute ESBL UTI, hepatic encephalopathy, CKD III    Savannah Edmond PA-C  04/18/19  12:52 PM  Pager # [phone]

## 2019-04-18 NOTE — DISCHARGE SUMMARY
[facility] Medicine Services  DISCHARGE SUMMARY    Date of Admission: 4/15/2019    Date of Discharge:  4/18/2019    PCP: Galina Gonsalves MD    Discharging Provider: Savannah Edmond PA-C  Attending Physician on day of DC: Dr. Jamison Ramirez     Admission Diagnosis:   Please see admission H&P    Discharge Diagnosis:   · Acute hepatic encephalopathy, improved  · ESBL Proteus urinary tract infection  · Pancytopenia  · Chronic kidney disease 3  · Hypothyroidism  · Essential hypertension  · Debility and functional decline in setting of chronic illness  · Cirrhosis of the liver secondary to prior alcoholism and hepatitis C      HPI     History of Present Illness:  Alma Delia Garcia is a 88 y.o. female with past medical history significant for cirrhosis of the liver, history of alcoholism, hepatitis C, stage III CKD, hypothyroidism, congestive heart failure, hypertension, and reported history of coronary artery disease.  She presented to the emergency department of [facility] on 4/15/2019 with complaints of altered mental status. She has had multiple admissions in the past with acute hepatic encephalopathy due to refusing to take her lactulose intermittently. Upon arrival to the ED, vitals were BP 115/80, respirations 18, SPO2 100% on room air, rate 71, afebrile.  Ammonia level 263.  Creatinine 1.44 which appears to be around her baseline.  CT of the head without contrast showed no acute intracranial abnormality.  Urinalysis did show evidence of acute urinary tract infection. Patient was been admitted to the progressive care unit of [facility] for further evaluation and therapy.             Hospital Course     Hospital Course  Upon prior to admission, NG tube was placed in the emergency department 30 g of lactulose given with scheduled lactulose to follow.  Due to acute urinary tract infection IV Rocephin was started.  Vitamin B12 and folate levels were checked  due to chronic pancytopenia though likely secondary to liver cirrhosis.  Creatinine was mildly elevated upon admission though in the setting of stage III chronic kidney disease and was actually near her baseline.       She did remain confused on 4/16/2019 and did pull out her nasogastric tube.  Lactulose was then scheduled p.o. with rifaximin and potentially sedating medications held.  Urine culture initially revealed growth of Proteus and Rocephin was continued empirically at that time.  Vitamin B12 and folate levels were found to be replete.  There were some concerns with her hypothyroidism given elevated TSH and low free T4.  However ultimately given noncompliance at home with medications adjustments were not made as it was not clear if she had been consistently taking her home levothyroxine.      On 4/17/19, urine culture did return with ESBL Proteus and IV Rocephin was changed to IV Invanz with Ms. steiner then placed in contact isolation.  C-reactive protein improved at 1.07.      She was accepted to [facility] and rehab on this date for further IV Invanz to receive via Port-A-Cath.  PT and OT also recommended at outside facility given functional occult decline in setting of acute on chronic illness.    Lactulose was changed to 3 times daily as outlined within the discharge medication reconciliation list.  In addition, Bumex was changed to as needed given concern for attribution to possible dehydration.    Speech language pathology did evaluate with recommendations of mechanical soft, chopped diet with nectar thick liquids only.  This diet was continued upon discharge to the nursing home facility.    Pertinent Laboratory and Radiology Results     Pertinent Test Results:      Results from last 7 days   Lab Units 04/15/19  1053   PH, ARTERIAL pH units 7.462*   PO2 ART mm Hg 81.6   PCO2, ARTERIAL mm Hg 35.6   HCO3 ART mmol/L 24.9     Results from last 7 days   Lab Units 04/18/19  0351 04/17/19  0128  04/16/19  0312 04/15/19  1220   WBC 10*3/mm3 4.54 4.67 5.04 3.22*   HEMOGLOBIN g/dL 10.9* 10.8* 11.9* 11.6*   HEMATOCRIT % 34.9 34.1 35.8 36.1   PLATELETS 10*3/mm3 90* 96* 100* 93*   MCV fL 104.5* 103.6* 101.1* 103.1*   SODIUM mmol/L 143 147* 139 140   POTASSIUM mmol/L 4.6 4.4 3.6 4.3   CHLORIDE mmol/L 113* 115* 108* 104   CO2 mmol/L 18.7* 19.8* 20.5* 23.9   BUN mg/dL 19 25* 31* 37*   CREATININE mg/dL 1.00 1.13* 1.22* 1.44*   GLUCOSE mg/dL 89 92 93 93   CALCIUM mg/dL 9.0 9.0 9.7 9.1        Results from last 7 days   Lab Units 04/15/19  1220   TROPONIN T ng/mL <0.010     Results from last 7 days   Lab Units 04/18/19  0351 04/17/19  0128 04/16/19  0312 04/15/19  1220 04/15/19  1216   CRP mg/dL 1.07* 1.37* 0.63* 0.61*  --    LACTATE mmol/L  --   --   --   --  1.6   WBC 10*3/mm3 4.54 4.67 5.04 3.22*  --      Results from last 7 days   Lab Units 04/18/19  0351 04/17/19  0128 04/16/19  0312 04/15/19  1220   BILIRUBIN mg/dL 0.9 1.0 1.6* 1.1   ALK PHOS U/L 108 109 132* 157*   ALT (SGPT) U/L 18 17 20 20   AST (SGOT) U/L 38* 38* 48* 45*   PROTIME Seconds  --   --  16.4* 16.3*   INR   --   --  1.30* 1.28*   APTT seconds  --   --   --  34.0           Invalid input(s): TG, LDLCALC, LDLREALC  Results from last 7 days   Lab Units 04/15/19  1220   TSH mIU/mL 6.650*     Brief Urine Lab Results  (Last result in the past 365 days)      Color   Clarity   Blood   Leuk Est   Nitrite   Protein   CREAT   Urine HCG        04/15/19 1138 Yellow Cloudy Moderate (2+) Large (3+) Negative Trace                       Results for orders placed during the hospital encounter of 10/11/17   Adult Transthoracic Echo Complete W/ Cont if Necessary Per Protocol    Narrative · Left ventricular wall thickness is consistent with mild concentric   hypertrophy.  · Left ventricular systolic function is normal. Estimated EF appears to be   in the range of 56 - 60%.  · Left ventricular diastolic dysfunction (grade I) consistent with   impaired relaxation.  · Left  atrial cavity size is mildly dilated.  · Mild aortic valve regurgitation is present.  · There is no evidence of pericardial effusion.           Order Current Status    Blood Culture - Blood, Arm, Right Preliminary result    Blood Culture - Blood, Arm, Right Preliminary result            ----------------------------------------------------------------------------------------------------------------------  Ct Head Without Contrast    Result Date: 4/15/2019  No acute intracranial pathology. Nothing is seen on this exam to specifically account for the patient's symptoms.  This report was finalized on 4/15/2019 11:05 AM by Dr. Byron Mcintosh MD.      Xr Chest 1 View    Result Date: 4/15/2019  1. Support devices as above. Specifically, NG tube with tip in the region of mid stomach. 2. Otherwise stable chest.  This report was finalized on 4/15/2019 3:34 PM by Dr. Manish Sherwood MD.      Xr Chest 1 View    Result Date: 4/15/2019  Stable chest. No acute changes identified.  This report was finalized on 4/15/2019 11:27 AM by Dr. Manish Sherwood MD.      Fl Video Swallow    Result Date: 4/16/2019  Aspiration with thin liquids. Please see dysphasia notes for further details.  This report was finalized on 4/16/2019 11:08 AM by Dr. Manish Sherwood MD.          Microbiology Results (last 10 days)     Procedure Component Value - Date/Time    Influenza Antigen, Rapid - Swab, Nasopharynx [[id number]]  (Normal) Collected:  04/15/19 1222    Lab Status:  Final result Specimen:  Swab from Nasopharynx Updated:  04/15/19 1240     Influenza A Ag, EIA Negative     Influenza B Ag, EIA Negative    Blood Culture - Blood, Arm, Right [[id number]] Collected:  04/15/19 1215    Lab Status:  Preliminary result Specimen:  Blood from Arm, Right Updated:  04/18/19 1230     Blood Culture No growth at 3 days    Blood Culture - Blood, Arm, Right [[id number]] Collected:  04/15/19 1215    Lab Status:  Preliminary result Specimen:  Blood from Arm, Right Updated:   04/18/19 1230     Blood Culture No growth at 3 days    Urine Culture - Urine, Urine, Catheter [[id number]]  (Abnormal)  (Susceptibility) Collected:  04/15/19 1138    Lab Status:  Final result Specimen:  Urine, Catheter Updated:  04/17/19 1323     Urine Culture >100,000 CFU/mL Proteus mirabilis ESBL     Comment:   Consider infectious disease consult.  Susceptibility results may not correlate to clinical outcomes.       Susceptibility      Proteus mirabilis ESBL     FAHEEM     Gentamicin Susceptible     Levofloxacin Susceptible     Meropenem Susceptible     Nitrofurantoin Resistant     Piperacillin + Tazobactam Susceptible     Tetracycline Resistant     Trimethoprim + Sulfamethoxazole Susceptible                            Discharge Vitals and Physical Examination       Vital Signs  Temp:  [98 °F (36.7 °C)-98.5 °F (36.9 °C)] 98.5 °F (36.9 °C)  Heart Rate:  [58-72] 70  Resp:  [18-20] 18  BP: (128-150)/(51-64) 138/64     Physical Exam:  General:    Awake, alert pleasantly confused   Heart:      Normal S1 and S2. Regular rate and rhythm. No significant murmur, rubs or gallops appreciated.   Lungs:     Respirations regular, even and unlabored. Lungs clear to auscultation B/L. No wheezes, rales or rhonchi.   Abdomen:   Soft and nontender. No guarding, rebound tenderness or  organomegaly noted. Bowel sounds present x 4.   Extremities:  No clubbing, cyanosis or edema noted. Moves UE and LE equally B/L.         Discharge Disposition, Discharge Medications, and Discharge Appointments     Discharge Disposition:   SNF    Condition on Discharge:  Fair    Discharge Medications:     Discharge Medications      New Medications      Instructions Start Date   ertapenem 1 gm/100ml solution IV  Commonly known as:  INVanz   1 g, Intravenous, Every 24 Hours      lactobacillus acidophilus capsule capsule   1 capsule, Oral, Daily   Start Date:  4/19/2019        Changes to Medications      Instructions Start Date   bumetanide 1 MG  tablet  Commonly known as:  BUMEX  What changed:    · when to take this  · reasons to take this   1 mg, Oral, Daily PRN      lactulose 10 GM/15ML solution  Commonly known as:  CHRONULAC  What changed:    · how much to take  · when to take this   30 g, Oral, 3 Times Daily         Continue These Medications      Instructions Start Date   ASPIRIN ADULT LOW STRENGTH 81 MG EC tablet  Generic drug:  aspirin   TAKE 1 TABLET BY MOUTH DAILY.      esomeprazole 40 MG capsule  Commonly known as:  nexIUM   40 mg, Oral, Every Morning Before Breakfast      HYDROcodone-acetaminophen 5-325 MG per tablet  Commonly known as:  NORCO   1 tablet, Oral, 2 Times Daily PRN      levothyroxine 50 MCG tablet  Commonly known as:  SYNTHROID, LEVOTHROID   50 mcg, Oral, Daily      magnesium oxide 400 MG tablet  Commonly known as:  MAG-OX   800 mg, Oral, Daily      nitroglycerin 0.4 MG SL tablet  Commonly known as:  NITROSTAT   0.4 mg, Sublingual, Every 5 Minutes PRN, Take no more than 3 doses in 15 minutes.      OXcarbazepine 150 MG tablet  Commonly known as:  TRILEPTAL   150 mg, Oral, Every Morning      OXcarbazepine 150 MG tablet  Commonly known as:  TRILEPTAL   300 mg, Oral, Nightly      rifaximin 550 MG tablet  Commonly known as:  XIFAXAN   550 mg, Oral, Every 12 Hours Scheduled      rOPINIRole 1 MG tablet  Commonly known as:  REQUIP   1 mg, Oral, Nightly, Take 1 hour before bedtime.      teriparatide 600 MCG/2.4ML injection  Commonly known as:  FORTEO   20 mcg, Subcutaneous, Daily      vitamin D 18994 units capsule capsule  Commonly known as:  ERGOCALCIFEROL   50,000 Units, Oral, Weekly             Discharged medication regimen discussed with attending physician prior to discharge.     Discharge Diet:     Dietary Orders (From admission, onward)    Start     Ordered    04/16/19 1122  Diet Soft Texture; Chopped; Nectar / Syrup Thick  Diet Effective Now     Comments:  Meds whole in puree  Up for meals   Question Answer Comment   Diet Texture /  Consistency Soft Texture    Select Texture: Chopped    Fluid Consistency [place] / Syrup Thick        04/16/19 1121          Activity at Discharge:  activity as tolerated         Discharge Disposition:    Skilled Nursing Facility (DC - External)        Follow-up Appointments:  Your Scheduled Appointments    Apr 25, 2019 10:15 AM EDT  Follow Up with Galina Gonsalves MD  [facility] CARDIOLOGY (--) [address] [place] [id number] [phone]   Arrive 15 minutes prior to appointment.          Additional Instructions for the Follow-ups that You Need to Schedule     Discharge Follow-up with PCP   As directed       Currently Documented PCP:    Galina Gonsalves MD    PCP Phone Number:    [phone]     Follow Up Details:  Pt to be seen by Dr. Hopkins at the SNF Follow up with Dr. Gonsalves upon discharge from [facility].            Contact information for follow-up providers     Galina Gonsalves MD Follow up.    Specialty:  Cardiology  Why:  Pt to be seen by Dr. Hopkins at the [facility] Follow up with Dr. Gonsalves upon discharge from [facility].  Contact information:  [address] [facility] [address]  [phone]                   Contact information for after-discharge care     Destination     [facility] .    Service:  Skilled Nursing  Contact information:  [address] [place] [id number] [phone]                             Additional Instructions for the Follow-ups that You Need to Schedule     Discharge Follow-up with PCP   As directed       Currently Documented PCP:    Galina Gonsalves MD    PCP Phone Number:    [phone]     Follow Up Details:  Pt to be seen by Dr. Hopkins at the [facility] Follow up with Dr. Gonsalves upon discharge from [facility].               Test Results Pending at Discharge:     Order Current Status    Blood Culture - Blood, Arm, Right Preliminary result    Blood Culture - Blood, Arm, Right Preliminary result             Savannah  Mirian Edmond PA-C  [facility] Medicine Team  04/18/19  4:50 PM      Time: Greater than 30 minutes spent on this discharge.

## 2019-04-18 NOTE — THERAPY TREATMENT NOTE
Acute Care - Speech Language Pathology   Swallow Treatment Note FIDEL Flores     Patient Name: Alma Delia Garcia  : 1930  MRN: [id number]  Today's Date: 2019               Admit Date: 4/15/2019    Visit Dx:      ICD-10-CM ICD-9-CM   1. Hepatic encephalopathy (CMS/HCC) K72.90 572.2     Patient Active Problem List   Diagnosis   • Hepatic cirrhosis*   • HTN (hypertension)*   • Anxiety disorder*   • Osteopetrosis*   • Hypothyroidism*   • GERD (gastroesophageal reflux disease)*   • Chronic pain syndrome due to fractured spine and left rib fracture*   • Anemia, chronic disease   • Arthralgia of hip   • Left leg pain   • Fracture of proximal end of tibia and fibula   • Primary insomnia   • Thrombocytopenia (CMS/HCC)   • Peripheral vascular disease (CMS/HCC)   • Bilateral leg edema   • Cramp of both lower extremities   • Hepatic encephalopathy (CMS/HCC)   • Acute UTI (urinary tract infection)   • Hypokalemia   • Closed fracture of lower end of left femur with routine healing   • Status post open reduction with internal fixation of fracture   • Nonrheumatic aortic valve insufficiency, mild   • Chronic diastolic heart failure, mild   • Periprosthetic fracture of shaft of femur   • S/P ORIF (open reduction internal fixation) fracture   • Diarrhea   • Right upper quadrant abdominal pain   • Nausea   • Elevated liver enzymes   • Poor venous access   • Chronic kidney disease, stage III (moderate) (CMS/HCC)   • Trochanteric bursitis of left hip   • ESBL (extended spectrum beta-lactamase) producing bacteria infection       Therapy Treatment   Ms. Garcia is seen this pm at bedside on 3N-331 for formal dysphagia tx. She is s/p MBS on 19 w/ SLP recs of mechanical soft, chopped w/ NECTAR thick liquids only. She is positioned upright in bed a/a cooperative to participate. Moderate fatigue effects w/ rest breaks given across this tx session. She declines to accept po intake for diet tolerance this pm. Thin liquids found at  "pt's bedside. SLP removes thin liquids x2, re-educates pt on importance of adherence to diet modification of nectar thick liquids only. She verbalizes agreement and understanding. Nectar thick liquids only sign re-posted above head of bed for family/staff reference.      Long Term Goal:  Pt will accept least restrictive diet tolerance w/o overt s/s aspiration.      Short Term Goals:  1. Pt will perform OROM/ERVIN exercises x3 sets x10 reps w/ min cues.  *pt performs x2 sets x5 reps w/ mod cues and models.  2. Pt will perform resistive breathing exercises x3 sets x5 reps w/resistance of 1-4 To improve respiratory support and control.   *pt performs x3 sets x5 reps w/ resistance level of 1x1 w/ max cues and models. Pt requires frequent cues for lip seal around resistive breather.  3. Pt will perform laryngeal adduction/elevation exercises x3 sets x10 reps w/ min cues.   *pt performs x3 sets x5 reps w/ avg sustained phonation of 2.32 seconds given max cues and models. Fatigue effects noted, pt declines to perform post-resistive breather sets stating \"I'm worn out\".  4. Pt will perform Shaker exercises sustained x3 sets x5 reps for 30+ seconds over 3 consecutive sessions.   *not directly addressed 2/2 fatigue  5. Pt will perform Shaker exercises repetitive x3 sets x12 reps w/ mod cues.   *not directly addressed 2/2 fatigue  6. Pt will perform compensatory techniques during meals w/ min cues.  *pt educated importance of adherence to nectar thick liquids and upright and centered for all po intake. She verbalizes agreement and understanding.  7. Pt will participate in instrumental re-evaluation of swallowing fnx in 7-10 days, pending progress towards this poc.   *please continue modified po diet of mechanical soft, chopped w/ NECTAR thick liquids only     Thank you-  Lisa Martino M.S., CCC-SLP        Outcome Summary    Continue modified po diet. Continue tx per POC.  EDUCATION  The patient has been educated in the " following areas:   Dysphagia (Swallowing Impairment) Oral Care/Hydration Modified Diet Instruction.    SLP Recommendation and Plan    Continue tx per POC. Continue modified po diet.     Time Calculation:   Time Calculation- SLP     Row Name 04/18/19 1410             Time Calculation- SLP    SLP - Next Appointment  04/19/19  -NATHALIE        User Key  (r) = Recorded By, (t) = Taken By, (c) = Cosigned By    Initials Name Provider Type     Lisa Martino, MS CCC-SLP Speech and Language Pathologist          Therapy Charges for Today     Code Description Service Date Service Provider Modifiers Qty    [id number]  ST TREATMENT SWALLOW 2 4/18/2019 Lisa Martino, MS CCC-SLP GN 1                 Lisa Martino MS CCC-SLP  4/18/2019

## 2019-04-18 NOTE — PLAN OF CARE
Problem: Patient Care Overview  Goal: Plan of Care Review  Outcome: Ongoing (interventions implemented as appropriate)   04/17/19 2000   Coping/Psychosocial   Plan of Care Reviewed With patient     Goal: Interprofessional Rounds/Family Conf  Outcome: Ongoing (interventions implemented as appropriate)      Problem: Fall Risk (Adult)  Goal: Absence of Fall  Outcome: Ongoing (interventions implemented as appropriate)      Problem: Confusion, Acute (Adult)  Goal: Identify Related Risk Factors and Signs and Symptoms  Outcome: Ongoing (interventions implemented as appropriate)    Goal: Cognitive/Functional Impairments Minimized  Outcome: Ongoing (interventions implemented as appropriate)    Goal: Safety  Outcome: Ongoing (interventions implemented as appropriate)      Problem: Skin Injury Risk (Adult)  Goal: Skin Health and Integrity  Outcome: Ongoing (interventions implemented as appropriate)      Problem: Wound (Includes Pressure Injury) (Adult)  Goal: Signs and Symptoms of Listed Potential Problems Will be Absent, Minimized or Managed (Wound)  Outcome: Ongoing (interventions implemented as appropriate)      Problem: Eating/Swallowing Impairment (IRF) (Adult)  Goal: Optimal/Safe Level of Indep, Swallowing  Outcome: Ongoing (interventions implemented as appropriate)

## 2019-04-18 NOTE — PLAN OF CARE
Problem: Patient Care Overview  Goal: Plan of Care Review  Outcome: Ongoing (interventions implemented as appropriate)   04/18/19 1055   Coping/Psychosocial   Plan of Care Reviewed With patient   Plan of Care Review   Progress no change       Problem: Fall Risk (Adult)  Goal: Absence of Fall  Outcome: Ongoing (interventions implemented as appropriate)      Problem: Confusion, Acute (Adult)  Goal: Identify Related Risk Factors and Signs and Symptoms  Outcome: Ongoing (interventions implemented as appropriate)    Goal: Cognitive/Functional Impairments Minimized  Outcome: Ongoing (interventions implemented as appropriate)    Goal: Safety  Outcome: Ongoing (interventions implemented as appropriate)      Problem: Skin Injury Risk (Adult)  Goal: Skin Health and Integrity  Outcome: Ongoing (interventions implemented as appropriate)      Problem: Wound (Includes Pressure Injury) (Adult)  Goal: Signs and Symptoms of Listed Potential Problems Will be Absent, Minimized or Managed (Wound)  Outcome: Ongoing (interventions implemented as appropriate)      Problem: Eating/Swallowing Impairment (IRF) (Adult)  Goal: Optimal/Safe Level of Indep, Swallowing  Outcome: Ongoing (interventions implemented as appropriate)

## 2019-04-18 NOTE — PLAN OF CARE
Problem: Patient Care Overview  Goal: Plan of Care Review  Outcome: Ongoing (interventions implemented as appropriate)   04/18/19 1410   Coping/Psychosocial   Plan of Care Reviewed With patient   OTHER   Outcome Summary Pt actively participated in formal dysphagia tx this pm at bedside. Required rest breaks w/ moderate fatigue effects. Continue modified po diet. Continue tx per POC.   Plan of Care Review   Progress improving

## 2019-04-20 LAB
BACTERIA SPEC AEROBE CULT: NORMAL
BACTERIA SPEC AEROBE CULT: NORMAL

## 2019-04-25 ENCOUNTER — OFFICE VISIT (OUTPATIENT)
Dept: CARDIOLOGY | Facility: CLINIC | Age: 84
End: 2019-04-25

## 2019-04-25 VITALS
RESPIRATION RATE: 16 BRPM | OXYGEN SATURATION: 97 % | SYSTOLIC BLOOD PRESSURE: 126 MMHG | HEIGHT: 64 IN | DIASTOLIC BLOOD PRESSURE: 50 MMHG | BODY MASS INDEX: 22.73 KG/M2 | HEART RATE: 62 BPM

## 2019-04-25 DIAGNOSIS — G89.4 CHRONIC PAIN SYNDROME: ICD-10-CM

## 2019-04-25 DIAGNOSIS — I50.32 CHRONIC DIASTOLIC HEART FAILURE (HCC): ICD-10-CM

## 2019-04-25 DIAGNOSIS — K74.60 CIRRHOSIS OF LIVER WITHOUT ASCITES, UNSPECIFIED HEPATIC CIRRHOSIS TYPE (HCC): Primary | ICD-10-CM

## 2019-04-25 DIAGNOSIS — F41.1 GENERALIZED ANXIETY DISORDER: ICD-10-CM

## 2019-04-25 DIAGNOSIS — I35.1 NONRHEUMATIC AORTIC VALVE INSUFFICIENCY: ICD-10-CM

## 2019-04-25 DIAGNOSIS — E03.4 HYPOTHYROIDISM DUE TO ACQUIRED ATROPHY OF THYROID: ICD-10-CM

## 2019-04-25 PROCEDURE — 99213 OFFICE O/P EST LOW 20 MIN: CPT | Performed by: INTERNAL MEDICINE

## 2019-04-25 RX ORDER — LEVOTHYROXINE SODIUM 0.07 MG/1
75 TABLET ORAL DAILY
COMMUNITY
End: 2020-01-01 | Stop reason: HOSPADM

## 2019-04-25 NOTE — PROGRESS NOTES
subjective     Chief Complaint   Patient presents with   • chronic diastolic heart failure     History of Present Illness  Patient is 88 years old elderly white female who has a prior history of chronic alcoholism and hepatic cirrhosis.  She was admitted to the hospital because of altered mental status and was found to have acute hepatic encephalopathy.  Ammonia level was quite high.  She was also septic.  It was felt that she had a urinary tract infection with Proteus.  Patient was transferred to the nursing home with Invanz infusion.  Patient is here for follow-up.  She is here to be improving she has been there for 1 week and she has about 2 more weeks left.    She is taking Invanz 1 g every 24 hours.  She is taking lactulose 30 g 3 times a day along with rifaximin  Patient is awake alert oriented she is getting physical therapy and Occupational Therapy.  She denies any chest pain palpitations or shortness of breath    Past Surgical History:   Procedure Laterality Date   • BACK SURGERY      KYPHOPLASTIES    • CARDIAC CATHETERIZATION Left 08/2004   • CARDIOVASCULAR STRESS TEST  01/25/2015   • CATARACT EXTRACTION     • CHOLECYSTECTOMY     • CLAVICLE SURGERY Right    • COLONOSCOPY  10/2004   • ECHO - CONVERTED  11/2012   • FEMUR OPEN REDUCTION INTERNAL FIXATION Left 10/12/2017    Procedure: FEMUR OPEN REDUCTION INTERNAL FIXATION;  Surgeon: Karson Pierre MD;  Location: [facility];  Service:    • HIP FRACTURE SURGERY Bilateral     ARTHROPLASTIES    • PARTIAL HYSTERECTOMY     • PORTACATH PLACEMENT N/A 9/28/2018    Procedure: INSERTION OF PORTACATH;  Surgeon: Vic Mauricio MD;  Location: [facility] OR;  Service: General   • WRIST FRACTURE SURGERY       Family History   Problem Relation Age of Onset   • Cancer Mother    • Heart disease Father    • Arthritis Sister    • Osteoporosis Sister    • Diabetes Maternal Aunt      Past Medical History:   Diagnosis Date   • Acute renal failure (CMS/HCC)    • Alcohol  abuse     in remission   • Anxiety    • Bell's palsy    • Cellulitis     LLE   • CHF (congestive heart failure) (CMS/HCC)     Diastolic   • CHF (congestive heart failure) (CMS/HCC)    • Constipation    • Coronary artery disease    • Diverticulosis    • GERD (gastroesophageal reflux disease)    • Hepatic cirrhosis (CMS/HCC)    • Hepatitis C    • History of transfusion    • Hypertension    • Hypothyroidism    • MI (mitral incompetence)    • Osteoporosis    • Osteoporosis    • Restless leg syndrome    • Thrombocytopenia (CMS/HCC)    • Trigeminal neuralgia      Patient Active Problem List   Diagnosis   • Hepatic cirrhosis*   • HTN (hypertension)*   • Anxiety disorder*   • Osteopetrosis*   • Hypothyroidism*   • GERD (gastroesophageal reflux disease)*   • Chronic pain syndrome due to fractured spine and left rib fracture*   • Anemia, chronic disease   • Arthralgia of hip   • Left leg pain   • Fracture of proximal end of tibia and fibula   • Primary insomnia   • Thrombocytopenia (CMS/HCC)   • Peripheral vascular disease (CMS/HCC)   • Bilateral leg edema   • Cramp of both lower extremities   • Hepatic encephalopathy (CMS/HCC)   • Acute UTI (urinary tract infection)   • Hypokalemia   • Closed fracture of lower end of left femur with routine healing   • Status post open reduction with internal fixation of fracture   • Nonrheumatic aortic valve insufficiency, mild   • Chronic diastolic heart failure, mild   • Periprosthetic fracture of shaft of femur   • S/P ORIF (open reduction internal fixation) fracture   • Diarrhea   • Right upper quadrant abdominal pain   • Nausea   • Elevated liver enzymes   • Poor venous access   • Chronic kidney disease, stage III (moderate) (CMS/HCC)   • Trochanteric bursitis of left hip   • ESBL (extended spectrum beta-lactamase) producing bacteria infection       Social History     Tobacco Use   • Smoking status: Never Smoker   • Smokeless tobacco: Former User     Types: Snuff   Substance Use Topics    • Alcohol use: No     Comment: FORMER 12 BOTTLES PER DAY FOR 15 YEARS   • Drug use: No       Allergies   Allergen Reactions   • Ciprofloxacin        Current Outpatient Medications on File Prior to Visit   Medication Sig   • ASPIRIN ADULT LOW STRENGTH 81 MG EC tablet TAKE 1 TABLET BY MOUTH DAILY.   • bumetanide (BUMEX) 1 MG tablet Take 1 tablet by mouth Daily As Needed (Swelling, increased shortness of breath).   • ertapenem (INVanz) 1 g/100 mL 0.9% NS VTB (mbp) Infuse 100 mL into a venous catheter Daily for 9 doses.   • esomeprazole (nexIUM) 40 MG capsule TAKE 1 CAPSULE BY MOUTH EVERY MORNING BEFORE BREAKFAST.   • HYDROcodone-acetaminophen (NORCO) 5-325 MG per tablet Take 1 tablet by mouth 2 (Two) Times a Day As Needed for Moderate Pain .   • lactobacillus acidophilus (RISAQUAD) capsule capsule Take 1 capsule by mouth Daily for 8 days.   • lactulose (CHRONULAC) 10 GM/15ML solution Take 45 mL by mouth 3 (Three) Times a Day.   • levothyroxine (SYNTHROID, LEVOTHROID) 75 MCG tablet Take 75 mcg by mouth Daily.   • magnesium oxide (MAG-OX) 400 MG tablet Take 800 mg by mouth Daily.   • nitroglycerin (NITROSTAT) 0.4 MG SL tablet Place 1 tablet under the tongue Every 5 (Five) Minutes As Needed for Chest Pain. Take no more than 3 doses in 15 minutes.   • OXcarbazepine (TRILEPTAL) 150 MG tablet Take 150 mg by mouth Every Morning.   • OXcarbazepine (TRILEPTAL) 150 MG tablet Take 300 mg by mouth Every Night.   • rifaximin (XIFAXAN) 550 MG tablet Take 1 tablet by mouth Every 12 (Twelve) Hours.   • rOPINIRole (REQUIP) 1 MG tablet TAKE 1 TABLET BY MOUTH EVERY NIGHT. TAKE 1 HOUR BEFORE BEDTIME.   • teriparatide (FORTEO) 600 MCG/2.4ML injection Inject 0.08 mL under the skin Daily.   • vitamin D (ERGOCALCIFEROL) 13855 units capsule capsule Take 1 capsule by mouth 1 (One) Time Per Week.   • [DISCONTINUED] levothyroxine (SYNTHROID, LEVOTHROID) 50 MCG tablet TAKE 1 TABLET BY MOUTH DAILY.     No current facility-administered medications  "on file prior to visit.          The following portions of the patient's history were reviewed and updated as appropriate: allergies, current medications, past family history, past medical history, past social history, past surgical history and problem list.    Review of Systems   Constitution: Positive for weakness and malaise/fatigue.   HENT: Negative.  Negative for congestion.    Eyes: Negative.    Cardiovascular: Negative.  Negative for chest pain, cyanosis, dyspnea on exertion, irregular heartbeat, leg swelling, near-syncope, orthopnea, palpitations, paroxysmal nocturnal dyspnea and syncope.   Respiratory: Negative.  Negative for shortness of breath.    Hematologic/Lymphatic: Negative.    Skin: Negative.    Musculoskeletal: Positive for arthritis, back pain and stiffness.   Gastrointestinal: Positive for bloating and diarrhea.   Genitourinary: Negative.    Neurological: Negative for headaches.          Objective:     /50 (BP Location: Right arm)   Pulse 62   Resp 16   Ht 162.6 cm (64.02\")   LMP  (LMP Unknown)   SpO2 97%   BMI 22.73 kg/m²   Physical Exam   Constitutional: She appears well-developed and well-nourished. No distress.   HENT:   Head: Normocephalic and atraumatic.   Mouth/Throat: Oropharynx is clear and moist. No oropharyngeal exudate.   Eyes: Conjunctivae and EOM are normal. Pupils are equal, round, and reactive to light. No scleral icterus.   Neck: Normal range of motion. Neck supple. No JVD present. No tracheal deviation present. No thyromegaly present.   Cardiovascular: Normal rate, regular rhythm, normal heart sounds and intact distal pulses. PMI is not displaced. Exam reveals no gallop, no friction rub and no decreased pulses.   No murmur heard.  Pulses:       Carotid pulses are 3+ on the right side, and 3+ on the left side.       Radial pulses are 3+ on the right side, and 3+ on the left side.   Pulmonary/Chest: Effort normal and breath sounds normal. No respiratory distress. She " has no wheezes. She has no rales. She exhibits no tenderness.   Abdominal: Soft. Bowel sounds are normal. She exhibits no distension, no abdominal bruit and no mass. There is no splenomegaly or hepatomegaly. There is no tenderness. There is no rebound and no guarding.   Musculoskeletal: Normal range of motion. She exhibits no edema, tenderness or deformity.   Lymphadenopathy:     She has no cervical adenopathy.   Neurological: She is alert. She has normal reflexes. No cranial nerve deficit. She exhibits normal muscle tone. Coordination normal.   Skin: Skin is warm and dry. No rash noted. She is not diaphoretic. No erythema.   Psychiatric: She has a normal mood and affect. Her behavior is normal. Judgment and thought content normal.         Lab Review  Lab Results   Component Value Date     04/18/2019    K 4.6 04/18/2019     (H) 04/18/2019    BUN 19 04/18/2019    CREATININE 1.00 04/18/2019    GLUCOSE 89 04/18/2019    CALCIUM 9.0 04/18/2019    ALT 18 04/18/2019    ALKPHOS 108 04/18/2019    LABIL2 0.9 (L) 04/08/2016     Lab Results   Component Value Date    CKTOTAL 99 07/25/2017     Lab Results   Component Value Date    WBC 4.54 04/18/2019    HGB 10.9 (L) 04/18/2019    HCT 34.9 04/18/2019    PLT 90 (L) 04/18/2019     Lab Results   Component Value Date    INR 1.30 (H) 04/16/2019    INR 1.28 (H) 04/15/2019    INR 1.28 (H) 06/27/2018     Lab Results   Component Value Date    MG 1.8 04/18/2019     Lab Results   Component Value Date    TSH 6.650 (H) 04/15/2019     Lab Results   Component Value Date    BNP 33.0 11/05/2018     Lab Results   Component Value Date    CHLPL 161 04/08/2016    CHOL 180 02/28/2019    TRIG 129 02/28/2019    HDL 89 02/28/2019    VLDL 25.8 02/28/2019    LDLHDL 0.73 02/28/2019     Lab Results   Component Value Date    LDL 65 02/28/2019    LDL 35 11/05/2018       Procedures       I personally viewed and interpreted the patient's LAB data         Assessment:     1. Cirrhosis of liver without  ascites, unspecified hepatic cirrhosis type (CMS/HCC)    2. Chronic pain syndrome due to fractured spine and left rib fracture*    3. Generalized anxiety disorder    4. Hypothyroidism due to acquired atrophy of thyroid    5. Nonrheumatic aortic valve insufficiency, mild    6. Chronic diastolic heart failure, mild          Plan:     Patient has urinary tract infection.  She is getting IV Invanz therapy at the nursing home.  Hepatic encephalopathy has improved  Patient is still quite weak and needs occupational and physical therapy.  From cardiac standpoint she is better.  No change in therapy was made.  She was encouraged to stay and complete her course of the nursing home  She was seen by me when she is out of the nursing home.  No change in therapy from cardiac standpoint        No Follow-up on file.

## 2019-07-24 NOTE — TELEPHONE ENCOUNTER
Patient's sister called requesting narcotic pain medications. The patient was seen today and it was explained she received narcotics from an outside provider twice within the last month. The patient was given [place] on 7-09-19 from Dr. BILLS PER  patient violated her contract on 6-11-19 and 7-9-19 due to this issue we can no longer prescribe narcotics. Non-Narcotic options was given including a referral to pain management. The sister stated she would let the patient know.

## 2019-07-29 NOTE — TELEPHONE ENCOUNTER
NAPOLEON FROM DR CHAPMAN'S OFFICE CALLED AND SAID THAT SARAH'S FAMILY CALLED AND TOLD THEM THAT DR SOSA WOULD NOT WRITE HER PAIN MEDS BECAUSE DR CHAPMAN HAD WRITTEN THEM IN June. SHE WAS IN THE NURSING HOME AT THAT TIME. THEY WERE WROTE #[phone] ON 6/11/19 JUST TO COVER HER UNTIL THE PRIMARY CARE SAW HER AGAIN.

## 2019-07-29 NOTE — TELEPHONE ENCOUNTER
The patient is currently being treated with narcotics from 's office. The patient was instructed to return to  office and ask for refills or she was offered an appt with pain management.  will no longer prescribed narcotics per . I did call back Mary and explain the situation. She voiced understanding.

## 2019-08-19 NOTE — TELEPHONE ENCOUNTER
[employer] sent via cover [employer] meds for Xifaxan.    Approval for Xifaxan is coverage from 05-21-19 - 02-15-20.

## 2019-10-23 NOTE — PROGRESS NOTES
subjective     Chief Complaint   Patient presents with   • Cirrhosis of liver     History of Present Illness  Patient is 89 years old white female who has multiple chronic medical problems.  She has hepatic cirrhosis with multiple episodes of hepatic encephalopathy.  She complains of being weak and tired.  She is taking lactulose 3 times a day and states that she is having 4-5 loose bowel movements   Family states that she is mentally alert and has been doing better.  Patient needs pneumonia shot and flu shot today.    Blood pressure is running slightly high.  Today although family says that they checked the blood pressure at home and it has been good.  She complains of anxiety and a lot of pain however she is not taking any narcotics or sedatives at this time.  Hypothyroidism is being treated with Synthroid 75 mcg daily.  She has abdominal swelling and some leg edema.  She has Bumex but she takes it only on PRN basis.      Past Surgical History:   Procedure Laterality Date   • BACK SURGERY      KYPHOPLASTIES    • CARDIAC CATHETERIZATION Left 08/2004   • CARDIOVASCULAR STRESS TEST  01/25/2015   • CATARACT EXTRACTION     • CHOLECYSTECTOMY     • CLAVICLE SURGERY Right    • COLONOSCOPY  10/2004   • ECHO - CONVERTED  11/2012   • FEMUR OPEN REDUCTION INTERNAL FIXATION Left 10/12/2017    Procedure: FEMUR OPEN REDUCTION INTERNAL FIXATION;  Surgeon: Karson Pierre MD;  Location: [facility] OR;  Service:    • HIP FRACTURE SURGERY Bilateral     ARTHROPLASTIES    • PARTIAL HYSTERECTOMY     • PORTACATH PLACEMENT N/A 9/28/2018    Procedure: INSERTION OF PORTACATH;  Surgeon: Vic Mauricio MD;  Location: [facility] OR;  Service: General   • WRIST FRACTURE SURGERY       Family History   Problem Relation Age of Onset   • Cancer Mother    • Heart disease Father    • Arthritis Sister    • Osteoporosis Sister    • Diabetes Maternal Aunt      Past Medical History:   Diagnosis Date   • Acute renal failure (CMS/HCC)    •  Alcohol abuse     in remission   • Anxiety    • Bell's palsy    • Cellulitis     LLE   • CHF (congestive heart failure) (CMS/HCC)     Diastolic   • CHF (congestive heart failure) (CMS/HCC)    • Constipation    • Coronary artery disease    • Diverticulosis    • GERD (gastroesophageal reflux disease)    • Hepatic cirrhosis (CMS/HCC)    • Hepatitis C    • History of transfusion    • Hypertension    • Hypothyroidism    • MI (mitral incompetence)    • Osteoporosis    • Osteoporosis    • Restless leg syndrome    • Thrombocytopenia (CMS/HCC)    • Trigeminal neuralgia      Patient Active Problem List   Diagnosis   • Hepatic cirrhosis*   • HTN (hypertension)*   • Anxiety disorder*   • Osteopetrosis*   • Hypothyroidism*   • GERD (gastroesophageal reflux disease)*   • Chronic pain syndrome due to fractured spine and left rib fracture*   • Anemia, chronic disease   • Arthralgia of hip   • Left leg pain   • Fracture of proximal end of tibia and fibula   • Primary insomnia   • Thrombocytopenia (CMS/HCC)   • Peripheral vascular disease (CMS/HCC)   • Bilateral leg edema   • Cramp of both lower extremities   • Hepatic encephalopathy (CMS/HCC)   • Acute UTI (urinary tract infection)   • Hypokalemia   • Closed fracture of lower end of left femur with routine healing   • Status post open reduction with internal fixation of fracture   • Nonrheumatic aortic valve insufficiency, mild   • Chronic diastolic heart failure, mild   • Periprosthetic fracture of shaft of femur   • S/P ORIF (open reduction internal fixation) fracture   • Diarrhea   • Right upper quadrant abdominal pain   • Nausea   • Elevated liver enzymes   • Poor venous access   • Chronic kidney disease, stage III (moderate) (CMS/HCC)   • Trochanteric bursitis of left hip   • ESBL (extended spectrum beta-lactamase) producing bacteria infection       Social History     Tobacco Use   • Smoking status: Never Smoker   • Smokeless tobacco: Former User     Types: Snuff   Substance  Use Topics   • Alcohol use: No     Comment: FORMER 12 BOTTLES PER DAY FOR 15 YEARS   • Drug use: No       Allergies   Allergen Reactions   • Ciprofloxacin Itching       Current Outpatient Medications on File Prior to Visit   Medication Sig   • ASPIRIN ADULT LOW STRENGTH 81 MG EC tablet TAKE 1 TABLET BY MOUTH DAILY.   • bumetanide (BUMEX) 1 MG tablet Take 1 tablet by mouth Daily As Needed (Swelling, increased shortness of breath).   • esomeprazole (nexIUM) 40 MG capsule TAKE 1 CAPSULE BY MOUTH EVERY MORNING BEFORE BREAKFAST.   • FORTEO 600 MCG/2.4ML injection INJECT 0.8MLS SUB-Q EVERY DAY AS DIRECTED   • HYDROcodone-acetaminophen (NORCO) 5-325 MG per tablet Take 1 tablet by mouth 2 (Two) Times a Day As Needed for Moderate Pain .   • lactulose (CHRONULAC) 10 GM/15ML solution TAKE 30 MLS BY MOUTH FIVE TIMES PER DAY   • levothyroxine (SYNTHROID, LEVOTHROID) 75 MCG tablet Take 75 mcg by mouth Daily.   • magnesium oxide (MAG-OX) 400 MG tablet Take 800 mg by mouth Daily.   • nitroglycerin (NITROSTAT) 0.4 MG SL tablet Place 1 tablet under the tongue Every 5 (Five) Minutes As Needed for Chest Pain. Take no more than 3 doses in 15 minutes.   • OXcarbazepine (TRILEPTAL) 150 MG tablet Take 150 mg by mouth Every Morning.   • OXcarbazepine (TRILEPTAL) 150 MG tablet Take 300 mg by mouth Every Night.   • rifaximin (XIFAXAN) 550 MG tablet Take 1 tablet by mouth Every 12 (Twelve) Hours.   • rOPINIRole (REQUIP) 1 MG tablet TAKE 1 TABLET BY MOUTH EVERY NIGHT. TAKE 1 HOUR BEFORE BEDTIME.   • TRUEPLUS PEN NEEDLES 31G X 8 MM misc USE WITH FORTEO   • vitamin D (ERGOCALCIFEROL) 20140 units capsule capsule TAKE 1 CAPSULE BY MOUTH 1 (ONE) TIME PER WEEK.     No current facility-administered medications on file prior to visit.          The following portions of the patient's history were reviewed and updated as appropriate: allergies, current medications, past family history, past medical history, past social history, past surgical history and  "problem list.    Review of Systems   Constitution: Positive for weakness and malaise/fatigue.   HENT: Negative.  Negative for congestion.    Eyes: Negative.    Cardiovascular: Positive for leg swelling. Negative for chest pain, cyanosis, dyspnea on exertion, irregular heartbeat, near-syncope, orthopnea, palpitations, paroxysmal nocturnal dyspnea and syncope.   Respiratory: Positive for shortness of breath.    Hematologic/Lymphatic: Negative.    Skin: Positive for color change.   Musculoskeletal: Positive for back pain, myalgias and stiffness.   Gastrointestinal: Positive for bloating and diarrhea. Negative for jaundice, melena and nausea.   Neurological: Negative for headaches.   Psychiatric/Behavioral: The patient has insomnia and is nervous/anxious.           Objective:     /68   Pulse 68   Resp 16   Ht 162.6 cm (64.02\")   LMP  (LMP Unknown)   SpO2 97%   BMI 22.65 kg/m²   Physical Exam   Constitutional: She appears well-developed and well-nourished. No distress.   HENT:   Head: Normocephalic and atraumatic.   Mouth/Throat: Oropharynx is clear and moist. No oropharyngeal exudate.   Eyes: Conjunctivae and EOM are normal. Pupils are equal, round, and reactive to light. No scleral icterus.   Neck: Normal range of motion. Neck supple. No JVD present. No tracheal deviation present. No thyromegaly present.   Cardiovascular: Normal rate, regular rhythm, normal heart sounds and intact distal pulses. PMI is not displaced. Exam reveals no gallop, no friction rub and no decreased pulses.   No murmur heard.  Pulses:       Carotid pulses are 3+ on the right side, and 3+ on the left side.       Radial pulses are 3+ on the right side, and 3+ on the left side.   Pulmonary/Chest: Effort normal and breath sounds normal. No respiratory distress. She has no wheezes. She has no rales. She exhibits no tenderness.   Abdominal: Soft. Bowel sounds are normal. She exhibits distension. She exhibits no abdominal bruit and no mass. " There is no splenomegaly or hepatomegaly. There is no tenderness. There is no rebound and no guarding.   Musculoskeletal: Normal range of motion. She exhibits edema. She exhibits no tenderness or deformity.   Lymphadenopathy:     She has no cervical adenopathy.   Neurological: She is alert. She has normal reflexes. No cranial nerve deficit. She exhibits normal muscle tone. Coordination normal.   Skin: Skin is warm and dry. No rash noted. She is not diaphoretic. There is erythema.   Psychiatric: She has a normal mood and affect. Her behavior is normal. Judgment and thought content normal.         Lab Review  Lab Results   Component Value Date     04/18/2019    K 4.6 04/18/2019     (H) 04/18/2019    BUN 19 04/18/2019    CREATININE 1.00 04/18/2019    GLUCOSE 89 04/18/2019    CALCIUM 9.0 04/18/2019    ALT 18 04/18/2019    ALKPHOS 108 04/18/2019    LABIL2 0.9 (L) 04/08/2016     Lab Results   Component Value Date    CKTOTAL 99 07/25/2017     Lab Results   Component Value Date    WBC 4.54 04/18/2019    HGB 10.9 (L) 04/18/2019    HCT 34.9 04/18/2019    PLT 90 (L) 04/18/2019     Lab Results   Component Value Date    INR 1.30 (H) 04/16/2019    INR 1.28 (H) 04/15/2019    INR 1.28 (H) 06/27/2018     Lab Results   Component Value Date    MG 1.8 04/18/2019     Lab Results   Component Value Date    TSH 6.650 (H) 04/15/2019     Lab Results   Component Value Date    BNP 33.0 11/05/2018     Lab Results   Component Value Date    CHLPL 161 04/08/2016    CHOL 206 (H) 10/16/2019    TRIG 95 10/16/2019     (H) 10/16/2019    VLDL 19 10/16/2019    LDLHDL 0.82 10/16/2019     Lab Results   Component Value Date    LDL 84 10/16/2019    LDL 65 02/28/2019       Procedures       I personally viewed and interpreted the patient's LAB data         Assessment:     1. Essential hypertension    2. Chronic diastolic heart failure, mild    3. Cirrhosis of liver without ascites, unspecified hepatic cirrhosis type (CMS/HCC)    4.  Hypothyroidism due to acquired atrophy of thyroid    5. Need for vaccination    6. Need for immunization against influenza          Plan:     Patient recently had lipid panel with LDL of 84 and HDL of 103  She has been anemic and has thrombocytopenia we will check CBC CMP and ammonia level again.  She was advised to continue current medications.  Flu shot and pneumonia shot was given.  Refills of medications were given.  She was advised to continue lactulose and Xifaxan despite diarrhea.  Synthroid was continued.  Lab work scheduled.        No Follow-up on file.

## 2019-10-24 NOTE — ADDENDUM NOTE
Addended by: GARRISON ANDREWS on: 10/24/2019 09:00 AM     Modules accepted: Orders    
Addended by: GARRISON ANDREWS on: 10/24/2019 09:31 AM     Modules accepted: Orders    
ACP

## 2019-10-26 PROBLEM — E87.6 HYPOKALEMIA: Status: RESOLVED | Noted: 2017-08-10 | Resolved: 2019-01-01

## 2019-10-26 PROBLEM — I73.9 PERIPHERAL VASCULAR DISEASE (HCC): Chronic | Status: ACTIVE | Noted: 2017-03-15

## 2019-10-26 PROBLEM — D53.9 MACROCYTIC ANEMIA: Chronic | Status: ACTIVE | Noted: 2019-01-01

## 2019-10-26 PROBLEM — R11.0 NAUSEA: Status: RESOLVED | Noted: 2018-02-01 | Resolved: 2019-01-01

## 2019-10-26 PROBLEM — M70.62 TROCHANTERIC BURSITIS OF LEFT HIP: Status: RESOLVED | Noted: 2018-12-20 | Resolved: 2019-01-01

## 2019-10-26 PROBLEM — R10.11 RIGHT UPPER QUADRANT ABDOMINAL PAIN: Status: RESOLVED | Noted: 2018-02-01 | Resolved: 2019-01-01

## 2019-10-26 PROBLEM — K76.82 HEPATIC ENCEPHALOPATHY (HCC): Chronic | Status: ACTIVE | Noted: 2017-07-12

## 2019-10-26 PROBLEM — Z87.81 S/P ORIF (OPEN REDUCTION INTERNAL FIXATION) FRACTURE: Status: RESOLVED | Noted: 2017-12-28 | Resolved: 2019-01-01

## 2019-10-26 PROBLEM — R53.81 DEBILITY: Chronic | Status: ACTIVE | Noted: 2019-01-01

## 2019-10-26 PROBLEM — E88.09 HYPOALBUMINEMIA: Chronic | Status: ACTIVE | Noted: 2019-01-01

## 2019-10-26 PROBLEM — N39.0 ACUTE UTI (URINARY TRACT INFECTION): Status: RESOLVED | Noted: 2017-07-26 | Resolved: 2019-01-01

## 2019-10-26 PROBLEM — Z87.81 STATUS POST OPEN REDUCTION WITH INTERNAL FIXATION OF FRACTURE: Status: RESOLVED | Noted: 2017-10-26 | Resolved: 2019-01-01

## 2019-10-26 PROBLEM — N18.30 CHRONIC KIDNEY DISEASE, STAGE III (MODERATE) (HCC): Chronic | Status: ACTIVE | Noted: 2018-10-31

## 2019-10-26 PROBLEM — E87.3 RESPIRATORY ALKALOSIS: Chronic | Status: ACTIVE | Noted: 2019-01-01

## 2019-10-26 PROBLEM — K76.82 ACUTE HEPATIC ENCEPHALOPATHY (HCC): Status: ACTIVE | Noted: 2019-01-01

## 2019-10-26 PROBLEM — I50.32 CHRONIC DIASTOLIC HEART FAILURE (HCC): Chronic | Status: ACTIVE | Noted: 2017-12-10

## 2019-10-26 PROBLEM — R60.0 BILATERAL LEG EDEMA: Status: RESOLVED | Noted: 2017-03-30 | Resolved: 2019-01-01

## 2019-10-26 PROBLEM — Z98.890 S/P ORIF (OPEN REDUCTION INTERNAL FIXATION) FRACTURE: Status: RESOLVED | Noted: 2017-12-28 | Resolved: 2019-01-01

## 2019-10-26 PROBLEM — S72.402D CLOSED FRACTURE OF LOWER END OF LEFT FEMUR WITH ROUTINE HEALING: Status: RESOLVED | Noted: 2017-10-12 | Resolved: 2019-01-01

## 2019-10-26 PROBLEM — Z96.649 PERIPROSTHETIC FRACTURE OF SHAFT OF FEMUR: Status: RESOLVED | Noted: 2017-12-28 | Resolved: 2019-01-01

## 2019-10-26 PROBLEM — G25.81 RESTLESS LEG SYNDROME: Chronic | Status: ACTIVE | Noted: 2019-01-01

## 2019-10-26 PROBLEM — R19.7 DIARRHEA: Status: RESOLVED | Noted: 2018-02-01 | Resolved: 2019-01-01

## 2019-10-26 PROBLEM — Z98.890 STATUS POST OPEN REDUCTION WITH INTERNAL FIXATION OF FRACTURE: Status: RESOLVED | Noted: 2017-10-26 | Resolved: 2019-01-01

## 2019-10-26 PROBLEM — R25.2 CRAMP OF BOTH LOWER EXTREMITIES: Status: RESOLVED | Noted: 2017-06-15 | Resolved: 2019-01-01

## 2019-10-26 PROBLEM — I35.1 NONRHEUMATIC AORTIC VALVE INSUFFICIENCY: Chronic | Status: ACTIVE | Noted: 2017-12-10

## 2019-10-26 PROBLEM — Z16.12 ESBL (EXTENDED SPECTRUM BETA-LACTAMASE) PRODUCING BACTERIA INFECTION: Status: RESOLVED | Noted: 2019-04-18 | Resolved: 2019-01-01

## 2019-10-26 PROBLEM — I25.10 CORONARY ARTERY DISEASE: Chronic | Status: ACTIVE | Noted: 2019-01-01

## 2019-10-26 PROBLEM — D69.6 THROMBOCYTOPENIA (HCC): Status: RESOLVED | Noted: 2017-01-09 | Resolved: 2019-01-01

## 2019-10-26 PROBLEM — B19.20 HEPATITIS C: Chronic | Status: ACTIVE | Noted: 2019-01-01

## 2019-10-26 PROBLEM — A49.9 ESBL (EXTENDED SPECTRUM BETA-LACTAMASE) PRODUCING BACTERIA INFECTION: Status: RESOLVED | Noted: 2019-04-18 | Resolved: 2019-01-01

## 2019-10-26 PROBLEM — R74.8 ELEVATED LIVER ENZYMES: Status: RESOLVED | Noted: 2018-07-03 | Resolved: 2019-01-01

## 2019-10-26 PROBLEM — K76.82 HEPATIC ENCEPHALOPATHY (HCC): Status: RESOLVED | Noted: 2017-07-12 | Resolved: 2019-01-01

## 2019-10-26 PROBLEM — M97.8XXA PERIPROSTHETIC FRACTURE OF SHAFT OF FEMUR: Status: RESOLVED | Noted: 2017-12-28 | Resolved: 2019-01-01

## 2019-10-26 PROBLEM — R94.31 PROLONGED Q-T INTERVAL ON ECG: Status: ACTIVE | Noted: 2019-01-01

## 2019-10-28 PROBLEM — G93.40 ENCEPHALOPATHY: Status: ACTIVE | Noted: 2019-01-01

## 2019-11-01 NOTE — OUTREACH NOTE
Medical Week 1 Survey      Responses   Facility patient discharged from?  [facility]   Does the patient have one of the following disease processes/diagnoses(primary or secondary)?  Other   Is there a successful TCM telephone encounter documented?  No   Week 1 attempt successful?  Yes   Call start time  1445   Call end time  1447   Discharge diagnosis  Acute hepatic encephalopathy d/t liver cirrhosis, hx of alcohol abuse and Hep. C, Resp. alkalosis, hypoalbuminemia, CKD III, macrocytic anemia, prolonged QTc, CAD, CHF, essential HTN, hypothyroidism,    Is patient permission given to speak with other caregiver?  Yes   Person spoke with today (if not patient) and relationship  Granddtr   Meds reviewed with patient/caregiver?  Yes   Is the patient having any side effects they believe may be caused by any medication additions or changes?  No   Does the patient have all medications ordered at discharge?  Yes   Is the patient taking all medications as directed (includes completed medication regime)?  Yes   Does the patient have a primary care provider?   Yes   Does the patient have an appointment with their PCP within 7 days of discharge?  Yes   Has the patient kept scheduled appointments due by today?  N/A   Has home health visited the patient within 72 hours of discharge?  N/A   Psychosocial issues?  No   Did the patient receive a copy of their discharge instructions?  Yes   Nursing interventions  Reviewed instructions with patient   What is the patient's perception of their health status since discharge?  Improving   Is the patient/caregiver able to teach back signs and symptoms related to disease process for when to call PCP?  Yes   Is the patient/caregiver able to teach back signs and symptoms related to disease process for when to call 911?  Yes   Is the patient/caregiver able to teach back the hierarchy of who to call/visit for symptoms/problems? PCP, Specialist, Home health nurse, Urgent Care, ED, 911  Yes   Additional  teach back comments  Says she is doing better, not drinking.   Week 1 call completed?  Yes          Mitzi Hassan RN

## 2019-11-05 NOTE — PROGRESS NOTES
subjective     Chief Complaint   Patient presents with   • hepatic cirrhosis     [facility] 11-01-19   • Rash     forhead, abd, back     History of Present Illness    Patient is 89 years old white female who has history of hepatic cirrhosis and with multiple episodes of hepatic encephalopathy.  She is taking lactulose 3 times a day and having loose bowel movements 3-4 a day.  She is also taking Xifaxan 550 twice daily.    She also has history of mild aortic insufficiency and chronic diastolic heart failure grade 1.  Blood pressure is well controlled.  Patient is asymptomatic from cardiac standpoint.  She is seeing Dr. griffin for trigeminal neuralgia and is taking medicines from him.      Past Surgical History:   Procedure Laterality Date   • BACK SURGERY      KYPHOPLASTIES    • CARDIAC CATHETERIZATION Left 08/2004   • CARDIOVASCULAR STRESS TEST  01/25/2015   • CATARACT EXTRACTION     • CHOLECYSTECTOMY     • CLAVICLE SURGERY Right    • COLONOSCOPY  10/2004   • ECHO - CONVERTED  11/2012   • FEMUR OPEN REDUCTION INTERNAL FIXATION Left 10/12/2017    Procedure: FEMUR OPEN REDUCTION INTERNAL FIXATION;  Surgeon: Karson Pierre MD;  Location: [facility];  Service:    • HIP FRACTURE SURGERY Bilateral     ARTHROPLASTIES    • PARTIAL HYSTERECTOMY     • PORTACATH PLACEMENT N/A 9/28/2018    Procedure: INSERTION OF PORTACATH;  Surgeon: Vic Mauricio MD;  Location: [facility] OR;  Service: General   • WRIST FRACTURE SURGERY       Family History   Problem Relation Age of Onset   • Cancer Mother    • Heart disease Father    • Arthritis Sister    • Osteoporosis Sister    • Diabetes Maternal Aunt      Past Medical History:   Diagnosis Date   • Acute renal failure (CMS/HCC)    • Alcohol abuse     in remission   • Anxiety    • Bell's palsy    • Cellulitis     LLE   • CHF (congestive heart failure) (CMS/HCC)     Diastolic   • CHF (congestive heart failure) (CMS/HCC)    • Constipation    • Coronary artery disease     • Diverticulosis    • GERD (gastroesophageal reflux disease)    • Hepatic cirrhosis (CMS/HCC)    • Hepatitis C    • History of transfusion    • Hypertension    • Hypothyroidism    • MI (mitral incompetence)    • Osteoporosis    • Osteoporosis    • Restless leg syndrome    • Thrombocytopenia (CMS/HCC)    • Trigeminal neuralgia      Patient Active Problem List   Diagnosis   • Hepatic cirrhosis*   • HTN (hypertension)*   • Anxiety disorder*   • Osteoporosis   • Hypothyroidism*   • GERD (gastroesophageal reflux disease)*   • Chronic pain syndrome due to fractured spine and left rib fracture*   • Anemia, chronic disease   • Arthralgia of hip   • Primary insomnia   • Peripheral vascular disease (CMS/HCC)   • Nonrheumatic aortic valve insufficiency, mild   • Chronic diastolic heart failure (Grade 1)    • Poor venous access   • Chronic kidney disease, stage III (moderate) (CMS/HCC)   • Acute hepatic encephalopathy   • Coronary artery disease   • Restless leg syndrome   • Hepatitis C   • Debility in setting of chronic illness    • Respiratory alkalosis with secondary metabolic alkalosis    • Prolonged Q-T interval on ECG   • Macrocytic anemia   • Hypoalbuminemia   • Encephalopathy       Social History     Tobacco Use   • Smoking status: Never Smoker   • Smokeless tobacco: Former User     Types: Snuff   Substance Use Topics   • Alcohol use: No     Comment: FORMER 12 BOTTLES PER DAY FOR 15 YEARS   • Drug use: No       Allergies   Allergen Reactions   • Ciprofloxacin Itching       Current Outpatient Medications on File Prior to Visit   Medication Sig   • aspirin 81 MG EC tablet Take 81 mg by mouth Daily.   • esomeprazole (nexIUM) 40 MG capsule Take 40 mg by mouth Every Morning Before Breakfast.   • HYDROcodone-acetaminophen (NORCO)  MG per tablet Take 1 tablet by mouth Every 12 (Twelve) Hours As Needed for Moderate Pain .   • lactulose (CHRONULAC) 10 GM/15ML solution Take 10 g by mouth 3 (Three) Times a Day.   •  levothyroxine (SYNTHROID, LEVOTHROID) 75 MCG tablet Take 75 mcg by mouth Daily.   • magnesium oxide (MAG-OX) 400 MG tablet Take 400 mg by mouth Daily.   • multivitamin (DAILY RAMESH) tablet tablet Take 1 tablet by mouth Daily.   • nitroglycerin (NITROSTAT) 0.4 MG SL tablet Place 1 tablet under the tongue Every 5 (Five) Minutes As Needed for Chest Pain. Take no more than 3 doses in 15 minutes.   • OXcarbazepine (TRILEPTAL) 150 MG tablet Take 300 mg by mouth 2 (Two) Times a Day.   • rOPINIRole (REQUIP) 1 MG tablet Take 1 mg by mouth Every Night. Take 1 hour before bedtime.   • teriparatide (FORTEO) 600 MCG/2.4ML injection Inject 20 mcg under the skin into the appropriate area as directed Daily.   • vitamin D (ERGOCALCIFEROL) 1.25 MG (43805 UT) capsule capsule Take 50,000 Units by mouth 1 (One) Time Per Week. Prior to [facility] Admission, Patient was on: takes on mondays   • XIFAXAN 550 MG tablet TAKE ONE TABLET BY MOUTH TWICE A DAY   • [DISCONTINUED] bumetanide (BUMEX) 1 MG tablet Take 1 mg by mouth Daily As Needed (swelling).     No current facility-administered medications on file prior to visit.          The following portions of the patient's history were reviewed and updated as appropriate: allergies, current medications, past family history, past medical history, past social history, past surgical history and problem list.    Review of Systems   Constitution: Positive for weakness and malaise/fatigue.   HENT: Negative for congestion.    Eyes: Negative.    Cardiovascular: Negative.  Negative for chest pain, cyanosis, dyspnea on exertion, irregular heartbeat, leg swelling, near-syncope, orthopnea, palpitations, paroxysmal nocturnal dyspnea and syncope.   Respiratory: Negative.  Negative for shortness of breath.    Hematologic/Lymphatic: Negative.    Musculoskeletal: Positive for back pain.   Gastrointestinal: Negative.    Neurological: Positive for headaches.        Trigeminal neuralgia          Objective:     BP  "136/76 (BP Location: Left arm, Patient Position: Sitting, Cuff Size: Adult)   Pulse 71   Ht 162.6 cm (64\")   Wt 58.1 kg (128 lb)   LMP  (LMP Unknown)   SpO2 97%   BMI 21.97 kg/m²   Physical Exam   Constitutional: She appears well-developed and well-nourished. No distress.   HENT:   Head: Normocephalic and atraumatic.   Mouth/Throat: Oropharynx is clear and moist. No oropharyngeal exudate.   Eyes: Conjunctivae and EOM are normal. Pupils are equal, round, and reactive to light. No scleral icterus.   Neck: Normal range of motion. Neck supple. No JVD present. No tracheal deviation present. No thyromegaly present.   Cardiovascular: Normal rate, regular rhythm, normal heart sounds and intact distal pulses. PMI is not displaced. Exam reveals no gallop, no friction rub and no decreased pulses.   No murmur heard.  Pulses:       Carotid pulses are 3+ on the right side, and 3+ on the left side.       Radial pulses are 3+ on the right side, and 3+ on the left side.   Pulmonary/Chest: Effort normal and breath sounds normal. No respiratory distress. She has no wheezes. She has no rales. She exhibits no tenderness.   Abdominal: Soft. Bowel sounds are normal. She exhibits no distension, no abdominal bruit and no mass. There is no splenomegaly or hepatomegaly. There is no tenderness. There is no rebound and no guarding.   Musculoskeletal: Normal range of motion. She exhibits no edema, tenderness or deformity.   Lymphadenopathy:     She has no cervical adenopathy.   Neurological: She is alert. She has normal reflexes. No cranial nerve deficit. She exhibits normal muscle tone. Coordination normal.   Skin: Skin is warm and dry. No rash noted. She is not diaphoretic. No erythema.   Psychiatric: She has a normal mood and affect. Her behavior is normal. Judgment and thought content normal.         Lab Review  Lab Results   Component Value Date     10/29/2019    K 4.2 10/29/2019     (H) 10/29/2019    BUN 16 10/29/2019    " CREATININE 0.82 10/29/2019    GLUCOSE 89 10/29/2019    CALCIUM 8.4 (L) 10/29/2019    ALT 26 10/29/2019    ALKPHOS 175 (H) 10/29/2019    LABIL2 0.9 (L) 04/08/2016     Lab Results   Component Value Date    CKTOTAL 99 07/25/2017     Lab Results   Component Value Date    WBC 5.11 10/29/2019    HGB 10.6 (L) 10/29/2019    HCT 33.0 (L) 10/29/2019     (L) 10/29/2019     Lab Results   Component Value Date    INR 1.16 (H) 10/26/2019    INR 1.30 (H) 04/16/2019    INR 1.28 (H) 04/15/2019     Lab Results   Component Value Date    MG 1.8 10/29/2019     Lab Results   Component Value Date    TSH 3.840 10/26/2019     Lab Results   Component Value Date    BNP 33.0 11/05/2018     Lab Results   Component Value Date    CHLPL 161 04/08/2016    CHOL 199 10/27/2019    TRIG 65 10/27/2019     (H) 10/27/2019    VLDL 13 10/27/2019    LDLHDL 0.59 10/27/2019     Lab Results   Component Value Date    LDL 69 10/27/2019    LDL 84 10/16/2019       Procedures       I personally viewed and interpreted the patient's LAB data         Assessment:     1. Cirrhosis of liver without ascites, unspecified hepatic cirrhosis type (CMS/HCC)    2. Chronic diastolic heart failure (Grade 1)     3. Hypothyroidism due to acquired atrophy of thyroid          Plan:     Patient recently was in the hospital for hepatic encephalopathy with markedly elevated ammonia level.  Now she is doing much better is ambulatory and mentally clear.  She is taking rifaximin and lactulose which was continued and patient was encouraged to take it as prescribed.    She is taking Bumex 1 mg only on PRN basis.  Patient was advised that she should not take Bumex unless needed  BNP has been normal and she had hypernatremia when she went to the hospital  Refills of Bumex was given  Follow-up scheduled        No Follow-up on file.

## 2019-11-11 NOTE — OUTREACH NOTE
Medical Week 2 Survey      Responses   Facility patient discharged from?  [facility]   Does the patient have one of the following disease processes/diagnoses(primary or secondary)?  Other   Week 2 attempt successful?  Yes   Call start time  1210   Rescheduled  Rescheduled-pt requested [sister unsure about answering questions. Confirmed pt's phone # will try again]   Call end time  1210          Katie Salcedo, RN

## 2019-11-14 NOTE — OUTREACH NOTE
Medical Week 2 Survey      Responses   Facility patient discharged from?  [facility]   Does the patient have one of the following disease processes/diagnoses(primary or secondary)?  Other   Week 2 attempt successful?  Yes   Call start time  1325   Discharge diagnosis  Acute hepatic encephalopathy d/t liver cirrhosis   Call end time  1328   Is patient permission given to speak with other caregiver?  Yes   List who call center can speak with  anyone in family   Person spoke with today (if not patient) and relationship  Grandson   Is the patient taking all medications as directed (includes completed medication regime)?  Yes   Does the patient have a primary care provider?   Yes   Has the patient kept scheduled appointments due by today?  Yes   Comments  Patient saw Dr Gonsalves 11/5/19.    Has home health visited the patient within 72 hours of discharge?  N/A   Psychosocial issues?  No   Did the patient receive a copy of their discharge instructions?  Yes   What is the patient's perception of their health status since discharge?  Improving   Is the patient/caregiver able to teach back the hierarchy of who to call/visit for symptoms/problems? PCP, Specialist, Home health nurse, Urgent Care, ED, 911  Yes   Week 2 Call Completed?  Yes          Ama Mendoza RN

## 2019-11-21 NOTE — OUTREACH NOTE
Medical Week 3 Survey      Responses   Facility patient discharged from?  [facility]   Does the patient have one of the following disease processes/diagnoses(primary or secondary)?  Other   Week 3 attempt successful?  Yes   Call start time  1625   Call end time  1626   Discharge diagnosis  Acute hepatic encephalopathy d/t liver cirrhosis   What is the patient's perception of their health status since discharge?  Improving   Additional teach back comments  pt is doing good, no questions or concerns at this time.   Week 3 Call Completed?  Yes   Wrap up additional comments  quick phone call.          Sis Martino RN

## 2019-12-02 NOTE — OUTREACH NOTE
Medical Week 4 Survey      Responses   Facility patient discharged from?  [facility]   Does the patient have one of the following disease processes/diagnoses(primary or secondary)?  Other   Week 4 attempt successful?  No          Yolanda Victor RN

## 2019-12-12 NOTE — TELEPHONE ENCOUNTER
JC:  Pt grand daughter glo travillion called and said they have been tracking the patients weight for several days and it is fluctuating between 137 and 139 pounds. Today she will not let them weigh her, she will not take her medicine, she will not cooperate with them, she has been pacing the floor most of the day since 5 am and her legs are swollen.   I asked the nurse what I should do, the nurse told me to tell them to take her to the Emergency room. She may need labs drawn to see what is going on

## 2019-12-12 NOTE — ED PROVIDER NOTES
Subjective   Patient sent to ER with altered mental status.      Altered Mental Status   Presenting symptoms: lethargy    Severity:  Mild  Most recent episode:  Today  Timing:  Constant  Chronicity:  New  Associated symptoms: no abdominal pain and no fever        Review of Systems   Constitutional: Negative for fatigue and fever.   HENT: Negative.    Eyes: Negative.    Respiratory: Negative.    Cardiovascular: Negative.    Gastrointestinal: Negative for abdominal pain.   Endocrine: Negative.    Genitourinary: Negative.    Musculoskeletal: Negative.    Skin: Negative.    Allergic/Immunologic: Negative.    Neurological: Negative.    Hematological: Negative.        Past Medical History:   Diagnosis Date   • Acute renal failure (CMS/HCC)    • Alcohol abuse     in remission   • Anxiety    • Bell's palsy    • Cellulitis     LLE   • CHF (congestive heart failure) (CMS/HCC)     Diastolic   • CHF (congestive heart failure) (CMS/HCC)    • Constipation    • Coronary artery disease    • Diverticulosis    • GERD (gastroesophageal reflux disease)    • Hepatic cirrhosis (CMS/HCC)    • Hepatitis C    • History of transfusion    • Hypertension    • Hypothyroidism    • MI (mitral incompetence)    • Osteoporosis    • Osteoporosis    • Restless leg syndrome    • Thrombocytopenia (CMS/HCC)    • Trigeminal neuralgia        Allergies   Allergen Reactions   • Ciprofloxacin Itching       Past Surgical History:   Procedure Laterality Date   • BACK SURGERY      KYPHOPLASTIES    • CARDIAC CATHETERIZATION Left 08/2004   • CARDIOVASCULAR STRESS TEST  01/25/2015   • CATARACT EXTRACTION     • CHOLECYSTECTOMY     • CLAVICLE SURGERY Right    • COLONOSCOPY  10/2004   • ECHO - CONVERTED  11/2012   • FEMUR OPEN REDUCTION INTERNAL FIXATION Left 10/12/2017    Procedure: FEMUR OPEN REDUCTION INTERNAL FIXATION;  Surgeon: Karson Pierre MD;  Location: [facility];  Service:    • HIP FRACTURE SURGERY Bilateral     ARTHROPLASTIES    • PARTIAL  HYSTERECTOMY     • PORTACATH PLACEMENT N/A 9/28/2018    Procedure: INSERTION OF PORTACATH;  Surgeon: Vic Mauricio MD;  Location: [facility];  Service: General   • WRIST FRACTURE SURGERY         Family History   Problem Relation Age of Onset   • Cancer Mother    • Heart disease Father    • Arthritis Sister    • Osteoporosis Sister    • Diabetes Maternal Aunt        Social History     Socioeconomic History   • Marital status:      Spouse name: Not on file   • Number of children: Not on file   • Years of education: Not on file   • Highest education level: Not on file   Tobacco Use   • Smoking status: Never Smoker   • Smokeless tobacco: Former User     Types: Snuff   Substance and Sexual Activity   • Alcohol use: No     Comment: FORMER 12 BOTTLES PER DAY FOR 15 YEARS   • Drug use: No   • Sexual activity: Defer   Social History Narrative    LIVES WITH GRANDDAUGHTER DEVON IN [place]            Objective   Physical Exam   Constitutional: She appears well-developed and well-nourished.   HENT:   Head: Normocephalic.   Mouth/Throat: Oropharynx is clear and moist.   Eyes: Pupils are equal, round, and reactive to light.   Neck: Normal range of motion.   Cardiovascular: Normal rate.   Pulmonary/Chest: Effort normal.   Abdominal: Soft.   Musculoskeletal: Normal range of motion.   Neurological: She is alert. She has normal strength.   Skin: Skin is warm.   Psychiatric: She has a normal mood and affect.   Nursing note and vitals reviewed.      Procedures           ED Course                      No data recorded                        MDM    Final diagnoses:   Dehydration   Alcoholic cirrhosis of liver without ascites (CMS/HCC)              Rex Adam MD  12/12/19 7946       Rex Adam MD  12/12/19 [id number]

## 2020-01-01 ENCOUNTER — APPOINTMENT (OUTPATIENT)
Dept: CT IMAGING | Facility: HOSPITAL | Age: 85
End: 2020-01-01

## 2020-01-01 ENCOUNTER — HOSPITAL ENCOUNTER (EMERGENCY)
Facility: HOSPITAL | Age: 85
Discharge: HOME | End: 2020-04-30
Attending: EMERGENCY MEDICINE | Admitting: EMERGENCY MEDICINE

## 2020-01-01 ENCOUNTER — APPOINTMENT (OUTPATIENT)
Dept: GENERAL RADIOLOGY | Facility: HOSPITAL | Age: 85
End: 2020-01-01

## 2020-01-01 ENCOUNTER — APPOINTMENT (OUTPATIENT)
Dept: ULTRASOUND IMAGING | Facility: HOSPITAL | Age: 85
End: 2020-01-01

## 2020-01-01 ENCOUNTER — HOSPITAL ENCOUNTER (INPATIENT)
Facility: HOSPITAL | Age: 85
LOS: 5 days | Discharge: HOME | End: 2020-03-11
Attending: EMERGENCY MEDICINE | Admitting: HOSPITALIST

## 2020-01-01 ENCOUNTER — READMISSION MANAGEMENT (OUTPATIENT)
Dept: CALL CENTER | Facility: HOSPITAL | Age: 85
End: 2020-01-01

## 2020-01-01 ENCOUNTER — PRIOR AUTHORIZATION (OUTPATIENT)
Dept: CARDIOLOGY | Facility: CLINIC | Age: 85
End: 2020-01-01

## 2020-01-01 ENCOUNTER — OFFICE VISIT (OUTPATIENT)
Dept: CARDIOLOGY | Facility: CLINIC | Age: 85
End: 2020-01-01

## 2020-01-01 ENCOUNTER — LAB REQUISITION (OUTPATIENT)
Dept: LAB | Facility: HOSPITAL | Age: 85
End: 2020-01-01

## 2020-01-01 ENCOUNTER — HOSPITAL ENCOUNTER (INPATIENT)
Facility: HOSPITAL | Age: 85
LOS: 3 days | Discharge: HOSPICE | End: 2020-03-18
Attending: EMERGENCY MEDICINE | Admitting: INTERNAL MEDICINE

## 2020-01-01 VITALS
BODY MASS INDEX: 29 KG/M2 | HEIGHT: 60 IN | OXYGEN SATURATION: 95 % | DIASTOLIC BLOOD PRESSURE: 52 MMHG | SYSTOLIC BLOOD PRESSURE: 140 MMHG | TEMPERATURE: 97.6 F | RESPIRATION RATE: 18 BRPM | HEART RATE: 74 BPM | WEIGHT: 147.7 LBS

## 2020-01-01 VITALS
HEIGHT: 65 IN | SYSTOLIC BLOOD PRESSURE: 120 MMHG | BODY MASS INDEX: 22.16 KG/M2 | WEIGHT: 133 LBS | DIASTOLIC BLOOD PRESSURE: 50 MMHG | OXYGEN SATURATION: 97 % | RESPIRATION RATE: 18 BRPM | HEART RATE: 97 BPM | TEMPERATURE: 99 F

## 2020-01-01 VITALS
HEART RATE: 79 BPM | SYSTOLIC BLOOD PRESSURE: 140 MMHG | OXYGEN SATURATION: 98 % | HEIGHT: 62 IN | DIASTOLIC BLOOD PRESSURE: 68 MMHG | BODY MASS INDEX: 26.68 KG/M2 | WEIGHT: 145 LBS

## 2020-01-01 VITALS
DIASTOLIC BLOOD PRESSURE: 42 MMHG | OXYGEN SATURATION: 96 % | HEART RATE: 71 BPM | SYSTOLIC BLOOD PRESSURE: 116 MMHG | WEIGHT: 139.6 LBS | BODY MASS INDEX: 23.83 KG/M2 | RESPIRATION RATE: 18 BRPM | HEIGHT: 64 IN | TEMPERATURE: 98 F

## 2020-01-01 DIAGNOSIS — K74.69 OTHER CIRRHOSIS OF LIVER (HCC): ICD-10-CM

## 2020-01-01 DIAGNOSIS — E83.39 HYPOPHOSPHATEMIA: ICD-10-CM

## 2020-01-01 DIAGNOSIS — E03.9 HYPOTHYROIDISM, UNSPECIFIED TYPE: ICD-10-CM

## 2020-01-01 DIAGNOSIS — G89.4 CHRONIC PAIN SYNDROME: Chronic | ICD-10-CM

## 2020-01-01 DIAGNOSIS — E03.9 HYPOTHYROIDISM, UNSPECIFIED TYPE: Chronic | ICD-10-CM

## 2020-01-01 DIAGNOSIS — M47.812 SPONDYLOSIS OF CERVICAL REGION WITHOUT MYELOPATHY OR RADICULOPATHY: Primary | ICD-10-CM

## 2020-01-01 DIAGNOSIS — K74.60 CIRRHOSIS OF LIVER WITHOUT ASCITES, UNSPECIFIED HEPATIC CIRRHOSIS TYPE (HCC): Primary | Chronic | ICD-10-CM

## 2020-01-01 DIAGNOSIS — G50.0 TRIGEMINAL NEURALGIA: ICD-10-CM

## 2020-01-01 DIAGNOSIS — I10 ESSENTIAL HYPERTENSION: Primary | Chronic | ICD-10-CM

## 2020-01-01 DIAGNOSIS — K76.82 HEPATIC ENCEPHALOPATHY (HCC): Primary | ICD-10-CM

## 2020-01-01 DIAGNOSIS — D64.9 ANEMIA, UNSPECIFIED: ICD-10-CM

## 2020-01-01 DIAGNOSIS — M47.816 SPONDYLOSIS OF LUMBAR REGION WITHOUT MYELOPATHY OR RADICULOPATHY: ICD-10-CM

## 2020-01-01 DIAGNOSIS — M47.814 SPONDYLOSIS OF THORACIC REGION WITHOUT MYELOPATHY OR RADICULOPATHY: ICD-10-CM

## 2020-01-01 DIAGNOSIS — E03.4 HYPOTHYROIDISM DUE TO ACQUIRED ATROPHY OF THYROID: Chronic | ICD-10-CM

## 2020-01-01 DIAGNOSIS — K74.60 CIRRHOSIS OF LIVER WITHOUT ASCITES, UNSPECIFIED HEPATIC CIRRHOSIS TYPE (HCC): Chronic | ICD-10-CM

## 2020-01-01 DIAGNOSIS — E03.8 OTHER SPECIFIED HYPOTHYROIDISM: ICD-10-CM

## 2020-01-01 DIAGNOSIS — I50.32 CHRONIC DIASTOLIC HEART FAILURE (HCC): Chronic | ICD-10-CM
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
## 2020-01-01 DIAGNOSIS — R41.82 ALTERED MENTAL STATUS, UNSPECIFIED ALTERED MENTAL STATUS TYPE: ICD-10-CM

## 2020-01-01 DIAGNOSIS — K76.82 ACUTE HEPATIC ENCEPHALOPATHY (HCC): Primary | ICD-10-CM

## 2020-01-01 LAB
6-ACETYL MORPHINE: NEGATIVE
A-A DO2: 28.4 MMHG (ref 0–300)
ALBUMIN SERPL-MCNC: 2.33 G/DL (ref 3.5–5.2)
ALBUMIN SERPL-MCNC: 2.38 G/DL (ref 3.5–5.2)
ALBUMIN SERPL-MCNC: 2.6 G/DL (ref 3.5–5.2)
ALBUMIN SERPL-MCNC: 2.8 G/DL (ref 3.5–5.2)
ALBUMIN SERPL-MCNC: 2.98 G/DL (ref 3.5–5.2)
ALBUMIN SERPL-MCNC: 2.99 G/DL (ref 3.5–5.2)
ALBUMIN/GLOB SERPL: 0.8 G/DL
ALP SERPL-CCNC: 101 U/L (ref 39–117)
ALP SERPL-CCNC: 122 U/L (ref 39–117)
ALP SERPL-CCNC: 122 U/L (ref 39–117)
ALP SERPL-CCNC: 129 U/L (ref 39–117)
ALP SERPL-CCNC: 84 U/L (ref 39–117)
ALP SERPL-CCNC: 97 U/L (ref 39–117)
ALT SERPL W P-5'-P-CCNC: 14 U/L (ref 1–33)
ALT SERPL W P-5'-P-CCNC: 20 U/L (ref 1–33)
ALT SERPL W P-5'-P-CCNC: 22 U/L (ref 1–33)
ALT SERPL W P-5'-P-CCNC: 23 U/L (ref 1–33)
ALT SERPL W P-5'-P-CCNC: 25 U/L (ref 1–33)
ALT SERPL W P-5'-P-CCNC: 28 U/L (ref 1–33)
AMMONIA BLD-SCNC: 129 UMOL/L (ref 11–51)
AMMONIA BLD-SCNC: 172 UMOL/L (ref 11–51)
AMMONIA BLD-SCNC: 52 UMOL/L (ref 11–51)
AMMONIA BLD-SCNC: 54 UMOL/L (ref 11–51)
AMMONIA BLD-SCNC: 61 UMOL/L (ref 11–51)
AMMONIA BLD-SCNC: 64 UMOL/L (ref 11–51)
AMMONIA BLD-SCNC: 71 UMOL/L (ref 11–51)
AMMONIA BLD-SCNC: 74 UMOL/L (ref 11–51)
AMMONIA BLD-SCNC: 87 UMOL/L (ref 11–51)
AMPHET+METHAMPHET UR QL: NEGATIVE
AMYLASE SERPL-CCNC: 32 U/L (ref 28–100)
ANION GAP SERPL CALCULATED.3IONS-SCNC: 10 MMOL/L (ref 5–15)
ANION GAP SERPL CALCULATED.3IONS-SCNC: 11.2 MMOL/L (ref 5–15)
ANION GAP SERPL CALCULATED.3IONS-SCNC: 11.3 MMOL/L (ref 5–15)
ANION GAP SERPL CALCULATED.3IONS-SCNC: 12.2 MMOL/L (ref 5–15)
ANION GAP SERPL CALCULATED.3IONS-SCNC: 12.9 MMOL/L (ref 5–15)
ANION GAP SERPL CALCULATED.3IONS-SCNC: 13 MMOL/L (ref 5–15)
ANION GAP SERPL CALCULATED.3IONS-SCNC: 13 MMOL/L (ref 5–15)
ANION GAP SERPL CALCULATED.3IONS-SCNC: 8.1 MMOL/L (ref 5–15)
ANION GAP SERPL CALCULATED.3IONS-SCNC: 8.7 MMOL/L (ref 5–15)
ANION GAP SERPL CALCULATED.3IONS-SCNC: 8.8 MMOL/L (ref 5–15)
ANION GAP SERPL CALCULATED.3IONS-SCNC: 9.8 MMOL/L (ref 5–15)
ANION GAP SERPL CALCULATED.3IONS-SCNC: 9.9 MMOL/L (ref 5–15)
ANISOCYTOSIS BLD QL: NORMAL
APTT PPP: 34.7 SECONDS (ref 23.8–36.1)
APTT PPP: 41.4 SECONDS (ref 23.8–36.1)
ARTERIAL PATENCY WRIST A: POSITIVE
AST SERPL-CCNC: 25 U/L (ref 1–32)
AST SERPL-CCNC: 38 U/L (ref 1–32)
AST SERPL-CCNC: 41 U/L (ref 1–32)
AST SERPL-CCNC: 45 U/L (ref 1–32)
AST SERPL-CCNC: 46 U/L (ref 1–32)
AST SERPL-CCNC: 60 U/L (ref 1–32)
ATMOSPHERIC PRESS: 728 MMHG
BACTERIA SPEC AEROBE CULT: NORMAL
BACTERIA UR QL AUTO: NORMAL /HPF
BARBITURATES UR QL SCN: NEGATIVE
BASE EXCESS BLDA CALC-SCNC: 4.2 MMOL/L (ref 0–2)
BASOPHILS # BLD AUTO: 0.02 10*3/MM3 (ref 0–0.2)
BASOPHILS # BLD AUTO: 0.03 10*3/MM3 (ref 0–0.2)
BASOPHILS # BLD AUTO: 0.04 10*3/MM3 (ref 0–0.2)
BASOPHILS # BLD AUTO: 0.04 10*3/MM3 (ref 0–0.2)
BASOPHILS # BLD AUTO: 0.05 10*3/MM3 (ref 0–0.2)
BASOPHILS # BLD AUTO: 0.06 10*3/MM3 (ref 0–0.2)
BASOPHILS # BLD AUTO: 0.07 10*3/MM3 (ref 0–0.2)
BASOPHILS # BLD AUTO: 0.08 10*3/MM3 (ref 0–0.2)
BASOPHILS NFR BLD AUTO: 0.4 % (ref 0–1.5)
BASOPHILS NFR BLD AUTO: 0.4 % (ref 0–1.5)
BASOPHILS NFR BLD AUTO: 0.6 % (ref 0–1.5)
BASOPHILS NFR BLD AUTO: 0.6 % (ref 0–1.5)
BASOPHILS NFR BLD AUTO: 0.7 % (ref 0–1.5)
BASOPHILS NFR BLD AUTO: 0.8 % (ref 0–1.5)
BASOPHILS NFR BLD AUTO: 1.2 % (ref 0–1.5)
BASOPHILS NFR BLD AUTO: 1.3 % (ref 0–1.5)
BASOPHILS NFR BLD AUTO: 1.5 % (ref 0–1.5)
BASOPHILS NFR BLD AUTO: 1.9 % (ref 0–1.5)
BDY SITE: ABNORMAL
BENZODIAZ UR QL SCN: NEGATIVE
BILIRUB CONJ SERPL-MCNC: 0.5 MG/DL (ref 0.2–0.3)
BILIRUB INDIRECT SERPL-MCNC: 0.7 MG/DL
BILIRUB SERPL-MCNC: 1 MG/DL (ref 0.2–1.2)
BILIRUB SERPL-MCNC: 1.2 MG/DL (ref 0.2–1.2)
BILIRUB SERPL-MCNC: 1.2 MG/DL (ref 0.2–1.2)
BILIRUB SERPL-MCNC: 1.3 MG/DL (ref 0.2–1.2)
BILIRUB SERPL-MCNC: 1.5 MG/DL (ref 0.2–1.2)
BILIRUB SERPL-MCNC: 1.7 MG/DL (ref 0.2–1.2)
BILIRUB UR QL STRIP: NEGATIVE
BILIRUB UR QL STRIP: NEGATIVE
BODY TEMPERATURE: 0 C
BUN BLD-MCNC: 11 MG/DL (ref 8–23)
BUN BLD-MCNC: 12 MG/DL (ref 8–23)
BUN BLD-MCNC: 13 MG/DL (ref 8–23)
BUN BLD-MCNC: 13 MG/DL (ref 8–23)
BUN BLD-MCNC: 14 MG/DL (ref 8–23)
BUN BLD-MCNC: 14 MG/DL (ref 8–23)
BUN BLD-MCNC: 15 MG/DL (ref 8–23)
BUN BLD-MCNC: 9 MG/DL (ref 8–23)
BUN/CREAT SERPL: 10.7 (ref 7–25)
BUN/CREAT SERPL: 11.8 (ref 7–25)
BUN/CREAT SERPL: 12.2 (ref 7–25)
BUN/CREAT SERPL: 14.3 (ref 7–25)
BUN/CREAT SERPL: 14.4 (ref 7–25)
BUN/CREAT SERPL: 14.4 (ref 7–25)
BUN/CREAT SERPL: 14.8 (ref 7–25)
BUN/CREAT SERPL: 15.2 (ref 7–25)
BUN/CREAT SERPL: 17 (ref 7–25)
BUN/CREAT SERPL: 18.1 (ref 7–25)
BUN/CREAT SERPL: 18.2 (ref 7–25)
BUN/CREAT SERPL: 21.4 (ref 7–25)
BUPRENORPHINE SERPL-MCNC: NEGATIVE NG/ML
CALCIUM SPEC-SCNC: 7.6 MG/DL (ref 8.6–10.5)
CALCIUM SPEC-SCNC: 8 MG/DL (ref 8.6–10.5)
CALCIUM SPEC-SCNC: 8.2 MG/DL (ref 8.6–10.5)
CALCIUM SPEC-SCNC: 8.3 MG/DL (ref 8.6–10.5)
CALCIUM SPEC-SCNC: 8.3 MG/DL (ref 8.6–10.5)
CALCIUM SPEC-SCNC: 8.4 MG/DL (ref 8.6–10.5)
CALCIUM SPEC-SCNC: 8.6 MG/DL (ref 8.6–10.5)
CALCIUM SPEC-SCNC: 8.7 MG/DL (ref 8.6–10.5)
CALCIUM SPEC-SCNC: 8.8 MG/DL (ref 8.6–10.5)
CALCIUM SPEC-SCNC: 9.2 MG/DL (ref 8.6–10.5)
CANNABINOIDS SERPL QL: NEGATIVE
CHLORIDE SERPL-SCNC: 100 MMOL/L (ref 98–107)
CHLORIDE SERPL-SCNC: 102 MMOL/L (ref 98–107)
CHLORIDE SERPL-SCNC: 103 MMOL/L (ref 98–107)
CHLORIDE SERPL-SCNC: 105 MMOL/L (ref 98–107)
CHLORIDE SERPL-SCNC: 107 MMOL/L (ref 98–107)
CHLORIDE SERPL-SCNC: 108 MMOL/L (ref 98–107)
CHLORIDE SERPL-SCNC: 109 MMOL/L (ref 98–107)
CHLORIDE SERPL-SCNC: 111 MMOL/L (ref 98–107)
CHOLEST SERPL-MCNC: 190 MG/DL (ref 0–200)
CLARITY UR: ABNORMAL
CLARITY UR: CLEAR
CO2 BLDA-SCNC: 28.9 MMOL/L (ref 22–33)
CO2 SERPL-SCNC: 20.1 MMOL/L (ref 22–29)
CO2 SERPL-SCNC: 22.8 MMOL/L (ref 22–29)
CO2 SERPL-SCNC: 23.8 MMOL/L (ref 22–29)
CO2 SERPL-SCNC: 23.9 MMOL/L (ref 22–29)
CO2 SERPL-SCNC: 24 MMOL/L (ref 22–29)
CO2 SERPL-SCNC: 24 MMOL/L (ref 22–29)
CO2 SERPL-SCNC: 24.1 MMOL/L (ref 22–29)
CO2 SERPL-SCNC: 24.2 MMOL/L (ref 22–29)
CO2 SERPL-SCNC: 24.2 MMOL/L (ref 22–29)
CO2 SERPL-SCNC: 24.7 MMOL/L (ref 22–29)
CO2 SERPL-SCNC: 26.3 MMOL/L (ref 22–29)
CO2 SERPL-SCNC: 27 MMOL/L (ref 22–29)
COCAINE UR QL: NEGATIVE
COHGB MFR BLD: 1.1 % (ref 0–5)
COLOR UR: YELLOW
COLOR UR: YELLOW
CREAT BLD-MCNC: 0.7 MG/DL (ref 0.57–1)
CREAT BLD-MCNC: 0.77 MG/DL (ref 0.57–1)
CREAT BLD-MCNC: 0.81 MG/DL (ref 0.57–1)
CREAT BLD-MCNC: 0.83 MG/DL (ref 0.57–1)
CREAT BLD-MCNC: 0.84 MG/DL (ref 0.57–1)
CREAT BLD-MCNC: 0.84 MG/DL (ref 0.57–1)
CREAT BLD-MCNC: 0.88 MG/DL (ref 0.57–1)
CREAT BLD-MCNC: 0.9 MG/DL (ref 0.57–1)
CREAT BLD-MCNC: 0.9 MG/DL (ref 0.57–1)
CREAT BLD-MCNC: 0.92 MG/DL (ref 0.57–1)
CREAT BLD-MCNC: 0.93 MG/DL (ref 0.57–1)
CREAT BLD-MCNC: 0.98 MG/DL (ref 0.57–1)
CRP SERPL-MCNC: 4.3 MG/DL (ref 0–0.5)
D-LACTATE SERPL-SCNC: 1.3 MMOL/L (ref 0.5–2)
D-LACTATE SERPL-SCNC: 1.9 MMOL/L (ref 0.5–2)
DEPRECATED RDW RBC AUTO: 53.8 FL (ref 37–54)
DEPRECATED RDW RBC AUTO: 60.1 FL (ref 37–54)
DEPRECATED RDW RBC AUTO: 62 FL (ref 37–54)
DEPRECATED RDW RBC AUTO: 62.4 FL (ref 37–54)
DEPRECATED RDW RBC AUTO: 63.7 FL (ref 37–54)
DEPRECATED RDW RBC AUTO: 65.6 FL (ref 37–54)
DEPRECATED RDW RBC AUTO: 66.4 FL (ref 37–54)
DEPRECATED RDW RBC AUTO: 70.6 FL (ref 37–54)
EOSINOPHIL # BLD AUTO: 0.32 10*3/MM3 (ref 0–0.4)
EOSINOPHIL # BLD AUTO: 0.44 10*3/MM3 (ref 0–0.4)
EOSINOPHIL # BLD AUTO: 0.44 10*3/MM3 (ref 0–0.4)
EOSINOPHIL # BLD AUTO: 0.54 10*3/MM3 (ref 0–0.4)
EOSINOPHIL # BLD AUTO: 0.57 10*3/MM3 (ref 0–0.4)
EOSINOPHIL # BLD AUTO: 0.61 10*3/MM3 (ref 0–0.4)
EOSINOPHIL # BLD AUTO: 0.91 10*3/MM3 (ref 0–0.4)
EOSINOPHIL # BLD AUTO: 0.99 10*3/MM3 (ref 0–0.4)
EOSINOPHIL # BLD AUTO: 1.05 10*3/MM3 (ref 0–0.4)
EOSINOPHIL # BLD AUTO: 1.12 10*3/MM3 (ref 0–0.4)
EOSINOPHIL NFR BLD AUTO: 11.8 % (ref 0.3–6.2)
EOSINOPHIL NFR BLD AUTO: 11.9 % (ref 0.3–6.2)
EOSINOPHIL NFR BLD AUTO: 12.4 % (ref 0.3–6.2)
EOSINOPHIL NFR BLD AUTO: 13.4 % (ref 0.3–6.2)
EOSINOPHIL NFR BLD AUTO: 15.6 % (ref 0.3–6.2)
EOSINOPHIL NFR BLD AUTO: 20.7 % (ref 0.3–6.2)
EOSINOPHIL NFR BLD AUTO: 23 % (ref 0.3–6.2)
EOSINOPHIL NFR BLD AUTO: 23 % (ref 0.3–6.2)
EOSINOPHIL NFR BLD AUTO: 7.9 % (ref 0.3–6.2)
EOSINOPHIL NFR BLD AUTO: 8 % (ref 0.3–6.2)
ERYTHROCYTE [DISTWIDTH] IN BLOOD BY AUTOMATED COUNT: 15.2 % (ref 12.3–15.4)
ERYTHROCYTE [DISTWIDTH] IN BLOOD BY AUTOMATED COUNT: 16.8 % (ref 12.3–15.4)
ERYTHROCYTE [DISTWIDTH] IN BLOOD BY AUTOMATED COUNT: 16.8 % (ref 12.3–15.4)
ERYTHROCYTE [DISTWIDTH] IN BLOOD BY AUTOMATED COUNT: 16.9 % (ref 12.3–15.4)
ERYTHROCYTE [DISTWIDTH] IN BLOOD BY AUTOMATED COUNT: 17 % (ref 12.3–15.4)
ERYTHROCYTE [DISTWIDTH] IN BLOOD BY AUTOMATED COUNT: 17 % (ref 12.3–15.4)
ERYTHROCYTE [DISTWIDTH] IN BLOOD BY AUTOMATED COUNT: 17.2 % (ref 12.3–15.4)
ERYTHROCYTE [DISTWIDTH] IN BLOOD BY AUTOMATED COUNT: 17.2 % (ref 12.3–15.4)
ERYTHROCYTE [DISTWIDTH] IN BLOOD BY AUTOMATED COUNT: 17.3 % (ref 12.3–15.4)
ERYTHROCYTE [DISTWIDTH] IN BLOOD BY AUTOMATED COUNT: 17.5 % (ref 12.3–15.4)
ETHANOL BLD-MCNC: <10 MG/DL (ref 0–10)
ETHANOL UR QL: <0.01 %
FERRITIN SERPL-MCNC: 1564 NG/ML (ref 13–150)
FLUAV AG NPH QL: NEGATIVE
FLUBV AG NPH QL IA: NEGATIVE
FOLATE SERPL-MCNC: >20 NG/ML (ref 4.78–24.2)
GFR SERPL CREATININE-BSD FRML MDRD: 53 ML/MIN/1.73
GFR SERPL CREATININE-BSD FRML MDRD: 57 ML/MIN/1.73
GFR SERPL CREATININE-BSD FRML MDRD: 57 ML/MIN/1.73
GFR SERPL CREATININE-BSD FRML MDRD: 59 ML/MIN/1.73
GFR SERPL CREATININE-BSD FRML MDRD: 59 ML/MIN/1.73
GFR SERPL CREATININE-BSD FRML MDRD: 61 ML/MIN/1.73
GFR SERPL CREATININE-BSD FRML MDRD: 64 ML/MIN/1.73
GFR SERPL CREATININE-BSD FRML MDRD: 64 ML/MIN/1.73
GFR SERPL CREATININE-BSD FRML MDRD: 65 ML/MIN/1.73
GFR SERPL CREATININE-BSD FRML MDRD: 67 ML/MIN/1.73
GFR SERPL CREATININE-BSD FRML MDRD: 71 ML/MIN/1.73
GFR SERPL CREATININE-BSD FRML MDRD: 79 ML/MIN/1.73
GLOBULIN UR ELPH-MCNC: 2.9 GM/DL
GLOBULIN UR ELPH-MCNC: 3 GM/DL
GLOBULIN UR ELPH-MCNC: 3.7 GM/DL
GLOBULIN UR ELPH-MCNC: 3.8 GM/DL
GLOBULIN UR ELPH-MCNC: 3.9 GM/DL
GLUCOSE BLD-MCNC: 100 MG/DL (ref 65–99)
GLUCOSE BLD-MCNC: 101 MG/DL (ref 65–99)
GLUCOSE BLD-MCNC: 106 MG/DL (ref 65–99)
GLUCOSE BLD-MCNC: 109 MG/DL (ref 65–99)
GLUCOSE BLD-MCNC: 112 MG/DL (ref 65–99)
GLUCOSE BLD-MCNC: 115 MG/DL (ref 65–99)
GLUCOSE BLD-MCNC: 118 MG/DL (ref 65–99)
GLUCOSE BLD-MCNC: 79 MG/DL (ref 65–99)
GLUCOSE BLD-MCNC: 80 MG/DL (ref 65–99)
GLUCOSE BLD-MCNC: 86 MG/DL (ref 65–99)
GLUCOSE BLD-MCNC: 87 MG/DL (ref 65–99)
GLUCOSE BLD-MCNC: 93 MG/DL (ref 65–99)
GLUCOSE BLDC GLUCOMTR-MCNC: 101 MG/DL (ref 70–130)
GLUCOSE BLDC GLUCOMTR-MCNC: 106 MG/DL (ref 70–130)
GLUCOSE BLDC GLUCOMTR-MCNC: 108 MG/DL (ref 70–130)
GLUCOSE BLDC GLUCOMTR-MCNC: 109 MG/DL (ref 70–130)
GLUCOSE BLDC GLUCOMTR-MCNC: 112 MG/DL (ref 70–130)
GLUCOSE BLDC GLUCOMTR-MCNC: 113 MG/DL (ref 70–130)
GLUCOSE BLDC GLUCOMTR-MCNC: 113 MG/DL (ref 70–130)
GLUCOSE BLDC GLUCOMTR-MCNC: 114 MG/DL (ref 70–130)
GLUCOSE BLDC GLUCOMTR-MCNC: 116 MG/DL (ref 70–130)
GLUCOSE BLDC GLUCOMTR-MCNC: 118 MG/DL (ref 70–130)
GLUCOSE BLDC GLUCOMTR-MCNC: 125 MG/DL (ref 70–130)
GLUCOSE BLDC GLUCOMTR-MCNC: 131 MG/DL (ref 70–130)
GLUCOSE BLDC GLUCOMTR-MCNC: 133 MG/DL (ref 70–130)
GLUCOSE BLDC GLUCOMTR-MCNC: 162 MG/DL (ref 70–130)
GLUCOSE BLDC GLUCOMTR-MCNC: 82 MG/DL (ref 70–130)
GLUCOSE BLDC GLUCOMTR-MCNC: 83 MG/DL (ref 70–130)
GLUCOSE BLDC GLUCOMTR-MCNC: 83 MG/DL (ref 70–130)
GLUCOSE BLDC GLUCOMTR-MCNC: 85 MG/DL (ref 70–130)
GLUCOSE BLDC GLUCOMTR-MCNC: 85 MG/DL (ref 70–130)
GLUCOSE BLDC GLUCOMTR-MCNC: 88 MG/DL (ref 70–130)
GLUCOSE BLDC GLUCOMTR-MCNC: 99 MG/DL (ref 70–130)
GLUCOSE UR STRIP-MCNC: NEGATIVE MG/DL
GLUCOSE UR STRIP-MCNC: NEGATIVE MG/DL
HAV IGM SERPL QL IA: NORMAL
HBA1C MFR BLD: 4.5 % (ref 4.8–5.6)
HBV CORE IGM SERPL QL IA: NORMAL
HBV SURFACE AG SERPL QL IA: NORMAL
HCO3 BLDA-SCNC: 27.7 MMOL/L (ref 20–26)
HCT VFR BLD AUTO: 29.4 % (ref 34–46.6)
HCT VFR BLD AUTO: 29.8 % (ref 34–46.6)
HCT VFR BLD AUTO: 30.6 % (ref 34–46.6)
HCT VFR BLD AUTO: 31 % (ref 34–46.6)
HCT VFR BLD AUTO: 32.3 % (ref 34–46.6)
HCT VFR BLD AUTO: 32.5 % (ref 34–46.6)
HCT VFR BLD AUTO: 32.7 % (ref 34–46.6)
HCT VFR BLD AUTO: 33.3 % (ref 34–46.6)
HCT VFR BLD AUTO: 34.4 % (ref 34–46.6)
HCT VFR BLD AUTO: 37.2 % (ref 34–46.6)
HCT VFR BLD CALC: 32.4 % (ref 38–51)
HCV AB SER DONR QL: NORMAL
HDLC SERPL-MCNC: 119 MG/DL (ref 40–60)
HGB BLD-MCNC: 10.2 G/DL (ref 12–15.9)
HGB BLD-MCNC: 10.4 G/DL (ref 12–15.9)
HGB BLD-MCNC: 10.4 G/DL (ref 12–15.9)
HGB BLD-MCNC: 10.5 G/DL (ref 12–15.9)
HGB BLD-MCNC: 11.7 G/DL (ref 12–15.9)
HGB BLD-MCNC: 9.3 G/DL (ref 12–15.9)
HGB BLD-MCNC: 9.4 G/DL (ref 12–15.9)
HGB BLD-MCNC: 9.4 G/DL (ref 12–15.9)
HGB BLD-MCNC: 9.5 G/DL (ref 12–15.9)
HGB BLD-MCNC: 9.6 G/DL (ref 12–15.9)
HGB BLDA-MCNC: 10.6 G/DL (ref 13.5–17.5)
HGB UR QL STRIP.AUTO: ABNORMAL
HGB UR QL STRIP.AUTO: NEGATIVE
HOLD SPECIMEN: NORMAL
HOROWITZ INDEX BLD+IHG-RTO: 21 %
HYALINE CASTS UR QL AUTO: NORMAL /LPF
HYPOCHROMIA BLD QL: NORMAL
IMM GRANULOCYTES # BLD AUTO: 0.01 10*3/MM3 (ref 0–0.05)
IMM GRANULOCYTES # BLD AUTO: 0.02 10*3/MM3 (ref 0–0.05)
IMM GRANULOCYTES # BLD AUTO: 0.02 10*3/MM3 (ref 0–0.05)
IMM GRANULOCYTES # BLD AUTO: 0.03 10*3/MM3 (ref 0–0.05)
IMM GRANULOCYTES NFR BLD AUTO: 0.2 % (ref 0–0.5)
IMM GRANULOCYTES NFR BLD AUTO: 0.3 % (ref 0–0.5)
IMM GRANULOCYTES NFR BLD AUTO: 0.3 % (ref 0–0.5)
IMM GRANULOCYTES NFR BLD AUTO: 0.4 % (ref 0–0.5)
IMM GRANULOCYTES NFR BLD AUTO: 0.4 % (ref 0–0.5)
IMM GRANULOCYTES NFR BLD AUTO: 0.8 % (ref 0–0.5)
INR PPP: 1.21 (ref 0.9–1.1)
INR PPP: 1.23 (ref 0.9–1.1)
IRON 24H UR-MRATE: 84 MCG/DL (ref 37–145)
IRON SATN MFR SERPL: 54 % (ref 20–50)
KETONES UR QL STRIP: NEGATIVE
KETONES UR QL STRIP: NEGATIVE
LDLC SERPL CALC-MCNC: 54 MG/DL (ref 0–100)
LDLC/HDLC SERPL: 0.46 {RATIO}
LEUKOCYTE ESTERASE UR QL STRIP.AUTO: NEGATIVE
LEUKOCYTE ESTERASE UR QL STRIP.AUTO: NEGATIVE
LIPASE SERPL-CCNC: 26 U/L (ref 13–60)
LIPASE SERPL-CCNC: 28 U/L (ref 13–60)
LYMPHOCYTES # BLD AUTO: 0.71 10*3/MM3 (ref 0.7–3.1)
LYMPHOCYTES # BLD AUTO: 0.81 10*3/MM3 (ref 0.7–3.1)
LYMPHOCYTES # BLD AUTO: 0.89 10*3/MM3 (ref 0.7–3.1)
LYMPHOCYTES # BLD AUTO: 1.02 10*3/MM3 (ref 0.7–3.1)
LYMPHOCYTES # BLD AUTO: 1.06 10*3/MM3 (ref 0.7–3.1)
LYMPHOCYTES # BLD AUTO: 1.15 10*3/MM3 (ref 0.7–3.1)
LYMPHOCYTES # BLD AUTO: 1.17 10*3/MM3 (ref 0.7–3.1)
LYMPHOCYTES # BLD AUTO: 1.21 10*3/MM3 (ref 0.7–3.1)
LYMPHOCYTES # BLD AUTO: 1.25 10*3/MM3 (ref 0.7–3.1)
LYMPHOCYTES # BLD AUTO: 1.3 10*3/MM3 (ref 0.7–3.1)
LYMPHOCYTES NFR BLD AUTO: 17.2 % (ref 19.6–45.3)
LYMPHOCYTES NFR BLD AUTO: 17.8 % (ref 19.6–45.3)
LYMPHOCYTES NFR BLD AUTO: 17.8 % (ref 19.6–45.3)
LYMPHOCYTES NFR BLD AUTO: 19 % (ref 19.6–45.3)
LYMPHOCYTES NFR BLD AUTO: 21.1 % (ref 19.6–45.3)
LYMPHOCYTES NFR BLD AUTO: 23.6 % (ref 19.6–45.3)
LYMPHOCYTES NFR BLD AUTO: 24.6 % (ref 19.6–45.3)
LYMPHOCYTES NFR BLD AUTO: 25.8 % (ref 19.6–45.3)
LYMPHOCYTES NFR BLD AUTO: 29.8 % (ref 19.6–45.3)
LYMPHOCYTES NFR BLD AUTO: 30.7 % (ref 19.6–45.3)
Lab: ABNORMAL
MACROCYTES BLD QL SMEAR: NORMAL
MAGNESIUM SERPL-MCNC: 1.6 MG/DL (ref 1.6–2.4)
MAGNESIUM SERPL-MCNC: 1.7 MG/DL (ref 1.6–2.4)
MAGNESIUM SERPL-MCNC: 1.8 MG/DL (ref 1.6–2.4)
MAGNESIUM SERPL-MCNC: 2 MG/DL (ref 1.6–2.4)
MAGNESIUM SERPL-MCNC: 2.5 MG/DL (ref 1.6–2.4)
MAGNESIUM SERPL-MCNC: 3 MG/DL (ref 1.6–2.4)
MCH RBC QN AUTO: 30.9 PG (ref 26.6–33)
MCH RBC QN AUTO: 31.4 PG (ref 26.6–33)
MCH RBC QN AUTO: 31.7 PG (ref 26.6–33)
MCH RBC QN AUTO: 31.9 PG (ref 26.6–33)
MCH RBC QN AUTO: 32 PG (ref 26.6–33)
MCH RBC QN AUTO: 32.1 PG (ref 26.6–33)
MCH RBC QN AUTO: 32.3 PG (ref 26.6–33)
MCH RBC QN AUTO: 32.7 PG (ref 26.6–33)
MCHC RBC AUTO-ENTMCNC: 28.8 G/DL (ref 31.5–35.7)
MCHC RBC AUTO-ENTMCNC: 29.7 G/DL (ref 31.5–35.7)
MCHC RBC AUTO-ENTMCNC: 30.6 G/DL (ref 31.5–35.7)
MCHC RBC AUTO-ENTMCNC: 31.4 G/DL (ref 31.5–35.7)
MCHC RBC AUTO-ENTMCNC: 31.5 G/DL (ref 31.5–35.7)
MCHC RBC AUTO-ENTMCNC: 31.8 G/DL (ref 31.5–35.7)
MCHC RBC AUTO-ENTMCNC: 32 G/DL (ref 31.5–35.7)
MCHC RBC AUTO-ENTMCNC: 32 G/DL (ref 31.5–35.7)
MCV RBC AUTO: 100.6 FL (ref 79–97)
MCV RBC AUTO: 100.8 FL (ref 79–97)
MCV RBC AUTO: 101 FL (ref 79–97)
MCV RBC AUTO: 103.3 FL (ref 79–97)
MCV RBC AUTO: 104.1 FL (ref 79–97)
MCV RBC AUTO: 108.2 FL (ref 79–97)
MCV RBC AUTO: 112.2 FL (ref 79–97)
MCV RBC AUTO: 96.7 FL (ref 79–97)
MCV RBC AUTO: 99.1 FL (ref 79–97)
MCV RBC AUTO: 99.7 FL (ref 79–97)
METHADONE UR QL SCN: NEGATIVE
METHGB BLD QL: 0.6 % (ref 0–3)
MODALITY: ABNORMAL
MONOCYTES # BLD AUTO: 0.27 10*3/MM3 (ref 0.1–0.9)
MONOCYTES # BLD AUTO: 0.28 10*3/MM3 (ref 0.1–0.9)
MONOCYTES # BLD AUTO: 0.37 10*3/MM3 (ref 0.1–0.9)
MONOCYTES # BLD AUTO: 0.42 10*3/MM3 (ref 0.1–0.9)
MONOCYTES # BLD AUTO: 0.43 10*3/MM3 (ref 0.1–0.9)
MONOCYTES # BLD AUTO: 0.51 10*3/MM3 (ref 0.1–0.9)
MONOCYTES # BLD AUTO: 0.51 10*3/MM3 (ref 0.1–0.9)
MONOCYTES # BLD AUTO: 0.53 10*3/MM3 (ref 0.1–0.9)
MONOCYTES NFR BLD AUTO: 10.1 % (ref 5–12)
MONOCYTES NFR BLD AUTO: 10.5 % (ref 5–12)
MONOCYTES NFR BLD AUTO: 10.6 % (ref 5–12)
MONOCYTES NFR BLD AUTO: 11.7 % (ref 5–12)
MONOCYTES NFR BLD AUTO: 5 % (ref 5–12)
MONOCYTES NFR BLD AUTO: 7.6 % (ref 5–12)
MONOCYTES NFR BLD AUTO: 8 % (ref 5–12)
MONOCYTES NFR BLD AUTO: 8.1 % (ref 5–12)
MONOCYTES NFR BLD AUTO: 8.3 % (ref 5–12)
MONOCYTES NFR BLD AUTO: 9.3 % (ref 5–12)
NEUTROPHILS # BLD AUTO: 1.55 10*3/MM3 (ref 1.7–7)
NEUTROPHILS # BLD AUTO: 1.76 10*3/MM3 (ref 1.7–7)
NEUTROPHILS # BLD AUTO: 1.85 10*3/MM3 (ref 1.7–7)
NEUTROPHILS # BLD AUTO: 2.07 10*3/MM3 (ref 1.7–7)
NEUTROPHILS # BLD AUTO: 2.28 10*3/MM3 (ref 1.7–7)
NEUTROPHILS # BLD AUTO: 2.54 10*3/MM3 (ref 1.7–7)
NEUTROPHILS # BLD AUTO: 2.57 10*3/MM3 (ref 1.7–7)
NEUTROPHILS # BLD AUTO: 3.22 10*3/MM3 (ref 1.7–7)
NEUTROPHILS # BLD AUTO: 3.6 10*3/MM3 (ref 1.7–7)
NEUTROPHILS # BLD AUTO: 3.61 10*3/MM3 (ref 1.7–7)
NEUTROPHILS NFR BLD AUTO: 39 % (ref 42.7–76)
NEUTROPHILS NFR BLD AUTO: 42.3 % (ref 42.7–76)
NEUTROPHILS NFR BLD AUTO: 43.7 % (ref 42.7–76)
NEUTROPHILS NFR BLD AUTO: 44.8 % (ref 42.7–76)
NEUTROPHILS NFR BLD AUTO: 49.3 % (ref 42.7–76)
NEUTROPHILS NFR BLD AUTO: 56.6 % (ref 42.7–76)
NEUTROPHILS NFR BLD AUTO: 56.7 % (ref 42.7–76)
NEUTROPHILS NFR BLD AUTO: 62.3 % (ref 42.7–76)
NEUTROPHILS NFR BLD AUTO: 63.3 % (ref 42.7–76)
NEUTROPHILS NFR BLD AUTO: 65.1 % (ref 42.7–76)
NITRITE UR QL STRIP: NEGATIVE
NITRITE UR QL STRIP: NEGATIVE
NOTE: ABNORMAL
NRBC BLD AUTO-RTO: 0 /100 WBC (ref 0–0.2)
NT-PROBNP SERPL-MCNC: 1537 PG/ML (ref 5–1800)
NT-PROBNP SERPL-MCNC: 1736 PG/ML (ref 5–1800)
NT-PROBNP SERPL-MCNC: 2533 PG/ML (ref 5–1800)
NT-PROBNP SERPL-MCNC: 886.1 PG/ML (ref 5–1800)
OPIATES UR QL: POSITIVE
OXCARBAZEPINE: 11 UG/ML (ref 10–35)
OXYCODONE UR QL SCN: NEGATIVE
OXYHGB MFR BLDV: 94.1 % (ref 94–99)
PCO2 BLDA: 36.8 MM HG (ref 35–45)
PCO2 TEMP ADJ BLD: ABNORMAL MM[HG]
PCP UR QL SCN: NEGATIVE
PH BLDA: 7.49 PH UNITS (ref 7.35–7.45)
PH UR STRIP.AUTO: 7.5 [PH] (ref 5–8)
PH UR STRIP.AUTO: 8.5 [PH] (ref 5–8)
PH, TEMP CORRECTED: ABNORMAL
PHOSPHATE SERPL-MCNC: 2.1 MG/DL (ref 2.5–4.5)
PHOSPHATE SERPL-MCNC: 2.2 MG/DL (ref 2.5–4.5)
PHOSPHATE SERPL-MCNC: 2.4 MG/DL (ref 2.5–4.5)
PHOSPHATE SERPL-MCNC: 2.7 MG/DL (ref 2.5–4.5)
PHOSPHATE SERPL-MCNC: 2.9 MG/DL (ref 2.5–4.5)
PLAT MORPH BLD: NORMAL
PLATELET # BLD AUTO: 100 10*3/MM3 (ref 140–450)
PLATELET # BLD AUTO: 106 10*3/MM3 (ref 140–450)
PLATELET # BLD AUTO: 106 10*3/MM3 (ref 140–450)
PLATELET # BLD AUTO: 108 10*3/MM3 (ref 140–450)
PLATELET # BLD AUTO: 109 10*3/MM3 (ref 140–450)
PLATELET # BLD AUTO: 116 10*3/MM3 (ref 140–450)
PLATELET # BLD AUTO: 119 10*3/MM3 (ref 140–450)
PLATELET # BLD AUTO: 147 10*3/MM3 (ref 140–450)
PLATELET # BLD AUTO: 83 10*3/MM3 (ref 140–450)
PLATELET # BLD AUTO: 96 10*3/MM3 (ref 140–450)
PMV BLD AUTO: 10.7 FL (ref 6–12)
PMV BLD AUTO: 10.8 FL (ref 6–12)
PMV BLD AUTO: 10.8 FL (ref 6–12)
PMV BLD AUTO: 10.9 FL (ref 6–12)
PMV BLD AUTO: 11 FL (ref 6–12)
PMV BLD AUTO: 11.2 FL (ref 6–12)
PMV BLD AUTO: 12 FL (ref 6–12)
PO2 BLDA: 73.2 MM HG (ref 83–108)
PO2 TEMP ADJ BLD: ABNORMAL MM[HG]
POTASSIUM BLD-SCNC: 3.2 MMOL/L (ref 3.5–5.2)
POTASSIUM BLD-SCNC: 3.3 MMOL/L (ref 3.5–5.2)
POTASSIUM BLD-SCNC: 3.4 MMOL/L (ref 3.5–5.2)
POTASSIUM BLD-SCNC: 3.5 MMOL/L (ref 3.5–5.2)
POTASSIUM BLD-SCNC: 3.5 MMOL/L (ref 3.5–5.2)
POTASSIUM BLD-SCNC: 3.7 MMOL/L (ref 3.5–5.2)
POTASSIUM BLD-SCNC: 3.8 MMOL/L (ref 3.5–5.2)
POTASSIUM BLD-SCNC: 3.9 MMOL/L (ref 3.5–5.2)
POTASSIUM BLD-SCNC: 4 MMOL/L (ref 3.5–5.2)
POTASSIUM BLD-SCNC: 4.1 MMOL/L (ref 3.5–5.2)
POTASSIUM BLD-SCNC: 4.1 MMOL/L (ref 3.5–5.2)
POTASSIUM BLD-SCNC: 4.2 MMOL/L (ref 3.5–5.2)
PROCALCITONIN SERPL-MCNC: 0.44 NG/ML (ref 0.1–0.25)
PROT SERPL-MCNC: 5.2 G/DL (ref 6–8.5)
PROT SERPL-MCNC: 5.4 G/DL (ref 6–8.5)
PROT SERPL-MCNC: 5.5 G/DL (ref 6–8.5)
PROT SERPL-MCNC: 6.5 G/DL (ref 6–8.5)
PROT SERPL-MCNC: 6.8 G/DL (ref 6–8.5)
PROT SERPL-MCNC: 6.9 G/DL (ref 6–8.5)
PROT UR QL STRIP: ABNORMAL
PROT UR QL STRIP: NEGATIVE
PROTHROMBIN TIME: 15.9 SECONDS (ref 11–15.4)
PROTHROMBIN TIME: 16.1 SECONDS (ref 11–15.4)
RBC # BLD AUTO: 2.88 10*6/MM3 (ref 3.77–5.28)
RBC # BLD AUTO: 2.94 10*6/MM3 (ref 3.77–5.28)
RBC # BLD AUTO: 2.95 10*6/MM3 (ref 3.77–5.28)
RBC # BLD AUTO: 3 10*6/MM3 (ref 3.77–5.28)
RBC # BLD AUTO: 3.04 10*6/MM3 (ref 3.77–5.28)
RBC # BLD AUTO: 3.18 10*6/MM3 (ref 3.77–5.28)
RBC # BLD AUTO: 3.25 10*6/MM3 (ref 3.77–5.28)
RBC # BLD AUTO: 3.28 10*6/MM3 (ref 3.77–5.28)
RBC # BLD AUTO: 3.34 10*6/MM3 (ref 3.77–5.28)
RBC # BLD AUTO: 3.69 10*6/MM3 (ref 3.77–5.28)
RBC # UR: NORMAL /HPF
REF LAB TEST METHOD: NORMAL
SAO2 % BLDCOA: 95.7 % (ref 94–99)
SODIUM BLD-SCNC: 137 MMOL/L (ref 136–145)
SODIUM BLD-SCNC: 138 MMOL/L (ref 136–145)
SODIUM BLD-SCNC: 141 MMOL/L (ref 136–145)
SODIUM BLD-SCNC: 141 MMOL/L (ref 136–145)
SODIUM BLD-SCNC: 142 MMOL/L (ref 136–145)
SODIUM BLD-SCNC: 143 MMOL/L (ref 136–145)
SODIUM BLD-SCNC: 144 MMOL/L (ref 136–145)
SODIUM BLD-SCNC: 145 MMOL/L (ref 136–145)
SODIUM BLD-SCNC: 146 MMOL/L (ref 136–145)
SODIUM BLD-SCNC: 146 MMOL/L (ref 136–145)
SP GR UR STRIP: 1.01 (ref 1–1.03)
SP GR UR STRIP: 1.02 (ref 1–1.03)
SQUAMOUS #/AREA URNS HPF: NORMAL /HPF
T4 FREE SERPL-MCNC: 0.67 NG/DL (ref 0.93–1.7)
T4 FREE SERPL-MCNC: 0.8 NG/DL (ref 0.93–1.7)
TIBC SERPL-MCNC: 156 MCG/DL (ref 298–536)
TRANS CELLS #/AREA URNS HPF: NORMAL /HPF
TRANSFERRIN SERPL-MCNC: 105 MG/DL (ref 200–360)
TRIGL SERPL-MCNC: 84 MG/DL (ref 0–150)
TROPONIN T SERPL-MCNC: 0.02 NG/ML (ref 0–0.03)
TROPONIN T SERPL-MCNC: 0.02 NG/ML (ref 0–0.03)
TSH SERPL DL<=0.05 MIU/L-ACNC: 15.65 UIU/ML (ref 0.27–4.2)
TSH SERPL DL<=0.05 MIU/L-ACNC: 7.55 UIU/ML (ref 0.27–4.2)
UROBILINOGEN UR QL STRIP: ABNORMAL
UROBILINOGEN UR QL STRIP: ABNORMAL
VENTILATOR MODE: ABNORMAL
VIT B12 BLD-MCNC: 796 PG/ML (ref 211–946)
VLDLC SERPL-MCNC: 16.8 MG/DL
WBC NRBC COR # BLD: 3.56 10*3/MM3 (ref 3.4–10.8)
WBC NRBC COR # BLD: 3.96 10*3/MM3 (ref 3.4–10.8)
WBC NRBC COR # BLD: 4 10*3/MM3 (ref 3.4–10.8)
WBC NRBC COR # BLD: 4.24 10*3/MM3 (ref 3.4–10.8)
WBC NRBC COR # BLD: 4.54 10*3/MM3 (ref 3.4–10.8)
WBC NRBC COR # BLD: 4.88 10*3/MM3 (ref 3.4–10.8)
WBC NRBC COR # BLD: 5.08 10*3/MM3 (ref 3.4–10.8)
WBC NRBC COR # BLD: 5.17 10*3/MM3 (ref 3.4–10.8)
WBC NRBC COR # BLD: 5.55 10*3/MM3 (ref 3.4–10.8)
WBC NRBC COR # BLD: 6.36 10*3/MM3 (ref 3.4–10.8)
WBC UR QL AUTO: NORMAL /HPF
WHOLE BLOOD HOLD SPECIMEN: NORMAL
WHOLE BLOOD HOLD SPECIMEN: NORMAL

## 2020-01-01 PROCEDURE — 97116 GAIT TRAINING THERAPY: CPT

## 2020-01-01 PROCEDURE — 36415 COLL VENOUS BLD VENIPUNCTURE: CPT

## 2020-01-01 PROCEDURE — 94799 UNLISTED PULMONARY SVC/PX: CPT

## 2020-01-01 PROCEDURE — 74230 X-RAY XM SWLNG FUNCJ C+: CPT

## 2020-01-01 PROCEDURE — 99233 SBSQ HOSP IP/OBS HIGH 50: CPT | Performed by: INTERNAL MEDICINE

## 2020-01-01 PROCEDURE — 85025 COMPLETE CBC W/AUTO DIFF WBC: CPT | Performed by: PHYSICIAN ASSISTANT

## 2020-01-01 PROCEDURE — 94640 AIRWAY INHALATION TREATMENT: CPT

## 2020-01-01 PROCEDURE — 83050 HGB METHEMOGLOBIN QUAN: CPT

## 2020-01-01 PROCEDURE — 93005 ELECTROCARDIOGRAM TRACING: CPT | Performed by: HOSPITALIST

## 2020-01-01 PROCEDURE — 83690 ASSAY OF LIPASE: CPT | Performed by: PHYSICIAN ASSISTANT

## 2020-01-01 PROCEDURE — 82962 GLUCOSE BLOOD TEST: CPT

## 2020-01-01 PROCEDURE — 82140 ASSAY OF AMMONIA: CPT | Performed by: FAMILY MEDICINE

## 2020-01-01 PROCEDURE — 80307 DRUG TEST PRSMV CHEM ANLYZR: CPT | Performed by: PHYSICIAN ASSISTANT

## 2020-01-01 PROCEDURE — 99232 SBSQ HOSP IP/OBS MODERATE 35: CPT | Performed by: HOSPITALIST

## 2020-01-01 PROCEDURE — 84100 ASSAY OF PHOSPHORUS: CPT | Performed by: INTERNAL MEDICINE

## 2020-01-01 PROCEDURE — G0378 HOSPITAL OBSERVATION PER HR: HCPCS

## 2020-01-01 PROCEDURE — 85025 COMPLETE CBC W/AUTO DIFF WBC: CPT | Performed by: INTERNAL MEDICINE

## 2020-01-01 PROCEDURE — 86140 C-REACTIVE PROTEIN: CPT | Performed by: PHYSICIAN ASSISTANT

## 2020-01-01 PROCEDURE — 83605 ASSAY OF LACTIC ACID: CPT | Performed by: PHYSICIAN ASSISTANT

## 2020-01-01 PROCEDURE — 25010000002 ENOXAPARIN PER 10 MG: Performed by: INTERNAL MEDICINE

## 2020-01-01 PROCEDURE — 82150 ASSAY OF AMYLASE: CPT | Performed by: PHYSICIAN ASSISTANT

## 2020-01-01 PROCEDURE — 93005 ELECTROCARDIOGRAM TRACING: CPT | Performed by: PHYSICIAN ASSISTANT

## 2020-01-01 PROCEDURE — 82140 ASSAY OF AMMONIA: CPT | Performed by: INTERNAL MEDICINE

## 2020-01-01 PROCEDURE — 84443 ASSAY THYROID STIM HORMONE: CPT | Performed by: FAMILY MEDICINE

## 2020-01-01 PROCEDURE — 80053 COMPREHEN METABOLIC PANEL: CPT | Performed by: PHYSICIAN ASSISTANT

## 2020-01-01 PROCEDURE — 82140 ASSAY OF AMMONIA: CPT | Performed by: PHYSICIAN ASSISTANT

## 2020-01-01 PROCEDURE — 80053 COMPREHEN METABOLIC PANEL: CPT | Performed by: HOSPITALIST

## 2020-01-01 PROCEDURE — 71045 X-RAY EXAM CHEST 1 VIEW: CPT

## 2020-01-01 PROCEDURE — 85007 BL SMEAR W/DIFF WBC COUNT: CPT | Performed by: INTERNAL MEDICINE

## 2020-01-01 PROCEDURE — 84100 ASSAY OF PHOSPHORUS: CPT | Performed by: HOSPITALIST

## 2020-01-01 PROCEDURE — 83540 ASSAY OF IRON: CPT | Performed by: HOSPITALIST

## 2020-01-01 PROCEDURE — 80061 LIPID PANEL: CPT | Performed by: PHYSICIAN ASSISTANT

## 2020-01-01 PROCEDURE — 71275 CT ANGIOGRAPHY CHEST: CPT | Performed by: RADIOLOGY

## 2020-01-01 PROCEDURE — 99239 HOSP IP/OBS DSCHRG MGMT >30: CPT | Performed by: HOSPITALIST

## 2020-01-01 PROCEDURE — 82746 ASSAY OF FOLIC ACID SERUM: CPT | Performed by: HOSPITALIST

## 2020-01-01 PROCEDURE — 84443 ASSAY THYROID STIM HORMONE: CPT | Performed by: PHYSICIAN ASSISTANT

## 2020-01-01 PROCEDURE — 85610 PROTHROMBIN TIME: CPT | Performed by: PHYSICIAN ASSISTANT

## 2020-01-01 PROCEDURE — 0 IOPAMIDOL PER 1 ML: Performed by: HOSPITALIST

## 2020-01-01 PROCEDURE — 84100 ASSAY OF PHOSPHORUS: CPT | Performed by: PHYSICIAN ASSISTANT

## 2020-01-01 PROCEDURE — 80053 COMPREHEN METABOLIC PANEL: CPT | Performed by: FAMILY MEDICINE

## 2020-01-01 PROCEDURE — 82728 ASSAY OF FERRITIN: CPT | Performed by: HOSPITALIST

## 2020-01-01 PROCEDURE — 25010000002 ONDANSETRON PER 1 MG: Performed by: NURSE PRACTITIONER

## 2020-01-01 PROCEDURE — 85730 THROMBOPLASTIN TIME PARTIAL: CPT | Performed by: PHYSICIAN ASSISTANT

## 2020-01-01 PROCEDURE — 25010000002 MAGNESIUM SULFATE 2 GM/50ML SOLUTION: Performed by: HOSPITALIST

## 2020-01-01 PROCEDURE — 80048 BASIC METABOLIC PNL TOTAL CA: CPT | Performed by: INTERNAL MEDICINE

## 2020-01-01 PROCEDURE — 80048 BASIC METABOLIC PNL TOTAL CA: CPT | Performed by: PHYSICIAN ASSISTANT

## 2020-01-01 PROCEDURE — 82140 ASSAY OF AMMONIA: CPT | Performed by: HOSPITALIST

## 2020-01-01 PROCEDURE — 83735 ASSAY OF MAGNESIUM: CPT | Performed by: HOSPITALIST

## 2020-01-01 PROCEDURE — 99232 SBSQ HOSP IP/OBS MODERATE 35: CPT | Performed by: INTERNAL MEDICINE

## 2020-01-01 PROCEDURE — 84132 ASSAY OF SERUM POTASSIUM: CPT | Performed by: HOSPITALIST

## 2020-01-01 PROCEDURE — 97166 OT EVAL MOD COMPLEX 45 MIN: CPT

## 2020-01-01 PROCEDURE — 25010000003 HEPARIN LOCK FLUCH PER 10 UNITS

## 2020-01-01 PROCEDURE — 97530 THERAPEUTIC ACTIVITIES: CPT

## 2020-01-01 PROCEDURE — 74230 X-RAY XM SWLNG FUNCJ C+: CPT | Performed by: RADIOLOGY

## 2020-01-01 PROCEDURE — 74018 RADEX ABDOMEN 1 VIEW: CPT | Performed by: RADIOLOGY

## 2020-01-01 PROCEDURE — 93010 ELECTROCARDIOGRAM REPORT: CPT | Performed by: INTERNAL MEDICINE

## 2020-01-01 PROCEDURE — 83735 ASSAY OF MAGNESIUM: CPT | Performed by: INTERNAL MEDICINE

## 2020-01-01 PROCEDURE — 36600 WITHDRAWAL OF ARTERIAL BLOOD: CPT

## 2020-01-01 PROCEDURE — 99213 OFFICE O/P EST LOW 20 MIN: CPT | Performed by: INTERNAL MEDICINE

## 2020-01-01 PROCEDURE — 83880 ASSAY OF NATRIURETIC PEPTIDE: CPT | Performed by: HOSPITALIST

## 2020-01-01 PROCEDURE — 80183 DRUG SCRN QUANT OXCARBAZEPIN: CPT | Performed by: HOSPITALIST

## 2020-01-01 PROCEDURE — 70450 CT HEAD/BRAIN W/O DYE: CPT | Performed by: RADIOLOGY

## 2020-01-01 PROCEDURE — 81003 URINALYSIS AUTO W/O SCOPE: CPT | Performed by: PHYSICIAN ASSISTANT

## 2020-01-01 PROCEDURE — 82805 BLOOD GASES W/O2 SATURATION: CPT

## 2020-01-01 PROCEDURE — 74018 RADEX ABDOMEN 1 VIEW: CPT

## 2020-01-01 PROCEDURE — 25010000002 ONDANSETRON PER 1 MG: Performed by: INTERNAL MEDICINE

## 2020-01-01 PROCEDURE — 83036 HEMOGLOBIN GLYCOSYLATED A1C: CPT | Performed by: PHYSICIAN ASSISTANT

## 2020-01-01 PROCEDURE — 85025 COMPLETE CBC W/AUTO DIFF WBC: CPT | Performed by: HOSPITALIST

## 2020-01-01 PROCEDURE — 72125 CT NECK SPINE W/O DYE: CPT | Performed by: RADIOLOGY

## 2020-01-01 PROCEDURE — 92526 ORAL FUNCTION THERAPY: CPT | Performed by: SPEECH-LANGUAGE PATHOLOGIST

## 2020-01-01 PROCEDURE — 99442 PR PHYS/QHP TELEPHONE EVALUATION 11-20 MIN: CPT | Performed by: INTERNAL MEDICINE

## 2020-01-01 PROCEDURE — 99223 1ST HOSP IP/OBS HIGH 75: CPT | Performed by: INTERNAL MEDICINE

## 2020-01-01 PROCEDURE — 84439 ASSAY OF FREE THYROXINE: CPT | Performed by: FAMILY MEDICINE

## 2020-01-01 PROCEDURE — 84439 ASSAY OF FREE THYROXINE: CPT | Performed by: PHYSICIAN ASSISTANT

## 2020-01-01 PROCEDURE — 84132 ASSAY OF SERUM POTASSIUM: CPT | Performed by: INTERNAL MEDICINE

## 2020-01-01 PROCEDURE — 25010000003 MAGNESIUM SULFATE 4 GM/100ML SOLUTION: Performed by: HOSPITALIST

## 2020-01-01 PROCEDURE — P9612 CATHETERIZE FOR URINE SPEC: HCPCS

## 2020-01-01 PROCEDURE — 74019 RADEX ABDOMEN 2 VIEWS: CPT

## 2020-01-01 PROCEDURE — 83735 ASSAY OF MAGNESIUM: CPT | Performed by: PHYSICIAN ASSISTANT

## 2020-01-01 PROCEDURE — 74176 CT ABD & PELVIS W/O CONTRAST: CPT

## 2020-01-01 PROCEDURE — 71045 X-RAY EXAM CHEST 1 VIEW: CPT | Performed by: RADIOLOGY

## 2020-01-01 PROCEDURE — 99285 EMERGENCY DEPT VISIT HI MDM: CPT

## 2020-01-01 PROCEDURE — 70450 CT HEAD/BRAIN W/O DYE: CPT

## 2020-01-01 PROCEDURE — 87804 INFLUENZA ASSAY W/OPTIC: CPT | Performed by: PHYSICIAN ASSISTANT

## 2020-01-01 PROCEDURE — 99284 EMERGENCY DEPT VISIT MOD MDM: CPT

## 2020-01-01 PROCEDURE — 85025 COMPLETE CBC W/AUTO DIFF WBC: CPT | Performed by: FAMILY MEDICINE

## 2020-01-01 PROCEDURE — 72131 CT LUMBAR SPINE W/O DYE: CPT | Performed by: RADIOLOGY

## 2020-01-01 PROCEDURE — 80074 ACUTE HEPATITIS PANEL: CPT | Performed by: HOSPITALIST

## 2020-01-01 PROCEDURE — 72128 CT CHEST SPINE W/O DYE: CPT | Performed by: RADIOLOGY

## 2020-01-01 PROCEDURE — 80076 HEPATIC FUNCTION PANEL: CPT | Performed by: HOSPITALIST

## 2020-01-01 PROCEDURE — 83880 ASSAY OF NATRIURETIC PEPTIDE: CPT | Performed by: PHYSICIAN ASSISTANT

## 2020-01-01 PROCEDURE — 87040 BLOOD CULTURE FOR BACTERIA: CPT | Performed by: PHYSICIAN ASSISTANT

## 2020-01-01 PROCEDURE — 99223 1ST HOSP IP/OBS HIGH 75: CPT | Performed by: HOSPITALIST

## 2020-01-01 PROCEDURE — 81001 URINALYSIS AUTO W/SCOPE: CPT | Performed by: EMERGENCY MEDICINE

## 2020-01-01 PROCEDURE — 82607 VITAMIN B-12: CPT | Performed by: HOSPITALIST

## 2020-01-01 PROCEDURE — 93970 EXTREMITY STUDY: CPT | Performed by: RADIOLOGY

## 2020-01-01 PROCEDURE — 93970 EXTREMITY STUDY: CPT

## 2020-01-01 PROCEDURE — 84145 PROCALCITONIN (PCT): CPT | Performed by: PHYSICIAN ASSISTANT

## 2020-01-01 PROCEDURE — 99239 HOSP IP/OBS DSCHRG MGMT >30: CPT | Performed by: INTERNAL MEDICINE

## 2020-01-01 PROCEDURE — 84484 ASSAY OF TROPONIN QUANT: CPT | Performed by: PHYSICIAN ASSISTANT

## 2020-01-01 PROCEDURE — 74176 CT ABD & PELVIS W/O CONTRAST: CPT | Performed by: RADIOLOGY

## 2020-01-01 PROCEDURE — 80048 BASIC METABOLIC PNL TOTAL CA: CPT | Performed by: HOSPITALIST

## 2020-01-01 PROCEDURE — 25010000002 ONDANSETRON PER 1 MG: Performed by: HOSPITALIST

## 2020-01-01 PROCEDURE — 82375 ASSAY CARBOXYHB QUANT: CPT

## 2020-01-01 PROCEDURE — 84484 ASSAY OF TROPONIN QUANT: CPT | Performed by: HOSPITALIST

## 2020-01-01 PROCEDURE — 72125 CT NECK SPINE W/O DYE: CPT

## 2020-01-01 PROCEDURE — 74019 RADEX ABDOMEN 2 VIEWS: CPT | Performed by: RADIOLOGY

## 2020-01-01 PROCEDURE — 25010000003 MAGNESIUM SULFATE 4 GM/100ML SOLUTION: Performed by: INTERNAL MEDICINE

## 2020-01-01 PROCEDURE — 72128 CT CHEST SPINE W/O DYE: CPT

## 2020-01-01 PROCEDURE — 25010000002 HYDRALAZINE PER 20 MG: Performed by: HOSPITALIST

## 2020-01-01 PROCEDURE — 25010000003 POTASSIUM CHLORIDE 10 MEQ/100ML SOLUTION: Performed by: HOSPITALIST

## 2020-01-01 PROCEDURE — 84466 ASSAY OF TRANSFERRIN: CPT | Performed by: HOSPITALIST

## 2020-01-01 PROCEDURE — 92611 MOTION FLUOROSCOPY/SWALLOW: CPT | Performed by: SPEECH-LANGUAGE PATHOLOGIST

## 2020-01-01 PROCEDURE — 97162 PT EVAL MOD COMPLEX 30 MIN: CPT

## 2020-01-01 PROCEDURE — 72131 CT LUMBAR SPINE W/O DYE: CPT

## 2020-01-01 PROCEDURE — 71275 CT ANGIOGRAPHY CHEST: CPT

## 2020-01-01 RX ORDER — SODIUM CHLORIDE 0.9 % (FLUSH) 0.9 %
10 SYRINGE (ML) INJECTION AS NEEDED
Status: DISCONTINUED | OUTPATIENT
Start: 2020-01-01 | End: 2020-01-01 | Stop reason: HOSPADM

## 2020-01-01 RX ORDER — LACTULOSE 10 G/15ML
20 SOLUTION ORAL 3 TIMES DAILY
Qty: 2700 ML | Refills: 0 | Status: SHIPPED | OUTPATIENT
Start: 2020-01-01

## 2020-01-01 RX ORDER — SODIUM CHLORIDE 0.9 % (FLUSH) 0.9 %
10 SYRINGE (ML) INJECTION AS NEEDED
Status: DISCONTINUED | OUTPATIENT
Start: 2020-01-01 | End: 2020-01-01

## 2020-01-01 RX ORDER — NITROGLYCERIN 0.4 MG/1
0.4 TABLET SUBLINGUAL
Status: CANCELLED | OUTPATIENT
Start: 2020-01-01

## 2020-01-01 RX ORDER — HEPARIN SODIUM (PORCINE) LOCK FLUSH IV SOLN 100 UNIT/ML 100 UNIT/ML
5 SOLUTION INTRAVENOUS AS NEEDED
Status: DISCONTINUED | OUTPATIENT
Start: 2020-01-01 | End: 2020-01-01

## 2020-01-01 RX ORDER — OXCARBAZEPINE 300 MG/1
300 TABLET, FILM COATED ORAL NIGHTLY
Status: DISCONTINUED | OUTPATIENT
Start: 2020-01-01 | End: 2020-01-01 | Stop reason: HOSPADM

## 2020-01-01 RX ORDER — MULTIVITAMIN
1 TABLET ORAL DAILY
Qty: 30 TABLET | Refills: 0 | Status: SHIPPED | OUTPATIENT
Start: 2020-01-01

## 2020-01-01 RX ORDER — IPRATROPIUM BROMIDE AND ALBUTEROL SULFATE 2.5; .5 MG/3ML; MG/3ML
3 SOLUTION RESPIRATORY (INHALATION)
Status: DISCONTINUED | OUTPATIENT
Start: 2020-01-01 | End: 2020-01-01 | Stop reason: HOSPADM

## 2020-01-01 RX ORDER — MAGNESIUM SULFATE HEPTAHYDRATE 40 MG/ML
4 INJECTION, SOLUTION INTRAVENOUS ONCE
Status: COMPLETED | OUTPATIENT
Start: 2020-01-01 | End: 2020-01-01

## 2020-01-01 RX ORDER — SPIRONOLACTONE 50 MG/1
100 TABLET, FILM COATED ORAL DAILY
Qty: 30 TABLET | Refills: 0 | Status: ON HOLD | OUTPATIENT
Start: 2020-01-01 | End: 2020-01-01 | Stop reason: SDUPTHER

## 2020-01-01 RX ORDER — MAGNESIUM SULFATE HEPTAHYDRATE 40 MG/ML
4 INJECTION, SOLUTION INTRAVENOUS AS NEEDED
Status: DISCONTINUED | OUTPATIENT
Start: 2020-01-01 | End: 2020-01-01

## 2020-01-01 RX ORDER — POTASSIUM CHLORIDE 20 MEQ/1
40 TABLET, EXTENDED RELEASE ORAL EVERY 4 HOURS
Status: DISCONTINUED | OUTPATIENT
Start: 2020-01-01 | End: 2020-01-01

## 2020-01-01 RX ORDER — PEN NEEDLE, DIABETIC 31 G X1/4"
NEEDLE, DISPOSABLE MISCELLANEOUS
COMMUNITY
Start: 2020-01-01

## 2020-01-01 RX ORDER — LACTULOSE 10 G/15ML
20 SOLUTION ORAL 3 TIMES DAILY
Status: DISCONTINUED | OUTPATIENT
Start: 2020-01-01 | End: 2020-01-01 | Stop reason: HOSPADM

## 2020-01-01 RX ORDER — POTASSIUM CHLORIDE 20 MEQ/1
20 TABLET, EXTENDED RELEASE ORAL ONCE
Status: COMPLETED | OUTPATIENT
Start: 2020-01-01 | End: 2020-01-01

## 2020-01-01 RX ORDER — POTASSIUM CHLORIDE 7.45 MG/ML
10 INJECTION INTRAVENOUS
Status: COMPLETED | OUTPATIENT
Start: 2020-01-01 | End: 2020-01-01

## 2020-01-01 RX ORDER — ASPIRIN 81 MG/1
81 TABLET ORAL DAILY
Status: CANCELLED | OUTPATIENT
Start: 2020-01-01

## 2020-01-01 RX ORDER — SODIUM CHLORIDE 0.9 % (FLUSH) 0.9 %
10 SYRINGE (ML) INJECTION EVERY 12 HOURS SCHEDULED
Status: DISCONTINUED | OUTPATIENT
Start: 2020-01-01 | End: 2020-01-01 | Stop reason: HOSPADM

## 2020-01-01 RX ORDER — HYDROCODONE BITARTRATE AND ACETAMINOPHEN 10; 325 MG/1; MG/1
1 TABLET ORAL EVERY 12 HOURS PRN
Status: CANCELLED | OUTPATIENT
Start: 2020-01-01

## 2020-01-01 RX ORDER — MAGNESIUM SULFATE HEPTAHYDRATE 40 MG/ML
2 INJECTION, SOLUTION INTRAVENOUS AS NEEDED
Status: DISCONTINUED | OUTPATIENT
Start: 2020-01-01 | End: 2020-01-01

## 2020-01-01 RX ORDER — ALBUTEROL SULFATE 90 UG/1
2 AEROSOL, METERED RESPIRATORY (INHALATION) EVERY 4 HOURS PRN
Qty: 2 INHALER | Refills: 0 | Status: CANCELLED | OUTPATIENT
Start: 2020-01-01

## 2020-01-01 RX ORDER — RIFAXIMIN 550 MG/1
TABLET ORAL
Qty: 180 TABLET | Refills: 0 | Status: SHIPPED | OUTPATIENT
Start: 2020-01-01

## 2020-01-01 RX ORDER — PANTOPRAZOLE SODIUM 40 MG/1
40 TABLET, DELAYED RELEASE ORAL EVERY MORNING
Status: CANCELLED | OUTPATIENT
Start: 2020-01-01

## 2020-01-01 RX ORDER — POTASSIUM CHLORIDE 20 MEQ/1
40 TABLET, EXTENDED RELEASE ORAL ONCE
Status: DISCONTINUED | OUTPATIENT
Start: 2020-01-01 | End: 2020-01-01

## 2020-01-01 RX ORDER — LEVOTHYROXINE SODIUM 0.1 MG/1
100 TABLET ORAL DAILY
Status: DISCONTINUED | OUTPATIENT
Start: 2020-01-01 | End: 2020-01-01 | Stop reason: HOSPADM

## 2020-01-01 RX ORDER — SODIUM CHLORIDE 0.9 % (FLUSH) 0.9 %
20 SYRINGE (ML) INJECTION AS NEEDED
Status: DISCONTINUED | OUTPATIENT
Start: 2020-01-01 | End: 2020-01-01

## 2020-01-01 RX ORDER — SPIRONOLACTONE 25 MG/1
50 TABLET ORAL DAILY
Status: DISCONTINUED | OUTPATIENT
Start: 2020-01-01 | End: 2020-01-01 | Stop reason: HOSPADM

## 2020-01-01 RX ORDER — ONDANSETRON 2 MG/ML
4 INJECTION INTRAMUSCULAR; INTRAVENOUS ONCE
Status: COMPLETED | OUTPATIENT
Start: 2020-01-01 | End: 2020-01-01

## 2020-01-01 RX ORDER — ACETAMINOPHEN 325 MG/1
650 TABLET ORAL EVERY 6 HOURS PRN
Status: DISCONTINUED | OUTPATIENT
Start: 2020-01-01 | End: 2020-01-01 | Stop reason: HOSPADM

## 2020-01-01 RX ORDER — MULTIVITAMIN
1 TABLET ORAL DAILY
Status: DISCONTINUED | OUTPATIENT
Start: 2020-01-01 | End: 2020-01-01 | Stop reason: HOSPADM

## 2020-01-01 RX ORDER — OXCARBAZEPINE 300 MG/1
150 TABLET, FILM COATED ORAL EVERY MORNING
Status: CANCELLED | OUTPATIENT
Start: 2020-01-01

## 2020-01-01 RX ORDER — POTASSIUM CHLORIDE 20 MEQ/1
40 TABLET, EXTENDED RELEASE ORAL ONCE
Status: COMPLETED | OUTPATIENT
Start: 2020-01-01 | End: 2020-01-01

## 2020-01-01 RX ORDER — NITROGLYCERIN 0.4 MG/1
0.4 TABLET SUBLINGUAL
Status: DISCONTINUED | OUTPATIENT
Start: 2020-01-01 | End: 2020-01-01 | Stop reason: HOSPADM

## 2020-01-01 RX ORDER — LACTULOSE 10 G/15ML
30 SOLUTION ORAL 3 TIMES DAILY
Status: DISCONTINUED | OUTPATIENT
Start: 2020-01-01 | End: 2020-01-01 | Stop reason: HOSPADM

## 2020-01-01 RX ORDER — AMLODIPINE BESYLATE 5 MG/1
5 TABLET ORAL
Status: DISCONTINUED | OUTPATIENT
Start: 2020-01-01 | End: 2020-01-01 | Stop reason: HOSPADM

## 2020-01-01 RX ORDER — HYDROCODONE BITARTRATE AND ACETAMINOPHEN 5; 325 MG/1; MG/1
1 TABLET ORAL EVERY 6 HOURS PRN
Status: DISCONTINUED | OUTPATIENT
Start: 2020-01-01 | End: 2020-01-01 | Stop reason: HOSPADM

## 2020-01-01 RX ORDER — MULTIVITAMIN
1 TABLET ORAL DAILY
Status: CANCELLED | OUTPATIENT
Start: 2020-01-01

## 2020-01-01 RX ORDER — SPIRONOLACTONE 100 MG/1
100 TABLET, FILM COATED ORAL DAILY
Status: DISCONTINUED | OUTPATIENT
Start: 2020-01-01 | End: 2020-01-01

## 2020-01-01 RX ORDER — LEVOTHYROXINE SODIUM 0.07 MG/1
75 TABLET ORAL
Status: CANCELLED | OUTPATIENT
Start: 2020-01-01

## 2020-01-01 RX ORDER — HYDROXYZINE PAMOATE 25 MG/1
25 CAPSULE ORAL 3 TIMES DAILY PRN
COMMUNITY

## 2020-01-01 RX ORDER — SODIUM CHLORIDE 9 MG/ML
75 INJECTION, SOLUTION INTRAVENOUS CONTINUOUS
Status: DISCONTINUED | OUTPATIENT
Start: 2020-01-01 | End: 2020-01-01

## 2020-01-01 RX ORDER — ONDANSETRON 4 MG/1
4 TABLET, FILM COATED ORAL EVERY 8 HOURS PRN
Qty: 20 TABLET | Refills: 0 | Status: SHIPPED | OUTPATIENT
Start: 2020-01-01

## 2020-01-01 RX ORDER — HEPARIN SODIUM (PORCINE) LOCK FLUSH IV SOLN 100 UNIT/ML 100 UNIT/ML
5 SOLUTION INTRAVENOUS AS NEEDED
Status: DISCONTINUED | OUTPATIENT
Start: 2020-01-01 | End: 2020-01-01 | Stop reason: HOSPADM

## 2020-01-01 RX ORDER — PANTOPRAZOLE SODIUM 40 MG/1
40 TABLET, DELAYED RELEASE ORAL EVERY MORNING
Status: DISCONTINUED | OUTPATIENT
Start: 2020-01-01 | End: 2020-01-01 | Stop reason: HOSPADM

## 2020-01-01 RX ORDER — FUROSEMIDE 40 MG/1
40 TABLET ORAL DAILY
Status: DISCONTINUED | OUTPATIENT
Start: 2020-01-01 | End: 2020-01-01

## 2020-01-01 RX ORDER — LACTULOSE 10 G/15ML
20 SOLUTION ORAL 2 TIMES DAILY
Qty: 1800 ML | Refills: 0 | Status: CANCELLED | OUTPATIENT
Start: 2020-01-01 | End: 2020-01-01

## 2020-01-01 RX ORDER — FUROSEMIDE 20 MG/1
20 TABLET ORAL DAILY
Status: DISCONTINUED | OUTPATIENT
Start: 2020-01-01 | End: 2020-01-01 | Stop reason: HOSPADM

## 2020-01-01 RX ORDER — ERGOCALCIFEROL 1.25 MG/1
CAPSULE ORAL
Qty: 4 CAPSULE | Refills: 0 | Status: SHIPPED | OUTPATIENT
Start: 2020-01-01

## 2020-01-01 RX ORDER — FUROSEMIDE 40 MG/1
20 TABLET ORAL DAILY
Qty: 15 TABLET | Refills: 0 | Status: CANCELLED | OUTPATIENT
Start: 2020-01-01 | End: 2020-01-01

## 2020-01-01 RX ORDER — HYDROCODONE BITARTRATE AND ACETAMINOPHEN 10; 325 MG/1; MG/1
0.5 TABLET ORAL EVERY 12 HOURS PRN
Status: CANCELLED
Start: 2020-01-01

## 2020-01-01 RX ORDER — OXCARBAZEPINE 300 MG/1
150 TABLET, FILM COATED ORAL EVERY MORNING
Status: DISCONTINUED | OUTPATIENT
Start: 2020-01-01 | End: 2020-01-01 | Stop reason: HOSPADM

## 2020-01-01 RX ORDER — OXYCODONE AND ACETAMINOPHEN 10; 325 MG/1; MG/1
1 TABLET ORAL EVERY 6 HOURS PRN
COMMUNITY

## 2020-01-01 RX ORDER — LACTULOSE 10 G/15ML
30 SOLUTION ORAL 3 TIMES DAILY
Status: DISCONTINUED | OUTPATIENT
Start: 2020-01-01 | End: 2020-01-01

## 2020-01-01 RX ORDER — PROMETHAZINE HYDROCHLORIDE 12.5 MG/1
12.5 TABLET ORAL EVERY 6 HOURS PRN
Status: DISCONTINUED | OUTPATIENT
Start: 2020-01-01 | End: 2020-01-01 | Stop reason: HOSPADM

## 2020-01-01 RX ORDER — BUMETANIDE 1 MG/1
1.5 TABLET ORAL DAILY PRN
Status: CANCELLED | OUTPATIENT
Start: 2020-01-01

## 2020-01-01 RX ORDER — LEVOTHYROXINE SODIUM 0.1 MG/1
100 TABLET ORAL DAILY
Qty: 30 TABLET | Refills: 0 | Status: CANCELLED | OUTPATIENT
Start: 2020-01-01 | End: 2020-01-01

## 2020-01-01 RX ORDER — LACTULOSE 10 G/15ML
30 SOLUTION ORAL
Status: DISCONTINUED | OUTPATIENT
Start: 2020-01-01 | End: 2020-01-01

## 2020-01-01 RX ORDER — FENTANYL 25 UG/H
1 PATCH TRANSDERMAL
COMMUNITY

## 2020-01-01 RX ORDER — HYDROXYZINE HYDROCHLORIDE 25 MG/1
25 TABLET, FILM COATED ORAL 3 TIMES DAILY PRN
COMMUNITY

## 2020-01-01 RX ORDER — LACTULOSE 10 G/15ML
30 SOLUTION ORAL EVERY 6 HOURS SCHEDULED
Status: DISCONTINUED | OUTPATIENT
Start: 2020-01-01 | End: 2020-01-01

## 2020-01-01 RX ORDER — SODIUM CHLORIDE 0.9 % (FLUSH) 0.9 %
10 SYRINGE (ML) INJECTION EVERY 12 HOURS SCHEDULED
Status: DISCONTINUED | OUTPATIENT
Start: 2020-01-01 | End: 2020-01-01

## 2020-01-01 RX ORDER — OXCARBAZEPINE 150 MG/1
150 TABLET, FILM COATED ORAL EVERY MORNING
COMMUNITY

## 2020-01-01 RX ORDER — FUROSEMIDE 40 MG/1
40 TABLET ORAL DAILY
Qty: 30 TABLET | Refills: 0 | Status: ON HOLD | OUTPATIENT
Start: 2020-01-01 | End: 2020-01-01 | Stop reason: SDUPTHER

## 2020-01-01 RX ORDER — MAGNESIUM OXIDE 400 MG/1
400 TABLET ORAL 2 TIMES DAILY
COMMUNITY

## 2020-01-01 RX ORDER — ROPINIROLE 1 MG/1
1 TABLET, FILM COATED ORAL NIGHTLY
Status: DISCONTINUED | OUTPATIENT
Start: 2020-01-01 | End: 2020-01-01 | Stop reason: HOSPADM

## 2020-01-01 RX ORDER — ASPIRIN 81 MG/1
81 TABLET ORAL DAILY
Status: DISCONTINUED | OUTPATIENT
Start: 2020-01-01 | End: 2020-01-01 | Stop reason: HOSPADM

## 2020-01-01 RX ORDER — HYDRALAZINE HYDROCHLORIDE 20 MG/ML
5 INJECTION INTRAMUSCULAR; INTRAVENOUS ONCE
Status: COMPLETED | OUTPATIENT
Start: 2020-01-01 | End: 2020-01-01

## 2020-01-01 RX ORDER — BUMETANIDE 1 MG/1
1.5 TABLET ORAL DAILY PRN
Qty: 45 TABLET | Refills: 2 | Status: SHIPPED | OUTPATIENT
Start: 2020-01-01 | End: 2020-01-01 | Stop reason: HOSPADM

## 2020-01-01 RX ORDER — MAGNESIUM SULFATE HEPTAHYDRATE 40 MG/ML
2 INJECTION, SOLUTION INTRAVENOUS ONCE
Status: COMPLETED | OUTPATIENT
Start: 2020-01-01 | End: 2020-01-01

## 2020-01-01 RX ORDER — HYDROCODONE BITARTRATE AND ACETAMINOPHEN 10; 325 MG/1; MG/1
0.5 TABLET ORAL EVERY 12 HOURS PRN
Start: 2020-01-01 | End: 2020-01-01 | Stop reason: ALTCHOICE

## 2020-01-01 RX ORDER — POTASSIUM CHLORIDE 1.5 G/1.77G
40 POWDER, FOR SOLUTION ORAL AS NEEDED
Status: DISCONTINUED | OUTPATIENT
Start: 2020-01-01 | End: 2020-01-01

## 2020-01-01 RX ORDER — AMLODIPINE BESYLATE 5 MG/1
5 TABLET ORAL
Qty: 30 TABLET | Refills: 0 | Status: SHIPPED | OUTPATIENT
Start: 2020-01-01 | End: 2020-01-01

## 2020-01-01 RX ORDER — ROPINIROLE 1 MG/1
1 TABLET, FILM COATED ORAL NIGHTLY
Status: CANCELLED | OUTPATIENT
Start: 2020-01-01

## 2020-01-01 RX ORDER — TRIAMCINOLONE ACETONIDE 1 MG/G
CREAM TOPICAL 2 TIMES DAILY
Qty: 45 G | Refills: 1 | Status: SHIPPED | OUTPATIENT
Start: 2020-01-01 | End: 2020-01-01

## 2020-01-01 RX ORDER — OMEPRAZOLE 40 MG/1
40 CAPSULE, DELAYED RELEASE ORAL DAILY
COMMUNITY

## 2020-01-01 RX ORDER — OXCARBAZEPINE 300 MG/1
300 TABLET, FILM COATED ORAL NIGHTLY
Status: CANCELLED | OUTPATIENT
Start: 2020-01-01

## 2020-01-01 RX ORDER — SPIRONOLACTONE 50 MG/1
50 TABLET, FILM COATED ORAL DAILY
Qty: 30 TABLET | Refills: 0
Start: 2020-01-01

## 2020-01-01 RX ORDER — LEVOTHYROXINE SODIUM 0.1 MG/1
100 TABLET ORAL DAILY
Qty: 30 TABLET | Refills: 0 | Status: SHIPPED | OUTPATIENT
Start: 2020-01-01

## 2020-01-01 RX ORDER — POTASSIUM CHLORIDE 750 MG/1
40 CAPSULE, EXTENDED RELEASE ORAL AS NEEDED
Status: DISCONTINUED | OUTPATIENT
Start: 2020-01-01 | End: 2020-01-01

## 2020-01-01 RX ORDER — LACTULOSE 10 G/15ML
20 SOLUTION ORAL 2 TIMES DAILY
Status: DISCONTINUED | OUTPATIENT
Start: 2020-01-01 | End: 2020-01-01

## 2020-01-01 RX ORDER — FUROSEMIDE 40 MG/1
20 TABLET ORAL DAILY
Qty: 30 TABLET | Refills: 0
Start: 2020-01-01

## 2020-01-01 RX ORDER — LACTULOSE 10 G/15ML
10 SOLUTION ORAL 3 TIMES DAILY
Status: ON HOLD | COMMUNITY
End: 2020-01-01 | Stop reason: SDUPTHER

## 2020-01-01 RX ORDER — FUROSEMIDE 20 MG/1
20 TABLET ORAL DAILY
Status: DISCONTINUED | OUTPATIENT
Start: 2020-01-01 | End: 2020-01-01

## 2020-01-01 RX ORDER — DEXTROSE AND SODIUM CHLORIDE 5; .45 G/100ML; G/100ML
75 INJECTION, SOLUTION INTRAVENOUS CONTINUOUS
Status: DISCONTINUED | OUTPATIENT
Start: 2020-01-01 | End: 2020-01-01

## 2020-01-01 RX ORDER — LACTULOSE 10 G/15ML
20 SOLUTION ORAL ONCE
Status: COMPLETED | OUTPATIENT
Start: 2020-01-01 | End: 2020-01-01

## 2020-01-01 RX ORDER — LACTULOSE 10 G/15ML
20 SOLUTION ORAL 3 TIMES DAILY
Status: CANCELLED | OUTPATIENT
Start: 2020-01-01

## 2020-01-01 RX ORDER — PEN NEEDLE, DIABETIC 31 GX5/16"
NEEDLE, DISPOSABLE MISCELLANEOUS
COMMUNITY
Start: 2019-01-01 | End: 2020-01-01

## 2020-01-01 RX ORDER — ONDANSETRON 2 MG/ML
4 INJECTION INTRAMUSCULAR; INTRAVENOUS EVERY 6 HOURS PRN
Status: DISCONTINUED | OUTPATIENT
Start: 2020-01-01 | End: 2020-01-01 | Stop reason: HOSPADM

## 2020-01-01 RX ORDER — LEVOTHYROXINE SODIUM 0.07 MG/1
75 TABLET ORAL DAILY
Status: DISCONTINUED | OUTPATIENT
Start: 2020-01-01 | End: 2020-01-01

## 2020-01-01 RX ORDER — SODIUM CHLORIDE 0.9 % (FLUSH) 0.9 %
20 SYRINGE (ML) INJECTION AS NEEDED
Status: DISCONTINUED | OUTPATIENT
Start: 2020-01-01 | End: 2020-01-01 | Stop reason: HOSPADM

## 2020-01-01 RX ORDER — PEN NEEDLE, DIABETIC 31 GX5/16"
NEEDLE, DISPOSABLE MISCELLANEOUS
Qty: 30 EACH | Refills: 2 | Status: SHIPPED | OUTPATIENT
Start: 2020-01-01 | End: 2020-01-01

## 2020-01-01 RX ORDER — GUAIFENESIN/DEXTROMETHORPHAN 100-10MG/5
5 SYRUP ORAL EVERY 4 HOURS PRN
Status: DISCONTINUED | OUTPATIENT
Start: 2020-01-01 | End: 2020-01-01 | Stop reason: HOSPADM

## 2020-01-01 RX ORDER — HEPARIN SODIUM (PORCINE) LOCK FLUSH IV SOLN 100 UNIT/ML 100 UNIT/ML
SOLUTION INTRAVENOUS
Status: COMPLETED
Start: 2020-01-01 | End: 2020-01-01

## 2020-01-01 RX ORDER — LACTULOSE 10 G/15ML
30 SOLUTION ORAL DAILY
Status: DISCONTINUED | OUTPATIENT
Start: 2020-01-01 | End: 2020-01-01

## 2020-01-01 RX ORDER — SPIRONOLACTONE 50 MG/1
50 TABLET, FILM COATED ORAL DAILY
Qty: 30 TABLET | Refills: 0 | Status: CANCELLED | OUTPATIENT
Start: 2020-01-01 | End: 2020-01-01

## 2020-01-01 RX ORDER — OXCARBAZEPINE 150 MG/1
300 TABLET, FILM COATED ORAL
COMMUNITY

## 2020-01-01 RX ORDER — MAGNESIUM OXIDE 400 MG/1
2 TABLET ORAL DAILY
Qty: 180 TABLET | Refills: 1 | Status: SHIPPED | OUTPATIENT
Start: 2020-01-01 | End: 2020-01-01

## 2020-01-01 RX ORDER — LACTOSE-REDUCED FOOD 0.06 G-1.5
1 LIQUID (ML) ORAL 2 TIMES DAILY
Qty: 60 CAN | Refills: 0 | Status: SHIPPED | OUTPATIENT
Start: 2020-01-01

## 2020-01-01 RX ADMIN — IPRATROPIUM BROMIDE AND ALBUTEROL SULFATE 3 ML: .5; 3 SOLUTION RESPIRATORY (INHALATION) at 14:23

## 2020-01-01 RX ADMIN — SPIRONOLACTONE 50 MG: 25 TABLET ORAL at 08:22

## 2020-01-01 RX ADMIN — ONDANSETRON 4 MG: 2 INJECTION INTRAMUSCULAR; INTRAVENOUS at 16:05

## 2020-01-01 RX ADMIN — LACTULOSE 30 G: 10 SOLUTION ORAL at 09:04

## 2020-01-01 RX ADMIN — PANTOPRAZOLE SODIUM 40 MG: 40 TABLET, DELAYED RELEASE ORAL at 06:03

## 2020-01-01 RX ADMIN — SODIUM CHLORIDE, PRESERVATIVE FREE 10 ML: 5 INJECTION INTRAVENOUS at 00:30

## 2020-01-01 RX ADMIN — ROPINIROLE HYDROCHLORIDE 1 MG: 1 TABLET, FILM COATED ORAL at 21:20

## 2020-01-01 RX ADMIN — RIFAXIMIN 550 MG: 550 TABLET ORAL at 09:04

## 2020-01-01 RX ADMIN — LACTULOSE 30 G: 10 SOLUTION ORAL at 00:29

## 2020-01-01 RX ADMIN — MAGNESIUM SULFATE 2 G: 2 INJECTION INTRAVENOUS at 12:29

## 2020-01-01 RX ADMIN — ASPIRIN 81 MG: 81 TABLET, COATED ORAL at 09:14

## 2020-01-01 RX ADMIN — SODIUM CHLORIDE, PRESERVATIVE FREE 10 ML: 5 INJECTION INTRAVENOUS at 22:00

## 2020-01-01 RX ADMIN — LACTULOSE 30 G: 10 SOLUTION ORAL at 17:04

## 2020-01-01 RX ADMIN — RIFAXIMIN 550 MG: 550 TABLET ORAL at 22:36

## 2020-01-01 RX ADMIN — IPRATROPIUM BROMIDE AND ALBUTEROL SULFATE 3 ML: .5; 3 SOLUTION RESPIRATORY (INHALATION) at 13:40

## 2020-01-01 RX ADMIN — LACTULOSE 30 G: 10 SOLUTION ORAL at 20:45

## 2020-01-01 RX ADMIN — LEVOTHYROXINE SODIUM 100 MCG: 100 TABLET ORAL at 08:50

## 2020-01-01 RX ADMIN — LACTULOSE 30 G: 10 SOLUTION ORAL at 03:46

## 2020-01-01 RX ADMIN — IPRATROPIUM BROMIDE AND ALBUTEROL SULFATE 3 ML: .5; 3 SOLUTION RESPIRATORY (INHALATION) at 06:28

## 2020-01-01 RX ADMIN — PANTOPRAZOLE SODIUM 40 MG: 40 TABLET, DELAYED RELEASE ORAL at 06:15

## 2020-01-01 RX ADMIN — LACTULOSE 30 G: 10 SOLUTION ORAL at 20:38

## 2020-01-01 RX ADMIN — LEVOTHYROXINE SODIUM 100 MCG: 100 TABLET ORAL at 09:04

## 2020-01-01 RX ADMIN — POTASSIUM PHOSPHATE, MONOBASIC 500 MG: 500 TABLET, SOLUBLE ORAL at 10:56

## 2020-01-01 RX ADMIN — OXCARBAZEPINE 150 MG: 300 TABLET, FILM COATED ORAL at 06:15

## 2020-01-01 RX ADMIN — FUROSEMIDE 20 MG: 20 TABLET ORAL at 08:22

## 2020-01-01 RX ADMIN — ENOXAPARIN SODIUM 40 MG: 40 INJECTION SUBCUTANEOUS at 17:34

## 2020-01-01 RX ADMIN — POTASSIUM CHLORIDE 10 MEQ: 7.46 INJECTION, SOLUTION INTRAVENOUS at 15:09

## 2020-01-01 RX ADMIN — LACTULOSE 30 G: 10 SOLUTION ORAL at 23:18

## 2020-01-01 RX ADMIN — LACTULOSE 20 G: 10 SOLUTION ORAL at 13:38

## 2020-01-01 RX ADMIN — OXCARBAZEPINE 300 MG: 300 TABLET, FILM COATED ORAL at 21:42

## 2020-01-01 RX ADMIN — LACTULOSE 30 G: 10 SOLUTION ORAL at 08:59

## 2020-01-01 RX ADMIN — Medication 1 TABLET: at 08:22

## 2020-01-01 RX ADMIN — LEVOTHYROXINE SODIUM 100 MCG: 100 TABLET ORAL at 09:01

## 2020-01-01 RX ADMIN — SODIUM CHLORIDE, PRESERVATIVE FREE 10 ML: 5 INJECTION INTRAVENOUS at 09:05

## 2020-01-01 RX ADMIN — IPRATROPIUM BROMIDE AND ALBUTEROL SULFATE 3 ML: .5; 3 SOLUTION RESPIRATORY (INHALATION) at 19:03

## 2020-01-01 RX ADMIN — LEVOTHYROXINE SODIUM 100 MCG: 100 TABLET ORAL at 08:19

## 2020-01-01 RX ADMIN — ASPIRIN 81 MG: 81 TABLET, COATED ORAL at 09:04

## 2020-01-01 RX ADMIN — ROPINIROLE HYDROCHLORIDE 1 MG: 1 TABLET, FILM COATED ORAL at 21:42

## 2020-01-01 RX ADMIN — MAGNESIUM GLUCONATE 500 MG ORAL TABLET 400 MG: 500 TABLET ORAL at 20:39

## 2020-01-01 RX ADMIN — POTASSIUM CHLORIDE 20 MEQ: 20 TABLET, EXTENDED RELEASE ORAL at 10:56

## 2020-01-01 RX ADMIN — LEVOTHYROXINE SODIUM 100 MCG: 100 TABLET ORAL at 09:14

## 2020-01-01 RX ADMIN — LEVOTHYROXINE SODIUM 100 MCG: 100 TABLET ORAL at 08:35

## 2020-01-01 RX ADMIN — IPRATROPIUM BROMIDE AND ALBUTEROL SULFATE 3 ML: .5; 3 SOLUTION RESPIRATORY (INHALATION) at 01:27

## 2020-01-01 RX ADMIN — IPRATROPIUM BROMIDE AND ALBUTEROL SULFATE 3 ML: .5; 3 SOLUTION RESPIRATORY (INHALATION) at 01:32

## 2020-01-01 RX ADMIN — POTASSIUM PHOSPHATE, MONOBASIC 500 MG: 500 TABLET, SOLUBLE ORAL at 12:29

## 2020-01-01 RX ADMIN — RIFAXIMIN 550 MG: 550 TABLET ORAL at 20:13

## 2020-01-01 RX ADMIN — FUROSEMIDE 20 MG: 20 TABLET ORAL at 08:19

## 2020-01-01 RX ADMIN — MAGNESIUM GLUCONATE 500 MG ORAL TABLET 400 MG: 500 TABLET ORAL at 08:50

## 2020-01-01 RX ADMIN — FUROSEMIDE 20 MG: 20 TABLET ORAL at 09:14

## 2020-01-01 RX ADMIN — IPRATROPIUM BROMIDE AND ALBUTEROL SULFATE 3 ML: .5; 3 SOLUTION RESPIRATORY (INHALATION) at 19:28

## 2020-01-01 RX ADMIN — ASPIRIN 81 MG: 81 TABLET, COATED ORAL at 08:35

## 2020-01-01 RX ADMIN — PANTOPRAZOLE SODIUM 40 MG: 40 TABLET, DELAYED RELEASE ORAL at 09:04

## 2020-01-01 RX ADMIN — SODIUM CHLORIDE, PRESERVATIVE FREE 10 ML: 5 INJECTION INTRAVENOUS at 08:20

## 2020-01-01 RX ADMIN — RIFAXIMIN 550 MG: 550 TABLET ORAL at 21:42

## 2020-01-01 RX ADMIN — ONDANSETRON 4 MG: 2 INJECTION INTRAMUSCULAR; INTRAVENOUS at 04:53

## 2020-01-01 RX ADMIN — RIFAXIMIN 550 MG: 550 TABLET ORAL at 09:14

## 2020-01-01 RX ADMIN — RIFAXIMIN 550 MG: 550 TABLET ORAL at 08:35

## 2020-01-01 RX ADMIN — OXCARBAZEPINE 150 MG: 300 TABLET, FILM COATED ORAL at 06:03

## 2020-01-01 RX ADMIN — SPIRONOLACTONE 50 MG: 25 TABLET ORAL at 09:04

## 2020-01-01 RX ADMIN — PANTOPRAZOLE SODIUM 40 MG: 40 TABLET, DELAYED RELEASE ORAL at 05:27

## 2020-01-01 RX ADMIN — PANTOPRAZOLE SODIUM 40 MG: 40 TABLET, DELAYED RELEASE ORAL at 05:23

## 2020-01-01 RX ADMIN — SPIRONOLACTONE 50 MG: 25 TABLET ORAL at 08:19

## 2020-01-01 RX ADMIN — RIFAXIMIN 550 MG: 550 TABLET ORAL at 09:01

## 2020-01-01 RX ADMIN — SODIUM CHLORIDE, PRESERVATIVE FREE 10 ML: 5 INJECTION INTRAVENOUS at 22:19

## 2020-01-01 RX ADMIN — MAGNESIUM SULFATE IN WATER 4 G: 40 INJECTION, SOLUTION INTRAVENOUS at 10:48

## 2020-01-01 RX ADMIN — IPRATROPIUM BROMIDE AND ALBUTEROL SULFATE 3 ML: .5; 3 SOLUTION RESPIRATORY (INHALATION) at 07:25

## 2020-01-01 RX ADMIN — ASPIRIN 81 MG: 81 TABLET, COATED ORAL at 08:50

## 2020-01-01 RX ADMIN — LACTULOSE 30 G: 10 SOLUTION ORAL at 11:00

## 2020-01-01 RX ADMIN — RIFAXIMIN 550 MG: 550 TABLET ORAL at 08:19

## 2020-01-01 RX ADMIN — LACTULOSE 20 G: 10 SOLUTION ORAL at 17:48

## 2020-01-01 RX ADMIN — LACTULOSE 30 G: 10 SOLUTION ORAL at 21:42

## 2020-01-01 RX ADMIN — SODIUM CHLORIDE, PRESERVATIVE FREE 10 ML: 5 INJECTION INTRAVENOUS at 20:39

## 2020-01-01 RX ADMIN — LEVOTHYROXINE SODIUM 100 MCG: 100 TABLET ORAL at 09:23

## 2020-01-01 RX ADMIN — ONDANSETRON 4 MG: 2 INJECTION INTRAMUSCULAR; INTRAVENOUS at 05:23

## 2020-01-01 RX ADMIN — ENOXAPARIN SODIUM 40 MG: 40 INJECTION SUBCUTANEOUS at 16:19

## 2020-01-01 RX ADMIN — ASPIRIN 81 MG: 81 TABLET, COATED ORAL at 08:19

## 2020-01-01 RX ADMIN — LACTULOSE 20 G: 10 SOLUTION ORAL at 22:37

## 2020-01-01 RX ADMIN — SODIUM CHLORIDE, PRESERVATIVE FREE 10 ML: 5 INJECTION INTRAVENOUS at 21:20

## 2020-01-01 RX ADMIN — LACTULOSE 30 G: 10 SOLUTION ORAL at 17:31

## 2020-01-01 RX ADMIN — ROPINIROLE HYDROCHLORIDE 1 MG: 1 TABLET, FILM COATED ORAL at 20:13

## 2020-01-01 RX ADMIN — RIFAXIMIN 550 MG: 550 TABLET ORAL at 20:39

## 2020-01-01 RX ADMIN — LACTULOSE 20 G: 10 SOLUTION ORAL at 09:14

## 2020-01-01 RX ADMIN — MAGNESIUM GLUCONATE 500 MG ORAL TABLET 400 MG: 500 TABLET ORAL at 22:18

## 2020-01-01 RX ADMIN — MAGNESIUM SULFATE IN WATER 4 G: 40 INJECTION, SOLUTION INTRAVENOUS at 16:19

## 2020-01-01 RX ADMIN — POTASSIUM PHOSPHATE, MONOBASIC 500 MG: 500 TABLET, SOLUBLE ORAL at 08:22

## 2020-01-01 RX ADMIN — DEXTROMETHORPHAN HYDROBROMIDE AND GUAIFENESIN 5 ML: 10; 100 LIQUID ORAL at 21:42

## 2020-01-01 RX ADMIN — IPRATROPIUM BROMIDE AND ALBUTEROL SULFATE 3 ML: .5; 3 SOLUTION RESPIRATORY (INHALATION) at 14:17

## 2020-01-01 RX ADMIN — LACTULOSE 30 G: 10 SOLUTION ORAL at 17:34

## 2020-01-01 RX ADMIN — RIFAXIMIN 550 MG: 550 TABLET ORAL at 08:22

## 2020-01-01 RX ADMIN — LACTULOSE 30 G: 10 SOLUTION ORAL at 22:17

## 2020-01-01 RX ADMIN — RIFAXIMIN 550 MG: 550 TABLET ORAL at 21:13

## 2020-01-01 RX ADMIN — MAGNESIUM GLUCONATE 500 MG ORAL TABLET 400 MG: 500 TABLET ORAL at 09:04

## 2020-01-01 RX ADMIN — HYDRALAZINE HYDROCHLORIDE 5 MG: 20 INJECTION INTRAMUSCULAR; INTRAVENOUS at 14:10

## 2020-01-01 RX ADMIN — Medication 1 TABLET: at 08:50

## 2020-01-01 RX ADMIN — LACTULOSE 30 G: 10 SOLUTION ORAL at 08:35

## 2020-01-01 RX ADMIN — POTASSIUM PHOSPHATE, MONOBASIC 500 MG: 500 TABLET, SOLUBLE ORAL at 21:17

## 2020-01-01 RX ADMIN — ENOXAPARIN SODIUM 40 MG: 40 INJECTION SUBCUTANEOUS at 17:04

## 2020-01-01 RX ADMIN — RIFAXIMIN 550 MG: 550 TABLET ORAL at 21:20

## 2020-01-01 RX ADMIN — Medication 500 UNITS: at 12:46

## 2020-01-01 RX ADMIN — OXCARBAZEPINE 300 MG: 300 TABLET, FILM COATED ORAL at 20:13

## 2020-01-01 RX ADMIN — SODIUM CHLORIDE, PRESERVATIVE FREE 10 ML: 5 INJECTION INTRAVENOUS at 20:13

## 2020-01-01 RX ADMIN — OXCARBAZEPINE 300 MG: 300 TABLET, FILM COATED ORAL at 22:36

## 2020-01-01 RX ADMIN — IPRATROPIUM BROMIDE AND ALBUTEROL SULFATE 3 ML: .5; 3 SOLUTION RESPIRATORY (INHALATION) at 00:35

## 2020-01-01 RX ADMIN — FUROSEMIDE 20 MG: 20 TABLET ORAL at 09:04

## 2020-01-01 RX ADMIN — LACTULOSE 30 G: 10 SOLUTION ORAL at 06:25

## 2020-01-01 RX ADMIN — SPIRONOLACTONE 50 MG: 25 TABLET ORAL at 14:33

## 2020-01-01 RX ADMIN — OXCARBAZEPINE 150 MG: 300 TABLET, FILM COATED ORAL at 09:04

## 2020-01-01 RX ADMIN — FUROSEMIDE 20 MG: 20 TABLET ORAL at 10:55

## 2020-01-01 RX ADMIN — IPRATROPIUM BROMIDE AND ALBUTEROL SULFATE 3 ML: .5; 3 SOLUTION RESPIRATORY (INHALATION) at 20:11

## 2020-01-01 RX ADMIN — POTASSIUM CHLORIDE 40 MEQ: 20 TABLET, EXTENDED RELEASE ORAL at 09:23

## 2020-01-01 RX ADMIN — LACTULOSE 30 G: 10 SOLUTION ORAL at 14:23

## 2020-01-01 RX ADMIN — ROPINIROLE HYDROCHLORIDE 1 MG: 1 TABLET, FILM COATED ORAL at 22:34

## 2020-01-01 RX ADMIN — ONDANSETRON 4 MG: 2 INJECTION INTRAMUSCULAR; INTRAVENOUS at 11:27

## 2020-01-01 RX ADMIN — IOPAMIDOL 60 ML: 755 INJECTION, SOLUTION INTRAVENOUS at 14:00

## 2020-01-01 RX ADMIN — ROPINIROLE HYDROCHLORIDE 1 MG: 1 TABLET, FILM COATED ORAL at 21:17

## 2020-01-01 RX ADMIN — SPIRONOLACTONE 50 MG: 25 TABLET ORAL at 10:56

## 2020-01-01 RX ADMIN — SODIUM CHLORIDE, PRESERVATIVE FREE 10 ML: 5 INJECTION INTRAVENOUS at 09:00

## 2020-01-01 RX ADMIN — HYDROCODONE BITARTRATE AND ACETAMINOPHEN 1 TABLET: 5; 325 TABLET ORAL at 09:14

## 2020-01-01 RX ADMIN — POTASSIUM CHLORIDE 10 MEQ: 7.46 INJECTION, SOLUTION INTRAVENOUS at 10:48

## 2020-01-01 RX ADMIN — POTASSIUM PHOSPHATE, MONOBASIC 500 MG: 500 TABLET, SOLUBLE ORAL at 17:04

## 2020-01-01 RX ADMIN — ASPIRIN 81 MG: 81 TABLET, COATED ORAL at 08:22

## 2020-01-01 RX ADMIN — LACTULOSE 30 G: 10 SOLUTION ORAL at 01:38

## 2020-01-01 RX ADMIN — POTASSIUM PHOSPHATE, MONOBASIC 500 MG: 500 TABLET, SOLUBLE ORAL at 08:35

## 2020-01-01 RX ADMIN — AMLODIPINE BESYLATE 5 MG: 5 TABLET ORAL at 21:15

## 2020-01-01 RX ADMIN — SODIUM CHLORIDE, PRESERVATIVE FREE 10 ML: 5 INJECTION INTRAVENOUS at 09:04

## 2020-01-01 RX ADMIN — AMLODIPINE BESYLATE 5 MG: 5 TABLET ORAL at 08:22

## 2020-01-01 RX ADMIN — RIFAXIMIN 550 MG: 550 TABLET ORAL at 08:50

## 2020-01-01 RX ADMIN — LACTULOSE 20 G: 10 SOLUTION ORAL at 16:13

## 2020-01-01 RX ADMIN — IPRATROPIUM BROMIDE AND ALBUTEROL SULFATE 3 ML: .5; 3 SOLUTION RESPIRATORY (INHALATION) at 07:50

## 2020-01-01 RX ADMIN — OXCARBAZEPINE 150 MG: 300 TABLET, FILM COATED ORAL at 06:40

## 2020-01-01 RX ADMIN — POTASSIUM PHOSPHATE, MONOBASIC 500 MG: 500 TABLET, SOLUBLE ORAL at 17:31

## 2020-01-01 RX ADMIN — LACTULOSE 30 G: 20 SOLUTION ORAL at 16:17

## 2020-01-01 RX ADMIN — SODIUM CHLORIDE, PRESERVATIVE FREE 10 ML: 5 INJECTION INTRAVENOUS at 21:17

## 2020-01-01 RX ADMIN — PANTOPRAZOLE SODIUM 40 MG: 40 TABLET, DELAYED RELEASE ORAL at 05:17

## 2020-01-01 RX ADMIN — PANTOPRAZOLE SODIUM 40 MG: 40 TABLET, DELAYED RELEASE ORAL at 06:40

## 2020-01-01 RX ADMIN — Medication 1 TABLET: at 20:39

## 2020-01-01 RX ADMIN — LACTULOSE 20 G: 10 SOLUTION ORAL at 21:20

## 2020-01-01 RX ADMIN — LACTULOSE 20 G: 10 SOLUTION ORAL at 09:05

## 2020-01-01 RX ADMIN — RIFAXIMIN 550 MG: 550 TABLET ORAL at 21:17

## 2020-01-01 RX ADMIN — RIFAXIMIN 550 MG: 550 TABLET ORAL at 22:18

## 2020-01-01 RX ADMIN — SODIUM CHLORIDE, PRESERVATIVE FREE 10 ML: 5 INJECTION INTRAVENOUS at 22:36

## 2020-01-01 RX ADMIN — POTASSIUM CHLORIDE 10 MEQ: 7.46 INJECTION, SOLUTION INTRAVENOUS at 13:01

## 2020-01-01 RX ADMIN — SODIUM CHLORIDE, PRESERVATIVE FREE 10 ML: 5 INJECTION INTRAVENOUS at 08:56

## 2020-01-01 RX ADMIN — SODIUM CHLORIDE 500 ML: 9 INJECTION, SOLUTION INTRAVENOUS at 12:31

## 2020-01-01 RX ADMIN — ONDANSETRON 4 MG: 2 INJECTION INTRAMUSCULAR; INTRAVENOUS at 08:56

## 2020-01-01 RX ADMIN — LACTULOSE 30 G: 10 SOLUTION ORAL at 05:16

## 2020-01-01 RX ADMIN — OXCARBAZEPINE 300 MG: 300 TABLET, FILM COATED ORAL at 21:17

## 2020-01-01 RX ADMIN — OXCARBAZEPINE 300 MG: 300 TABLET, FILM COATED ORAL at 22:19

## 2020-01-01 RX ADMIN — MAGNESIUM GLUCONATE 500 MG ORAL TABLET 400 MG: 500 TABLET ORAL at 08:22

## 2020-01-01 RX ADMIN — MAGNESIUM GLUCONATE 500 MG ORAL TABLET 400 MG: 500 TABLET ORAL at 21:13

## 2020-01-01 RX ADMIN — SODIUM CHLORIDE, PRESERVATIVE FREE 10 ML: 5 INJECTION INTRAVENOUS at 08:51

## 2020-01-01 RX ADMIN — ASPIRIN 81 MG: 81 TABLET, COATED ORAL at 09:00

## 2020-01-01 RX ADMIN — LACTULOSE 30 G: 10 SOLUTION ORAL at 21:17

## 2020-01-01 RX ADMIN — POTASSIUM PHOSPHATE, MONOBASIC 500 MG: 500 TABLET, SOLUBLE ORAL at 21:42

## 2020-01-01 RX ADMIN — IPRATROPIUM BROMIDE AND ALBUTEROL SULFATE 3 ML: .5; 3 SOLUTION RESPIRATORY (INHALATION) at 13:27

## 2020-01-01 RX ADMIN — SODIUM CHLORIDE, PRESERVATIVE FREE 10 ML: 5 INJECTION INTRAVENOUS at 08:22

## 2020-01-01 RX ADMIN — OXCARBAZEPINE 150 MG: 300 TABLET, FILM COATED ORAL at 05:28

## 2020-01-01 RX ADMIN — POTASSIUM PHOSPHATE, MONOBASIC 500 MG: 500 TABLET, SOLUBLE ORAL at 17:27

## 2020-01-01 RX ADMIN — IPRATROPIUM BROMIDE AND ALBUTEROL SULFATE 3 ML: .5; 3 SOLUTION RESPIRATORY (INHALATION) at 12:47

## 2020-01-01 RX ADMIN — AMLODIPINE BESYLATE 5 MG: 5 TABLET ORAL at 09:04

## 2020-01-01 RX ADMIN — DEXTROSE AND SODIUM CHLORIDE 75 ML/HR: 5; 450 INJECTION, SOLUTION INTRAVENOUS at 23:06

## 2020-01-01 RX ADMIN — LACTULOSE 30 G: 10 SOLUTION ORAL at 18:18

## 2020-01-01 RX ADMIN — POTASSIUM PHOSPHATE, MONOBASIC 500 MG: 500 TABLET, SOLUBLE ORAL at 22:19

## 2020-01-01 RX ADMIN — POTASSIUM CHLORIDE 40 MEQ: 20 TABLET, EXTENDED RELEASE ORAL at 12:29

## 2020-01-01 RX ADMIN — SODIUM CHLORIDE, PRESERVATIVE FREE 10 ML: 5 INJECTION INTRAVENOUS at 09:14

## 2020-01-01 RX ADMIN — OXCARBAZEPINE 300 MG: 300 TABLET, FILM COATED ORAL at 21:13

## 2020-01-01 RX ADMIN — OXCARBAZEPINE 300 MG: 300 TABLET, FILM COATED ORAL at 21:20

## 2020-01-01 RX ADMIN — HYDROCODONE BITARTRATE AND ACETAMINOPHEN 1 TABLET: 5; 325 TABLET ORAL at 09:04

## 2020-01-01 RX ADMIN — Medication 1 TABLET: at 09:04

## 2020-01-01 RX ADMIN — LACTULOSE 30 G: 10 SOLUTION ORAL at 05:23

## 2020-01-01 RX ADMIN — IPRATROPIUM BROMIDE AND ALBUTEROL SULFATE 3 ML: .5; 3 SOLUTION RESPIRATORY (INHALATION) at 00:48

## 2020-01-01 RX ADMIN — OXCARBAZEPINE 150 MG: 300 TABLET, FILM COATED ORAL at 05:48

## 2020-01-01 RX ADMIN — POTASSIUM CHLORIDE 10 MEQ: 7.46 INJECTION, SOLUTION INTRAVENOUS at 14:13

## 2020-01-01 RX ADMIN — HYDROCODONE BITARTRATE AND ACETAMINOPHEN 1 TABLET: 5; 325 TABLET ORAL at 12:45

## 2020-01-01 RX ADMIN — SPIRONOLACTONE 50 MG: 25 TABLET ORAL at 09:14

## 2020-01-01 RX ADMIN — POTASSIUM PHOSPHATE, MONOBASIC 500 MG: 500 TABLET, SOLUBLE ORAL at 21:13

## 2020-01-01 RX ADMIN — ACETAMINOPHEN 650 MG: 325 TABLET ORAL at 00:33

## 2020-01-01 RX ADMIN — SPIRONOLACTONE 50 MG: 25 TABLET ORAL at 08:35

## 2020-01-01 RX ADMIN — LACTULOSE 30 G: 10 SOLUTION ORAL at 11:33

## 2020-01-01 RX ADMIN — POTASSIUM PHOSPHATE, MONOBASIC 500 MG: 500 TABLET, SOLUBLE ORAL at 17:34

## 2020-01-01 RX ADMIN — SODIUM CHLORIDE 75 ML/HR: 9 INJECTION, SOLUTION INTRAVENOUS at 16:44

## 2020-01-20 NOTE — PROGRESS NOTES
subjective     Chief Complaint   Patient presents with   • Hypertension   • Edema     legs are sollen worse and oozing .   • Rash     allover    • Med Refill     pending     History of Present Illness  Patient is 89 years old white female who complains of mild bilateral lower extremity edema.  Legs are markedly swollen and there was fluid.  She is taking Bumex 1 mg daily    Patient also complains of skin rash all over and itching.  Multiple moles on the skin.  Patient has history of hepatic cirrhosis and multiple admission to the hospital with hepatic encephalopathy.  Patient is taking Chronulac and is causing diarrhea.  She says that she is taking only 3 times a day instead of 4 times and is having 3 good bowel movements.  She also takes Xifaxan 550 twice daily.  She is taking her medications regularly.  She has hypothyroidism Synthroid replacement therapy.  Overall she is doing very well except for marked bilateral lower extremity edema.  She says that she was told in the ER that she is dehydrated and she is drinking a lot of fluids.    Past Surgical History:   Procedure Laterality Date   • BACK SURGERY      KYPHOPLASTIES    • CARDIAC CATHETERIZATION Left 08/2004   • CARDIOVASCULAR STRESS TEST  01/25/2015   • CATARACT EXTRACTION     • CHOLECYSTECTOMY     • CLAVICLE SURGERY Right    • COLONOSCOPY  10/2004   • ECHO - CONVERTED  11/2012   • FEMUR OPEN REDUCTION INTERNAL FIXATION Left 10/12/2017    Procedure: FEMUR OPEN REDUCTION INTERNAL FIXATION;  Surgeon: Karson Pierre MD;  Location: [facility] OR;  Service:    • HIP FRACTURE SURGERY Bilateral     ARTHROPLASTIES    • PARTIAL HYSTERECTOMY     • PORTACATH PLACEMENT N/A 9/28/2018    Procedure: INSERTION OF PORTACATH;  Surgeon: Vic Mauricio MD;  Location: [facility] OR;  Service: General   • WRIST FRACTURE SURGERY       Family History   Problem Relation Age of Onset   • Cancer Mother    • Heart disease Father    • Arthritis Sister    • Osteoporosis Sister     • Diabetes Maternal Aunt      Past Medical History:   Diagnosis Date   • Acute renal failure (CMS/HCC)    • Alcohol abuse     in remission   • Anxiety    • Bell's palsy    • Cellulitis     LLE   • CHF (congestive heart failure) (CMS/HCC)     Diastolic   • CHF (congestive heart failure) (CMS/HCC)    • Constipation    • Coronary artery disease    • Diverticulosis    • GERD (gastroesophageal reflux disease)    • Hepatic cirrhosis (CMS/HCC)    • Hepatitis C    • History of transfusion    • Hypertension    • Hypothyroidism    • MI (mitral incompetence)    • Osteoporosis    • Osteoporosis    • Restless leg syndrome    • Thrombocytopenia (CMS/HCC)    • Trigeminal neuralgia      Patient Active Problem List   Diagnosis   • Hepatic cirrhosis*   • HTN (hypertension)*   • Anxiety disorder*   • Osteoporosis   • Hypothyroidism*   • GERD (gastroesophageal reflux disease)*   • Chronic pain syndrome due to fractured spine and left rib fracture*   • Anemia, chronic disease   • Arthralgia of hip   • Primary insomnia   • Peripheral vascular disease (CMS/HCC)   • Nonrheumatic aortic valve insufficiency, mild   • Chronic diastolic heart failure (Grade 1)    • Poor venous access   • Chronic kidney disease, stage III (moderate) (CMS/HCC)   • Acute hepatic encephalopathy   • Coronary artery disease   • Restless leg syndrome   • Hepatitis C   • Debility in setting of chronic illness    • Respiratory alkalosis with secondary metabolic alkalosis    • Prolonged Q-T interval on ECG   • Macrocytic anemia   • Hypoalbuminemia   • Encephalopathy       Social History     Tobacco Use   • Smoking status: Never Smoker   • Smokeless tobacco: Former User     Types: Snuff   Substance Use Topics   • Alcohol use: No     Comment: FORMER 12 BOTTLES PER DAY FOR 15 YEARS   • Drug use: No       Allergies   Allergen Reactions   • Ciprofloxacin Itching       Current Outpatient Medications on File Prior to Visit   Medication Sig   • aspirin 81 MG EC tablet Take  81 mg by mouth Daily.   • esomeprazole (nexIUM) 40 MG capsule TAKE 1 CAPSULE BY MOUTH EVERY MORNING BEFORE BREAKFAST.   • HYDROcodone-acetaminophen (NORCO)  MG per tablet Take 1 tablet by mouth Every 12 (Twelve) Hours As Needed for Moderate Pain .   • lactulose (CHRONULAC) 10 GM/15ML solution TAKE 30 MLS BY MOUTH FIVE TIMES PER DAY   • lactulose (CHRONULAC) 10 GM/15ML solution TAKE 30 MLS BY MOUTH FIVE TIMES PER DAY   • levothyroxine (SYNTHROID, LEVOTHROID) 75 MCG tablet Take 75 mcg by mouth Daily.   • nitroglycerin (NITROSTAT) 0.4 MG SL tablet Place 1 tablet under the tongue Every 5 (Five) Minutes As Needed for Chest Pain. Take no more than 3 doses in 15 minutes.   • OXcarbazepine (TRILEPTAL) 150 MG tablet Take 300 mg by mouth 2 (Two) Times a Day.   • rOPINIRole (REQUIP) 1 MG tablet Take 1 mg by mouth Every Night. Take 1 hour before bedtime.   • teriparatide (FORTEO) 600 MCG/2.4ML injection Inject 20 mcg under the skin into the appropriate area as directed Daily.   • vitamin D (ERGOCALCIFEROL) 1.25 MG (99238 UT) capsule capsule TAKE 1 CAPSULE BY MOUTH 1 TIME PER WEEK.   • [DISCONTINUED] bumetanide (BUMEX) 1 MG tablet Take 1 tablet by mouth Daily As Needed (swelling).   • [DISCONTINUED] magnesium oxide (MAG-OX) 400 MG tablet TAKE 2 TABLETS BY MOUTH DAILY   • [DISCONTINUED] magnesium oxide (MAGOX) 400 (241.3 Mg) MG tablet tablet TAKE 1 TABLET BY MOUTH 3 TIMES A DAY.   • [DISCONTINUED] multivitamin (DAILY RAMESH) tablet tablet Take 1 tablet by mouth Daily.   • [DISCONTINUED] XIFAXAN 550 MG tablet TAKE ONE TABLET BY MOUTH TWICE A DAY   • TRUEPLUS PEN NEEDLES 31G X 8 MM misc USE WITH FORTEO   • vitamin D (ERGOCALCIFEROL) 1.25 MG (25663 UT) capsule capsule TAKE ONE CAPSULE BY MOUTH EVERY WEEK     No current facility-administered medications on file prior to visit.          The following portions of the patient's history were reviewed and updated as appropriate: allergies, current medications, past family history,  "past medical history, past social history, past surgical history and problem list.    Review of Systems   Constitution: Negative.   HENT: Negative.  Negative for congestion.    Eyes: Negative.    Cardiovascular: Positive for leg swelling. Negative for chest pain, cyanosis, dyspnea on exertion, irregular heartbeat, near-syncope, orthopnea, palpitations, paroxysmal nocturnal dyspnea and syncope.   Respiratory: Negative.  Negative for shortness of breath.    Hematologic/Lymphatic: Negative.    Musculoskeletal: Negative.    Gastrointestinal: Negative.    Neurological: Negative.  Negative for headaches.          Objective:     /68 (BP Location: Left arm, Patient Position: Sitting, Cuff Size: Adult)   Pulse 79   Ht 157.5 cm (62\")   Wt 65.8 kg (145 lb)   LMP  (LMP Unknown)   SpO2 98%   BMI 26.52 kg/m²   Physical Exam   Constitutional: She appears well-developed and well-nourished. No distress.   HENT:   Head: Normocephalic and atraumatic.   Mouth/Throat: Oropharynx is clear and moist. No oropharyngeal exudate.   Eyes: Pupils are equal, round, and reactive to light. Conjunctivae and EOM are normal. No scleral icterus.   Neck: Normal range of motion. Neck supple. No JVD present. No tracheal deviation present. No thyromegaly present.   Cardiovascular: Normal rate, regular rhythm, normal heart sounds and intact distal pulses. PMI is not displaced. Exam reveals no gallop, no friction rub and no decreased pulses.   No murmur heard.  Pulses:       Carotid pulses are 3+ on the right side, and 3+ on the left side.       Radial pulses are 3+ on the right side, and 3+ on the left side.   Pulmonary/Chest: Effort normal and breath sounds normal. No respiratory distress. She has no wheezes. She has no rales. She exhibits no tenderness.   Abdominal: Soft. Bowel sounds are normal. She exhibits no distension, no abdominal bruit and no mass. There is no splenomegaly or hepatomegaly. There is no tenderness. There is no rebound " and no guarding.   Musculoskeletal: Normal range of motion. She exhibits edema. She exhibits no tenderness or deformity.   Patient has marked 4+ bilateral lower extremity edema with fluid oozing from the skin of left leg.   Lymphadenopathy:     She has no cervical adenopathy.   Neurological: She is alert. She has normal reflexes. No cranial nerve deficit. She exhibits normal muscle tone. Coordination normal.   Skin: Skin is warm and dry. No rash noted. She is not diaphoretic. No erythema.   Psychiatric: She has a normal mood and affect. Her behavior is normal. Judgment and thought content normal.         Lab Review  Lab Results   Component Value Date     12/12/2019    K 4.1 12/12/2019     12/12/2019    BUN 22 12/12/2019    CREATININE 1.05 (H) 12/12/2019    GLUCOSE 94 12/12/2019    CALCIUM 9.5 12/12/2019    ALT 25 12/12/2019    ALKPHOS 149 (H) 12/12/2019    LABIL2 0.9 (L) 04/08/2016     Lab Results   Component Value Date    CKTOTAL 99 07/25/2017     Lab Results   Component Value Date    WBC 3.88 12/12/2019    HGB 10.9 (L) 12/12/2019    HCT 33.8 (L) 12/12/2019     (L) 12/12/2019     Lab Results   Component Value Date    INR 1.16 (H) 10/26/2019    INR 1.30 (H) 04/16/2019    INR 1.28 (H) 04/15/2019     Lab Results   Component Value Date    MG 2.1 12/12/2019     Lab Results   Component Value Date    TSH 3.840 10/26/2019     Lab Results   Component Value Date    BNP 33.0 11/05/2018     Lab Results   Component Value Date    CHLPL 161 04/08/2016    CHOL 199 10/27/2019    TRIG 65 10/27/2019     (H) 10/27/2019    VLDL 13 10/27/2019    LDLHDL 0.59 10/27/2019     Lab Results   Component Value Date    LDL 69 10/27/2019    LDL 84 10/16/2019       Procedures       I personally viewed and interpreted the patient's LAB data         Assessment:     1. Essential hypertension    2. Hypothyroidism due to acquired atrophy of thyroid    3. Cirrhosis of liver without ascites, unspecified hepatic cirrhosis type  (CMS/HCC)          Plan:     Patient has hepatic cirrhosis with ascites and marked bilateral lower extremity edema.  She was advised to increase Bumex 1.5 mg daily  She is drinking too much fluid, fluid intake was decreased.  Salt restriction was also emphasized.  Blood pressure is very well controlled.  She complains of itching and skin moles.  Dermatology consult was recommended however patient first was to try some cream.  Kenalog cream was given for skin rash  Nexium was continued  Because of hepatic cirrhosis she was advised to continue Chronulac and Xifaxan        No follow-ups on file.

## 2020-03-05 PROBLEM — K76.82 HEPATIC ENCEPHALOPATHY (HCC): Status: ACTIVE | Noted: 2020-01-01

## 2020-03-05 PROBLEM — E87.0 HYPERNATREMIA: Status: ACTIVE | Noted: 2020-01-01

## 2020-03-05 NOTE — ED PROVIDER NOTES
"Subjective   89-year-old female with comes in by EMS from home with c/c \"Altered mental status, SOA\" X one day. Patient has significant history HTN,  Hypothyroidism, alcohol, hepatitis, renal failure, alcohol abuse, CHF and hypertension.  Patient noted to have become nonverbal.  Upon family's evaluation today patient patient was noted to be nonverbal she appeared to be slumping in bed.  Patient is normally pleasantly confused per the family.  Patient's family does report that she has had similar symptoms in the past and diagnosed with metabolic encephalitis.         History provided by:  Patient   used: No    Altered Mental Status   Presenting symptoms: disorientation    Severity:  Moderate  Episode history:  Single  Duration:  1 day  Timing:  Intermittent  Progression:  Worsening  Associated symptoms: no headaches and no light-headedness        Review of Systems   Respiratory: Positive for shortness of breath. Negative for choking and chest tightness.    Genitourinary: Negative.  Negative for difficulty urinating, dyspareunia, dysuria, enuresis, flank pain and frequency.   Musculoskeletal: Negative for arthralgias, back pain and gait problem.   Neurological: Negative.  Negative for dizziness, facial asymmetry, light-headedness, numbness and headaches.   Hematological: Negative.    All other systems reviewed and are negative.      Past Medical History:   Diagnosis Date   • Acute renal failure (CMS/HCC)    • Alcohol abuse     in remission   • Anxiety    • Bell's palsy    • Cellulitis     LLE   • CHF (congestive heart failure) (CMS/HCC)     Diastolic   • CHF (congestive heart failure) (CMS/HCC)    • Constipation    • Coronary artery disease    • Diverticulosis    • GERD (gastroesophageal reflux disease)    • Hepatic cirrhosis (CMS/HCC)    • Hepatitis C    • History of transfusion    • Hypertension    • Hypothyroidism    • MI (mitral incompetence)    • Osteoporosis    • Osteoporosis    • Restless " leg syndrome    • Thrombocytopenia (CMS/HCC)    • Trigeminal neuralgia        Allergies   Allergen Reactions   • Ciprofloxacin Itching       Past Surgical History:   Procedure Laterality Date   • BACK SURGERY      KYPHOPLASTIES    • CARDIAC CATHETERIZATION Left 08/2004   • CARDIOVASCULAR STRESS TEST  01/25/2015   • CATARACT EXTRACTION     • CHOLECYSTECTOMY     • CLAVICLE SURGERY Right    • COLONOSCOPY  10/2004   • ECHO - CONVERTED  11/2012   • FEMUR OPEN REDUCTION INTERNAL FIXATION Left 10/12/2017    Procedure: FEMUR OPEN REDUCTION INTERNAL FIXATION;  Surgeon: Karson Pierre MD;  Location: [facility] OR;  Service:    • HIP FRACTURE SURGERY Bilateral     ARTHROPLASTIES    • PORTACATH PLACEMENT N/A 9/28/2018    Procedure: INSERTION OF PORTACATH;  Surgeon: Vic Mauricio MD;  Location: [facility] OR;  Service: General   • SUBTOTAL HYSTERECTOMY     • WRIST FRACTURE SURGERY         Family History   Problem Relation Age of Onset   • Cancer Mother    • Heart disease Father    • Arthritis Sister    • Osteoporosis Sister    • Diabetes Maternal Aunt        Social History     Socioeconomic History   • Marital status:      Spouse name: Not on file   • Number of children: Not on file   • Years of education: Not on file   • Highest education level: Not on file   Tobacco Use   • Smoking status: Never Smoker   • Smokeless tobacco: Former User     Types: Snuff   Substance and Sexual Activity   • Alcohol use: No     Comment: FORMER 12 BOTTLES PER DAY FOR 15 YEARS   • Drug use: No   • Sexual activity: Defer   Social History Narrative    LIVES WITH GRANDDAUGHTER DEVON IN [place]            Objective   Physical Exam   Constitutional: She is oriented to person, place, and time. She appears toxic. She does not appear ill. No distress.   HENT:   Head: Normocephalic and atraumatic.   Eyes: Pupils are equal, round, and reactive to light. EOM are normal. Right eye exhibits normal extraocular motion. Left eye exhibits  normal extraocular motion and no nystagmus. Right pupil is round and reactive. Left pupil is round and reactive.   Neck: Normal range of motion. Neck supple. No JVD present. No neck rigidity. No tracheal deviation present. No Brudzinski's sign noted.   Cardiovascular: Normal rate. Exam reveals no gallop and no friction rub.   No murmur heard.  Pulmonary/Chest: Effort normal and breath sounds normal. No stridor. No respiratory distress. She has no wheezes. She has no rales. She exhibits no tenderness.   Abdominal: Soft. Bowel sounds are normal. She exhibits no distension. There is no tenderness. There is no guarding.   Musculoskeletal: She exhibits no tenderness.   Lymphadenopathy:     She has no cervical adenopathy.   Neurological: She is alert and oriented to person, place, and time. She has normal strength. She is not disoriented. She displays normal reflexes. No cranial nerve deficit. She displays a negative Romberg sign. Coordination and gait normal.   Skin: Skin is warm and dry. Capillary refill takes less than 2 seconds. No rash noted. She is not diaphoretic. No cyanosis or erythema.   Psychiatric: She has a normal mood and affect. Her behavior is normal. Her mood appears not anxious. She is not agitated.   Nursing note and vitals reviewed.      Procedures           ED Course  ED Course as of Mar 05 1429   Thu Mar 05, 2020   1302 RIGHT PORT-A-CATH WITH TIP IN SVC. COARSENED INTERSTITIAL MARKINGS.  Heart and mediastinum contours are unremarkable.  No pleural effusion.  No pneumothorax.   Bony and soft tissue structures are unremarkable.    [BH]   7344 IMPRESSION:  Stable atrophy and chronic small vessel ischemic disease. No  CT evidence of acute intracranial abnormality.    [BH]   1427 Discussed care with Dr. Fuchs. Will be admitted to observation.     [BH]   1428 I did discuss this case with Dr. martinez in detail.  Family stated the patient has not been willing to take her lactulose over the past week.    [BH]       ED Course User Index  [BH] Wilder Lao PA-C                                           [facility]  Number of Diagnoses or Management Options  Altered mental status, unspecified altered mental status type: new and requires workup  Hepatic encephalopathy (CMS/HCC): new and requires workup  Hypophosphatemia: new and requires workup  Hypothyroidism, unspecified type: new and requires workup     Amount and/or Complexity of Data Reviewed  Clinical lab tests: reviewed and ordered  Tests in the radiology section of CPT®: reviewed and ordered  Decide to obtain previous medical records or to obtain history from someone other than the patient: yes    Risk of Complications, Morbidity, and/or Mortality  Presenting problems: moderate  Diagnostic procedures: moderate  Management options: moderate    Patient Progress  Patient progress: stable      Final diagnoses:   Hepatic encephalopathy (CMS/HCC)   Hypophosphatemia   Altered mental status, unspecified altered mental status type   Hypothyroidism, unspecified type            Wilder Lao PA-C  03/05/20 1429

## 2020-03-05 NOTE — PLAN OF CARE
Problem: Patient Care Overview  Goal: Plan of Care Review  Outcome: Ongoing (interventions implemented as appropriate)   Pt confused. She knows her first name but that is all. Pt squirming in bed, bed alarm set. Fall precautions maintained. Pt was suctioned for secretion in mouth. SCDs in place at this time. NS @ 75. NSR HR 78. No indications of pain at this time. Pt is being turned dependently. Will monitor.

## 2020-03-05 NOTE — ED NOTES
Contact information for patient granddaughter/caregiver, Candace:    [phone] (home)  [phone] (cell)         Lupe Mirza RN  03/05/20 0606

## 2020-03-05 NOTE — H&P
[facility] Medicine Services  History & Physical    Patient Identification:  Name:  Alma Delia Garcia  Age:  89 y.o.  Sex:  female  :  1930  MRN:  [id number]   Visit Number:  [id number]  Primary Care Physician:  Galina Gonsalves MD    Subjective     Chief complaint: Altered mental status    History of presenting illness:      Alma Delia Garcia is a 89 y.o. female with past medical history significant for hepatic cirrhosis with hepatic encephalopathy, Chronic kidney disease stage II-III, essential hypertension, coronary artery disease, seizures and debility.  She has had prior admissions in the past with hepatic encephalopathy secondary to lactulose noncompliance. She presented to the emergency department of [facility] on 2020 for further evaluation of altered mental status.  Her family reportedly called EMS secondary for altered mental status.  Upon arrival to the ED, vital signs were temperature 97.8 °F, pulse 77, respiration rate 18, and blood pressure 131/80 with room air oxygen saturation 98%.  Arterial blood gas revealed pH of 7.486, PCO2 36.8, and PO2 of 73.2.  TSH was found to be 15.650 with free T4 of 0.80.  Ammonia level is elevated at 64, INR 1.21.  Platelets are 119.  Influenza swab is negative.  UA unremarkable.  Chest x-ray will interstitial markings.  CT head stable with atrophy and chronic small vessel ischemic disease.    She wakes up to name and stimulation but does not follow commands or conversation.   Given AMS, much of HPI, ROS, and PMH have been obtained from chart and staff.      Known Emergency Department medications received prior to my evaluation included Lactulose 30g x1 @ 1423. Emergency Department Room location at the time of my evaluation was Ed 1 bed 4     ---------------------------------------------------------------------------------------------------------------------   Review of Systems   Unable to perform ROS: Mental status  change        ---------------------------------------------------------------------------------------------------------------------   Past Medical History:   Diagnosis Date   • Acute renal failure (CMS/HCC)    • Alcohol abuse     in remission   • Anxiety    • Bell's palsy    • Cellulitis     LLE   • CHF (congestive heart failure) (CMS/HCC)     Diastolic   • CHF (congestive heart failure) (CMS/HCC)    • Constipation    • Coronary artery disease    • Diverticulosis    • GERD (gastroesophageal reflux disease)    • Hepatic cirrhosis (CMS/HCC)    • Hepatitis C    • History of transfusion    • Hypertension    • Hypothyroidism    • MI (mitral incompetence)    • Osteoporosis    • Osteoporosis    • Restless leg syndrome    • Thrombocytopenia (CMS/HCC)    • Trigeminal neuralgia      Past Surgical History:   Procedure Laterality Date   • BACK SURGERY      KYPHOPLASTIES    • CARDIAC CATHETERIZATION Left 08/2004   • CARDIOVASCULAR STRESS TEST  01/25/2015   • CATARACT EXTRACTION     • CHOLECYSTECTOMY     • CLAVICLE SURGERY Right    • COLONOSCOPY  10/2004   • ECHO - CONVERTED  11/2012   • FEMUR OPEN REDUCTION INTERNAL FIXATION Left 10/12/2017    Procedure: FEMUR OPEN REDUCTION INTERNAL FIXATION;  Surgeon: Karson Pierre MD;  Location: [facility];  Service:    • HIP FRACTURE SURGERY Bilateral     ARTHROPLASTIES    • PORTACATH PLACEMENT N/A 9/28/2018    Procedure: INSERTION OF PORTACATH;  Surgeon: Vic Mauricio MD;  Location: [facility];  Service: General   • SUBTOTAL HYSTERECTOMY     • WRIST FRACTURE SURGERY       Family History   Problem Relation Age of Onset   • Cancer Mother    • Heart disease Father    • Arthritis Sister    • Osteoporosis Sister    • Diabetes Maternal Aunt      Social History     Socioeconomic History   • Marital status:      Spouse name: Not on file   • Number of children: Not on file   • Years of education: Not on file   • Highest education level: Not on file   Tobacco Use   • Smoking  status: Never Smoker   • Smokeless tobacco: Former User     Types: Snuff   Substance and Sexual Activity   • Alcohol use: No     Comment: FORMER 12 BOTTLES PER DAY FOR 15 YEARS   • Drug use: No   • Sexual activity: Defer   Social History Narrative    [place]      ---------------------------------------------------------------------------------------------------------------------   Allergies:  Ciprofloxacin  ---------------------------------------------------------------------------------------------------------------------   Home medications:    Medications below are reported home medications pulling from within the system; at this time, these medications have not been reconciled unless otherwise specified and are in the verification process for further verifcation as current home medications.    (Not in a hospital admission)    Hospital Scheduled Meds:    lactulose 30 g Oral Daily   lactulose 30 g Rectal TID   sodium chloride 10 mL Intravenous Q12H       sodium chloride 75 mL/hr       Current listed hospital scheduled medications may not yet reflect those currently placed in orders that are signed and held awaiting patient's arrival to floor.   ---------------------------------------------------------------------------------------------------------------------     Objective     Vital Signs:  Temp:  [97.8 °F (36.6 °C)] 97.8 °F (36.6 °C)  Heart Rate:  [77-78] 78  Resp:  [18] 18  BP: (131-188)/(79-83) 188/83      03/05/20  1142   Weight: 65.8 kg (145 lb)     Body mass index is 26.52 kg/m².  ---------------------------------------------------------------------------------------------------------------------       Physical Exam   Constitutional: Vital signs are normal. She has a sickly appearance.   HENT:   Poor dentition with some missing teeth   Pulmonary/Chest: No tachypnea and no bradypnea. No respiratory distress. She has no decreased breath sounds. She has no wheezes. She has  no rhonchi. She has no rales.   Abdominal: Soft. Bowel sounds are normal. She exhibits distension. She exhibits no ascites. There is hepatosplenomegaly. There is no tenderness.   Musculoskeletal:   Mild, non-pitting edema of the lower extremities. Bilateral upper extremity edema.      Vascular Status -  Her right foot exhibits no edema. Her left foot exhibits no edema.  Skin Integrity  -  Her right foot skin is intact.Her left foot skin is intact..  Neurological: She is alert.   Awakes to voice and stimulation but does not answer questions or follow commands.     Skin: Skin is warm and dry.   Psychiatric: Cognition and memory are impaired.         ---------------------------------------------------------------------------------------------------------------------  EKG:      Will add EKG x1 to eval rhythm.       Telemetry:    I have personally looked at both the EKG and the telemetry strips.  ---------------------------------------------------------------------------------------------------------------------   Results from last 7 days   Lab Units 03/05/20  1204   LACTATE mmol/L 1.9   WBC 10*3/mm3 4.54   HEMOGLOBIN g/dL 10.4*   HEMATOCRIT % 32.5*   MCV fL 99.1*   MCHC g/dL 32.0   PLATELETS 10*3/mm3 119*   INR  1.21*     Results from last 7 days   Lab Units 03/05/20  1159   PH, ARTERIAL pH units 7.486*   PO2 ART mm Hg 73.2*   PCO2, ARTERIAL mm Hg 36.8   HCO3 ART mmol/L 27.7*     Results from last 7 days   Lab Units 03/05/20  1252   SODIUM mmol/L 146*   POTASSIUM mmol/L 3.9   CHLORIDE mmol/L 109*   CO2 mmol/L 24.0   BUN mg/dL 15   CREATININE mg/dL 0.83   EGFR IF NONAFRICN AM mL/min/1.73 65   CALCIUM mg/dL 9.2   GLUCOSE mg/dL 86   ALBUMIN g/dL 2.98*   BILIRUBIN mg/dL 1.5*   ALK PHOS U/L 129*   AST (SGOT) U/L 60*   ALT (SGPT) U/L 28   Estimated Creatinine Clearance: 40.9 mL/min (by C-G formula based on SCr of 0.83 mg/dL).  Ammonia   Date Value Ref Range Status   03/05/2020 64 (H) 11 - 51 umol/L Final         Results from  last 7 days   Lab Units 03/05/20  1252   PROBNP pg/mL 1,537.0     Lab Results   Component Value Date    HGBA1C 4.50 (L) 03/05/2020     Lab Results   Component Value Date    TSH 15.650 (H) 03/05/2020    FREET4 0.80 (L) 03/05/2020     No results found for: PREGTESTUR, PREGSERUM, HCG, HCGQUANT  Pain Management Panel     Pain Management Panel Latest Ref Rng & Units 10/26/2019 4/15/2019    AMPHETAMINES SCREEN, URINE Negative Negative Negative    BARBITURATES SCREEN Negative Negative Negative    BENZODIAZEPINE SCREEN, URINE Negative Negative Negative    BUPRENORPHINEUR Negative Negative Negative    COCAINE SCREEN, URINE Negative Negative Negative    METHADONE SCREEN, URINE Negative Negative Negative        No results found for: BLOODCX  No results found for: URINECX  No results found for: WOUNDCX  No results found for: STOOLCX  Results from last 7 days   Lab Units 03/05/20  1252   CHOLESTEROL mg/dL 190   TRIGLYCERIDES mg/dL 84   HDL CHOL mg/dL 119*   LDL CHOL mg/dL 54     ---------------------------------------------------------------------------------------------------------------------  Imaging Results (Last 7 Days)     Procedure Component Value Units Date/Time    CT Head Without Contrast [[id number]] Collected:  03/05/20 1327     Updated:  03/05/20 1330    Narrative:       EXAMINATION: CT HEAD WO CONTRAST-      CLINICAL INDICATION:     Altered mental status     COMPARISON:    12/12/2019     Technique: Multiple CT axial images were obtained through the level of  the brain without IV contrast administration. Reformatted images in the  coronal and/or sagittal plane(s) were generated from the axial data set  to facilitate diagnostic accuracy and/or surgical planning.     Radiation dose reduction techniques were utilized per ALARA protocol.  Automated exposure control was initiated through either or [employer] or  Incentive software packages by  protocol.       DOSE (DLP mGy-cm):     FINDINGS:     Brain:  Unremarkable. No parenchymal hemorrhage or mass. No white matter  abnormality. No areas of mass effect. Stable moderate generalized  cerebral atrophy and chronic small vessel ischemic disease.  Ventricles: Unremarkable. No hydrocephalus.  Extra-axial spaces: Unremarkable. No extra-axial hemorrhage. No  extra-axial masses.  Bones: Unremarkable. No acute fracture identified.  Sinuses: Unremarkable as visualized. No air-fluid levels.  Mastoids: Unremarkable. No mastoid effusions.  Soft Tissues: Unremarkable.          Impression:       Stable atrophy and chronic small vessel ischemic disease. No  CT evidence of acute intracranial abnormality.     This report was finalized on 3/5/2020 1:28 PM by Dr. Manish Sherwood MD.       XR Chest AP [[id number]] Collected:  03/05/20 1257     Updated:  03/05/20 1259    Narrative:       EXAMINATION: XR CHEST AP-      CLINICAL INDICATION:     AMS     TECHNIQUE:  XR CHEST AP-      COMPARISON: 10/26/2019      FINDINGS:   RIGHT PORT-A-CATH WITH TIP IN SVC. COARSENED INTERSTITIAL MARKINGS.  Heart and mediastinum contours are unremarkable.  No pleural effusion.  No pneumothorax.   Bony and soft tissue structures are unremarkable.       Impression:       AS ABOVE.     This report was finalized on 3/5/2020 12:57 PM by Dr. Zeke Adams MD.             Cultures:  No results found for: BLOODCX, URINECX, WOUNDCX, MRSACX, RESPCX, STOOLCX    Last echocardiogram:  Results for orders placed during the hospital encounter of 10/26/19   Adult Transthoracic Echo Complete W/ Cont if Necessary Per Protocol    Narrative · There is moderate calcification of the aortic valve mainly affecting the   non, left and right coronary cusp(s).  · Mild aortic valve regurgitation is present.  · Estimated EF = 65%.  · Left ventricular systolic function is normal.  · Left ventricular diastolic dysfunction (grade I) consistent with   impaired relaxation.  · No wall motion abnormality              I have personally reviewed  the above radiology images and read the final radiology report on 03/05/20  ---------------------------------------------------------------------------------------------------------------------  Assessment / Plan     Active Hospital Problems    Diagnosis POA   • Hepatic encephalopathy (CMS/HCC) [K72.90] Yes   • Hypernatremia [E87.0] Yes   • Debility in setting of chronic illness  [R53.81] Yes   • Chronic kidney disease, stage III (moderate) (CMS/HCC) [N18.3] Yes   • Chronic diastolic heart failure (Grade 1)  [I50.32] Yes   • Hypothyroidism* [E03.9] Yes       ASSESSMENT/PLAN:  · Metabolic encephalopathy with concern for hepatic encephalopathy given lactulose noncompliance: Lactulose 30 g 3 times daily has been ordered to be given rectally if she remains confused.  Neurochecks have been ordered.  Bed alarm is been ordered.  We will leave n.p.o. while confuse given increased risk for aspiration and confused/lethargic state. Consult SLP. CT head did not reveal acute source of encephalopathy.  She has not historically required frequent paracentesis.  She does have some abdominal distention but no fluid wave present.  Resume home Xifaxan when she is able to again tolerate oral intake.    · Cirrhosis with known prior history of hepatitis C: Will monitor.  AST/  INR slightly elevated.  Thrombocytopenia present.  Will repeat a.m. CBC to follow platelets. CT abdomen pelvis done which did not show any ascites and showed liver cirrhosis.    · Mild hypernatremia likely secondary to poor oral intake: We will repeat a.m. BMP.  Will give gentle IV fluids D5 half NS and monitor Accu-Cheks, given suspected decreased oral intake with dry mucous membranes and confusion.  Echocardiogram from Oct 2019 has been reviewed with estimated ejection fraction of 65% and grade 1 diastolic dysfunction.    · Diastolic CHF, appears compensated: We will continue to monitor as we gently hydrate.  Will review home medication regimen.  Though doubtful  if she has been having poor oral intake and compliance with lactulose that she is currently taking home medications. Hold home bumex.     · Chronic kidney disease stage II-III: Avoid nephrotoxins when possible.  We will gently hydrate given concern for poor oral intake.    · Advanced age: Supportive care.    · Chronic Thrombocytopenia: Repeat AM CBC.     · Macrocytic anemia: Will check iron profile, Ferritin.     · Hypothyroidism: TSH is elevated with low free T4.  Will increase dose to 100 mcg. Would need repeat thyroid profile In 6 weeks. At present, it is unclear if she has been compliant with home levothyroxine.    · Seizures: We will resume home Trileptal.  Check Trileptal level.    ----------  -DVT prophylaxis: SCDs and Protonix for GI prophylaxis.  -Activity: Bed rest  -Expected length of stay:   OBSERVATION status; however, if further evaluation or treatment plans warrant, status may change.  Based upon current information, I predict patient's care encounter to be less than or equal to 2 midnights      High risk secondary to hepatic encephalopathy, advanced age, ongoing medical noncompliance    Savannah Edmond PA-C  03/05/20  3:45 PM  Pager # [phone]  ---------------------------------------------------------------------------------------------------------------------     Patient seen and examined independently with RN at the bedside.  I agree with assessment plan and history and physical by WILMER Nuñez.  The note has been edited to reflect my findings.      Anastasiya Dias MD   [facility]

## 2020-03-05 NOTE — ED NOTES
8 fr in/out catheterization. During procedure hand hygiene observed and sterile equipment and aseptic technique used. Return of less than 50 mL yellow-colored urine. She tolerated procedure well.  Patient ID band checked for patient name and birthdate: pt confirmed. Catheterized urine collected; sample sent to lab. Specimen labeled in the presence of the patient. Hospital wipes used per protocol.        Kecia Guaman RN  03/05/20 [id number]

## 2020-03-06 NOTE — PLAN OF CARE
MBS completed. Isolated incident of deep laryngeal penetration w/ thin liquids, however cleared upon completion of swallow. No other laryngeal penetration or aspiration. Will initiate modified po diet of mechanical soft, ground w/ thin liquids. SLP to f/u for diet safety/acceptance/upgrade.  Problem: Patient Care Overview  Goal: Plan of Care Review  Outcome: Ongoing (interventions implemented as appropriate)  Flowsheets (Taken 3/6/2020 0900 by Anu Garay RN)  Plan of Care Reviewed With: patient

## 2020-03-06 NOTE — MBS/VFSS/FEES
Acute Care - Speech Language Pathology   Swallow Initial Evaluation [facility]   MODIFIED BARIUM SWALLOW STUDY     Patient Name: Alma Delia Garcia  : 1930  MRN: [id number]  Today's Date: 3/6/2020      Admit Date: 3/5/2020    Alma Delia Garcia  presents to the radiology suite this am from 251/2S to participate in an instrumental MBS to assess safety/efficacy of swallowing fnx, determine safest/least restrictive diet. She is admitted to [facility] w/ hepatic encephalopathy. D/w RN this am who provides verbal clearance for pt to be transported to radiology suite to participate.        Social History     Socioeconomic History   • Marital status:      Spouse name: Not on file   • Number of children: Not on file   • Years of education: Not on file   • Highest education level: Not on file   Tobacco Use   • Smoking status: Never Smoker   • Smokeless tobacco: Former User     Types: Snuff   Substance and Sexual Activity   • Alcohol use: No     Comment: FORMER 12 BOTTLES PER DAY FOR 15 YEARS   • Drug use: No   • Sexual activity: Defer   Social History Narrative    LIVES WITH GRANDDAUGHTER [place] IN [place]         Chest xray on 3/5/2020 revealed  EXAMINATION: XR CHEST AP-      CLINICAL INDICATION:     AMS     TECHNIQUE:  XR CHEST AP-      COMPARISON: 10/26/2019      FINDINGS:   RIGHT PORT-A-CATH WITH TIP IN SVC. COARSENED INTERSTITIAL MARKINGS.  Heart and mediastinum contours are unremarkable.  No pleural effusion.  No pneumothorax.   Bony and soft tissue structures are unremarkable.     IMPRESSION:  AS ABOVE.     This report was finalized on 3/5/2020 12:57 PM by Dr. Zeke Adams MD.    Diet Orders (active) (From admission, onward)     Start     Ordered    20 1800  Dietary Nutrition Supplements Boost Plus (Ensure Enlive, Ensure Plus)  Daily With Breakfast, Lunch & Dinner      20 1529    20 1223  Diet Soft Texture; Ground; Thin  Diet Effective Now     Comments:  Meds whole in puree. Single  presentation.  Upright and centered for all po intake.    03/06/20 1222                She is observed on ra w/o complications, tolerating well.    Risks and benefits of the procedure are explained w/ verbalizing understanding/agreement to participate. Proceed per protocol.    Pt is positioned upright and centered in a soft strap supportive vess chair to accept multiple po presentations of solid cracker, puree, honey thick, nectar thick, and thin liquids via spoon, cup and straw, along w/ whole placebo pill in puree. Pt is able to self feed.     All views are from the lateral plane.     Facial/oral structures are symmetrical upon observation w/o lingual deviation upon protrusion. Oral mucosa are moist, pink and clean. She is primarily edentulous across this evaluation. Secretions are clear, thin and well controlled. OROM/ERVIN is delayed/weak to imitate oral postures. Gag is not assessed. Volitional cough is adequate in intensity, mildly congestive in quality, nonproductive. Vocal quality is adequate in intensity, clear in quality w/ intelligible speech. Pt is a/a and cooperative to particpate.  Pt  is oriented to herself, follows simple directives, and participates in simple conversational exchanges. She is mildly confused across this evaluation.    Upon po presentations, adequate bolus anticipation w/ good labial seal for bolus clearance via spoon bowl, cup rim stability and suction via straw.  Bolus formation, manipulation,  and control are mild-moderately impaired w/ increased oral prep time and mixed phasic/rotary mastication pattern. A-p transit is delayed w/o oral residue. Tongue base retraction and linguavelar seal are adequate w/o premature spillage. No laryngeal penetration or aspiration is evidenced before the swallow.     Pharyngeal swallow is timely w/ adequate hyolaryngeal elevation and epiglottic inversion. Pharyngeal contraction is adequate w/o significant residue. Isolated incident of deep laryngeal  penetration w/ large bolus size of thin liquids via cup, however clears upon completion of swallow. No other laryngeal penetration or aspiration evidenced during or after the swallow.     Full esophageal sweep reveals no obvious mucosal abnormalities. Motility is wfl w/o retrograde flow noted.      She is able to manipulate and swallow whole barium pill in puree presentation w/o difficulty.      Visit Dx:     ICD-10-CM ICD-9-CM   1. Hepatic encephalopathy (CMS/HCC) K72.90 572.2   2. Hypophosphatemia E83.39 275.3   3. Altered mental status, unspecified altered mental status type R41.82 780.97   4. Hypothyroidism, unspecified type E03.9 244.9     Patient Active Problem List   Diagnosis   • Hepatic cirrhosis*   • HTN (hypertension)*   • Anxiety disorder*   • Osteoporosis   • Hypothyroidism*   • GERD (gastroesophageal reflux disease)*   • Chronic pain syndrome due to fractured spine and left rib fracture*   • Anemia, chronic disease   • Arthralgia of hip   • Primary insomnia   • Peripheral vascular disease (CMS/HCC)   • Nonrheumatic aortic valve insufficiency, mild   • Chronic diastolic heart failure (Grade 1)    • Poor venous access   • Chronic kidney disease, stage III (moderate) (CMS/HCC)   • Acute hepatic encephalopathy   • Coronary artery disease   • Restless leg syndrome   • Hepatitis C   • Debility in setting of chronic illness    • Respiratory alkalosis with secondary metabolic alkalosis    • Prolonged Q-T interval on ECG   • Macrocytic anemia   • Hypoalbuminemia   • Encephalopathy   • Hepatic encephalopathy (CMS/HCC)   • Hypernatremia     Past Medical History:   Diagnosis Date   • Acute renal failure (CMS/HCC)    • Alcohol abuse     in remission   • Anxiety    • Bell's palsy    • Cellulitis     LLE   • CHF (congestive heart failure) (CMS/HCC)     Diastolic   • CHF (congestive heart failure) (CMS/HCC)    • Constipation    • Coronary artery disease    • Diverticulosis    • GERD (gastroesophageal reflux disease)     • Hepatic cirrhosis (CMS/HCC)    • Hepatitis C    • History of transfusion    • Hypertension    • Hypothyroidism    • MI (mitral incompetence)    • Osteoporosis    • Osteoporosis    • Restless leg syndrome    • Thrombocytopenia (CMS/HCC)    • Trigeminal neuralgia      Past Surgical History:   Procedure Laterality Date   • BACK SURGERY      KYPHOPLASTIES    • CARDIAC CATHETERIZATION Left 08/2004   • CARDIOVASCULAR STRESS TEST  01/25/2015   • CATARACT EXTRACTION     • CHOLECYSTECTOMY     • CLAVICLE SURGERY Right    • COLONOSCOPY  10/2004   • ECHO - CONVERTED  11/2012   • FEMUR OPEN REDUCTION INTERNAL FIXATION Left 10/12/2017    Procedure: FEMUR OPEN REDUCTION INTERNAL FIXATION;  Surgeon: Karson Pierre MD;  Location: [facility] OR;  Service:    • HIP FRACTURE SURGERY Bilateral     ARTHROPLASTIES    • PORTACATH PLACEMENT N/A 9/28/2018    Procedure: INSERTION OF PORTACATH;  Surgeon: Vic Mauricio MD;  Location: [facility] OR;  Service: General   • SUBTOTAL HYSTERECTOMY     • WRIST FRACTURE SURGERY           EDUCATION  The patient has been educated in the following areas:   Dysphagia (Swallowing Impairment) Oral Care/Hydration Modified Diet Instruction.    SLP Recommendation and Plan    Impression: Per this evaluation , Ms. Garcia presents w/ a mild-moderate oral dysphagia felt to be 2/2 ams and overall generalized weakness in comparison to a true dysphagia. Increased oral prep time w/ mixed phasic/rotray mastication pattern. . Isolated incident of deep laryngeal penetration w/ large bolus size of thin liquids via cup, however clears upon completion of swallow. No other laryngeal penetration or aspiration evidenced during or after the swallow.     She is felt to most benefit from initiation of modified po diet of mechanical soft, ground w/ thin liquids.    Recommendations:   1. Mechanical soft, ground w/ thin liquids.  2. Meds whole in puree. Single presentation. Crushed prn.  3. Seth precautions.   4. Oral  care protocol.   5. Universal aspiration precautions  6. Upright and centered for all po intake.    SLP to f/u for diet safety/acceptance/upgrade.     D/w pt results and recommendations w/ verbal understanding and agreement.     D/w RN results and recommendations w/ verbal understanding and agreement.     Thank you for allowing me to participate in the care of your patient-  Lisa Martino M.S., CCC-SLP   Time Calculation:   Time Calculation- SLP     Time Calculation- SLP     Row Name 03/06/20 1549    SLP - Next Appointment  03/09/20  -Recorded by: NATHALIE            User Key     Initials Name Provider Type    Lisa Corona MS CCC-SLP Speech and Language Pathologist          Therapy Charges for Today     Code Description Service Date Service Provider Modifiers Qty    [id number] HC ST MOTION FLUORO EVAL SWALLOW 8 3/6/2020 Lisa Martino MS CCC-SLP GN 1               Lisa Martino MS CCC-SLP  3/6/2020

## 2020-03-06 NOTE — PLAN OF CARE
Problem: Skin Injury Risk (Adult)  Goal: Identify Related Risk Factors and Signs and Symptoms  Outcome: Ongoing (interventions implemented as appropriate)  Flowsheets (Taken 3/6/2020 0623)  Related Risk Factors (Skin Injury Risk): advanced age; cognitive impairment; mobility impaired; moisture  Goal: Skin Health and Integrity  Outcome: Ongoing (interventions implemented as appropriate)     Problem: Fall Risk (Adult)  Goal: Identify Related Risk Factors and Signs and Symptoms  Outcome: Ongoing (interventions implemented as appropriate)  Goal: Absence of Fall  Outcome: Ongoing (interventions implemented as appropriate)     Problem: Patient Care Overview  Goal: Plan of Care Review  Outcome: Ongoing (interventions implemented as appropriate)  Flowsheets (Taken 3/6/2020 0623)  Progress: no change  Outcome Summary: Patient rested quietly throughout night. Patient given lactulose, large bowel movement after. Patient alert and oriented to person only. Patient shows no signs of pain, or distress. Patient running sinus rhythm on the monitor. Will continue to monitor.  Goal: Individualization and Mutuality  Outcome: Ongoing (interventions implemented as appropriate)  Goal: Discharge Needs Assessment  Outcome: Ongoing (interventions implemented as appropriate)  Goal: Interprofessional Rounds/Family Conf  Outcome: Ongoing (interventions implemented as appropriate)

## 2020-03-06 NOTE — NURSING NOTE
Patient with a small papular wound to her inner gluteal cleft.  Denies tenderness.  No erythema.  No drainage.  Will leave open to air.  Will use Z-Guard PRN.

## 2020-03-06 NOTE — PROGRESS NOTES
Discharge Planning Assessment  [facility]     Patient Name: Alma Deila Garcia  MRN: [id number]  Today's Date: 3/6/2020    Admit Date: 3/5/2020    Discharge Needs Assessment     Row Name 03/06/20 1619       Living Environment    Lives With  child(jojo), adult;grandparent(s)    Name(s) of Who Lives With Patient  Pt lives at home with her daughter, Christina, and Candace mcmillan.     Current Living Arrangements  home/apartment/condo    Primary Care Provided by  child(jojo)    Provides Primary Care For  no one, unable/limited ability to care for self    Family Caregiver if Needed  child(jojo), adult;grandchild(jojo), adult    Quality of Family Relationships  involved;helpful;supportive    Able to Return to Prior Arrangements  yes       Resource/Environmental Concerns    Transportation Concerns  car, none       Transition Planning    Patient/Family Anticipates Transition to  home with family    Transportation Anticipated  family or friend will provide       Discharge Needs Assessment    Equipment Currently Used at Home  commode;walker, rolling;hospital bed    Equipment Needed After Discharge  none        Discharge Plan     Row Name 03/06/20 1621       Plan    Plan  SS spoke with pt's Candace mcmillan, on this day. Pt lives at home with her daughter Christina, and hipolito. Pt does not receive  services. Pt has a walker, bedside commode, and hospital bed. Pt does not have a POA or a living will. Pt's PCP is Dr. de león. Pt plans to return home with her family. SS will follow and assist as needed.     Patient/Family in Agreement with Plan  yes          BRI Arizmendi

## 2020-03-06 NOTE — TELEPHONE ENCOUNTER
PATIENT SON IN LAW CALLED AND SAID HOME HEALTH CAME OUT AND [facility]. THE LADY TOLD THEM THAT SHE WAS JUST DRAWING LABS AND WOULD NOT BE BACK. SOMETHING TO DO WITH THE PATIENTS INSURANCE. THEY DID NOT CLEAN HER CHEST. HE CALLED THE NURSING HOME SHE HAD JUST BEEN IN AND THEY TOLD HER TO CONTACT THE DOCTORS OFFICE BECAUSE IT ALL HAD TO BE TAKEN CARE OF BY THE DR OFFICE   pt came in as scheduled for iol for fetal demise s/p KCl injection 3/4  vaginal delivery with cytotec protocol, uncomplicated  social work consulted and cleared

## 2020-03-06 NOTE — PLAN OF CARE
Problem: Patient Care Overview  Goal: Plan of Care Review  Outcome: Ongoing (interventions implemented as appropriate)   Pt more oriented today. Pt was aware of last name and place. More improvement in speech and motor today. Potassium replaced. Passed swallow eval; diet advanced; tolerating well. Fall precautions maintained. No complaints of pain at this time. Will monitor.

## 2020-03-07 NOTE — PROGRESS NOTES
[facility] HOSPITALIST PROGRESS NOTE     Patient Identification:  Name:  Alma Delia Garcia  Age:  89 y.o.  Sex:  female  :  1930  MRN:  [id number]  Visit Number:  [id number]  ROOM: 250/2S     Primary Care Provider:  Galina Gonsalves MD    Length of stay:  1    Subjective        Chief Compliant Follow up for AMS    Patient seen and examined this morning with no family at bedside.  alert and awake and answering appropriately.  Confusion resolved. Oriented to self, place, person and limited to time. Denies any chest pain but did have shortness of breath while repositioning in bed and noted to have wheezing with dry cough.  Denies any nausea vomiting abdominal pain.  Afebrile no events overnight.  On room air.  Has chronic facial pain secondary to trigeminal neuralgia.      Objective     Current Hospital Meds:    aspirin 81 mg Oral Daily   ipratropium-albuterol 3 mL Nebulization 4x Daily - RT   lactulose 30 g Oral TID   levothyroxine 100 mcg Oral Daily   OXcarbazepine 150 mg Oral QAM   OXcarbazepine 300 mg Oral Nightly   pantoprazole 40 mg Oral QAM   potassium phosphate (monobasic) 500 mg Oral 4x Daily With Meals & Nightly   riFAXIMin 550 mg Oral BID   rOPINIRole 1 mg Oral Nightly   sodium chloride 10 mL Intravenous Q12H   sodium chloride 10 mL Intravenous Q12H   spironolactone 50 mg Oral Daily      ----------------------------------------------------------------------------------------------------------------------  Vital Signs:  Temp:  [97.6 °F (36.4 °C)-98.3 °F (36.8 °C)] 98.1 °F (36.7 °C)  Heart Rate:  [60-81] 70  Resp:  [19-20] 19  BP: (145-163)/(57-63) 163/59  SpO2:  [94 %-99 %] 99 %  on   ;   Device (Oxygen Therapy): room air  Body mass index is 28.85 kg/m².    Wt Readings from Last 3 Encounters:   20 67 kg (147 lb 11.2 oz)   20 65.8 kg (145 lb)   19 59 kg (130 lb)       Intake/Output Summary (Last 24 hours) at 3/7/2020 1448  Last data filed at 3/7/2020 1134  Gross  per 24 hour   Intake 1920 ml   Output --   Net 1920 ml     Diet Soft Texture; Ground; Thin  ----------------------------------------------------------------------------------------------------------------------  Physical exam:  General: Comfortable, Not in distress.  Well-developed and well-nourished.   HENT:  Head:  Normocephalic and atraumatic.  Mouth:  Moist mucous membranes.    Eyes:  Conjunctivae and EOM are normal.  Pupils are equal, round, and reactive to light.  No scleral icterus.    Neck:  Neck supple.  No JVD present.  trachea midline.   Cardiovascular:  Normal rate, regular rhythm with no murmur.  Pulmonary/Chest:  No respiratory distress, + few wheezes, resolving on cough, no crackles, with normal breath sounds and good air movement.  Abdomen:  Soft.  Bowel sounds are normal.  No distension and no tenderness.   Musculoskeletal:  no tenderness, and no deformity.  No red or swollen joints anywhere.    Neurological:  Alert and oriented to person, place, and not to time.  No cranial nerve deficit. No focal deficits. No facial droop.  No slurred speech.   Skin:  Skin is warm and dry. No rash noted. No pallor.   Peripheral vascular: pulses in all 4 extremities with no clubbing, no cyanosis, trace edema.  Genitourinary:  No hsieh  ----------------------------------------------------------------------------------------------------------------------  ----------------------------------------------------------------------------------------------------------------------  Results from last 7 days   Lab Units 03/07/20  0749 03/06/20 0012 03/05/20  1204   LACTATE mmol/L  --   --  1.9   WBC 10*3/mm3 5.08 4.24 4.54   HEMOGLOBIN g/dL 9.4* 10.2* 10.4*   HEMATOCRIT % 29.8* 34.4 32.5*   MCV fL 101.0* 108.2* 99.1*   MCHC g/dL 31.5 29.7* 32.0   PLATELETS 10*3/mm3 108* 106* 119*   INR   --   --  1.21*     Results from last 7 days   Lab Units 03/07/20  0749 03/06/20  1914 03/06/20  0012 03/05/20  1252   SODIUM mmol/L 144  --   146* 146*   POTASSIUM mmol/L 3.4* 3.5 3.3* 3.9   MAGNESIUM mg/dL 1.8  --   --  1.6   CHLORIDE mmol/L 111*  --  109* 109*   CO2 mmol/L 24.2  --  24.0 24.0   BUN mg/dL 14  --  15 15   CREATININE mg/dL 0.77  --  0.70 0.83   PHOSPHORUS mg/dL 2.4*  --   --  2.2*   EGFR IF NONAFRICN AM mL/min/1.73 71  --  79 65   CALCIUM mg/dL 8.3*  --  8.7 9.2   GLUCOSE mg/dL 87  --  80 86   ALBUMIN g/dL  --   --   --  2.98*   BILIRUBIN mg/dL  --   --   --  1.5*   ALK PHOS U/L  --   --   --  129*   AST (SGOT) U/L  --   --   --  60*   ALT (SGPT) U/L  --   --   --  28   Estimated Creatinine Clearance: 40.7 mL/min (by C-G formula based on SCr of 0.77 mg/dL).  Results from last 7 days   Lab Units 03/07/20  0749   TROPONIN T ng/mL 0.018     Hemoglobin A1C   Date/Time Value Ref Range Status   03/05/2020 1204 4.50 (L) 4.80 - 5.60 % Final     Glucose   Date/Time Value Ref Range Status   03/07/2020 1625 106 70 - 130 mg/dL Final   03/07/2020 1158 113 70 - 130 mg/dL Final   03/07/2020 0718 112 70 - 130 mg/dL Final   03/06/2020 2030 108 70 - 130 mg/dL Final   03/06/2020 1245 88 70 - 130 mg/dL Final   03/06/2020 0656 85 70 - 130 mg/dL Final   03/05/2020 2248 83 70 - 130 mg/dL Final     Ammonia   Date Value Ref Range Status   03/06/2020 54 (H) 11 - 51 umol/L Final   03/05/2020 64 (H) 11 - 51 umol/L Final     Results from last 7 days   Lab Units 03/05/20  1252   CHOLESTEROL mg/dL 190   TRIGLYCERIDES mg/dL 84   HDL CHOL mg/dL 119*   LDL CHOL mg/dL 54     Results from last 7 days   Lab Units 03/05/20  1147   NITRITE UA  Negative   WBC UA /HPF 0-2   BACTERIA UA /HPF None Seen   SQUAM EPITHEL UA /HPF 0-2     Blood Culture   Date Value Ref Range Status   03/05/2020 No growth at 24 hours  Preliminary   03/05/2020 No growth at 24 hours  Preliminary   No results found for: RESPCXNo results found for: URINECXNo results found for: WOUNDCXNo results found for: BODYFLDCXNo results found for: STOOLCX  I have personally looked at the labs and they are summarized  above.  ----------------------------------------------------------------------------------------------------------------------  Imaging Results (Last 24 Hours)     Procedure Component Value Units Date/Time    CT Chest Pulmonary Embolism [[id number]] Collected:  03/07/20 1326     Updated:  03/07/20 1328    Narrative:       EXAMINATION: CT CHEST PULMONARY EMBOLISM-      Technique: Multiple CT axial images were obtained through the level of  pulmonary arteries, following IV contrast administration per CT PE  protocol.  Volume Rendered 3D or MIP images performed.     Radiation dose reduction techniques were utilized per ALARA protocol.  Automated exposure control was initiated through either or [employer] or  [employer] software packages by  protocol.                CLINICAL INDICATION:    SOB and Chest Pain     COMPARISON:    None     FINDINGS:    There is bibasilar atalectasis.    There is no pulmonary artery filling defect to suggest pulmonary  embolism.   Small bilateral pleural effusions.     Mild cardiac enlargement. Bibasilar atelectasis. There is no thoracic  adenopathy.   Incidentally imaged upper abdomen is unremarkable.   Bone windows show no acute osseous abnormality.       Impression:          1. No PE.   2. Mild cardiac enlargement. Bibasilar atelectasis.     This report was finalized on 3/7/2020 1:26 PM by Dr. Zeke Adams MD.           I have personally reviewed the radiology images and read the final radiology report.    Assessment & Plan         ASSESSMENT/PLAN:  · Acute hepatic encephalopathy given lactulose noncompliance: resolved. Ammonia improved. Continue with Lactulose 30 g 3 times oral daily. Continue with home rifaximin. neurochecks have been ordered. Started on dysphagia diet after SLP evaluation. CT head did not reveal acute source of encephalopathy. She has not historically required frequent paracentesis.     · SOB: ? Etiology. Some wheezing but the patient does not have COPD. Will  start on duO-NEBS. CT PE protocol with atelectasis.     · Cirrhosis with known prior history of hepatitis C: Will monitor.  AST/  INR slightly elevated.  Thrombocytopenia present.  CT abdomen pelvis done which did not show any ascites and showed liver cirrhosis.     · Mild hypernatremia likely secondary to poor oral intake: resolved.  Echocardiogram from Oct 2019 has been reviewed with estimated ejection fraction of 65% and grade 1 diastolic dysfunction.  Increase oral fluids.     · Hypokalemia, replace and monitor.  Supplement magnesium     · Diastolic CHF, appears compensated     · Chronic kidney disease stage II: Avoid nephrotoxins when possible.     · Essential hypertension. on Aldactone and monitor closely.  We will also give 1 dose of IV hydralazine. Start on lasix in am.      · Advanced age: Supportive care.     · Chronic Thrombocytopenia: Repeat AM CBC.      · Macrocytic anemia, hgb stable.      · Hypothyroidism: TSH is elevated with low free T4.  Will increase dose to 100 mcg. Would need repeat thyroid profile In 6 weeks. At present, it is unclear if she has been compliant with home levothyroxine.     · Trigeminal Neuralgia: on home Trileptal.       PT OT consulted.    Management discussed in detail with patient family nursing.     The patient is high risk due to the following diagnoses/reasons:  Hepatic encephalopathy    Anastasiya Fuchs MD  03/07/20  5:13 PM

## 2020-03-07 NOTE — PROGRESS NOTES
[facility] HOSPITALIST PROGRESS NOTE     Patient Identification:  Name:  Alma Delia Garcia  Age:  89 y.o.  Sex:  female  :  1930  MRN:  [id number]  Visit Number:  [id number]  ROOM: [facility]     Primary Care Provider:  Galina Gonsalves MD    Length of stay:  0    Subjective        Chief Compliant Follow up for AMS    Patient seen and examined this afternoon with family at bedside.  More alert and awake and answering appropriately.  Confusion improving, almost back to baseline.  Denies any chest pain but did have shortness of breath.  Denies any nausea vomiting abdominal pain.  Afebrile no events overnight.  On room air.  Evaluated by speech and started on dysphagia diet.  Has chronic facial pain secondary to trigeminal neuralgia.      Objective     Current Hospital Meds:  aspirin 81 mg Oral Daily   lactulose 30 g Oral Daily   lactulose 30 g Rectal TID   levothyroxine 100 mcg Oral Daily   OXcarbazepine 150 mg Oral QAM   OXcarbazepine 300 mg Oral Nightly   pantoprazole 40 mg Oral QAM   potassium phosphate (monobasic) 500 mg Oral 4x Daily With Meals & Nightly   riFAXIMin 550 mg Oral BID   rOPINIRole 1 mg Oral Nightly   sodium chloride 10 mL Intravenous Q12H   sodium chloride 10 mL Intravenous Q12H   spironolactone 50 mg Oral Daily      ----------------------------------------------------------------------------------------------------------------------  Vital Signs:  Temp:  [97 °F (36.1 °C)-98.1 °F (36.7 °C)] 97 °F (36.1 °C)  Heart Rate:  [57-73] 69  Resp:  [14-18] 18  BP: (159-189)/(51-88) 169/66  SpO2:  [94 %-99 %] 99 %  on   ;   Device (Oxygen Therapy): room air  Body mass index is 28.85 kg/m².    Wt Readings from Last 3 Encounters:   20 67 kg (147 lb 11.2 oz)   20 65.8 kg (145 lb)   19 59 kg (130 lb)       Intake/Output Summary (Last 24 hours) at 3/6/2020 191  Last data filed at 3/6/2020 1800  Gross per 24 hour   Intake 1350 ml   Output --   Net 1350 ml     Diet Soft  Texture; Ground; Thin  ----------------------------------------------------------------------------------------------------------------------  Physical exam:  General: Comfortable, Not in distress.  Well-developed and well-nourished.   HENT:  Head:  Normocephalic and atraumatic.  Mouth:  Moist mucous membranes.    Eyes:  Conjunctivae and EOM are normal.  Pupils are equal, round, and reactive to light.  No scleral icterus.    Neck:  Neck supple.  No JVD present.  trachea midline.   Cardiovascular:  Normal rate, regular rhythm with no murmur.  Pulmonary/Chest:  No respiratory distress, no wheezes, no crackles, with normal breath sounds and good air movement.  Abdomen:  Soft.  Bowel sounds are normal.  No distension and no tenderness.   Musculoskeletal:  no tenderness, and no deformity.  No red or swollen joints anywhere.    Neurological:  Alert and oriented to person, place, and not to time.  No cranial nerve deficit. No focal deficits. No facial droop.  No slurred speech.   Skin:  Skin is warm and dry. No rash noted. No pallor.   Peripheral vascular: pulses in all 4 extremities with no clubbing, no cyanosis, trace edema.  Genitourinary:  No hsieh  ----------------------------------------------------------------------------------------------------------------------  ----------------------------------------------------------------------------------------------------------------------Results from last 7 days   Lab Units 03/06/20  0012 03/05/20  1204   LACTATE mmol/L  --  1.9   WBC 10*3/mm3 4.24 4.54   HEMOGLOBIN g/dL 10.2* 10.4*   HEMATOCRIT % 34.4 32.5*   MCV fL 108.2* 99.1*   MCHC g/dL 29.7* 32.0   PLATELETS 10*3/mm3 106* 119*   INR   --  1.21*     Results from last 7 days   Lab Units 03/06/20  0012 03/05/20  1252   SODIUM mmol/L 146* 146*   POTASSIUM mmol/L 3.3* 3.9   MAGNESIUM mg/dL  --  1.6   CHLORIDE mmol/L 109* 109*   CO2 mmol/L 24.0 24.0   BUN mg/dL 15 15   CREATININE mg/dL 0.70 0.83   PHOSPHORUS mg/dL  --  2.2*    EGFR IF NONAFRICN AM mL/min/1.73 79 65   CALCIUM mg/dL 8.7 9.2   GLUCOSE mg/dL 80 86   ALBUMIN g/dL  --  2.98*   BILIRUBIN mg/dL  --  1.5*   ALK PHOS U/L  --  129*   AST (SGOT) U/L  --  60*   ALT (SGPT) U/L  --  28   Estimated Creatinine Clearance: 40.7 mL/min (by C-G formula based on SCr of 0.7 mg/dL).      Hemoglobin A1C   Date/Time Value Ref Range Status   03/05/2020 1204 4.50 (L) 4.80 - 5.60 % Final     Glucose   Date/Time Value Ref Range Status   03/06/2020 1245 88 70 - 130 mg/dL Final   03/06/2020 0656 85 70 - 130 mg/dL Final   03/05/2020 2248 83 70 - 130 mg/dL Final     Ammonia   Date Value Ref Range Status   03/06/2020 54 (H) 11 - 51 umol/L Final   03/05/2020 64 (H) 11 - 51 umol/L Final     Results from last 7 days   Lab Units 03/05/20  1252   CHOLESTEROL mg/dL 190   TRIGLYCERIDES mg/dL 84   HDL CHOL mg/dL 119*   LDL CHOL mg/dL 54     Results from last 7 days   Lab Units 03/05/20  1147   NITRITE UA  Negative   WBC UA /HPF 0-2   BACTERIA UA /HPF None Seen   SQUAM EPITHEL UA /HPF 0-2     Blood Culture   Date Value Ref Range Status   03/05/2020 No growth at 24 hours  Preliminary   03/05/2020 No growth at 24 hours  Preliminary   No results found for: RESPCXNo results found for: URINECXNo results found for: WOUNDCXNo results found for: BODYFLDCXNo results found for: STOOLCX  I have personally looked at the labs and they are summarized above.  ----------------------------------------------------------------------------------------------------------------------  Imaging Results (Last 24 Hours)     Procedure Component Value Units Date/Time    FL Video Swallow Single Contrast [[id number]] Collected:  03/06/20 1217     Updated:  03/06/20 1220    Narrative:       EXAMINATION: FL VIDEO SWALLOW SINGLE-CONTRAST-      CLINICAL INDICATION:     r/o aspiration 2/2 RLL PNA; K72.90-Hepatic  failure, unspecified without coma; E83.39-Other disorders of phosphorus  metabolism; R41.82-Altered mental status,  unspecified;  E03.9-Hypothyroidism, unspecified     TECHNIQUE: Modified barium swallow was performed utilizing video  fluoroscopy in conjunction with the Speech Pathology team. The patient  ingested multiple barium media including thin barium, nectar, and honey  liquid as well as barium pudding and a barium pudding coated cracker.   FLUOROSCOPY TIME: 1.33 minutes     COMPARISON: NONE      FINDINGS: Isolated incident of deep laryngeal penetration w/ large bolus  via cup however clears upon completion of swallow. No other laryngeal  penetration or aspiration. Increased oral prep time w/ mixed  phasic/rotary mastication pattern.           Impression:       Isolated incident of deep laryngeal penetration w/ large  bolus via cup however clears upon completion of swallow. No other  laryngeal penetration or aspiration. Increased oral prep time w/ mixed  phasic/rotary mastication pattern.      This report was finalized on 3/6/2020 12:18 PM by Dr. Zeke Adams MD.           I have personally reviewed the radiology images and read the final radiology report.    Assessment & Plan         ASSESSMENT/PLAN:  · Acute hepatic encephalopathy given lactulose noncompliance: Continue with Lactulose 30 g 3 times oral daily.    Continue with home rifaximin. neurochecks have been ordered.    Started on dysphagia diet after SLP evaluation. CT head did not reveal acute source of encephalopathy.  She has not historically required frequent paracentesis.      · Cirrhosis with known prior history of hepatitis C: Will monitor.  AST/  INR slightly elevated.  Thrombocytopenia present.  CT abdomen pelvis done which did not show any ascites and showed liver cirrhosis.     · Mild hypernatremia likely secondary to poor oral intake: Sodium level stable. we will repeat a.m. BMP.  Hold IV fluids since getting SOB. Echocardiogram from Oct 2019 has been reviewed with estimated ejection fraction of 65% and grade 1 diastolic dysfunction.  Increase oral  fluids.     · Hypokalemia, replace and monitor.  Supplement magnesium     · Diastolic CHF, appears compensated: Hold home bumex because of hypotension.      · Chronic kidney disease stage II: Avoid nephrotoxins when possible.     · Essential hypertension.  Will start on Aldactone and monitor closely.  We will also give 1 dose of IV hydralazine     · Advanced age: Supportive care.     · Chronic Thrombocytopenia: Repeat AM CBC.      · Macrocytic anemia, hgb stable.      · Hypothyroidism: TSH is elevated with low free T4.  Will increase dose to 100 mcg. Would need repeat thyroid profile In 6 weeks. At present, it is unclear if she has been compliant with home levothyroxine.     · Trigeminal Neuralgia: on home Trileptal.       Consult PT OT.  Change to inpatient.    Management discussed in detail with patient family nursing.     The patient is high risk due to the following diagnoses/reasons:  Hepatic encephalopathy    Anastasiya Fuchs MD  03/06/20  7:14 PM

## 2020-03-07 NOTE — NURSING NOTE
Attempted to contain family to update them on room change. Was unable to get talk with any of the pts contacts.

## 2020-03-07 NOTE — PLAN OF CARE
Problem: Skin Injury Risk (Adult)  Goal: Identify Related Risk Factors and Signs and Symptoms  Outcome: Ongoing (interventions implemented as appropriate)  Flowsheets (Taken 3/6/2020 0623 by Darcie Rubi, RN)  Related Risk Factors (Skin Injury Risk): advanced age;cognitive impairment;mobility impaired;moisture  Goal: Skin Health and Integrity  Outcome: Ongoing (interventions implemented as appropriate)     Problem: Fall Risk (Adult)  Goal: Identify Related Risk Factors and Signs and Symptoms  Outcome: Ongoing (interventions implemented as appropriate)  Flowsheets (Taken 3/7/2020 1502)  Related Risk Factors (Fall Risk): age-related changes; gait/mobility problems; environment unfamiliar  Signs and Symptoms (Fall Risk): presence of risk factors  Goal: Absence of Fall  Outcome: Ongoing (interventions implemented as appropriate)     Problem: Patient Care Overview  Goal: Plan of Care Review  Outcome: Ongoing (interventions implemented as appropriate)  Flowsheets  Taken 3/7/2020 1502  Progress: no change  Taken 3/7/2020 0900  Plan of Care Reviewed With: patient  Note:   Patient has rested comfortably this shift with no complaints. Patient denies any shortness of breath or distress. Will continue to monitor.  Goal: Individualization and Mutuality  Outcome: Ongoing (interventions implemented as appropriate)  Goal: Discharge Needs Assessment  Outcome: Ongoing (interventions implemented as appropriate)  Goal: Interprofessional Rounds/Family Conf  Outcome: Ongoing (interventions implemented as appropriate)

## 2020-03-08 NOTE — PLAN OF CARE
Problem: Skin Injury Risk (Adult)  Goal: Identify Related Risk Factors and Signs and Symptoms  Description  Related risk factors and signs and symptoms are identified upon initiation of Human Response Clinical Practice Guideline (CPG).  Outcome: Ongoing (interventions implemented as appropriate)  Flowsheets (Taken 3/6/2020 0623 by Darcie Rubi RN)  Related Risk Factors (Skin Injury Risk): advanced age;cognitive impairment;mobility impaired;moisture  Goal: Skin Health and Integrity  Description  Patient will demonstrate the desired outcomes by discharge/transition of care.  Outcome: Ongoing (interventions implemented as appropriate)  Flowsheets (Taken 3/8/2020 0330)  Skin Health and Integrity: making progress toward outcome  Intervention: Promote/Optimize Nutrition  Flowsheets (Taken 3/7/2020 1925)  Oral Nutrition Promotion: rest periods promoted  Intervention: Prevent/Manage Excess Moisture  Flowsheets  Taken 3/8/2020 0000 by Savannah Abraham PCT  Perineal Care: absorbent pad changed;diaper changed;perineum cleansed  Bathing/Skin Care: incontinence care  Taken 3/7/2020 1925 by Ama Flores RN  Skin Protection: adhesive use limited;transparent dressing maintained;tubing/devices free from skin contact  Intervention: Maintain Head of Bed Elevation Less Than 30 Degrees as Tolerated  Flowsheets (Taken 3/8/2020 0000 by Savannah Abraham PCT)  Head of Bed (HOB): HOB at 30-45 degrees  Intervention: Prevent/Minimize Shear/Friction Injuries  Flowsheets  Taken 3/7/2020 1925 by Ama Flores RN  Pressure Reduction Devices: pressure-redistributing mattress utilized  Taken 3/8/2020 0000 by Savannah Abraham PCT  Positioning/Transfer Devices: pillows;wedge;in use  Intervention: Prevent or Minimize Pressure  Flowsheets  Taken 3/7/2020 1925 by Ama Flores RN  Pressure Reduction Techniques: heels elevated off bed  Taken 3/8/2020 0000 by Savannah Abraham PCT  Body Position: side-lying, left     Problem: Fall  Risk (Adult)  Goal: Identify Related Risk Factors and Signs and Symptoms  Description  Related risk factors and signs and symptoms are identified upon initiation of Human Response Clinical Practice Guideline (CPG).  Outcome: Ongoing (interventions implemented as appropriate)  Flowsheets (Taken 3/7/2020 1502 by Marta Cooper RN)  Related Risk Factors (Fall Risk): age-related changes;gait/mobility problems;environment unfamiliar  Signs and Symptoms (Fall Risk): presence of risk factors  Goal: Absence of Fall  Description  Patient will demonstrate the desired outcomes by discharge/transition of care.  Outcome: Ongoing (interventions implemented as appropriate)  Flowsheets (Taken 3/8/2020 0330)  Absence of Fall: making progress toward outcome  Intervention: Monitor/Assist with Self Care  Flowsheets  Taken 3/8/2020 0000 by Savannah Abraham PCT  Activity Assistance Provided: assistance, 2 people  Taken 3/7/2020 1925 by Ama Flores RN  Self-Care Promotion: independence encouraged;BADL personal objects within reach  Intervention: Reduce Risk/Promote Restraint Free Environment  Flowsheets (Taken 3/8/2020 0100)  Safety Promotion/Fall Prevention: activity supervised;fall prevention program maintained;nonskid shoes/slippers when out of bed;safety round/check completed  Environmental Safety Modification: assistive device/personal items within reach;clutter free environment maintained;room organization consistent  Safety/Security Measures: bed alarm set  Intervention: Review Medications/Identify Contributors to Fall Risk  Flowsheets (Taken 3/8/2020 0100)  Medication Review/Management: medications reviewed     Problem: Patient Care Overview  Goal: Plan of Care Review  Outcome: Ongoing (interventions implemented as appropriate)  Flowsheets  Taken 3/8/2020 0330  Progress: no change  Taken 3/7/2020 1925  Plan of Care Reviewed With: patient    Pt slept throughout most of the shift with no distress observed. Pt  continued to be monitored on telemetry. Pt denies any chest pain, nausea, or vomiting. Pt did seem to have some shortness of breath when adjusting positions or talking for longer periods of time. Pt remained alert and oriented to self and place but disoriented to time and situation toward the end of the shift. Pt reoriented when needed. Pt remained free from falls for the shift with fall precautions in place. Bed alarm remained on throughout the shift. Pt denies any needs or concerns at this time. Will continue to monitor.   Goal: Individualization and Mutuality  Outcome: Ongoing (interventions implemented as appropriate)  Goal: Discharge Needs Assessment  Outcome: Ongoing (interventions implemented as appropriate)  Goal: Interprofessional Rounds/Family Conf  Outcome: Ongoing (interventions implemented as appropriate)  Flowsheets (Taken 3/8/2020 0330)  Participants: dietitian/nutrition services; family; advanced practice nurse; nursing; patient; pharmacy; physician; respiratory therapy; wound care specialist

## 2020-03-08 NOTE — PROGRESS NOTES
[facility] HOSPITALIST PROGRESS NOTE     Patient Identification:  Name:  Alma Delia Garcia  Age:  89 y.o.  Sex:  female  :  1930  MRN:  [id number]  Visit Number:  [id number]  ROOM: 250/2S     Primary Care Provider:  Galina Gonsalves MD    Length of stay:  2    Subjective        Chief Compliant Follow up for AMS    Patient seen and examined this morning with RN at bedside.  alert and awake and answering appropriately.  Confusion resolved. Oriented to self, place, person and limited to time. Denies any chest pain, shortness of breath and cough. Has been having nausea and vomiting since last night. Denies abdominal pain.  Afebrile no events overnight.  On room air.  Has chronic facial pain secondary to trigeminal neuralgia.       Objective     Current Hospital Meds:    aspirin 81 mg Oral Daily   [START ON 3/9/2020] furosemide 20 mg Oral Daily   ipratropium-albuterol 3 mL Nebulization 4x Daily - RT   lactulose 20 g Oral BID   levothyroxine 100 mcg Oral Daily   OXcarbazepine 150 mg Oral QAM   OXcarbazepine 300 mg Oral Nightly   pantoprazole 40 mg Oral QAM   riFAXIMin 550 mg Oral BID   rOPINIRole 1 mg Oral Nightly   sodium chloride 10 mL Intravenous Q12H   sodium chloride 10 mL Intravenous Q12H   spironolactone 50 mg Oral Daily      ----------------------------------------------------------------------------------------------------------------------  Vital Signs:  Temp:  [98.2 °F (36.8 °C)-98.4 °F (36.9 °C)] 98.2 °F (36.8 °C)  Heart Rate:  [64-89] 80  Resp:  [18-20] 18  BP: (147-177)/(51-67) 150/59  SpO2:  [93 %-99 %] 99 %  on   ;   Device (Oxygen Therapy): room air  Body mass index is 28.85 kg/m².    Wt Readings from Last 3 Encounters:   20 67 kg (147 lb 11.2 oz)   20 65.8 kg (145 lb)   19 59 kg (130 lb)       Intake/Output Summary (Last 24 hours) at 3/8/2020 1555  Last data filed at 3/8/2020 1159  Gross per 24 hour   Intake 640 ml   Output --   Net 640 ml     Diet Soft  Texture; Ground; Thin  ----------------------------------------------------------------------------------------------------------------------  Physical exam:  General: Comfortable, Not in distress.  Well-developed and well-nourished.   HENT:  Head:  Normocephalic and atraumatic.  Mouth:  Moist mucous membranes.    Eyes:  Conjunctivae and EOM are normal.  Pupils are equal, round, and reactive to light.  No scleral icterus.    Neck:  Neck supple.  No JVD present.  trachea midline.   Cardiovascular:  Normal rate, regular rhythm with no murmur.  Pulmonary/Chest:  No respiratory distress, no wheezes, no crackles, with normal breath sounds and good air movement.  Abdomen:  Soft.  Bowel sounds are normal.  No distension and no tenderness.   Musculoskeletal:  no tenderness, and no deformity.  No red or swollen joints anywhere.    Neurological:  Alert and oriented to person, place, and not to time.  No cranial nerve deficit. No focal deficits. No facial droop.  No slurred speech.   Skin:  Skin is warm and dry. No rash noted. No pallor.   Peripheral vascular: pulses in all 4 extremities with no clubbing, no cyanosis, trace edema.  Genitourinary:  No hsieh  ----------------------------------------------------------------------------------------------------------------------  ----------------------------------------------------------------------------------------------------------------------  Results from last 7 days   Lab Units 03/08/20 0135 03/07/20  0749 03/06/20 0012 03/05/20  1204   LACTATE mmol/L  --   --   --  1.9   WBC 10*3/mm3 5.17 5.08 4.24 4.54   HEMOGLOBIN g/dL 10.5* 9.4* 10.2* 10.4*   HEMATOCRIT % 33.3* 29.8* 34.4 32.5*   MCV fL 99.7* 101.0* 108.2* 99.1*   MCHC g/dL 31.5 31.5 29.7* 32.0   PLATELETS 10*3/mm3 109* 108* 106* 119*   INR   --   --   --  1.21*     Results from last 7 days   Lab Units 03/08/20  0135 03/07/20 0749 03/06/20  1914 03/06/20  0012 03/05/20  1252   SODIUM mmol/L 141 144  --  146* 146*      POTASSIUM mmol/L 3.9 3.4* 3.5 3.3* 3.9   MAGNESIUM mg/dL 2.0 1.8  --   --  1.6   CHLORIDE mmol/L 107 111*  --  109* 109*   CO2 mmol/L 24.2 24.2  --  24.0 24.0   BUN mg/dL 12 14  --  15 15   CREATININE mg/dL 0.81 0.77  --  0.70 0.83   PHOSPHORUS mg/dL 2.9 2.4*  --   --  2.2*   EGFR IF NONAFRICN AM mL/min/1.73 67 71  --  79 65   CALCIUM mg/dL 8.6 8.3*  --  8.7 9.2   GLUCOSE mg/dL 109* 87  --  80 86   ALBUMIN g/dL  --   --   --   --  2.98*   BILIRUBIN mg/dL  --   --   --   --  1.5*   ALK PHOS U/L  --   --   --   --  129*   AST (SGOT) U/L  --   --   --   --  60*   ALT (SGPT) U/L  --   --   --   --  28   Estimated Creatinine Clearance: 40.2 mL/min (by C-G formula based on SCr of 0.81 mg/dL).  Results from last 7 days   Lab Units 03/07/20  0749   TROPONIN T ng/mL 0.018     Glucose   Date/Time Value Ref Range Status   03/08/2020 1139 118 70 - 130 mg/dL Final   03/08/2020 0647 113 70 - 130 mg/dL Final   03/07/2020 1951 125 70 - 130 mg/dL Final   03/07/2020 1625 106 70 - 130 mg/dL Final   03/07/2020 1158 113 70 - 130 mg/dL Final   03/07/2020 0718 112 70 - 130 mg/dL Final   03/06/2020 2030 108 70 - 130 mg/dL Final   03/06/2020 1245 88 70 - 130 mg/dL Final     Ammonia   Date Value Ref Range Status   03/06/2020 54 (H) 11 - 51 umol/L Final     Results from last 7 days   Lab Units 03/05/20  1252   CHOLESTEROL mg/dL 190   TRIGLYCERIDES mg/dL 84   HDL CHOL mg/dL 119*   LDL CHOL mg/dL 54     Results from last 7 days   Lab Units 03/05/20  1147   NITRITE UA  Negative   WBC UA /HPF 0-2   BACTERIA UA /HPF None Seen   SQUAM EPITHEL UA /HPF 0-2     Blood Culture   Date Value Ref Range Status   03/05/2020 No growth at 24 hours  Preliminary   03/05/2020 No growth at 24 hours  Preliminary   No results found for: RESPCXNo results found for: URINECXNo results found for: WOUNDCXNo results found for: BODYFLDCXNo results found for: STOOLCX  I have personally looked at the labs and they are summarized  above.  ----------------------------------------------------------------------------------------------------------------------  Imaging Results (Last 24 Hours)     ** No results found for the last 24 hours. **        I have personally reviewed the radiology images and read the final radiology report.    Assessment & Plan         ASSESSMENT/PLAN:  · Acute hepatic encephalopathy given lactulose noncompliance: resolved. Ammonia improved. decrease Lactulose 20 g 2 times oral daily. Continue with home rifaximin. on dysphagia diet after SLP evaluation. CT head did not reveal acute source of encephalopathy. She has not historically required frequent paracentesis.     · SOB: ? Etiology. Some wheezing but the patient does not have COPD. on duO-NEBS. CT PE protocol with atelectasis, negative for PE.     · Cirrhosis with known prior history of hepatitis C: Will monitor.  AST/  INR slightly elevated.  Thrombocytopenia present.  CT abdomen pelvis done which did not show any ascites and showed liver cirrhosis.     · Mild hypernatremia likely secondary to poor oral intake: resolved.  Echocardiogram from Oct 2019 has been reviewed with estimated ejection fraction of 65% and grade 1 diastolic dysfunction.  Increase oral fluids.     · Hypokalemia, resolved.     · Diastolic CHF, appears compensated     · Chronic kidney disease stage II: Avoid nephrotoxins when possible.     · Essential hypertension. on Aldactone and lasix, monitor closely.       · Advanced age: Supportive care.     · Chronic Thrombocytopenia: stable. No acute bleeding. Repeat AM CBC.      · Macrocytic anemia, hgb stable.      · Hypothyroidism: TSH is elevated with low free T4.  Will increase dose to 100 mcg. Would need repeat thyroid profile In 6 weeks. At present, it is unclear if she has been compliant with home levothyroxine.     · Trigeminal Neuralgia: on home Trileptal.    · B/L pedel edema, will get doppler B/L lower extremities.      PT OT  consulted.    Management discussed in detail with patient family nursing.     The patient is high risk due to the following diagnoses/reasons:  Hepatic encephalopathy    Anastasiya Fuchs MD  03/08/20  3:57 PM

## 2020-03-08 NOTE — NURSING NOTE
Walking rounds and skin assessment completed with PHILLIP Lozano. See skin assessment flowsheet for findngs.

## 2020-03-08 NOTE — NURSING NOTE
Walking rounds and skin assessment completed with PHILLIP Lozano. See skin assessment flowsheet for findings.

## 2020-03-08 NOTE — NURSING NOTE
"Transitional Care Note    Enrolled in [facility] Transitional Care Note    Enrolled in [facility] Transitional Care Services under theTCM Model to be followed for 6 weeks post discharge.  BTC will assist with support and education at time of transition home from hospital.  Hospital  will follow throughout the stay at [facility].  Home  will visit within 48 hours of discharge and follow with home vitals and telephone contact for 6 weeks.      Patient admitted to [facility] via Emergency Department, complaints of altered mental status.  Admitted for treatment metabolic encephalopathy.        Patient lying in bed pleasant and talkative.     Explained transition to home program and Patient is agreeable to home visits.  Home  will be Cheyanne Sena RN    Clinical Assessment Instrument Scores:  >Short Portable Mental Status 4, normal mental function  >Geriatric Depression Scale 5, normal  >IADL 3, Dependent with ADL's  >SINGH-ADL 0, Dependent with self-care  >Subjective Health Rating \"fair\"  >General Anxiety Scale  0    "

## 2020-03-08 NOTE — PLAN OF CARE
Problem: Skin Injury Risk (Adult)  Goal: Identify Related Risk Factors and Signs and Symptoms  Outcome: Ongoing (interventions implemented as appropriate)  Flowsheets (Taken 3/6/2020 0623 by Darcie Rubi RN)  Related Risk Factors (Skin Injury Risk): advanced age;cognitive impairment;mobility impaired;moisture  Goal: Skin Health and Integrity  Outcome: Ongoing (interventions implemented as appropriate)     Problem: Fall Risk (Adult)  Goal: Identify Related Risk Factors and Signs and Symptoms  Outcome: Ongoing (interventions implemented as appropriate)  Flowsheets (Taken 3/7/2020 1502)  Related Risk Factors (Fall Risk): age-related changes;gait/mobility problems;environment unfamiliar  Signs and Symptoms (Fall Risk): presence of risk factors  Goal: Absence of Fall  Outcome: Ongoing (interventions implemented as appropriate)     Problem: Patient Care Overview  Goal: Plan of Care Review  Outcome: Ongoing (interventions implemented as appropriate)  Flowsheets  Taken 3/8/2020 0330 by Ama Flores RN  Progress: no change  Taken 3/7/2020 1925 by Ama Flores RN  Plan of Care Reviewed With: patient  Note:   Patient has complained of nausea and back pain throughout shift. Medications given per MD. MD aware that patient still complaining of nausea. Patient has slept most of the day in no visual sign of distress. Will continue to monitor.   Goal: Individualization and Mutuality  Outcome: Ongoing (interventions implemented as appropriate)  Goal: Discharge Needs Assessment  Outcome: Ongoing (interventions implemented as appropriate)  Goal: Interprofessional Rounds/Family Conf  Outcome: Ongoing (interventions implemented as appropriate)

## 2020-03-09 NOTE — THERAPY EVALUATION
Acute Care - Physical Therapy Initial Evaluation   Mark     Patient Name: Alma Delia Garcia  : 1930  MRN: [id number]  Today's Date: 3/9/2020   Onset of Illness/Injury or Date of Surgery: 20  Date of Referral to PT: 20  Referring Physician: Dr. Diamond      Admit Date: 3/5/2020    Visit Dx:     ICD-10-CM ICD-9-CM   1. Hepatic encephalopathy (CMS/HCC) K72.90 572.2   2. Hypophosphatemia E83.39 275.3   3. Altered mental status, unspecified altered mental status type R41.82 780.97   4. Hypothyroidism, unspecified type E03.9 244.9     Patient Active Problem List   Diagnosis   • Hepatic cirrhosis*   • HTN (hypertension)*   • Anxiety disorder*   • Osteoporosis   • Hypothyroidism*   • GERD (gastroesophageal reflux disease)*   • Chronic pain syndrome due to fractured spine and left rib fracture*   • Anemia, chronic disease   • Arthralgia of hip   • Primary insomnia   • Peripheral vascular disease (CMS/HCC)   • Nonrheumatic aortic valve insufficiency, mild   • Chronic diastolic heart failure (Grade 1)    • Poor venous access   • Chronic kidney disease, stage III (moderate) (CMS/HCC)   • Acute hepatic encephalopathy   • Coronary artery disease   • Restless leg syndrome   • Hepatitis C   • Debility in setting of chronic illness    • Respiratory alkalosis with secondary metabolic alkalosis    • Prolonged Q-T interval on ECG   • Macrocytic anemia   • Hypoalbuminemia   • Encephalopathy   • Hepatic encephalopathy (CMS/HCC)   • Hypernatremia     Past Medical History:   Diagnosis Date   • Acute renal failure (CMS/HCC)    • Alcohol abuse     in remission   • Anxiety    • Bell's palsy    • Cellulitis     LLE   • CHF (congestive heart failure) (CMS/HCC)     Diastolic   • CHF (congestive heart failure) (CMS/HCC)    • Constipation    • Coronary artery disease    • Diverticulosis    • GERD (gastroesophageal reflux disease)    • Hepatic cirrhosis (CMS/HCC)    • Hepatitis C    • History of transfusion    • Hypertension    •  Hypothyroidism    • MI (mitral incompetence)    • Osteoporosis    • Osteoporosis    • Restless leg syndrome    • Thrombocytopenia (CMS/HCC)    • Trigeminal neuralgia      Past Surgical History:   Procedure Laterality Date   • BACK SURGERY      KYPHOPLASTIES    • CARDIAC CATHETERIZATION Left 08/2004   • CARDIOVASCULAR STRESS TEST  01/25/2015   • CATARACT EXTRACTION     • CHOLECYSTECTOMY     • CLAVICLE SURGERY Right    • COLONOSCOPY  10/2004   • ECHO - CONVERTED  11/2012   • FEMUR OPEN REDUCTION INTERNAL FIXATION Left 10/12/2017    Procedure: FEMUR OPEN REDUCTION INTERNAL FIXATION;  Surgeon: Karson Pierre MD;  Location: [facility];  Service:    • HIP FRACTURE SURGERY Bilateral     ARTHROPLASTIES    • PORTACATH PLACEMENT N/A 9/28/2018    Procedure: INSERTION OF PORTACATH;  Surgeon: Vic Mauricio MD;  Location[facility] OR;  Service: General   • SUBTOTAL HYSTERECTOMY     • WRIST FRACTURE SURGERY          PT ASSESSMENT (last 12 hours)      Physical Therapy Evaluation     Row Name 03/09/20 1300          PT Evaluation Time/Intention    Subjective Information  complains of;weakness  -BC     Document Type  therapy note (daily note)  -BC     Mode of Treatment  physical therapy  -BC     Patient Effort  adequate  -BC     Row Name 03/09/20 1300          General Information    Patient Profile Reviewed?  yes  -BC     Onset of Illness/Injury or Date of Surgery  03/05/20  -BC     Referring Physician  Dr. Diamond  -BC     Prior Level of Function  mod assist:  -BC     Equipment Currently Used at Home  commode;walker, rolling;hospital bed  -BC     Risks Reviewed  patient:;LOB;nausea/vomiting;dizziness;increased discomfort;change in vital signs;increased drainage;lines disloged  -BC     Benefits Reviewed  patient:;improve function;increase independence;increase strength;increase balance;decrease pain;decrease risk of DVT;improve skin integrity;increase knowledge  -BC     Row Name 03/09/20 1300           Relationship/Environment    Primary Source of Support/Comfort  child(jojo)  -BC     Lives With  child(jojo), adult  -BC     Row Name 03/09/20 1300          Resource/Environmental Concerns    Current Living Arrangements  home/apartment/condo  -BC     Row Name 03/09/20 1300          Cognitive Assessment/Intervention- PT/OT    Orientation Status (Cognition)  oriented to;person  -BC     Follows Commands (Cognition)  WFL  -BC     Row Name 03/09/20 1300          Mobility Assessment/Treatment    Extremity Weight-bearing Status  left lower extremity;right lower extremity  -BC     Left Lower Extremity (Weight-bearing Status)  weight-bearing as tolerated (WBAT)  -BC     Right Lower Extremity (Weight-bearing Status)  weight-bearing as tolerated (WBAT)  -BC     Row Name 03/09/20 1300          Bed Mobility Assessment/Treatment    Bed Mobility Assessment/Treatment  bed mobility (all) activities  -BC     [place] Level (Bed Mobility)  moderate assist (50% patient effort)  -BC     Bed Mobility, Safety Issues  cognitive deficits limit understanding  -BC     Assistive Device (Bed Mobility)  bed rails  -BC     Row Name 03/09/20 1300          Transfer Assessment/Treatment    Transfer Assessment/Treatment  sit-stand transfer;stand-sit transfer  -BC     Maintains Weight-bearing Status (Transfers)  able to maintain  -BC     Sit-Stand [place] (Transfers)  moderate assist (50% patient effort)  -BC     Stand-Sit [place] (Transfers)  moderate assist (50% patient effort)  -BC     Row Name 03/09/20 1300          Sit-Stand Transfer    Assistive Device (Sit-Stand Transfers)  walker, front-wheeled  -BC     Row Name 03/09/20 1300          Stand-Sit Transfer    Assistive Device (Stand-Sit Transfers)  walker, front-wheeled  -BC     Row Name 03/09/20 1300          Gait/Stairs Assessment/Training    Gait/Stairs Assessment/Training  gait/ambulation independence  -BC     [place] Level (Gait)  moderate assist (50% patient effort)  -BC      Assistive Device (Gait)  walker, front-wheeled  -BC     Distance in Feet (Gait)  8 ft  -BC     Row Name 03/09/20 1300          General ROM    GENERAL ROM COMMENTS  ROM WFL  -[facility] Name 03/09/20 1300          MMT (Manual Muscle Testing)    General MMT Comments  MMT 3+/5  -BC     Row Name 03/09/20 1300          Coping    Observed Emotional State  accepting  -BC     Verbalized Emotional State  acceptance  -[facility] Name 03/09/20 1300          Plan of Care Review    Plan of Care Reviewed With  patient  -[facility] Name 03/09/20 1300          Physical Therapy Clinical Impression    Date of Referral to PT  03/05/20  -BC     Functional Level at Time of Evaluation (PT Clinical Impression)  adequate  -BC     Criteria for Skilled Interventions Met (PT Clinical Impression)  yes;treatment indicated  -BC     Rehab Potential (PT Clinical Summary)  fair, will monitor progress closely  -BC     Predicted Duration of Therapy (PT)  LOS  -BC     Row Name 03/09/20 1300          Physical Therapy Goals    Bed Mobility Goal Selection (PT)  bed mobility, PT goal 1  -BC     Transfer Goal Selection (PT)  transfer, PT goal 1  -BC     Gait Training Goal Selection (PT)  gait training, PT goal 1  -[facility] Name 03/09/20 1300          Bed Mobility Goal 1 (PT)    Activity/Assistive Device (Bed Mobility Goal 1, PT)  bed mobility activities, all  -BC     [place] Level/Cues Needed (Bed Mobility Goal 1, PT)  minimum assist (75% or more patient effort)  -BC     Time Frame (Bed Mobility Goal 1, PT)  by discharge  -BC     Row Name 03/09/20 1300          Transfer Goal 1 (PT)    Activity/Assistive Device (Transfer Goal 1, PT)  transfers, all  -BC     [place] Level/Cues Needed (Transfer Goal 1, PT)  minimum assist (75% or more patient effort)  -BC     Time Frame (Transfer Goal 1, PT)  by discharge  -BC     Row Name 03/09/20 1300          Gait Training Goal 1 (PT)    Activity/Assistive Device (Gait Training Goal 1, PT)  gait (walking  locomotion)  -BC     [place] Level (Gait Training Goal 1, PT)  minimum assist (75% or more patient effort)  -BC     Distance (Gait Goal 1, PT)  40 ft  -BC     Time Frame (Gait Training Goal 1, PT)  by discharge  -BC     Row Name 03/09/20 1300          Positioning and Restraints    Pre-Treatment Position  in bed  -BC     Post Treatment Position  bed  -BC     In Bed  notified nsg;supine;call light within reach;side rails up x3  -BC       User Key  (r) = Recorded By, (t) = Taken By, (c) = Cosigned By    Initials Name Provider Type    Rosa Summers PT Physical Therapist          PT Recommendation and Plan  Planned Therapy Interventions (PT Eval): balance training, bed mobility training, gait training, home exercise program, patient/family education, strengthening, transfer training  Therapy Frequency (PT Clinical Impression): (3-5x/week)  Plan of Care Reviewed With: patient     Time Calculation:   PT Charges     Row Name 03/09/20 1439             Time Calculation    PT Received On  03/09/20  -BC      PT Goal Re-Cert Due Date  03/23/20  -BC         Time Calculation- PT    Total Timed Code Minutes- PT  60 minute(s)  -BC         Timed Charges    60535 - Gait Training Minutes   15  -BC        User Key  (r) = Recorded By, (t) = Taken By, (c) = Cosigned By    Initials Name Provider Type    Rosa Summers PT Physical Therapist        Therapy Charges for Today     Code Description Service Date Service Provider Modifiers Qty    06225337691 HC GAIT TRAINING EA 15 MIN 3/9/2020 Rosa Stringer, PT GP 1    88073765298 HC PT EVAL MOD COMPLEXITY 3 3/9/2020 Rosa Stringer PT GP 1                 Rosa Stringer PT  3/9/2020

## 2020-03-09 NOTE — PLAN OF CARE
SLP f/u this am for diet safety/upgrade. She is s/p MBS on 3/6/2020 w/ SLP recs of mechanical soft, ground w/ thin liquids. Pt is noted w/ improvement in overall status, felt to most benefit from upgrade to mechanical soft, chopped w/ thin liquids. SLP to f/u for diet safety/acceptance of upgraded po diet.  Problem: Patient Care Overview  Goal: Plan of Care Review  Outcome: Ongoing (interventions implemented as appropriate)

## 2020-03-09 NOTE — PLAN OF CARE
Problem: Skin Injury Risk (Adult)  Goal: Identify Related Risk Factors and Signs and Symptoms  Outcome: Ongoing (interventions implemented as appropriate)  Flowsheets (Taken 3/6/2020 0623 by Darcie Rubi, RN)  Related Risk Factors (Skin Injury Risk): advanced age;cognitive impairment;mobility impaired;moisture  Goal: Skin Health and Integrity  Outcome: Ongoing (interventions implemented as appropriate)     Problem: Fall Risk (Adult)  Goal: Identify Related Risk Factors and Signs and Symptoms  Outcome: Ongoing (interventions implemented as appropriate)  Flowsheets (Taken 3/7/2020 1502)  Related Risk Factors (Fall Risk): age-related changes;gait/mobility problems;environment unfamiliar  Signs and Symptoms (Fall Risk): presence of risk factors  Goal: Absence of Fall  Outcome: Ongoing (interventions implemented as appropriate)     Problem: Patient Care Overview  Goal: Plan of Care Review  Outcome: Ongoing (interventions implemented as appropriate)  Flowsheets  Taken 3/9/2020 1744 by Marta Cooper RN  Progress: no change  Taken 3/9/2020 1300 by Rosa Stringer PT  Plan of Care Reviewed With: patient  Note:   Patient is feeling better this shift. Denies any complaints. Ammonia level was elevated today so discharge was cancelled. Per Dr Diamond discussed possibility of rehab placement with family. No definite response given. Will continue to monitor patient.  Goal: Individualization and Mutuality  Outcome: Ongoing (interventions implemented as appropriate)  Goal: Discharge Needs Assessment  Outcome: Ongoing (interventions implemented as appropriate)  Goal: Interprofessional Rounds/Family Conf  Outcome: Ongoing (interventions implemented as appropriate)

## 2020-03-09 NOTE — PROGRESS NOTES
[facility] HOSPITALIST PROGRESS NOTE     Patient Identification:  Name:  Alma Delia Garcia  Age:  89 y.o.  Sex:  female  :  1930  MRN:  [id number]  Visit Number:  [id number]  Primary Care Provider:  Galina Gonsalves MD    Length of stay:  3    Chief complaint:  89 y.o. old female admitted with Altered Mental Status          Subjective:    Patient seen with nursing staff.  No family was present at bedside.  Patient is lying comfortably in bed and denies any complaints.  Nursing staff reports that patient had a bowel movement last night but has not had one today.  Patient was sleepy when I went in to see her but woke up easily.       Current Hospital Meds:    aspirin 81 mg Oral Daily   furosemide 20 mg Oral Daily   ipratropium-albuterol 3 mL Nebulization 4x Daily - RT   lactulose 20 g Oral BID   levothyroxine 100 mcg Oral Daily   OXcarbazepine 150 mg Oral QAM   OXcarbazepine 300 mg Oral Nightly   pantoprazole 40 mg Oral QAM   riFAXIMin 550 mg Oral BID   rOPINIRole 1 mg Oral Nightly   sodium chloride 10 mL Intravenous Q12H   sodium chloride 10 mL Intravenous Q12H   spironolactone 50 mg Oral Daily        ----------------------------------------------------------------------------------------------------------------------  Vital Signs:  Temp:  [97.8 °F (36.6 °C)-98.5 °F (36.9 °C)] 97.8 °F (36.6 °C)  Heart Rate:  [69-84] 74  Resp:  [16-20] 18  BP: (143-163)/(53-59) 147/55      20  1142 20  1646   Weight: 65.8 kg (145 lb) 67 kg (147 lb 11.2 oz)     Body mass index is 28.85 kg/m².    Intake/Output Summary (Last 24 hours) at 3/9/2020 1347  Last data filed at 3/9/2020 1251  Gross per 24 hour   Intake 600 ml   Output --   Net 600 ml     Diet Soft Texture; Chopped; Thin  ----------------------------------------------------------------------------------------------------------------------  Physical exam:  Constitutional:   Elderly female, sitting in bed, no acute distress.  HENT:  Head:   Normocephalic and atraumatic.  Mouth:  Moist mucous membranes.    Eyes:  Pupils are equal, round, and reactive to light.   Neck:  Neck supple.  No JVD present.    Cardiovascular:  Regular rate and rhythm. S1+S2. No murmur, rubs or gallops.   Pulmonary/Chest: Clear to auscultation bilaterally.   Abdominal:  Soft. Non-tender. No viscera palpable.  Bowel sounds audible.   Musculoskeletal: No deformity or joint swelling.   Peripheral vascular: Bilateral dorsalis pedis palpable. No edema.   Neurological: Awake and alert, sleepy but arousable.  Patient has flapping tremors in both hands.  Skin:  Skin is warm and dry. No rash noted. No pallor.   ----------------------------------------------------------------------------------------------------------------------  Tele:    ----------------------------------------------------------------------------------------------------------------------  Results from last 7 days   Lab Units 03/07/20  0749   TROPONIN T ng/mL 0.018     Results from last 7 days   Lab Units 03/09/20  0119 03/08/20  0135 03/07/20  0749  03/05/20  1204   LACTATE mmol/L  --   --   --   --  1.9   WBC 10*3/mm3 6.36 5.17 5.08   < > 4.54   HEMOGLOBIN g/dL 9.6* 10.5* 9.4*   < > 10.4*   HEMATOCRIT % 30.6* 33.3* 29.8*   < > 32.5*   MCV fL 104.1* 99.7* 101.0*   < > 99.1*   MCHC g/dL 31.4* 31.5 31.5   < > 32.0   PLATELETS 10*3/mm3 100* 109* 108*   < > 119*   INR   --   --   --   --  1.21*    < > = values in this interval not displayed.     Results from last 7 days   Lab Units 03/05/20  1159   PH, ARTERIAL pH units 7.486*   PO2 ART mm Hg 73.2*   PCO2, ARTERIAL mm Hg 36.8   HCO3 ART mmol/L 27.7*     Results from last 7 days   Lab Units 03/09/20  0119 03/08/20  1756 03/08/20  0135 03/07/20  0749  03/05/20  1252   SODIUM mmol/L 142  --  141 144   < > 146*   POTASSIUM mmol/L 4.1  --  3.9 3.4*   < > 3.9   MAGNESIUM mg/dL  --   --  2.0 1.8  --  1.6   CHLORIDE mmol/L 108*  --  107 111*   < > 109*   CO2 mmol/L 24.1  --  24.2  24.2   < > 24.0   BUN mg/dL 14  --  12 14   < > 15   CREATININE mg/dL 0.92  --  0.81 0.77   < > 0.83   EGFR IF NONAFRICN AM mL/min/1.73 57*  --  67 71   < > 65   CALCIUM mg/dL 8.3*  --  8.6 8.3*   < > 9.2   GLUCOSE mg/dL 118*  --  109* 87   < > 86   ALBUMIN g/dL 2.33* 2.60*  --   --   --  2.98*   BILIRUBIN mg/dL 1.0 1.2  --   --   --  1.5*   ALK PHOS U/L 97 101  --   --   --  129*   AST (SGOT) U/L 38* 41*  --   --   --  60*   ALT (SGPT) U/L 20 22  --   --   --  28    < > = values in this interval not displayed.   Estimated Creatinine Clearance: 35.4 mL/min (by C-G formula based on SCr of 0.92 mg/dL).    Ammonia   Date Value Ref Range Status   03/09/2020 87 (H) 11 - 51 umol/L Final     Results from last 7 days   Lab Units 03/05/20  1252   CHOLESTEROL mg/dL 190   TRIGLYCERIDES mg/dL 84   HDL CHOL mg/dL 119*   LDL CHOL mg/dL 54     Blood Culture   Date Value Ref Range Status   03/05/2020 No growth at 3 days  Preliminary   03/05/2020 No growth at 4 days  Preliminary     No results found for: URINECX  No results found for: WOUNDCX  No results found for: STOOLCX    I have personally looked at the labs and they are summarized above.  ----------------------------------------------------------------------------------------------------------------------  Imaging Results (Last 24 Hours)     Procedure Component Value Units Date/Time    US Venous Doppler Lower Extremity Bilateral (duplex) [[id number]] Collected:  03/09/20 0741     Updated:  03/09/20 0744    Narrative:       US VENOUS DOPPLER LOWER EXTREMITY BILATERAL (DUPLEX)-     CLINICAL INDICATION: R/O DVT; K72.90-Hepatic failure, unspecified  without coma; E83.39-Other disorders of phosphorus metabolism;  R41.82-Altered mental status, unspecified; E03.9-Hypothyroidism,  unspecified        COMPARISON: None available      TECHNIQUE: Color Doppler imaging was used with compression and  augmentation to evaluate the lower extremity deep venous system.     FINDINGS:   There is  patent spontaneous flow from the common femoral vein through  the posterior tibial veins.  There was no internal clot or area of noncompressibility.  Normal augmentation was elicited where applicable.       Impression:       No DVT in the lower extremities on today's exam.      This report was finalized on 3/9/2020 7:41 AM by Dr. Byron Mcintosh MD.           ----------------------------------------------------------------------------------------------------------------------  Assessment and Plan:    -Acute hepatic encephalopathy  Likely due to lactulose noncompliance.  Patient had improvement in ammonia level but has flapping tremors today and ammonia level was found to be elevated.  Will increase lactulose to 20 g 3 times a day and continue rifaximin.  CT scan of the head showed no acute intracranial abnormality.  Vitamin B12 and folic acid level within normal limits.    -Decompensated cirrhosis  Patient has prior history of hepatitis C.  CT scan of the abdomen showed nodular liver contour and no significant amount of ascites.  LFTs mildly elevated and remain unchanged since yesterday.    -Chronic diastolic CHF  Compensated.  Continue Lasix and Aldactone.  Continue to monitor intake and output.    -CKD stage II  Stable.  Creatinine within normal limits.    -Essential HTN  Blood pressure reasonably controlled.  Continue to monitor blood pressure.    -Chronic thrombocytopenia  Due to underlying cirrhosis.  Platelet count stable.  Continue to monitor CBC.    -Hypothyroidism  TSH was elevated with low free T4.  Levothyroxine increased 200 mcg daily.  Continue levothyroxine and repeat TSH in 4 to 6 weeks.    -Trigeminal neuralgia  Continue Trileptal.    -Hypokalemia  Resolved.    -Hypernatremia  Resolved.    Activity: Up with assist, PT/OT.  Nutrition: Regular diet  Fluids: None  DVT prophylaxis: SCDs  GI prophylaxis: PPI    The patient is high risk due to: Decompensated cirrhosis, hepatic encephalopathy, CHF, CKD,  HTN.    I discussed the patients findings and my recommendations with patient and nursing staff.    Delores Diamond MD  03/09/20  1:47 PM

## 2020-03-09 NOTE — PLAN OF CARE
Problem: Skin Injury Risk (Adult)  Goal: Identify Related Risk Factors and Signs and Symptoms  Description  Related risk factors and signs and symptoms are identified upon initiation of Human Response Clinical Practice Guideline (CPG).  Outcome: Ongoing (interventions implemented as appropriate)  Flowsheets (Taken 3/6/2020 0623 by Darcie Rubi RN)  Related Risk Factors (Skin Injury Risk): advanced age;cognitive impairment;mobility impaired;moisture  Goal: Skin Health and Integrity  Description  Patient will demonstrate the desired outcomes by discharge/transition of care.  Outcome: Ongoing (interventions implemented as appropriate)  Flowsheets (Taken 3/9/2020 0308)  Skin Health and Integrity: making progress toward outcome  Intervention: Promote/Optimize Nutrition  Flowsheets (Taken 3/8/2020 1915)  Oral Nutrition Promotion: rest periods promoted;safe use of adaptive equipment encouraged  Intervention: Prevent/Manage Excess Moisture  Flowsheets  Taken 3/8/2020 2020 by Sarah Childers RN  Perineal Care: absorbent pad changed  Bathing/Skin Care: patient refused  Taken 3/8/2020 1915 by Ama Flores RN  Skin Protection: adhesive use limited;transparent dressing maintained;tubing/devices free from skin contact  Intervention: Maintain Head of Bed Elevation Less Than 30 Degrees as Tolerated  Flowsheets (Taken 3/9/2020 0200 by Sarah Childers RN)  Head of Bed (HOB): HOB at 30-45 degrees  Intervention: Prevent/Minimize Shear/Friction Injuries  Flowsheets  Taken 3/8/2020 1915 by Ama Flores RN  Pressure Reduction Devices: pressure-redistributing mattress utilized  Taken 3/9/2020 0200 by Sarah Childers RN  Positioning/Transfer Devices: in use;wedge;pillows  Intervention: Prevent or Minimize Pressure  Flowsheets  Taken 3/8/2020 1915 by Ama Flores RN  Pressure Reduction Techniques: heels elevated off bed;weight shift assistance provided  Taken 3/9/2020 0200 by Sarah Childers RN  Body  Position: patient/family refused;side-lying, right     Problem: Fall Risk (Adult)  Goal: Identify Related Risk Factors and Signs and Symptoms  Description  Related risk factors and signs and symptoms are identified upon initiation of Human Response Clinical Practice Guideline (CPG).  Outcome: Ongoing (interventions implemented as appropriate)  Flowsheets (Taken 3/7/2020 1502 by Marta Cooper RN)  Related Risk Factors (Fall Risk): age-related changes;gait/mobility problems;environment unfamiliar  Signs and Symptoms (Fall Risk): presence of risk factors  Goal: Absence of Fall  Description  Patient will demonstrate the desired outcomes by discharge/transition of care.  Outcome: Ongoing (interventions implemented as appropriate)  Flowsheets (Taken 3/9/2020 0308)  Absence of Fall: making progress toward outcome  Intervention: Monitor/Assist with Self Care  Flowsheets  Taken 3/9/2020 0200 by Sarah Childers, RN  Activity Assistance Provided: assistance, 1 person  Taken 3/8/2020 1915 by Ama Flores RN  Self-Care Promotion: independence encouraged;BADL personal objects within reach  Intervention: Reduce Risk/Promote Restraint Free Environment  Flowsheets (Taken 3/9/2020 0300)  Safety Promotion/Fall Prevention: activity supervised;fall prevention program maintained;nonskid shoes/slippers when out of bed;safety round/check completed  Environmental Safety Modification: assistive device/personal items within reach;clutter free environment maintained;room organization consistent  Safety/Security Measures: bed alarm set  Intervention: Review Medications/Identify Contributors to Fall Risk  Flowsheets (Taken 3/9/2020 0300)  Medication Review/Management: medications reviewed     Problem: Patient Care Overview  Goal: Plan of Care Review  Outcome: Ongoing (interventions implemented as appropriate)  Flowsheets  Taken 3/8/2020 0330  Progress: no change  Taken 3/8/2020 1915  Plan of Care Reviewed With: patient    Pt  rested comfortably throughout the night. Pt denies any chest pain, shortness of breath, or vomiting. Pt did continue to complain of some nausea. Pt remained free from falls for the shift. Pt able to tolerate oral fluid intake without emesis. Pt continues to be monitored on telemetry. Pt denies any needs or concerns at this time. Will continue to monitor.   Goal: Individualization and Mutuality  Outcome: Ongoing (interventions implemented as appropriate)  Goal: Discharge Needs Assessment  Outcome: Ongoing (interventions implemented as appropriate)  Flowsheets  Taken 3/6/2020 1619 by Fransisca Packer BSW  Equipment Needed After Discharge: none  Equipment Currently Used at Home: commode;walker, rolling;hospital bed  Transportation Anticipated: family or friend will provide  Transportation Concerns: car, none  Patient/Family Anticipates Transition to: home with family  Taken 3/5/2020 1635 by Anu Garay, RN  Patient/Family Anticipated Services at Transition: none  Goal: Interprofessional Rounds/Family Conf  Outcome: Ongoing (interventions implemented as appropriate)  Flowsheets (Taken 3/8/2020 0330)  Participants: dietitian/nutrition services;family;advanced practice nurse;nursing;patient;pharmacy;physician;respiratory therapy;wound care specialist

## 2020-03-09 NOTE — THERAPY TREATMENT NOTE
Acute Care - Speech Language Pathology   Swallow Treatment Note  Mark   DIET SAFETY/ACCEPTANCE     Patient Name: Alma Delia Garcia  : 1930  MRN: [id number]  Today's Date: 3/9/2020      Admit Date: 3/5/2020  Visit Dx:      ICD-10-CM ICD-9-CM   1. Hepatic encephalopathy (CMS/HCC) K72.90 572.2   2. Hypophosphatemia E83.39 275.3   3. Altered mental status, unspecified altered mental status type R41.82 780.97   4. Hypothyroidism, unspecified type E03.9 244.9     Patient Active Problem List   Diagnosis   • Hepatic cirrhosis*   • HTN (hypertension)*   • Anxiety disorder*   • Osteoporosis   • Hypothyroidism*   • GERD (gastroesophageal reflux disease)*   • Chronic pain syndrome due to fractured spine and left rib fracture*   • Anemia, chronic disease   • Arthralgia of hip   • Primary insomnia   • Peripheral vascular disease (CMS/HCC)   • Nonrheumatic aortic valve insufficiency, mild   • Chronic diastolic heart failure (Grade 1)    • Poor venous access   • Chronic kidney disease, stage III (moderate) (CMS/HCC)   • Acute hepatic encephalopathy   • Coronary artery disease   • Restless leg syndrome   • Hepatitis C   • Debility in setting of chronic illness    • Respiratory alkalosis with secondary metabolic alkalosis    • Prolonged Q-T interval on ECG   • Macrocytic anemia   • Hypoalbuminemia   • Encephalopathy   • Hepatic encephalopathy (CMS/HCC)   • Hypernatremia       Therapy Treatment     Ms. Garcia is seen at bedside this am for diet safety/acceptance, determine candidacy for upgraded solid consistency. She is s/p MBS on 3/6/2020 w/ SLP recs of mechanical soft, ground w/ thin liquids. She is noted w/ improvement in ams as well as overall generalized weakness.     She is awake upon entry, positioned upright and centered in bed w/ po lunch tray. Pt is agreeable to accept po intake for diet safety acceptance. SLP assists w/ pt consuming ~50% of her po lunch tray. She consumes multiple presentations of solid cracker w/  mildly increased oral prep time w/o oral residue. She accepts multiple trials of thin liquids via cup and straw. No overt s/s of aspiration. No s/s concerning for silent aspiration.     She is felt to most benefit from upgrade to mechanical soft, chopped meats w/ continued thin liquids. Meds whole in puree. Single presentation. Crushed prn. SLP to f/u for continued diet safety/acceptance s/p diet upgrade.    Thank you for allowing me to participate in the care of your patient-  Lisa Martino M.S., CCC-SLP      Outcome Summary    Recommendations:   1. Mechanical soft, ground w/ thin liquids.  2. Meds whole in puree. Single presentation. Crushed prn.  3. Seth precautions.   4. Oral care protocol.   5. Universal aspiration precautions  6. Upright and centered for all po intake.     SLP to f/u for diet safety/acceptance/upgrade.  EDUCATION  The patient has been educated in the following areas:   Dysphagia (Swallowing Impairment) Oral Care/Hydration Modified Diet Instruction.    SLP Recommendation and Plan    SLP to f/u for diet safety/acceptance.   Time Calculation:   Time Calculation- SLP     Time Calculation- SLP     Row Name 03/09/20 1321    SLP - Next Appointment  03/10/20  -Recorded by: NATHALIE            User Key     Initials Name Provider Type    Lisa Corona MS CCC-SLP Speech and Language Pathologist          Therapy Charges for Today     Code Description Service Date Service Provider Modifiers Qty    07427138048  ST TREATMENT SWALLOW 2 3/9/2020 Lisa Martino MS CCC-SLP GN 1                 Lisa Martino MS CCC-SLP  3/9/2020

## 2020-03-09 NOTE — PROGRESS NOTES
Discharge Planning Assessment   Mark     Patient Name: Alma Delia Garcia  MRN: [id number]  Today's Date: 3/9/2020    Admit Date: 3/5/2020      Discharge Plan     Row Name 03/09/20 1246       Plan    Final Discharge Disposition Code  01 - home or self-care    Final Note  Pt is being discharged home on this day. No needs identified.         Expected Discharge Date and Time     Expected Discharge Date Expected Discharge Time    Mar 9, 2020       BRI Arizmendi

## 2020-03-09 NOTE — THERAPY EVALUATION
Acute Care - Occupational Therapy Initial Evaluation   Mark     Patient Name: Alma Delia Garcia  : 1930  MRN: [id number]  Today's Date: 3/9/2020  Onset of Illness/Injury or Date of Surgery: 20     Referring Physician: Dr. Diamond    Admit Date: 3/5/2020       ICD-10-CM ICD-9-CM   1. Hepatic encephalopathy (CMS/HCC) K72.90 572.2   2. Hypophosphatemia E83.39 275.3   3. Altered mental status, unspecified altered mental status type R41.82 780.97   4. Hypothyroidism, unspecified type E03.9 244.9     Patient Active Problem List   Diagnosis   • Hepatic cirrhosis*   • HTN (hypertension)*   • Anxiety disorder*   • Osteoporosis   • Hypothyroidism*   • GERD (gastroesophageal reflux disease)*   • Chronic pain syndrome due to fractured spine and left rib fracture*   • Anemia, chronic disease   • Arthralgia of hip   • Primary insomnia   • Peripheral vascular disease (CMS/HCC)   • Nonrheumatic aortic valve insufficiency, mild   • Chronic diastolic heart failure (Grade 1)    • Poor venous access   • Chronic kidney disease, stage III (moderate) (CMS/HCC)   • Acute hepatic encephalopathy   • Coronary artery disease   • Restless leg syndrome   • Hepatitis C   • Debility in setting of chronic illness    • Respiratory alkalosis with secondary metabolic alkalosis    • Prolonged Q-T interval on ECG   • Macrocytic anemia   • Hypoalbuminemia   • Encephalopathy   • Hepatic encephalopathy (CMS/HCC)   • Hypernatremia     Past Medical History:   Diagnosis Date   • Acute renal failure (CMS/HCC)    • Alcohol abuse     in remission   • Anxiety    • Bell's palsy    • Cellulitis     LLE   • CHF (congestive heart failure) (CMS/HCC)     Diastolic   • CHF (congestive heart failure) (CMS/HCC)    • Constipation    • Coronary artery disease    • Diverticulosis    • GERD (gastroesophageal reflux disease)    • Hepatic cirrhosis (CMS/HCC)    • Hepatitis C    • History of transfusion    • Hypertension    • Hypothyroidism    • MI (mitral  incompetence)    • Osteoporosis    • Osteoporosis    • Restless leg syndrome    • Thrombocytopenia (CMS/HCC)    • Trigeminal neuralgia      Past Surgical History:   Procedure Laterality Date   • BACK SURGERY      KYPHOPLASTIES    • CARDIAC CATHETERIZATION Left 08/2004   • CARDIOVASCULAR STRESS TEST  01/25/2015   • CATARACT EXTRACTION     • CHOLECYSTECTOMY     • CLAVICLE SURGERY Right    • COLONOSCOPY  10/2004   • ECHO - CONVERTED  11/2012   • FEMUR OPEN REDUCTION INTERNAL FIXATION Left 10/12/2017    Procedure: FEMUR OPEN REDUCTION INTERNAL FIXATION;  Surgeon: Karson Pierre MD;  Location: [facility] OR;  Service:    • HIP FRACTURE SURGERY Bilateral     ARTHROPLASTIES    • PORTACATH PLACEMENT N/A 9/28/2018    Procedure: INSERTION OF PORTACATH;  Surgeon: Vic Mauricio MD;  Location: [facility] OR;  Service: General   • SUBTOTAL HYSTERECTOMY     • WRIST FRACTURE SURGERY            OT ASSESSMENT FLOWSHEET (last 12 hours)      Occupational Therapy Evaluation     Row Name 03/09/20 1500                   OT Evaluation Time/Intention    Document Type  evaluation  -KR        Mode of Treatment  occupational therapy  -KR        Patient Effort  adequate  -KR           General Information    Patient Observations  alert;cooperative;agree to therapy  -KR        Prior Level of Function  mod assist:  -KR           Cognitive Assessment/Intervention- PT/OT    Orientation Status (Cognition)  oriented to;person;place  -KR        Follows Commands (Cognition)  WFL  -KR           ADL Assessment/Intervention    BADL Assessment/Intervention  bathing;upper body dressing;lower body dressing;grooming;feeding;toileting  -KR           Bathing Assessment/Intervention    Bathing [place] Level  bathing skills;moderate assist (50% patient effort)  -KR           Upper Body Dressing Assessment/Training    Upper Body Dressing [place] Level  upper body dressing skills;minimum assist (75% patient effort)  -KR           Lower Body  Dressing Assessment/Training    Lower Body Dressing [place] Level  lower body dressing skills;moderate assist (50% patient effort)  -KR           Grooming Assessment/Training    [place] Level (Grooming)  grooming skills;minimum assist (75% patient effort)  -KR           Self-Feeding Assessment/Training    [place] Level (Feeding)  set up;feeding skills  -KR           Toileting Assessment/Training    [place] Level (Toileting)  toileting skills;moderate assist (50% patient effort)  -KR           General ROM    GENERAL ROM COMMENTS  BUE WFL  -KR           MMT (Manual Muscle Testing)    General MMT Comments  BUE 3-/5  -KR           Clinical Impression (OT)    Criteria for Skilled Therapeutic Interventions Met (OT Eval)  yes  -KR        Rehab Potential (OT Eval)  good, to achieve stated therapy goals  -KR        Anticipated Discharge Disposition (OT)  home with assist  -KR           Planned OT Interventions    Planned Therapy Interventions (OT Eval)  strengthening exercise;ROM/therapeutic exercise  -KR           OT Goals    Strength Goal Selection (OT)  strength, OT goal 1  -KR        Additional Documentation  Strength Goal Selection (OT) (Row)  -KR           Strength Goal 1 (OT)    Strength Goal 1 (OT)  BUE increase x 1 to enhance self care skills  -KR        Time Frame (Strength Goal 1, OT)  by discharge  -KR          User Key  (r) = Recorded By, (t) = Taken By, (c) = Cosigned By    Initials Name Effective Dates    KR Manish Ramirez, OT 04/03/18 -                OT Recommendation and Plan  Outcome Summary/Treatment Plan (OT)  Anticipated Discharge Disposition (OT): home with assist  Planned Therapy Interventions (OT Eval): strengthening exercise, ROM/therapeutic exercise           Time Calculation:   Time Calculation- OT     Row Name 03/09/20 1439             Timed Charges    23118 - Gait Training Minutes   15  -BC        User Key  (r) = Recorded By, (t) = Taken By, (c) = Cosigned By    Initials Name  Provider Type    BC Rosa Stringer, PT Physical Therapist        Therapy Charges for Today     Code Description Service Date Service Provider Modifiers Qty    66849926644 HC OT EVAL MOD COMPLEXITY 4 3/9/2020 Manish Ramirez OT GO 1               Manish Ramirez OT  3/9/2020

## 2020-03-10 NOTE — THERAPY TREATMENT NOTE
Acute Care - Speech Language Pathology   Swallow Treatment Note  Mark   DIET SAFETY/ACCEPTANCE     Patient Name: Alma Delia Garcia  : 1930  MRN: [id number]  Today's Date: 3/10/2020  Onset of Illness/Injury or Date of Surgery: 20   Admit Date: 3/5/2020  Visit Dx:      ICD-10-CM ICD-9-CM   1. Hepatic encephalopathy (CMS/HCC) K72.90 572.2   2. Hypophosphatemia E83.39 275.3   3. Altered mental status, unspecified altered mental status type R41.82 780.97   4. Hypothyroidism, unspecified type E03.9 244.9     Patient Active Problem List   Diagnosis   • Hepatic cirrhosis*   • HTN (hypertension)*   • Anxiety disorder*   • Osteoporosis   • Hypothyroidism*   • GERD (gastroesophageal reflux disease)*   • Chronic pain syndrome due to fractured spine and left rib fracture*   • Anemia, chronic disease   • Arthralgia of hip   • Primary insomnia   • Peripheral vascular disease (CMS/HCC)   • Nonrheumatic aortic valve insufficiency, mild   • Chronic diastolic heart failure (Grade 1)    • Poor venous access   • Chronic kidney disease, stage III (moderate) (CMS/HCC)   • Acute hepatic encephalopathy   • Coronary artery disease   • Restless leg syndrome   • Hepatitis C   • Debility in setting of chronic illness    • Respiratory alkalosis with secondary metabolic alkalosis    • Prolonged Q-T interval on ECG   • Macrocytic anemia   • Hypoalbuminemia   • Encephalopathy   • Hepatic encephalopathy (CMS/HCC)   • Hypernatremia       Therapy Treatment  Rehabilitation Treatment Summary     Treatment Time/Intention     Row Name 03/10/20 0900    Discipline  physical therapist  -BC    Document Type  therapy note (daily note)  -BC    Mode of Treatment  physical therapy  -BC    Therapy Frequency (PT Clinical Impression)  (comments only) 3-5x/week  -BC    Patient Effort  adequate  -BC    Recorded by [BC] Recorded by: Rosa Stringer, PT, Recorded at: 03/10/20 0925          Cognitive Assessment/Intervention- PT/OT     Row Name 03/10/20  0900    Orientation Status (Cognition)  oriented to;person  -BC    Follows Commands (Cognition)  WFL  -BC    Recorded by [BC] Recorded by: Rosa Stringer PT, Recorded at: 03/10/20 0925          Mobility Assessment/Intervention     Row Name 03/10/20 0900    Extremity Weight-bearing Status  left lower extremity;right lower extremity  -BC    Left Lower Extremity (Weight-bearing Status)  weight-bearing as tolerated (WBAT)  -BC    Right Lower Extremity (Weight-bearing Status)  weight-bearing as tolerated (WBAT)  -BC    Recorded by [BC] Recorded by: Rosa Stringer PT, Recorded at: 03/10/20 0925          Bed Mobility Assessment/Treatment     Row Name 03/10/20 0900    Bed Mobility Assessment/Treatment  bed mobility (all) activities  -BC    [place] Level (Bed Mobility)  moderate assist (50% patient effort)  -BC    Bed Mobility, Safety Issues  cognitive deficits limit understanding  -BC    Assistive Device (Bed Mobility)  bed rails  -BC    Recorded by [BC] Recorded by: Rosa Stringer PT, Recorded at: 03/10/20 0925          Transfer Assessment/Treatment     Row Name 03/10/20 0900    Transfer Assessment/Treatment  sit-stand transfer;stand-sit transfer  -BC    Maintains Weight-bearing Status (Transfers)  able to maintain  -BC    Recorded by [BC] Recorded by: Rosa Stringer PT, Recorded at: 03/10/20 0925          Sit-Stand Transfer     Row Name 03/10/20 0900    Sit-Stand [place] (Transfers)  moderate assist (50% patient effort)  -BC    Assistive Device (Sit-Stand Transfers)  walker, front-wheeled  -BC    Recorded by [BC] Recorded by: Rosa Stringer PT, Recorded at: 03/10/20 0925          Stand-Sit Transfer     Row Name 03/10/20 0900    Stand-Sit [place] (Transfers)  moderate assist (50% patient effort)  -BC    Assistive Device (Stand-Sit Transfers)  walker, front-wheeled  -BC    Recorded by [BC] Recorded by: Rosa Stringer PT, Recorded at: 03/10/20 0925          Gait/Stairs  Assessment/Training     Row Name 03/10/20 0900    Gait/Stairs Assessment/Training  gait/ambulation independence  -BC    [place] Level (Gait)  moderate assist (50% patient effort)  -BC    Assistive Device (Gait)  walker, front-wheeled  -BC    Distance in Feet (Gait)  20 ft  -BC    Recorded by [BC] Recorded by: Rosa Stringer PT, Recorded at: 03/10/20 0925          Positioning and Restraints     Row Name 03/10/20 0900    Pre-Treatment Position  in bed  -BC    Post Treatment Position  bed  -BC    In Bed  notified nsg;supine;call light within reach;encouraged to call for assist;side rails up x3  -BC    Recorded by [BC] Recorded by: Rosa Stringer PT, Recorded at: 03/10/20 0925          Coping     Row Name 03/10/20 0900    Observed Emotional State  accepting  -BC    Verbalized Emotional State  acceptance  -BC    Recorded by [BC] Recorded by: Rosa Stringer PT, Recorded at: 03/10/20 0925            User Key     Initials Name Effective Dates Discipline    BC Rosa Stringer PT 03/14/16 -  PT         Ms. Garcia is seen at bedside this am for diet safety/acceptance, determine candidacy for upgraded solid consistency. She is s/p MBS on 3/6/2020 w/ SLP recs of mechanical soft, ground w/ thin liquids. SLP f/u yesterday w/ diet safety/upgrade completed w/ upgrade to chopped meats. She is noted w/ improvement in ams as well as overall generalized weakness.     She is resting upon entry, however easily awakens w/ verbal stimuli, positioned laying flat in bed. SLP repositions pt to upright and centered in bed. Pt is agreeable to accept po intake for continued diet safety/acceptance s/p upgrade. She consumes multiple presentations of solid cracker w/ mildly increased oral prep time w/o oral residue. She accepts multiple trials of thin liquids via cup and straw. No overt s/s of aspiration. No s/s concerning for silent aspiration.     She is felt to most benefit from continued modified po diet of mechanical soft,  chopped meats w/ continued thin liquids. Meds whole in puree. Single presentation. Crushed prn. This is felt to be most representative of pt's baseline diet. No further SLP f/u warranted at this time.     Thank you for allowing me to participate in the care of your patient-  Lisa Martino M.S., CCC-SLP      Outcome Summary    Recommendations:   1. Mechanical soft, chopped w/ thin liquids.  2. Meds whole in puree. Single presentation. Crushed prn.  3. Seth precautions.   4. Oral care protocol.   5. Universal aspiration precautions  6. Upright and centered for all po intake.       EDUCATION  The patient has been educated in the following areas:   Dysphagia (Swallowing Impairment) Oral Care/Hydration Modified Diet Instruction.    SLP Recommendation and Plan    No further SLP f/u warranted at this time.  Time Calculation:       Therapy Charges for Today     Code Description Service Date Service Provider Modifiers Qty    53625022289 HC ST TREATMENT SWALLOW 2 3/9/2020 Lisa Martino, MS CCC-SLP GN 1    [id number] HC ST TREATMENT SWALLOW 2 3/10/2020 Lisa Martino, MS CCC-SLP GN 1                 Lisa Martino MS CCC-SLP  3/10/2020

## 2020-03-10 NOTE — PLAN OF CARE
Problem: Skin Injury Risk (Adult)  Goal: Identify Related Risk Factors and Signs and Symptoms  Outcome: Ongoing (interventions implemented as appropriate)  Flowsheets (Taken 3/6/2020 0623 by Darcie Rubi, RN)  Related Risk Factors (Skin Injury Risk): advanced age;cognitive impairment;mobility impaired;moisture  Goal: Skin Health and Integrity  Outcome: Ongoing (interventions implemented as appropriate)     Problem: Fall Risk (Adult)  Goal: Identify Related Risk Factors and Signs and Symptoms  Outcome: Ongoing (interventions implemented as appropriate)  Flowsheets (Taken 3/7/2020 1502)  Related Risk Factors (Fall Risk): age-related changes;gait/mobility problems;environment unfamiliar  Signs and Symptoms (Fall Risk): presence of risk factors  Goal: Absence of Fall  Outcome: Ongoing (interventions implemented as appropriate)     Problem: Patient Care Overview  Goal: Plan of Care Review  Outcome: Ongoing (interventions implemented as appropriate)  Flowsheets  Taken 3/10/2020 1505 by Marta Cooper RN  Progress: no change  Taken 3/9/2020 1905 by Savannah Saba RN  Plan of Care Reviewed With: patient  Note:   Patient has rested quietly with no complaints. Patient states she feels better today. Ambulated well with PT. Will continue to monitor.  Goal: Individualization and Mutuality  Outcome: Ongoing (interventions implemented as appropriate)  Goal: Discharge Needs Assessment  Outcome: Ongoing (interventions implemented as appropriate)  Goal: Interprofessional Rounds/Family Conf  Outcome: Ongoing (interventions implemented as appropriate)

## 2020-03-10 NOTE — PROGRESS NOTES
[facility] HOSPITALIST PROGRESS NOTE     Patient Identification:  Name:  Alma Delia Garcia  Age:  89 y.o.  Sex:  female  :  1930  MRN:  [id number]  Visit Number:  [id number]  Primary Care Provider:  Galina Gonsalves MD    Length of stay:  4    Chief complaint:  89 y.o. old female admitted with Altered Mental Status          Subjective:    Patient seen with nursing staff.  No family was present at bedside.  Patient is lethargic and sleepy but arousable.  Nursing staff reports that patient has not had a bowel movement since yesterday.  Patient is complaining of abdominal distention, abdominal pain and back pain.  She states that she has been passing some gas.  Patient is going up some nausea and states that she did not have much of an appetite because of nausea.         Current Hospital Meds:    aspirin 81 mg Oral Daily   furosemide 20 mg Oral Daily   ipratropium-albuterol 3 mL Nebulization 4x Daily - RT   lactulose 20 g Oral TID   levothyroxine 100 mcg Oral Daily   OXcarbazepine 150 mg Oral QAM   OXcarbazepine 300 mg Oral Nightly   pantoprazole 40 mg Oral QAM   riFAXIMin 550 mg Oral BID   rOPINIRole 1 mg Oral Nightly   sodium chloride 10 mL Intravenous Q12H   sodium chloride 10 mL Intravenous Q12H   spironolactone 50 mg Oral Daily        ----------------------------------------------------------------------------------------------------------------------  Vital Signs:  Temp:  [97.4 °F (36.3 °C)-98.2 °F (36.8 °C)] 98.2 °F (36.8 °C)  Heart Rate:  [68-82] 68  Resp:  [18-20] 18  BP: (127-161)/(50-60) 145/54      20  1142 20  1646   Weight: 65.8 kg (145 lb) 67 kg (147 lb 11.2 oz)     Body mass index is 28.85 kg/m².    Intake/Output Summary (Last 24 hours) at 3/10/2020 1327  Last data filed at 3/10/2020 0900  Gross per 24 hour   Intake 360 ml   Output --   Net 360 ml     Diet Soft Texture; Chopped;  Thin  ----------------------------------------------------------------------------------------------------------------------  Physical exam:  Constitutional:   Elderly female, sitting in bed, no acute distress.  HENT:  Head:  Normocephalic and atraumatic.  Mouth:  Moist mucous membranes.    Eyes:  Pupils are equal, round, and reactive to light.   Neck:  Neck supple.  No JVD present.    Cardiovascular:  Regular rate and rhythm. S1+S2. No murmur, rubs or gallops.   Pulmonary/Chest: Clear to auscultation bilaterally.   Abdominal:  Soft. Non-tender. No viscera palpable.  Bowel sounds audible.   Musculoskeletal: No deformity or joint swelling.   Peripheral vascular: Bilateral dorsalis pedis palpable. No edema.   Neurological: Lethargic and sleepy but arousable.  Patient has flapping tremors in both hands.  Skin:  Skin is warm and dry. No rash noted. No pallor.   ----------------------------------------------------------------------------------------------------------------------  Tele:    ----------------------------------------------------------------------------------------------------------------------  Results from last 7 days   Lab Units 03/07/20  0749   TROPONIN T ng/mL 0.018     Results from last 7 days   Lab Units 03/10/20  0217 03/09/20  0119 03/08/20  0135  03/05/20  1204   LACTATE mmol/L  --   --   --   --  1.9   WBC 10*3/mm3 4.88 6.36 5.17   < > 4.54   HEMOGLOBIN g/dL 9.3* 9.6* 10.5*   < > 10.4*   HEMATOCRIT % 32.3* 30.6* 33.3*   < > 32.5*   MCV fL 112.2* 104.1* 99.7*   < > 99.1*   MCHC g/dL 28.8* 31.4* 31.5   < > 32.0   PLATELETS 10*3/mm3 96* 100* 109*   < > 119*   INR   --   --   --   --  1.21*    < > = values in this interval not displayed.     Results from last 7 days   Lab Units 03/05/20  1159   PH, ARTERIAL pH units 7.486*   PO2 ART mm Hg 73.2*   PCO2, ARTERIAL mm Hg 36.8   HCO3 ART mmol/L 27.7*     Results from last 7 days   Lab Units 03/10/20  0217 03/09/20  0119 03/08/20  1756 03/08/20  0135  03/07/20  0749  03/05/20  1252   SODIUM mmol/L 138 142  --  141 144   < > 146*   POTASSIUM mmol/L 4.0 4.1  --  3.9 3.4*   < > 3.9   MAGNESIUM mg/dL  --   --   --  2.0 1.8  --  1.6   CHLORIDE mmol/L 103 108*  --  107 111*   < > 109*   CO2 mmol/L 26.3 24.1  --  24.2 24.2   < > 24.0   BUN mg/dL 13 14  --  12 14   < > 15   CREATININE mg/dL 0.90 0.92  --  0.81 0.77   < > 0.83   EGFR IF NONAFRICN AM mL/min/1.73 59* 57*  --  67 71   < > 65   CALCIUM mg/dL 8.2* 8.3*  --  8.6 8.3*   < > 9.2   GLUCOSE mg/dL 106* 118*  --  109* 87   < > 86   ALBUMIN g/dL  --  2.33* 2.60*  --   --   --  2.98*   BILIRUBIN mg/dL  --  1.0 1.2  --   --   --  1.5*   ALK PHOS U/L  --  97 101  --   --   --  129*   AST (SGOT) U/L  --  38* 41*  --   --   --  60*   ALT (SGPT) U/L  --  20 22  --   --   --  28    < > = values in this interval not displayed.   Estimated Creatinine Clearance: 36.2 mL/min (by C-G formula based on SCr of 0.9 mg/dL).    Ammonia   Date Value Ref Range Status   03/10/2020 74 (H) 11 - 51 umol/L Final   03/09/2020 87 (H) 11 - 51 umol/L Final     Results from last 7 days   Lab Units 03/05/20  1252   CHOLESTEROL mg/dL 190   TRIGLYCERIDES mg/dL 84   HDL CHOL mg/dL 119*   LDL CHOL mg/dL 54     Blood Culture   Date Value Ref Range Status   03/05/2020 No growth at 3 days  Preliminary   03/05/2020 No growth at 4 days  Preliminary     No results found for: URINECX  No results found for: WOUNDCX  No results found for: STOOLCX    I have personally looked at the labs and they are summarized above.  ----------------------------------------------------------------------------------------------------------------------  Imaging Results (Last 24 Hours)     ** No results found for the last 24 hours. **        ----------------------------------------------------------------------------------------------------------------------  Assessment and Plan:    -Acute hepatic encephalopathy  Likely due to lactulose noncompliance.  Patient is lethargic today  and ammonia level yesterday was up to 84, it is down to 74 today.  Patient however has not had a bowel movement.  I have increased lactulose dosing and instructed nursing staff to titrate with the goal of 2 soft bowel movements per day.  Rifaximin.  CT scan of the head showed no acute intracranial abnormality.  Vitamin B12 and folic acid level within normal limits.    -Decompensated cirrhosis  Patient has prior history of hepatitis C.  CT scan of the abdomen showed nodular liver contour and no significant amount of ascites.  LFTs mildly elevated and remain unchanged since yesterday.    -Abdominal distention  Patient denies any obstipation and bowel sounds are positive.  Will check abdominal x-ray to rule out obstruction.    -Chronic diastolic CHF  Compensated.  Continue Lasix and Aldactone.  Continue to monitor intake and output.    -CKD stage II  Stable.  Creatinine within normal limits.    -Essential HTN  Blood pressure reasonably controlled.  Continue to monitor blood pressure.    -Chronic thrombocytopenia  Due to underlying cirrhosis.  Platelet count stable.  Continue to monitor CBC.    -Hypothyroidism  TSH was elevated with low free T4.  Levothyroxine increased to 100 mcg daily.  Continue levothyroxine and repeat TSH in 4 to 6 weeks.    -Trigeminal neuralgia  Continue Trileptal.    -Hypokalemia  Resolved.    -Hypernatremia  Resolved.    Activity: Up with assist, PT/OT.  Nutrition: Regular diet  Fluids: None  DVT prophylaxis: SCDs  GI prophylaxis: PPI    The patient is high risk due to: Decompensated cirrhosis, hepatic encephalopathy, CHF, CKD, HTN.    I discussed the patients findings and my recommendations with patient and nursing staff.    Delores Diamond MD  03/10/20  13:27

## 2020-03-10 NOTE — PLAN OF CARE
Pt resting in bed quietly. Pt skin is discolored through out. Pt tolerated meds well, no difficulty noted as far as swallowing. No other needs at this time. Call light in reach. Will continue to monitor.

## 2020-03-10 NOTE — PLAN OF CARE
SLP f/u for diet safety/acceptance as pt is s/p upgrade to mechanical soft, chopped w/ thin liquids. Continue modified po diet. No further SLP f/u warranted at this time.  Problem: Patient Care Overview  Goal: Plan of Care Review  Outcome: Ongoing (interventions implemented as appropriate)

## 2020-03-10 NOTE — THERAPY TREATMENT NOTE
Acute Care - Physical Therapy Treatment Note   Mark     Patient Name: Alma Delia Garcia  : 1930  MRN: [id number]  Today's Date: 3/10/2020  Onset of Illness/Injury or Date of Surgery: 20  Date of Referral to PT: 20  Referring Physician: Dr. Diamond    Admit Date: 3/5/2020    Visit Dx:    ICD-10-CM ICD-9-CM   1. Hepatic encephalopathy (CMS/HCC) K72.90 572.2   2. Hypophosphatemia E83.39 275.3   3. Altered mental status, unspecified altered mental status type R41.82 780.97   4. Hypothyroidism, unspecified type E03.9 244.9     Patient Active Problem List   Diagnosis   • Hepatic cirrhosis*   • HTN (hypertension)*   • Anxiety disorder*   • Osteoporosis   • Hypothyroidism*   • GERD (gastroesophageal reflux disease)*   • Chronic pain syndrome due to fractured spine and left rib fracture*   • Anemia, chronic disease   • Arthralgia of hip   • Primary insomnia   • Peripheral vascular disease (CMS/HCC)   • Nonrheumatic aortic valve insufficiency, mild   • Chronic diastolic heart failure (Grade 1)    • Poor venous access   • Chronic kidney disease, stage III (moderate) (CMS/HCC)   • Acute hepatic encephalopathy   • Coronary artery disease   • Restless leg syndrome   • Hepatitis C   • Debility in setting of chronic illness    • Respiratory alkalosis with secondary metabolic alkalosis    • Prolonged Q-T interval on ECG   • Macrocytic anemia   • Hypoalbuminemia   • Encephalopathy   • Hepatic encephalopathy (CMS/HCC)   • Hypernatremia       Therapy Treatment    Rehabilitation Treatment Summary     Row Name 03/10/20 0900             Treatment Time/Intention    Discipline  physical therapist  -BC      Document Type  therapy note (daily note)  -BC      Mode of Treatment  physical therapy  -BC      Therapy Frequency (PT Clinical Impression)  -- 3-5x/week  -BC      Patient Effort  adequate  -BC      Recorded by [BC] Rosa Stringer, PT 03/10/20 0950      Row Name 03/10/20 0900             Cognitive  Assessment/Intervention- PT/OT    Orientation Status (Cognition)  oriented to;person  -BC      Follows Commands (Cognition)  WFL  -BC      Recorded by [BC] Rosa Stringer, PT 03/10/20 0925      Row Name 03/10/20 0900             Mobility Assessment/Intervention    Extremity Weight-bearing Status  left lower extremity;right lower extremity  -BC      Left Lower Extremity (Weight-bearing Status)  weight-bearing as tolerated (WBAT)  -BC      Right Lower Extremity (Weight-bearing Status)  weight-bearing as tolerated (WBAT)  -BC      Recorded by [BC] Rosa Stringer, PT 03/10/20 0925      Row Name 03/10/20 0900             Bed Mobility Assessment/Treatment    Bed Mobility Assessment/Treatment  bed mobility (all) activities  -BC      [place] Level (Bed Mobility)  moderate assist (50% patient effort)  -BC      Bed Mobility, Safety Issues  cognitive deficits limit understanding  -BC      Assistive Device (Bed Mobility)  bed rails  -BC      Recorded by [BC] Rosa Stringer, PT 03/10/20 0925      Row Name 03/10/20 0900             Transfer Assessment/Treatment    Transfer Assessment/Treatment  sit-stand transfer;stand-sit transfer  -BC      Maintains Weight-bearing Status (Transfers)  able to maintain  -BC      Recorded by [BC] Rosa Stringer, PT 03/10/20 0925      Row Name 03/10/20 0900             Sit-Stand Transfer    Sit-Stand [place] (Transfers)  moderate assist (50% patient effort)  -BC      Assistive Device (Sit-Stand Transfers)  walker, front-wheeled  -BC      Recorded by [BC] Rosa Stringer, PT 03/10/20 0925      Row Name 03/10/20 0900             Stand-Sit Transfer    Stand-Sit [place] (Transfers)  moderate assist (50% patient effort)  -BC      Assistive Device (Stand-Sit Transfers)  walker, front-wheeled  -BC      Recorded by [BC] Rosa Stringer, PT 03/10/20 0925      Row Name 03/10/20 0900             Gait/Stairs Assessment/Training    Gait/Stairs Assessment/Training  gait/ambulation  independence  -BC      [place] Level (Gait)  moderate assist (50% patient effort)  -BC      Assistive Device (Gait)  walker, front-wheeled  -BC      Distance in Feet (Gait)  20 ft  -BC      Recorded by [BC] Rosa Stringer, PT 03/10/20 0925      Row Name 03/10/20 0900             Positioning and Restraints    Pre-Treatment Position  in bed  -BC      Post Treatment Position  bed  -BC      In Bed  notified nsg;supine;call light within reach;encouraged to call for assist;side rails up x3  -BC      Recorded by [BC] Rosa Stringer, PT 03/10/20 0925      Row Name 03/10/20 0900             Coping    Observed Emotional State  accepting  -BC      Verbalized Emotional State  acceptance  -BC      Recorded by [BC] Rosa Stringer, PT 03/10/20 0925        User Key  (r) = Recorded By, (t) = Taken By, (c) = Cosigned By    Initials Name Effective Dates Discipline    BC Rosa Stringer, PT 03/14/16 -  PT                       PT Recommendation and Plan  Planned Therapy Interventions (PT Eval): balance training, bed mobility training, gait training, home exercise program, patient/family education, strengthening, transfer training  Therapy Frequency (PT Clinical Impression): (3-5x/week)  Plan of Care Reviewed With: patient     Time Calculation:   PT Charges     Row Name 03/10/20 0926             Time Calculation    PT Received On  03/10/20  -BC         Time Calculation- PT    Total Timed Code Minutes- PT  30 minute(s)  -BC         Timed Charges    01280 - Gait Training Minutes   15  -BC      03446 - PT Therapeutic Activity Minutes  15  -BC        User Key  (r) = Recorded By, (t) = Taken By, (c) = Cosigned By    Initials Name Provider Type    BC Rosa Stringer PT Physical Therapist        Therapy Charges for Today     Code Description Service Date Service Provider Modifiers Qty    45844652728 HC GAIT TRAINING EA 15 MIN 3/9/2020 Rosa Stringer, PT GP 1    59881619072 HC PT EVAL MOD COMPLEXITY 3 3/9/2020 Myke  Rosa CABRERA, PT GP 1    14190408242 HC GAIT TRAINING EA 15 MIN 3/10/2020 Rosa Stringer, PT GP 1    17579556687 HC PT THERAPEUTIC ACT EA 15 MIN 3/10/2020 Rosa Stringer, PT GP 1               Rosa Stringer, PT  3/10/2020

## 2020-03-11 PROBLEM — E87.0 HYPERNATREMIA: Status: RESOLVED | Noted: 2020-01-01 | Resolved: 2020-01-01

## 2020-03-11 NOTE — PROGRESS NOTES
Discharge Planning Assessment   Mark     Patient Name: Alma Delia Garcia  MRN: [id number]  Today's Date: 3/11/2020    Admit Date: 3/5/2020      Discharge Plan     Row Name 03/11/20 1349       Plan    Final Discharge Disposition Code  01 - home or self-care    Final Note  SS followed up with family to assure that pt did not have any discharge needs. Family denies any needs. Pt is being discharged home on this day.           BRI Arizmendi

## 2020-03-11 NOTE — DISCHARGE SUMMARY
[facility] Emergency Room Medicine Services  DISCHARGE SUMMARY    Patient Identification:  Name:  Alma Delia Garcia  Age:  89 y.o.  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 female presented to [facility] complaining of AMS.  Please see the admitting history and physical for further details.    Hospital Course   Alma Delia Garcia is a 89 y.o. female with PMH significant for hepatic cirrhosis with hepatic encephalopathy, Chronic kidney disease stage II-III, essential hypertension, coronary artery disease, seizures and debility presented to ER with AMS. Pt was diagnosed to have metabolic encephalopathy.     Pt was started on Po lactulose. Her encephalopathy was thought to be due to non-complioance with lactulose.  Patient had elevated ammonia level.  CT scan of the head showed no acute intracranial abnormality.  Vitamin B12 and folic acid levels were within normal limits.  Patient moved her bowels and her ammonia level came down to 54  Her lactulose was decreased to 20 mg twice a day and patient had elevation in her ammonia level and worsening of  encephalopathy.  Lactulose was increased to 20 mg 3 times a day and patient had improvement in ammonia level as well as mental status.  An abdominal x-ray was done that showed significant amount of stool in rectum otherwise no intra-abdominal abnormality.     Patient has history of hepatitis C which is likely caused her cirrhosis.  CT scan of the abdomen was done on admission that showed nodular liver contour and no significant amount of ascites.  LFTs were mildly elevated.  Patient was continued on Lasix and Aldactone.  Patient has no previous history of variceal bleed or any other evidence of portal hypertension.  She has history of chronic diastolic CHF which remained compensated and patient was continued on diuretics as outlined above.  Her TSH was elevated and free T4 was low.  Her levothyroxine was increased 200 mcg daily.  Patient will need repeat TSH check in 4 to 6 weeks.  Patient has limited ambulation due to generalized weakness and altered mental status.  She was able to get out of bed and sit in bedside chair.  Family is wanting to take patient home.  Patient is felt to have achieved maximum benefit of hospitalization and will be discharged home in stable condition.  Patient is advised to take lactulose with a goal of at least 2 soft bowel movements per day and increase lactulose frequency and dose if patient does not have 2 soft bowel movements per day.  If patient develops diarrhea, she should decrease lactulose dose.        Discharge Vitals/Physical Examination     Vital Signs:  Temp:  [97.6 °F (36.4 °C)-98.1 °F (36.7 °C)] 98.1 °F (36.7 °C)  Heart Rate:  [66-83] 77  Resp:  [18-20] 20  BP: (133-186)/(50-78) 142/78  Mean Arterial Pressure (Non-Invasive) for the past 24 hrs (Last 3 readings):   Noninvasive MAP (mmHg)   03/11/20 1116 118   03/11/20 0717 87   03/11/20 0312 103     SpO2 Percentage    03/11/20 0628 03/11/20 0717 03/11/20 1116   SpO2: 98% 98% 96%     SpO2:  [94 %-98 %] 96 %  on   ;   Device  (Oxygen Therapy): room air    Body mass index is 28.85 kg/m².  Wt Readings from Last 3 Encounters:   03/05/20 67 kg (147 lb 11.2 oz)   01/20/20 65.8 kg (145 lb)   12/12/19 59 kg (130 lb)         Physical Exam:  GEN: Elderly female, lying in bed, no acute distress.   EYES: Pupils are equal, round, and reactive to light.   HENT: Neck supple.  No JVD present.   CV: Regular rate and rhythm. S1+S2. No murmur, rubs or gallops.   PULM: Lungs are clear to auscultation bilaterally.  GI: Abdomen is soft and nontender, no viscera palpable and bowel sounds audible.  CNS: Sleepy but arousable.  Patient is awake, alert and oriented.  Cranial nerves appear grossly intact.  Strength bilaterally symmetrical in upper and lower extremities.  SKIN: Skin is warm and dry.  No rash noted.  No pallor.  MSK: No deformity or joint swelling.      Pertinent Laboratory/Radiology Results     Pertinent Laboratory Results:  Results from last 7 days   Lab Units 03/07/20  0749   TROPONIN T ng/mL 0.018     Results from last 7 days   Lab Units 03/07/20  0749 03/06/20  0012 03/05/20  1252   PROBNP pg/mL 1,736.0 2,533.0* 1,537.0     Results from last 7 days   Lab Units 03/05/20  1252   CHOLESTEROL mg/dL 190   TRIGLYCERIDES mg/dL 84   HDL CHOL mg/dL 119*   LDL CHOL mg/dL 54     Results from last 7 days   Lab Units 03/05/20  1159   PH, ARTERIAL pH units 7.486*   PO2 ART mm Hg 73.2*   PCO2, ARTERIAL mm Hg 36.8   HCO3 ART mmol/L 27.7*     Results from last 7 days   Lab Units 03/11/20  0257 03/10/20  0217 03/09/20  0119  03/05/20  1204   LACTATE mmol/L  --   --   --   --  1.9   WBC 10*3/mm3 3.96 4.88 6.36   < > 4.54   HEMOGLOBIN g/dL 9.5* 9.3* 9.6*   < > 10.4*   HEMATOCRIT % 31.0* 32.3* 30.6*   < > 32.5*   MCV fL 103.3* 112.2* 104.1*   < > 99.1*   MCHC g/dL 30.6* 28.8* 31.4*   < > 32.0   PLATELETS 10*3/mm3 83* 96* 100*   < > 119*   INR   --   --   --   --  1.21*    < > = values in this interval not displayed.     Results from last 7 days   Lab Units  03/11/20  0257 03/10/20  0217 03/09/20  0119 03/08/20  1756 03/08/20  0135 03/07/20  0749  03/05/20  1252   SODIUM mmol/L 137 138 142  --  141 144   < > 146*   POTASSIUM mmol/L 3.9 4.0 4.1  --  3.9 3.4*   < > 3.9   MAGNESIUM mg/dL  --   --   --   --  2.0 1.8  --  1.6   CHLORIDE mmol/L 100 103 108*  --  107 111*   < > 109*   CO2 mmol/L 27.0 26.3 24.1  --  24.2 24.2   < > 24.0   BUN mg/dL 12 13 14  --  12 14   < > 15   CREATININE mg/dL 0.84 0.90 0.92  --  0.81 0.77   < > 0.83   EGFR IF NONAFRICN AM mL/min/1.73 64 59* 57*  --  67 71   < > 65   CALCIUM mg/dL 8.4* 8.2* 8.3*  --  8.6 8.3*   < > 9.2   GLUCOSE mg/dL 100* 106* 118*  --  109* 87   < > 86   ALBUMIN g/dL  --   --  2.33* 2.60*  --   --   --  2.98*   BILIRUBIN mg/dL  --   --  1.0 1.2  --   --   --  1.5*   ALK PHOS U/L  --   --  97 101  --   --   --  129*   AST (SGOT) U/L  --   --  38* 41*  --   --   --  60*   ALT (SGPT) U/L  --   --  20 22  --   --   --  28    < > = values in this interval not displayed.   Estimated Creatinine Clearance: 38.8 mL/min (by C-G formula based on SCr of 0.84 mg/dL).  Ammonia   Date Value Ref Range Status   03/11/2020 52 (H) 11 - 51 umol/L Final   03/10/2020 74 (H) 11 - 51 umol/L Final   03/09/2020 87 (H) 11 - 51 umol/L Final       Glucose   Date/Time Value Ref Range Status   03/11/2020 0645 114 70 - 130 mg/dL Final   03/10/2020 2058 131 (H) 70 - 130 mg/dL Final   03/10/2020 1511 116 70 - 130 mg/dL Final   03/10/2020 0712 83 70 - 130 mg/dL Final   03/10/2020 0637 82 70 - 130 mg/dL Final   03/09/2020 1529 133 (H) 70 - 130 mg/dL Final   03/09/2020 0640 99 70 - 130 mg/dL Final   03/08/2020 2017 162 (H) 70 - 130 mg/dL Final     Lab Results   Component Value Date    HGBA1C 4.50 (L) 03/05/2020     Lab Results   Component Value Date    TSH 15.650 (H) 03/05/2020    FREET4 0.80 (L) 03/05/2020       Blood Culture   Date Value Ref Range Status   03/05/2020 No growth at 5 days  Final   03/05/2020 No growth at 5 days  Final     No results found for:  URINECX  No results found for: WOUNDCX  No results found for: STOOLCX  No results found for: RESPCX  Microbiology Results (last 10 days)     Procedure Component Value - Date/Time    Influenza Antigen, Rapid - Swab, Nasopharynx [[id number]]  (Normal) Collected:  03/05/20 1330    Lab Status:  Final result Specimen:  Swab from Nasopharynx Updated:  03/05/20 1405     Influenza A Ag, EIA Negative     Influenza B Ag, EIA Negative    Blood Culture - Blood, Arm, Right [[id number]] Collected:  03/05/20 1252    Lab Status:  Final result Specimen:  Blood from Arm, Right Updated:  03/10/20 1400     Blood Culture No growth at 5 days    Blood Culture - Blood, Arm, Right [[id number]] Collected:  03/05/20 1214    Lab Status:  Final result Specimen:  Blood from Arm, Right Updated:  03/10/20 1330     Blood Culture No growth at 5 days        Pain Management Panel     Pain Management Panel Latest Ref Rng & Units 10/26/2019 4/15/2019    AMPHETAMINES SCREEN, URINE Negative Negative Negative    BARBITURATES SCREEN Negative Negative Negative    BENZODIAZEPINE SCREEN, URINE Negative Negative Negative    BUPRENORPHINEUR Negative Negative Negative    COCAINE SCREEN, URINE Negative Negative Negative    METHADONE SCREEN, URINE Negative Negative Negative          Pertinent Radiology Results:  Imaging Results (All)     Procedure Component Value Units Date/Time    XR Abdomen Flat & Upright [[id number]] Collected:  03/10/20 1605     Updated:  03/10/20 1612    Narrative:       XR ABDOMEN FLAT AND UPRIGHT-     CLINICAL INDICATION: Abdominal pain and distention; K72.90-Hepatic  failure, unspecified without coma; E83.39-Other disorders of phosphorus  metabolism; R41.82-Altered mental status, unspecified;  E03.9-Hypothyroidism, unspecified        COMPARISON: None immediately available.       FINDINGS:  Chest:  Minimal airspace disease is noted in both lung bases.      Abdomen:  Two views of the abdomen show no free air. I do not see abnormal  bowel  distention to suggest complete obstruction. The bony structures are  intact. No abnormal soft tissue masses are seen. No suspicious appearing  calcifications are identified on today's exam.       Impression:       1. Large volume stool in the rectum.   2. Minimal airspace disease in both lung bases.  3. No evidence of bowel obstruction.  4. No free air.     This report was finalized on 3/10/2020 4:09 PM by Dr. Byron Mcintosh MD.       US Venous Doppler Lower Extremity Bilateral (duplex) [[id number]] Collected:  03/09/20 0741     Updated:  03/09/20 0744    Narrative:       US VENOUS DOPPLER LOWER EXTREMITY BILATERAL (DUPLEX)-     CLINICAL INDICATION: R/O DVT; K72.90-Hepatic failure, unspecified  without coma; E83.39-Other disorders of phosphorus metabolism;  R41.82-Altered mental status, unspecified; E03.9-Hypothyroidism,  unspecified        COMPARISON: None available      TECHNIQUE: Color Doppler imaging was used with compression and  augmentation to evaluate the lower extremity deep venous system.     FINDINGS:   There is patent spontaneous flow from the common femoral vein through  the posterior tibial veins.  There was no internal clot or area of noncompressibility.  Normal augmentation was elicited where applicable.       Impression:       No DVT in the lower extremities on today's exam.      This report was finalized on 3/9/2020 7:41 AM by Dr. Byron Mcintosh MD.       CT Chest Pulmonary Embolism [[id number]] Collected:  03/07/20 1326     Updated:  03/07/20 1328    Narrative:       EXAMINATION: CT CHEST PULMONARY EMBOLISM-      Technique: Multiple CT axial images were obtained through the level of  pulmonary arteries, following IV contrast administration per CT PE  protocol.  Volume Rendered 3D or MIP images performed.     Radiation dose reduction techniques were utilized per ALARA protocol.  Automated exposure control was initiated through either or [employer] or  DoseRight software packages by   protocol.                CLINICAL INDICATION:    SOB and Chest Pain     COMPARISON:    None     FINDINGS:    There is bibasilar atalectasis.    There is no pulmonary artery filling defect to suggest pulmonary  embolism.   Small bilateral pleural effusions.     Mild cardiac enlargement. Bibasilar atelectasis. There is no thoracic  adenopathy.   Incidentally imaged upper abdomen is unremarkable.   Bone windows show no acute osseous abnormality.       Impression:          1. No PE.   2. Mild cardiac enlargement. Bibasilar atelectasis.     This report was finalized on 3/7/2020 1:26 PM by Dr. Zeke Adams MD.       FL Video Swallow Single Contrast [[id number]] Collected:  03/06/20 1217     Updated:  03/06/20 1220    Narrative:       EXAMINATION: FL VIDEO SWALLOW SINGLE-CONTRAST-      CLINICAL INDICATION:     r/o aspiration 2/2 RLL PNA; K72.90-Hepatic  failure, unspecified without coma; E83.39-Other disorders of phosphorus  metabolism; R41.82-Altered mental status, unspecified;  E03.9-Hypothyroidism, unspecified     TECHNIQUE: Modified barium swallow was performed utilizing video  fluoroscopy in conjunction with the Speech Pathology team. The patient  ingested multiple barium media including thin barium, nectar, and honey  liquid as well as barium pudding and a barium pudding coated cracker.   FLUOROSCOPY TIME: 1.33 minutes     COMPARISON: NONE      FINDINGS: Isolated incident of deep laryngeal penetration w/ large bolus  via cup however clears upon completion of swallow. No other laryngeal  penetration or aspiration. Increased oral prep time w/ mixed  phasic/rotary mastication pattern.           Impression:       Isolated incident of deep laryngeal penetration w/ large  bolus via cup however clears upon completion of swallow. No other  laryngeal penetration or aspiration. Increased oral prep time w/ mixed  phasic/rotary mastication pattern.      This report was finalized on 3/6/2020 12:18 PM by Dr. Zeke Adams MD.        CT Abdomen Pelvis Without Contrast [[id number]] Collected:  03/05/20 1907     Updated:  03/05/20 1910    Narrative:          CT ABDOMEN PELVIS WO CONTRAST-      TECHNIQUE: Multiple axial CT images were obtained from lung bases  through pubic symphysis without administration of IV contrast.  Reformatted images in the coronal and/or sagittal plane(s) were  generated from the axial data set to facilitate diagnostic accuracy  and/or surgical planning.  Oral Contrast:NONE.     Radiation dose reduction techniques were utilized per ALARA protocol.  Automated exposure control was initiated through either or [employer] or  frestyl software packages by  protocol.             Clinical information  ABDOMINAL PAIN; K72.90-Hepatic failure, unspecified without coma;  E83.39-Other disorders of phosphorus metabolism; R41.82-Altered mental  status, unspecified; E03.9-Hypothyroidism, unspecified      Comparison  NONE.     Findings  LOWER THORAX: TINY PLEURAL EFFUSIONS.     ABDOMEN:        LIVER: CIRRHOTIC NODULAR LIVER CONTOUR.        GALLBLADDER: CHOLECYSTECTOMY CLIPS.        PANCREAS: Unremarkable. No mass or ductal dilatation.        SPLEEN: Homogeneous. No splenomegaly.        ADRENALS: No mass.        KIDNEYS: No mass. No obstructive uropathy.  No evidence of  urolithiasis.        GI TRACT: SIGMOID COLONIC DIVERTICULOSIS WITH NO CT EVIDENCE OF  DIVERTICULITIS AT THIS TIME.        PERITONEUM: No free air. No free fluid or loculated fluid  collections.        MESENTERY: Unremarkable.        LYMPH NODES: No lymphadenopathy.        VASCULATURE: No evidence of aneurysm.        ABDOMINAL WALL: No focal hernia or mass.           OTHER: None.     PELVIS:        BLADDER: No focal mass or significant wall thickening        REPRODUCTIVE: Unremarkable as visualized.        APPENDIX: Nondistended. No surrounding inflammation.     BONES: Multilevel compression deformity that may be chronic.       Impression:        Impression:  Nodular liver contour.      This report was finalized on 3/5/2020 7:08 PM by Dr. Zeke Adams MD.       CT Head Without Contrast [[id number]] Collected:  03/05/20 1327     Updated:  03/05/20 1330    Narrative:       EXAMINATION: CT HEAD WO CONTRAST-      CLINICAL INDICATION:     Altered mental status     COMPARISON:    12/12/2019     Technique: Multiple CT axial images were obtained through the level of  the brain without IV contrast administration. Reformatted images in the  coronal and/or sagittal plane(s) were generated from the axial data set  to facilitate diagnostic accuracy and/or surgical planning.     Radiation dose reduction techniques were utilized per ALARA protocol.  Automated exposure control was initiated through either or [employer] or  DoseRight software packages by  protocol.       DOSE (DLP mGy-cm):     FINDINGS:     Brain: Unremarkable. No parenchymal hemorrhage or mass. No white matter  abnormality. No areas of mass effect. Stable moderate generalized  cerebral atrophy and chronic small vessel ischemic disease.  Ventricles: Unremarkable. No hydrocephalus.  Extra-axial spaces: Unremarkable. No extra-axial hemorrhage. No  extra-axial masses.  Bones: Unremarkable. No acute fracture identified.  Sinuses: Unremarkable as visualized. No air-fluid levels.  Mastoids: Unremarkable. No mastoid effusions.  Soft Tissues: Unremarkable.          Impression:       Stable atrophy and chronic small vessel ischemic disease. No  CT evidence of acute intracranial abnormality.     This report was finalized on 3/5/2020 1:28 PM by Dr. Manish Sherwood MD.       XR Chest AP [[id number]] Collected:  03/05/20 1257     Updated:  03/05/20 1259    Narrative:       EXAMINATION: XR CHEST AP-      CLINICAL INDICATION:     AMS     TECHNIQUE:  XR CHEST AP-      COMPARISON: 10/26/2019      FINDINGS:   RIGHT PORT-A-CATH WITH TIP IN SVC. COARSENED INTERSTITIAL MARKINGS.  Heart and mediastinum contours are  unremarkable.  No pleural effusion.  No pneumothorax.   Bony and soft tissue structures are unremarkable.       Impression:       AS ABOVE.     This report was finalized on 3/5/2020 12:57 PM by Dr. Zeke Adams MD.             Test Results Pending at Discharge:      Discharge Disposition/Discharge Medications/Discharge Appointments     Discharge Disposition:   Home or Self Care    Condition at Discharge:  Stable         Discharge Diet:  Diet Instructions     Diet: Soft Texture, Cardiac; Thin Liquids, No Restrictions; Chopped      Discharge Diet:   Soft Texture  Cardiac       Fluid Consistency:  Thin Liquids, No Restrictions    Soft Options:  Chopped          Discharge Activity:  Activity Instructions     Activity as Tolerated            Code Status While Inpatient:  Code Status and Medical Interventions:   Ordered at: 03/05/20 1519     Code Status:    CPR     Medical Interventions (Level of Support Prior to Arrest):    Full       Discharge Medications:     Discharge Medications      New Medications      Instructions Start Date   BOOST PLUS liquid   1 can, Oral, 2 Times Daily      furosemide 40 MG tablet  Commonly known as:  LASIX   40 mg, Oral, Daily   Start Date:  March 12, 2020     spironolactone 50 MG tablet  Commonly known as:  ALDACTONE   100 mg, Oral, Daily   Start Date:  March 12, 2020        Changes to Medications      Instructions Start Date   lactulose 10 GM/15ML solution  Commonly known as:  CHRONULAC  What changed:  how much to take   20 g, Oral, 3 Times Daily      levothyroxine 100 MCG tablet  Commonly known as:  SYNTHROID, LEVOTHROID  What changed:    · medication strength  · how much to take   100 mcg, Oral, Daily   Start Date:  March 12, 2020        Continue These Medications      Instructions Start Date   aspirin 81 MG EC tablet   81 mg, Oral, Daily      esomeprazole 40 MG capsule  Commonly known as:  nexIUM   TAKE 1 CAPSULE BY MOUTH EVERY MORNING BEFORE BREAKFAST.      HYDROcodone-acetaminophen   MG per tablet  Commonly known as:  NORCO   1 tablet, Oral, Every 12 Hours PRN      magnesium oxide 400 MG tablet  Commonly known as:  MAG-OX   400 mg, Oral, 2 Times Daily      multivitamin tablet tablet   1 tablet, Oral, Daily      nitroglycerin 0.4 MG SL tablet  Commonly known as:  NITROSTAT   0.4 mg, Sublingual, Every 5 Minutes PRN, Take no more than 3 doses in 15 minutes.      OXcarbazepine 150 MG tablet  Commonly known as:  TRILEPTAL   150 mg, Oral, Every Morning      OXcarbazepine 150 MG tablet  Commonly known as:  TRILEPTAL   300 mg, Oral, Every Night at Bedtime      rOPINIRole 1 MG tablet  Commonly known as:  REQUIP   1 mg, Oral, Nightly, Take 1 hour before bedtime.       teriparatide 600 MCG/2.4ML injection  Commonly known as:  FORTEO   20 mcg, Subcutaneous, Every Night at Bedtime      vitamin D 1.25 MG (09526 UT) capsule capsule  Commonly known as:  ERGOCALCIFEROL   TAKE 1 CAPSULE BY MOUTH 1 TIME PER WEEK.      XIFAXAN 550 MG tablet  Generic drug:  riFAXIMin   TAKE ONE TABLET BY MOUTH TWICE A DAY         Stop These Medications    bumetanide 1 MG tablet  Commonly known as:  BUMEX            Discharge Appointments:  Your Scheduled Appointments    Apr 20, 2020  2:30 PM EDT  Follow Up with Galina Gonsalves MD  [facility] CARDIOLOGY (--) [address] [id number] [phone]   Arrive 15 minutes prior to appointment.            Additional Instructions for the Follow-ups that You Need to Schedule     Discharge Follow-up with PCP   As directed       Currently Documented PCP:    Galina Gonsalves MD    PCP Phone Number:    [phone]     Follow Up Details:  5-7 days                   Delores Diamond MD  Hospitalist Service -- [facility]       03/11/20  13:43    Discharge Time:   Please note that this discharge summary required more than 30 minutes to complete.    Please send a copy of this dictation to the following providers:    Galina Gonsalves,  MD

## 2020-03-11 NOTE — PLAN OF CARE
Problem: Skin Injury Risk (Adult)  Goal: Identify Related Risk Factors and Signs and Symptoms  Outcome: Ongoing (interventions implemented as appropriate)  Goal: Skin Health and Integrity  Outcome: Ongoing (interventions implemented as appropriate)     Problem: Fall Risk (Adult)  Goal: Identify Related Risk Factors and Signs and Symptoms  Outcome: Ongoing (interventions implemented as appropriate)  Goal: Absence of Fall  Outcome: Ongoing (interventions implemented as appropriate)     Problem: Patient Care Overview  Goal: Plan of Care Review  Outcome: Ongoing (interventions implemented as appropriate)  Flowsheets (Taken 3/11/2020 5894)  Outcome Summary: Patient given lactulose last night, patient had large bowel movement this AM. No complaints throughout night. Will continue to monitor.  Goal: Individualization and Mutuality  Outcome: Ongoing (interventions implemented as appropriate)  Goal: Discharge Needs Assessment  Outcome: Ongoing (interventions implemented as appropriate)  Goal: Interprofessional Rounds/Family Conf  Outcome: Ongoing (interventions implemented as appropriate)

## 2020-03-13 NOTE — OUTREACH NOTE
Medical Week 1 Survey      Responses   [facility] patient discharged from?  [facility]   Does the patient have one of the following disease processes/diagnoses(primary or secondary)?  Other   Is there a successful TCM telephone encounter documented?  No   Week 1 attempt successful?  No   Unsuccessful attempts  Attempt 1          Katie Salcedo RN

## 2020-03-13 NOTE — OUTREACH NOTE
Prep Survey      Responses   [facility]   Is LACE score < 7 ?  No   Eligibility  Readm Mgmt   Discharge diagnosis  Altered mental status r/t hepatic encephalopathy   Does the patient have one of the following disease processes/diagnoses(primary or secondary)?  Other   Does the patient have Home health ordered?  No   Is there a DME ordered?  No   Prep survey completed?  Yes          Elena Perez RN

## 2020-03-15 NOTE — ED NOTES
Patient is still out of the room at this time. Will check back.      Carmela Arzate  03/15/20 1205

## 2020-03-15 NOTE — ED NOTES
Patient is gone to CT at this time. Will go back to get blood work.      Carmela Arzate  03/15/20 1792

## 2020-03-15 NOTE — ED NOTES
Attempted to call report to [facility]N at this time. Nurse unable to take report and will return my call.      Yasmine Wilder, RN  03/15/20 8504

## 2020-03-15 NOTE — PROGRESS NOTES
Discharge Planning Assessment   [place]     Patient Name: Alma Delia Garcia  MRN: [id number]  Today's Date: 3/15/2020    Admit Date: 3/15/2020    Discharge Needs Assessment     Row Name 03/15/20 3268       Living Environment    Lives With  child(jojo), adult;grandchild(jojo)    Name(s) of Who Lives With Patient  LIVES WITH JEFFERSON OSORIO    Current Living Arrangements  home/apartment/condo    Primary Care Provided by  child(jojo);other (see comments)    Provides Primary Care For  no one, unable/limited ability to care for self    Family Caregiver if Needed  grandchild(jojo), adult;child(jojo), adult    Quality of Family Relationships  helpful;involved;supportive    Able to Return to Prior Arrangements  yes       Resource/Environmental Concerns    Resource/Environmental Concerns  none       Transition Planning    Patient/Family Anticipates Transition to  home with family;home with help/services    Patient/Family Anticipated Services at Transition  home health care    Transportation Anticipated  family or friend will provide       Discharge Needs Assessment    Readmission Within the Last 30 Days  previous discharge plan unsuccessful    Concerns to be Addressed  discharge planning;adjustment to diagnosis/illness;compliance issue    Equipment Currently Used at Home  commode;walker, rolling;hospital bed    Anticipated Changes Related to Illness  inability to care for self    Equipment Needed After Discharge  none    Outpatient/Agency/Support Group Needs  homecare agency        Discharge Plan     Row Name 03/15/20 9180       Plan    Plan  PT LIVES AT HOME WITH JEFFERSON OSORIO WHO IS AT BEDSIDE AND OTHER FAMILY MEMBERS AND PLANS TO RETURN HOME UPON DISCHARGE, FAMILY TO PROVIDE TRANSPORTATION. PCP IS DR SALCEDO, SHE USES [facility] BUT PER FAMILY THEY ARE SWITCHING OVER TO [facility]. SHE HAS MEDICARE A+B, DENIES ANY ISSUES WITH MEDS OR COPAYS. PT DOES NOT HAVE A POA OR LIVING WILL. SHE USES [facility], ROLLING WALKER  AND HOSPITAL BED. SHE DOES NOT HAVE HOME HEALTH OR HOME O2. FAMILY IS REQUESTING HOME HEALTH. PT WAS DISCHARGED 3/11/20 FOR HEPATIC ENCEPHALOPATHY WHICH SHE IS BEING READMITTED TODAY. FAMILY STATES SHE HAS NOT DONE WELL AT HOME AND IS NON-COMPLIANT WITH TAKING MEDS. STATES SHE WILL TAKE HER PAIN MEDS BUT NOT HER OTHER MEDS. THEY REPORT SHE HAS BEEN DOING WELL WITH HER SOFT DIET AND IS EATING AND DRINKING WELL.  SOCIAL SERVICE CONSULT PLACED FOR DISCHARGE PLANNING. FAMILY IS ASKING FOR  HOME HEALTH SERVICE AT DISCHARGE.      Patient/Family in Agreement with Plan  yes        Destination      Coordination has not been started for this encounter.      Durable Medical Equipment      Coordination has not been started for this encounter.      Dialysis/Infusion      Coordination has not been started for this encounter.      Home Medical Care      Coordination has not been started for this encounter.      Therapy      Coordination has not been started for this encounter.      Community Resources      Coordination has not been started for this encounter.          Demographic Summary     Row Name 03/15/20 1355       General Information    Admission Type  inpatient    Arrived From  home    Referral Source  emergency department    Reason for Consult  discharge planning    Preferred Language  English        Functional Status     Row Name 03/15/20 1356       Functional Status    Usual Activity Tolerance  fair    Current Activity Tolerance  poor       Mental Status    General Appearance WDL  WDL        Psychosocial    No documentation.       Abuse/Neglect    No documentation.       Legal    No documentation.       Substance Abuse    No documentation.       Patient Forms    No documentation.           Amanda Calixto RN

## 2020-03-15 NOTE — H&P
[facility] Medicine Services  HISTORY & PHYSICAL    Patient Identification:  Name:  Alma Delia Steiner  Age:  89 y.o.  Sex:  female  :  1930  MRN:  [id number]   Visit Number:  [id number]  Primary Care Physician:  Galina Gonsalves MD     Subjective     Chief complaint:   Chief Complaint   Patient presents with   • Altered Mental Status     History of presenting illness:   Mrs. steiner is an 89-year-old female with a past medical history of hepatitis C cirrhosis with hepatic encephalopathy home had been admitted multiple times due to hepatic encephalopathy.  She has dementia and is unable to currently give history due to encephalopathy therefore history was obtained from her granddaughter.  she has been noncompliant with her medications.  She is being cared for by her granddaughter and her  however have been refusing to take her lactulose despite them offering it to her multiple times.  She was admitted on  for the same thing.  Eventually took lactulose and hepatic encephalopathy improved she was discharged 4 days ago.  Upon returning home she has only taken total of 3-5 doses of lactulose and only had 2 bowel movements.  She has been refusing the rest of the doses as well as her other medications.  She was found to be confused today but still conversing therefore they brought her to the emergency room.  By the time she arrived to the emergency room her mental status had been worse her granddaughter.  They deny patient having any fevers or chills nausea vomiting abdominal pain melena or hematochezia no falls no changes since she was discharged from the hospital.     ---------------------------------------------------------------------------------------------------------------------   Review of Systems : Unable to provide due to altered mental status  ---------------------------------------------------------------------------------------------------------------------    Past Medical History:   Diagnosis Date   • Acute renal failure (CMS/HCC)    • Alcohol abuse     in remission   • Anxiety    • Bell's palsy    • Cellulitis     LLE   • CHF (congestive heart failure) (CMS/HCC)     Diastolic   • CHF (congestive heart failure) (CMS/HCC)    • Constipation    • Coronary artery disease    • Diverticulosis    • GERD (gastroesophageal reflux disease)    • Hepatic cirrhosis (CMS/HCC)    • Hepatitis C    • History of transfusion    • Hypertension    • Hypothyroidism    • MI (mitral incompetence)    • Osteoporosis    • Osteoporosis    • Restless leg syndrome    • Thrombocytopenia (CMS/HCC)    • Trigeminal neuralgia      Past Surgical History:   Procedure Laterality Date   • BACK SURGERY      KYPHOPLASTIES    • CARDIAC CATHETERIZATION Left 08/2004   • CARDIOVASCULAR STRESS TEST  01/25/2015   • CATARACT EXTRACTION     • CHOLECYSTECTOMY     • CLAVICLE SURGERY Right    • COLONOSCOPY  10/2004   • ECHO - CONVERTED  11/2012   • FEMUR OPEN REDUCTION INTERNAL FIXATION Left 10/12/2017    Procedure: FEMUR OPEN REDUCTION INTERNAL FIXATION;  Surgeon: Karson Pierre MD;  Location: [facility];  Service:    • HIP FRACTURE SURGERY Bilateral     ARTHROPLASTIES    • PORTACATH PLACEMENT N/A 9/28/2018    Procedure: INSERTION OF PORTACATH;  Surgeon: Vic Mauricio MD;  Location: [facility];  Service: General   • SUBTOTAL HYSTERECTOMY     • WRIST FRACTURE SURGERY       Family History   Problem Relation Age of Onset   • Cancer Mother    • Heart disease Father    • Arthritis Sister    • Osteoporosis Sister    • Diabetes Maternal Aunt      Social History     Socioeconomic History   • Marital status:      Spouse name: Not on file   • Number of children: Not on file   • Years of education: Not on file   • Highest education level: Not on file   Tobacco Use   • Smoking status: Never Smoker   • Smokeless tobacco: Former User     Types: Snuff   Substance and Sexual Activity   • Alcohol use: No      Comment: FORMER 12 BOTTLES PER DAY FOR 15 YEARS   • Drug use: No   • Sexual activity: Defer   Social History Narrative    LIVES WITH GRANDDAUGHTER DEVON IN [place]      ---------------------------------------------------------------------------------------------------------------------   Allergies:  Ciprofloxacin  ---------------------------------------------------------------------------------------------------------------------   Medications below are reported home medications pulling from within the system; at this time, these medications have not been reconciled unless otherwise specified and are in the verification process for further verifcation as current home medications.    Prior to Admission Medications     Prescriptions Last Dose Informant Patient Reported? Taking?    aspirin 81 MG EC tablet  Care Giver Yes No    Take 81 mg by mouth Daily.    esomeprazole (nexIUM) 40 MG capsule   No No    TAKE 1 CAPSULE BY MOUTH EVERY MORNING BEFORE BREAKFAST.    furosemide (LASIX) 40 MG tablet   No No    Take 1 tablet by mouth Daily.    HYDROcodone-acetaminophen (NORCO)  MG per tablet  Pharmacy Yes No    Take 1 tablet by mouth Every 12 (Twelve) Hours As Needed for Moderate Pain .    lactulose (CHRONULAC) 10 GM/15ML solution   No No    Take 30 mL by mouth 3 (Three) Times a Day.    levothyroxine (SYNTHROID, LEVOTHROID) 100 MCG tablet   No No    Take 1 tablet by mouth Daily.    magnesium oxide (MAG-OX) 400 MG tablet   Yes No    Take 400 mg by mouth 2 (Two) Times a Day.    multivitamin (DAILY RAMESH) tablet tablet   No No    Take 1 tablet by mouth Daily.    nitroglycerin (NITROSTAT) 0.4 MG SL tablet   No No    Place 1 tablet under the tongue Every 5 (Five) Minutes As Needed for Chest Pain. Take no more than 3 doses in 15 minutes.    Nutritional Supplements (BOOST PLUS) liquid   No No    Take 1 can by mouth 2 (Two) Times a Day.    OXcarbazepine (TRILEPTAL) 150 MG tablet   Yes No    Take 150 mg by mouth Every Morning.     OXcarbazepine (TRILEPTAL) 150 MG tablet   Yes No    Take 300 mg by mouth every night at bedtime.    rOPINIRole (REQUIP) 1 MG tablet  Care Giver Yes No    Take 1 mg by mouth Every Night. Take 1 hour before bedtime.    spironolactone (ALDACTONE) 50 MG tablet   No No    Take 2 tablets by mouth Daily.    teriparatide (FORTEO) 600 MCG/2.4ML injection   Yes No    Inject 20 mcg under the skin into the appropriate area as directed every night at bedtime.    vitamin D (ERGOCALCIFEROL) 1.25 MG (63211 UT) capsule capsule   No No    TAKE 1 CAPSULE BY MOUTH 1 TIME PER WEEK.    XIFAXAN 550 MG tablet   No No    TAKE ONE TABLET BY MOUTH TWICE A DAY        ---------------------------------------------------------------------------------------------------------------------    Objective     Hospital Scheduled Meds:    enoxaparin 40 mg Subcutaneous Q24H   lactulose 30 g Oral Q2H   magnesium sulfate 4 g Intravenous Once   sodium chloride 10 mL Intravenous Q12H          Current listed hospital scheduled medications may not yet reflect those currently placed in orders that are signed and held, awaiting patient's arrival to floor/unit.    ---------------------------------------------------------------------------------------------------------------------   Vital Signs:  Temp:  [98.7 °F (37.1 °C)] 98.7 °F (37.1 °C)  Heart Rate:  [55-85] 75  Resp:  [16] 16  BP: (150-182)/(58-80) 174/76  Mean Arterial Pressure (Non-Invasive) for the past 24 hrs (Last 3 readings):   Noninvasive MAP (mmHg)   03/15/20 1501 98   03/15/20 1447 94   03/15/20 1432 90     SpO2 Percentage    03/15/20 1432 03/15/20 1447 03/15/20 1501   SpO2: 99% 100% 100%     SpO2:  [98 %-100 %] 100 %  on   ;        Body mass index is 28.71 kg/m².  Wt Readings from Last 3 Encounters:   03/15/20 66.7 kg (147 lb)   03/05/20 67 kg (147 lb 11.2 oz)   01/20/20 65.8 kg (145 lb)      ---------------------------------------------------------------------------------------------------------------------   Physical Exam:  GEN: elderly, sleeping while sitting upright in bed, drooling.   EYES:  b/l cataract, pupils are reactive to light. No scleral icterus  HENT: poor dentition, moist tongue and mucosa, drooling,   CV: RRR, no murmurs  PULM: poor resp effort but mostly CTA, no crackles, rhonchi. Coughing   GI: distended, + bs, NT, firm to palpation. Tympanic. Unable to feel a fluid shift  NEURO: tongue is midline, no facial asymmetry, unable to complete due to AMS, follows one step commands intermittently but not consistent.  Keeps right elbow stiff in extension.   SKIN: very dry, mild edema LE's, not jaundiced  PSYCH: lethargic but arousable, not agitated, confused and not oriented.  ---------------------------------------------------------------------------------------------------------------------  EKG:  NSR, no signs of acute ischemia, no change.     ---------------------------------------------------------------------------------------------------------------------   Results from last 7 days   Lab Units 03/15/20  1214   TROPONIN T ng/mL 0.018           Results from last 7 days   Lab Units 03/15/20  1214 03/11/20  0257 03/10/20  0217   CRP mg/dL 4.30*  --   --    LACTATE mmol/L 1.3  --   --    WBC 10*3/mm3 4.00 3.96 4.88   HEMOGLOBIN g/dL 10.4* 9.5* 9.3*   HEMATOCRIT % 32.7* 31.0* 32.3*   MCV fL 100.6* 103.3* 112.2*   MCHC g/dL 31.8 30.6* 28.8*   PLATELETS 10*3/mm3 116* 83* 96*   INR  1.23*  --   --      Results from last 7 days   Lab Units 03/15/20  1214 03/11/20  0257 03/10/20  0217 03/09/20  0119  03/08/20  1756   SODIUM mmol/L 138 137 138 142   < >  --    POTASSIUM mmol/L 4.1 3.9 4.0 4.1   < >  --    MAGNESIUM mg/dL 1.7  --   --   --   --   --    CHLORIDE mmol/L 102 100 103 108*   < >  --    CO2 mmol/L 24.7 27.0 26.3 24.1   < >  --    BUN mg/dL 15 12 13 14   < >  --    CREATININE mg/dL 0.88  0.84 0.90 0.92   < >  --    EGFR IF NONAFRICN AM mL/min/1.73 61 64 59* 57*   < >  --    CALCIUM mg/dL 8.6 8.4* 8.2* 8.3*   < >  --    GLUCOSE mg/dL 93 100* 106* 118*   < >  --    ALBUMIN g/dL 2.99*  --   --  2.33*  --  2.60*   BILIRUBIN mg/dL 1.2  --   --  1.0  --  1.2   ALK PHOS U/L 122*  --   --  97  --  101   AST (SGOT) U/L 46*  --   --  38*  --  41*   ALT (SGPT) U/L 23  --   --  20  --  22    < > = values in this interval not displayed.   Estimated Creatinine Clearance: 36.9 mL/min (by C-G formula based on SCr of 0.88 mg/dL).  Ammonia   Date Value Ref Range Status   03/15/2020 129 (H) 11 - 51 umol/L Final       Glucose   Date/Time Value Ref Range Status   03/15/2020 1420 85 70 - 130 mg/dL Final     Lab Results   Component Value Date    HGBA1C 4.50 (L) 03/05/2020     Lab Results   Component Value Date    TSH 15.650 (H) 03/05/2020    FREET4 0.80 (L) 03/05/2020       ---------------------------------------------------------------------------------------------------------------------  Imaging Results (Last 7 Days)     Procedure Component Value Units Date/Time    XR Chest 1 View [[id number]] Collected:  03/15/20 1224     Updated:  03/15/20 1226    Narrative:       XR CHEST 1 VW-     CLINICAL INDICATION: AMS        COMPARISON: 03/05/2020      TECHNIQUE: Single frontal view of the chest.     FINDINGS:     There is diffuse interstitial lung disease.  Right-sided consolidation  There is no evidence of an acute osseous abnormality.   There are no suspicious-appearing parenchymal soft tissue nodules.          Impression:       Diffuse interstitial lung disease.  Right-sided consolidation     This report was finalized on 3/15/2020 12:24 PM by Dr. Byron Mcintosh MD.       CT Head Without Contrast [[id number]] Collected:  03/15/20 1205     Updated:  03/15/20 1208    Narrative:       CT HEAD WO CONTRAST-     CLINICAL INDICATION: AMS        COMPARISON: 03/05/2020      TECHNIQUE: Axial images of the brain were obtained with out  intravenous  contrast.  Reformatted images were created in the sagittal and coronal  planes.     DOSE: 2183.80 mGy.cm     Radiation dose reduction techniques were utilized per ALARA protocol.  Automated exposure control was initiated through either or [employer] or  2C2P software packages by  protocol.           FINDINGS:   Today's study shows no mass, hemorrhage, or midline shift.   Cerebral atrophy and ventriculomegaly  There is no evidence of acute ischemia.  I do not see epidural or subdural hematoma.  The gray-white differentiation is appropriate.   The bone window setting images show no destructive calvarial lesion or  acute calvarial fracture.   The posterior fossa is unremarkable.          Impression:       No acute intracranial pathology. Nothing is seen on this exam to  specifically account for the patient's symptoms.     This report was finalized on 3/15/2020 12:06 PM by Dr. Byron Mcintosh MD.           I have personally reviewed the above radiology results.     Last Echocardiogram:  Results for orders placed during the hospital encounter of 10/26/19   Adult Transthoracic Echo Complete W/ Cont if Necessary Per Protocol    Narrative · There is moderate calcification of the aortic valve mainly affecting the   non, left and right coronary cusp(s).  · Mild aortic valve regurgitation is present.  · Estimated EF = 65%.  · Left ventricular systolic function is normal.  · Left ventricular diastolic dysfunction (grade I) consistent with   impaired relaxation.  · No wall motion abnormality        ---------------------------------------------------------------------------------------------------------------------    Assessment & Plan      - Recurrent hepatic encephalopathy-due to progressive worsening liver cirrhosis as well as noncompliance with medication.   Her Ammonia is much higher than it was at last presentation.  Started high-dose lactulose every 2 hours until mentation has improved.  If  patient refuses to take medication will administer rectally however, effects may be minimal if enema cannot be retained.   No h/o esophageal varices but do not have an EGD report to confirm. If fails to take po (due to increase lethargy) will attempt Dobbhoff tube placement or ME administration.  If continues to worsen patient will need to be transitioned to comfort care measures here.     - Advanced decompensated liver cirrhosis-she is on Lasix, Aldactone, lactulose and rifaximin at home.   Recent CT scan last admission did not show ascites.     - Chronic thrombocytopenia-due to advanced liver cirrhosis.  Platelets around 116 K     - Uncontrolled hyperthyroidism-TSH noted to be elevated at 15 during last admission.  Her Synthroid dose was increased to 100 MCG.  However she is noncompliant with medications therefore difficult to correct.     - Chronic diastolic congestive heart failure-on diuretics at home however noncompliant with medications.  She does not have evidence of decompensated heart failure at this time.     - GERD-on PPI at home    - CKD 3-due to age and weight, Prandin appears to be stable at baseline    - Hypoalbuminemia-due to liver cirrhosis    - History of hepatitis C    - History of alcohol use disorder    - Hypertension-prior history and currently hypertensive with systolic blood pressure in the 150s to 170s.  We will hold antihypertensives at this time as she can have dramatic drop in her blood pressure given cirrhosis physiology.     - Restless leg syndrome-on ropinirole at home.  Will hold due to encephalopathy    - Trigeminal Neuralgia- takes OXcarbacepine (Trileptal). Hold due to lethargy.     - chronic pain- hold Lortab due to lethargy    ---------------------------------------------------  DVT Prophylaxis: lovenox       The patient is considered to be a high risk patient due to: progressive decompensated liver cirrhosis with recurrent complications, age, CKD, CHF, etc.     Code Status:  DNR/DNI    Discussed code status  in details with granddaughter/care taker.  She reported that patient would have not wanted to be kept alive if she was dying and would not want to be on ventilator or have her heart resrted if it were to stop.  I explained this means DNR/DNI but at this time she will continue to receive medical care for her medical conditions with intention of cure and optimization.  Patient has end-stage decompensated liver cirrhosis with recurrent complications including hepatic encephalopathy that is continuing to worsen with every admission.  She has stopped wanting to take medications which she makes management of her disease more difficult.  Therefore, care taker is planning on discussing goals of care with the rest of the family members in hopes to transition her to hospice and comfort care measures potentially.  She is no longer able to provide care for her at home as she is taking care of her own mother status post stroke.  Their goal is to admit her to a nursing home with possible hospice care depending on the discussion with the rest of the family.         Katayoun Behbahani, MD  Hospitalist Service -- [facility]       03/15/20  15:34

## 2020-03-15 NOTE — ED PROVIDER NOTES
Subjective   89-year-old female who presents to the ED today for altered mental status.  The patient was recently admitted to this hospital for hepatic encephalopathy and was discharged 3 days ago.  She initially did well at home but shortly after being discharged started to refuse to take her lactulose.  Her family states that she has probably only taken 6 doses over the last 3 days.  The last time she would take a dose of lactulose was around 9 PM yesterday evening.  Her granddaughter states she did have 2 bowel movements yesterday but they were small.  She states her appetite has been normal and she has been eating and drinking.  She states 2 days ago she fell while in the bathroom but refused to come to the hospital.  They report yesterday she started to develop altered mental status and was very sleepy which has then continued into today.  She has not had any vomiting or diarrhea.  She has not been running a fever.  The patient is currently oriented to self only but denies any pain or complaints at this time.      History provided by:  Relative (grand-daughter)  Altered Mental Status   Presenting symptoms: confusion and lethargy    Severity:  Severe  Most recent episode:  Yesterday  Episode history:  Continuous  Duration:  1 day  Timing:  Constant  Progression:  Worsening  Chronicity:  Recurrent  Context: not taking medications as prescribed    Associated symptoms: weakness    Associated symptoms: no abdominal pain, no decreased appetite, no difficulty breathing, no fever, no headaches, no nausea, no rash, no seizures, no slurred speech, no suicidal behavior and no vomiting        Review of Systems   Unable to perform ROS: Mental status change   Constitutional: Negative for appetite change, decreased appetite and fever.   Gastrointestinal: Negative for abdominal pain, nausea and vomiting.   Skin: Negative for rash.   Neurological: Positive for weakness. Negative for seizures and headaches.    Psychiatric/Behavioral: Positive for confusion.       Past Medical History:   Diagnosis Date   • Acute renal failure (CMS/HCC)    • Alcohol abuse     in remission   • Anxiety    • Bell's palsy    • Cellulitis     LLE   • CHF (congestive heart failure) (CMS/HCC)     Diastolic   • CHF (congestive heart failure) (CMS/HCC)    • Constipation    • Coronary artery disease    • Diverticulosis    • GERD (gastroesophageal reflux disease)    • Hepatic cirrhosis (CMS/HCC)    • Hepatitis C    • History of transfusion    • Hypertension    • Hypothyroidism    • MI (mitral incompetence)    • Osteoporosis    • Osteoporosis    • Restless leg syndrome    • Thrombocytopenia (CMS/HCC)    • Trigeminal neuralgia        Allergies   Allergen Reactions   • Ciprofloxacin Itching       Past Surgical History:   Procedure Laterality Date   • BACK SURGERY      KYPHOPLASTIES    • CARDIAC CATHETERIZATION Left 08/2004   • CARDIOVASCULAR STRESS TEST  01/25/2015   • CATARACT EXTRACTION     • CHOLECYSTECTOMY     • CLAVICLE SURGERY Right    • COLONOSCOPY  10/2004   • ECHO - CONVERTED  11/2012   • FEMUR OPEN REDUCTION INTERNAL FIXATION Left 10/12/2017    Procedure: FEMUR OPEN REDUCTION INTERNAL FIXATION;  Surgeon: Karson Pierre MD;  Location: [facility];  Service:    • HIP FRACTURE SURGERY Bilateral     ARTHROPLASTIES    • PORTACATH PLACEMENT N/A 9/28/2018    Procedure: INSERTION OF PORTACATH;  Surgeon: Vic Mauricio MD;  Location: [facility] OR;  Service: General   • SUBTOTAL HYSTERECTOMY     • WRIST FRACTURE SURGERY         Family History   Problem Relation Age of Onset   • Cancer Mother    • Heart disease Father    • Arthritis Sister    • Osteoporosis Sister    • Diabetes Maternal Aunt        Social History     Socioeconomic History   • Marital status:      Spouse name: Not on file   • Number of children: Not on file   • Years of education: Not on file   • Highest education level: Not on file   Tobacco Use   • Smoking status:  Never Smoker   • Smokeless tobacco: Former User     Types: Snuff   Substance and Sexual Activity   • Alcohol use: No     Comment: FORMER 12 BOTTLES PER DAY FOR 15 YEARS   • Drug use: No   • Sexual activity: Defer   Social History Narrative    LIVES WITH GRANDDAUGHTER DEVON IN [place]            Objective   Physical Exam   Constitutional: She appears well-developed and well-nourished. She appears lethargic. No distress.   Frail appearing, elderly female   HENT:   Head: Normocephalic and atraumatic.   Mouth/Throat: Oropharynx is clear and moist.   Slight amount of green crusting to right eyelid   Eyes: Pupils are equal, round, and reactive to light. EOM are normal.   Neck: Normal range of motion. Neck supple.   Cardiovascular: Normal rate, regular rhythm, normal heart sounds and intact distal pulses.   Pulmonary/Chest: Effort normal and breath sounds normal.   Abdominal: Soft. Bowel sounds are normal. There is no tenderness.   Musculoskeletal: She exhibits edema (mild edema to bilateral lower extremities).   Neurological: She has normal strength. She appears lethargic.   Patient is sleepy but arouses to voice.  She is oriented to self only.  She will follow commands.   Skin: Skin is warm and dry. Capillary refill takes less than 2 seconds.   Nursing note and vitals reviewed.      Procedures           ED Course  ED Course as of Mar 15 1414   Sun Mar 15, 2020   1222 Pt seen with PA.  Reviewed findings.  Agree with assessment and plan.    [BC]   1229 FINDINGS:     There is diffuse interstitial lung disease.  Right-sided consolidation  There is no evidence of an acute osseous abnormality.   There are no suspicious-appearing parenchymal soft tissue nodules.        IMPRESSION:  Diffuse interstitial lung disease.  Right-sided consolidation   XR Chest 1 View [AH]   1229 FINDINGS:   Today's study shows no mass, hemorrhage, or midline shift.   Cerebral atrophy and ventriculomegaly  There is no evidence of acute ischemia.  I  do not see epidural or subdural hematoma.  The gray-white differentiation is appropriate.   The bone window setting images show no destructive calvarial lesion or  acute calvarial fracture.   The posterior fossa is unremarkable.        IMPRESSION:  No acute intracranial pathology. Nothing is seen on this exam to  specifically account for the patient's symptoms.   CT Head Without Contrast []   1314 I have discussed with Dr. Behbahani who will admit.    []   1335 EKG performed at 12:13  Sinus rhythm, rate of 63  No acute ischemia, reviewed by Dr. Yin    []      ED Course User Index  [] Haleigh Robledo PA  [BC] Niranjan Yin MD                                           MDM  Number of Diagnoses or Management Options  Acute hepatic encephalopathy:      Amount and/or Complexity of Data Reviewed  Clinical lab tests: reviewed  Tests in the radiology section of CPT®: reviewed  Tests in the medicine section of CPT®: reviewed  Decide to obtain previous medical records or to obtain history from someone other than the patient: yes  Discuss the patient with other providers: yes    Patient Progress  Patient progress: stable      Final diagnoses:   Acute hepatic encephalopathy            Haleigh Robledo PA  03/15/20 1411

## 2020-03-15 NOTE — ED NOTES
Pt leaving ED at this time with Vy (ED tech) to be transported to . Pt alert, arouses to verbal stimuli. VSS. No acute distress noted, respirations even and nonlabored. Port accessed and intact. Belongings sent with pt's family.      Yasmine Wilder, RN  03/15/20 [id number]

## 2020-03-15 NOTE — ED NOTES
Accessed pt's port, straight cathed, and changed pt's brief at this time with Andre RN at bedside assisting. Pt tolerated well. No acute distress noted.      Yasmine Wilder, RN  03/15/20 [id number]

## 2020-03-15 NOTE — PLAN OF CARE
Problem: Patient Care Overview  Goal: Plan of Care Review  Outcome: Ongoing (interventions implemented as appropriate)  Flowsheets  Taken 3/15/2020 1550  Progress: no change  Outcome Summary: Patient admitted to floor from ED. Upon admission patient is alert but confused. Will attempt to use purewick for strict I&O as patient is incontinent at this time. Patient is on a 1500mL fluid restirction and a 2g low Na diet. No acute events noted. Will continue to monitor. Bed alarm in use.  Taken 3/15/2020 1526  Plan of Care Reviewed With: patient

## 2020-03-16 NOTE — DISCHARGE PLACEMENT REQUEST
"Sarah Fabian (89 y.o. Female)     Date of Birth Social Security Number Address Home Phone MRN    1930  [address]  [place] [id number] [phone] [id number]    Yarsanism Marital Status          Mosque        Admission Date Admission Type Admitting Provider Attending Provider Department, Room/Bed    3/15/20 Emergency Behbahani, Katayoun, MD Srinivas, Priyanka, MD [address], [address]346/2S    Discharge Date Discharge Disposition Discharge Destination                       Attending Provider:  Anastasiya Fuchs MD    Allergies:  Ciprofloxacin    Isolation:  None   Infection:  None   Code Status:  No CPR    Ht:  162.6 cm (64\")   Wt:  63.3 kg (139 lb 9.6 oz)    Admission Cmt:  None   Principal Problem:  None                Active Insurance as of 3/15/2020     Primary Coverage     Payor Plan Insurance Group Employer/Plan Group    MEDICARE MEDICARE A & B      Payor Plan Address Payor Plan Phone Number Payor Plan Fax Number Effective Dates    [address] [phone]  1988 - None Entered    [place] [address]       Subscriber Name Subscriber Birth Date Member ID       SARAH FABIAN 1930 [id number]               [address] [address]  Phone:  [phone]  Fax:          Patient:     Sarah Fabian MRN:  [id number]   [address]  [facility] [address] :  1930  SSN:    Phone: [phone] Sex:  F      INSURANCE PAYOR PLAN GROUP # SUBSCRIBER ID   Primary:    MEDICARE [id number]   [id number]   Admitting Diagnosis: Acute hepatic encephalopathy [K72.00]  Order Date:  Mar 16, 2020         Case Management  Consult       (Order ID: [id number])     Diagnosis:         Priority:  Routine Expected Date:   Expiration Date:        Interval:   Count:    Is discharge planning needed? If yes, who is requesting discharge planning? Provider  Reason for Consult? Hospice evaluation     Specimen Type:   Specimen " Source:   Specimen Taken Date:   Specimen Taken Time:                   Authorizing Provider:Anastasiya Fuchs MD  Authorizing Provider's NPI: [id number]  Order Entered By: Anastasiya Fuchs MD 3/16/2020  3:09 PM     Electronically signed by: Anastasiya Fuchs MD 3/16/2020  3:09 PM            Emergency Contacts      (Rel.) Home Phone Work Phone Mobile Phone    Yeimy Gallegos (Sister) [phone] -- --    Marybeth Baltazar (Sister) [phone] -- --    ALDENUBALDO (Sister) [phone] -- [phone]    glo moreno (Grandchild) [phone] -- --               History & Physical      Behbahani, Katayoun, MD at 03/15/20 1534               [facility]  HISTORY & PHYSICAL    Patient Identification:  Name:  Alma Delia Steiner  Age:  89 y.o.  Sex:  female  :  1930  MRN:  [id number]   Visit Number:  [id number]  Primary Care Physician:  Galina Gonsalves MD     Subjective     Chief complaint:   Chief Complaint   Patient presents with   • Altered Mental Status     History of presenting illness:   Mrs. steiner is an 89-year-old female with a past medical history of hepatitis C cirrhosis with hepatic encephalopathy home had been admitted multiple times due to hepatic encephalopathy.  She has dementia and is unable to currently give history due to encephalopathy therefore history was obtained from her granddaughter.  she has been noncompliant with her medications.  She is being cared for by her granddaughter and her  however have been refusing to take her lactulose despite them offering it to her multiple times.  She was admitted on  for the same thing.  Eventually took lactulose and hepatic encephalopathy improved she was discharged 4 days ago.  Upon returning home she has only taken total of 3-5 doses of lactulose and only had 2 bowel movements.  She has been refusing the rest of the doses as well as her other medications.  She was found to  be confused today but still conversing therefore they brought her to the emergency room.  By the time she arrived to the emergency room her mental status had been worse her granddaughter.  They deny patient having any fevers or chills nausea vomiting abdominal pain melena or hematochezia no falls no changes since she was discharged from the hospital.     ---------------------------------------------------------------------------------------------------------------------   Review of Systems : Unable to provide due to altered mental status  ---------------------------------------------------------------------------------------------------------------------   Past Medical History:   Diagnosis Date   • Acute renal failure (CMS/HCC)    • Alcohol abuse     in remission   • Anxiety    • Bell's palsy    • Cellulitis     LLE   • CHF (congestive heart failure) (CMS/HCC)     Diastolic   • CHF (congestive heart failure) (CMS/HCC)    • Constipation    • Coronary artery disease    • Diverticulosis    • GERD (gastroesophageal reflux disease)    • Hepatic cirrhosis (CMS/HCC)    • Hepatitis C    • History of transfusion    • Hypertension    • Hypothyroidism    • MI (mitral incompetence)    • Osteoporosis    • Osteoporosis    • Restless leg syndrome    • Thrombocytopenia (CMS/HCC)    • Trigeminal neuralgia      Past Surgical History:   Procedure Laterality Date   • BACK SURGERY      KYPHOPLASTIES    • CARDIAC CATHETERIZATION Left 08/2004   • CARDIOVASCULAR STRESS TEST  01/25/2015   • CATARACT EXTRACTION     • CHOLECYSTECTOMY     • CLAVICLE SURGERY Right    • COLONOSCOPY  10/2004   • ECHO - CONVERTED  11/2012   • FEMUR OPEN REDUCTION INTERNAL FIXATION Left 10/12/2017    Procedure: FEMUR OPEN REDUCTION INTERNAL FIXATION;  Surgeon: Karson Pierre MD;  Location: [facility];  Service:    • HIP FRACTURE SURGERY Bilateral     ARTHROPLASTIES    • PORTACATH PLACEMENT N/A 9/28/2018    Procedure: INSERTION OF PORTACATH;  Surgeon:  Vic Mauricio MD;  Location: [facility] OR;  Service: General   • SUBTOTAL HYSTERECTOMY     • WRIST FRACTURE SURGERY       Family History   Problem Relation Age of Onset   • Cancer Mother    • Heart disease Father    • Arthritis Sister    • Osteoporosis Sister    • Diabetes Maternal Aunt      Social History     Socioeconomic History   • Marital status:      Spouse name: Not on file   • Number of children: Not on file   • Years of education: Not on file   • Highest education level: Not on file   Tobacco Use   • Smoking status: Never Smoker   • Smokeless tobacco: Former User     Types: Snuff   Substance and Sexual Activity   • Alcohol use: No     Comment: FORMER 12 BOTTLES PER DAY FOR 15 YEARS   • Drug use: No   • Sexual activity: Defer   Social History Narrative    LIVES WITH GRANDDAUGHTER DEVON IN [place]      ---------------------------------------------------------------------------------------------------------------------   Allergies:  Ciprofloxacin  ---------------------------------------------------------------------------------------------------------------------   Medications below are reported home medications pulling from within the system; at this time, these medications have not been reconciled unless otherwise specified and are in the verification process for further verifcation as current home medications.    Prior to Admission Medications     Prescriptions Last Dose Informant Patient Reported? Taking?    aspirin 81 MG EC tablet  Care Giver Yes No    Take 81 mg by mouth Daily.    esomeprazole (nexIUM) 40 MG capsule   No No    TAKE 1 CAPSULE BY MOUTH EVERY MORNING BEFORE BREAKFAST.    furosemide (LASIX) 40 MG tablet   No No    Take 1 tablet by mouth Daily.    HYDROcodone-acetaminophen (NORCO)  MG per tablet  Pharmacy Yes No    Take 1 tablet by mouth Every 12 (Twelve) Hours As Needed for Moderate Pain .    lactulose (CHRONULAC) 10 GM/15ML solution   No No    Take 30 mL by mouth 3  (Three) Times a Day.    levothyroxine (SYNTHROID, LEVOTHROID) 100 MCG tablet   No No    Take 1 tablet by mouth Daily.    magnesium oxide (MAG-OX) 400 MG tablet   Yes No    Take 400 mg by mouth 2 (Two) Times a Day.    multivitamin (DAILY RAMESH) tablet tablet   No No    Take 1 tablet by mouth Daily.    nitroglycerin (NITROSTAT) 0.4 MG SL tablet   No No    Place 1 tablet under the tongue Every 5 (Five) Minutes As Needed for Chest Pain. Take no more than 3 doses in 15 minutes.    Nutritional Supplements (BOOST PLUS) liquid   No No    Take 1 can by mouth 2 (Two) Times a Day.    OXcarbazepine (TRILEPTAL) 150 MG tablet   Yes No    Take 150 mg by mouth Every Morning.    OXcarbazepine (TRILEPTAL) 150 MG tablet   Yes No    Take 300 mg by mouth every night at bedtime.    rOPINIRole (REQUIP) 1 MG tablet  Care Giver Yes No    Take 1 mg by mouth Every Night. Take 1 hour before bedtime.    spironolactone (ALDACTONE) 50 MG tablet   No No    Take 2 tablets by mouth Daily.    teriparatide (FORTEO) 600 MCG/2.4ML injection   Yes No    Inject 20 mcg under the skin into the appropriate area as directed every night at bedtime.    vitamin D (ERGOCALCIFEROL) 1.25 MG (59227 UT) capsule capsule   No No    TAKE 1 CAPSULE BY MOUTH 1 TIME PER WEEK.    XIFAXAN 550 MG tablet   No No    TAKE ONE TABLET BY MOUTH TWICE A DAY        ---------------------------------------------------------------------------------------------------------------------    Objective     Hospital Scheduled Meds:    enoxaparin 40 mg Subcutaneous Q24H   lactulose 30 g Oral Q2H   magnesium sulfate 4 g Intravenous Once   sodium chloride 10 mL Intravenous Q12H          Current listed hospital scheduled medications may not yet reflect those currently placed in orders that are signed and held, awaiting patient's arrival to floor/unit.    ---------------------------------------------------------------------------------------------------------------------   Vital Signs:  Temp:  [98.7  °F (37.1 °C)] 98.7 °F (37.1 °C)  Heart Rate:  [55-85] 75  Resp:  [16] 16  BP: (150-182)/(58-80) 174/76  Mean Arterial Pressure (Non-Invasive) for the past 24 hrs (Last 3 readings):   Noninvasive MAP (mmHg)   03/15/20 1501 98   03/15/20 1447 94   03/15/20 1432 90     SpO2 Percentage    03/15/20 1432 03/15/20 1447 03/15/20 1501   SpO2: 99% 100% 100%     SpO2:  [98 %-100 %] 100 %  on   ;        Body mass index is 28.71 kg/m².  Wt Readings from Last 3 Encounters:   03/15/20 66.7 kg (147 lb)   03/05/20 67 kg (147 lb 11.2 oz)   01/20/20 65.8 kg (145 lb)     ---------------------------------------------------------------------------------------------------------------------   Physical Exam:  GEN: elderly, sleeping while sitting upright in bed, drooling.   EYES:  b/l cataract, pupils are reactive to light. No scleral icterus  HENT: poor dentition, moist tongue and mucosa, drooling,   CV: RRR, no murmurs  PULM: poor resp effort but mostly CTA, no crackles, rhonchi. Coughing   GI: distended, + bs, NT, firm to palpation. Tympanic. Unable to feel a fluid shift  NEURO: tongue is midline, no facial asymmetry, unable to complete due to AMS, follows one step commands intermittently but not consistent.  Keeps right elbow stiff in extension.   SKIN: very dry, mild edema LE's, not jaundiced  PSYCH: lethargic but arousable, not agitated, confused and not oriented.  ---------------------------------------------------------------------------------------------------------------------  EKG:  NSR, no signs of acute ischemia, no change.     ---------------------------------------------------------------------------------------------------------------------   Results from last 7 days   Lab Units 03/15/20  1214   TROPONIN T ng/mL 0.018           Results from last 7 days   Lab Units 03/15/20  1214 03/11/20  0257 03/10/20  0217   CRP mg/dL 4.30*  --   --    LACTATE mmol/L 1.3  --   --    WBC 10*3/mm3 4.00 3.96 4.88   HEMOGLOBIN g/dL 10.4* 9.5*  9.3*   HEMATOCRIT % 32.7* 31.0* 32.3*   MCV fL 100.6* 103.3* 112.2*   MCHC g/dL 31.8 30.6* 28.8*   PLATELETS 10*3/mm3 116* 83* 96*   INR  1.23*  --   --      Results from last 7 days   Lab Units 03/15/20  1214 03/11/20  0257 03/10/20  0217 03/09/20  0119  03/08/20  1756   SODIUM mmol/L 138 137 138 142   < >  --    POTASSIUM mmol/L 4.1 3.9 4.0 4.1   < >  --    MAGNESIUM mg/dL 1.7  --   --   --   --   --    CHLORIDE mmol/L 102 100 103 108*   < >  --    CO2 mmol/L 24.7 27.0 26.3 24.1   < >  --    BUN mg/dL 15 12 13 14   < >  --    CREATININE mg/dL 0.88 0.84 0.90 0.92   < >  --    EGFR IF NONAFRICN AM mL/min/1.73 61 64 59* 57*   < >  --    CALCIUM mg/dL 8.6 8.4* 8.2* 8.3*   < >  --    GLUCOSE mg/dL 93 100* 106* 118*   < >  --    ALBUMIN g/dL 2.99*  --   --  2.33*  --  2.60*   BILIRUBIN mg/dL 1.2  --   --  1.0  --  1.2   ALK PHOS U/L 122*  --   --  97  --  101   AST (SGOT) U/L 46*  --   --  38*  --  41*   ALT (SGPT) U/L 23  --   --  20  --  22    < > = values in this interval not displayed.   Estimated Creatinine Clearance: 36.9 mL/min (by C-G formula based on SCr of 0.88 mg/dL).  Ammonia   Date Value Ref Range Status   03/15/2020 129 (H) 11 - 51 umol/L Final       Glucose   Date/Time Value Ref Range Status   03/15/2020 1420 85 70 - 130 mg/dL Final     Lab Results   Component Value Date    HGBA1C 4.50 (L) 03/05/2020     Lab Results   Component Value Date    TSH 15.650 (H) 03/05/2020    FREET4 0.80 (L) 03/05/2020       ---------------------------------------------------------------------------------------------------------------------  Imaging Results (Last 7 Days)     Procedure Component Value Units Date/Time    XR Chest 1 View [[id number]] Collected:  03/15/20 1224     Updated:  03/15/20 1226    Narrative:       XR CHEST 1 VW-     CLINICAL INDICATION: AMS        COMPARISON: 03/05/2020      TECHNIQUE: Single frontal view of the chest.     FINDINGS:     There is diffuse interstitial lung disease.  Right-sided  consolidation  There is no evidence of an acute osseous abnormality.   There are no suspicious-appearing parenchymal soft tissue nodules.          Impression:       Diffuse interstitial lung disease.  Right-sided consolidation     This report was finalized on 3/15/2020 12:24 PM by Dr. Byron Mcintosh MD.       CT Head Without Contrast [[id number]] Collected:  03/15/20 1205     Updated:  03/15/20 1208    Narrative:       CT HEAD WO CONTRAST-     CLINICAL INDICATION: AMS        COMPARISON: 03/05/2020      TECHNIQUE: Axial images of the brain were obtained with out intravenous  contrast.  Reformatted images were created in the sagittal and coronal  planes.     DOSE: 2183.80 mGy.cm     Radiation dose reduction techniques were utilized per ALARA protocol.  Automated exposure control was initiated through either or [employer] or  DoseRight software packages by  protocol.           FINDINGS:   Today's study shows no mass, hemorrhage, or midline shift.   Cerebral atrophy and ventriculomegaly  There is no evidence of acute ischemia.  I do not see epidural or subdural hematoma.  The gray-white differentiation is appropriate.   The bone window setting images show no destructive calvarial lesion or  acute calvarial fracture.   The posterior fossa is unremarkable.          Impression:       No acute intracranial pathology. Nothing is seen on this exam to  specifically account for the patient's symptoms.     This report was finalized on 3/15/2020 12:06 PM by Dr. Byron Mcintosh MD.           I have personally reviewed the above radiology results.     Last Echocardiogram:  Results for orders placed during the hospital encounter of 10/26/19   Adult Transthoracic Echo Complete W/ Cont if Necessary Per Protocol    Narrative · There is moderate calcification of the aortic valve mainly affecting the   non, left and right coronary cusp(s).  · Mild aortic valve regurgitation is present.  · Estimated EF = 65%.  · Left ventricular  systolic function is normal.  · Left ventricular diastolic dysfunction (grade I) consistent with   impaired relaxation.  · No wall motion abnormality        ---------------------------------------------------------------------------------------------------------------------    Assessment & Plan      - Recurrent hepatic encephalopathy-due to progressive worsening liver cirrhosis as well as noncompliance with medication.   Her Ammonia is much higher than it was at last presentation.  Started high-dose lactulose every 2 hours until mentation has improved.  If patient refuses to take medication will administer rectally however, effects may be minimal if enema cannot be retained.   No h/o esophageal varices but do not have an EGD report to confirm. If fails to take po (due to increase lethargy) will attempt Dobbhoff tube placement or TX administration.  If continues to worsen patient will need to be transitioned to comfort care measures here.     - Advanced decompensated liver cirrhosis-she is on Lasix, Aldactone, lactulose and rifaximin at home.   Recent CT scan last admission did not show ascites.     - Chronic thrombocytopenia-due to advanced liver cirrhosis.  Platelets around 116 K     - Uncontrolled hyperthyroidism-TSH noted to be elevated at 15 during last admission.  Her Synthroid dose was increased to 100 MCG.  However she is noncompliant with medications therefore difficult to correct.     - Chronic diastolic congestive heart failure-on diuretics at home however noncompliant with medications.  She does not have evidence of decompensated heart failure at this time.     - GERD-on PPI at home    - CKD 3-due to age and weight, Prandin appears to be stable at baseline    - Hypoalbuminemia-due to liver cirrhosis    - History of hepatitis C    - History of alcohol use disorder    - Hypertension-prior history and currently hypertensive with systolic blood pressure in the 150s to 170s.  We will hold antihypertensives at  this time as she can have dramatic drop in her blood pressure given cirrhosis physiology.     - Restless leg syndrome-on ropinirole at home.  Will hold due to encephalopathy    - Trigeminal Neuralgia- takes OXcarbacepine (Trileptal). Hold due to lethargy.     - chronic pain- hold Lortab due to lethargy    ---------------------------------------------------  DVT Prophylaxis: lovenox       The patient is considered to be a high risk patient due to: progressive decompensated liver cirrhosis with recurrent complications, age, CKD, CHF, etc.     Code Status: DNR/DNI    Discussed code status  in details with granddaughter/care taker.  She reported that patient would have not wanted to be kept alive if she was dying and would not want to be on ventilator or have her heart resrted if it were to stop.  I explained this means DNR/DNI but at this time she will continue to receive medical care for her medical conditions with intention of cure and optimization.  Patient has end-stage decompensated liver cirrhosis with recurrent complications including hepatic encephalopathy that is continuing to worsen with every admission.  She has stopped wanting to take medications which she makes management of her disease more difficult.  Therefore, care taker is planning on discussing goals of care with the rest of the family members in hopes to transition her to hospice and comfort care measures potentially.  She is no longer able to provide care for her at home as she is taking care of her own mother status post stroke.  Their goal is to admit her to a nursing home with possible hospice care depending on the discussion with the rest of the family.         Katayoun Behbahani, MD  Hospitalist Service -- [facility]       03/15/20  15:34         Electronically signed by Behbahani, Katayoun, MD at 03/15/20 3268       Vital Signs (last day)     Date/Time   Temp   Temp src   Pulse   Resp   BP   Patient Position   SpO2    03/16/20 1500    97.6 (36.4)   Oral   73   20   169/60   Lying   --    03/16/20 1048   97.9 (36.6)   Oral   76   20   162/58   Lying   --    03/16/20 0600   97.7 (36.5)   Oral   72   18   (!) 188/76   Lying   97    03/16/20 0300   97.9 (36.6)   Oral   68   18   161/62   Lying   --    03/15/20 2345   97.5 (36.4)   Oral   73   18   174/70   Lying   --    03/15/20 1900   97.4 (36.3)   Axillary   70   18   170/62   Lying   --    03/15/20 1518   97.8 (36.6)   Axillary   64   18   154/54   Lying   100    03/15/20 1501   --   --   75   --   174/76   --   100    03/15/20 1447   --   --   60   --   175/66   --   100    03/15/20 1432   --   --   56   --   156/64   --   99    03/15/20 1417   --   --   57   --   177/67   --   100    03/15/20 1412   --   --   74   --   --   --   98    03/15/20 1401   --   --   56   --   (!) 181/69   --   99    03/15/20 1347   --   --   85   --   175/69   --   99    03/15/20 1346   --   --   75   --   --   --   99    03/15/20 1332   --   --   55   --   160/63   --   100    03/15/20 1317   --   --   55   --   167/63   --   99    03/15/20 1301   --   --   71   --   172/80   --   100    03/15/20 1247   --   --   58   --   167/65   --   99    03/15/20 1232   --   --   75   --   (!) 182/59   --   99    03/15/20 1217   --   --   61   --   163/67   --   99    03/15/20 1147   --   --   61   --   157/58   --   100    03/15/20 1132   --   --   61   --   150/58   --   100    03/15/20 1117   --   --   71   --   161/61   --   100    03/15/20 1106   --   --   70   --   177/71   --   100    03/15/20 1102   98.7 (37.1)   Oral   76   16   176/70   Sitting   99                Current Facility-Administered Medications   Medication Dose Route Frequency Provider Last Rate Last Dose   • acetaminophen (TYLENOL) tablet 650 mg  650 mg Oral Q6H PRN Behbahani, Katayoun, MD       • aspirin EC tablet 81 mg  81 mg Oral Daily Behbahani, Katayoun, MD   81 mg at 03/16/20 0850   • enoxaparin (LOVENOX) syringe 40 mg  40 mg Subcutaneous Q24H  Behbahani, Katayoun, MD   40 mg at 03/15/20 1619   • furosemide (LASIX) tablet 20 mg  20 mg Oral Daily Anastasiya Fuchs MD   20 mg at 20 1055   • lactulose (CHRONULAC) 10 GM/15ML solution 30 g  30 g Oral Q6H Anastasiya Fuchs MD   30 g at 20 1100   • levothyroxine (SYNTHROID, LEVOTHROID) tablet 100 mcg  100 mcg Oral Daily Behbahani, Katayoun, MD   100 mcg at 20 0850   • magnesium oxide (MAG-OX) tablet 400 mg  400 mg Oral BID Behbahani, Katayoun, MD   400 mg at 20 0850   • multivitamin (DAILY RAMESH) tablet 1 tablet  1 tablet Oral Daily Behbahani, Katayoun, MD   1 tablet at 20 0850   • ondansetron (ZOFRAN) injection 4 mg  4 mg Intravenous Q6H PRN Behbahani, Katayoun, MD       • pantoprazole (PROTONIX) EC tablet 40 mg  40 mg Oral QAM Behbahani, Katayoun, MD   40 mg at 20 0523   • potassium phosphate (monobasic) (K-PHOS) tablet 500 mg  500 mg Oral TID Anastasiya Fuchs MD   500 mg at 20 1056   • riFAXIMin (XIFAXAN) tablet 550 mg  550 mg Oral BID Behbahani, Katayoun, MD   550 mg at 20 0850   • sodium chloride 0.9 % flush 10 mL  10 mL Intravenous PRN Haleigh Robledo PA       • sodium chloride 0.9 % flush 10 mL  10 mL Intravenous Q12H Behbahani, Katayoun, MD   10 mL at 20 0851   • sodium chloride 0.9 % flush 10 mL  10 mL Intravenous PRN Behbahani, Katayoun, MD       • spironolactone (ALDACTONE) tablet 50 mg  50 mg Oral Daily Anastasiya Fuchs MD   50 mg at 20 1056     Physician Progress Notes (most recent note)    No notes of this type exist for this encounter.         Consult Notes (most recent note)    No notes of this type exist for this encounter.            Physical Therapy Notes (most recent note)      Rosa Stringer, PT at 20 1343  Version 1 of 1         Acute Care - Physical Therapy Initial Evaluation   Mark     Patient Name: Alma Delia Garcia  : 1930  MRN: [id number]  Today's Date: 3/16/2020   Onset of Illness/Injury or Date of  Surgery: 03/16/20  Date of Referral to PT: 03/16/20  Referring Physician: Jef      Admit Date: 3/15/2020    Visit Dx:     ICD-10-CM ICD-9-CM   1. Acute hepatic encephalopathy K72.00 572.2     Patient Active Problem List   Diagnosis   • Hepatic cirrhosis*   • HTN (hypertension)*   • Anxiety disorder*   • Osteoporosis   • Hypothyroidism*   • GERD (gastroesophageal reflux disease)*   • Chronic pain syndrome due to fractured spine and left rib fracture*   • Anemia, chronic disease   • Arthralgia of hip   • Primary insomnia   • Peripheral vascular disease (CMS/HCC)   • Nonrheumatic aortic valve insufficiency, mild   • Chronic diastolic heart failure (Grade 1)    • Poor venous access   • Chronic kidney disease, stage III (moderate) (CMS/HCC)   • Acute hepatic encephalopathy   • Coronary artery disease   • Restless leg syndrome   • Hepatitis C   • Debility in setting of chronic illness    • Respiratory alkalosis with secondary metabolic alkalosis    • Prolonged Q-T interval on ECG   • Macrocytic anemia   • Hypoalbuminemia   • Encephalopathy   • Hepatic encephalopathy (CMS/HCC)     Past Medical History:   Diagnosis Date   • Acute renal failure (CMS/HCC)    • Alcohol abuse     in remission   • Anxiety    • Bell's palsy    • Cellulitis     LLE   • CHF (congestive heart failure) (CMS/HCC)     Diastolic   • CHF (congestive heart failure) (CMS/HCC)    • Constipation    • Coronary artery disease    • Diverticulosis    • GERD (gastroesophageal reflux disease)    • Hepatic cirrhosis (CMS/HCC)    • Hepatitis C    • History of transfusion    • Hypertension    • Hypothyroidism    • MI (mitral incompetence)    • Osteoporosis    • Osteoporosis    • Restless leg syndrome    • Thrombocytopenia (CMS/HCC)    • Trigeminal neuralgia      Past Surgical History:   Procedure Laterality Date   • BACK SURGERY      KYPHOPLASTIES    • CARDIAC CATHETERIZATION Left 08/2004   • CARDIOVASCULAR STRESS TEST  01/25/2015   • CATARACT EXTRACTION     •  CHOLECYSTECTOMY     • CLAVICLE SURGERY Right    • COLONOSCOPY  10/2004   • ECHO - CONVERTED  11/2012   • FEMUR OPEN REDUCTION INTERNAL FIXATION Left 10/12/2017    Procedure: FEMUR OPEN REDUCTION INTERNAL FIXATION;  Surgeon: Karson Pierre MD;  Location: [facility] OR;  Service:    • HIP FRACTURE SURGERY Bilateral     ARTHROPLASTIES    • PORTACATH PLACEMENT N/A 9/28/2018    Procedure: INSERTION OF PORTACATH;  Surgeon: Vic Mauricio MD;  Location: [facility] OR;  Service: General   • SUBTOTAL HYSTERECTOMY     • WRIST FRACTURE SURGERY          PT ASSESSMENT (last 12 hours)      Physical Therapy Evaluation     Row Name 03/16/20 1300          PT Evaluation Time/Intention    Subjective Information  no complaints  -BC     Document Type  evaluation  -BC     Mode of Treatment  physical therapy  -BC     Patient Effort  good  -BC     Row Name 03/16/20 1300          General Information    Patient Profile Reviewed?  yes  -BC     Onset of Illness/Injury or Date of Surgery  03/16/20  -BC     Referring Physician  Jef  -BC     Prior Level of Function  mod assist:;gait;transfer  -BC     Equipment Currently Used at Home  walker, rolling;wheelchair;commode  -BC     Row Name 03/16/20 1300          Relationship/Environment    Lives With  child(jojo), adult;grandchild(jojo)  -BC     Row Name 03/16/20 1300          Resource/Environmental Concerns    Current Living Arrangements  home/apartment/condo  -BC     Row Name 03/16/20 1300          Cognitive Assessment/Intervention- PT/OT    Orientation Status (Cognition)  oriented x 3  -BC     Follows Commands (Cognition)  WFL  -BC     Row Name 03/16/20 1300          Mobility Assessment/Treatment    Extremity Weight-bearing Status  left lower extremity;right lower extremity  -BC     Left Lower Extremity (Weight-bearing Status)  weight-bearing as tolerated (WBAT)  -BC     Right Lower Extremity (Weight-bearing Status)  weight-bearing as tolerated (WBAT)  -BC     Row Name 03/16/20 1300           Bed Mobility Assessment/Treatment    Bed Mobility Assessment/Treatment  bed mobility (all) activities  -BC     [place] Level (Bed Mobility)  moderate assist (50% patient effort)  -BC     Bed Mobility, Safety Issues  cognitive deficits limit understanding  -BC     Assistive Device (Bed Mobility)  bed rails  -BC     Row Name 03/16/20 1300          Transfer Assessment/Treatment    Transfer Assessment/Treatment  sit-stand transfer;stand-sit transfer  -BC     Maintains Weight-bearing Status (Transfers)  verbal cues to maintain  -BC     Sit-Stand [place] (Transfers)  moderate assist (50% patient effort)  -BC     Stand-Sit [place] (Transfers)  moderate assist (50% patient effort)  -BC     Row Name 03/16/20 1300          Sit-Stand Transfer    Assistive Device (Sit-Stand Transfers)  walker, front-wheeled  -BC     Row Name 03/16/20 1300          Stand-Sit Transfer    Assistive Device (Stand-Sit Transfers)  walker, front-wheeled  -BC     Row Name 03/16/20 1300          Gait/Stairs Assessment/Training    Gait/Stairs Assessment/Training  gait/ambulation independence  -BC     [place] Level (Gait)  moderate assist (50% patient effort)  -BC     Assistive Device (Gait)  walker, front-wheeled  -BC     Distance in Feet (Gait)  80 ft  -BC     Row Name 03/16/20 1300          General ROM    GENERAL ROM COMMENTS  ROM WFL  -BC     Row Name 03/16/20 1300          MMT (Manual Muscle Testing)    General MMT Comments  MMT 4-/5  -BC     Row Name             Wound 03/15/20 1529 Right medial heel Pressure Injury    Wound - Properties Group Date first assessed: 03/15/20  - Time first assessed: 1529 -KH Present on Hospital Admission: Y  -KH Side: Right  - Orientation: medial  - Location: heel  -KH Primary Wound Type: Pressure inj  -HM Stage, Pressure Injury: Stage 2  -HM    Row Name 03/16/20 1300          Coping    Observed Emotional State  accepting  -BC     Verbalized Emotional State  acceptance  -BC     Row  Name 03/16/20 1300          Plan of Care Review    Plan of Care Reviewed With  patient  -BC     Row Name 03/16/20 1300          Physical Therapy Clinical Impression    Date of Referral to PT  03/16/20  -BC     Functional Level at Time of Evaluation (PT Clinical Impression)  fair  -BC     Criteria for Skilled Interventions Met (PT Clinical Impression)  treatment indicated;yes  -BC     Rehab Potential (PT Clinical Summary)  good, to achieve stated therapy goals  -BC     Predicted Duration of Therapy (PT)  LOS  -BC     Row Name 03/16/20 1300          Physical Therapy Goals    Bed Mobility Goal Selection (PT)  bed mobility, PT goal 1  -BC     Transfer Goal Selection (PT)  transfer, PT goal 1  -BC     Gait Training Goal Selection (PT)  gait training, PT goal 1  -BC     Row Name 03/16/20 1300          Bed Mobility Goal 1 (PT)    Activity/Assistive Device (Bed Mobility Goal 1, PT)  bed mobility activities, all  -BC     [place] Level/Cues Needed (Bed Mobility Goal 1, PT)  minimum assist (75% or more patient effort)  -BC     Time Frame (Bed Mobility Goal 1, PT)  by discharge  -BC     Row Name 03/16/20 1300          Transfer Goal 1 (PT)    Activity/Assistive Device (Transfer Goal 1, PT)  transfers, all  -BC     [place] Level/Cues Needed (Transfer Goal 1, PT)  minimum assist (75% or more patient effort)  -BC     Time Frame (Transfer Goal 1, PT)  by discharge  -BC     Row Name 03/16/20 1300          Gait Training Goal 1 (PT)    Activity/Assistive Device (Gait Training Goal 1, PT)  gait (walking locomotion)  -BC     [place] Level (Gait Training Goal 1, PT)  minimum assist (75% or more patient effort)  -BC     Distance (Gait Goal 1, PT)  200 ft  -BC     Row Name 03/16/20 1300          Positioning and Restraints    Pre-Treatment Position  in bed  -BC     Post Treatment Position  bed  -BC     In Bed  notified nsg;supine;call light within reach;encouraged to call for assist;with family/caregiver;side rails up x3  -BC        User Key  (r) = Recorded By, (t) = Taken By, (c) = Cosigned By    Initials Name Provider Type     Damari Olmstead RN Registered Nurse    Rosa Summers PT Physical Therapist    Genet Boo RN Registered Nurse          PT Recommendation and Plan  Planned Therapy Interventions (PT Eval): balance training, bed mobility training, gait training, home exercise program, patient/family education  Therapy Frequency (PT Clinical Impression): (3-5x/week)  Plan of Care Reviewed With: patient     Time Calculation:   PT Charges     Row Name 03/16/20 1342             Time Calculation    PT Received On  03/09/20  -BC      PT Goal Re-Cert Due Date  03/30/20  -BC         Time Calculation- PT    Total Timed Code Minutes- PT  60 minute(s)  -BC         Timed Charges    42291 - Gait Training Minutes   15  -BC        User Key  (r) = Recorded By, (t) = Taken By, (c) = Cosigned By    Initials Name Provider Type    Rsoa Summers PT Physical Therapist        Therapy Charges for Today     Code Description Service Date Service Provider Modifiers Qty    72894339760 HC PT EVAL MOD COMPLEXITY 3 3/16/2020 Rosa Stringer, PT GP 1    18690743441 HC GAIT TRAINING EA 15 MIN 3/16/2020 Rosa Stringer, PT GP 1                 Rosa Stringer PT  3/16/2020          Electronically signed by Rosa Stringer PT at 03/16/20 1343       Occupational Therapy Notes (most recent note)    No notes exist for this encounter.         ADL Documentation (most recent)      Most Recent Value   Presence of Pain  denies pain/discomfort   Transferring  3 - assistive equipment and person   Toileting  3 - assistive equipment and person   Bathing  2 - assistive person   Dressing  2 - assistive person   Eating  2 - assistive person   Communication  0 - understands/communicates without difficulty   Swallowing  0 - swallows foods/liquids without difficulty   Equipment Currently Used at Home  commode, walker, rolling, hospital bed

## 2020-03-16 NOTE — PLAN OF CARE
Problem: Patient Care Overview  Goal: Plan of Care Review  Outcome: Ongoing (interventions implemented as appropriate)   Patient seems to be doing better. No new complaints.

## 2020-03-16 NOTE — OUTREACH NOTE
Medical Week 1 Survey      Responses   [facility] patient discharged from?  [facility]   Does the patient have one of the following disease processes/diagnoses(primary or secondary)?  Other   Is there a successful TCM telephone encounter documented?  No   Week 1 attempt successful?  No   Revoke  Readmitted          Elissa Flores RN

## 2020-03-16 NOTE — PROGRESS NOTES
Discharge Planning Assessment   [facility]     Patient Name: Alma Delia Garcia  MRN: [id number]  Today's Date: 3/16/2020    Admit Date: 3/15/2020    Discharge Needs Assessment    No documentation.       Discharge Plan     Row Name 03/16/20 1519       Plan    Plan  SS notified by Pallative Care rep, Faviola Flores that Pallative care was consulted. Per Faviola she had discussion wiht pt and family on this date. Pt has chosen [facility] for her Hospice needs at d/c.  SS will follow.           Haleigh Mallory

## 2020-03-16 NOTE — PROGRESS NOTES
Discharge Planning Assessment   [place]     Patient Name: Alma Delia Garcia  MRN: [id number]  Today's Date: 3/16/2020    Admit Date: 3/15/2020    Discharge Needs Assessment    No documentation.       Discharge Plan     Row Name 03/16/20 8143       Plan    Plan  SS recieved Hospice consult on this date. Pt and family are agreeable, prefer [facility]. Faxed referral. SS will follow.     Row Name 03/16/20 8924       Plan    Plan  SS notified by Pallative Care rep, Faviola Flores that Pallative care was consulted. Per Faviola she had discussion wiht pt and family on this date. Pt has chosen [facility] for her Hospice needs at d/c.  SS will follow.       Haleigh Mallory

## 2020-03-16 NOTE — THERAPY EVALUATION
Acute Care - Physical Therapy Initial Evaluation   [facility]     Patient Name: Alma Delia Garcia  : 1930  MRN: [id number]  Today's Date: 3/16/2020   Onset of Illness/Injury or Date of Surgery: 20  Date of Referral to PT: 20  Referring Physician: Jef      Admit Date: 3/15/2020    Visit Dx:     ICD-10-CM ICD-9-CM   1. Acute hepatic encephalopathy K72.00 572.2     Patient Active Problem List   Diagnosis   • Hepatic cirrhosis*   • HTN (hypertension)*   • Anxiety disorder*   • Osteoporosis   • Hypothyroidism*   • GERD (gastroesophageal reflux disease)*   • Chronic pain syndrome due to fractured spine and left rib fracture*   • Anemia, chronic disease   • Arthralgia of hip   • Primary insomnia   • Peripheral vascular disease (CMS/HCC)   • Nonrheumatic aortic valve insufficiency, mild   • Chronic diastolic heart failure (Grade 1)    • Poor venous access   • Chronic kidney disease, stage III (moderate) (CMS/HCC)   • Acute hepatic encephalopathy   • Coronary artery disease   • Restless leg syndrome   • Hepatitis C   • Debility in setting of chronic illness    • Respiratory alkalosis with secondary metabolic alkalosis    • Prolonged Q-T interval on ECG   • Macrocytic anemia   • Hypoalbuminemia   • Encephalopathy   • Hepatic encephalopathy (CMS/HCC)     Past Medical History:   Diagnosis Date   • Acute renal failure (CMS/HCC)    • Alcohol abuse     in remission   • Anxiety    • Bell's palsy    • Cellulitis     LLE   • CHF (congestive heart failure) (CMS/HCC)     Diastolic   • CHF (congestive heart failure) (CMS/HCC)    • Constipation    • Coronary artery disease    • Diverticulosis    • GERD (gastroesophageal reflux disease)    • Hepatic cirrhosis (CMS/HCC)    • Hepatitis C    • History of transfusion    • Hypertension    • Hypothyroidism    • MI (mitral incompetence)    • Osteoporosis    • Osteoporosis    • Restless leg syndrome    • Thrombocytopenia (CMS/HCC)    • Trigeminal neuralgia      Past Surgical  History:   Procedure Laterality Date   • BACK SURGERY      KYPHOPLASTIES    • CARDIAC CATHETERIZATION Left 08/2004   • CARDIOVASCULAR STRESS TEST  01/25/2015   • CATARACT EXTRACTION     • CHOLECYSTECTOMY     • CLAVICLE SURGERY Right    • COLONOSCOPY  10/2004   • ECHO - CONVERTED  11/2012   • FEMUR OPEN REDUCTION INTERNAL FIXATION Left 10/12/2017    Procedure: FEMUR OPEN REDUCTION INTERNAL FIXATION;  Surgeon: Karson Pierre MD;  Location: [facility];  Service:    • HIP FRACTURE SURGERY Bilateral     ARTHROPLASTIES    • PORTACATH PLACEMENT N/A 9/28/2018    Procedure: INSERTION OF PORTACATH;  Surgeon: Vic Mauricio MD;  Location: [facility];  Service: General   • SUBTOTAL HYSTERECTOMY     • WRIST FRACTURE SURGERY          PT ASSESSMENT (last 12 hours)      Physical Therapy Evaluation     Row Name 03/16/20 1300          PT Evaluation Time/Intention    Subjective Information  no complaints  -BC     Document Type  evaluation  -BC     Mode of Treatment  physical therapy  -BC     Patient Effort  good  -BC     Row Name 03/16/20 1300          General Information    Patient Profile Reviewed?  yes  -BC     Onset of Illness/Injury or Date of Surgery  03/16/20  -BC     Referring Physician  Jef  -BC     Prior Level of Function  mod assist:;gait;transfer  -BC     Equipment Currently Used at Home  walker, rolling;wheelchair;commode  -BC     Row Name 03/16/20 1300          Relationship/Environment    Lives With  child(jojo), adult;grandchild(jojo)  -BC     Row Name 03/16/20 1300          Resource/Environmental Concerns    Current Living Arrangements  home/apartment/condo  -BC     Row Name 03/16/20 1300          Cognitive Assessment/Intervention- PT/OT    Orientation Status (Cognition)  oriented x 3  -BC     Follows Commands (Cognition)  WFL  -BC     Row Name 03/16/20 1300          Mobility Assessment/Treatment    Extremity Weight-bearing Status  left lower extremity;right lower extremity  -BC     Left Lower  Extremity (Weight-bearing Status)  weight-bearing as tolerated (WBAT)  -BC     Right Lower Extremity (Weight-bearing Status)  weight-bearing as tolerated (WBAT)  -BC     Row Name 03/16/20 1300          Bed Mobility Assessment/Treatment    Bed Mobility Assessment/Treatment  bed mobility (all) activities  -BC     [place] Level (Bed Mobility)  moderate assist (50% patient effort)  -BC     Bed Mobility, Safety Issues  cognitive deficits limit understanding  -BC     Assistive Device (Bed Mobility)  bed rails  -BC     Row Name 03/16/20 1300          Transfer Assessment/Treatment    Transfer Assessment/Treatment  sit-stand transfer;stand-sit transfer  -BC     Maintains Weight-bearing Status (Transfers)  verbal cues to maintain  -BC     Sit-Stand [place] (Transfers)  moderate assist (50% patient effort)  -BC     Stand-Sit [place] (Transfers)  moderate assist (50% patient effort)  -BC     Row Name 03/16/20 1300          Sit-Stand Transfer    Assistive Device (Sit-Stand Transfers)  walker, front-wheeled  -BC     Row Name 03/16/20 1300          Stand-Sit Transfer    Assistive Device (Stand-Sit Transfers)  walker, front-wheeled  -BC     Row Name 03/16/20 1300          Gait/Stairs Assessment/Training    Gait/Stairs Assessment/Training  gait/ambulation independence  -BC     [place] Level (Gait)  moderate assist (50% patient effort)  -BC     Assistive Device (Gait)  walker, front-wheeled  -BC     Distance in Feet (Gait)  80 ft  -BC     Row Name 03/16/20 1300          General ROM    GENERAL ROM COMMENTS  ROM WFL  -BC     Row Name 03/16/20 1300          MMT (Manual Muscle Testing)    General MMT Comments  MMT 4-/5  -BC     Row Name             Wound 03/15/20 1529 Right medial heel Pressure Injury    Wound - Properties Group Date first assessed: 03/15/20  -KH Time first assessed: 1529 -KH Present on Hospital Admission: Y  -KH Side: Right  -KH Orientation: medial  - Location: heel  -KH Primary Wound Type:  Pressure inj  -HM Stage, Pressure Injury: Stage 2  -HM    Row Name 03/16/20 1300          Coping    Observed Emotional State  accepting  -BC     Verbalized Emotional State  acceptance  -BC     Row Name 03/16/20 1300          Plan of Care Review    Plan of Care Reviewed With  patient  -BC     Row Name 03/16/20 1300          Physical Therapy Clinical Impression    Date of Referral to PT  03/16/20  -BC     Functional Level at Time of Evaluation (PT Clinical Impression)  fair  -BC     Criteria for Skilled Interventions Met (PT Clinical Impression)  treatment indicated;yes  -BC     Rehab Potential (PT Clinical Summary)  good, to achieve stated therapy goals  -BC     Predicted Duration of Therapy (PT)  LOS  -BC     Row Name 03/16/20 1300          Physical Therapy Goals    Bed Mobility Goal Selection (PT)  bed mobility, PT goal 1  -BC     Transfer Goal Selection (PT)  transfer, PT goal 1  -BC     Gait Training Goal Selection (PT)  gait training, PT goal 1  -BC     Row Name 03/16/20 1300          Bed Mobility Goal 1 (PT)    Activity/Assistive Device (Bed Mobility Goal 1, PT)  bed mobility activities, all  -BC     [place] Level/Cues Needed (Bed Mobility Goal 1, PT)  minimum assist (75% or more patient effort)  -BC     Time Frame (Bed Mobility Goal 1, PT)  by discharge  -BC     Row Name 03/16/20 1300          Transfer Goal 1 (PT)    Activity/Assistive Device (Transfer Goal 1, PT)  transfers, all  -BC     [place] Level/Cues Needed (Transfer Goal 1, PT)  minimum assist (75% or more patient effort)  -BC     Time Frame (Transfer Goal 1, PT)  by discharge  -BC     Row Name 03/16/20 1300          Gait Training Goal 1 (PT)    Activity/Assistive Device (Gait Training Goal 1, PT)  gait (walking locomotion)  -BC     [place] Level (Gait Training Goal 1, PT)  minimum assist (75% or more patient effort)  -BC     Distance (Gait Goal 1, PT)  200 ft  -BC     Row Name 03/16/20 1300          Positioning and Restraints     Pre-Treatment Position  in bed  -BC     Post Treatment Position  bed  -BC     In Bed  notified nsg;supine;call light within reach;encouraged to call for assist;with family/caregiver;side rails up x3  -BC       User Key  (r) = Recorded By, (t) = Taken By, (c) = Cosigned By    Initials Name Provider Type     Damari Olmstead, RN Registered Nurse    Rosa Summers, PT Physical Therapist    Genet Boo, RN Registered Nurse          PT Recommendation and Plan  Planned Therapy Interventions (PT Eval): balance training, bed mobility training, gait training, home exercise program, patient/family education  Therapy Frequency (PT Clinical Impression): (3-5x/week)  Plan of Care Reviewed With: patient     Time Calculation:   PT Charges     Row Name 03/16/20 1342             Time Calculation    PT Received On  03/09/20  -BC      PT Goal Re-Cert Due Date  03/30/20  -BC         Time Calculation- PT    Total Timed Code Minutes- PT  60 minute(s)  -BC         Timed Charges    45638 - Gait Training Minutes   15  -BC        User Key  (r) = Recorded By, (t) = Taken By, (c) = Cosigned By    Initials Name Provider Type    Rosa Summers, PT Physical Therapist        Therapy Charges for Today     Code Description Service Date Service Provider Modifiers Qty    21050515549 HC PT EVAL MOD COMPLEXITY 3 3/16/2020 Rosa Stringer, PT GP 1    79252695211 HC GAIT TRAINING EA 15 MIN 3/16/2020 Rosa Stringer, PT GP 1                 Rosa Stringer PT  3/16/2020

## 2020-03-16 NOTE — PROGRESS NOTES
[facility] HOSPITALIST PROGRESS NOTE     Patient Identification:  Name:  Alma Delia Garcia  Age:  89 y.o.  Sex:  female  :  1930  MRN:  [id number]  Visit Number:  [id number]  ROOM: [address] [place] [address]     Primary Care Provider:  Galina Gonsalves MD    Length of stay:  1    Subjective        Chief Compliant AMS    Patient seen and examined this morning with granddaughter and her boyfriend the bedside.  Patient is alert awake oriented to self person limited to time and place.  Denies any chest pain shortness of breath nausea vomiting abdominal pain.  Has been tolerating lactulose.  Having bowel movements.  Afebrile no events overnight.  On room air.       Objective     Current Hospital Meds:    aspirin 81 mg Oral Daily   enoxaparin 40 mg Subcutaneous Q24H   furosemide 20 mg Oral Daily   lactulose 30 g Oral Q6H   levothyroxine 100 mcg Oral Daily   magnesium oxide 400 mg Oral BID   multivitamin 1 tablet Oral Daily   pantoprazole 40 mg Oral QAM   potassium phosphate (monobasic) 500 mg Oral TID   riFAXIMin 550 mg Oral BID   sodium chloride 10 mL Intravenous Q12H   spironolactone 50 mg Oral Daily      ----------------------------------------------------------------------------------------------------------------------  Vital Signs:  Temp:  [97.4 °F (36.3 °C)-97.9 °F (36.6 °C)] 97.6 °F (36.4 °C)  Heart Rate:  [68-76] 73  Resp:  [18-20] 20  BP: (161-188)/(58-76) 169/60  SpO2:  [97 %] 97 %  on   ;      Body mass index is 23.96 kg/m².    Wt Readings from Last 3 Encounters:   03/15/20 63.3 kg (139 lb 9.6 oz)   20 67 kg (147 lb 11.2 oz)   20 65.8 kg (145 lb)       Intake/Output Summary (Last 24 hours) at 3/16/2020 1852  Last data filed at 3/16/2020 1752  Gross per 24 hour   Intake 1160 ml   Output --   Net 1160 ml     Diet Clear Liquid  ----------------------------------------------------------------------------------------------------------------------  Physical exam:  General: Comfortable, Not in  distress.  Well-developed and well-nourished.   HENT:  Head:  Normocephalic and atraumatic.  Mouth:  Moist mucous membranes.    Eyes:  Conjunctivae and EOM are normal.  Pupils are equal, round, and reactive to light.  No scleral icterus.    Neck:  Neck supple.  No JVD present.  trachea midline  Cardiovascular:  Normal rate, regular rhythm with + systolic murmur.  Pulmonary/Chest:  No respiratory distress, no wheezes, no crackles, with normal breath sounds and good air movement.  Abdomen:  Soft.  Bowel sounds are normal.  No distension and no tenderness.   Musculoskeletal:  No edema, no tenderness, and no deformity.  No red or swollen joints anywhere.    Neurological:  Alert and oriented to person, limited to place, and time.  No cranial nerve deficit. No focal deficits. No facial droop.  No slurred speech.   Skin:  Skin is warm and dry. No rash noted. No pallor.   Peripheral vascular:  pulses in all 4 extremities with no clubbing, no cyanosis, 1+ edema b/l.  Genitourinary: No hsieh  ----------------------------------------------------------------------------------------------------------------------  ----------------------------------------------------------------------------------------------------------------------  Results from last 7 days   Lab Units 03/15/20  1214 03/11/20  0257 03/10/20  0217   CRP mg/dL 4.30*  --   --    LACTATE mmol/L 1.3  --   --    WBC 10*3/mm3 4.00 3.96 4.88   HEMOGLOBIN g/dL 10.4* 9.5* 9.3*   HEMATOCRIT % 32.7* 31.0* 32.3*   MCV fL 100.6* 103.3* 112.2*   MCHC g/dL 31.8 30.6* 28.8*   PLATELETS 10*3/mm3 116* 83* 96*   INR  1.23*  --   --      Results from last 7 days   Lab Units 03/16/20  0541 03/15/20  2109 03/15/20  1214 03/11/20  0257   SODIUM mmol/L 145  --  138 137   POTASSIUM mmol/L 3.5 3.7 4.1 3.9   MAGNESIUM mg/dL 2.5* 3.0* 1.7  --    CHLORIDE mmol/L 109*  --  102 100   CO2 mmol/L 23.8  --  24.7 27.0   BUN mg/dL 13  --  15 12   CREATININE mg/dL 0.90  --  0.88 0.84   PHOSPHORUS  mg/dL 2.1*  --   --   --    EGFR IF NONAFRICN AM mL/min/1.73 59*  --  61 64   CALCIUM mg/dL 8.8  --  8.6 8.4*   GLUCOSE mg/dL 112*  --  93 100*   ALBUMIN g/dL  --   --  2.99*  --    BILIRUBIN mg/dL  --   --  1.2  --    ALK PHOS U/L  --   --  122*  --    AST (SGOT) U/L  --   --  46*  --    ALT (SGPT) U/L  --   --  23  --    Estimated Creatinine Clearance: 42.3 mL/min (by C-G formula based on SCr of 0.9 mg/dL).  Results from last 7 days   Lab Units 03/15/20  1214   TROPONIN T ng/mL 0.018     Glucose   Date/Time Value Ref Range Status   03/15/2020 1420 85 70 - 130 mg/dL Final     Ammonia   Date Value Ref Range Status   03/15/2020 129 (H) 11 - 51 umol/L Final       Results from last 7 days   Lab Units 03/15/20  1229   NITRITE UA  Negative     No results found for: BLOODCXNo results found for: RESPCXNo results found for: URINECXNo results found for: WOUNDCXNo results found for: BODYFLDCXNo results found for: STOOLCX  I have personally looked at the labs and they are summarized above.  ----------------------------------------------------------------------------------------------------------------------  Imaging Results (Last 24 Hours)     ** No results found for the last 24 hours. **        I have personally reviewed the radiology images and read the final radiology report.    Assessment & Plan      Assessment:  Acute hepatic encephalopathy given lactulose noncompliance  Cirrhosis with known prior history of hepatitis C with chronic thrombocytopenia and mild coagulopathy  Hypophosphatemia  Chronic kidney disease stage II-III  Diastolic CHF, appears compensated  Essential hypertension  Macrocytic anemia  Hypothyroidism  Trigeminal Neuralgia    Plan:  For acute hepatic encephalopathy given lactulose noncompliance, will continue with lactulose 30 g every 6 hours.   Continue with rifaximin. Currently patient is improving and confusion improving.  Will check ammonia level in morning.  Check x-ray KUB in morning.    Cirrhosis  with known history of prior hepatitis C.  Continue with Aldactone and Lasix but will decrease the dose.  Since patient is advanced age, risk of renal failure and dehydration high with diuretics.  Platelet count and INR relatively stable.  Monitor closely.    Hypophosphatemia, replace and monitor.    CKD stage II-III stable.    Essential hypertension, suboptimal control.  Continue with Aldactone Lasix and will add low-dose amlodipine.    Macrocytic anemia, Hgb stable.     Hypothyroidism, continue with levothyroxine 100 mcg.    Resume home Trileptal regimen for history of trigeminal neuralgia.    Palliative care consulted for hospice.  Family and patient agreeable for [facility] the time of discharge.    PT OT consulted.  Patient getting out of bed.    Lovenox for DVT prophylaxis and Protonix for GI prophylaxis.    Management discussed in detail with patient family nursing.      The patient is high risk due to the following diagnoses/reasons:  Hepatic encephalopathy        Anastasiya Fuchs MD  03/16/20  18:52

## 2020-03-16 NOTE — NURSING NOTE
Stage 2 pressure injury to right heel  Treatment ordered     03/16/20 0948   Wound 03/15/20 1529 Right medial heel Pressure Injury   Date first assessed/Time first assessed: 03/15/20 1529   Present on Hospital Admission: Yes  Side: Right  Orientation: medial  Location: heel  Primary Wound Type: Pressure Injury  Stage, Pressure Injury: Stage 2   Dressing Appearance open to air   Base clean;moist;pink;red   Periwound intact;dry   Edges open   Wound Length (cm) 2 cm   Wound Width (cm) 2 cm   Drainage Characteristics/Odor clear  (shiny)   Drainage Amount scant

## 2020-03-16 NOTE — CONSULTS
"Galina Gonsalves MD  Consulting physician: Dr. Behbahani  Reason for referral:  Goals of Care and Hospice discussion   Chief Complaint   Patient presents with   • Altered Mental Status       HPI: 89 year old female patient arrived to [facility] ED from home with mental status changes.  Patient is known to [facility] for repeat metabolic encephalopathy related to non-compliance with lactulose for her end stage liver disease.  She is aware that her ammonia levels reach dangerously high, and were even higher this admission compared to last admit which she was just admitted here for same reason March 5th.  Grand-daughter, Candace Rico is her PCG at home.  The patient has 2 daughters, one lives here and helps some with patient and the other in [place].  Patient has a sister that also helps care for her and takes care of her Finances.  However, no one has any legal living will naming a HCS and no one has POA.  We have been consulted to discuss hospice care due to refusal of medications and repeat hospitalizations for the non-compliance.  Patient has already decided DNR/DNI and we are asked to talk further GIC with her and family.  I was able to meet with her and grand-daughter today, along with the grand-daughter's boyfriend.     Symptoms:   Patient is alert to self and place and time today.  She is able to tell me she \"just doesn't want\" her meds all the time.  I have explained to her that her ammonia level can kill her without treatment to keep it down as her liver disease progresses.  She quickly told me \"they have been telling me that for 40 years\"  She is smiling and sarcastic at bedside, playful.  Denies any pain or SOB.  Denies any abdominal pain although abdominal distention noted.     Advance care planning discussed: YES  Code Status:   Current Code Status     Date Active Code Status Order ID Comments User Context       3/15/2020 1534 No CPR [id number]  Behbahani, Katayoun, MD Inpatient       Questions for Current " Code Status     Question Answer Comment    Code Status No CPR     Medical Interventions (Level of Support Prior to Arrest) Limited     Limited Support to NOT Include Intubation      Cardioversion/Defibrillation         Advance Directive: None on file  Surrogate decision maker: To be determined   Past Medical History:   Diagnosis Date   • Acute renal failure (CMS/HCC)    • Alcohol abuse     in remission   • Anxiety    • Bell's palsy    • Cellulitis     LLE   • CHF (congestive heart failure) (CMS/HCC)     Diastolic   • CHF (congestive heart failure) (CMS/HCC)    • Constipation    • Coronary artery disease    • Diverticulosis    • GERD (gastroesophageal reflux disease)    • Hepatic cirrhosis (CMS/HCC)    • Hepatitis C    • History of transfusion    • Hypertension    • Hypothyroidism    • MI (mitral incompetence)    • Osteoporosis    • Osteoporosis    • Restless leg syndrome    • Thrombocytopenia (CMS/HCC)    • Trigeminal neuralgia      Past Surgical History:   Procedure Laterality Date   • BACK SURGERY      KYPHOPLASTIES    • CARDIAC CATHETERIZATION Left 08/2004   • CARDIOVASCULAR STRESS TEST  01/25/2015   • CATARACT EXTRACTION     • CHOLECYSTECTOMY     • CLAVICLE SURGERY Right    • COLONOSCOPY  10/2004   • ECHO - CONVERTED  11/2012   • FEMUR OPEN REDUCTION INTERNAL FIXATION Left 10/12/2017    Procedure: FEMUR OPEN REDUCTION INTERNAL FIXATION;  Surgeon: Karson Pierre MD;  Location: [facility];  Service:    • HIP FRACTURE SURGERY Bilateral     ARTHROPLASTIES    • PORTACATH PLACEMENT N/A 9/28/2018    Procedure: INSERTION OF PORTACATH;  Surgeon: Vic Mauricio MD;  Location: [facility];  Service: General   • SUBTOTAL HYSTERECTOMY     • WRIST FRACTURE SURGERY         Reviewed current scheduled and prn medications for route, type, dose and frequency.    Current Facility-Administered Medications   Medication Dose Route Frequency Provider Last Rate Last Dose   • acetaminophen (TYLENOL) tablet 650 mg  650 mg  Oral Q6H PRN Behbahani, Katayoun, MD       • aspirin EC tablet 81 mg  81 mg Oral Daily Behbahani, Katayoun, MD   81 mg at 03/16/20 0850   • enoxaparin (LOVENOX) syringe 40 mg  40 mg Subcutaneous Q24H Behbahani, Katayoun, MD   40 mg at 03/15/20 1619   • furosemide (LASIX) tablet 20 mg  20 mg Oral Daily Anastasiya Fuchs MD   20 mg at 03/16/20 1055   • lactulose (CHRONULAC) 10 GM/15ML solution 30 g  30 g Oral Q6H Anastasiya Fuchs MD   30 g at 03/16/20 1100   • levothyroxine (SYNTHROID, LEVOTHROID) tablet 100 mcg  100 mcg Oral Daily Behbahani, Katayoun, MD   100 mcg at 03/16/20 0850   • magnesium oxide (MAG-OX) tablet 400 mg  400 mg Oral BID Behbahani, Katayoun, MD   400 mg at 03/16/20 0850   • multivitamin (DAILY RAMESH) tablet 1 tablet  1 tablet Oral Daily Behbahani, Katayoun, MD   1 tablet at 03/16/20 0850   • ondansetron (ZOFRAN) injection 4 mg  4 mg Intravenous Q6H PRN Behbahani, Katayoun, MD       • pantoprazole (PROTONIX) EC tablet 40 mg  40 mg Oral QAM Behbahani, Katayoun, MD   40 mg at 03/16/20 0523   • potassium phosphate (monobasic) (K-PHOS) tablet 500 mg  500 mg Oral TID Anastasiya Fuchs MD   500 mg at 03/16/20 1056   • riFAXIMin (XIFAXAN) tablet 550 mg  550 mg Oral BID Behbahani, Katayoun, MD   550 mg at 03/16/20 0850   • sodium chloride 0.9 % flush 10 mL  10 mL Intravenous PRN Haleigh Robledo PA       • sodium chloride 0.9 % flush 10 mL  10 mL Intravenous Q12H Behbahani, Katayoun, MD   10 mL at 03/16/20 0851   • sodium chloride 0.9 % flush 10 mL  10 mL Intravenous PRN Behbahani, Katayoun, MD       • spironolactone (ALDACTONE) tablet 50 mg  50 mg Oral Daily Anastasiya Fuchs MD   50 mg at 03/16/20 1056        •  acetaminophen  •  ondansetron  •  [COMPLETED] Insert peripheral IV **AND** sodium chloride  •  sodium chloride  Allergies   Allergen Reactions   • Ciprofloxacin Itching     Family History   Problem Relation Age of Onset   • Cancer Mother    • Heart disease Father    • Arthritis Sister    •  "Osteoporosis Sister    • Diabetes Maternal Aunt      Social History     Socioeconomic History   • Marital status:      Spouse name: Not on file   • Number of children: Not on file   • Years of education: Not on file   • Highest education level: Not on file   Tobacco Use   • Smoking status: Never Smoker   • Smokeless tobacco: Former User     Types: Snuff   Substance and Sexual Activity   • Alcohol use: No     Comment: FORMER 12 BOTTLES PER DAY FOR 15 YEARS   • Drug use: No   • Sexual activity: Defer   Social History Narrative    LIVES WITH GRANDDAUGHTER DEVON IN [place]        Review of Systems - +/- per HPI and symptom review.      PPS: 40  /60 (BP Location: Right arm, Patient Position: Lying)   Pulse 73   Temp 97.6 °F (36.4 °C) (Oral)   Resp 20   Ht 162.6 cm (64\")   Wt 63.3 kg (139 lb 9.6 oz)   LMP  (LMP Unknown)   SpO2 97%   BMI 23.96 kg/m²   63.3 kg (139 lb 9.6 oz) Body mass index is 23.96 kg/m².  Intake & Output (last day)       03/15 0701 - 03/16 0700 03/16 0701 - 03/17 0700    P.O. 260 600    IV Piggyback 1000     Total Intake(mL/kg) 1260 (19.9) 600 (9.5)    Net +1260 +600          Urine Unmeasured Occurrence 6 x 3 x    Stool Unmeasured Occurrence 6 x 2 x          Physical Exam:  General Appearance: chronically ill appearing, elderly  Head: Normocephalic without obvious abnormality, atraumatic, noted areas of erythema all over face (? 2/2 liver)  Eyes: Conjunctivae and sclerae normal, no icterus, MERY  Throat: No oral lesions, no thrush, oral mucosa moist  Neck: No adenopathy, supple trachea, midline  Lungs: Clear to auscultation, respirations regular, even and non-labored  Heart: Regular rhythm and normal rate, normal S1  Abdomen: hyperactive bowel sounds, soft, distended, non-tender  Extremities: Moves all extremities, tight edema to BLE with dry erythematous skin noted   Pulses: pedal pulses diminished, radial pulses good.   Skin: Warm and dry, rash/dryness as " mentioned  Neurological: Alert, interactive, appropriate conversation,no myoclonus, equal hand  and strength, able to lift lower extremities off bed    Reviewed labs and diagnostic results.  Lab Results   Component Value Date    HGBA1C 4.50 (L) 03/05/2020     Results from last 7 days   Lab Units 03/15/20  1214   WBC 10*3/mm3 4.00   HEMOGLOBIN g/dL 10.4*   HEMATOCRIT % 32.7*   PLATELETS 10*3/mm3 116*     Results from last 7 days   Lab Units 03/16/20  0541  03/15/20  1214   SODIUM mmol/L 145  --  138   POTASSIUM mmol/L 3.5   < > 4.1   CHLORIDE mmol/L 109*  --  102   CO2 mmol/L 23.8  --  24.7   BUN mg/dL 13  --  15   CREATININE mg/dL 0.90  --  0.88   CALCIUM mg/dL 8.8  --  8.6   BILIRUBIN mg/dL  --   --  1.2   ALK PHOS U/L  --   --  122*   ALT (SGPT) U/L  --   --  23   AST (SGOT) U/L  --   --  46*   GLUCOSE mg/dL 112*  --  93    < > = values in this interval not displayed.     Results from last 7 days   Lab Units 03/16/20  0541   SODIUM mmol/L 145   POTASSIUM mmol/L 3.5   CHLORIDE mmol/L 109*   CO2 mmol/L 23.8   BUN mg/dL 13   CREATININE mg/dL 0.90   GLUCOSE mg/dL 112*   CALCIUM mg/dL 8.8     Imaging Results (Last 72 Hours)     Procedure Component Value Units Date/Time    XR Chest 1 View [[id number]] Collected:  03/15/20 1224     Updated:  03/15/20 1226    Narrative:       XR CHEST 1 VW-     CLINICAL INDICATION: AMS        COMPARISON: 03/05/2020      TECHNIQUE: Single frontal view of the chest.     FINDINGS:     There is diffuse interstitial lung disease.  Right-sided consolidation  There is no evidence of an acute osseous abnormality.   There are no suspicious-appearing parenchymal soft tissue nodules.          Impression:       Diffuse interstitial lung disease.  Right-sided consolidation     This report was finalized on 3/15/2020 12:24 PM by Dr. Byron Mcintosh MD.       CT Head Without Contrast [[id number]] Collected:  03/15/20 1205     Updated:  03/15/20 1208    Narrative:       CT HEAD WO CONTRAST-     CLINICAL  "INDICATION: AMS        COMPARISON: 03/05/2020      TECHNIQUE: Axial images of the brain were obtained with out intravenous  contrast.  Reformatted images were created in the sagittal and coronal  planes.     DOSE: 2183.80 mGy.cm     Radiation dose reduction techniques were utilized per ALARA protocol.  Automated exposure control was initiated through either or CareDose or  DoseRight software packages by  protocol.           FINDINGS:   Today's study shows no mass, hemorrhage, or midline shift.   Cerebral atrophy and ventriculomegaly  There is no evidence of acute ischemia.  I do not see epidural or subdural hematoma.  The gray-white differentiation is appropriate.   The bone window setting images show no destructive calvarial lesion or  acute calvarial fracture.   The posterior fossa is unremarkable.          Impression:       No acute intracranial pathology. Nothing is seen on this exam to  specifically account for the patient's symptoms.     This report was finalized on 3/15/2020 12:06 PM by Dr. Byron Mcintosh MD.           Impression: 89 y.o. female admitted for recurrent hepatic encephalopathy 2/2 advanced progressive liver cirrhosis and noncompliance with medication; uncontrolled hyperthyroidism; Diastolic congestive heart failure; CKD Stage 3.       Plan:     1.  Goals of Care   - Talked at length with patient and her grand-daughter, Candace today.  Patient wants to go home and wants to \"be made comfortable.\"  Grand-daughter states they have tried forcing patient to be compliant and that she is very persistent when she does not want to do something.  She states that her 2 daughters don't do a lot for her, that it is mainly her and patient's sister.  Patient and I discussed a HCS and she said \"probably Candace\" and Candace asked about legality.  I explained to her that as long as patient is alert and oriented she could name a HCS and could do a living will.  Possibly here in hospital if she decides for " "certain she wants her GD to be HCS.  IF she is confused due to medical condition, someone would need to seek emergency guardianship or her 2 daughters would have to make/defer decisions.  When I asked patient again, eho she would want making her decisions, she smile and said she \"wants to make them myself while I can\"  I asked her to be thinking about a certain answer to that question in case she is unable to make decisions but that we would allow her to make all decisions up until she couldn't.  She agreed.  We discussed hospice in detail.  What they could offer, and that if she refuses lactulose that she would be treated for symptoms of AMS because we know that's what will occur- and that without lactulose she would ultimately die.  Patient verbalizes understanding of this as I reinforced this often during conversation.  Patient remained agreeable to hospice services and wanting to go home.  I discussed with family that hospice would be in contact today or tomorrow to set up equipment and prepare for discharge when attending is ready.  I reported to Tata PEARCE and Haleigh what we discussed.  Family didn't have preference of hospice agency and was told about both hospice agencies that serve their area and I disclosed I was the NP for [facility].  They did want to go with [facility]- consult was placed today.      2.  Altered Mental Status   - Seems improved today.  Patient alert, oriented t self, place and time.  Even playing tricks with me and being sarcastic.  GD laughing gave her away on her tricks.  She was very pleasant but GD reports she gets anxious and agitated along with the confusion at home.  We discussed medications that we could use if patient would take them to offset symptoms if she was non-compliant but that these meds would only mask her problem of hyperammonemia and would not be curative if it was not the lactulose.  We discussed PEG placement if she was confused and needed PEG for lactulose to be given if not able " to give rectally and patient and GD both said they would not want this.  Again, I discussed symptom management at that time instead of returning to the hospital but ultimately that could mean she pass away from complications of the ammonia and liver.  But that they would have the choice to come back to hospital if wanted despite having hospice services.      3.  Edema   - Tight edema noted to BLE.  Likely from chronic diastolic CHF and noncompliance with all meds.  Skin is dry and rash 2/2 liver disease and thyroid disease uncontrolled.  Appearance of legs suggest PVD also as pedal pulses diminished.      4.  Restless leg syndrome    - Mentation has improved.  Would consider restarting requip back at bedtime for RLS due to appearance of legs and likely underlying PVD also.      We have placed the hospice consult and look forward to continuing to assist Ms. Garcia with her goals of care.  We appreciate the consult and will follow along.  Dr. Huff will be rounding tomorrow from palliative care. Please do not hesitate to call either of us for symptom management or GOC needs.          Faviola Flores, APRN  [phone]  03/16/20  15:51      Time:60 minutes spent reviewing medical and medication records, assessing and examining patient, discussing with patient, family and nursing staff, SS, answering questions, formulating a plan and documentation of care. > 50% time spent face to face

## 2020-03-16 NOTE — PROGRESS NOTES
Discharge Planning Assessment   [facility] [facility]     Patient Name: Alma Delia Garcia  MRN: [id number]  Today's Date: 3/16/2020    Admit Date: 3/15/2020    Discharge Needs Assessment     Row Name 03/16/20 1120       Living Environment    Lives With  child(jojo), adult;grandchild(jojo)    Name(s) of Who Lives With Patient  Daughter Kylah and MAVERICK Candace both can be reached at home number [phone]    Current Living Arrangements  home/apartment/condo    Primary Care Provided by  child(jojo)    Provides Primary Care For  no one, unable/limited ability to care for self    Family Caregiver if Needed  grandchild(jojo), adult;child(jojo), adult    Quality of Family Relationships  helpful;involved;supportive       Resource/Environmental Concerns    Resource/Environmental Concerns  none    Transportation Concerns  car, none       Transition Planning    Patient/Family Anticipates Transition to  home with family;home with help/services    Patient/Family Anticipated Services at Transition  home health care    Transportation Anticipated  family or friend will provide       Discharge Needs Assessment    Equipment Currently Used at Home  commode;walker, rolling;hospital bed    Anticipated Changes Related to Illness  inability to care for self    Equipment Needed After Discharge  none        Discharge Plan     Row Name 03/16/20 1124       Plan    Plan  Pt was admitted on 03/15/20. Pt lives with her daughter, Kylah and MAVERICK Candace, both can be reached at [phone]. Pt does not have a POA/living will. Pt does not utilize home health but family are requesting HH services at d./c. Pt utilizes the following DME: rolling walker, hospital bed, wheelchair, and raised toilet seat. Pt's phamracy currently is [facility] but prefers to switch to [facility]. Pt's PCP is Dr. Gonsalves. Pt's d/c plan is to return home with family. Per GD she refused NHP and Hospice. Pt's transportation will be GD Candace. SS will follow.         Destination      Coordination has  not been started for this encounter.      Durable Medical Equipment      Coordination has not been started for this encounter.      Dialysis/Infusion      Coordination has not been started for this encounter.      Home Medical Care      Coordination has not been started for this encounter.      Therapy      Coordination has not been started for this encounter.      Community Resources      Coordination has not been started for this encounter.          Demographic Summary     Row Name 03/16/20 1119       General Information    Admission Type  inpatient    Arrived From  home    Referral Source  nursing    Reason for Consult  discharge planning    Preferred Language  English        Functional Status    No documentation.       Psychosocial    No documentation.       Abuse/Neglect    No documentation.       Legal    No documentation.       Substance Abuse    No documentation.       Patient Forms    No documentation.           Haleigh Mallory

## 2020-03-16 NOTE — PLAN OF CARE
Patient has been getting latculose every 2 hours has had 4 bowel movements and has not been confused

## 2020-03-17 NOTE — THERAPY TREATMENT NOTE
Acute Care - Physical Therapy Treatment Note   [facility]     Patient Name: Alma Delia Garcia  : 1930  MRN: [id number]  Today's Date: 3/17/2020  Onset of Illness/Injury or Date of Surgery: 20  Date of Referral to PT: 20  Referring Physician: Jef    Admit Date: 3/15/2020    Visit Dx:    ICD-10-CM ICD-9-CM   1. Acute hepatic encephalopathy K72.00 572.2     Patient Active Problem List   Diagnosis   • Hepatic cirrhosis*   • HTN (hypertension)*   • Anxiety disorder*   • Osteoporosis   • Hypothyroidism*   • GERD (gastroesophageal reflux disease)*   • Chronic pain syndrome due to fractured spine and left rib fracture*   • Anemia, chronic disease   • Arthralgia of hip   • Primary insomnia   • Peripheral vascular disease (CMS/HCC)   • Nonrheumatic aortic valve insufficiency, mild   • Chronic diastolic heart failure (Grade 1)    • Poor venous access   • Chronic kidney disease, stage III (moderate) (CMS/HCC)   • Acute hepatic encephalopathy   • Coronary artery disease   • Restless leg syndrome   • Hepatitis C   • Debility in setting of chronic illness    • Respiratory alkalosis with secondary metabolic alkalosis    • Prolonged Q-T interval on ECG   • Macrocytic anemia   • Hypoalbuminemia   • Encephalopathy   • Hepatic encephalopathy (CMS/HCC)       Therapy Treatment    Rehabilitation Treatment Summary     Row Name 20 1100             Treatment Time/Intention    Discipline  physical therapist  -BC      Document Type  therapy note (daily note)  -BC      Mode of Treatment  physical therapy  -BC      Therapy Frequency (PT Clinical Impression)  -- 3-5x/week  -BC      Patient Effort  good  -BC      Recorded by [BC] Rosa Stringer, PT 20 1130      Row Name 20 1100             Cognitive Assessment/Intervention- PT/OT    Orientation Status (Cognition)  oriented x 3  -BC      Follows Commands (Cognition)  WFL  -BC      Recorded by [BC] Rosa Stringer, PT 20 1130      Row Name 20  1100             Mobility Assessment/Intervention    Extremity Weight-bearing Status  left lower extremity;right lower extremity  -BC      Left Lower Extremity (Weight-bearing Status)  weight-bearing as tolerated (WBAT)  -BC      Right Lower Extremity (Weight-bearing Status)  weight-bearing as tolerated (WBAT)  -BC      Recorded by [BC] Rosa Stringer, PT 03/17/20 1130      Row Name 03/17/20 1100             Bed Mobility Assessment/Treatment    Bed Mobility Assessment/Treatment  bed mobility (all) activities  -BC      [place] Level (Bed Mobility)  moderate assist (50% patient effort)  -BC      Bed Mobility, Safety Issues  cognitive deficits limit understanding  -BC      Assistive Device (Bed Mobility)  bed rails  -BC      Recorded by [BC] Rosa Stringer, PT 03/17/20 1130      Row Name 03/17/20 1100             Transfer Assessment/Treatment    Transfer Assessment/Treatment  sit-stand transfer;stand-sit transfer  -BC      Recorded by [BC] Rosa Stringer, PT 03/17/20 1130      Row Name 03/17/20 1100             Sit-Stand Transfer    Sit-Stand [place] (Transfers)  minimum assist (75% patient effort)  -BC      Assistive Device (Sit-Stand Transfers)  walker, front-wheeled  -BC      Recorded by [BC] Rosa Stringer, PT 03/17/20 1130      Row Name 03/17/20 1100             Stand-Sit Transfer    Stand-Sit [place] (Transfers)  minimum assist (75% patient effort)  -BC      Assistive Device (Stand-Sit Transfers)  walker, front-wheeled  -BC      Recorded by [BC] Rosa Stringer, PT 03/17/20 1130      Row Name 03/17/20 1100             Gait/Stairs Assessment/Training    Gait/Stairs Assessment/Training  gait/ambulation independence  -BC      [place] Level (Gait)  minimum assist (75% patient effort)  -BC      Assistive Device (Gait)  walker, front-wheeled  -BC      Distance in Feet (Gait)  70 ft  -BC      Recorded by [BC] Rosa Stringer, PT 03/17/20 1130      Row Name 03/17/20 1100              Positioning and Restraints    Pre-Treatment Position  in bed  -BC      Post Treatment Position  bed  -BC      In Bed  notified nsg;call light within reach;encouraged to call for assist;side rails up x3;supine  -BC      Recorded by [BC] Rosa Stringer, PT 03/17/20 1130      Row Name                Wound 03/15/20 1529 Right medial heel Pressure Injury    Wound - Properties Group Date first assessed: 03/15/20 [KH] Time first assessed: 1529 [KH] Present on Hospital Admission: Y [KH] Side: Right [KH] Orientation: medial [KH] Location: heel [KH] Primary Wound Type: Pressure inj [HM] Stage, Pressure Injury: Stage 2 [HM] Recorded by:  [] Damari Olmstead RN 03/16/20 0948 [] Genet Lao RN 03/15/20 1542    Row Name 03/17/20 1100             Coping    Observed Emotional State  accepting  -BC      Verbalized Emotional State  acceptance  -BC      Recorded by [BC] Rosa Stringer, PT 03/17/20 1130      Row Name 03/17/20 1100             Plan of Care Review    Plan of Care Reviewed With  patient  -BC      Recorded by [BC] Rosa Stringer, PT 03/17/20 1130        User Key  (r) = Recorded By, (t) = Taken By, (c) = Cosigned By    Initials Name Effective Dates Discipline     Damari Olmstead RN 06/16/16 -  Nurse    BC Rosa Stringer, PT 03/14/16 -  PT    Genet Boo RN 06/10/16 -  Nurse          Wound 03/15/20 1529 Right medial heel Pressure Injury (Active)   Dressing Appearance open to air 3/17/2020  8:20 AM   Closure Open to air 3/17/2020  8:20 AM   Base pink;red;clean 3/17/2020  8:20 AM   Periwound intact 3/17/2020  8:20 AM   Periwound Temperature cool 3/16/2020  8:00 PM   Periwound Skin Turgor firm 3/16/2020  8:00 PM   Edges open 3/16/2020  8:00 PM   Drainage Characteristics/Odor clear 3/17/2020  5:00 AM   Drainage Amount scant 3/17/2020  8:20 AM   Care, Wound cleansed with;sterile normal saline 3/17/2020  8:20 AM   Dressing Care, Wound dressing changed 3/17/2020  8:20 AM               PT  Recommendation and Plan  Planned Therapy Interventions (PT Eval): balance training, bed mobility training, gait training, home exercise program, patient/family education  Therapy Frequency (PT Clinical Impression): (3-5x/week)  Plan of Care Reviewed With: patient     Time Calculation:   PT Charges     Row Name 03/17/20 1132             Time Calculation    PT Received On  03/17/20  -BC         Time Calculation- PT    Total Timed Code Minutes- PT  30 minute(s)  -BC         Timed Charges    57616 - Gait Training Minutes   15  -BC      80311 - PT Therapeutic Activity Minutes  15  -BC        User Key  (r) = Recorded By, (t) = Taken By, (c) = Cosigned By    Initials Name Provider Type    BC Rosa Stringer, PT Physical Therapist        Therapy Charges for Today     Code Description Service Date Service Provider Modifiers Qty    04806112141 HC PT EVAL MOD COMPLEXITY 3 3/16/2020 Rosa Stringer, PT GP 1    39639559811 HC GAIT TRAINING EA 15 MIN 3/16/2020 Rosa Stringer, PT GP 1    63777214441 HC GAIT TRAINING EA 15 MIN 3/17/2020 Rosa Stringer, PT GP 1    06166745429 HC PT THERAPEUTIC ACT EA 15 MIN 3/17/2020 Rosa Stringer, PT GP 1               Rosa Stringer, PT  3/17/2020

## 2020-03-17 NOTE — PROGRESS NOTES
Discharge Planning Assessment  [facility]     Patient Name: Alma Delia Garcia  MRN: [id number]  Today's Date: 3/17/2020    Admit Date: 3/15/2020        Discharge Plan     Row Name 03/17/20 1537       Plan    Plan [facility]ig[facility]hospice RN, Millicent Chang came to visit pt and family and they are in agreement for pt to return home with hospice services.  notified Dr. Fuchs and she states plans to discharge pt home on Wednesday, 3/18. SS notified Palliative Care per Dr. Huff. SS to follow.         XIOMARA Marquez

## 2020-03-17 NOTE — PLAN OF CARE
Problem: Patient Care Overview  Goal: Plan of Care Review  Outcome: Outcome(s) achieved  Flowsheets  Taken 3/17/2020 0335  Progress: no change  Taken 3/16/2020 2000  Plan of Care Reviewed With: patient  Note:   No significant events during the night.

## 2020-03-17 NOTE — PROGRESS NOTES
[facility] HOSPITALIST PROGRESS NOTE     Patient Identification:  Name:  Alma Delia Garcia  Age:  89 y.o.  Sex:  female  :  1930  MRN:  [id number]  Visit Number:  [id number]  ROOM: [address] [place] [address]     Primary Care Provider:  Galina Gonsalves MD    Length of stay:  2    Subjective        Chief Compliant AMS    Patient seen and examined this morning with no family at the bedside.  Patient is alert awake oriented to self person limited to time and place.  Denies any chest pain shortness of breath nausea. Had small vomiting last night but toerated food after that. Denies abdominal pain.  Has been tolerating lactulose.  Having bowel movements. Did work with therapy this morning. Afebrile no events overnight.  On room air.       Objective     Current Hospital Meds:    amLODIPine 5 mg Oral Q24H   aspirin 81 mg Oral Daily   enoxaparin 40 mg Subcutaneous Q24H   furosemide 20 mg Oral Daily   lactulose 30 g Oral TID   levothyroxine 100 mcg Oral Daily   magnesium oxide 400 mg Oral BID   multivitamin 1 tablet Oral Daily   OXcarbazepine 150 mg Oral QAM   OXcarbazepine 300 mg Oral Nightly   pantoprazole 40 mg Oral QAM   potassium phosphate (monobasic) 500 mg Oral TID   riFAXIMin 550 mg Oral BID   sodium chloride 10 mL Intravenous Q12H   spironolactone 50 mg Oral Daily      ----------------------------------------------------------------------------------------------------------------------  Vital Signs:  Temp:  [97.6 °F (36.4 °C)-99 °F (37.2 °C)] 98.2 °F (36.8 °C)  Heart Rate:  [73-87] 75  Resp:  [20] 20  BP: (128-184)/(50-62) 135/50  SpO2:  [96 %] 96 %  on   ;      Body mass index is 23.96 kg/m².    Wt Readings from Last 3 Encounters:   03/15/20 63.3 kg (139 lb 9.6 oz)   20 67 kg (147 lb 11.2 oz)   20 65.8 kg (145 lb)       Intake/Output Summary (Last 24 hours) at 3/17/2020 1243  Last data filed at 3/17/2020 0900  Gross per 24 hour   Intake 660 ml   Output --   Net 660 ml     Diet Soft Texture;  Chopped; Thin; Cardiac  ----------------------------------------------------------------------------------------------------------------------  Physical exam:  General: Comfortable, Not in distress.  Well-developed and well-nourished.   HENT:  Head:  Normocephalic and atraumatic.  Mouth:  Moist mucous membranes.    Eyes:  Conjunctivae and EOM are normal.  Pupils are equal, round, and reactive to light.  No scleral icterus.    Neck:  Neck supple.  No JVD present.  trachea midline  Cardiovascular:  Normal rate, regular rhythm with + murmur.  Pulmonary/Chest:  No respiratory distress, no wheezes, no crackles, with normal breath sounds and good air movement.  Abdomen:  Soft.  Bowel sounds are normal.  No distension and no tenderness.   Musculoskeletal:  No edema, no tenderness, and no deformity.  No red or swollen joints anywhere.    Neurological:  Alert and oriented to person, limited to place, and time.  No cranial nerve deficit. No focal deficits. No facial droop.  No slurred speech.   Skin:  Skin is warm and dry. No rash noted. No pallor.   Peripheral vascular:  pulses in all 4 extremities with no clubbing, no cyanosis, 1+ edema b/l.  Genitourinary: No hsieh  ----------------------------------------------------------------------------------------------------------------------  ----------------------------------------------------------------------------------------------------------------------  Results from last 7 days   Lab Units 03/17/20  0405 03/15/20  1214 03/11/20  0257   CRP mg/dL  --  4.30*  --    LACTATE mmol/L  --  1.3  --    WBC 10*3/mm3 5.55 4.00 3.96   HEMOGLOBIN g/dL 11.7* 10.4* 9.5*   HEMATOCRIT % 37.2 32.7* 31.0*   MCV fL 100.8* 100.6* 103.3*   MCHC g/dL 31.5 31.8 30.6*   PLATELETS 10*3/mm3 147 116* 83*   INR   --  1.23*  --      Results from last 7 days   Lab Units 03/17/20  0405 03/16/20  0541 03/15/20  2109 03/15/20  1214   SODIUM mmol/L 143 145  --  138   POTASSIUM mmol/L 3.2* 3.5 3.7 4.1    MAGNESIUM mg/dL  --  2.5* 3.0* 1.7   CHLORIDE mmol/L 109* 109*  --  102   CO2 mmol/L 22.8 23.8  --  24.7   BUN mg/dL 9 13  --  15   CREATININE mg/dL 0.84 0.90  --  0.88   PHOSPHORUS mg/dL  --  2.1*  --   --    EGFR IF NONAFRICN AM mL/min/1.73 64 59*  --  61   CALCIUM mg/dL 8.6 8.8  --  8.6   GLUCOSE mg/dL 101* 112*  --  93   ALBUMIN g/dL 2.80*  --   --  2.99*   BILIRUBIN mg/dL 1.7*  --   --  1.2   ALK PHOS U/L 122*  --   --  122*   AST (SGOT) U/L 45*  --   --  46*   ALT (SGPT) U/L 25  --   --  23   Estimated Creatinine Clearance: 45.4 mL/min (by C-G formula based on SCr of 0.84 mg/dL).  Results from last 7 days   Lab Units 03/15/20  1214   TROPONIN T ng/mL 0.018     Glucose   Date/Time Value Ref Range Status   03/15/2020 1420 85 70 - 130 mg/dL Final     Ammonia   Date Value Ref Range Status   03/17/2020 71 (H) 11 - 51 umol/L Final   03/15/2020 129 (H) 11 - 51 umol/L Final       Results from last 7 days   Lab Units 03/15/20  1229   NITRITE UA  Negative     No results found for: BLOODCXNo results found for: RESPCXNo results found for: URINECXNo results found for: WOUNDCXNo results found for: BODYFLDCXNo results found for: STOOLCX  I have personally looked at the labs and they are summarized above.  ----------------------------------------------------------------------------------------------------------------------  Imaging Results (Last 24 Hours)     Procedure Component Value Units Date/Time    XR Abdomen KUB [[id number]] Collected:  03/17/20 0837     Updated:  03/17/20 0839    Narrative:       EXAMINATION: XR ABDOMEN KUB-      TECHNIQUE: Single KUB     CLINICAL INDICATION:     Constipation; K72.00-Acute and subacute hepatic  failure without coma      COMPARISON:    03/10/2020     FINDINGS:    There are no calcifications projecting over the kidneys or along the  expected course of the ureters.  Bowel gas pattern is nonspecific and nonobstructive in appearance.   Scattered fecal material seen throughout colon.  Bony  structures are stable.  No definite radiographic evidence of soft tissue mass.          Impression:       Interval decrease in stool burden. No significant constipation  radiographically evident. Otherwise stable exam.     This report was finalized on 3/17/2020 8:37 AM by Dr. Manish Sherwood MD.           I have personally reviewed the radiology images and read the final radiology report.    Assessment & Plan      Assessment:  Acute hepatic encephalopathy given lactulose noncompliance  Cirrhosis with known prior history of hepatitis C with chronic thrombocytopenia and mild coagulopathy  Hypophosphatemia  Chronic kidney disease stage II-III  Diastolic CHF, appears compensated  Mild AI  Essential hypertension  Macrocytic anemia  Hypothyroidism  Trigeminal Neuralgia    Plan:  For acute hepatic encephalopathy given lactulose noncompliance, will continue with lactulose 30 g every 8 hours. Ammonia improving. Continue with rifaximin. Currently patient is improved and confusion almost resolving. x-ray KUB with no constipation.    Cirrhosis with known history of prior hepatitis C.  Continue with Aldactone and Lasix.  Since patient is advanced age, risk of renal failure and dehydration high with diuretics.  Thrombocytopenia resolved. INR relatively stable.  Monitor closely.    Hypophosphatemia, replace and monitor.    CKD stage II-III stable.    Essential hypertension, improving.  Continue with Aldactone Lasix and amlodipine.    Macrocytic anemia, Hgb stable. Check B12 and folate.    Hypothyroidism, continue with levothyroxine 100 mcg.    On Trileptal regimen for history of trigeminal neuralgia.    Palliative care consulted for hospice.  Family and patient agreeable for [facility] hospice the time of discharge.    PT OT consulted.  Patient getting out of bed.    Lovenox for DVT prophylaxis and Protonix for GI prophylaxis.  DC to home in am if stable.     Management discussed in detail with patient and nursing.      The patient  is high risk due to the following diagnoses/reasons:  Hepatic encephalopathy        Anastasiya Fuchs MD  03/17/20  12:43

## 2020-03-17 NOTE — PROGRESS NOTES
"Palliative Care Daily Progress Note     S: Medical record reviewed, followed up with pt, pt's dtr and SARAH at bedside regarding patient's condition. Events noted. Pt denies any complaints today. Pt and family met with hospice RN today and plan is to discharge home tomorrow with hospice.       O:   Palliative Performance Scale Score:     /60 (BP Location: Right arm, Patient Position: Lying)   Pulse 74   Temp 98.2 °F (36.8 °C) (Oral)   Resp 20   Ht 162.6 cm (64\")   Wt 63.3 kg (139 lb 9.6 oz)   LMP  (LMP Unknown)   SpO2 96%   BMI 23.96 kg/m²     Intake/Output Summary (Last 24 hours) at 3/17/2020 1627  Last data filed at 3/17/2020 1452  Gross per 24 hour   Intake 840 ml   Output --   Net 840 ml       PE:  General Appearance:    Chronically ill appearing, alert but slightly drowsy/withdrawn, cooperative, NAD   HEENT:    NC/AT, without obvious abnormality, EOMI, anicteric    Neck:   supple, trachea midline, no JVD   Lungs:     Decreased BS but otherwise CTAB without w/r/r    Heart:    RRR, normal S1 and S2, no M/R/G   Abdomen:     Soft, NT, mildly distension, NABS    Extremities:   Moves all extremities, 1+ BLE edema   Pulses:   Pulses palpable and equal bilaterally   Skin:   Warm, dry   Neurologic:   Alert but slightly drowsy, no focal deficits, cooperative   Psych:   Calm, appropriate           Meds: Reviewed and changes noted    Labs:   Results from last 7 days   Lab Units 03/17/20  0405   WBC 10*3/mm3 5.55   HEMOGLOBIN g/dL 11.7*   HEMATOCRIT % 37.2   PLATELETS 10*3/mm3 147     Results from last 7 days   Lab Units 03/17/20  0405   SODIUM mmol/L 143   POTASSIUM mmol/L 3.2*   CHLORIDE mmol/L 109*   CO2 mmol/L 22.8   BUN mg/dL 9   CREATININE mg/dL 0.84   GLUCOSE mg/dL 101*   CALCIUM mg/dL 8.6     Results from last 7 days   Lab Units 03/17/20  0405   SODIUM mmol/L 143   POTASSIUM mmol/L 3.2*   CHLORIDE mmol/L 109*   CO2 mmol/L 22.8   BUN mg/dL 9   CREATININE mg/dL 0.84   CALCIUM mg/dL 8.6   BILIRUBIN mg/dL " 1.7*   ALK PHOS U/L 122*   ALT (SGPT) U/L 25   AST (SGOT) U/L 45*   GLUCOSE mg/dL 101*     Imaging Results (Last 72 Hours)     Procedure Component Value Units Date/Time    XR Abdomen KUB [[id number]] Collected:  03/17/20 0837     Updated:  03/17/20 0839    Narrative:       EXAMINATION: XR ABDOMEN KUB-      TECHNIQUE: Single KUB     CLINICAL INDICATION:     Constipation; K72.00-Acute and subacute hepatic  failure without coma      COMPARISON:    03/10/2020     FINDINGS:    There are no calcifications projecting over the kidneys or along the  expected course of the ureters.  Bowel gas pattern is nonspecific and nonobstructive in appearance.   Scattered fecal material seen throughout colon.  Bony structures are stable.  No definite radiographic evidence of soft tissue mass.          Impression:       Interval decrease in stool burden. No significant constipation  radiographically evident. Otherwise stable exam.     This report was finalized on 3/17/2020 8:37 AM by Dr. Manish Sherwood MD.       XR Chest 1 View [[id number]] Collected:  03/15/20 1224     Updated:  03/15/20 1226    Narrative:       XR CHEST 1 VW-     CLINICAL INDICATION: AMS        COMPARISON: 03/05/2020      TECHNIQUE: Single frontal view of the chest.     FINDINGS:     There is diffuse interstitial lung disease.  Right-sided consolidation  There is no evidence of an acute osseous abnormality.   There are no suspicious-appearing parenchymal soft tissue nodules.          Impression:       Diffuse interstitial lung disease.  Right-sided consolidation     This report was finalized on 3/15/2020 12:24 PM by Dr. Byron Mcintosh MD.       CT Head Without Contrast [[id number]] Collected:  03/15/20 1205     Updated:  03/15/20 1208    Narrative:       CT HEAD WO CONTRAST-     CLINICAL INDICATION: AMS        COMPARISON: 03/05/2020      TECHNIQUE: Axial images of the brain were obtained with out intravenous  contrast.  Reformatted images were created in the sagittal and  coronal  planes.     DOSE: 2183.80 mGy.cm     Radiation dose reduction techniques were utilized per ALARA protocol.  Automated exposure control was initiated through either or CareDose or  DoseRight software packages by  protocol.           FINDINGS:   Today's study shows no mass, hemorrhage, or midline shift.   Cerebral atrophy and ventriculomegaly  There is no evidence of acute ischemia.  I do not see epidural or subdural hematoma.  The gray-white differentiation is appropriate.   The bone window setting images show no destructive calvarial lesion or  acute calvarial fracture.   The posterior fossa is unremarkable.          Impression:       No acute intracranial pathology. Nothing is seen on this exam to  specifically account for the patient's symptoms.     This report was finalized on 3/15/2020 12:06 PM by Dr. Byron Mcintosh MD.               Diagnostics: Reviewed    A: Alma Delia Garcia is a 89 y.o. 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     Plan is for pt to discharge home with hospice tomorrow. Given that plan, I do not feel that we need to pursue improvement in lab measurements.     We will continue to follow along. Please do not hesitate to contact us regarding further sx mgmt or GOC needs, including after hours or on weekends via our on call provider at [phone].     Yue Huff MD    3/17/2020

## 2020-03-17 NOTE — THERAPY EVALUATION
Acute Care - Occupational Therapy Initial Evaluation   Mark     Patient Name: Alma Delia Garcia  : 1930  MRN: [id number]  Today's Date: 3/17/2020  Onset of Illness/Injury or Date of Surgery: 20     Referring Physician: Jef    Admit Date: 3/15/2020       ICD-10-CM ICD-9-CM   1. Acute hepatic encephalopathy K72.00 572.2     Patient Active Problem List   Diagnosis   • Hepatic cirrhosis*   • HTN (hypertension)*   • Anxiety disorder*   • Osteoporosis   • Hypothyroidism*   • GERD (gastroesophageal reflux disease)*   • Chronic pain syndrome due to fractured spine and left rib fracture*   • Anemia, chronic disease   • Arthralgia of hip   • Primary insomnia   • Peripheral vascular disease (CMS/HCC)   • Nonrheumatic aortic valve insufficiency, mild   • Chronic diastolic heart failure (Grade 1)    • Poor venous access   • Chronic kidney disease, stage III (moderate) (CMS/HCC)   • Acute hepatic encephalopathy   • Coronary artery disease   • Restless leg syndrome   • Hepatitis C   • Debility in setting of chronic illness    • Respiratory alkalosis with secondary metabolic alkalosis    • Prolonged Q-T interval on ECG   • Macrocytic anemia   • Hypoalbuminemia   • Encephalopathy   • Hepatic encephalopathy (CMS/HCC)     Past Medical History:   Diagnosis Date   • Acute renal failure (CMS/HCC)    • Alcohol abuse     in remission   • Anxiety    • Bell's palsy    • Cellulitis     LLE   • CHF (congestive heart failure) (CMS/HCC)     Diastolic   • CHF (congestive heart failure) (CMS/HCC)    • Constipation    • Coronary artery disease    • Diverticulosis    • GERD (gastroesophageal reflux disease)    • Hepatic cirrhosis (CMS/HCC)    • Hepatitis C    • History of transfusion    • Hypertension    • Hypothyroidism    • MI (mitral incompetence)    • Osteoporosis    • Osteoporosis    • Restless leg syndrome    • Thrombocytopenia (CMS/HCC)    • Trigeminal neuralgia      Past Surgical History:   Procedure Laterality Date   •  BACK SURGERY      KYPHOPLASTIES    • CARDIAC CATHETERIZATION Left 08/2004   • CARDIOVASCULAR STRESS TEST  01/25/2015   • CATARACT EXTRACTION     • CHOLECYSTECTOMY     • CLAVICLE SURGERY Right    • COLONOSCOPY  10/2004   • ECHO - CONVERTED  11/2012   • FEMUR OPEN REDUCTION INTERNAL FIXATION Left 10/12/2017    Procedure: FEMUR OPEN REDUCTION INTERNAL FIXATION;  Surgeon: Karson Pierre MD;  Location: [facility] OR;  Service:    • HIP FRACTURE SURGERY Bilateral     ARTHROPLASTIES    • PORTACATH PLACEMENT N/A 9/28/2018    Procedure: INSERTION OF PORTACATH;  Surgeon: Vic Mauricio MD;  Location: [facility] OR;  Service: General   • SUBTOTAL HYSTERECTOMY     • WRIST FRACTURE SURGERY            OT ASSESSMENT FLOWSHEET (last 12 hours)      Occupational Therapy Evaluation     Row Name 03/17/20 1326                   OT Evaluation Time/Intention    Document Type  evaluation  -KR        Mode of Treatment  occupational therapy  -KR        Patient Effort  adequate  -KR           General Information    Patient Observations  alert;cooperative;agree to therapy  -KR           Relationship/Environment    Primary Source of Support/Comfort  child(jojo)  -KR        Lives With  child(jojo), adult  -KR           Cognitive Assessment/Intervention- PT/OT    Orientation Status (Cognition)  oriented to;person;place  -KR        Follows Commands (Cognition)  WFL  -KR           Bed Mobility Assessment/Treatment    Bed Mobility Assessment/Treatment  bed mobility (all) activities  -KR        [place] Level (Bed Mobility)  maximum assist (25% patient effort)  -KR           Transfer Assessment/Treatment    Transfer Assessment/Treatment  bed-chair transfer  -KR           Bed-Chair Transfer    Bed-Chair [place] (Transfers)  moderate assist (50% patient effort)  -KR           ADL Assessment/Intervention    BADL Assessment/Intervention  bathing;upper body dressing;lower body dressing;grooming;feeding;toileting  -KR           Bathing  Assessment/Intervention    Bathing [place] Level  bathing skills;moderate assist (50% patient effort)  -KR           Upper Body Dressing Assessment/Training    Upper Body Dressing [place] Level  upper body dressing skills;moderate assist (50% patient effort)  -KR           Lower Body Dressing Assessment/Training    Lower Body Dressing [place] Level  lower body dressing skills;maximum assist (25% patient effort)  -KR           Grooming Assessment/Training    [place] Level (Grooming)  grooming skills;moderate assist (50% patient effort)  -KR           Self-Feeding Assessment/Training    [place] Level (Feeding)  feeding skills;minimum assist (75% patient effort)  -KR           Toileting Assessment/Training    [place] Level (Toileting)  toileting skills;moderate assist (50% patient effort)  -KR           General ROM    GENERAL ROM COMMENTS  BUE 3-/5  -KR           MMT (Manual Muscle Testing)    General MMT Comments  BUE 3-/5  -KR           Wound 03/15/20 1529 Right medial heel Pressure Injury    Wound - Properties Group Date first assessed: 03/15/20  -KH Time first assessed: 1529  -KH Present on Hospital Admission: Y  -KH Side: Right  - Orientation: medial  - Location: heel  -KH Primary Wound Type: Pressure inj  -HM Stage, Pressure Injury: Stage 2  -HM       Clinical Impression (OT)    Criteria for Skilled Therapeutic Interventions Met (OT Eval)  yes  -KR        Rehab Potential (OT Eval)  good, to achieve stated therapy goals  -KR        Anticipated Discharge Disposition (OT)  home with assist  -KR           Planned OT Interventions    Planned Therapy Interventions (OT Eval)  activity tolerance training;strengthening exercise  -KR           OT Goals    Strength Goal Selection (OT)  strength, OT goal 1  -KR        Additional Documentation  Strength Goal Selection (OT) (Row)  -KR           Strength Goal 1 (OT)    Strength Goal 1 (OT)  BUE increase x 1 to enhance self care skills  -KR         Time Frame (Strength Goal 1, OT)  by discharge  -KR          User Key  (r) = Recorded By, (t) = Taken By, (c) = Cosigned By    Initials Name Effective Dates     Damari Olmstead RN 06/16/16 -     Manish Hardy OT 04/03/18 -     Genet Boo RN 06/10/16 -                OT Recommendation and Plan  Outcome Summary/Treatment Plan (OT)  Anticipated Discharge Disposition (OT): home with assist  Planned Therapy Interventions (OT Eval): activity tolerance training, strengthening exercise           Time Calculation:   Time Calculation- OT     Row Name 03/17/20 1132             Timed Charges    03702 - Gait Training Minutes   15  -BC        User Key  (r) = Recorded By, (t) = Taken By, (c) = Cosigned By    Initials Name Provider Type    Rosa Summers, PT Physical Therapist        Therapy Charges for Today     Code Description Service Date Service Provider Modifiers Qty    18539205851 HC OT EVAL MOD COMPLEXITY 4 3/17/2020 Manish Ramirez OT GO 1               Manish Ramirez OT  3/17/2020

## 2020-03-18 PROBLEM — K76.82 ACUTE HEPATIC ENCEPHALOPATHY (HCC): Status: RESOLVED | Noted: 2019-01-01 | Resolved: 2020-01-01

## 2020-03-18 NOTE — PLAN OF CARE
Patient does not appear to be feeling well this shift has been very quite and tired had one time of nausea but has slept

## 2020-03-18 NOTE — PROGRESS NOTES
Patient sleeping comfortably.  No family at bedside to speak with.  Noted discharge today with hospice.     CAYLA Salazar  Palliative Care

## 2020-03-18 NOTE — NURSING NOTE
Called and spoke with pts sister about her belongings being left in patients room. Made them aware they would be in security and they can pick them up there.

## 2020-03-18 NOTE — THERAPY TREATMENT NOTE
Acute Care - Physical Therapy Treatment Note  FIDEL Flores     Patient Name: Alma Delia Garcia  : 1930  MRN: [id number]  Today's Date: 3/18/2020  Onset of Illness/Injury or Date of Surgery: 20  Date of Referral to PT: 20  Referring Physician: Jef    Admit Date: 3/15/2020    Visit Dx:    ICD-10-CM ICD-9-CM   1. Acute hepatic encephalopathy K72.00 572.2   2. Chronic pain syndrome due to fractured spine and left rib fracture* G89.4 338.4     Patient Active Problem List   Diagnosis   • Hepatic cirrhosis*   • HTN (hypertension)*   • Anxiety disorder*   • Osteoporosis   • Hypothyroidism*   • GERD (gastroesophageal reflux disease)*   • Chronic pain syndrome due to fractured spine and left rib fracture*   • Anemia, chronic disease   • Arthralgia of hip   • Primary insomnia   • Peripheral vascular disease (CMS/HCC)   • Nonrheumatic aortic valve insufficiency, mild   • Chronic diastolic heart failure (Grade 1)    • Poor venous access   • Chronic kidney disease, stage III (moderate) (CMS/HCC)   • Coronary artery disease   • Restless leg syndrome   • Hepatitis C   • Debility in setting of chronic illness    • Respiratory alkalosis with secondary metabolic alkalosis    • Prolonged Q-T interval on ECG   • Macrocytic anemia   • Hypoalbuminemia   • Encephalopathy   • Hepatic encephalopathy (CMS/HCC)       Therapy Treatment    Rehabilitation Treatment Summary     Row Name 20 1500             Treatment Time/Intention    Discipline  physical therapist  -BC      Document Type  therapy note (daily note)  -BC      Mode of Treatment  physical therapy  -BC      Therapy Frequency (PT Clinical Impression)  -- 3-5x/week  -BC      Patient Effort  good  -BC      Recorded by [BC] Rosa Stringer, PT 20 1110      Row Name 20 1500             Cognitive Assessment/Intervention- PT/OT    Orientation Status (Cognition)  oriented x 3  -BC      Follows Commands (Cognition)  WFL  -BC      Recorded by [BC] Myke  Rosa CABRERA, PT 03/18/20 1559      Row Name 03/18/20 1500             Mobility Assessment/Intervention    Extremity Weight-bearing Status  left lower extremity;right lower extremity  -BC      Left Lower Extremity (Weight-bearing Status)  weight-bearing as tolerated (WBAT)  -BC      Right Lower Extremity (Weight-bearing Status)  weight-bearing as tolerated (WBAT)  -BC      Recorded by [BC] Rosa Stringer, PT 03/18/20 1559      Row Name 03/18/20 1500             Bed Mobility Assessment/Treatment    Bed Mobility Assessment/Treatment  bed mobility (all) activities  -BC      [place] Level (Bed Mobility)  moderate assist (50% patient effort)  -BC      Bed Mobility, Safety Issues  cognitive deficits limit understanding  -BC      Assistive Device (Bed Mobility)  bed rails  -BC      Recorded by [BC] Rosa Stringer, PT 03/18/20 1559      Row Name 03/18/20 1500             Transfer Assessment/Treatment    Transfer Assessment/Treatment  sit-stand transfer;stand-sit transfer  -BC      Maintains Weight-bearing Status (Transfers)  able to maintain  -BC      Recorded by [BC] Rosa Stringer, PT 03/18/20 1559      Row Name 03/18/20 1500             Sit-Stand Transfer    Sit-Stand [place] (Transfers)  minimum assist (75% patient effort)  -BC      Assistive Device (Sit-Stand Transfers)  walker, front-wheeled  -BC      Recorded by [BC] Rosa Stringer, PT 03/18/20 1559      Row Name 03/18/20 1500             Stand-Sit Transfer    Stand-Sit [place] (Transfers)  minimum assist (75% patient effort)  -BC      Assistive Device (Stand-Sit Transfers)  walker, front-wheeled  -BC      Recorded by [BC] Rosa Strigner, PT 03/18/20 1559      Row Name 03/18/20 1500             Gait/Stairs Assessment/Training    Gait/Stairs Assessment/Training  gait/ambulation independence  -BC      [place] Level (Gait)  minimum assist (75% patient effort)  -BC      Assistive Device (Gait)  walker, front-wheeled  -BC      Distance in  Feet (Gait)  60  -BC      Recorded by [BC] Rosa Stringer, PT 03/18/20 1559      Row Name 03/18/20 1500             Positioning and Restraints    Pre-Treatment Position  in bed  -BC      Post Treatment Position  bed  -BC      In Bed  notified nsg;supine;sitting EOB;call light within reach;with family/caregiver;side rails up x3  -BC      Recorded by [BC] Rosa Stringer, PT 03/18/20 1559      Row Name                Wound 03/15/20 1529 Right medial heel Pressure Injury    Wound - Properties Group Date first assessed: 03/15/20 [KH] Time first assessed: 1529 [KH] Present on Hospital Admission: Y [KH] Side: Right [KH] Orientation: medial [KH] Location: heel [KH] Primary Wound Type: Pressure inj [HM] Stage, Pressure Injury: Stage 2 [] Recorded by:  [] Damari Olmstead RN 03/16/20 0948 [] Genet Lao RN 03/15/20 1542    Row Name 03/18/20 1500             Coping    Observed Emotional State  accepting  -BC      Verbalized Emotional State  acceptance  -BC      Recorded by [BC] Rosa Stringer, PT 03/18/20 1559        User Key  (r) = Recorded By, (t) = Taken By, (c) = Cosigned By    Initials Name Effective Dates Discipline     Damari Olmstead RN 06/16/16 -  Nurse    BC Rosa Stringer, PT 03/14/16 -  PT    Genet Boo RN 06/10/16 -  Nurse          Wound 03/15/20 1529 Right medial heel Pressure Injury (Active)   Wound Image    3/18/2020 12:00 PM   Dressing Appearance intact 3/18/2020  7:30 AM   Closure None 3/18/2020  7:30 AM   Base pink;red 3/18/2020  7:30 AM   Drainage Characteristics/Odor clear 3/18/2020  7:30 AM   Drainage Amount scant 3/18/2020  7:30 AM   Care, Wound cleansed with;sterile normal saline;honey applied 3/18/2020  7:30 AM   Dressing Care, Wound dressing changed;border dressing 3/18/2020  7:30 AM   Periwound Care, Wound dry periwound area maintained 3/18/2020  7:30 AM               PT Recommendation and Plan  Planned Therapy Interventions (PT Eval): balance training,  bed mobility training, gait training, home exercise program, patient/family education  Therapy Frequency (PT Clinical Impression): (3-5x/week)  Plan of Care Reviewed With: patient     Time Calculation:   PT Charges     Row Name 03/18/20 1559             Time Calculation    PT Received On  03/18/20  -BC         Time Calculation- PT    Total Timed Code Minutes- PT  30 minute(s)  -BC         Timed Charges    80875 - Gait Training Minutes   15  -BC      75174 - PT Therapeutic Activity Minutes  15  -BC        User Key  (r) = Recorded By, (t) = Taken By, (c) = Cosigned By    Initials Name Provider Type    BC Rosa Stringer, PT Physical Therapist        Therapy Charges for Today     Code Description Service Date Service Provider Modifiers Qty    58548034698 HC GAIT TRAINING EA 15 MIN 3/17/2020 Rosa Stringer, PT GP 1    89516917760 HC PT THERAPEUTIC ACT EA 15 MIN 3/17/2020 Rosa Stringer, PT GP 1    52728178132 HC GAIT TRAINING EA 15 MIN 3/18/2020 Rosa Stringer, PT GP 1    73196663422 HC PT THERAPEUTIC ACT EA 15 MIN 3/18/2020 Rosa Stringer, PT GP 1               Rosa Stringer, PT  3/18/2020

## 2020-03-18 NOTE — DISCHARGE SUMMARY
[facility] DISCHARGE SUMMARY    Patient Identification:  Name:  Alma Delia Garcia  Age:  89 y.o.  Sex:  female  :  1930  MRN:  [id number]  Visit Number:  [id number]    Date of Admission: 3/15/2020  Date of Discharge:  3/18/2020     PCP: Galina Gonsalves MD    DISCHARGE DIAGNOSIS  Acute hepatic encephalopathy given lactulose noncompliance  Cirrhosis with known prior history of hepatitis C with chronic thrombocytopenia and mild coagulopathy  Hypophosphatemia  Chronic kidney disease stage II-III  Diastolic CHF, appears compensated  Mild AI  Essential hypertension  Macrocytic anemia  Hypothyroidism  Trigeminal Neuralgia    CONSULTS   Palliative    PROCEDURES PERFORMED  None    HOSPITAL COURSE  Ms. Garcia is a 89 y.o. female with past medical history significant for hepatic cirrhosis with hepatic encephalopathy, Chronic kidney disease stage II-III, essential hypertension, coronary artery disease, trigeminal neuralgia and debility presented to [facility] complaining of confusion.  Please see the admitting history and physical for further details.   Admitted with hepatic encephalopathy and lactulose noncompliance with significantly elevated ammonia level. Started on aggressive lactulose regimen. Continued with rifaximin. Patient had good response with multiple Bowels movements and complete resolution of confusion. Ammonia level trended down. Lactulose dose adjusted. KUB with Interval decrease in stool burden. No significant constipation radiographically evident.  For HTN, continued on aldactone and lasix and the dose decreased. Amlodipine added. BP optimal controlled. PT OT consulted.  Patient getting out of bed. Palliative care consulted for hospice. SS consulted for the DC planning. Family and patient agreeable for [facility] the time of discharge. Since the patient is vitally stable, improved symptomatically and agreeable to go home, it was decided to discharge  the patient to home. Discharge disposition stable.        VITAL SIGNS:  Temp:  [97.9 °F (36.6 °C)-98.8 °F (37.1 °C)] 98 °F (36.7 °C)  Heart Rate:  [71-77] 71  Resp:  [18-20] 18  BP: (116-155)/(42-63) 116/42     on   ;        Body mass index is 23.96 kg/m².  Wt Readings from Last 3 Encounters:   03/15/20 63.3 kg (139 lb 9.6 oz)   03/05/20 67 kg (147 lb 11.2 oz)   01/20/20 65.8 kg (145 lb)       PHYSICAL EXAM:  General: Comfortable, Not in distress.  Well-developed and well-nourished.   HENT:  Head:  Normocephalic and atraumatic.  Mouth:  Moist mucous membranes. Eyes:  Conjunctivae and EOM are normal.  Pupils are equal, round, and reactive to light.  No scleral icterus.    Neck:  Neck supple.  No JVD present.  trachea midline  Cardiovascular:  Normal rate, regular rhythm with + murmur.  Pulmonary/Chest:  No respiratory distress, no wheezes, no crackles, with normal breath sounds and good air movement.  Abdomen:  Soft.  Bowel sounds are normal.  No distension and no tenderness.   Musculoskeletal:  No edema, no tenderness, and no deformity.  No red or swollen joints anywhere.    Neurological:  Alert and oriented to person, limited to place, and time.  No cranial nerve deficit. No focal deficits. No facial droop.  No slurred speech.   Skin:  Skin is warm and dry. No rash noted. No pallor.   Peripheral vascular:  pulses in all 4 extremities with no clubbing, no cyanosis, trace edema b/l.  Genitourinary: No hsieh    DISCHARGE DISPOSITION   Home with Hospice    DISCHARGE MEDICATIONS:     Discharge Medications      New Medications      Instructions Start Date   amLODIPine 5 MG tablet  Commonly known as:  NORVASC   5 mg, Oral, Every 24 Hours Scheduled      ondansetron 4 MG tablet  Commonly known as:  Zofran   4 mg, Oral, Every 8 Hours PRN         Changes to Medications      Instructions Start Date   furosemide 40 MG tablet  Commonly known as:  LASIX  What changed:  how much to take   20 mg, Oral, Daily       HYDROcodone-acetaminophen  MG per tablet  Commonly known as:  NORCO  What changed:  how much to take   0.5 tablets, Oral, Every 12 Hours PRN      spironolactone 50 MG tablet  Commonly known as:  ALDACTONE  What changed:  how much to take   50 mg, Oral, Daily         Continue These Medications      Instructions Start Date   aspirin 81 MG EC tablet   81 mg, Oral, Daily      Boost Plus liquid   1 can, Oral, 2 Times Daily      esomeprazole 40 MG capsule  Commonly known as:  nexIUM   TAKE 1 CAPSULE BY MOUTH EVERY MORNING BEFORE BREAKFAST.      lactulose 10 GM/15ML solution  Commonly known as:  CHRONULAC   20 g, Oral, 3 Times Daily      levothyroxine 100 MCG tablet  Commonly known as:  SYNTHROID, LEVOTHROID   100 mcg, Oral, Daily      magnesium oxide 400 MG tablet  Commonly known as:  MAG-OX   400 mg, Oral, 2 Times Daily      multivitamin tablet tablet   1 tablet, Oral, Daily      nitroglycerin 0.4 MG SL tablet  Commonly known as:  Nitrostat   0.4 mg, Sublingual, Every 5 Minutes PRN, Take no more than 3 doses in 15 minutes.      OXcarbazepine 150 MG tablet  Commonly known as:  TRILEPTAL   150 mg, Oral, Every Morning      OXcarbazepine 150 MG tablet  Commonly known as:  TRILEPTAL   300 mg, Oral, Every Night at Bedtime      rOPINIRole 1 MG tablet  Commonly known as:  REQUIP   1 mg, Oral, Nightly, Take 1 hour before bedtime.       teriparatide 600 MCG/2.4ML injection  Commonly known as:  FORTEO   20 mcg, Subcutaneous, Every Night at Bedtime      vitamin D 1.25 MG (31968 UT) capsule capsule  Commonly known as:  ERGOCALCIFEROL   TAKE 1 CAPSULE BY MOUTH 1 TIME PER WEEK.      Xifaxan 550 MG tablet  Generic drug:  riFAXIMin   TAKE ONE TABLET BY MOUTH TWICE A DAY             Diet Instructions     Diet: Soft Texture; Thin Liquids, No Restrictions; Chopped      Discharge Diet:  Soft Texture    Fluid Consistency:  Thin Liquids, No Restrictions    Soft Options:  Chopped        Activity Instructions     Activity as Tolerated           Future Appointments   Date Time Provider Department Center   4/20/2020  2:30 PM Galina Gonsalves MD [facility] None     Your Scheduled Appointments    Apr 20, 2020  2:30 PM EDT  Follow Up with Galina Gonsalves MD  [facility] CARDIOLOGY (--) [place] [address]  [phone]   Arrive 15 minutes prior to appointment.          Additional Instructions for the Follow-ups that You Need to Schedule     Discharge Follow-up with PCP   As directed       Currently Documented PCP:    Galina Gonsalves MD    PCP Phone Number:    [phone]     Follow Up Details:  Within1 week           Follow-up Information     Galina Gonsalves MD .    Specialty:  Cardiology  Why:  Within1 week  Contact information:  [place] [address]  [phone]                    TEST  RESULTS PENDING AT DISCHARGE   Order Current Status    Blood Culture - Blood, Arm, Left Preliminary result    Blood Culture - Blood, Arm, Right Preliminary result           CODE STATUS  Code Status and Medical Interventions:   Ordered at: 03/15/20 1534     Limited Support to NOT Include:    Intubation    Cardioversion/Defibrillation     Code Status:    No CPR     Medical Interventions (Level of Support Prior to Arrest):    Limited       Anastasiya Fuchs MD  03/18/20  11:04    Please note that this discharge summary required more than 30 minutes to complete.    Please send a copy of this dictation to the following providers:  Galina Gonsalves MD

## 2020-03-18 NOTE — PROGRESS NOTES
Discharge Planning Assessment   Gideon     Patient Name: Alma Delia Garcia  MRN: [id number]  Today's Date: 3/18/2020    Admit Date: 3/15/2020    Discharge Plan     Row Name 03/18/20 1146       Plan    Final Discharge Disposition Code  50 - home with hospice    Final Note Pt is being discharged home today. SS contacted [facility] [phone] per Eduardo. [facility]. SS to fax AVS after lead nurse has reviewed it. SS to follow.     SS contacted pt's granddaughter, Candace and she states plans to provide transportation for pt today. SS to follow.     SS faxed AVS to [facility]. No other needs identified.            Home Medical Care - Selection Complete      Service Provider Request Status Selected Services Address Phone Number Fax Number    [facility] Selected Home Hospice [id number] [address] [phone]     XIOMARA Marquez

## 2020-04-20 PROBLEM — G50.0 TRIGEMINAL NEURALGIA: Status: ACTIVE | Noted: 2020-01-01

## 2020-04-20 NOTE — PROGRESS NOTES
subjective     Chief Complaint   Patient presents with   • Hypertension     S/P Hospitilization /  pt is on hospice care since d/c'd from hospital 03-15-20   • Coronary Artery Disease     Follow up         History of Present Illness  You have chosen to receive care through a telephone visit. Do you consent to use a telephone visit for your medical care today? Yes    Patient is 89 years old white female who was discharged from the hospital about a month ago.  She was admitted with acute hepatic encephalopathy.  She is supposed to be taking lactulose and rifampin but she has not been compliant with lactulose in the past.  Hepatic cirrhosis is related to hep C in the past.  She is now on hospice and is doing very well according to the family.  Blood pressure is very well controlled.  Patient complains of hemorrhoids but is not causing her any problem no bleeding.  She has hypothyroidism.  She is on Synthroid replacement therapy but her TSH was still abnormal when she left the hospital.  Patient has trigeminal neuralgia but very well controlled with Trileptal.  From cardiac standpoint she has mild aortic insufficiency and diastolic heart failure which has been compensated.  Family states that patient is not taking Lasix anymore that was discontinued by hospice.  When patient left the hospital she was discharged home on Lasix and Aldactone.    Family also states that patient is not taking Xifaxan 550 twice daily however when she left the hospital she was instructed to take it.      Past Surgical History:   Procedure Laterality Date   • BACK SURGERY      KYPHOPLASTIES    • CARDIAC CATHETERIZATION Left 08/2004   • CARDIOVASCULAR STRESS TEST  01/25/2015   • CATARACT EXTRACTION     • CHOLECYSTECTOMY     • CLAVICLE SURGERY Right    • COLONOSCOPY  10/2004   • ECHO - CONVERTED  11/2012   • FEMUR OPEN REDUCTION INTERNAL FIXATION Left 10/12/2017    Procedure: FEMUR OPEN REDUCTION INTERNAL FIXATION;  Surgeon: Karson Vallecillo  MD Ignacio;  Location: [facility] OR;  Service:    • HIP FRACTURE SURGERY Bilateral     ARTHROPLASTIES    • PORTACATH PLACEMENT N/A 9/28/2018    Procedure: INSERTION OF PORTACATH;  Surgeon: Vic Mauricio MD;  Location: [facility] OR;  Service: General   • SUBTOTAL HYSTERECTOMY     • WRIST FRACTURE SURGERY       Family History   Problem Relation Age of Onset   • Cancer Mother    • Heart disease Father    • Arthritis Sister    • Osteoporosis Sister    • Diabetes Maternal Aunt      Past Medical History:   Diagnosis Date   • Acute renal failure (CMS/HCC)    • Alcohol abuse     in remission   • Anxiety    • Bell's palsy    • Cellulitis     LLE   • CHF (congestive heart failure) (CMS/HCC)     Diastolic   • CHF (congestive heart failure) (CMS/HCC)    • Constipation    • Coronary artery disease    • Diverticulosis    • GERD (gastroesophageal reflux disease)    • Hepatic cirrhosis (CMS/HCC)    • Hepatitis C    • History of transfusion    • Hypertension    • Hypothyroidism    • MI (mitral incompetence)    • Osteoporosis    • Osteoporosis    • Restless leg syndrome    • Thrombocytopenia (CMS/HCC)    • Trigeminal neuralgia      Patient Active Problem List   Diagnosis   • Hepatic cirrhosis*   • HTN (hypertension)*   • Anxiety disorder*   • Osteoporosis   • Hypothyroidism*   • GERD (gastroesophageal reflux disease)*   • Chronic pain syndrome due to fractured spine and left rib fracture*   • Anemia, chronic disease   • Arthralgia of hip   • Primary insomnia   • Peripheral vascular disease (CMS/HCC)   • Nonrheumatic aortic valve insufficiency, mild   • Chronic diastolic heart failure (Grade 1)    • Poor venous access   • Chronic kidney disease, stage III (moderate) (CMS/HCC)   • Coronary artery disease   • Restless leg syndrome   • Hepatitis C   • Debility in setting of chronic illness    • Respiratory alkalosis with secondary metabolic alkalosis    • Prolonged Q-T interval on ECG   • Macrocytic anemia   • Hypoalbuminemia   •  Encephalopathy   • Hepatic encephalopathy (CMS/HCC)   • Trigeminal neuralgia       Social History     Tobacco Use   • Smoking status: Never Smoker   • Smokeless tobacco: Former User     Types: Snuff   Substance Use Topics   • Alcohol use: No     Comment: FORMER 12 BOTTLES PER DAY FOR 15 YEARS   • Drug use: No       Allergies   Allergen Reactions   • Ciprofloxacin Itching       Current Outpatient Medications on File Prior to Visit   Medication Sig   • aspirin 81 MG EC tablet Take 81 mg by mouth Daily.   • EASY TOUCH PEN NEEDLES 31G X 6 MM misc USE WITH FORTEO   • hydrOXYzine (ATARAX) 25 MG tablet Take 25 mg by mouth 3 (Three) Times a Day As Needed for Itching.   • lactulose (CHRONULAC) 10 GM/15ML solution Take 30 mL by mouth 3 (Three) Times a Day.   • magnesium oxide (MAG-OX) 400 MG tablet Take 400 mg by mouth 2 (Two) Times a Day.   • multivitamin (DAILY RAMESH) tablet tablet Take 1 tablet by mouth Daily.   • nitroglycerin (NITROSTAT) 0.4 MG SL tablet Place 1 tablet under the tongue Every 5 (Five) Minutes As Needed for Chest Pain. Take no more than 3 doses in 15 minutes.   • Nutritional Supplements (BOOST PLUS) liquid Take 1 can by mouth 2 (Two) Times a Day.   • omeprazole (priLOSEC) 40 MG capsule Take 40 mg by mouth Daily.   • ondansetron (Zofran) 4 MG tablet Take 1 tablet by mouth Every 8 (Eight) Hours As Needed for Nausea or Vomiting.   • OXcarbazepine (TRILEPTAL) 150 MG tablet Take 150 mg by mouth Every Morning.   • OXcarbazepine (TRILEPTAL) 150 MG tablet Take 300 mg by mouth every night at bedtime.   • oxyCODONE-acetaminophen (PERCOCET)  MG per tablet Take 1 tablet by mouth Every 6 (Six) Hours As Needed for Moderate Pain .   • spironolactone (ALDACTONE) 50 MG tablet Take 1 tablet by mouth Daily.   • vitamin D (ERGOCALCIFEROL) 1.25 MG (63108 UT) capsule capsule TAKE 1 CAPSULE BY MOUTH 1 TIME PER WEEK.   • furosemide (LASIX) 40 MG tablet Take 0.5 tablets by mouth Daily.   • levothyroxine (SYNTHROID,  LEVOTHROID) 100 MCG tablet Take 1 tablet by mouth Daily.   • rOPINIRole (REQUIP) 1 MG tablet Take 1 mg by mouth Every Night. Take 1 hour before bedtime.   • teriparatide (FORTEO) 600 MCG/2.4ML injection Inject 20 mcg under the skin into the appropriate area as directed every night at bedtime.   • XIFAXAN 550 MG tablet TAKE ONE TABLET BY MOUTH TWICE A DAY   • [DISCONTINUED] esomeprazole (nexIUM) 40 MG capsule TAKE 1 CAPSULE BY MOUTH EVERY MORNING BEFORE BREAKFAST.   • [DISCONTINUED] HYDROcodone-acetaminophen (NORCO)  MG per tablet Take 0.5 tablets by mouth Every 12 (Twelve) Hours As Needed for Moderate Pain .     No current facility-administered medications on file prior to visit.          The following portions of the patient's history were reviewed and updated as appropriate: allergies, current medications, past family history, past medical history, past social history, past surgical history and problem list.    Review of Systems   Constitution: Positive for malaise/fatigue.   HENT: Negative.  Negative for congestion.    Eyes: Negative.    Cardiovascular: Negative.  Negative for chest pain, cyanosis, dyspnea on exertion, irregular heartbeat, leg swelling, near-syncope, orthopnea, palpitations, paroxysmal nocturnal dyspnea and syncope.   Respiratory: Negative.  Negative for shortness of breath.    Hematologic/Lymphatic: Negative.    Musculoskeletal: Negative.    Gastrointestinal: Negative.         Hemorrhoids asymptomatic   Neurological: Negative.  Negative for headaches.          Objective:     LMP  (LMP Unknown)   Physical Exam   Constitutional:   Not done         Lab Review  Lab Results   Component Value Date     03/18/2020    K 3.8 03/18/2020     03/18/2020    BUN 11 03/18/2020    CREATININE 0.93 03/18/2020    GLUCOSE 115 (H) 03/18/2020    CALCIUM 8.0 (L) 03/18/2020    ALT 25 03/17/2020    ALKPHOS 122 (H) 03/17/2020    LABIL2 0.9 (L) 04/08/2016     Lab Results   Component Value Date    CKTOTAL  99 07/25/2017     Lab Results   Component Value Date    WBC 5.55 03/17/2020    HGB 11.7 (L) 03/17/2020    HCT 37.2 03/17/2020     03/17/2020     Lab Results   Component Value Date    INR 1.23 (H) 03/15/2020    INR 1.21 (H) 03/05/2020    INR 1.16 (H) 10/26/2019     Lab Results   Component Value Date    MG 2.5 (H) 03/16/2020     Lab Results   Component Value Date    TSH 15.650 (H) 03/05/2020     Lab Results   Component Value Date    BNP 33.0 11/05/2018     Lab Results   Component Value Date    CHLPL 161 04/08/2016    CHOL 190 03/05/2020    TRIG 84 03/05/2020     (H) 03/05/2020    VLDL 16.8 03/05/2020    LDLHDL 0.46 03/05/2020     Lab Results   Component Value Date    LDL 54 03/05/2020    LDL 69 10/27/2019       Procedures       I personally viewed and interpreted the patient's LAB data         Assessment:     1. Cirrhosis of liver without ascites, unspecified hepatic cirrhosis type (CMS/HCC)    2. Hypothyroidism, unspecified type    3. Chronic diastolic heart failure (Grade 1)     4. Trigeminal neuralgia          Plan:     Patient has hepatic cirrhosis with multiple episodes of acute hepatic encephalopathy.  She is on hospice now.  Family states that she is not taking Xifaxan however she is taking lactulose.  Family was instructed that she should be taking Xifaxan also.  She is also not taking Lasix  We will check her lab work.  She has had abnormal magnesium and potassium that needs to be checked.  We will also check ammonia level.  Her thyroid functions have been abnormal that needs to be checked again.  Coronavirus precautions discussed.  This visit has been rescheduled as a phone visit to comply with patient safety concerns in accordance with CDC recommendations. Total time of discussion was 15 minutes.          No follow-ups on file.

## 2020-06-26 NOTE — PROGRESS NOTES
subjective     Chief Complaint   Patient presents with   • Leg Pain     BLE   • Fall     7-13-19 in home didn't go to ER.  ordered xray yesterday.      History of Present Illness  Patient is 88 years old white female who has osteoarthritis and degenerative disc disease of lumbar cervical spine she has been taking low-dose Norco therapy from me  She saw Dr. griffin who gave her pain medications and arranged x-rays.  Patient has hepatic cirrhosis with multiple episodes of hepatic compensation, and hepatic encephalopathy  Patient on lactulose and Xifaxan  She also has osteoporosis and is taking Forteo  GERD symptoms are better with Nexium    Patient has a lot of lower extremity edema and is taking Bumex 1 mg on PRN basis.  Patient also has hypothyroidism on Synthroid placement therapy      Past Surgical History:   Procedure Laterality Date   • BACK SURGERY      KYPHOPLASTIES    • CARDIAC CATHETERIZATION Left 08/2004   • CARDIOVASCULAR STRESS TEST  01/25/2015   • CATARACT EXTRACTION     • CHOLECYSTECTOMY     • CLAVICLE SURGERY Right    • COLONOSCOPY  10/2004   • ECHO - CONVERTED  11/2012   • FEMUR OPEN REDUCTION INTERNAL FIXATION Left 10/12/2017    Procedure: FEMUR OPEN REDUCTION INTERNAL FIXATION;  Surgeon: Karson Pierre MD;  Location: [facility];  Service:    • HIP FRACTURE SURGERY Bilateral     ARTHROPLASTIES    • PARTIAL HYSTERECTOMY     • PORTACATH PLACEMENT N/A 9/28/2018    Procedure: INSERTION OF PORTACATH;  Surgeon: Vic Mauricio MD;  Location: [facility];  Service: General   • WRIST FRACTURE SURGERY       Family History   Problem Relation Age of Onset   • Cancer Mother    • Heart disease Father    • Arthritis Sister    • Osteoporosis Sister    • Diabetes Maternal Aunt      Past Medical History:   Diagnosis Date   • Acute renal failure (CMS/HCC)    • Alcohol abuse     in remission   • Anxiety    • Bell's palsy    • Cellulitis     LLE   • CHF (congestive heart failure) (CMS/HCC)      Diastolic   • CHF (congestive heart failure) (CMS/HCC)    • Constipation    • Coronary artery disease    • Diverticulosis    • GERD (gastroesophageal reflux disease)    • Hepatic cirrhosis (CMS/HCC)    • Hepatitis C    • History of transfusion    • Hypertension    • Hypothyroidism    • MI (mitral incompetence)    • Osteoporosis    • Osteoporosis    • Restless leg syndrome    • Thrombocytopenia (CMS/HCC)    • Trigeminal neuralgia      Patient Active Problem List   Diagnosis   • Hepatic cirrhosis*   • HTN (hypertension)*   • Anxiety disorder*   • Osteopetrosis*   • Hypothyroidism*   • GERD (gastroesophageal reflux disease)*   • Chronic pain syndrome due to fractured spine and left rib fracture*   • Anemia, chronic disease   • Arthralgia of hip   • Left leg pain   • Fracture of proximal end of tibia and fibula   • Primary insomnia   • Thrombocytopenia (CMS/HCC)   • Peripheral vascular disease (CMS/HCC)   • Bilateral leg edema   • Cramp of both lower extremities   • Hepatic encephalopathy (CMS/HCC)   • Acute UTI (urinary tract infection)   • Hypokalemia   • Closed fracture of lower end of left femur with routine healing   • Status post open reduction with internal fixation of fracture   • Nonrheumatic aortic valve insufficiency, mild   • Chronic diastolic heart failure, mild   • Periprosthetic fracture of shaft of femur   • S/P ORIF (open reduction internal fixation) fracture   • Diarrhea   • Right upper quadrant abdominal pain   • Nausea   • Elevated liver enzymes   • Poor venous access   • Chronic kidney disease, stage III (moderate) (CMS/HCC)   • Trochanteric bursitis of left hip   • ESBL (extended spectrum beta-lactamase) producing bacteria infection       Social History     Tobacco Use   • Smoking status: Never Smoker   • Smokeless tobacco: Former User     Types: Snuff   Substance Use Topics   • Alcohol use: No     Comment: FORMER 12 BOTTLES PER DAY FOR 15 YEARS   • Drug use: No       Allergies   Allergen Reactions    • Ciprofloxacin        Current Outpatient Medications on File Prior to Visit   Medication Sig   • ASPIRIN ADULT LOW STRENGTH 81 MG EC tablet TAKE 1 TABLET BY MOUTH DAILY.   • bumetanide (BUMEX) 1 MG tablet Take 1 tablet by mouth Daily As Needed (Swelling, increased shortness of breath).   • esomeprazole (nexIUM) 40 MG capsule TAKE 1 CAPSULE BY MOUTH EVERY MORNING BEFORE BREAKFAST.   • FORTEO 600 MCG/2.4ML injection INJECT 0.8 MLS SUB-Q EVERY DAY AS DIRECTED   • HYDROcodone-acetaminophen (NORCO) 5-325 MG per tablet Take 1 tablet by mouth 2 (Two) Times a Day As Needed for Moderate Pain .   • lactulose (CHRONULAC) 10 GM/15ML solution Take 45 mL by mouth 3 (Three) Times a Day.   • levothyroxine (SYNTHROID, LEVOTHROID) 75 MCG tablet Take 75 mcg by mouth Daily.   • magnesium oxide (MAG-OX) 400 MG tablet Take 800 mg by mouth Daily.   • nitroglycerin (NITROSTAT) 0.4 MG SL tablet Place 1 tablet under the tongue Every 5 (Five) Minutes As Needed for Chest Pain. Take no more than 3 doses in 15 minutes.   • OXcarbazepine (TRILEPTAL) 150 MG tablet Take 150 mg by mouth Every Morning.   • OXcarbazepine (TRILEPTAL) 150 MG tablet Take 300 mg by mouth Every Night.   • rifaximin (XIFAXAN) 550 MG tablet Take 1 tablet by mouth Every 12 (Twelve) Hours.   • rOPINIRole (REQUIP) 1 MG tablet TAKE 1 TABLET BY MOUTH EVERY NIGHT. TAKE 1 HOUR BEFORE BEDTIME.   • vitamin D (ERGOCALCIFEROL) 39643 units capsule capsule TAKE 1 CAPSULE BY MOUTH 1 (ONE) TIME PER WEEK.     No current facility-administered medications on file prior to visit.          The following portions of the patient's history were reviewed and updated as appropriate: allergies, current medications, past family history, past medical history, past social history, past surgical history and problem list.    Review of Systems   Constitution: Positive for weakness and malaise/fatigue.   HENT: Negative.    Eyes: Negative.    Cardiovascular: Positive for leg swelling.   Respiratory:  "Negative.    Musculoskeletal: Positive for arthritis, back pain, neck pain and stiffness.   Psychiatric/Behavioral: The patient is nervous/anxious.           Objective:     /62 (BP Location: Left arm, Patient Position: Sitting)   Pulse 54   Ht 162.6 cm (64\")   Wt 59.9 kg (132 lb)   LMP  (LMP Unknown)   SpO2 98%   BMI 22.66 kg/m²   Physical Exam   Constitutional: She appears well-developed and well-nourished. No distress.   HENT:   Head: Normocephalic and atraumatic.   Mouth/Throat: Oropharynx is clear and moist. No oropharyngeal exudate.   Eyes: Conjunctivae and EOM are normal. Pupils are equal, round, and reactive to light. No scleral icterus.   Neck: Normal range of motion. Neck supple. No JVD present. No tracheal deviation present. No thyromegaly present.   Cardiovascular: Normal rate, regular rhythm, normal heart sounds and intact distal pulses. PMI is not displaced. Exam reveals no gallop, no friction rub and no decreased pulses.   No murmur heard.  Pulses:       Carotid pulses are 3+ on the right side, and 3+ on the left side.       Radial pulses are 3+ on the right side, and 3+ on the left side.   Pulmonary/Chest: Effort normal and breath sounds normal. No respiratory distress. She has no wheezes. She has no rales. She exhibits no tenderness.   Abdominal: Soft. Bowel sounds are normal. She exhibits no distension, no abdominal bruit and no mass. There is no splenomegaly or hepatomegaly. There is no tenderness. There is no rebound and no guarding.   Musculoskeletal: Normal range of motion. She exhibits edema. She exhibits no tenderness or deformity.   Lymphadenopathy:     She has no cervical adenopathy.   Neurological: She is alert. She has normal reflexes. No cranial nerve deficit. She exhibits normal muscle tone. Coordination normal.   Skin: Skin is warm and dry. No rash noted. She is not diaphoretic. No erythema.   Psychiatric: She has a normal mood and affect. Her behavior is normal. Judgment " and thought content normal.         Lab Review  Lab Results   Component Value Date     04/18/2019    K 4.6 04/18/2019     (H) 04/18/2019    BUN 19 04/18/2019    CREATININE 1.00 04/18/2019    GLUCOSE 89 04/18/2019    CALCIUM 9.0 04/18/2019    ALT 18 04/18/2019    ALKPHOS 108 04/18/2019    LABIL2 0.9 (L) 04/08/2016     Lab Results   Component Value Date    CKTOTAL 99 07/25/2017     Lab Results   Component Value Date    WBC 4.54 04/18/2019    HGB 10.9 (L) 04/18/2019    HCT 34.9 04/18/2019    PLT 90 (L) 04/18/2019     Lab Results   Component Value Date    INR 1.30 (H) 04/16/2019    INR 1.28 (H) 04/15/2019    INR 1.28 (H) 06/27/2018     Lab Results   Component Value Date    MG 1.8 04/18/2019     Lab Results   Component Value Date    TSH 6.650 (H) 04/15/2019     Lab Results   Component Value Date    BNP 33.0 11/05/2018     Lab Results   Component Value Date    CHLPL 161 04/08/2016    CHOL 180 02/28/2019    TRIG 129 02/28/2019    HDL 89 02/28/2019    VLDL 25.8 02/28/2019    LDLHDL 0.73 02/28/2019     Lab Results   Component Value Date    LDL 65 02/28/2019    LDL 35 11/05/2018       Procedures           I personally viewed and interpreted the patient's LAB data         Assessment:     1. Hepatic cirrhosis, unspecified hepatic cirrhosis type, unspecified whether ascites present (CMS/HCC)    2. Chronic diastolic heart failure, mild    3. Essential hypertension    4. Nonrheumatic aortic valve insufficiency, mild    5. Generalized anxiety disorder    6. Bilateral leg edema          Plan:     Patient is doing much better.  She was in the hospital about 2 months ago with hepatic encephalopathy.  Patient is taking Xifaxan and lactulose  Which will be continued  Patient has bilateral lower extremity edema which is not any worse.  Bumex was continued.  Blood pressure seems very well controlled  Patient did not follow the contract of narcotic therapy.  She got Waelder prescription from Dr. griffin who got some x-rays on her  back.  She was advised to follow with Dr. griffin for pain management.  Follow-up in 3 months with ammonia and liver function        No Follow-up on file.   Fusiform Excision Additional Text (Leave Blank If You Do Not Want): The margin was drawn around the clinically apparent lesion.  A fusiform shape was then drawn on the skin incorporating the lesion and margins.  Incisions were then made along these lines to the appropriate tissue plane and the lesion was extirpated.

## 2020-07-28 NOTE — PLAN OF CARE
Problem: Patient Care Overview  Goal: Plan of Care Review  Outcome: Ongoing (interventions implemented as appropriate)   04/16/19 1131 04/16/19 1453 04/17/19 0740   Coping/Psychosocial   Plan of Care Reviewed With --  --  patient   OTHER   Outcome Summary MBS completed this am. Intermittent microaspiration w/ thin liquids via cup. Cheko aspiration w/ thin liquids via straw. Cough response elicited w/ all po trials across this evaluation. Initiate modified po diet of mechanical soft, chopped w/ NECTAR thick liquids only. Meds whole in puree. SLP to f/u for dysphagia tx/diet tolerance --  --    Plan of Care Review   Progress --  no change --      Goal: Individualization and Mutuality  Outcome: Ongoing (interventions implemented as appropriate)    Goal: Discharge Needs Assessment  Outcome: Ongoing (interventions implemented as appropriate)   04/15/19 1520 04/15/19 1647   Discharge Needs Assessment   Patient/Family Anticipates Transition to home --    Patient/Family Anticipated Services at Transition ;other (see comments)  (family wants to put back in nursing home for awhile ) --    Transportation Anticipated family or friend will provide --    Equipment Needed After Discharge none --    Disability   Equipment Currently Used at Home --  walker, rolling;bath bench;commode;hospital bed     Goal: Interprofessional Rounds/Family Conf  Outcome: Ongoing (interventions implemented as appropriate)      Problem: Fall Risk (Adult)  Intervention: Review Medications/Identify Contributors to Fall Risk   04/16/19 1419   Safety Management   Medication Review/Management medications reviewed       Goal: Absence of Fall  Outcome: Ongoing (interventions implemented as appropriate)   04/16/19 1453   Fall Risk (Adult)   Absence of Fall making progress toward outcome       Problem: Confusion, Acute (Adult)  Goal: Identify Related Risk Factors and Signs and Symptoms  Outcome: Ongoing (interventions implemented as appropriate)    04/17/19 1400   Confusion, Acute (Adult)   Related Risk Factors (Acute Confusion) advanced age     Goal: Cognitive/Functional Impairments Minimized  Outcome: Ongoing (interventions implemented as appropriate)   04/16/19 0333   Confusion, Acute (Adult)   Cognitive/Functional Impairments Minimized making progress toward outcome     Goal: Safety  Outcome: Ongoing (interventions implemented as appropriate)   04/16/19 1453   Confusion, Acute (Adult)   Safety making progress toward outcome       Problem: Skin Injury Risk (Adult)  Goal: Skin Health and Integrity  Outcome: Ongoing (interventions implemented as appropriate)   04/16/19 1453   Skin Injury Risk (Adult)   Skin Health and Integrity making progress toward outcome       Problem: Wound (Includes Pressure Injury) (Adult)  Goal: Signs and Symptoms of Listed Potential Problems Will be Absent, Minimized or Managed (Wound)  Outcome: Ongoing (interventions implemented as appropriate)   04/16/19 1453   Goal/Outcome Evaluation   Problems Assessed (Wound) all   Problems Present (Wound) none       Problem: Eating/Swallowing Impairment (IRF) (Adult)  Goal: Optimal/Safe Level of Indep, Swallowing  Outcome: Ongoing (interventions implemented as appropriate)   04/16/19 1131   Eating/Swallowing Impairment (IRF) (Adult)   Optimal/Safe Level of Indep, Swallowing demonstrating adequate progress          1+ bilaterally

## 2023-07-18 NOTE — OUTREACH NOTE
Prep Survey      Responses   Facility patient discharged from?  [facility]   Is patient eligible?  Yes   Discharge diagnosis  Acute hepatic encephalopathy d/t liver cirrhosis, hx of alcohol abuse and Hep. C, Resp. alkalosis, hypoalbuminemia, CKD III, macrocytic anemia, prolonged QTc, CAD, CHF, essential HTN, hypothyroidism,    Does the patient have one of the following disease processes/diagnoses(primary or secondary)?  Other   Does the patient have Home health ordered?  No   Is there a DME ordered?  No   Comments regarding appointments  See AVS   Prep survey completed?  Yes          Ama Angulo RN        
Add Postop Global No-Charge Code (69661)?: yes
Detail Level: Simple

## (undated) DEVICE — HOLDER: Brand: DEROYAL

## (undated) DEVICE — NDL HYPO ECLPS SFTY 18G 1 1/2IN

## (undated) DEVICE — IMMOB KN 3PNL DLX CANVS 24IN BLU

## (undated) DEVICE — SKIN AFFIX SURG ADHESIVE 72/CS 0.55ML: Brand: MEDLINE

## (undated) DEVICE — DRP C/ARM W/BAND W/CLIPS 41X74IN

## (undated) DEVICE — SYR LUERLOK 30CC

## (undated) DEVICE — APPL CHLORAPREP W/TINT 26ML ORNG

## (undated) DEVICE — INTENDED FOR TISSUE SEPARATION, AND OTHER PROCEDURES THAT REQUIRE A SHARP SURGICAL BLADE TO PUNCTURE OR CUT.: Brand: BARD-PARKER ® CARBON RIB-BACK BLADES

## (undated) DEVICE — GLV SURG TRIUMPH ORTHO W/ALOE PF LTX 8.5 STRL

## (undated) DEVICE — RL COTN ABSORB/COMPR 1/2LB 6IN 13FT 1PU

## (undated) DEVICE — BNDG ELAS ELITE V/CLOSE 6IN 5YD LF STRL

## (undated) DEVICE — UNDERCAST PADDING: Brand: DEROYAL

## (undated) DEVICE — PAD POSITIONING TRIANGLES KN DISP STRL

## (undated) DEVICE — PAD GRND REM POLYHESIVE A/ DISP

## (undated) DEVICE — DRSNG ADAPTIC 3X16

## (undated) DEVICE — KT CVR ULTRASND PROB PULL UP LXF 5X8

## (undated) DEVICE — PK BASIC 70

## (undated) DEVICE — SUT MNCRYL PLS ANTIB UD 4/0 PS2 18IN

## (undated) DEVICE — SUT ETHIB 2 CV V37 MS/4 30IN MX69G

## (undated) DEVICE — SUT VIC 3/0 SH 27IN J416H

## (undated) DEVICE — SUT PROLN 3/0 8832H

## (undated) DEVICE — DRAPE,T,LAPARO,TRANS,STERILE: Brand: MEDLINE

## (undated) DEVICE — ENCORE® LATEX MICRO SIZE 7.5, STERILE LATEX POWDER-FREE SURGICAL GLOVE: Brand: ENCORE

## (undated) DEVICE — PK EXTREM LOWR 70

## (undated) DEVICE — NDL HYPO ECLPS SFTY 22G 1 1/2IN

## (undated) DEVICE — DRSNG PAD ABD 8X10IN STRL

## (undated) DEVICE — BIT DRL PERC QC CALIB 4.3X300MM